# Patient Record
Sex: MALE | Race: WHITE | NOT HISPANIC OR LATINO | Employment: OTHER | URBAN - METROPOLITAN AREA
[De-identification: names, ages, dates, MRNs, and addresses within clinical notes are randomized per-mention and may not be internally consistent; named-entity substitution may affect disease eponyms.]

---

## 2020-05-07 ENCOUNTER — OFFICE VISIT (OUTPATIENT)
Dept: ALLERGY | Facility: CLINIC | Age: 75
End: 2020-05-07
Payer: MEDICARE

## 2020-05-07 VITALS
HEART RATE: 65 BPM | OXYGEN SATURATION: 96 % | HEIGHT: 73 IN | WEIGHT: 232 LBS | TEMPERATURE: 98 F | BODY MASS INDEX: 30.75 KG/M2

## 2020-05-07 DIAGNOSIS — J18.9 RECURRENT PNEUMONIA: ICD-10-CM

## 2020-05-07 DIAGNOSIS — J44.1 CHRONIC OBSTRUCTIVE PULMONARY DISEASE WITH ACUTE EXACERBATION: ICD-10-CM

## 2020-05-07 DIAGNOSIS — D83.9 CVID (COMMON VARIABLE IMMUNODEFICIENCY): Primary | ICD-10-CM

## 2020-05-07 DIAGNOSIS — Z90.81 H/O SPLENECTOMY: ICD-10-CM

## 2020-05-07 PROBLEM — D80.1 HYPOGAMMAGLOBULINEMIA: Status: ACTIVE | Noted: 2020-05-07

## 2020-05-07 PROCEDURE — 99204 OFFICE O/P NEW MOD 45 MIN: CPT | Mod: S$GLB,,, | Performed by: ALLERGY & IMMUNOLOGY

## 2020-05-07 PROCEDURE — 99204 PR OFFICE/OUTPT VISIT, NEW, LEVL IV, 45-59 MIN: ICD-10-PCS | Mod: S$GLB,,, | Performed by: ALLERGY & IMMUNOLOGY

## 2020-05-07 RX ORDER — BUDESONIDE AND FORMOTEROL FUMARATE DIHYDRATE 160; 4.5 UG/1; UG/1
2 AEROSOL RESPIRATORY (INHALATION)
Status: ON HOLD | COMMUNITY
End: 2023-08-31

## 2020-05-07 RX ORDER — ISOSORBIDE MONONITRATE 120 MG/1
120 TABLET, EXTENDED RELEASE ORAL
Status: ON HOLD | COMMUNITY
Start: 2018-04-16 | End: 2023-08-31

## 2020-05-07 RX ORDER — WARFARIN SODIUM 5 MG/1
TABLET ORAL
COMMUNITY
Start: 2020-04-15 | End: 2020-05-07

## 2020-05-07 RX ORDER — DOXYCYCLINE 100 MG/1
CAPSULE ORAL
Qty: 30 CAPSULE | Refills: 2 | Status: SHIPPED | OUTPATIENT
Start: 2020-05-07 | End: 2024-02-21

## 2020-05-07 RX ORDER — QUININE SULFATE 324 MG/1
648 CAPSULE ORAL
Status: ON HOLD | COMMUNITY
End: 2023-08-31

## 2020-05-07 RX ORDER — TIOTROPIUM BROMIDE 18 UG/1
18 CAPSULE ORAL; RESPIRATORY (INHALATION) DAILY
Status: ON HOLD | COMMUNITY
End: 2023-08-31

## 2020-05-07 RX ORDER — LEVOTHYROXINE SODIUM 25 UG/1
TABLET ORAL
COMMUNITY
Start: 2016-12-12 | End: 2023-09-20

## 2020-05-07 RX ORDER — FUROSEMIDE 40 MG/1
40 TABLET ORAL DAILY
COMMUNITY
End: 2023-11-28 | Stop reason: ALTCHOICE

## 2020-05-07 RX ORDER — SPIRONOLACTONE 50 MG/1
50 TABLET, FILM COATED ORAL DAILY
Status: ON HOLD | COMMUNITY
End: 2024-03-07 | Stop reason: HOSPADM

## 2020-05-07 RX ORDER — ACETAMINOPHEN 325 MG/1
650 TABLET ORAL EVERY 6 HOURS PRN
COMMUNITY

## 2020-05-07 RX ORDER — GABAPENTIN 100 MG/1
100 CAPSULE ORAL
COMMUNITY
Start: 2019-11-25 | End: 2023-08-23

## 2020-05-07 RX ORDER — HYDROCODONE BITARTRATE AND HOMATROPINE METHYLBROMIDE ORAL SOLUTION 5; 1.5 MG/5ML; MG/5ML
LIQUID ORAL EVERY 6 HOURS PRN
Status: ON HOLD | COMMUNITY
End: 2024-03-07 | Stop reason: HOSPADM

## 2020-05-07 RX ORDER — TEMAZEPAM 30 MG/1
30 CAPSULE ORAL NIGHTLY PRN
COMMUNITY
End: 2024-01-10

## 2020-05-07 RX ORDER — WARFARIN 2.5 MG/1
2.5 TABLET ORAL
COMMUNITY
End: 2020-05-07

## 2020-05-07 RX ORDER — ALBUTEROL SULFATE 0.83 MG/ML
2.5 SOLUTION RESPIRATORY (INHALATION)
COMMUNITY
End: 2023-08-23

## 2020-05-07 NOTE — PROGRESS NOTES
"Subjective:       Patient ID: Jose F Hanley is a 75 y.o. male.    Chief Complaint: Immunodeficiency ([facility] referral for low IgG level  688 2/11/2020)    HPI     Pt [facility] Dy[facility] NP and Dr. Yoandy Mccoy.     Pt has labs from Feb that indicate CVID.   IgG 688  IgM 24  IgA 270    Strep pneumo 6/14 2/2020 2018 11/14    Doesn't recall vaccination prior to 2018 and memory has dropped.   This qualifies him for CVID.     Tetanus is not immune, needs vaccination. 2/2020    Pt states he gets pneumonia 2-3 times per year. Last episode was late 2019 per pt report, [facility]'t [facility] that those reports currently, just labs and pft.   Pt states had recurrent pna and bronchitis in his 30's. 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     Doesn't recall recurrent sinusitis  Doesn't recall otitis media.       Pt endorses splenectomy in 5 years. He states he has had a pna vaccine last year, but his titers are low.  Epic immuniz indicates last injection was 11/2014 and PCV was 2017. Needs another ppv-23 vaccine.     Of note he has significant respiratory comorbidity.   Reviewed his PFT that shows severe obstruction.   Pt dx with " COPD" and on "shots" I assume dupixent as he states he takes it q 2 weeks.   States some improvement in his breathing and rhinitis with this.     Pre                    Post change   fvc 63%             6%  fev1 52%           9%  Ratio 62             66    Partial response to dilator  Severe obstruction.       Review of Systems    General: neg unexpected weight changes, fevers, chills, night sweats, malaise  HEENT: see hpi, Neg eye pain, vision changes, ear drainage, nose bleeds, throat tightness, sores in the mouth  CV: Neg chest pain, palpitations, swelling  Resp: see hpi,  hemoptysis  GI: see hpi, neg dysphagia, night abdominal pain, reflux, chronic diarrhea, chronic constipation  Derm: " "See Hpi, neg new rash, neg flushing  Mu/sk: Neg joint pain, joint swelling   Psych: Neg anxiety  neuro: neg chronic headaches, muscle weakness  Endo: neg heat/cold intolerance, chronic fatigue    Objective:     Vitals:    05/07/20 0933   Pulse: 65   Temp: 97.7 °F (36.5 °C)   TempSrc: Oral   SpO2: 96%   Weight: 105.2 kg (232 lb)   Height: 6' 1" (1.854 m)   no peak flow due to covid -19.      Physical Exam      General: no acute distress, well developed well nourished   HEENT:   Head:normocephalic atraumatic  Eyes: MIS, EOMI, Neg injection, scleral icterus, or conjunctival papillary hypertrophy.  Ears: tm clear bilaterally, normal canal  Nose: nares   OP: mucus membranes moist, - cobblestoning, - PND, neg erythema or lesions  Neck: supple, Full range of motion, neg lymphadenopathy  Chest: full respiratory excursion no abnormal chest abnormality  Resp: clear to ascultation bilaterally  CV: RRR, neg MRG, brisk capillary refill  Abdomen: BS+, non tender, non distended  Ext:  Neg clubbing, cyanosis, pitting edema  Skin: erythematous eyebrows, with exfoliation.   Lymph: neg supraclavicular, axillary     Assessment:       1. CVID (common variable immunodeficiency)    2. Chronic obstructive pulmonary disease with acute exacerbation    3. Recurrent pneumonia    4. H/O splenectomy        Plan:       CVID (common variable immunodeficiency)  -     doxycycline (VIBRAMYCIN) 100 MG Cap; Take 1 capsule with food to be taken on Monday Wednesday and Friday. May be photsensitive  Dispense: 30 capsule; Refill: 2    Chronic obstructive pulmonary disease with acute exacerbation    Recurrent pneumonia    H/O splenectomy      CVID  DIscussed infusion. Pt declines and would like Doxy MWF  Start infusions if he gets infections.     Needs PPV 13 if last ppv was 2014 due to splenectomy.   Needs Tetanus vaccine not immune     COPD  [facility]      Follow up in 6 weeks        Andria Alvarez " M.D.  Allergy/Immunology  [facility]   [phone] phone  [phone] fax

## 2020-05-07 NOTE — PATIENT INSTRUCTIONS
Start taking doxycycline only to be taken on Monday Wednesday Friday twice per day with food.     Let's follow up in 3 months and see how you are doing.     I recommend you get the Pneumovax -23 vaccine and Tetanus vaccine for protection.

## 2023-08-22 ENCOUNTER — TELEPHONE (OUTPATIENT)
Dept: VASCULAR SURGERY | Facility: CLINIC | Age: 78
End: 2023-08-22
Payer: MEDICARE

## 2023-08-22 NOTE — TELEPHONE ENCOUNTER
----- Message from Rebecca Quiroz LPN sent at 8/21/2023  4:54 PM CDT -----  Contact: Daughter-in-law/Lisy    ----- Message -----  From: Shamar Gaona  Sent: 8/21/2023   1:17 PM CDT  To: William PEREZ Staff    Type:  Sooner Appointment Request    Caller is requesting a sooner appointment.  Caller declined first available appointment listed below.  Caller will not accept being placed on the waitlist and is requesting a message be sent to doctor.    Name of Caller:  Daughter-in-law/Lisy  When is the first available appointment?  N/a  Symptoms:  Vasc issue, legs  Best Call Back Number:  [phone]  Additional Information:   States pt admitted to [facility], needs to see vasc surgeon to prevent amputation. Could not name exact condition

## 2023-08-22 NOTE — TELEPHONE ENCOUNTER
Appt scheduled for 8/23 - 2:00pm at [place] location, asked to be present for 1:30.  Verbalized understanding.

## 2023-08-22 NOTE — TELEPHONE ENCOUNTER
----- Message from Joaquina Lundberg sent at 8/22/2023 11:27 AM CDT -----  Caller is requesting a sooner appointment.  Caller declined first available appointment listed below.  Caller will not accept being placed on the waitlist and is requesting a message be sent to doctor.     Name of Caller:  Daughter-in-law/Lisy  When is the first available appointment?  N/a  Symptoms:  Vasc issue, legs  Best Call Back Number:  [phone]  Additional Information:   States pt discharge from [facility], needs to see vasc surgeon to prevent amputation. Could not name exact condition. Pt is at risk for losing his toes.. need to be call back asap. . Requesting a appt TODAY   please advise thank you

## 2023-08-22 NOTE — TELEPHONE ENCOUNTER
----- Message from Shamar Gaona sent at 8/21/2023  1:15 PM CDT -----  Contact: Daughter-in-law/Lisy  Type:  Sooner Appointment Request    Caller is requesting a sooner appointment.  Caller declined first available appointment listed below.  Caller will not accept being placed on the waitlist and is requesting a message be sent to doctor.    Name of Caller:  Daughter-in-law/Lisy  When is the first available appointment?  N/a  Symptoms:  Vasc issue, legs  Best Call Back Number:  [phone]  Additional Information:   States pt admitted to [place], needs to see vasc surgeon to prevent amputation. Could not name exact condition

## 2023-08-23 ENCOUNTER — OFFICE VISIT (OUTPATIENT)
Dept: VASCULAR SURGERY | Facility: CLINIC | Age: 78
End: 2023-08-23
Payer: MEDICARE

## 2023-08-23 VITALS
BODY MASS INDEX: 30.61 KG/M2 | WEIGHT: 232 LBS | DIASTOLIC BLOOD PRESSURE: 73 MMHG | HEART RATE: 76 BPM | SYSTOLIC BLOOD PRESSURE: 163 MMHG

## 2023-08-23 DIAGNOSIS — I73.9 PAD (PERIPHERAL ARTERY DISEASE): ICD-10-CM

## 2023-08-23 PROCEDURE — 99999 PR PBB SHADOW E&M-EST. PATIENT-LVL II: ICD-10-PCS | Mod: PBBFAC,,, | Performed by: THORACIC SURGERY (CARDIOTHORACIC VASCULAR SURGERY)

## 2023-08-23 PROCEDURE — 99212 OFFICE O/P EST SF 10 MIN: CPT | Mod: PBBFAC,PN | Performed by: THORACIC SURGERY (CARDIOTHORACIC VASCULAR SURGERY)

## 2023-08-23 PROCEDURE — 99204 OFFICE O/P NEW MOD 45 MIN: CPT | Mod: S$PBB,,, | Performed by: THORACIC SURGERY (CARDIOTHORACIC VASCULAR SURGERY)

## 2023-08-23 PROCEDURE — 99204 PR OFFICE/OUTPT VISIT, NEW, LEVL IV, 45-59 MIN: ICD-10-PCS | Mod: S$PBB,,, | Performed by: THORACIC SURGERY (CARDIOTHORACIC VASCULAR SURGERY)

## 2023-08-23 PROCEDURE — 99999 PR PBB SHADOW E&M-EST. PATIENT-LVL II: CPT | Mod: PBBFAC,,, | Performed by: THORACIC SURGERY (CARDIOTHORACIC VASCULAR SURGERY)

## 2023-08-23 RX ORDER — SILVER SULFADIAZINE 10 G/1000G
CREAM TOPICAL 2 TIMES DAILY
COMMUNITY
Start: 2023-08-14 | End: 2023-08-24

## 2023-08-23 RX ORDER — CEFDINIR 300 MG/1
300 CAPSULE ORAL
COMMUNITY
Start: 2023-08-21 | End: 2023-08-25

## 2023-08-23 RX ORDER — NITROGLYCERIN 0.4 MG/1
0.4 TABLET SUBLINGUAL EVERY 5 MIN PRN
COMMUNITY
Start: 2023-02-21

## 2023-08-23 RX ORDER — CYCLOBENZAPRINE HCL 10 MG
10 TABLET ORAL EVERY 8 HOURS PRN
COMMUNITY
Start: 2023-07-10 | End: 2024-07-09

## 2023-08-23 RX ORDER — OMEPRAZOLE 20 MG/1
40 CAPSULE, DELAYED RELEASE ORAL DAILY
COMMUNITY
Start: 2022-09-30

## 2023-08-23 RX ORDER — BETAMETHASONE VALERATE 0.1 %
1 LOTION (ML) TOPICAL 2 TIMES DAILY PRN
COMMUNITY
Start: 2023-02-28

## 2023-08-23 RX ORDER — CLINDAMYCIN HYDROCHLORIDE 300 MG/1
300 CAPSULE ORAL 4 TIMES DAILY
COMMUNITY
Start: 2023-08-14 | End: 2023-08-24

## 2023-08-23 NOTE — PROGRESS NOTES
This patient was referred to the office with peripheral arterial disease.  He was found to have gangrene of the left great toe.  This has gotten somewhat worse over the last couple of weeks.  The patient has chronic COPD.  He quit smoking decades ago.  He is had previous coronary bypass grafting as well as coronary stenting.  He is also had a TAVR procedure.  He has chronic atrial fibrillation.    He has had no other major recent surgeries.  Medicines are part of the epic record.  He does take daily aspirin.  On exam vital signs are stable.  Pupils are equal and round reactive to light.  Neck is supple.  Chest is equal breath sounds.  Heart is in an regular rate and rhythm.  Abdomen is benign.  Peripheral pulses are diminished.  He has dry gangrene of the left great toe.  The recent CTA is noted.  He has superficial femoral artery disease bilaterally.    Recommendation is for angiography and intervention if warranted.  It is possible that his dry gangrene of the right great toe could be secondary to embolic disease given his history of atrial fibrillation.  However he states that he is had a Watchman device.  Nevertheless at this point an angiogram will be done to treat fully the left superficial femoral artery to improve flow to the left foot and great toe.

## 2023-08-24 ENCOUNTER — TELEPHONE (OUTPATIENT)
Dept: VASCULAR SURGERY | Facility: CLINIC | Age: 78
End: 2023-08-24
Payer: MEDICARE

## 2023-08-24 DIAGNOSIS — I73.9 PAD (PERIPHERAL ARTERY DISEASE): Primary | ICD-10-CM

## 2023-08-24 NOTE — TELEPHONE ENCOUNTER
----- Message from Shamar Gaona sent at 8/24/2023  3:51 PM CDT -----  Contact: Self  Type:  Patient Returning Call    Who Called:  Patient  Who Left Message for Patient:  Rebecca  Does the patient know what this is regarding?:  Yes  Best Call Back Number:  [phone]  Additional Information:

## 2023-08-24 NOTE — TELEPHONE ENCOUNTER
Lisy NAVARRETE and patient notified of PROC 8/31 @ 730A @ [facility]         PREOP 8/29 @ 11A @ [facility]  Patient is not on any blood thinners

## 2023-08-28 DIAGNOSIS — Z79.01 LONG-TERM (CURRENT) USE OF ANTICOAGULANTS, INR GOAL 2.5-3.5: ICD-10-CM

## 2023-08-28 DIAGNOSIS — I73.9 PAD (PERIPHERAL ARTERY DISEASE): Primary | ICD-10-CM

## 2023-08-28 RX ORDER — MUPIROCIN 20 MG/G
OINTMENT TOPICAL
Status: CANCELLED | OUTPATIENT
Start: 2023-08-28

## 2023-08-28 RX ORDER — CHLORHEXIDINE GLUCONATE ORAL RINSE 1.2 MG/ML
10 SOLUTION DENTAL
Status: CANCELLED | OUTPATIENT
Start: 2023-08-28

## 2023-08-29 ENCOUNTER — HOSPITAL ENCOUNTER (OUTPATIENT)
Dept: PREADMISSION TESTING | Facility: HOSPITAL | Age: 78
Discharge: HOME | End: 2023-08-29
Attending: THORACIC SURGERY (CARDIOTHORACIC VASCULAR SURGERY)
Payer: MEDICARE

## 2023-08-29 DIAGNOSIS — I73.9 PAD (PERIPHERAL ARTERY DISEASE): ICD-10-CM

## 2023-08-29 DIAGNOSIS — Z79.01 LONG-TERM (CURRENT) USE OF ANTICOAGULANTS, INR GOAL 2.5-3.5: ICD-10-CM

## 2023-08-29 LAB
ANION GAP SERPL CALC-SCNC: 12 MMOL/L (ref 8–16)
BUN SERPL-MCNC: 16 MG/DL (ref 8–23)
CALCIUM SERPL-MCNC: 9.1 MG/DL (ref 8.7–10.5)
CHLORIDE SERPL-SCNC: 90 MMOL/L (ref 95–110)
CO2 SERPL-SCNC: 28 MMOL/L (ref 23–29)
CREAT SERPL-MCNC: 1 MG/DL (ref 0.5–1.4)
EST. GFR  (NO RACE VARIABLE): >60 ML/MIN/1.73 M^2
GLUCOSE SERPL-MCNC: 100 MG/DL (ref 70–110)
POTASSIUM SERPL-SCNC: 4 MMOL/L (ref 3.5–5.1)
SODIUM SERPL-SCNC: 130 MMOL/L (ref 136–145)

## 2023-08-29 PROCEDURE — 80048 BASIC METABOLIC PNL TOTAL CA: CPT | Performed by: THORACIC SURGERY (CARDIOTHORACIC VASCULAR SURGERY)

## 2023-08-29 RX ORDER — ASPIRIN 81 MG/1
81 TABLET ORAL DAILY
COMMUNITY
Start: 2022-08-31 | End: 2023-08-31

## 2023-08-29 RX ORDER — AMOXICILLIN 500 MG
1 CAPSULE ORAL 2 TIMES DAILY
COMMUNITY

## 2023-08-31 ENCOUNTER — HOSPITAL ENCOUNTER (OUTPATIENT)
Facility: HOSPITAL | Age: 78
Discharge: HOME | End: 2023-08-31
Attending: THORACIC SURGERY (CARDIOTHORACIC VASCULAR SURGERY) | Admitting: THORACIC SURGERY (CARDIOTHORACIC VASCULAR SURGERY)
Payer: MEDICARE

## 2023-08-31 VITALS
HEIGHT: 72 IN | BODY MASS INDEX: 36.16 KG/M2 | DIASTOLIC BLOOD PRESSURE: 67 MMHG | SYSTOLIC BLOOD PRESSURE: 146 MMHG | OXYGEN SATURATION: 91 % | HEART RATE: 68 BPM | WEIGHT: 267 LBS | RESPIRATION RATE: 22 BRPM

## 2023-08-31 DIAGNOSIS — I73.9 PAD (PERIPHERAL ARTERY DISEASE): ICD-10-CM

## 2023-08-31 LAB
ERYTHROCYTE [DISTWIDTH] IN BLOOD BY AUTOMATED COUNT: 22.8 % (ref 11.5–14.5)
HCT VFR BLD AUTO: 47.9 % (ref 40–54)
HGB BLD-MCNC: 13.8 G/DL (ref 14–18)
MCH RBC QN AUTO: 19.9 PG (ref 27–31)
MCHC RBC AUTO-ENTMCNC: 28.8 G/DL (ref 32–36)
MCV RBC AUTO: 69 FL (ref 82–98)
PLATELET # BLD AUTO: 569 K/UL (ref 150–450)
PMV BLD AUTO: 9.9 FL (ref 9.2–12.9)
RBC # BLD AUTO: 6.95 M/UL (ref 4.6–6.2)
WBC # BLD AUTO: 9.79 K/UL (ref 3.9–12.7)

## 2023-08-31 PROCEDURE — 99152 MOD SED SAME PHYS/QHP 5/>YRS: CPT | Performed by: THORACIC SURGERY (CARDIOTHORACIC VASCULAR SURGERY)

## 2023-08-31 PROCEDURE — 37224 HC FEM/POPL REVAS W/TLA: CPT | Performed by: THORACIC SURGERY (CARDIOTHORACIC VASCULAR SURGERY)

## 2023-08-31 PROCEDURE — 99153 MOD SED SAME PHYS/QHP EA: CPT | Performed by: THORACIC SURGERY (CARDIOTHORACIC VASCULAR SURGERY)

## 2023-08-31 PROCEDURE — 37230 PR TIB/PER REVASC W/STENT: CPT | Mod: LT,,, | Performed by: THORACIC SURGERY (CARDIOTHORACIC VASCULAR SURGERY)

## 2023-08-31 PROCEDURE — 75716 ARTERY X-RAYS ARMS/LEGS: CPT | Performed by: THORACIC SURGERY (CARDIOTHORACIC VASCULAR SURGERY)

## 2023-08-31 PROCEDURE — 85027 COMPLETE CBC AUTOMATED: CPT | Performed by: THORACIC SURGERY (CARDIOTHORACIC VASCULAR SURGERY)

## 2023-08-31 PROCEDURE — C1725 CATH, TRANSLUMIN NON-LASER: HCPCS | Performed by: THORACIC SURGERY (CARDIOTHORACIC VASCULAR SURGERY)

## 2023-08-31 PROCEDURE — C1887 CATHETER, GUIDING: HCPCS | Performed by: THORACIC SURGERY (CARDIOTHORACIC VASCULAR SURGERY)

## 2023-08-31 PROCEDURE — C1760 CLOSURE DEV, VASC: HCPCS | Performed by: THORACIC SURGERY (CARDIOTHORACIC VASCULAR SURGERY)

## 2023-08-31 PROCEDURE — 25000003 PHARM REV CODE 250: Performed by: THORACIC SURGERY (CARDIOTHORACIC VASCULAR SURGERY)

## 2023-08-31 PROCEDURE — 99152 PR MOD CONSCIOUS SEDATION, SAME PHYS, 5+ YRS, FIRST 15 MIN: ICD-10-PCS | Mod: ,,, | Performed by: THORACIC SURGERY (CARDIOTHORACIC VASCULAR SURGERY)

## 2023-08-31 PROCEDURE — C1894 INTRO/SHEATH, NON-LASER: HCPCS | Performed by: THORACIC SURGERY (CARDIOTHORACIC VASCULAR SURGERY)

## 2023-08-31 PROCEDURE — C2623 CATH, TRANSLUMIN, DRUG-COAT: HCPCS | Performed by: THORACIC SURGERY (CARDIOTHORACIC VASCULAR SURGERY)

## 2023-08-31 PROCEDURE — 37224 PR FEM/POPL REVAS W/TLA: ICD-10-PCS | Mod: 51,LT,, | Performed by: THORACIC SURGERY (CARDIOTHORACIC VASCULAR SURGERY)

## 2023-08-31 PROCEDURE — 63600175 PHARM REV CODE 636 W HCPCS: Performed by: THORACIC SURGERY (CARDIOTHORACIC VASCULAR SURGERY)

## 2023-08-31 PROCEDURE — 37230 PR TIB/PER REVASC W/STENT: ICD-10-PCS | Mod: LT,,, | Performed by: THORACIC SURGERY (CARDIOTHORACIC VASCULAR SURGERY)

## 2023-08-31 PROCEDURE — C1769 GUIDE WIRE: HCPCS | Performed by: THORACIC SURGERY (CARDIOTHORACIC VASCULAR SURGERY)

## 2023-08-31 PROCEDURE — 27201423 OPTIME MED/SURG SUP & DEVICES STERILE SUPPLY: Performed by: THORACIC SURGERY (CARDIOTHORACIC VASCULAR SURGERY)

## 2023-08-31 PROCEDURE — 75716 PR  ANGIO EXTERMITY BILAT: ICD-10-PCS | Mod: 26,59,, | Performed by: THORACIC SURGERY (CARDIOTHORACIC VASCULAR SURGERY)

## 2023-08-31 PROCEDURE — 37230 HC TIB/PER REVASC W/STENT: CPT | Performed by: THORACIC SURGERY (CARDIOTHORACIC VASCULAR SURGERY)

## 2023-08-31 PROCEDURE — 75716 ARTERY X-RAYS ARMS/LEGS: CPT | Mod: 26,59,, | Performed by: THORACIC SURGERY (CARDIOTHORACIC VASCULAR SURGERY)

## 2023-08-31 PROCEDURE — C1874 STENT, COATED/COV W/DEL SYS: HCPCS | Performed by: THORACIC SURGERY (CARDIOTHORACIC VASCULAR SURGERY)

## 2023-08-31 PROCEDURE — 99152 MOD SED SAME PHYS/QHP 5/>YRS: CPT | Mod: ,,, | Performed by: THORACIC SURGERY (CARDIOTHORACIC VASCULAR SURGERY)

## 2023-08-31 PROCEDURE — 37224 PR FEM/POPL REVAS W/TLA: CPT | Mod: 51,LT,, | Performed by: THORACIC SURGERY (CARDIOTHORACIC VASCULAR SURGERY)

## 2023-08-31 DEVICE — STENT ONYXNG30038UX ONYX 3.00X38RX
Type: IMPLANTABLE DEVICE | Site: LEG | Status: FUNCTIONAL
Brand: ONYX FRONTIER™

## 2023-08-31 DEVICE — ANGIO-SEAL VIP VASCULAR CLOSURE DEVICE
Type: IMPLANTABLE DEVICE | Site: LEG | Status: FUNCTIONAL
Brand: ANGIO-SEAL

## 2023-08-31 RX ORDER — CLOPIDOGREL BISULFATE 75 MG/1
75 TABLET ORAL DAILY
Qty: 30 TABLET | Refills: 11 | Status: SHIPPED | OUTPATIENT
Start: 2023-08-31 | End: 2024-01-10

## 2023-08-31 RX ORDER — HEPARIN SODIUM 1000 [USP'U]/ML
INJECTION, SOLUTION INTRAVENOUS; SUBCUTANEOUS
Status: DISCONTINUED | OUTPATIENT
Start: 2023-08-31 | End: 2023-08-31 | Stop reason: HOSPADM

## 2023-08-31 RX ORDER — SODIUM CHLORIDE 9 MG/ML
INJECTION, SOLUTION INTRAVENOUS CONTINUOUS
Status: DISCONTINUED | OUTPATIENT
Start: 2023-08-31 | End: 2023-08-31 | Stop reason: HOSPADM

## 2023-08-31 RX ORDER — HYDROCODONE BITARTRATE AND ACETAMINOPHEN 5; 325 MG/1; MG/1
1 TABLET ORAL EVERY 4 HOURS PRN
Status: DISCONTINUED | OUTPATIENT
Start: 2023-08-31 | End: 2023-08-31 | Stop reason: HOSPADM

## 2023-08-31 RX ORDER — NITROGLYCERIN 5 MG/ML
INJECTION, SOLUTION INTRAVENOUS
Status: DISCONTINUED | OUTPATIENT
Start: 2023-08-31 | End: 2023-08-31 | Stop reason: HOSPADM

## 2023-08-31 RX ORDER — LIDOCAINE HYDROCHLORIDE 10 MG/ML
INJECTION, SOLUTION EPIDURAL; INFILTRATION; INTRACAUDAL; PERINEURAL
Status: DISCONTINUED | OUTPATIENT
Start: 2023-08-31 | End: 2023-08-31 | Stop reason: HOSPADM

## 2023-08-31 RX ORDER — ACETAMINOPHEN 325 MG/1
650 TABLET ORAL EVERY 4 HOURS PRN
Status: DISCONTINUED | OUTPATIENT
Start: 2023-08-31 | End: 2023-08-31 | Stop reason: HOSPADM

## 2023-08-31 RX ORDER — CLOPIDOGREL BISULFATE 75 MG/1
TABLET ORAL
Status: DISCONTINUED
Start: 2023-08-31 | End: 2023-08-31 | Stop reason: HOSPADM

## 2023-08-31 RX ORDER — MUPIROCIN 20 MG/G
OINTMENT TOPICAL
Status: DISCONTINUED | OUTPATIENT
Start: 2023-08-31 | End: 2023-08-31 | Stop reason: HOSPADM

## 2023-08-31 RX ORDER — ONDANSETRON 4 MG/1
8 TABLET, ORALLY DISINTEGRATING ORAL EVERY 8 HOURS PRN
Status: DISCONTINUED | OUTPATIENT
Start: 2023-08-31 | End: 2023-08-31 | Stop reason: HOSPADM

## 2023-08-31 RX ORDER — FENTANYL CITRATE 50 UG/ML
INJECTION, SOLUTION INTRAMUSCULAR; INTRAVENOUS
Status: DISCONTINUED | OUTPATIENT
Start: 2023-08-31 | End: 2023-08-31 | Stop reason: HOSPADM

## 2023-08-31 RX ORDER — CHLORHEXIDINE GLUCONATE ORAL RINSE 1.2 MG/ML
10 SOLUTION DENTAL
Status: DISCONTINUED | OUTPATIENT
Start: 2023-08-31 | End: 2023-08-31 | Stop reason: HOSPADM

## 2023-08-31 RX ORDER — MIDAZOLAM HYDROCHLORIDE 1 MG/ML
INJECTION INTRAMUSCULAR; INTRAVENOUS
Status: DISCONTINUED | OUTPATIENT
Start: 2023-08-31 | End: 2023-08-31 | Stop reason: HOSPADM

## 2023-08-31 RX ORDER — CLOPIDOGREL BISULFATE 75 MG/1
75 TABLET ORAL DAILY
Status: DISCONTINUED | OUTPATIENT
Start: 2023-08-31 | End: 2023-08-31 | Stop reason: HOSPADM

## 2023-08-31 RX ADMIN — SODIUM CHLORIDE: 0.9 INJECTION, SOLUTION INTRAVENOUS at 09:08

## 2023-08-31 RX ADMIN — CLOPIDOGREL BISULFATE 75 MG: 75 TABLET, FILM COATED ORAL at 12:08

## 2023-08-31 NOTE — NURSING
Consulted podiatry per Dr Thomas order, called podiatry , MD's out of town till after Sept 5, referred to outpt wound care, wound care will call pt to set up appointment

## 2023-08-31 NOTE — OP NOTE
This is Dr. Thomas dictating with date of service being 31st August 2023.    Preoperative diagnosis:  Peripheral arterial disease with gangrene of the left great toe.    Postoperative diagnosis:  Same.    Operation: Conscious moderate sedation with aortography and lower extremity runoff as well as selective left lower extremity angiography with angioplasty of the left popliteal artery above the knee and angioplasty and stenting of the proximal anterior tibial artery with 3 mm drug coated balloon expandable stent and angioplasty of the more distal anterior tibial artery with 3 mm angioplasty balloon.  Operation detail:  The patient was prepped and draped in usual sterile fashion.  Conscious moderate sedation was used consisting of fentanyl and Versed in the doses described in nursing notes.  Continuous pulse oximetry, telemetry and blood pressure were monitored during the procedure which took about an hour.    Local anesthesia was infiltrated over the right common femoral artery which was punctured.  A J-wire was advanced.  Sheath was placed.  With catheter and guidewire techniques an Omni flush catheter was placed into the abdominal aorta where aortography was performed revealing patency of the infrarenal abdominal aorta to and through its bifurcation.  The common, external and internal iliac arteries were patent.    The catheter was brought down to the bifurcation and runoff was then obtained revealing patency on the right side of the common, deep and superficial femoral arteries.  The popliteal artery was patent as well.  The tibial vessels were severely diseased with single-vessel runoff primarily through the peroneal artery.    On the left side the common and deep femoral arteries were patent.  The superficial femoral artery was patent as well.  The popliteal artery above the knee had a stenosis in the range of 60-70%.  The popliteal artery below the knee was patent.  The posterior tibial appeared be  chronically occluded.  The peroneal artery had a high-grade lesion at its origin.  The anterior tibial artery which appeared to be the predominant vessel had a subtotal occlusion at its origin as well.    Based on diagnostic findings intervention was then performed.  The patient was heparinized.  After adequate circulation of the heparin a long sheath was advanced from the right to the left external iliac artery.  With catheter and guidewire techniques the lesions within the popliteal artery and anterior tibial artery were crossed.  This was done with an 014 wire.  The lesion within the proximal anterior tibial artery was treated with 2.5 and 3 mm angioplasty balloons.  However persistent high-grade stenosis remained.  This was treated with a 3 mm balloon expandable stent.  An excellent result was achieved with little to no residual stenosis.    The more distal anterior tibial artery also had a significant lesion.  This was treated with a 2.5 followed by a 3 mm angioplasty balloon.  A very good result was achieved.    The procedure was then concluded.  The catheters and wires were removed.  The puncture site in the right common femoral artery was secured with an Angio-Seal closure system.    There were no apparent complications.  Estimated blood loss was minimal.  The patient was brought to the recovery area in satisfactory condition.

## 2023-08-31 NOTE — Clinical Note
An angiography was performed of the ostial, proximal, mid and distal left popliteal artery, anterior tibial artery, peroneal artery and posterior tibial artery

## 2023-08-31 NOTE — Clinical Note
240 ml of contrast were injected throughout the case. 50 mL of contrast was the total wasted during the case. 290 mL was the total amount used during the case.

## 2023-08-31 NOTE — Clinical Note
An angiography was performed of the ostial, proximal, mid and distal left common femoral artery via power injection. Injection was performed with 80 mL contrast at 8 mL/s. The power injection PSI was 500.  Bilateral leg run off performed

## 2023-09-05 ENCOUNTER — OFFICE VISIT (OUTPATIENT)
Dept: WOUND CARE | Facility: HOSPITAL | Age: 78
End: 2023-09-05
Attending: PODIATRIST
Payer: MEDICARE

## 2023-09-05 VITALS
SYSTOLIC BLOOD PRESSURE: 149 MMHG | RESPIRATION RATE: 18 BRPM | TEMPERATURE: 99 F | DIASTOLIC BLOOD PRESSURE: 75 MMHG | HEART RATE: 79 BPM

## 2023-09-05 DIAGNOSIS — I96 GANGRENE: ICD-10-CM

## 2023-09-05 DIAGNOSIS — I96 GANGRENE OF LEFT FOOT: ICD-10-CM

## 2023-09-05 DIAGNOSIS — L08.9 WOUND INFECTION: ICD-10-CM

## 2023-09-05 DIAGNOSIS — L02.619 CELLULITIS AND ABSCESS OF FOOT: ICD-10-CM

## 2023-09-05 DIAGNOSIS — Z91.89 AT HIGH RISK FOR INADEQUATE NUTRITIONAL INTAKE: ICD-10-CM

## 2023-09-05 DIAGNOSIS — M79.672 FOOT PAIN, LEFT: ICD-10-CM

## 2023-09-05 DIAGNOSIS — L03.119 CELLULITIS AND ABSCESS OF FOOT: ICD-10-CM

## 2023-09-05 DIAGNOSIS — Z09 HOSPITAL DISCHARGE FOLLOW-UP: ICD-10-CM

## 2023-09-05 DIAGNOSIS — T14.8XXA WOUND INFECTION: ICD-10-CM

## 2023-09-05 DIAGNOSIS — I73.9 PVD (PERIPHERAL VASCULAR DISEASE): ICD-10-CM

## 2023-09-05 DIAGNOSIS — L97.524 ULCER OF LEFT FOOT WITH NECROSIS OF BONE: Primary | ICD-10-CM

## 2023-09-05 DIAGNOSIS — I73.9 PAD (PERIPHERAL ARTERY DISEASE): ICD-10-CM

## 2023-09-05 DIAGNOSIS — R26.2 DIFFICULTY IN WALKING, NOT ELSEWHERE CLASSIFIED: ICD-10-CM

## 2023-09-05 PROCEDURE — 99204 OFFICE O/P NEW MOD 45 MIN: CPT | Mod: 25,,, | Performed by: PODIATRIST

## 2023-09-05 PROCEDURE — 99204 PR OFFICE/OUTPT VISIT, NEW, LEVL IV, 45-59 MIN: ICD-10-PCS | Mod: 25,,, | Performed by: PODIATRIST

## 2023-09-05 PROCEDURE — 11043 DBRDMT MUSC&/FSCA 1ST 20/<: CPT | Mod: ,,, | Performed by: PODIATRIST

## 2023-09-05 PROCEDURE — 11043 PR DEBRIDEMENT, SKIN, SUB-Q TISSUE,MUSCLE,=<20 SQ CM: ICD-10-PCS | Mod: ,,, | Performed by: PODIATRIST

## 2023-09-05 PROCEDURE — 87075 CULTR BACTERIA EXCEPT BLOOD: CPT | Performed by: PODIATRIST

## 2023-09-05 PROCEDURE — 11043 DBRDMT MUSC&/FSCA 1ST 20/<: CPT | Performed by: PODIATRIST

## 2023-09-05 PROCEDURE — 87070 CULTURE OTHR SPECIMN AEROBIC: CPT | Performed by: PODIATRIST

## 2023-09-05 RX ORDER — DOXYCYCLINE 100 MG/1
100 CAPSULE ORAL EVERY 12 HOURS
Qty: 28 CAPSULE | Refills: 0 | Status: SHIPPED | OUTPATIENT
Start: 2023-09-05 | End: 2023-09-19

## 2023-09-05 NOTE — PROGRESS NOTES
[address] [facility] [address]  [place] [address]  Phone: [phone]   [phone]    Patient's PCP:Sunita Rivera MD  Referring Provider: Aaareferral Self    Subjective:      Chief Complaint:: Wound Care, Wound Infection, Wound Consult, Wound Check, Non-healing Wound, Numbness, Pressure Ulcer, Poor Circulation, Foot Ulcer, Difficulty Walking, Foot Pain, and Hospital Follow Up    HPI  Jose F Hanley is a 78 y.o. male who presents today with a complaint of left great toe wound.  See wound docs documentation for full assessment and evaluation of wound.    First visit with patient.  Reviewed all notes from patients medical chart from last 12 months, including imaging, procedures, studies, progress notes,  cultures, antibiotic regimens, photos,  etc.       Patient had recent vascular intervention.  Reviewed operative note.  Patient under the care of vascular surgery.      EVALUATION, ASSESSMENT, & PLAN:     1. Debridement.See Wound Docs for assessment of wounds and procedure notes  2. Continue taking all medications as prescribed  3. Dressing changes, see Wound docs for dressing change orders  4. RTC one week   5. Counseled patient on increasing protein intake, not getting wound wet, keeping dressing clean dry and intact, following a healthy diet, elevating legs when able, removing pressure from wound    Total time spent for E&M 40  Total time for debridement 35 minutes       Culture taken of wound.  I will adjust antibiotics accordingly once the results of the culture return    Bone scan ordered to rule out deeper infection       Proper ulcer care and the possible need for serial debridements, topical medications, specific dressings and biological engineered skin substitutes if indicated.    Patient should call the office immediately if any signs of infection, such as fever, chills, sweats, increased redness or pain.    Patient was instructed to call the clinic or go to the emergency department if their  symptoms do not improve, worsens, or if new symptoms develop.  Patient was advised that if any increased swelling, pain, or numbness arise to go immediately to the ED. Patient knows to call any time if an emergency arises. Shared decision making occurred and patient verbalized understanding in agreement with this plan.       >50% of this > 60 minute visit was spent face to face educating/counseling the patient    I spent a total of 60 minutes on the day of the visit.This includes face to face time and non-face to face time preparing to see the patient (eg, review of tests), obtaining and/or reviewing separately obtained history, documenting clinical information in the electronic or other health record, independently interpreting results and communicating results to the patient/family/caregiver, or care coordinator.       Much of the documentation for this visit was completed in the Wound Docs system.  Please see the attached documentation for further details about the patient's care. Scanned under the Media tab.        Radha GodoyARIANNE bolden       Vitals:    09/05/23 1634   BP: (!) 149/75   Pulse: 79   Resp: 18   Temp: 98.7 °F (37.1 °C)   PainSc:   2      Shoe Size:     Past Surgical History:   Procedure Laterality Date    AORTOGRAPHY WITH SERIALOGRAPHY N/A 8/31/2023    Procedure: AORTOGRAM, WITH SERIALOGRAPHY;  Surgeon: Gary Thomas MD;  Location: [facility] CATH/EP LAB;  Service: Peripheral Vascular;  Laterality: N/A;    CORONARY ARTERY BYPASS GRAFT      PTA, ANTERIOR TIBIAL Left 8/31/2023    Procedure: PTA, Anterior Tibial;  Surgeon: Gary Thomas MD;  Location: [facility] CATH/EP LAB;  Service: Peripheral Vascular;  Laterality: Left;    PTA, SUPERFICIAL FEMORAL ARTERY Left 8/31/2023    Procedure: PTA, Superficial Femoral Artery;  Surgeon: Gary Thomas MD;  Location: [facility] CATH/EP LAB;  Service: Peripheral Vascular;  Laterality: Left;    SPLENECTOMY      STENT, ANTERIOR TIBIAL Left 8/31/2023     Procedure: Stent, Anterior Tibial;  Surgeon: Gary Thomas MD;  Location: [facility] CATH/EP LAB;  Service: Peripheral Vascular;  Laterality: Left;     Past Medical History:   Diagnosis Date    COPD (chronic obstructive pulmonary disease)     HLD (hyperlipidemia)     HTN (hypertension)     PAD (peripheral artery disease)      History reviewed. No pertinent family history.     Social History:   Marital Status:   Alcohol History:  reports that he does not currently use alcohol after a past usage of about 21.0 standard drinks of alcohol per week.  Tobacco History:  reports that he quit smoking about 35 years ago. His smoking use included cigarettes. He has never used smokeless tobacco.  Drug History:  reports no history of drug use.    Review of patient's allergies indicates:  No Known Allergies    Current Outpatient Medications   Medication Sig Dispense Refill    acetaminophen (TYLENOL) 325 MG tablet Take 650 mg by mouth.      betamethasone valerate 0.1% (VALISONE) 0.1 % Lotn Apply to scalp bid prn rash      clopidogreL (PLAVIX) 75 mg tablet Take 1 tablet (75 mg total) by mouth once daily. 30 tablet 11    cyclobenzaprine (FLEXERIL) 10 MG tablet Take 10 mg by mouth every 8 (eight) hours as needed.      doxycycline (VIBRAMYCIN) 100 MG Cap Take 1 capsule with food to be taken on Monday Wednesday and Friday. May be photsensitive 30 capsule 2    doxycycline (VIBRAMYCIN) 100 MG Cap Take 1 capsule (100 mg total) by mouth every 12 (twelve) hours. for 14 days 28 capsule 0    furosemide (LASIX) 40 MG tablet Take 40 mg by mouth once daily.      hydrocodone-homatropine 5-1.5 mg/5 ml (HYCODAN) 5-1.5 mg/5 mL Syrp Take by mouth.      levothyroxine (SYNTHROID) 25 MCG tablet TAKE 1 TABLET DAILY      NEXIUM 40 mg capsule Take 40 mg by mouth once daily.       nitroGLYCERIN (NITROSTAT) 0.4 MG SL tablet Place 0.4 mg under the tongue every 5 (five) minutes as needed.      omega-3 fatty acids/fish oil (FISH OIL-OMEGA-3 FATTY ACIDS)  300-1,000 mg capsule Take 1 capsule by mouth 2 (two) times daily.      omeprazole (PRILOSEC) 20 MG capsule 40 mg.      pregabalin (LYRICA) 100 MG capsule Take 100 mg by mouth 2 (two) times daily.        spironolactone (ALDACTONE) 50 MG tablet Take 50 mg by mouth once daily.      temazepam (RESTORIL) 30 mg capsule Take 30 mg by mouth nightly as needed for Insomnia.      TESTOSTERONE PROPIONATE, BULK, MISC Inject 1 each as directed every 14 (fourteen) days.      TOPROL XL 25 mg 24 hr tablet Take 25 mg by mouth once daily.        No current facility-administered medications for this visit.       Review of Systems   Constitutional:  Negative for chills, fatigue, fever and unexpected weight change.   HENT:  Negative for hearing loss and trouble swallowing.    Eyes:  Negative for photophobia and visual disturbance.   Respiratory:  Negative for cough, shortness of breath and wheezing.    Cardiovascular:  Negative for chest pain, palpitations and leg swelling.   Gastrointestinal:  Negative for abdominal pain and nausea.   Genitourinary:  Negative for dysuria and frequency.   Musculoskeletal:  Negative for arthralgias, back pain, gait problem, joint swelling and myalgias.   Skin:  Positive for wound. Negative for rash.   Neurological:  Positive for numbness. Negative for tremors, seizures, weakness and headaches.   Hematological:  Does not bruise/bleed easily.         Objective:        Physical Exam:   Foot Exam  Physical Exam  Ortho/SPM Exam     Imaging:            Assessment:       1. 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 PAD (peripheral artery disease)    3. At high risk for inadequate nutritional intake    4. Cellulitis and abscess of foot    5. PVD (peripheral vascular disease)    6. Gangrene    7. Gangrene of left foot    8. Foot pain, left    9. Difficulty in walking, not elsewhere classified    10. Hospital discharge follow-up    11. Wound infection      Plan:   Ulcer of left foot with necrosis of  bone    PAD (peripheral artery disease)    At high risk for inadequate nutritional intake    Cellulitis and abscess of foot  -     NM Bone Scan 3 Phase Foot; Future; Expected date: 09/05/2023  -     Culture, Anaerobic  -     CULTURE, AEROBIC  (SPECIFY SOURCE)  -     doxycycline (VIBRAMYCIN) 100 MG Cap; Take 1 capsule (100 mg total) by mouth every 12 (twelve) hours. for 14 days  Dispense: 28 capsule; Refill: 0    PVD (peripheral vascular disease)    Gangrene    Gangrene of left foot    Foot pain, left    Difficulty in walking, not elsewhere classified    Hospital discharge follow-up    Wound infection      Follow up in about 1 week (around 9/12/2023).    Procedures          Counseling:     I provided patient education verbally regarding:   Patient diagnosis, treatment options, as well as alternatives, risks, and benefits.     This note was created using Dragon voice recognition software that occasionally misinterpreted phrases or words.

## 2023-09-08 LAB — BACTERIA SPEC AEROBE CULT: NORMAL

## 2023-09-11 ENCOUNTER — TELEPHONE (OUTPATIENT)
Dept: VASCULAR SURGERY | Facility: CLINIC | Age: 78
End: 2023-09-11
Payer: MEDICARE

## 2023-09-11 LAB — BACTERIA SPEC ANAEROBE CULT: NORMAL

## 2023-09-12 ENCOUNTER — OFFICE VISIT (OUTPATIENT)
Dept: WOUND CARE | Facility: HOSPITAL | Age: 78
End: 2023-09-12
Attending: PODIATRIST
Payer: MEDICARE

## 2023-09-12 VITALS
TEMPERATURE: 98 F | DIASTOLIC BLOOD PRESSURE: 76 MMHG | HEIGHT: 72 IN | SYSTOLIC BLOOD PRESSURE: 145 MMHG | HEART RATE: 80 BPM | RESPIRATION RATE: 18 BRPM | WEIGHT: 264 LBS | BODY MASS INDEX: 35.76 KG/M2

## 2023-09-12 DIAGNOSIS — Z09 HOSPITAL DISCHARGE FOLLOW-UP: ICD-10-CM

## 2023-09-12 DIAGNOSIS — Z91.89 AT HIGH RISK FOR INADEQUATE NUTRITIONAL INTAKE: ICD-10-CM

## 2023-09-12 DIAGNOSIS — T14.8XXA WOUND INFECTION: ICD-10-CM

## 2023-09-12 DIAGNOSIS — I73.9 PAD (PERIPHERAL ARTERY DISEASE): ICD-10-CM

## 2023-09-12 DIAGNOSIS — L97.524 ULCER OF LEFT FOOT WITH NECROSIS OF BONE: Primary | ICD-10-CM

## 2023-09-12 DIAGNOSIS — I96 GANGRENE OF LEFT FOOT: ICD-10-CM

## 2023-09-12 DIAGNOSIS — L02.619 CELLULITIS AND ABSCESS OF FOOT: ICD-10-CM

## 2023-09-12 DIAGNOSIS — L03.119 CELLULITIS AND ABSCESS OF FOOT: ICD-10-CM

## 2023-09-12 DIAGNOSIS — L08.9 WOUND INFECTION: ICD-10-CM

## 2023-09-12 DIAGNOSIS — I96 GANGRENE: ICD-10-CM

## 2023-09-12 DIAGNOSIS — R26.2 DIFFICULTY IN WALKING, NOT ELSEWHERE CLASSIFIED: ICD-10-CM

## 2023-09-12 DIAGNOSIS — M79.672 FOOT PAIN, LEFT: ICD-10-CM

## 2023-09-12 DIAGNOSIS — I73.9 PVD (PERIPHERAL VASCULAR DISEASE): ICD-10-CM

## 2023-09-12 PROCEDURE — 11043 DBRDMT MUSC&/FSCA 1ST 20/<: CPT | Performed by: PODIATRIST

## 2023-09-12 PROCEDURE — 99214 PR OFFICE/OUTPT VISIT, EST, LEVL IV, 30-39 MIN: ICD-10-PCS | Mod: 25,,, | Performed by: PODIATRIST

## 2023-09-12 PROCEDURE — 11043 PR DEBRIDEMENT, SKIN, SUB-Q TISSUE,MUSCLE,=<20 SQ CM: ICD-10-PCS | Mod: ,,, | Performed by: PODIATRIST

## 2023-09-12 PROCEDURE — 11043 DBRDMT MUSC&/FSCA 1ST 20/<: CPT | Mod: ,,, | Performed by: PODIATRIST

## 2023-09-12 PROCEDURE — 99214 OFFICE O/P EST MOD 30 MIN: CPT | Mod: 25,,, | Performed by: PODIATRIST

## 2023-09-12 NOTE — PROGRESS NOTES
[address] [facility] [address]  [place] [address]  Phone: [phone]   [phone]    Patient's PCP:Sunita Rivera MD  Referring Provider: Aaarefnelly Barrett    Subjective:      Chief Complaint:: Wound Care, Wound Check, Non-healing Wound, Peripheral Neuropathy, Poor Circulation, Numbness, Foot Ulcer, Foot Pain, Difficulty Walking, and Results (Discuss results of recent culture taken)    VIANEY Hanley is a 78 y.o. male who presents today with a complaint of left great toe wound.  See wound docs documentation for full assessment and evaluation of wound.    Additionally, patient presents to discuss results of recent culture taken.     First visit with patient.  Reviewed all notes from patients medical chart from last 12 months, including imaging, procedures, studies, progress notes,  cultures, antibiotic regimens, photos,  etc.         Patient had recent vascular intervention.  Reviewed operative note.  Patient under the care of vascular surgery.        EVALUATION, ASSESSMENT, & PLAN:      1. Debridement.See Wound Docs for assessment of wounds and procedure notes  2. Continue taking all medications as prescribed  3. Dressing changes, see Wound docs for dressing change orders  5. Counseled patient on increasing protein intake, not getting wound wet, keeping dressing clean dry and intact, following a healthy diet, elevating legs when able, removing pressure from wound     Total time spent for E&M 40  Total time for debridement 35 minutes        Bone scan ordered to rule out deeper infection         Proper ulcer care and the possible need for serial debridements, topical medications, specific dressings and biological engineered skin substitutes if indicated.     Patient should call the office immediately if any signs of infection, such as fever, chills, sweats, increased redness or pain.     Patient was instructed to call the clinic or go to the emergency department if their symptoms do not improve,  worsens, or if new symptoms develop.  Patient was advised that if any increased swelling, pain, or numbness arise to go immediately to the ED. Patient knows to call any time if an emergency arises. Shared decision making occurred and patient verbalized understanding in agreement with this plan.         >50% of this > 60 minute visit was spent face to face educating/counseling the patient     I spent a total of 60 minutes on the day of the visit.This includes face to face time and non-face to face time preparing to see the patient (eg, review of tests), obtaining and/or reviewing separately obtained history, documenting clinical information in the electronic or other health record, independently interpreting results and communicating results to the patient/family/caregiver, or care coordinator.        Much of the documentation for this visit was completed in the Wound Docs system.  Please see the attached documentation for further details about the patient's care. Scanned under the Media tab.         Radha Costello DPM     Vitals:    09/12/23 1413   BP: (!) 145/76   Pulse: 80   Resp: 18   Temp: 98.4 °F (36.9 °C)   Weight: 119.7 kg (264 lb)   Height: 6' (1.829 m)   PainSc: 0-No pain      Shoe Size:     Past Surgical History:   Procedure Laterality Date    AORTOGRAPHY WITH SERIALOGRAPHY N/A 8/31/2023    Procedure: AORTOGRAM, WITH SERIALOGRAPHY;  Surgeon: Gary Thomas MD;  Location: [facility] CATH/EP LAB;  Service: Peripheral Vascular;  Laterality: N/A;    CORONARY ARTERY BYPASS GRAFT      PTA, ANTERIOR TIBIAL Left 8/31/2023    Procedure: PTA, Anterior Tibial;  Surgeon: Gary Thomas MD;  Location: [facility] CATH/EP LAB;  Service: Peripheral Vascular;  Laterality: Left;    PTA, SUPERFICIAL FEMORAL ARTERY Left 8/31/2023    Procedure: PTA, Superficial Femoral Artery;  Surgeon: Gary Thomas MD;  Location: [facility] CATH/EP LAB;  Service: Peripheral Vascular;  Laterality: Left;    SPLENECTOMY      STENT, ANTERIOR  TIBIAL Left 8/31/2023    Procedure: Stent, Anterior Tibial;  Surgeon: Gary Thomas MD;  Location: [facility] CATH/EP LAB;  Service: Peripheral Vascular;  Laterality: Left;     Past Medical History:   Diagnosis Date    COPD (chronic obstructive pulmonary disease)     HLD (hyperlipidemia)     HTN (hypertension)     PAD (peripheral artery disease)      History reviewed. No pertinent family history.     Social History:   Marital Status:   Alcohol History:  reports that he does not currently use alcohol after a past usage of about 21.0 standard drinks of alcohol per week.  Tobacco History:  reports that he quit smoking about 35 years ago. His smoking use included cigarettes. He has never used smokeless tobacco.  Drug History:  reports no history of drug use.    Review of patient's allergies indicates:  No Known Allergies    Current Outpatient Medications   Medication Sig Dispense Refill    acetaminophen (TYLENOL) 325 MG tablet Take 650 mg by mouth.      betamethasone valerate 0.1% (VALISONE) 0.1 % Lotn Apply to scalp bid prn rash      clopidogreL (PLAVIX) 75 mg tablet Take 1 tablet (75 mg total) by mouth once daily. 30 tablet 11    cyclobenzaprine (FLEXERIL) 10 MG tablet Take 10 mg by mouth every 8 (eight) hours as needed.      doxycycline (VIBRAMYCIN) 100 MG Cap Take 1 capsule with food to be taken on Monday Wednesday and Friday. May be photsensitive 30 capsule 2    doxycycline (VIBRAMYCIN) 100 MG Cap Take 1 capsule (100 mg total) by mouth every 12 (twelve) hours. for 14 days 28 capsule 0    furosemide (LASIX) 40 MG tablet Take 40 mg by mouth once daily.      hydrocodone-homatropine 5-1.5 mg/5 ml (HYCODAN) 5-1.5 mg/5 mL Syrp Take by mouth.      levothyroxine (SYNTHROID) 25 MCG tablet TAKE 1 TABLET DAILY      NEXIUM 40 mg capsule Take 40 mg by mouth once daily.       nitroGLYCERIN (NITROSTAT) 0.4 MG SL tablet Place 0.4 mg under the tongue every 5 (five) minutes as needed.      omega-3 fatty acids/fish oil (FISH  OIL-OMEGA-3 FATTY ACIDS) 300-1,000 mg capsule Take 1 capsule by mouth 2 (two) times daily.      omeprazole (PRILOSEC) 20 MG capsule 40 mg.      pregabalin (LYRICA) 100 MG capsule Take 100 mg by mouth 2 (two) times daily.        spironolactone (ALDACTONE) 50 MG tablet Take 50 mg by mouth once daily.      temazepam (RESTORIL) 30 mg capsule Take 30 mg by mouth nightly as needed for Insomnia.      TESTOSTERONE PROPIONATE, BULK, MISC Inject 1 each as directed every 14 (fourteen) days.      TOPROL XL 25 mg 24 hr tablet Take 25 mg by mouth once daily.        No current facility-administered medications for this visit.       Review of Systems   Constitutional:  Negative for chills, fatigue, fever and unexpected weight change.   HENT:  Negative for hearing loss and trouble swallowing.    Eyes:  Negative for photophobia and visual disturbance.   Respiratory:  Negative for cough, shortness of breath and wheezing.    Cardiovascular:  Negative for chest pain, palpitations and leg swelling.   Gastrointestinal:  Negative for abdominal pain and nausea.   Genitourinary:  Negative for dysuria and frequency.   Musculoskeletal:  Negative for arthralgias, back pain, gait problem, joint swelling and myalgias.   Skin:  Positive for wound. Negative for rash.   Neurological:  Positive for numbness. Negative for tremors, seizures, weakness and headaches.   Hematological:  Does not bruise/bleed easily.         Objective:        Physical Exam:   Foot Exam  Physical Exam  Ortho/SPM Exam     Imaging:            Assessment:       1. 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 PAD (peripheral artery disease)    3. At high risk for inadequate nutritional intake    4. Gangrene    5. PVD (peripheral vascular disease)    6. Foot pain, left    7. Difficulty in walking, not elsewhere classified    8. Wound infection    9. Hospital discharge follow-up    10. Gangrene of left foot    11. Cellulitis and abscess of foot      Plan:   Ulcer of left foot  with necrosis of bone  -     Ambulatory referral/consult to Cardiovascular Surgery; Future; Expected date: 09/19/2023    PAD (peripheral artery disease)    At high risk for inadequate nutritional intake    Gangrene    PVD (peripheral vascular disease)  -     Ambulatory referral/consult to Cardiovascular Surgery; Future; Expected date: 09/19/2023    Foot pain, left    Difficulty in walking, not elsewhere classified    Wound infection    Hospital discharge follow-up    Gangrene of left foot    Cellulitis and abscess of foot      Follow up in about 1 week (around 9/19/2023).    Procedures          Counseling:     I provided patient education verbally regarding:   Patient diagnosis, treatment options, as well as alternatives, risks, and benefits.     This note was created using Dragon voice recognition software that occasionally misinterpreted phrases or words.

## 2023-09-13 ENCOUNTER — TELEPHONE (OUTPATIENT)
Dept: VASCULAR SURGERY | Facility: CLINIC | Age: 78
End: 2023-09-13
Payer: MEDICARE

## 2023-09-13 DIAGNOSIS — I73.9 PAD (PERIPHERAL ARTERY DISEASE): Primary | ICD-10-CM

## 2023-09-13 NOTE — TELEPHONE ENCOUNTER
Pt scheduled for arterial u/s for Friday 9/15 at 1pm at [place] location. Pt verbalized understanding.     ----- Message from Deann Cohen sent at 9/13/2023  1:26 PM CDT -----  Regarding: appointment  Contact: patient  Type:  Sooner Appointment Request    Caller is requesting a sooner appointment.  Caller declined first available appointment listed below.  Caller will not accept being placed on the waitlist and is requesting a message be sent to doctor.    Name of Caller:  patient  When is the first available appointment?    Symptoms:  blood flow in legs  Best Call Back Number:  [phone] (home)     Additional Information:  Patient states when he went to Dr. Costello she stated he isn't getting enough blood flow in legs.  Please call patient to advise.  thanks

## 2023-09-15 ENCOUNTER — HOSPITAL ENCOUNTER (OUTPATIENT)
Dept: RADIOLOGY | Facility: HOSPITAL | Age: 78
Discharge: HOME | End: 2023-09-15
Attending: PODIATRIST
Payer: MEDICARE

## 2023-09-15 ENCOUNTER — HOSPITAL ENCOUNTER (OUTPATIENT)
Dept: RADIOLOGY | Facility: HOSPITAL | Age: 78
Discharge: HOME | End: 2023-09-15
Attending: THORACIC SURGERY (CARDIOTHORACIC VASCULAR SURGERY)
Payer: MEDICARE

## 2023-09-15 DIAGNOSIS — L02.619 CELLULITIS AND ABSCESS OF FOOT: ICD-10-CM

## 2023-09-15 DIAGNOSIS — L03.119 CELLULITIS AND ABSCESS OF FOOT: ICD-10-CM

## 2023-09-15 DIAGNOSIS — I73.9 PAD (PERIPHERAL ARTERY DISEASE): ICD-10-CM

## 2023-09-15 PROCEDURE — A9503 TC99M MEDRONATE: HCPCS

## 2023-09-15 PROCEDURE — 78315 BONE IMAGING 3 PHASE: CPT | Mod: TC

## 2023-09-15 PROCEDURE — 93925 LOWER EXTREMITY STUDY: CPT | Mod: TC

## 2023-09-19 ENCOUNTER — OFFICE VISIT (OUTPATIENT)
Dept: WOUND CARE | Facility: HOSPITAL | Age: 78
End: 2023-09-19
Attending: PODIATRIST
Payer: MEDICARE

## 2023-09-19 VITALS
HEIGHT: 72 IN | WEIGHT: 264 LBS | SYSTOLIC BLOOD PRESSURE: 142 MMHG | BODY MASS INDEX: 35.76 KG/M2 | DIASTOLIC BLOOD PRESSURE: 75 MMHG | HEART RATE: 95 BPM

## 2023-09-19 DIAGNOSIS — I73.9 PVD (PERIPHERAL VASCULAR DISEASE): ICD-10-CM

## 2023-09-19 DIAGNOSIS — R26.2 DIFFICULTY IN WALKING, NOT ELSEWHERE CLASSIFIED: ICD-10-CM

## 2023-09-19 DIAGNOSIS — I96 GANGRENE OF LEFT FOOT: ICD-10-CM

## 2023-09-19 DIAGNOSIS — I96 GANGRENE: ICD-10-CM

## 2023-09-19 DIAGNOSIS — I73.9 PAD (PERIPHERAL ARTERY DISEASE): ICD-10-CM

## 2023-09-19 DIAGNOSIS — L97.524 ULCER OF LEFT FOOT WITH NECROSIS OF BONE: Primary | ICD-10-CM

## 2023-09-19 DIAGNOSIS — Z91.89 AT HIGH RISK FOR INADEQUATE NUTRITIONAL INTAKE: ICD-10-CM

## 2023-09-19 DIAGNOSIS — L02.619 CELLULITIS AND ABSCESS OF FOOT: ICD-10-CM

## 2023-09-19 DIAGNOSIS — T14.8XXA WOUND INFECTION: ICD-10-CM

## 2023-09-19 DIAGNOSIS — L03.119 CELLULITIS AND ABSCESS OF FOOT: ICD-10-CM

## 2023-09-19 DIAGNOSIS — L08.9 WOUND INFECTION: ICD-10-CM

## 2023-09-19 DIAGNOSIS — M79.672 FOOT PAIN, LEFT: ICD-10-CM

## 2023-09-19 PROCEDURE — 99214 PR OFFICE/OUTPT VISIT, EST, LEVL IV, 30-39 MIN: ICD-10-PCS | Mod: 25,,, | Performed by: PODIATRIST

## 2023-09-19 PROCEDURE — 99214 OFFICE O/P EST MOD 30 MIN: CPT | Mod: 25,,, | Performed by: PODIATRIST

## 2023-09-19 PROCEDURE — 11043 PR DEBRIDEMENT, SKIN, SUB-Q TISSUE,MUSCLE,=<20 SQ CM: ICD-10-PCS | Mod: ,,, | Performed by: PODIATRIST

## 2023-09-19 PROCEDURE — 87075 CULTR BACTERIA EXCEPT BLOOD: CPT | Performed by: PODIATRIST

## 2023-09-19 PROCEDURE — 11043 DBRDMT MUSC&/FSCA 1ST 20/<: CPT | Mod: ,,, | Performed by: PODIATRIST

## 2023-09-19 PROCEDURE — 11043 DBRDMT MUSC&/FSCA 1ST 20/<: CPT | Performed by: PODIATRIST

## 2023-09-19 PROCEDURE — 87070 CULTURE OTHR SPECIMN AEROBIC: CPT | Performed by: PODIATRIST

## 2023-09-19 NOTE — PROGRESS NOTES
[address] [facility] [address]  [place] [address]  Phone: [phone]   [phone]    Patient's PCP:Sunita Rivera MD  Referring Provider: Aaarefnelly Barrett    Subjective:      Chief Complaint:: Wound Care, Foot Ulcer, Poor Circulation, Peripheral Neuropathy, Numbness, Non-healing Wound, Osteomyelitis , Wound Check, Wound Infection, and Results (Discuss results of recent bone scan)    VIANEY Hanley is a 78 y.o. male who presents today with a complaint of left great toe wound.  See wound docs documentation for full assessment and evaluation of wound.     Additionally, patient presents to discuss results of recent bone scan.  Referral sent to ID for osteomyelitis.   IMPRESSION:  Increased MDP activity along the left great toe in keeping with cellulitis with osteomyelitis in the distal phalanx of the great toe of the left foot.        Patient had recent vascular intervention.  Reviewed operative note.  Patient under the care of vascular surgery.        EVALUATION, ASSESSMENT, & PLAN:      1. Debridement.See Wound Docs for assessment of wounds and procedure notes  2. Continue taking all medications as prescribed  3. Dressing changes, see Wound docs for dressing change orders  5. Counseled patient on increasing protein intake, not getting wound wet, keeping dressing clean dry and intact, following a healthy diet, elevating legs when able, removing pressure from wound     Total time spent for E&M 40  Total time for debridement 35 minutes          Proper ulcer care and the possible need for serial debridements, topical medications, specific dressings and biological engineered skin substitutes if indicated.     Patient should call the office immediately if any signs of infection, such as fever, chills, sweats, increased redness or pain.     Patient was instructed to call the clinic or go to the emergency department if their symptoms do not improve, worsens, or if new symptoms develop.  Patient was advised  that if any increased swelling, pain, or numbness arise to go immediately to the ED. Patient knows to call any time if an emergency arises. Shared decision making occurred and patient verbalized understanding in agreement with this plan.         >50% of this > 60 minute visit was spent face to face educating/counseling the patient     I spent a total of 60 minutes on the day of the visit.This includes face to face time and non-face to face time preparing to see the patient (eg, review of tests), obtaining and/or reviewing separately obtained history, documenting clinical information in the electronic or other health record, independently interpreting results and communicating results to the patient/family/caregiver, or care coordinator.        Much of the documentation for this visit was completed in the Wound Docs system.  Please see the attached documentation for further details about the patient's care. Scanned under the Media tab.         Radha ManARIANNE bolden        Vitals:    09/19/23 1242   BP: (!) 142/75   Pulse: 95   Weight: 119.7 kg (264 lb)   Height: 6' (1.829 m)      Shoe Size:     Past Surgical History:   Procedure Laterality Date    AORTOGRAPHY WITH SERIALOGRAPHY N/A 8/31/2023    Procedure: AORTOGRAM, WITH SERIALOGRAPHY;  Surgeon: Gary Thomas MD;  Location: [facility] CATH/EP LAB;  Service: Peripheral Vascular;  Laterality: N/A;    CORONARY ARTERY BYPASS GRAFT      PTA, ANTERIOR TIBIAL Left 8/31/2023    Procedure: PTA, Anterior Tibial;  Surgeon: Gary Thomas MD;  Location: [facility] CATH/EP LAB;  Service: Peripheral Vascular;  Laterality: Left;    PTA, SUPERFICIAL FEMORAL ARTERY Left 8/31/2023    Procedure: PTA, Superficial Femoral Artery;  Surgeon: Gary Thomas MD;  Location: [facility] CATH/EP LAB;  Service: Peripheral Vascular;  Laterality: Left;    SPLENECTOMY      STENT, ANTERIOR TIBIAL Left 8/31/2023    Procedure: Stent, Anterior Tibial;  Surgeon: Gary Thomas MD;  Location: [facility]  CATH/EP LAB;  Service: Peripheral Vascular;  Laterality: Left;     Past Medical History:   Diagnosis Date    COPD (chronic obstructive pulmonary disease)     HLD (hyperlipidemia)     HTN (hypertension)     PAD (peripheral artery disease)      History reviewed. No pertinent family history.     Social History:   Marital Status:   Alcohol History:  reports that he does not currently use alcohol after a past usage of about 21.0 standard drinks of alcohol per week.  Tobacco History:  reports that he quit smoking about 35 years ago. His smoking use included cigarettes. He has never used smokeless tobacco.  Drug History:  reports no history of drug use.    Review of patient's allergies indicates:  No Known Allergies    Current Outpatient Medications   Medication Sig Dispense Refill    acetaminophen (TYLENOL) 325 MG tablet Take 650 mg by mouth.      betamethasone valerate 0.1% (VALISONE) 0.1 % Lotn Apply to scalp bid prn rash      clopidogreL (PLAVIX) 75 mg tablet Take 1 tablet (75 mg total) by mouth once daily. 30 tablet 11    cyclobenzaprine (FLEXERIL) 10 MG tablet Take 10 mg by mouth every 8 (eight) hours as needed.      doxycycline (VIBRAMYCIN) 100 MG Cap Take 1 capsule with food to be taken on Monday Wednesday and Friday. May be photsensitive 30 capsule 2    furosemide (LASIX) 40 MG tablet Take 40 mg by mouth once daily.      hydrocodone-homatropine 5-1.5 mg/5 ml (HYCODAN) 5-1.5 mg/5 mL Syrp Take by mouth.      levothyroxine (SYNTHROID) 25 MCG tablet TAKE 1 TABLET DAILY      NEXIUM 40 mg capsule Take 40 mg by mouth once daily.       nitroGLYCERIN (NITROSTAT) 0.4 MG SL tablet Place 0.4 mg under the tongue every 5 (five) minutes as needed.      omega-3 fatty acids/fish oil (FISH OIL-OMEGA-3 FATTY ACIDS) 300-1,000 mg capsule Take 1 capsule by mouth 2 (two) times daily.      omeprazole (PRILOSEC) 20 MG capsule 40 mg.      pregabalin (LYRICA) 100 MG capsule Take 100 mg by mouth 2 (two) times daily.         spironolactone (ALDACTONE) 50 MG tablet Take 50 mg by mouth once daily.      temazepam (RESTORIL) 30 mg capsule Take 30 mg by mouth nightly as needed for Insomnia.      TESTOSTERONE PROPIONATE, BULK, MISC Inject 1 each as directed every 14 (fourteen) days.      TOPROL XL 25 mg 24 hr tablet Take 25 mg by mouth once daily.        No current facility-administered medications for this visit.       Review of Systems   Constitutional:  Negative for chills, fatigue, fever and unexpected weight change.   HENT:  Negative for hearing loss and trouble swallowing.    Eyes:  Negative for photophobia and visual disturbance.   Respiratory:  Negative for cough, shortness of breath and wheezing.    Cardiovascular:  Negative for chest pain, palpitations and leg swelling.   Gastrointestinal:  Negative for abdominal pain and nausea.   Genitourinary:  Negative for dysuria and frequency.   Musculoskeletal:  Negative for arthralgias, back pain, gait problem, joint swelling and myalgias.   Skin:  Positive for wound. Negative for rash.   Neurological:  Positive for numbness. Negative for tremors, seizures, weakness and headaches.   Hematological:  Does not bruise/bleed easily.         Objective:        Physical Exam:   Foot Exam  Physical Exam  Ortho/SPM Exam     Imaging:            Assessment:       1. 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 PAD (peripheral artery disease)    3. At high risk for inadequate nutritional intake    4. Foot pain, left    5. PVD (peripheral vascular disease)    6. Wound infection    7. Difficulty in walking, not elsewhere classified    8. Gangrene    9. Gangrene of left foot    10. 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  -     Ambulatory referral/consult to Infectious Disease; Future; Expected date: 09/26/2023  -     Culture, Anaerobic  -     CULTURE, AEROBIC  (SPECIFY SOURCE)    PAD (peripheral artery disease)    At high risk for inadequate nutritional  intake    Foot pain, left    PVD (peripheral vascular disease)    Wound infection    Difficulty in walking, not elsewhere classified    Gangrene    Gangrene of left foot    Cellulitis and abscess of foot  -     Ambulatory referral/consult to Infectious Disease; Future; Expected date: 09/26/2023  -     Culture, Anaerobic  -     CULTURE, AEROBIC  (SPECIFY SOURCE)      Follow up in about 1 week (around 9/26/2023).    Procedures          Counseling:     I provided patient education verbally regarding:   Patient diagnosis, treatment options, as well as alternatives, risks, and benefits.     This note was created using Dragon voice recognition software that occasionally misinterpreted phrases or words.

## 2023-09-20 ENCOUNTER — OFFICE VISIT (OUTPATIENT)
Dept: VASCULAR SURGERY | Facility: CLINIC | Age: 78
End: 2023-09-20
Payer: MEDICARE

## 2023-09-20 VITALS
HEIGHT: 72 IN | DIASTOLIC BLOOD PRESSURE: 76 MMHG | SYSTOLIC BLOOD PRESSURE: 127 MMHG | HEART RATE: 82 BPM | BODY MASS INDEX: 35.8 KG/M2

## 2023-09-20 DIAGNOSIS — I73.9 PAD (PERIPHERAL ARTERY DISEASE): Primary | ICD-10-CM

## 2023-09-20 PROCEDURE — 99213 PR OFFICE/OUTPT VISIT, EST, LEVL III, 20-29 MIN: ICD-10-PCS | Mod: S$PBB,,, | Performed by: THORACIC SURGERY (CARDIOTHORACIC VASCULAR SURGERY)

## 2023-09-20 PROCEDURE — 99999 PR PBB SHADOW E&M-EST. PATIENT-LVL IV: CPT | Mod: PBBFAC,,, | Performed by: THORACIC SURGERY (CARDIOTHORACIC VASCULAR SURGERY)

## 2023-09-20 PROCEDURE — 99213 OFFICE O/P EST LOW 20 MIN: CPT | Mod: S$PBB,,, | Performed by: THORACIC SURGERY (CARDIOTHORACIC VASCULAR SURGERY)

## 2023-09-20 PROCEDURE — 99214 OFFICE O/P EST MOD 30 MIN: CPT | Mod: PBBFAC,PN | Performed by: THORACIC SURGERY (CARDIOTHORACIC VASCULAR SURGERY)

## 2023-09-20 PROCEDURE — 99999 PR PBB SHADOW E&M-EST. PATIENT-LVL IV: ICD-10-PCS | Mod: PBBFAC,,, | Performed by: THORACIC SURGERY (CARDIOTHORACIC VASCULAR SURGERY)

## 2023-09-20 RX ORDER — LEVOTHYROXINE SODIUM 50 UG/1
50 TABLET ORAL
COMMUNITY
Start: 2023-02-02

## 2023-09-20 RX ORDER — ALBUTEROL SULFATE 90 UG/1
2 AEROSOL, METERED RESPIRATORY (INHALATION) EVERY 6 HOURS PRN
COMMUNITY

## 2023-09-20 RX ORDER — METOLAZONE 5 MG/1
5 TABLET ORAL DAILY PRN
Status: ON HOLD | COMMUNITY
Start: 2023-02-02 | End: 2024-03-07 | Stop reason: HOSPADM

## 2023-09-20 RX ORDER — UBIDECARENONE 75 MG
500 CAPSULE ORAL
COMMUNITY
Start: 2023-02-02 | End: 2024-03-23

## 2023-09-20 RX ORDER — GABAPENTIN 300 MG/1
300 CAPSULE ORAL 2 TIMES DAILY
COMMUNITY
Start: 2023-05-15

## 2023-09-20 RX ORDER — DOXEPIN HYDROCHLORIDE 50 MG/1
50 CAPSULE ORAL NIGHTLY
COMMUNITY
Start: 2023-09-08 | End: 2024-03-23

## 2023-09-20 RX ORDER — DULOXETIN HYDROCHLORIDE 60 MG/1
60 CAPSULE, DELAYED RELEASE ORAL NIGHTLY
COMMUNITY
Start: 2023-01-17

## 2023-09-20 RX ORDER — MONTELUKAST SODIUM 10 MG/1
10 TABLET ORAL DAILY
COMMUNITY
Start: 2023-01-05

## 2023-09-20 RX ORDER — POTASSIUM CHLORIDE 20 MEQ/1
20 TABLET, EXTENDED RELEASE ORAL NIGHTLY
COMMUNITY
Start: 2023-02-02

## 2023-09-20 RX ORDER — TAMSULOSIN HYDROCHLORIDE 0.4 MG/1
0.4 CAPSULE ORAL DAILY
COMMUNITY
Start: 2023-02-02

## 2023-09-20 RX ORDER — EPINEPHRINE 0.3 MG/.3ML
0.3 INJECTION SUBCUTANEOUS ONCE
COMMUNITY
Start: 2022-12-09

## 2023-09-20 RX ORDER — AMOXICILLIN 250 MG
1 CAPSULE ORAL NIGHTLY
COMMUNITY
Start: 2023-02-02

## 2023-09-20 RX ORDER — DIPHENHYDRAMINE HCL 50 MG
50 CAPSULE ORAL EVERY 6 HOURS PRN
Status: ON HOLD | COMMUNITY
Start: 2023-07-28 | End: 2024-03-07 | Stop reason: HOSPADM

## 2023-09-20 NOTE — PROGRESS NOTES
This patient has peripheral arterial disease and a nonhealing wound on the left great toe.  He had recent angiography and angioplasty and stenting of the left anterior tibial artery.  He comes back to the office today in follow-up.  The podiatrist was concerned that the wound was not improving very quickly.  He is scheduled to see the infectious disease service soon.  He is on antibiotics.  There is a concern that he may have osteomyelitis.    On exam today vital signs are stable.  His wound was seen yesterday and redressed.  He reports that overall there has been some improvement but the dry gangrene persist on the left great toe.    At this point I explained to him and his wife that if there is no improvement consideration can be made for surgical bypass.  However I would reserve this procedure until the podiatrist is sure the wound is not healing or the dry gangrene persists.

## 2023-09-21 ENCOUNTER — OFFICE VISIT (OUTPATIENT)
Dept: INFECTIOUS DISEASES | Facility: CLINIC | Age: 78
End: 2023-09-21
Payer: MEDICARE

## 2023-09-21 VITALS
DIASTOLIC BLOOD PRESSURE: 78 MMHG | BODY MASS INDEX: 36.35 KG/M2 | HEART RATE: 76 BPM | SYSTOLIC BLOOD PRESSURE: 124 MMHG | WEIGHT: 268 LBS | OXYGEN SATURATION: 91 %

## 2023-09-21 DIAGNOSIS — L02.619 CELLULITIS AND ABSCESS OF FOOT: ICD-10-CM

## 2023-09-21 DIAGNOSIS — L97.524 ULCER OF LEFT FOOT WITH NECROSIS OF BONE: Primary | ICD-10-CM

## 2023-09-21 DIAGNOSIS — Z90.81 H/O SPLENECTOMY: ICD-10-CM

## 2023-09-21 DIAGNOSIS — E11.51 DIABETES MELLITUS WITH PERIPHERAL VASCULAR DISEASE: ICD-10-CM

## 2023-09-21 DIAGNOSIS — L03.119 CELLULITIS AND ABSCESS OF FOOT: ICD-10-CM

## 2023-09-21 DIAGNOSIS — I73.9 PAD (PERIPHERAL ARTERY DISEASE): ICD-10-CM

## 2023-09-21 PROCEDURE — 99204 OFFICE O/P NEW MOD 45 MIN: CPT | Mod: S$GLB,,, | Performed by: INTERNAL MEDICINE

## 2023-09-21 PROCEDURE — 99204 PR OFFICE/OUTPT VISIT, NEW, LEVL IV, 45-59 MIN: ICD-10-PCS | Mod: S$GLB,,, | Performed by: INTERNAL MEDICINE

## 2023-09-21 RX ORDER — AMOXICILLIN 500 MG/1
500 CAPSULE ORAL EVERY 8 HOURS
Qty: 90 CAPSULE | Refills: 1 | Status: SHIPPED | OUTPATIENT
Start: 2023-09-21 | End: 2024-02-21

## 2023-09-21 NOTE — PROGRESS NOTES
Subjective:      Reason for consult: osteomyelitis of left great toe    HPI: Jose F Hanley is a 78 y.o. male who sustained a blister on his neuropathic toe in early August. He developed cellulitis after breaking the blister and he was seen by primary on 8/14, 16 , given clindamycin but then admitted on 8/18 to [place]. General surgery debrided gangrenous tissue and sent a culture. He received IV antibiotics and was discharged on cefdinir. The culture grew Enterococcus. He was referred to Dr. Costello who also debrided on 9/12 and gave him doxycycline. This culture and the one from 9/19 were negative. He had a bone scan as well which demonstrated osteomyelitis in the distal phalanx of the great toe.   He has not smoked in 35 years, has an A1c of 6.3% and consumes 3 mixed drinks per night(and has done so for decades) . He does have neuropathy of his feet and checks them manually    [facility]     Review of patient's allergies indicates:   Allergen Reactions    Adhes. band-tape-benzalkonium Rash     Pt stated it caused blisters    Statins-hmg-coa reductase inhibitors Other (See Comments)     Statin Intolerance (joint pain)     Past Medical History:   Diagnosis Date    COPD (chronic obstructive pulmonary disease)     HLD (hyperlipidemia)     HTN (hypertension)     PAD (peripheral artery disease)     Inflammatory polyarthropathy (HCC Code)    Peripheral neuropathy    Carpal tunnel syndrome    Osteoarthrosis, hand    SNHL (sensorineural hearing loss)    S/P CABG (coronary artery bypass graft)    Atherosclerotic heart disease of native coronary artery without angina pectoris    Chronic neck pain    Plantar fascial fibromatosis - Right    Non-ST elevation MI (NSTEMI) (HCC Code)    HTN (hypertension)    Dyslipidemia    COPD, group D, by GOLD 2017 classification (HCC Code)    Hypogonadism male    Splenic rupture    Incisional hernia    AS (aortic stenosis)    S/P TAVR (transcatheter aortic valve replacement)    Basal cell  carcinoma of nose    Presbyopia    Dry eyes    Chronic atrial fibrillation (HCC Code)    Contraindication to statin medication    Atherosclerosis of artery of both lower extremities (HCC Code)    History of malignant neoplasm of skin    Actinic keratoses    JAZMIN (acute kidney injury) (HCC Code)    Asthma-COPD overlap syndrome (HCC Code)    Environmental and seasonal allergies    Pacemaker    Hypothyroidism    Insomnia    H/O splenectomy    Gastrointestinal hemorrhage    Generalized weakness    Syncope    Presence of Watchman left atrial appendage closure device    OA (osteoarthritis of the spine)    Atherosclerosis of autologous artery coronary artery bypass graft(s) with unstable angina pectoris (HCC Code)    Benign prostatic hyperplasia without urinary obstruction    Cobalamin deficiency    Hyperlipidemia    Hypokalemia    Type 2 diabetes mellitus (HCC Code)    Vitamin D deficiency     Coronary artery bypass graft; Ankle surgery; Cardiac catheterization (2013); Coronary stent placement; Rotator cuff repair; Cataract extraction; Hemorroidectomy (2013); Ankle fracture surgery; COLONOSCOPY (N/A, 4/9/2013); Splenectomy, total; Eye surgery; Ventral hernia repair (N/A, 7/29/2015); Aortic valve replacement (N/A, 2/26/2016); Heart Catheterization (7/28/2017); Embolectomy (Right, 4/17/2018); Wound debridement (Right, 6/11/2018); Colonoscopy (N/A, 11/26/2018); Pacemaker (8/1/2019); and Colonoscopy (N/A, 12/3/2021).  Past Surgical History:   Procedure Laterality Date    AORTOGRAPHY WITH SERIALOGRAPHY N/A 8/31/2023    Procedure: AORTOGRAM, WITH SERIALOGRAPHY;  Surgeon: Gary Thomas MD;  Location: [facility] CATH/EP LAB;  Service: Peripheral Vascular;  Laterality: N/A;    CORONARY ARTERY BYPASS GRAFT      PTA, ANTERIOR TIBIAL Left 8/31/2023    Procedure: PTA, Anterior Tibial;  Surgeon: Gary Thomas MD;  Location: [facility] CATH/EP LAB;  Service: Peripheral Vascular;  Laterality: Left;    PTA, SUPERFICIAL FEMORAL ARTERY  Left 2023    Procedure: PTA, Superficial Femoral Artery;  Surgeon: Gary Thomas MD;  Location: [facility] CATH/EP LAB;  Service: Peripheral Vascular;  Laterality: Left;    SPLENECTOMY      STENT, ANTERIOR TIBIAL Left 2023    Procedure: Stent, Anterior Tibial;  Surgeon: Gary Thomas MD;  Location: [facility] CATH/EP LAB;  Service: Peripheral Vascular;  Laterality: Left;      Social History     Tobacco Use    Smoking status: Former     Current packs/day: 0.00     Types: Cigarettes     Quit date: 1987     Years since quittin.8    Smokeless tobacco: Never   Substance Use Topics    Alcohol use: Not Currently     Alcohol/week: 21.0 standard drinks of alcohol     Types: 21 Shots of liquor per week     No family history on file.    Pertinent medications noted:     Review of Systems    Ability to give history is: good. Wife present assists as well  No chills, fever, sweats,    No change in vision,   No pain in mouth or throat.    No chest pain, but he does have a history of congestive heart failure  No cough, sputum production, but does have BLACKBURN and attributes this to needing some lasix  No nausea, vomiting, diarrhea,  or focal abd pain.    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 Body mass index is 36.35 kg/m².  Physical Exam      General: Alert and attentive, cooperative andhas BLACKBURN and a rattle with respiration    Eyes:  anicteric, EOMI    Neck: Supple,      ENT: EAC patent,  nares patent, no oral lesions, , no thrush    Cardiovascular: Regular rate and rhythm      Respiratory: loose upper airway rattle, no consolidation    Gastrointestinal:  Soft, bowel sounds normal,    no tenderness or distention    Genitourinary:  no flank tenderness    Integumentary: Skin without rashes,    Vascular: No pitting peripheral edema or phlebitis, warm and well perfused    Musculoskeletal: Ambulates without difficulty, no acute arthritis, synovitis or myositis.     Lymphatic:      Neurological: Normal LOC, cranial nerves, speech,  gait    Psychiatric: Normal mood, speech,  demeanor     Wound:         VAD:       General Labs reviewed:  Lab Results   Component Value Date    WBC 9.79 08/31/2023    RBC 6.95 (H) 08/31/2023    HGB 13.8 (L) 08/31/2023    HCT 47.9 08/31/2023    MCV 69 (L) 08/31/2023    MCH 19.9 (L) 08/31/2023    MCHC 28.8 (L) 08/31/2023    RDW 22.8 (H) 08/31/2023     (H) 08/31/2023    MPV 9.9 08/31/2023     CMP  Sodium   Date Value Ref Range Status   08/29/2023 130 (L) 136 - 145 mmol/L Final     Potassium   Date Value Ref Range Status   08/29/2023 4.0 3.5 - 5.1 mmol/L Final     Chloride   Date Value Ref Range Status   08/29/2023 90 (L) 95 - 110 mmol/L Final     CO2   Date Value Ref Range Status   08/29/2023 28 23 - 29 mmol/L Final     Glucose   Date Value Ref Range Status   08/29/2023 100 70 - 110 mg/dL Final     BUN   Date Value Ref Range Status   08/29/2023 16 8 - 23 mg/dL Final     Creatinine   Date Value Ref Range Status   08/29/2023 1.0 0.5 - 1.4 mg/dL Final     Calcium   Date Value Ref Range Status   08/29/2023 9.1 8.7 - 10.5 mg/dL Final     Anion Gap   Date Value Ref Range Status   08/29/2023 12 8 - 16 mmol/L Final     eGFR   Date Value Ref Range Status   08/29/2023 >60.0 >60 mL/min/1.73 m^2 Final           Micro:8/18/23 right great toe  Culture  3+ Enterococcus faecalis Abnormal     Culture  1+ Normal skin stiven    Stain  No organisms seen    Stain  No WBC    Stain  No Epithelial cells    Resulting Agency [facility]   Susceptibility    Organism Antibiotic Method  Susceptibility   Enterococcus faecalis Ampicillin SUSCEPTIBILITY PANEL <2 ug/ml: Sensitive   Enterococcus faecalis Gentamicin Synergy Screen SUSCEPTIBILITY PANEL <500 ug/ml: Sensitive   Enterococcus faecalis Linezolid SUSCEPTIBILITY PANEL 2 ug/ml: Sensitive   Enterococcus faecalis Penicillin SUSCEPTIBILITY PANEL 2 ug/ml: Sensitive   Enterococcus faecalis Streptomycin SynergyScreen SUSCEPTIBILITY PANEL <1,000 ug/ml: Sensitive   Enterococcus faecalis Tigecycline   SUSCEPTIBILITY PANEL Sensitive   Enterococcus faecalis Vancomycin SUSCEPTIBILITY PANEL 1 ug/ml: Sensitive     Narrative      Microbiology Results (last 7 days)       ** No results found for the last 168 hours. **          Imaging Reviewed:  CTA BLE  1. Prior cardiac interventions   2. Prior splenectomy   3. Bilateral SFA stenoses      Bone scan   IMPRESSION:  Increased MDP activity along the left great toe in keeping with cellulitis with osteomyelitis in the distal phalanx of the great toe of the left foot.    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  Dispense: 90 capsule; Refill: 1  -     dalbavancin (DALVANCE) 1,500 mg  in dextrose 5 % (D5W) 325 mL infusion  -     dextrose 5 % (D5W) 250 mL flush bag  -     heparin, porcine (PF) 100 unit/mL injection flush 500 Units  -     alteplase injection 2 mg  -     dalbavancin (DALVANCE) 1,500 mg in dextrose 5 % (D5W) 325 mL infusion  -     dextrose 5 % (D5W) 250 mL flush bag  -     heparin, porcine (PF) 100 unit/mL injection flush 500 Units  -     alteplase injection 2 mg    Stop doxycycyline  Start amoxicillin 500 mg three times a day  We will work on getting approval for Dalvance 1500 mg IV on day 1 and on day 10 vs Daptomycin     Follow up 3 weeks    This note was created using Dragon voice recognition software that occasionally misinterpreted phrases or words.

## 2023-09-21 NOTE — PATIENT INSTRUCTIONS
Stop doxycycyline  Start amoxicillin 500 mg three times a day  We will work on getting approval for Dalvance 1500 mg IV on day 1 and on day 10 vs Daptomycin     Follow up 3 weeks

## 2023-09-22 PROBLEM — L97.524 ULCER OF LEFT FOOT WITH NECROSIS OF BONE: Status: ACTIVE | Noted: 2023-09-22

## 2023-09-22 LAB — BACTERIA SPEC AEROBE CULT: NORMAL

## 2023-09-22 RX ORDER — HEPARIN 100 UNIT/ML
500 SYRINGE INTRAVENOUS
Status: CANCELLED | OUTPATIENT
Start: 2023-10-05

## 2023-09-22 RX ORDER — HEPARIN 100 UNIT/ML
500 SYRINGE INTRAVENOUS
Status: CANCELLED | OUTPATIENT
Start: 2023-09-25

## 2023-09-25 ENCOUNTER — DOCUMENT SCAN (OUTPATIENT)
Dept: HOME HEALTH SERVICES | Facility: HOSPITAL | Age: 78
End: 2023-09-25
Payer: MEDICARE

## 2023-09-25 LAB — BACTERIA SPEC ANAEROBE CULT: NORMAL

## 2023-09-26 ENCOUNTER — OFFICE VISIT (OUTPATIENT)
Dept: WOUND CARE | Facility: HOSPITAL | Age: 78
End: 2023-09-26
Attending: PODIATRIST
Payer: MEDICARE

## 2023-09-26 ENCOUNTER — INFUSION (OUTPATIENT)
Dept: INFUSION THERAPY | Facility: HOSPITAL | Age: 78
End: 2023-09-26
Attending: INTERNAL MEDICINE
Payer: MEDICARE

## 2023-09-26 VITALS
TEMPERATURE: 98 F | DIASTOLIC BLOOD PRESSURE: 85 MMHG | OXYGEN SATURATION: 95 % | SYSTOLIC BLOOD PRESSURE: 143 MMHG | RESPIRATION RATE: 16 BRPM | HEART RATE: 75 BPM

## 2023-09-26 VITALS
DIASTOLIC BLOOD PRESSURE: 77 MMHG | RESPIRATION RATE: 20 BRPM | SYSTOLIC BLOOD PRESSURE: 138 MMHG | HEART RATE: 80 BPM | TEMPERATURE: 98 F

## 2023-09-26 DIAGNOSIS — L03.119 CELLULITIS AND ABSCESS OF FOOT: ICD-10-CM

## 2023-09-26 DIAGNOSIS — I73.9 PAD (PERIPHERAL ARTERY DISEASE): ICD-10-CM

## 2023-09-26 DIAGNOSIS — I96 GANGRENE: ICD-10-CM

## 2023-09-26 DIAGNOSIS — I73.9 PVD (PERIPHERAL VASCULAR DISEASE): ICD-10-CM

## 2023-09-26 DIAGNOSIS — M79.672 FOOT PAIN, LEFT: ICD-10-CM

## 2023-09-26 DIAGNOSIS — R26.2 DIFFICULTY IN WALKING, NOT ELSEWHERE CLASSIFIED: ICD-10-CM

## 2023-09-26 DIAGNOSIS — L02.619 CELLULITIS AND ABSCESS OF FOOT: ICD-10-CM

## 2023-09-26 DIAGNOSIS — L97.524 ULCER OF LEFT FOOT WITH NECROSIS OF BONE: Primary | ICD-10-CM

## 2023-09-26 DIAGNOSIS — I96 GANGRENE OF LEFT FOOT: ICD-10-CM

## 2023-09-26 DIAGNOSIS — T14.8XXA WOUND INFECTION: ICD-10-CM

## 2023-09-26 DIAGNOSIS — Z91.89 AT HIGH RISK FOR INADEQUATE NUTRITIONAL INTAKE: ICD-10-CM

## 2023-09-26 DIAGNOSIS — L08.9 WOUND INFECTION: ICD-10-CM

## 2023-09-26 PROCEDURE — 99499 NO LOS: ICD-10-PCS | Mod: ,,, | Performed by: PODIATRIST

## 2023-09-26 PROCEDURE — 25000003 PHARM REV CODE 250: Performed by: INTERNAL MEDICINE

## 2023-09-26 PROCEDURE — 11043 DBRDMT MUSC&/FSCA 1ST 20/<: CPT | Mod: PN | Performed by: PODIATRIST

## 2023-09-26 PROCEDURE — 99499 UNLISTED E&M SERVICE: CPT | Mod: ,,, | Performed by: PODIATRIST

## 2023-09-26 PROCEDURE — 96365 THER/PROPH/DIAG IV INF INIT: CPT

## 2023-09-26 PROCEDURE — 63600175 PHARM REV CODE 636 W HCPCS: Mod: JZ,JG | Performed by: INTERNAL MEDICINE

## 2023-09-26 PROCEDURE — 11043 DBRDMT MUSC&/FSCA 1ST 20/<: CPT | Mod: ,,, | Performed by: PODIATRIST

## 2023-09-26 PROCEDURE — 11043 PR DEBRIDEMENT, SKIN, SUB-Q TISSUE,MUSCLE,=<20 SQ CM: ICD-10-PCS | Mod: ,,, | Performed by: PODIATRIST

## 2023-09-26 RX ORDER — HEPARIN 100 UNIT/ML
500 SYRINGE INTRAVENOUS
Status: CANCELLED | OUTPATIENT
Start: 2023-09-26

## 2023-09-26 RX ADMIN — DALBAVANCIN 1500 MG: 500 INJECTION, POWDER, FOR SOLUTION INTRAVENOUS at 10:09

## 2023-09-27 NOTE — PROGRESS NOTES
[address]  [place] [address]  Phone: [phone]   [phone]    Patient's PCP:Sunita Rivera MD  Referring Provider: Aaarefnelly Self    Subjective:      Chief Complaint:: Wound Care, Foot Ulcer, Wound Check, Wound Infection, Pressure Ulcer, Osteomyelitis , Non-healing Wound, Numbness, Peripheral Neuropathy, Poor Circulation, and Difficulty Walking    HPI  Jose F Hanley is a 78 y.o. male who presents today with a complaint of left great toe wound.  See wound docs documentation for full assessment and evaluation of wound.    Patient saw infectious disease and is undergoing treatment for bone infection present.     Patient had recent vascular intervention.  Reviewed operative note.  Patient under the care of vascular surgery.        EVALUATION, ASSESSMENT, & PLAN:      1. Debridement.See Wound Docs for assessment of wounds and procedure notes  2. Continue taking all medications as prescribed  3. Dressing changes, see Wound docs for dressing change orders  5. Counseled patient on increasing protein intake, not getting wound wet, keeping dressing clean dry and intact, following a healthy diet, elevating legs when able, removing pressure from wound     Total time spent for E&M 40  Total time for debridement 35 minutes          Proper ulcer care and the possible need for serial debridements, topical medications, specific dressings and biological engineered skin substitutes if indicated.     Patient should call the office immediately if any signs of infection, such as fever, chills, sweats, increased redness or pain.     Patient was instructed to call the clinic or go to the emergency department if their symptoms do not improve, worsens, or if new symptoms develop.  Patient was advised that if any increased swelling, pain, or numbness arise to go immediately to the ED. Patient knows to call any time if an emergency arises. Shared decision making occurred and patient verbalized understanding  in agreement with this plan.         >50% of this > 60 minute visit was spent face to face educating/counseling the patient     I spent a total of 60 minutes on the day of the visit.This includes face to face time and non-face to face time preparing to see the patient (eg, review of tests), obtaining and/or reviewing separately obtained history, documenting clinical information in the electronic or other health record, independently interpreting results and communicating results to the patient/family/caregiver, or care coordinator.        Much of the documentation for this visit was completed in the Wound Docs system.  Please see the attached documentation for further details about the patient's care. Scanned under the Media tab.         Radha Costello, ARIANNE     Vitals:    09/26/23 1150   BP: 138/77   Pulse: 80   Resp: 20   Temp: 98.2 °F (36.8 °C)   PainSc: 0-No pain      Shoe Size:     Past Surgical History:   Procedure Laterality Date    AORTOGRAPHY WITH SERIALOGRAPHY N/A 8/31/2023    Procedure: AORTOGRAM, WITH SERIALOGRAPHY;  Surgeon: Gary Thomas MD;  Location: [facility] CATH/EP LAB;  Service: Peripheral Vascular;  Laterality: N/A;    CORONARY ARTERY BYPASS GRAFT      PTA, ANTERIOR TIBIAL Left 8/31/2023    Procedure: PTA, Anterior Tibial;  Surgeon: Gary Thomas MD;  Location: [facility] CATH/EP LAB;  Service: Peripheral Vascular;  Laterality: Left;    PTA, SUPERFICIAL FEMORAL ARTERY Left 8/31/2023    Procedure: PTA, Superficial Femoral Artery;  Surgeon: Gary Thomas MD;  Location: [facility] CATH/EP LAB;  Service: Peripheral Vascular;  Laterality: Left;    SPLENECTOMY      STENT, ANTERIOR TIBIAL Left 8/31/2023    Procedure: Stent, Anterior Tibial;  Surgeon: Gary Thomas MD;  Location: [facility] CATH/EP LAB;  Service: Peripheral Vascular;  Laterality: Left;     Past Medical History:   Diagnosis Date    COPD (chronic obstructive pulmonary disease)     HLD (hyperlipidemia)     HTN (hypertension)     PAD  (peripheral artery disease)      History reviewed. No pertinent family history.     Social History:   Marital Status:   Alcohol History:  reports that he does not currently use alcohol after a past usage of about 21.0 standard drinks of alcohol per week.  Tobacco History:  reports that he quit smoking about 35 years ago. His smoking use included cigarettes. He has never used smokeless tobacco.  Drug History:  reports no history of drug use.    Review of patient's allergies indicates:   Allergen Reactions    Adhes. band-tape-benzalkonium Rash     Pt stated it caused blisters    Statins-hmg-coa reductase inhibitors Other (See Comments)     Statin Intolerance (joint pain)       Current Outpatient Medications   Medication Sig Dispense Refill    acetaminophen (TYLENOL) 325 MG tablet Take 650 mg by mouth.      albuterol (PROVENTIL/VENTOLIN HFA) 90 mcg/actuation inhaler Inhale 2 puffs into the lungs every 6 (six) hours as needed.      amoxicillin (AMOXIL) 500 MG capsule Take 1 capsule (500 mg total) by mouth every 8 (eight) hours. 90 capsule 1    betamethasone valerate 0.1% (VALISONE) 0.1 % Lotn Apply to scalp bid prn rash      clopidogreL (PLAVIX) 75 mg tablet Take 1 tablet (75 mg total) by mouth once daily. 30 tablet 11    cyanocobalamin 500 MCG tablet 500 mcg.      cyclobenzaprine (FLEXERIL) 10 MG tablet Take 10 mg by mouth every 8 (eight) hours as needed.      diphenhydrAMINE (BENADRYL) 50 MG capsule 50 mg.      doxepin (SINEQUAN) 50 MG capsule Take 50 mg by mouth every evening.      doxycycline (VIBRAMYCIN) 100 MG Cap Take 1 capsule with food to be taken on Monday Wednesday and Friday. May be photsensitive 30 capsule 2    DULoxetine (CYMBALTA) 60 MG capsule 60 mg.      EPINEPHrine (EPIPEN) 0.3 mg/0.3 mL AtIn INJECT 0.3MG/0.3ML SUBCUTANEOUSLY (UNDER THE SKIN)  AS NEEDED FOR ALLERGIC REACTIONS SELF-INJECTOR PEN      furosemide (LASIX) 40 MG tablet Take 40 mg by mouth once daily.      gabapentin (NEURONTIN) 400 MG  capsule 800 mg.      hydrocodone-homatropine 5-1.5 mg/5 ml (HYCODAN) 5-1.5 mg/5 mL Syrp Take by mouth.      levothyroxine (SYNTHROID) 50 MCG tablet 50 mcg.      metOLazone (ZAROXOLYN) 5 MG tablet 5 mg.      montelukast (SINGULAIR) 10 mg tablet 10 mg.      NEXIUM 40 mg capsule Take 40 mg by mouth once daily.       nitroGLYCERIN (NITROSTAT) 0.4 MG SL tablet Place 0.4 mg under the tongue every 5 (five) minutes as needed.      omega-3 fatty acids/fish oil (FISH OIL-OMEGA-3 FATTY ACIDS) 300-1,000 mg capsule Take 1 capsule by mouth 2 (two) times daily.      omeprazole (PRILOSEC) 20 MG capsule 40 mg.      potassium chloride SA (K-DUR,KLOR-CON) 20 MEQ tablet 20 mEq.      pregabalin (LYRICA) 100 MG capsule Take 100 mg by mouth 2 (two) times daily.        senna-docusate 8.6-50 mg (PERICOLACE) 8.6-50 mg per tablet TAKE 2 TABLETS BY MOUTH DAILY FOR CONSTIPATION      spironolactone (ALDACTONE) 50 MG tablet Take 50 mg by mouth once daily.      tamsulosin (FLOMAX) 0.4 mg Cap 0.4 mg.      temazepam (RESTORIL) 30 mg capsule Take 30 mg by mouth nightly as needed for Insomnia.      TESTOSTERONE PROPIONATE, BULK, MISC Inject 1 each as directed every 14 (fourteen) days.      TOPROL XL 25 mg 24 hr tablet Take 25 mg by mouth once daily.        No current facility-administered medications for this visit.       Review of Systems   Constitutional:  Negative for chills, fatigue, fever and unexpected weight change.   HENT:  Negative for hearing loss and trouble swallowing.    Eyes:  Negative for photophobia and visual disturbance.   Respiratory:  Negative for cough, shortness of breath and wheezing.    Cardiovascular:  Negative for chest pain, palpitations and leg swelling.   Gastrointestinal:  Negative for abdominal pain and nausea.   Genitourinary:  Negative for dysuria and frequency.   Musculoskeletal:  Negative for arthralgias, back pain, gait problem, joint swelling and myalgias.   Skin:  Positive for wound. Negative for rash.    Neurological:  Positive for numbness. Negative for tremors, seizures, weakness and headaches.   Hematological:  Does not bruise/bleed easily.         Objective:        Physical Exam:   Foot Exam  Physical Exam  Ortho/SPM Exam     Imaging:            Assessment:       1. 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 PVD (peripheral vascular disease)    3. Wound infection    4. PAD (peripheral artery disease)    5. At high risk for inadequate nutritional intake    6. Foot pain, left    7. Gangrene    8. Gangrene of left foot    9. Cellulitis and abscess of foot    10. 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    Procedures          Counseling:     I provided patient education verbally regarding:   Patient diagnosis, treatment options, as well as alternatives, risks, and benefits.     This note was created using Dragon voice recognition software that occasionally misinterpreted phrases or words.

## 2023-10-03 ENCOUNTER — DOCUMENT SCAN (OUTPATIENT)
Dept: HOME HEALTH SERVICES | Facility: HOSPITAL | Age: 78
End: 2023-10-03
Payer: MEDICARE

## 2023-10-03 ENCOUNTER — OFFICE VISIT (OUTPATIENT)
Dept: WOUND CARE | Facility: HOSPITAL | Age: 78
End: 2023-10-03
Attending: PODIATRIST
Payer: MEDICARE

## 2023-10-03 VITALS
SYSTOLIC BLOOD PRESSURE: 140 MMHG | HEART RATE: 83 BPM | DIASTOLIC BLOOD PRESSURE: 70 MMHG | RESPIRATION RATE: 18 BRPM | TEMPERATURE: 98 F

## 2023-10-03 DIAGNOSIS — L03.119 CELLULITIS AND ABSCESS OF FOOT: ICD-10-CM

## 2023-10-03 DIAGNOSIS — R26.2 DIFFICULTY IN WALKING, NOT ELSEWHERE CLASSIFIED: ICD-10-CM

## 2023-10-03 DIAGNOSIS — I73.9 PAD (PERIPHERAL ARTERY DISEASE): ICD-10-CM

## 2023-10-03 DIAGNOSIS — E11.628 DIABETIC FOOT INFECTION: ICD-10-CM

## 2023-10-03 DIAGNOSIS — I73.9 PVD (PERIPHERAL VASCULAR DISEASE): ICD-10-CM

## 2023-10-03 DIAGNOSIS — L08.9 WOUND INFECTION: ICD-10-CM

## 2023-10-03 DIAGNOSIS — L02.619 CELLULITIS AND ABSCESS OF FOOT: ICD-10-CM

## 2023-10-03 DIAGNOSIS — L97.524 ULCER OF LEFT FOOT WITH NECROSIS OF BONE: Primary | ICD-10-CM

## 2023-10-03 DIAGNOSIS — E11.40 TYPE 2 DIABETES MELLITUS WITH DIABETIC NEUROPATHY, UNSPECIFIED WHETHER LONG TERM INSULIN USE: ICD-10-CM

## 2023-10-03 DIAGNOSIS — M79.672 FOOT PAIN, LEFT: ICD-10-CM

## 2023-10-03 DIAGNOSIS — Z91.89 AT HIGH RISK FOR INADEQUATE NUTRITIONAL INTAKE: ICD-10-CM

## 2023-10-03 DIAGNOSIS — T14.8XXA WOUND INFECTION: ICD-10-CM

## 2023-10-03 DIAGNOSIS — L08.9 DIABETIC FOOT INFECTION: ICD-10-CM

## 2023-10-03 PROCEDURE — 11043 DBRDMT MUSC&/FSCA 1ST 20/<: CPT | Mod: PN | Performed by: PODIATRIST

## 2023-10-03 PROCEDURE — 99499 NO LOS: ICD-10-PCS | Mod: ,,, | Performed by: PODIATRIST

## 2023-10-03 PROCEDURE — 11043 DBRDMT MUSC&/FSCA 1ST 20/<: CPT | Mod: ,,, | Performed by: PODIATRIST

## 2023-10-03 PROCEDURE — 11043 PR DEBRIDEMENT, SKIN, SUB-Q TISSUE,MUSCLE,=<20 SQ CM: ICD-10-PCS | Mod: ,,, | Performed by: PODIATRIST

## 2023-10-03 PROCEDURE — 99499 UNLISTED E&M SERVICE: CPT | Mod: ,,, | Performed by: PODIATRIST

## 2023-10-03 NOTE — PROGRESS NOTES
[address] [facility] [address]  [place] [address]  Phone: [phone]   [phone]    Patient's PCP:Sunita Rivera MD  Referring Provider: Aubrey Self    Subjective:      Chief Complaint:: Wound Care, Diabetic Foot Ulcer, Diabetes, Wound Check, Numbness, Peripheral Neuropathy, Non-healing Wound, Pressure Ulcer, Difficulty Walking, and Foot Ulcer    HPI  Jose F Hanley is a 78 y.o. male who presents today with a complaint of left great toe wound.  See wound docs documentation for full assessment and evaluation of wound.     Patient saw infectious disease and is undergoing treatment for bone infection present.     Patient had recent vascular intervention.  Reviewed operative note.  Patient under the care of vascular surgery.        EVALUATION, ASSESSMENT, & PLAN:      1. Debridement.See Wound Docs for assessment of wounds and procedure notes  2. Continue taking all medications as prescribed  3. Dressing changes, see Wound docs for dressing change orders  5. Counseled patient on increasing protein intake, not getting wound wet, keeping dressing clean dry and intact, following a healthy diet, elevating legs when able, removing pressure from wound     Total time spent for E&M 40  Total time for debridement 35 minutes          Proper ulcer care and the possible need for serial debridements, topical medications, specific dressings and biological engineered skin substitutes if indicated.     Patient should call the office immediately if any signs of infection, such as fever, chills, sweats, increased redness or pain.     Patient was instructed to call the clinic or go to the emergency department if their symptoms do not improve, worsens, or if new symptoms develop.  Patient was advised that if any increased swelling, pain, or numbness arise to go immediately to the ED. Patient knows to call any time if an emergency arises. Shared decision making occurred and patient verbalized understanding in agreement with  this plan.         >50% of this > 60 minute visit was spent face to face educating/counseling the patient     I spent a total of 60 minutes on the day of the visit.This includes face to face time and non-face to face time preparing to see the patient (eg, review of tests), obtaining and/or reviewing separately obtained history, documenting clinical information in the electronic or other health record, independently interpreting results and communicating results to the patient/family/caregiver, or care coordinator.        Much of the documentation for this visit was completed in the Wound Docs system.  Please see the attached documentation for further details about the patient's care. Scanned under the Media tab.         Radha Costello, ARIANNE     Vitals:    10/03/23 1137   BP: (!) 140/70   Pulse: 83   Resp: 18   Temp: 98.2 °F (36.8 °C)   PainSc: 0-No pain      Shoe Size:     Past Surgical History:   Procedure Laterality Date    AORTOGRAPHY WITH SERIALOGRAPHY N/A 8/31/2023    Procedure: AORTOGRAM, WITH SERIALOGRAPHY;  Surgeon: Gary Thomas MD;  Location: [facility] CATH/EP LAB;  Service: Peripheral Vascular;  Laterality: N/A;    CORONARY ARTERY BYPASS GRAFT      PTA, ANTERIOR TIBIAL Left 8/31/2023    Procedure: PTA, Anterior Tibial;  Surgeon: Gary Thomas MD;  Location: [facility] CATH/EP LAB;  Service: Peripheral Vascular;  Laterality: Left;    PTA, SUPERFICIAL FEMORAL ARTERY Left 8/31/2023    Procedure: PTA, Superficial Femoral Artery;  Surgeon: Gary Thomas MD;  Location: [facility] CATH/EP LAB;  Service: Peripheral Vascular;  Laterality: Left;    SPLENECTOMY      STENT, ANTERIOR TIBIAL Left 8/31/2023    Procedure: Stent, Anterior Tibial;  Surgeon: Gary Thomas MD;  Location: [facility] CATH/EP LAB;  Service: Peripheral Vascular;  Laterality: Left;     Past Medical History:   Diagnosis Date    COPD (chronic obstructive pulmonary disease)     HLD (hyperlipidemia)     HTN (hypertension)     PAD (peripheral  artery disease)      History reviewed. No pertinent family history.     Social History:   Marital Status:   Alcohol History:  reports that he does not currently use alcohol after a past usage of about 21.0 standard drinks of alcohol per week.  Tobacco History:  reports that he quit smoking about 35 years ago. His smoking use included cigarettes. He has never used smokeless tobacco.  Drug History:  reports no history of drug use.    Review of patient's allergies indicates:   Allergen Reactions    Adhes. band-tape-benzalkonium Rash     Pt stated it caused blisters    Statins-hmg-coa reductase inhibitors Other (See Comments)     Statin Intolerance (joint pain)       Current Outpatient Medications   Medication Sig Dispense Refill    acetaminophen (TYLENOL) 325 MG tablet Take 650 mg by mouth.      albuterol (PROVENTIL/VENTOLIN HFA) 90 mcg/actuation inhaler Inhale 2 puffs into the lungs every 6 (six) hours as needed.      amoxicillin (AMOXIL) 500 MG capsule Take 1 capsule (500 mg total) by mouth every 8 (eight) hours. 90 capsule 1    betamethasone valerate 0.1% (VALISONE) 0.1 % Lotn Apply to scalp bid prn rash      clopidogreL (PLAVIX) 75 mg tablet Take 1 tablet (75 mg total) by mouth once daily. 30 tablet 11    cyanocobalamin 500 MCG tablet 500 mcg.      cyclobenzaprine (FLEXERIL) 10 MG tablet Take 10 mg by mouth every 8 (eight) hours as needed.      diphenhydrAMINE (BENADRYL) 50 MG capsule 50 mg.      doxepin (SINEQUAN) 50 MG capsule Take 50 mg by mouth every evening.      doxycycline (VIBRAMYCIN) 100 MG Cap Take 1 capsule with food to be taken on Monday Wednesday and Friday. May be photsensitive 30 capsule 2    DULoxetine (CYMBALTA) 60 MG capsule 60 mg.      EPINEPHrine (EPIPEN) 0.3 mg/0.3 mL AtIn INJECT 0.3MG/0.3ML SUBCUTANEOUSLY (UNDER THE SKIN)  AS NEEDED FOR ALLERGIC REACTIONS SELF-INJECTOR PEN      furosemide (LASIX) 40 MG tablet Take 40 mg by mouth once daily.      gabapentin (NEURONTIN) 400 MG capsule 800  mg.      hydrocodone-homatropine 5-1.5 mg/5 ml (HYCODAN) 5-1.5 mg/5 mL Syrp Take by mouth.      levothyroxine (SYNTHROID) 50 MCG tablet 50 mcg.      metOLazone (ZAROXOLYN) 5 MG tablet 5 mg.      montelukast (SINGULAIR) 10 mg tablet 10 mg.      NEXIUM 40 mg capsule Take 40 mg by mouth once daily.       nitroGLYCERIN (NITROSTAT) 0.4 MG SL tablet Place 0.4 mg under the tongue every 5 (five) minutes as needed.      omega-3 fatty acids/fish oil (FISH OIL-OMEGA-3 FATTY ACIDS) 300-1,000 mg capsule Take 1 capsule by mouth 2 (two) times daily.      omeprazole (PRILOSEC) 20 MG capsule 40 mg.      potassium chloride SA (K-DUR,KLOR-CON) 20 MEQ tablet 20 mEq.      pregabalin (LYRICA) 100 MG capsule Take 100 mg by mouth 2 (two) times daily.        senna-docusate 8.6-50 mg (PERICOLACE) 8.6-50 mg per tablet TAKE 2 TABLETS BY MOUTH DAILY FOR CONSTIPATION      spironolactone (ALDACTONE) 50 MG tablet Take 50 mg by mouth once daily.      tamsulosin (FLOMAX) 0.4 mg Cap 0.4 mg.      temazepam (RESTORIL) 30 mg capsule Take 30 mg by mouth nightly as needed for Insomnia.      TESTOSTERONE PROPIONATE, BULK, MISC Inject 1 each as directed every 14 (fourteen) days.      TOPROL XL 25 mg 24 hr tablet Take 25 mg by mouth once daily.        No current facility-administered medications for this visit.       Review of Systems   Constitutional:  Negative for chills, fatigue, fever and unexpected weight change.   HENT:  Negative for hearing loss and trouble swallowing.    Eyes:  Negative for photophobia and visual disturbance.   Respiratory:  Negative for cough, shortness of breath and wheezing.    Cardiovascular:  Negative for chest pain, palpitations and leg swelling.   Gastrointestinal:  Negative for abdominal pain and nausea.   Genitourinary:  Negative for dysuria and frequency.   Musculoskeletal:  Negative for arthralgias, back pain, gait problem, joint swelling and myalgias.   Skin:  Positive for wound. Negative for rash.   Neurological:   Positive for numbness. Negative for tremors, seizures, weakness and headaches.   Hematological:  Does not bruise/bleed easily.         Objective:        Physical Exam:   Foot Exam  Physical Exam  Ortho/SPM Exam     Imaging:            Assessment:       1. 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 PVD (peripheral vascular disease)    3. Wound infection    4. PAD (peripheral artery disease)    5. At high risk for inadequate nutritional intake    6. Foot pain, left    7. Difficulty in walking, not elsewhere classified    8. Type 2 diabetes mellitus with diabetic neuropathy, unspecified whether long term insulin use    9. Diabetic foot infection    10. 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    Procedures          Counseling:     I provided patient education verbally regarding:   Patient diagnosis, treatment options, as well as alternatives, risks, and benefits.     This note was created using Dragon voice recognition software that occasionally misinterpreted phrases or words.

## 2023-10-06 ENCOUNTER — INFUSION (OUTPATIENT)
Dept: INFUSION THERAPY | Facility: HOSPITAL | Age: 78
End: 2023-10-06
Attending: INTERNAL MEDICINE
Payer: MEDICARE

## 2023-10-06 ENCOUNTER — DOCUMENT SCAN (OUTPATIENT)
Dept: HOME HEALTH SERVICES | Facility: HOSPITAL | Age: 78
End: 2023-10-06
Payer: MEDICARE

## 2023-10-06 VITALS
HEART RATE: 80 BPM | SYSTOLIC BLOOD PRESSURE: 131 MMHG | TEMPERATURE: 98 F | RESPIRATION RATE: 16 BRPM | OXYGEN SATURATION: 94 % | DIASTOLIC BLOOD PRESSURE: 76 MMHG

## 2023-10-06 DIAGNOSIS — L97.524 ULCER OF LEFT FOOT WITH NECROSIS OF BONE: Primary | ICD-10-CM

## 2023-10-06 PROCEDURE — 63600175 PHARM REV CODE 636 W HCPCS: Mod: JZ,JG | Performed by: INTERNAL MEDICINE

## 2023-10-06 PROCEDURE — 96365 THER/PROPH/DIAG IV INF INIT: CPT

## 2023-10-06 PROCEDURE — 25000003 PHARM REV CODE 250: Performed by: INTERNAL MEDICINE

## 2023-10-06 RX ORDER — HEPARIN 100 UNIT/ML
500 SYRINGE INTRAVENOUS
Status: CANCELLED | OUTPATIENT
Start: 2023-10-06

## 2023-10-06 RX ADMIN — DALBAVANCIN 1500 MG: 500 INJECTION, POWDER, FOR SOLUTION INTRAVENOUS at 10:10

## 2023-10-09 ENCOUNTER — OFFICE VISIT (OUTPATIENT)
Dept: WOUND CARE | Facility: HOSPITAL | Age: 78
End: 2023-10-09
Attending: PODIATRIST
Payer: MEDICARE

## 2023-10-09 VITALS
DIASTOLIC BLOOD PRESSURE: 83 MMHG | HEART RATE: 76 BPM | TEMPERATURE: 98 F | RESPIRATION RATE: 18 BRPM | SYSTOLIC BLOOD PRESSURE: 162 MMHG

## 2023-10-09 DIAGNOSIS — L02.619 CELLULITIS AND ABSCESS OF FOOT: ICD-10-CM

## 2023-10-09 DIAGNOSIS — R26.2 DIFFICULTY IN WALKING, NOT ELSEWHERE CLASSIFIED: ICD-10-CM

## 2023-10-09 DIAGNOSIS — L97.524 ULCER OF LEFT FOOT WITH NECROSIS OF BONE: Primary | ICD-10-CM

## 2023-10-09 DIAGNOSIS — E11.40 TYPE 2 DIABETES MELLITUS WITH DIABETIC NEUROPATHY, UNSPECIFIED WHETHER LONG TERM INSULIN USE: ICD-10-CM

## 2023-10-09 DIAGNOSIS — Z91.89 AT HIGH RISK FOR INADEQUATE NUTRITIONAL INTAKE: ICD-10-CM

## 2023-10-09 DIAGNOSIS — L03.119 CELLULITIS AND ABSCESS OF FOOT: ICD-10-CM

## 2023-10-09 DIAGNOSIS — I73.9 PVD (PERIPHERAL VASCULAR DISEASE): ICD-10-CM

## 2023-10-09 PROCEDURE — 99499 UNLISTED E&M SERVICE: CPT | Mod: ,,, | Performed by: PODIATRIST

## 2023-10-09 PROCEDURE — 99499 NO LOS: ICD-10-PCS | Mod: ,,, | Performed by: PODIATRIST

## 2023-10-09 PROCEDURE — 15275 SKIN SUB GRAFT FACE/NK/HF/G: CPT | Mod: PN | Performed by: PODIATRIST

## 2023-10-09 PROCEDURE — 15275 SKIN SUB GRAFT FACE/NK/HF/G: CPT | Mod: ,,, | Performed by: PODIATRIST

## 2023-10-09 PROCEDURE — 15275 PR SKIN SUB GRAFT FACE/NK/HF/G UP TO 100 SQCM: ICD-10-PCS | Mod: ,,, | Performed by: PODIATRIST

## 2023-10-10 NOTE — PROGRESS NOTES
[address] [facility] [address]  [place] [address]  Phone: [phone]   [phone]    Patient's PCP:Sunita Rivera MD  Referring Provider: Aaareferral Self    Subjective:      Chief Complaint:: Wound Care, Diabetes, Diabetic Foot Ulcer, Wound Check, Numbness, Non-healing Wound, Peripheral Neuropathy, Pressure Ulcer, Foot Ulcer, and Difficulty Walking    HPI  Jose F Hanley is a 78 y.o. male who presents today with a complaint of left great toe wound.   See wound docs documentation for full assessment and evaluation of wound.     Patient saw infectious disease and is undergoing treatment for bone infection present.     Patient had recent vascular intervention.  Reviewed operative note.  Patient under the care of vascular surgery.        EVALUATION, ASSESSMENT, & PLAN:      1. Debridement with application of skin substitute to accelerate healing.See Wound Docs for assessment of wounds and procedure notes  2. Continue taking all medications as prescribed  3. Dressing changes, see Wound docs for dressing change orders  5. Counseled patient on increasing protein intake, not getting wound wet, keeping dressing clean dry and intact, following a healthy diet, elevating legs when able, removing pressure from wound     Total time spent for E&M 40  Total time for debridement 35 minutes          Proper ulcer care and the possible need for serial debridements, topical medications, specific dressings and biological engineered skin substitutes if indicated.     Patient should call the office immediately if any signs of infection, such as fever, chills, sweats, increased redness or pain.     Patient was instructed to call the clinic or go to the emergency department if their symptoms do not improve, worsens, or if new symptoms develop.  Patient was advised that if any increased swelling, pain, or numbness arise to go immediately to the ED. Patient knows to call any time if an emergency arises. Shared decision making  occurred and patient verbalized understanding in agreement with this plan.         >50% of this > 60 minute visit was spent face to face educating/counseling the patient     I spent a total of 60 minutes on the day of the visit.This includes face to face time and non-face to face time preparing to see the patient (eg, review of tests), obtaining and/or reviewing separately obtained history, documenting clinical information in the electronic or other health record, independently interpreting results and communicating results to the patient/family/caregiver, or care coordinator.       Much of the documentation for this visit was completed in the Wound Docs system.  Please see the attached documentation for further details about the patient's care. Scanned under the Media tab.         Radha CostelloJJMARLEN       Vitals:    10/09/23 1025   BP: (!) 162/83   Pulse: 76   Resp: 18   Temp: 97.6 °F (36.4 °C)   TempSrc: Skin   PainSc: 0-No pain      Shoe Size:     Past Surgical History:   Procedure Laterality Date    AORTOGRAPHY WITH SERIALOGRAPHY N/A 8/31/2023    Procedure: AORTOGRAM, WITH SERIALOGRAPHY;  Surgeon: Gary Thomas MD;  Location: [facility] CATH/EP LAB;  Service: Peripheral Vascular;  Laterality: N/A;    CORONARY ARTERY BYPASS GRAFT      PTA, ANTERIOR TIBIAL Left 8/31/2023    Procedure: PTA, Anterior Tibial;  Surgeon: Gary Thomas MD;  Location: [facility] CATH/EP LAB;  Service: Peripheral Vascular;  Laterality: Left;    PTA, SUPERFICIAL FEMORAL ARTERY Left 8/31/2023    Procedure: PTA, Superficial Femoral Artery;  Surgeon: Gary Thomas MD;  Location: [facility] CATH/EP LAB;  Service: Peripheral Vascular;  Laterality: Left;    SPLENECTOMY      STENT, ANTERIOR TIBIAL Left 8/31/2023    Procedure: Stent, Anterior Tibial;  Surgeon: Gary Thomas MD;  Location: [facility] CATH/EP LAB;  Service: Peripheral Vascular;  Laterality: Left;     Past Medical History:   Diagnosis Date    COPD (chronic obstructive  pulmonary disease)     HLD (hyperlipidemia)     HTN (hypertension)     PAD (peripheral artery disease)      History reviewed. No pertinent family history.     Social History:   Marital Status:   Alcohol History:  reports that he does not currently use alcohol after a past usage of about 21.0 standard drinks of alcohol per week.  Tobacco History:  reports that he quit smoking about 35 years ago. His smoking use included cigarettes. He has never used smokeless tobacco.  Drug History:  reports no history of drug use.    Review of patient's allergies indicates:   Allergen Reactions    Adhes. band-tape-benzalkonium Rash     Pt stated it caused blisters    Statins-hmg-coa reductase inhibitors Other (See Comments)     Statin Intolerance (joint pain)       Current Outpatient Medications   Medication Sig Dispense Refill    acetaminophen (TYLENOL) 325 MG tablet Take 650 mg by mouth.      albuterol (PROVENTIL/VENTOLIN HFA) 90 mcg/actuation inhaler Inhale 2 puffs into the lungs every 6 (six) hours as needed.      amoxicillin (AMOXIL) 500 MG capsule Take 1 capsule (500 mg total) by mouth every 8 (eight) hours. 90 capsule 1    betamethasone valerate 0.1% (VALISONE) 0.1 % Lotn Apply to scalp bid prn rash      clopidogreL (PLAVIX) 75 mg tablet Take 1 tablet (75 mg total) by mouth once daily. 30 tablet 11    cyanocobalamin 500 MCG tablet 500 mcg.      cyclobenzaprine (FLEXERIL) 10 MG tablet Take 10 mg by mouth every 8 (eight) hours as needed.      diphenhydrAMINE (BENADRYL) 50 MG capsule 50 mg.      doxepin (SINEQUAN) 50 MG capsule Take 50 mg by mouth every evening.      doxycycline (VIBRAMYCIN) 100 MG Cap Take 1 capsule with food to be taken on Monday Wednesday and Friday. May be photsensitive 30 capsule 2    DULoxetine (CYMBALTA) 60 MG capsule 60 mg.      EPINEPHrine (EPIPEN) 0.3 mg/0.3 mL AtIn INJECT 0.3MG/0.3ML SUBCUTANEOUSLY (UNDER THE SKIN)  AS NEEDED FOR ALLERGIC REACTIONS SELF-INJECTOR PEN      furosemide (LASIX) 40  MG tablet Take 40 mg by mouth once daily.      gabapentin (NEURONTIN) 400 MG capsule 800 mg.      hydrocodone-homatropine 5-1.5 mg/5 ml (HYCODAN) 5-1.5 mg/5 mL Syrp Take by mouth.      levothyroxine (SYNTHROID) 50 MCG tablet 50 mcg.      metOLazone (ZAROXOLYN) 5 MG tablet 5 mg.      montelukast (SINGULAIR) 10 mg tablet 10 mg.      NEXIUM 40 mg capsule Take 40 mg by mouth once daily.       nitroGLYCERIN (NITROSTAT) 0.4 MG SL tablet Place 0.4 mg under the tongue every 5 (five) minutes as needed.      omega-3 fatty acids/fish oil (FISH OIL-OMEGA-3 FATTY ACIDS) 300-1,000 mg capsule Take 1 capsule by mouth 2 (two) times daily.      omeprazole (PRILOSEC) 20 MG capsule 40 mg.      potassium chloride SA (K-DUR,KLOR-CON) 20 MEQ tablet 20 mEq.      pregabalin (LYRICA) 100 MG capsule Take 100 mg by mouth 2 (two) times daily.        senna-docusate 8.6-50 mg (PERICOLACE) 8.6-50 mg per tablet TAKE 2 TABLETS BY MOUTH DAILY FOR CONSTIPATION      spironolactone (ALDACTONE) 50 MG tablet Take 50 mg by mouth once daily.      tamsulosin (FLOMAX) 0.4 mg Cap 0.4 mg.      temazepam (RESTORIL) 30 mg capsule Take 30 mg by mouth nightly as needed for Insomnia.      TESTOSTERONE PROPIONATE, BULK, MISC Inject 1 each as directed every 14 (fourteen) days.      TOPROL XL 25 mg 24 hr tablet Take 25 mg by mouth once daily.        No current facility-administered medications for this visit.       Review of Systems   Constitutional:  Negative for chills, fatigue, fever and unexpected weight change.   HENT:  Negative for hearing loss and trouble swallowing.    Eyes:  Negative for photophobia and visual disturbance.   Respiratory:  Negative for cough, shortness of breath and wheezing.    Cardiovascular:  Negative for chest pain, palpitations and leg swelling.   Gastrointestinal:  Negative for abdominal pain and nausea.   Genitourinary:  Negative for dysuria and frequency.   Musculoskeletal:  Negative for arthralgias, back pain, gait problem, joint  swelling and myalgias.   Skin:  Positive for wound. Negative for rash.   Neurological:  Positive for numbness. Negative for tremors, seizures, weakness and headaches.   Hematological:  Does not bruise/bleed easily.         Objective:        Physical Exam:   Foot Exam  Physical Exam  Ortho/SPM Exam     Imaging:            Assessment:       1. 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 PVD (peripheral vascular disease)    3. Cellulitis and abscess of foot    4. At high risk for inadequate nutritional intake    5. Type 2 diabetes mellitus with diabetic neuropathy, unspecified whether long term insulin use    6. 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    Procedures          Counseling:     I provided patient education verbally regarding:   Patient diagnosis, treatment options, as well as alternatives, risks, and benefits.     This note was created using Dragon voice recognition software that occasionally misinterpreted phrases or words.

## 2023-10-11 ENCOUNTER — OFFICE VISIT (OUTPATIENT)
Dept: INFECTIOUS DISEASES | Facility: CLINIC | Age: 78
End: 2023-10-11
Payer: MEDICARE

## 2023-10-11 ENCOUNTER — LAB VISIT (OUTPATIENT)
Dept: LAB | Facility: HOSPITAL | Age: 78
End: 2023-10-11
Attending: INTERNAL MEDICINE
Payer: MEDICARE

## 2023-10-11 VITALS
SYSTOLIC BLOOD PRESSURE: 146 MMHG | BODY MASS INDEX: 37.15 KG/M2 | HEIGHT: 72 IN | TEMPERATURE: 98 F | OXYGEN SATURATION: 96 % | DIASTOLIC BLOOD PRESSURE: 72 MMHG | HEART RATE: 79 BPM | WEIGHT: 274.31 LBS

## 2023-10-11 DIAGNOSIS — L97.524 ULCER OF LEFT FOOT WITH NECROSIS OF BONE: Primary | ICD-10-CM

## 2023-10-11 DIAGNOSIS — A49.8 ENTEROCOCCUS FAECALIS INFECTION: ICD-10-CM

## 2023-10-11 DIAGNOSIS — Z90.81 H/O SPLENECTOMY: ICD-10-CM

## 2023-10-11 DIAGNOSIS — F10.10 ALCOHOL ABUSE: ICD-10-CM

## 2023-10-11 DIAGNOSIS — L97.524 ULCER OF LEFT FOOT WITH NECROSIS OF BONE: ICD-10-CM

## 2023-10-11 DIAGNOSIS — E11.51 DIABETES MELLITUS WITH PERIPHERAL VASCULAR DISEASE: ICD-10-CM

## 2023-10-11 DIAGNOSIS — Z79.2 ENCOUNTER FOR LONG-TERM (CURRENT) USE OF ANTIBIOTICS: ICD-10-CM

## 2023-10-11 LAB
ALBUMIN SERPL BCP-MCNC: 4.2 G/DL (ref 3.5–5.2)
ALP SERPL-CCNC: 34 U/L (ref 55–135)
ALT SERPL W/O P-5'-P-CCNC: 28 U/L (ref 10–44)
ANION GAP SERPL CALC-SCNC: 7 MMOL/L (ref 8–16)
AST SERPL-CCNC: 33 U/L (ref 10–40)
BASOPHILS # BLD AUTO: 0.19 K/UL (ref 0–0.2)
BASOPHILS NFR BLD: 1.9 % (ref 0–1.9)
BILIRUB SERPL-MCNC: 0.5 MG/DL (ref 0.1–1)
BUN SERPL-MCNC: 30 MG/DL (ref 8–23)
CALCIUM SERPL-MCNC: 9.9 MG/DL (ref 8.7–10.5)
CHLORIDE SERPL-SCNC: 94 MMOL/L (ref 95–110)
CO2 SERPL-SCNC: 34 MMOL/L (ref 23–29)
CREAT SERPL-MCNC: 1.1 MG/DL (ref 0.5–1.4)
DIFFERENTIAL METHOD: ABNORMAL
EOSINOPHIL # BLD AUTO: 0.4 K/UL (ref 0–0.5)
EOSINOPHIL NFR BLD: 4.3 % (ref 0–8)
ERYTHROCYTE [DISTWIDTH] IN BLOOD BY AUTOMATED COUNT: 25.2 % (ref 11.5–14.5)
EST. GFR  (NO RACE VARIABLE): >60 ML/MIN/1.73 M^2
GLUCOSE SERPL-MCNC: 108 MG/DL (ref 70–110)
HCT VFR BLD AUTO: 49.6 % (ref 40–54)
HGB BLD-MCNC: 14.6 G/DL (ref 14–18)
IMM GRANULOCYTES # BLD AUTO: 0.02 K/UL (ref 0–0.04)
IMM GRANULOCYTES NFR BLD AUTO: 0.2 % (ref 0–0.5)
LYMPHOCYTES # BLD AUTO: 3.1 K/UL (ref 1–4.8)
LYMPHOCYTES NFR BLD: 31 % (ref 18–48)
MCH RBC QN AUTO: 21.3 PG (ref 27–31)
MCHC RBC AUTO-ENTMCNC: 29.4 G/DL (ref 32–36)
MCV RBC AUTO: 73 FL (ref 82–98)
MONOCYTES # BLD AUTO: 1.3 K/UL (ref 0.3–1)
MONOCYTES NFR BLD: 13.3 % (ref 4–15)
NEUTROPHILS # BLD AUTO: 5 K/UL (ref 1.8–7.7)
NEUTROPHILS NFR BLD: 49.3 % (ref 38–73)
NRBC BLD-RTO: 0 /100 WBC
PLATELET # BLD AUTO: 408 K/UL (ref 150–450)
PMV BLD AUTO: 9.8 FL (ref 9.2–12.9)
POTASSIUM SERPL-SCNC: 4.3 MMOL/L (ref 3.5–5.1)
PROT SERPL-MCNC: 7.3 G/DL (ref 6–8.4)
RBC # BLD AUTO: 6.84 M/UL (ref 4.6–6.2)
SODIUM SERPL-SCNC: 135 MMOL/L (ref 136–145)
WBC # BLD AUTO: 10.08 K/UL (ref 3.9–12.7)

## 2023-10-11 PROCEDURE — 99214 OFFICE O/P EST MOD 30 MIN: CPT | Mod: S$GLB,,, | Performed by: INTERNAL MEDICINE

## 2023-10-11 PROCEDURE — 80053 COMPREHEN METABOLIC PANEL: CPT | Performed by: INTERNAL MEDICINE

## 2023-10-11 PROCEDURE — 99214 PR OFFICE/OUTPT VISIT, EST, LEVL IV, 30-39 MIN: ICD-10-PCS | Mod: S$GLB,,, | Performed by: INTERNAL MEDICINE

## 2023-10-11 PROCEDURE — 85025 COMPLETE CBC W/AUTO DIFF WBC: CPT | Performed by: INTERNAL MEDICINE

## 2023-10-11 PROCEDURE — 36415 COLL VENOUS BLD VENIPUNCTURE: CPT | Performed by: INTERNAL MEDICINE

## 2023-10-11 PROCEDURE — 86140 C-REACTIVE PROTEIN: CPT | Performed by: INTERNAL MEDICINE

## 2023-10-11 NOTE — PROGRESS NOTES
Subjective:      Reason for consult: osteomyelitis of left great toe  9/21/23  Follow-up    : Jose F Hanley is a 78 y.o. male who sustained a blister on his neuropathic toe in early August. He developed cellulitis after breaking the blister and he was seen by primary on 8/14, 16 , given clindamycin but then admitted on 8/18 to [place]. General surgery debrided gangrenous tissue and sent a culture. He received IV antibiotics and was discharged on cefdinir. The culture grew Enterococcus. He was referred to Dr. Costello who also debrided on 9/12 and gave him doxycycline. This culture and the one from 9/19 were negative. He had a bone scan as well which demonstrated osteomyelitis in the distal phalanx of the great toe.   He has not smoked in 35 years, has an A1c of 6.3% and consumes 3 mixed drinks per night(and has done so for decades) . He does have neuropathy of his feet and checks them manually  [facility]     10/11/23: (had PTA AT in 2 locations 8/31) received dalvance on 9/26 and 10/6.    Reduced alcohol from 4-5 drinks per day down to 2.  Tolerating amoxicillin without rash, diarrhea, thrush. Dr. Costello debrided and placed a graft on 10/9. Taking 2 protein drinks per day. 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   SOB has diminshed.   Review of patient's allergies indicates:   Allergen Reactions    Adhes. band-tape-benzalkonium Rash     Pt stated it caused blisters    Statins-hmg-coa reductase inhibitors Other (See Comments)     Statin Intolerance (joint pain)     Past Medical History:   Diagnosis Date    COPD (chronic obstructive pulmonary disease)     HLD (hyperlipidemia)     HTN (hypertension)     PAD (peripheral artery disease)     Inflammatory polyarthropathy (HCC Code)    Peripheral neuropathy    Carpal tunnel syndrome    Osteoarthrosis, hand    SNHL (sensorineural hearing loss)    S/P CABG (coronary artery bypass graft)    Atherosclerotic heart disease of native  coronary artery without angina pectoris    Chronic neck pain    Plantar fascial fibromatosis - Right    Non-ST elevation MI (NSTEMI) (HCC Code)    HTN (hypertension)    Dyslipidemia    COPD, group D, by GOLD 2017 classification (HCC Code)    Hypogonadism male    Splenic rupture    Incisional hernia    AS (aortic stenosis)    S/P TAVR (transcatheter aortic valve replacement)    Basal cell carcinoma of nose    Presbyopia    Dry eyes    Chronic atrial fibrillation (HCC Code)    Contraindication to statin medication    Atherosclerosis of artery of both lower extremities (HCC Code)    History of malignant neoplasm of skin    Actinic keratoses    JAZMIN (acute kidney injury) (HCC Code)    Asthma-COPD overlap syndrome (HCC Code)    Environmental and seasonal allergies    Pacemaker    Hypothyroidism    Insomnia    H/O splenectomy    Gastrointestinal hemorrhage    Generalized weakness    Syncope    Presence of Watchman left atrial appendage closure device    OA (osteoarthritis of the spine)    Atherosclerosis of autologous artery coronary artery bypass graft(s) with unstable angina pectoris (HCC Code)    Benign prostatic hyperplasia without urinary obstruction    Cobalamin deficiency    Hyperlipidemia    Hypokalemia    Type 2 diabetes mellitus (HCC Code)    Vitamin D deficiency     Coronary artery bypass graft; Ankle surgery; Cardiac catheterization (2013); Coronary stent placement; Rotator cuff repair; Cataract extraction; Hemorroidectomy (2013); Ankle fracture surgery; COLONOSCOPY (N/A, 4/9/2013); Splenectomy, total; Eye surgery; Ventral hernia repair (N/A, 7/29/2015); Aortic valve replacement (N/A, 2/26/2016); Heart Catheterization (7/28/2017); Embolectomy (Right, 4/17/2018); Wound debridement (Right, 6/11/2018); Colonoscopy (N/A, 11/26/2018); Pacemaker (8/1/2019); and Colonoscopy (N/A, 12/3/2021).  Past Surgical History:   Procedure Laterality Date    AORTOGRAPHY WITH SERIALOGRAPHY N/A 8/31/2023    Procedure: AORTOGRAM,  WITH SERIALOGRAPHY;  Surgeon: Gary Thomas MD;  Location: [facility] CATH/EP LAB;  Service: Peripheral Vascular;  Laterality: N/A;    CORONARY ARTERY BYPASS GRAFT      PTA, ANTERIOR TIBIAL Left 2023    Procedure: PTA, Anterior Tibial;  Surgeon: Gary Thomas MD;  Location: [facility] CATH/EP LAB;  Service: Peripheral Vascular;  Laterality: Left;    PTA, SUPERFICIAL FEMORAL ARTERY Left 2023    Procedure: PTA, Superficial Femoral Artery;  Surgeon: Gary Thomas MD;  Location: [facility] CATH/EP LAB;  Service: Peripheral Vascular;  Laterality: Left;    SPLENECTOMY      STENT, ANTERIOR TIBIAL Left 2023    Procedure: Stent, Anterior Tibial;  Surgeon: Gary Thomas MD;  Location: [facility] CATH/EP LAB;  Service: Peripheral Vascular;  Laterality: Left;      Social History     Tobacco Use    Smoking status: Former     Current packs/day: 0.00     Types: Cigarettes     Quit date: 1987     Years since quittin.8    Smokeless tobacco: Never   Substance Use Topics    Alcohol use: Not Currently     Alcohol/week: 21.0 standard drinks of alcohol     Types: 21 Shots of liquor per week     History reviewed. No pertinent family history.    Pertinent medications noted:     Review of Systems    Ability to give history is: good. Wife present assists as well  No chills, fever, sweats,    No change in vision,   No pain in mouth or throat.    No chest pain, but he does have a history of congestive heart failure and is breathing more easily today  No cough, sputum production, but does have less BLACKBURN  and taking lasix daily and takes metolazone prn 2 lb weight gain  No nausea, vomiting, diarrhea,  or focal abd pain.    No swelling of joints, redness of joints, injuries, or new focal pain  He does have peripheral neuropathy  No anxiety, depression but drinks moderate to heavy alcohol. 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 Body mass index is 37.2 kg/m².  Physical Exam      General: Alert and attentive, cooperative      Eyes:  anicteric, EOMI    Neck: Supple,      ENT: EAC patent,  nares patent, no oral lesions, , no thrush    Cardiovascular: Regular rate and rhythm     Respiratory:mild left basilar crackles    Gastrointestinal:  Soft, bowel sounds normal,    no  distention    Genitourinary:  no flank tenderness    Integumentary: Skin without rashes,    Vascular: mild bilateral pitting peripheral edema  warm and well perfused    Musculoskeletal: Ambulates without difficulty, no acute arthritis, synovitis or myositis.     Lymphatic:      Neurological: Normal LOC, cranial nerves, speech,  gait    Psychiatric: Normal mood, speech,  demeanor     Wound:  9/21       I did review photos in wound care notes from 9/26. Bandages and graft not disturbed today    VAD:       General Labs reviewed:  Lab Results   Component Value Date    WBC 10.08 10/11/2023    RBC 6.84 (H) 10/11/2023    HGB 14.6 10/11/2023    HCT 49.6 10/11/2023    MCV 73 (L) 10/11/2023    MCH 21.3 (L) 10/11/2023    MCHC 29.4 (L) 10/11/2023    RDW 25.2 (H) 10/11/2023     10/11/2023    MPV 9.8 10/11/2023    GRAN 5.0 10/11/2023    GRAN 49.3 10/11/2023    LYMPH 3.1 10/11/2023    LYMPH 31.0 10/11/2023    MONO 1.3 (H) 10/11/2023    MONO 13.3 10/11/2023    EOS 0.4 10/11/2023    BASO 0.19 10/11/2023    EOSINOPHIL 4.3 10/11/2023    BASOPHIL 1.9 10/11/2023     CMP  Sodium   Date Value Ref Range Status   10/11/2023 135 (L) 136 - 145 mmol/L Final     Potassium   Date Value Ref Range Status   10/11/2023 4.3 3.5 - 5.1  mmol/L Final     Chloride   Date Value Ref Range Status   10/11/2023 94 (L) 95 - 110 mmol/L Final     CO2   Date Value Ref Range Status   10/11/2023 34 (H) 23 - 29 mmol/L Final     Glucose   Date Value Ref Range Status   10/11/2023 108 70 - 110 mg/dL Final     BUN   Date Value Ref Range Status   10/11/2023 30 (H) 8 - 23 mg/dL Final     Creatinine   Date Value Ref Range Status   10/11/2023 1.1 0.5 - 1.4 mg/dL Final     Calcium   Date Value Ref Range Status   10/11/2023 9.9 8.7 - 10.5 mg/dL Final     Total Protein   Date Value Ref Range Status   10/11/2023 7.3 6.0 - 8.4 g/dL Final     Albumin   Date Value Ref Range Status   10/11/2023 4.2 3.5 - 5.2 g/dL Final     Total Bilirubin   Date Value Ref Range Status   10/11/2023 0.5 0.1 - 1.0 mg/dL Final     Comment:     For infants and newborns, interpretation of results should be based  on gestational age, weight and in agreement with clinical  observations.    Premature Infant recommended reference ranges:  Up to 24 hours.............<8.0 mg/dL  Up to 48 hours............<12.0 mg/dL  3-5 days..................<15.0 mg/dL  6-29 days.................<15.0 mg/dL       Alkaline Phosphatase   Date Value Ref Range Status   10/11/2023 34 (L) 55 - 135 U/L Final     AST   Date Value Ref Range Status   10/11/2023 33 10 - 40 U/L Final     ALT   Date Value Ref Range Status   10/11/2023 28 10 - 44 U/L Final     Anion Gap   Date Value Ref Range Status   10/11/2023 7 (L) 8 - 16 mmol/L Final     eGFR   Date Value Ref Range Status   10/11/2023 >60.0 >60 mL/min/1.73 m^2 Final     CRP not done      Micro:8/18/23 right great toe  Culture  3+ Enterococcus faecalis Abnormal     Culture  1+ Normal skin stiven    Stain  No organisms seen    Stain  No WBC    Stain  No Epithelial cells    Resulting Agency [facility]   Susceptibility    Organism Antibiotic Method Susceptibility   Enterococcus faecalis Ampicillin SUSCEPTIBILITY PANEL <2 ug/ml: Sensitive   Enterococcus faecalis Gentamicin Synergy Screen  SUSCEPTIBILITY PANEL <500 ug/ml: Sensitive   Enterococcus faecalis Linezolid SUSCEPTIBILITY PANEL 2 ug/ml: Sensitive   Enterococcus faecalis Penicillin SUSCEPTIBILITY PANEL 2 ug/ml: Sensitive   Enterococcus faecalis Streptomycin SynergyScreen SUSCEPTIBILITY PANEL <1,000 ug/ml: Sensitive   Enterococcus faecalis Tigecycline   SUSCEPTIBILITY PANEL Sensitive   Enterococcus faecalis Vancomycin SUSCEPTIBILITY PANEL 1 ug/ml: Sensitive     Narrative      Microbiology Results (last 7 days)       ** No results found for the last 168 hours. **          Imaging Reviewed:  CTA BLE  1. Prior cardiac interventions   2. Prior splenectomy   3. Bilateral SFA stenoses      Bone scan   IMPRESSION:  Increased MDP activity along the left great toe in keeping with cellulitis with osteomyelitis in the distal phalanx of the great toe of the left foot.    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 - Primary    Relevant Orders    CBC Auto Differential (Completed)    Comprehensive Metabolic Panel (Completed)    C-Reactive Protein     Other Visit Diagnoses       Encounter for long-term (current) use of antibiotics        Relevant Orders    CBC Auto Differential (Completed)    Comprehensive Metabolic Panel (Completed)    C-Reactive Protein    Enterococcus faecalis infection        Diabetes mellitus with peripheral vascular disease        Alcohol abuse                   Plan:           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  -     CBC Auto Differential; Future; Expected date: 10/11/2023  -     Comprehensive Metabolic Panel; Future; Expected date: 10/11/2023  -     C-Reactive Protein; Future; Expected date: 10/11/2023    Encounter for long-term (current) use of antibiotics  -     CBC Auto Differential; Future; Expected date: 10/11/2023  -     Comprehensive Metabolic Panel; Future; Expected date: 10/11/2023  -     C-Reactive Protein; Future; Expected date:  10/11/2023    Enterococcus faecalis infection    H/O splenectomy    Diabetes mellitus with peripheral vascular disease    Alcohol abuse       Continue the amoxicillin  The intravenous antibiotic will be in your system for another 3-4 weeks  Please call me if you develop any side effects of the amoxicillin  Please continue to reduce alcohol and elevate your foot whenever you are sitting  Please be extra careful not to let it get dirty in the yard/field    Return in 3 weeks  This note was created using Dragon voice recognition software that occasionally misinterpreted phrases or words.

## 2023-10-11 NOTE — PATIENT INSTRUCTIONS
Continue the amoxicillin  The intravenous antibiotic will be in your system for another 3-4 weeks  Please call me if you develop any side effects of the amoxicillin  Please continue to reduce alcohol and elevate your foot whenever you are sitting  Please be extra careful not to let it get dirty in the yard/field    Return in 3 weeks

## 2023-10-12 ENCOUNTER — TELEPHONE (OUTPATIENT)
Dept: PODIATRY | Facility: CLINIC | Age: 78
End: 2023-10-12
Payer: MEDICARE

## 2023-10-12 LAB — CRP SERPL-MCNC: 4.3 MG/L (ref 0–8.2)

## 2023-10-12 NOTE — TELEPHONE ENCOUNTER
----- Message from Nadege Dominguez sent at 10/12/2023  1:40 PM CDT -----  Regarding: Home Health nurse needs return call  Type: Needs Medical Advice  Who Called:  Natali home health nurse    Best Call Back Number: [phone]  Additional Information: They received wound care orders for patient and needing some clarification on them please call as soon as possible

## 2023-10-16 ENCOUNTER — TELEPHONE (OUTPATIENT)
Dept: INFECTIOUS DISEASES | Facility: CLINIC | Age: 78
End: 2023-10-16

## 2023-10-16 NOTE — TELEPHONE ENCOUNTER
----- Message from Francisca Garcia MD sent at 10/12/2023  4:47 PM CDT -----  Please let him know labs look ok

## 2023-10-17 ENCOUNTER — OFFICE VISIT (OUTPATIENT)
Dept: WOUND CARE | Facility: HOSPITAL | Age: 78
End: 2023-10-17
Attending: PODIATRIST
Payer: MEDICARE

## 2023-10-17 VITALS
RESPIRATION RATE: 19 BRPM | TEMPERATURE: 98 F | HEART RATE: 74 BPM | DIASTOLIC BLOOD PRESSURE: 74 MMHG | SYSTOLIC BLOOD PRESSURE: 146 MMHG

## 2023-10-17 DIAGNOSIS — E11.40 TYPE 2 DIABETES MELLITUS WITH DIABETIC NEUROPATHY, UNSPECIFIED WHETHER LONG TERM INSULIN USE: ICD-10-CM

## 2023-10-17 DIAGNOSIS — T14.8XXA WOUND INFECTION: ICD-10-CM

## 2023-10-17 DIAGNOSIS — M79.672 FOOT PAIN, LEFT: ICD-10-CM

## 2023-10-17 DIAGNOSIS — L08.9 WOUND INFECTION: ICD-10-CM

## 2023-10-17 DIAGNOSIS — L03.119 CELLULITIS AND ABSCESS OF FOOT: ICD-10-CM

## 2023-10-17 DIAGNOSIS — I73.9 PVD (PERIPHERAL VASCULAR DISEASE): ICD-10-CM

## 2023-10-17 DIAGNOSIS — Z91.89 AT HIGH RISK FOR INADEQUATE NUTRITIONAL INTAKE: ICD-10-CM

## 2023-10-17 DIAGNOSIS — L97.524 ULCER OF LEFT FOOT WITH NECROSIS OF BONE: Primary | ICD-10-CM

## 2023-10-17 DIAGNOSIS — L02.619 CELLULITIS AND ABSCESS OF FOOT: ICD-10-CM

## 2023-10-17 DIAGNOSIS — L08.9 DIABETIC FOOT INFECTION: ICD-10-CM

## 2023-10-17 DIAGNOSIS — E11.628 DIABETIC FOOT INFECTION: ICD-10-CM

## 2023-10-17 DIAGNOSIS — I73.9 PAD (PERIPHERAL ARTERY DISEASE): ICD-10-CM

## 2023-10-17 DIAGNOSIS — R26.2 DIFFICULTY IN WALKING, NOT ELSEWHERE CLASSIFIED: ICD-10-CM

## 2023-10-17 PROCEDURE — 15275 SKIN SUB GRAFT FACE/NK/HF/G: CPT | Mod: ,,, | Performed by: PODIATRIST

## 2023-10-17 PROCEDURE — 99499 UNLISTED E&M SERVICE: CPT | Mod: ,,, | Performed by: PODIATRIST

## 2023-10-17 PROCEDURE — 99499 NO LOS: ICD-10-PCS | Mod: ,,, | Performed by: PODIATRIST

## 2023-10-17 PROCEDURE — 15275 SKIN SUB GRAFT FACE/NK/HF/G: CPT | Mod: PN | Performed by: PODIATRIST

## 2023-10-17 PROCEDURE — 15275 PR SKIN SUB GRAFT FACE/NK/HF/G UP TO 100 SQCM: ICD-10-PCS | Mod: ,,, | Performed by: PODIATRIST

## 2023-10-18 NOTE — PROGRESS NOTES
[address]  [place] [address]  Phone: [phone]   [phone]    Patient's PCP:Sunita Rivera MD  Referring Provider: Aaareferral Self    Subjective:      Chief Complaint:: Wound Care, Diabetic Foot Ulcer, Non-healing Wound Follow Up, Diabetes, Wound Check, Non-healing Wound, Peripheral Neuropathy, Numbness, and Difficulty Walking    HPI  Jose F Hanley is a 78 y.o. male who presents today with a complaint of left great toe wound.   See wound docs documentation for full assessment and evaluation of wound.     Patient saw infectious disease and is undergoing treatment for bone infection present.     Patient had recent vascular intervention.  Reviewed operative note.  Patient under the care of vascular surgery.        EVALUATION, ASSESSMENT, & PLAN:      1. Debridement with application of skin substitute to accelerate healing.See Wound Docs for assessment of wounds and procedure notes  2. Continue taking all medications as prescribed  3. Dressing changes, see Wound docs for dressing change orders  5. Counseled patient on increasing protein intake, not getting wound wet, keeping dressing clean dry and intact, following a healthy diet, elevating legs when able, removing pressure from wound      Total time spent for E&M 40  Total time for debridement 35 minutes          Proper ulcer care and the possible need for serial debridements, topical medications, specific dressings and biological engineered skin substitutes if indicated.     Patient should call the office immediately if any signs of infection, such as fever, chills, sweats, increased redness or pain.     Patient was instructed to call the clinic or go to the emergency department if their symptoms do not improve, worsens, or if new symptoms develop.  Patient was advised that if any increased swelling, pain, or numbness arise to go immediately to the ED. Patient knows to call any time if an emergency arises. Shared decision making  occurred and patient verbalized understanding in agreement with this plan.         >50% of this > 60 minute visit was spent face to face educating/counseling the patient     I spent a total of 60 minutes on the day of the visit.This includes face to face time and non-face to face time preparing to see the patient (eg, review of tests), obtaining and/or reviewing separately obtained history, documenting clinical information in the electronic or other health record, independently interpreting results and communicating results to the patient/family/caregiver, or care coordinator.        Much of the documentation for this visit was completed in the Wound Docs system.  Please see the attached documentation for further details about the patient's care. Scanned under the Media tab.         Radha Costello, ARIANNE     Vitals:    10/17/23 1507   BP: (!) 146/74   Pulse: 74   Resp: 19   Temp: 98.4 °F (36.9 °C)   PainSc: 0-No pain      Shoe Size:     Past Surgical History:   Procedure Laterality Date    AORTOGRAPHY WITH SERIALOGRAPHY N/A 8/31/2023    Procedure: AORTOGRAM, WITH SERIALOGRAPHY;  Surgeon: Gary Thomas MD;  Location: [facility] CATH/EP LAB;  Service: Peripheral Vascular;  Laterality: N/A;    CORONARY ARTERY BYPASS GRAFT      PTA, ANTERIOR TIBIAL Left 8/31/2023    Procedure: PTA, Anterior Tibial;  Surgeon: Gary Thomas MD;  Location: [facility] CATH/EP LAB;  Service: Peripheral Vascular;  Laterality: Left;    PTA, SUPERFICIAL FEMORAL ARTERY Left 8/31/2023    Procedure: PTA, Superficial Femoral Artery;  Surgeon: Gary Thomas MD;  Location: [facility] CATH/EP LAB;  Service: Peripheral Vascular;  Laterality: Left;    SPLENECTOMY      STENT, ANTERIOR TIBIAL Left 8/31/2023    Procedure: Stent, Anterior Tibial;  Surgeon: Gary Thomas MD;  Location: [facility] CATH/EP LAB;  Service: Peripheral Vascular;  Laterality: Left;     Past Medical History:   Diagnosis Date    COPD (chronic obstructive pulmonary disease)      HLD (hyperlipidemia)     HTN (hypertension)     PAD (peripheral artery disease)      History reviewed. No pertinent family history.     Social History:   Marital Status:   Alcohol History:  reports that he does not currently use alcohol after a past usage of about 21.0 standard drinks of alcohol per week.  Tobacco History:  reports that he quit smoking about 35 years ago. His smoking use included cigarettes. He has never used smokeless tobacco.  Drug History:  reports no history of drug use.    Review of patient's allergies indicates:   Allergen Reactions    Adhes. band-tape-benzalkonium Rash     Pt stated it caused blisters    Statins-hmg-coa reductase inhibitors Other (See Comments)     Statin Intolerance (joint pain)       Current Outpatient Medications   Medication Sig Dispense Refill    acetaminophen (TYLENOL) 325 MG tablet Take 650 mg by mouth.      albuterol (PROVENTIL/VENTOLIN HFA) 90 mcg/actuation inhaler Inhale 2 puffs into the lungs every 6 (six) hours as needed.      amoxicillin (AMOXIL) 500 MG capsule Take 1 capsule (500 mg total) by mouth every 8 (eight) hours. 90 capsule 1    betamethasone valerate 0.1% (VALISONE) 0.1 % Lotn Apply to scalp bid prn rash      clopidogreL (PLAVIX) 75 mg tablet Take 1 tablet (75 mg total) by mouth once daily. 30 tablet 11    cyanocobalamin 500 MCG tablet 500 mcg.      cyclobenzaprine (FLEXERIL) 10 MG tablet Take 10 mg by mouth every 8 (eight) hours as needed.      diphenhydrAMINE (BENADRYL) 50 MG capsule 50 mg.      doxepin (SINEQUAN) 50 MG capsule Take 50 mg by mouth every evening.      doxycycline (VIBRAMYCIN) 100 MG Cap Take 1 capsule with food to be taken on Monday Wednesday and Friday. May be photsensitive (Patient not taking: Reported on 10/11/2023) 30 capsule 2    DULoxetine (CYMBALTA) 60 MG capsule 60 mg.      EPINEPHrine (EPIPEN) 0.3 mg/0.3 mL AtIn INJECT 0.3MG/0.3ML SUBCUTANEOUSLY (UNDER THE SKIN)  AS NEEDED FOR ALLERGIC REACTIONS SELF-INJECTOR PEN       furosemide (LASIX) 40 MG tablet Take 40 mg by mouth once daily.      gabapentin (NEURONTIN) 400 MG capsule 800 mg.      hydrocodone-homatropine 5-1.5 mg/5 ml (HYCODAN) 5-1.5 mg/5 mL Syrp Take by mouth.      levothyroxine (SYNTHROID) 50 MCG tablet 50 mcg.      metOLazone (ZAROXOLYN) 5 MG tablet 5 mg.      montelukast (SINGULAIR) 10 mg tablet 10 mg.      NEXIUM 40 mg capsule Take 40 mg by mouth once daily.       nitroGLYCERIN (NITROSTAT) 0.4 MG SL tablet Place 0.4 mg under the tongue every 5 (five) minutes as needed.      omega-3 fatty acids/fish oil (FISH OIL-OMEGA-3 FATTY ACIDS) 300-1,000 mg capsule Take 1 capsule by mouth 2 (two) times daily.      omeprazole (PRILOSEC) 20 MG capsule 40 mg.      potassium chloride SA (K-DUR,KLOR-CON) 20 MEQ tablet 20 mEq.      pregabalin (LYRICA) 100 MG capsule Take 100 mg by mouth 2 (two) times daily.        senna-docusate 8.6-50 mg (PERICOLACE) 8.6-50 mg per tablet TAKE 2 TABLETS BY MOUTH DAILY FOR CONSTIPATION      spironolactone (ALDACTONE) 50 MG tablet Take 50 mg by mouth once daily.      tamsulosin (FLOMAX) 0.4 mg Cap 0.4 mg.      temazepam (RESTORIL) 30 mg capsule Take 30 mg by mouth nightly as needed for Insomnia.      TESTOSTERONE PROPIONATE, BULK, MISC Inject 1 each as directed every 14 (fourteen) days.      TOPROL XL 25 mg 24 hr tablet Take 25 mg by mouth once daily.        No current facility-administered medications for this visit.       Review of Systems   Constitutional:  Negative for chills, fatigue, fever and unexpected weight change.   HENT:  Negative for hearing loss and trouble swallowing.    Eyes:  Negative for photophobia and visual disturbance.   Respiratory:  Negative for cough, shortness of breath and wheezing.    Cardiovascular:  Negative for chest pain, palpitations and leg swelling.   Gastrointestinal:  Negative for abdominal pain and nausea.   Genitourinary:  Negative for dysuria and frequency.   Musculoskeletal:  Negative for arthralgias, back pain,  gait problem, joint swelling and myalgias.   Skin:  Positive for wound. Negative for rash.   Neurological:  Positive for numbness. Negative for tremors, seizures, weakness and headaches.   Hematological:  Does not bruise/bleed easily.         Objective:        Physical Exam:   Foot Exam  Physical Exam  Ortho/SPM Exam     Imaging:            Assessment:       1. 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 PAD (peripheral artery disease)    3. PVD (peripheral vascular disease)    4. Cellulitis and abscess of foot    5. At high risk for inadequate nutritional intake    6. Diabetic foot infection    7. Type 2 diabetes mellitus with diabetic neuropathy, unspecified whether long term insulin use    8. Foot pain, left    9. Wound infection    10. 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    Procedures          Counseling:     I provided patient education verbally regarding:   Patient diagnosis, treatment options, as well as alternatives, risks, and benefits.     This note was created using Dragon voice recognition software that occasionally misinterpreted phrases or words.

## 2023-10-24 ENCOUNTER — OFFICE VISIT (OUTPATIENT)
Dept: WOUND CARE | Facility: HOSPITAL | Age: 78
End: 2023-10-24
Attending: PODIATRIST
Payer: MEDICARE

## 2023-10-24 VITALS
SYSTOLIC BLOOD PRESSURE: 135 MMHG | TEMPERATURE: 98 F | RESPIRATION RATE: 18 BRPM | HEART RATE: 61 BPM | DIASTOLIC BLOOD PRESSURE: 94 MMHG

## 2023-10-24 DIAGNOSIS — R26.2 DIFFICULTY IN WALKING, NOT ELSEWHERE CLASSIFIED: ICD-10-CM

## 2023-10-24 DIAGNOSIS — L03.119 CELLULITIS AND ABSCESS OF FOOT: ICD-10-CM

## 2023-10-24 DIAGNOSIS — M79.672 FOOT PAIN, LEFT: ICD-10-CM

## 2023-10-24 DIAGNOSIS — L97.524 ULCER OF LEFT FOOT WITH NECROSIS OF BONE: Primary | ICD-10-CM

## 2023-10-24 DIAGNOSIS — Z91.89 AT HIGH RISK FOR INADEQUATE NUTRITIONAL INTAKE: ICD-10-CM

## 2023-10-24 DIAGNOSIS — I73.9 PAD (PERIPHERAL ARTERY DISEASE): ICD-10-CM

## 2023-10-24 DIAGNOSIS — L02.619 CELLULITIS AND ABSCESS OF FOOT: ICD-10-CM

## 2023-10-24 DIAGNOSIS — Z09 HOSPITAL DISCHARGE FOLLOW-UP: ICD-10-CM

## 2023-10-24 DIAGNOSIS — E11.628 DIABETIC FOOT INFECTION: ICD-10-CM

## 2023-10-24 DIAGNOSIS — I73.9 PVD (PERIPHERAL VASCULAR DISEASE): ICD-10-CM

## 2023-10-24 DIAGNOSIS — L08.9 WOUND INFECTION: ICD-10-CM

## 2023-10-24 DIAGNOSIS — L08.9 DIABETIC FOOT INFECTION: ICD-10-CM

## 2023-10-24 DIAGNOSIS — T14.8XXA WOUND INFECTION: ICD-10-CM

## 2023-10-24 DIAGNOSIS — E11.40 TYPE 2 DIABETES MELLITUS WITH DIABETIC NEUROPATHY, UNSPECIFIED WHETHER LONG TERM INSULIN USE: ICD-10-CM

## 2023-10-24 PROCEDURE — 99214 OFFICE O/P EST MOD 30 MIN: CPT | Mod: 25,,, | Performed by: PODIATRIST

## 2023-10-24 PROCEDURE — 15275 SKIN SUB GRAFT FACE/NK/HF/G: CPT | Mod: PN | Performed by: PODIATRIST

## 2023-10-24 PROCEDURE — 99214 PR OFFICE/OUTPT VISIT, EST, LEVL IV, 30-39 MIN: ICD-10-PCS | Mod: 25,,, | Performed by: PODIATRIST

## 2023-10-24 PROCEDURE — 15275 SKIN SUB GRAFT FACE/NK/HF/G: CPT | Mod: ,,, | Performed by: PODIATRIST

## 2023-10-24 PROCEDURE — 15275 PR SKIN SUB GRAFT FACE/NK/HF/G UP TO 100 SQCM: ICD-10-PCS | Mod: ,,, | Performed by: PODIATRIST

## 2023-10-24 NOTE — PROGRESS NOTES
[address] [facility] [address]  [place] [address]  Phone: [phone]   [phone]    Patient's PCP:Sunita Rivera MD  Referring Provider: Aaareferral Self    Subjective:      Chief Complaint:: Wound Care, Hospital Follow Up (Recent hospital admission due to testing positive for COVID.), Wound Check, Non-healing Wound, Numbness, Peripheral Neuropathy, Difficulty Walking, Diabetes, and Diabetic Foot Ulcer    HPI  Jose F Hanley is a 78 y.o. male who presents today with a complaint of left great toe wound.   See wound docs documentation for full assessment and evaluation of wound.    When patient was leaving clinic at the end of his visit he fell.  He did not hit his head.  He did not lose conscious.  He did obtain small skin tears on both arms Karma.  These were bandaged.  Patient did not appear to be confused.  Recommended he go to the emergency room, but he refused.  We took his vital signs following the fall and blood pressure was 132/78, 91 pulse, and 93 oxygen sat.  Patient was able to correctly answer questions such as the president of the [place].  Since patient was not willing to go to the hospital, I recommended he wait in the waiting room for approximately 30 minutes and we would monitor him for any changes and mental status.  Following 30 minutes of monitoring patient appeared to still be mentally acute and aware and no changes in his mental status were noted.  Therefore, we had staff member walk him down to his car.  Once again, he refused to go to the hospital to be further examined.  Patient was competent to make his own medical decisions.  Therefore we were unable to keep him against his will.  Contacted patient at 3pm (patient left clinic around 11:30am) to make sure patient had got home safely. Patient stated he was home safely and felt fine. States no headache, no confusion when contacted over the phone.       Patient saw infectious disease and is undergoing treatment for bone infection  present.     Patient had recent vascular intervention.  Reviewed operative note.  Patient under the care of vascular surgery.        EVALUATION, ASSESSMENT, & PLAN:      1. Debridement with application of skin substitute to accelerate healing.See Wound Docs for assessment of wounds and procedure notes  2. Continue taking all medications as prescribed  3. Dressing changes, see Wound docs for dressing change orders  5. Counseled patient on increasing protein intake, not getting wound wet, keeping dressing clean dry and intact, following a healthy diet, elevating legs when able, removing pressure from wound      Total time spent for E&M 40  Total time for debridement 35 minutes          Proper ulcer care and the possible need for serial debridements, topical medications, specific dressings and biological engineered skin substitutes if indicated.      Counseling/Education:  I provided patient education verbally regarding:   The aspects of diabetes and how it pertains to the feet. I explained the importance of proper diabetic foot care and how it is essential for the health of their feet.    I discussed the importance of knowing their Hemoglobin A1c and that the level needs to be as close to 6 as possible. I discussed the increase complications of high blood sugar including stroke, blindness, heart attack, kidney failure and loss of limb secondary to neuropathy and PVD.     With neuropathy, beware of any breaks in the skin or redness. These areas are not recognized early due to the numbness.    I discussed Diabetes, lower back issues, metabolic disorders, systemic causes, chemotherapy, vitamin deficiency, heavy metal exposure, as some of the causes. I also explained that as much as 40% of the time we can not find a cause. I discussed different treatments available to control the symptoms but which may not cure the problem.       Counseled patient on the aspects of diabetes and how it pertains to the feet.  I explained  the importance of proper diabetic foot care and how it is essential for the health of their feet.      Shoe inspection. Patient instructed on proper foot hygeine. We discussed wearing proper shoe gear, daily foot inspections, never walking without protective shoe gear, never putting sharp instruments to feet, routine podiatric nail visits every 2-3 months.         Patient should call the office immediately if any signs of infection, such as fever, chills, sweats, increased redness or pain.     Patient was instructed to call the clinic or go to the emergency department if their symptoms do not improve, worsens, or if new symptoms develop.  Patient was advised that if any increased swelling, pain, or numbness arise to go immediately to the ED. Patient knows to call any time if an emergency arises. Shared decision making occurred and patient verbalized understanding in agreement with this plan.         >50% of this > 60 minute visit was spent face to face educating/counseling the patient     I spent a total of 60 minutes on the day of the visit.This includes face to face time and non-face to face time preparing to see the patient (eg, review of tests), obtaining and/or reviewing separately obtained history, documenting clinical information in the electronic or other health record, independently interpreting results and communicating results to the patient/family/caregiver, or care coordinator.        Much of the documentation for this visit was completed in the Wound Docs system.  Please see the attached documentation for further details about the patient's care. Scanned under the Media tab.         Radha Costello DPM        Vitals:    10/24/23 1023   BP: (!) 135/94   Pulse: 61   Resp: 18   Temp: 98.1 °F (36.7 °C)   TempSrc: Skin   PainSc: 0-No pain      Shoe Size:     Past Surgical History:   Procedure Laterality Date    AORTOGRAPHY WITH SERIALOGRAPHY N/A 8/31/2023    Procedure: AORTOGRAM, WITH SERIALOGRAPHY;   Surgeon: Gary Thomas MD;  Location: [facility] CATH/EP LAB;  Service: Peripheral Vascular;  Laterality: N/A;    CORONARY ARTERY BYPASS GRAFT      PTA, ANTERIOR TIBIAL Left 8/31/2023    Procedure: PTA, Anterior Tibial;  Surgeon: Gary Thomas MD;  Location: [facility] CATH/EP LAB;  Service: Peripheral Vascular;  Laterality: Left;    PTA, SUPERFICIAL FEMORAL ARTERY Left 8/31/2023    Procedure: PTA, Superficial Femoral Artery;  Surgeon: Gary Thomas MD;  Location: [facility] CATH/EP LAB;  Service: Peripheral Vascular;  Laterality: Left;    SPLENECTOMY      STENT, ANTERIOR TIBIAL Left 8/31/2023    Procedure: Stent, Anterior Tibial;  Surgeon: Gary Thomas MD;  Location: [facility] CATH/EP LAB;  Service: Peripheral Vascular;  Laterality: Left;     Past Medical History:   Diagnosis Date    COPD (chronic obstructive pulmonary disease)     HLD (hyperlipidemia)     HTN (hypertension)     PAD (peripheral artery disease)      History reviewed. No pertinent family history.     Social History:   Marital Status:   Alcohol History:  reports that he does not currently use alcohol after a past usage of about 21.0 standard drinks of alcohol per week.  Tobacco History:  reports that he quit smoking about 35 years ago. His smoking use included cigarettes. He has never used smokeless tobacco.  Drug History:  reports no history of drug use.    Review of patient's allergies indicates:   Allergen Reactions    Adhes. band-tape-benzalkonium Rash     Pt stated it caused blisters    Statins-hmg-coa reductase inhibitors Other (See Comments)     Statin Intolerance (joint pain)       Current Outpatient Medications   Medication Sig Dispense Refill    acetaminophen (TYLENOL) 325 MG tablet Take 650 mg by mouth.      albuterol (PROVENTIL/VENTOLIN HFA) 90 mcg/actuation inhaler Inhale 2 puffs into the lungs every 6 (six) hours as needed.      amoxicillin (AMOXIL) 500 MG capsule Take 1 capsule (500 mg total) by mouth every 8 (eight)  hours. 90 capsule 1    betamethasone valerate 0.1% (VALISONE) 0.1 % Lotn Apply to scalp bid prn rash      clopidogreL (PLAVIX) 75 mg tablet Take 1 tablet (75 mg total) by mouth once daily. 30 tablet 11    cyanocobalamin 500 MCG tablet 500 mcg.      cyclobenzaprine (FLEXERIL) 10 MG tablet Take 10 mg by mouth every 8 (eight) hours as needed.      diphenhydrAMINE (BENADRYL) 50 MG capsule 50 mg.      doxepin (SINEQUAN) 50 MG capsule Take 50 mg by mouth every evening.      doxycycline (VIBRAMYCIN) 100 MG Cap Take 1 capsule with food to be taken on Monday Wednesday and Friday. May be photsensitive (Patient not taking: Reported on 10/11/2023) 30 capsule 2    DULoxetine (CYMBALTA) 60 MG capsule 60 mg.      EPINEPHrine (EPIPEN) 0.3 mg/0.3 mL AtIn INJECT 0.3MG/0.3ML SUBCUTANEOUSLY (UNDER THE SKIN)  AS NEEDED FOR ALLERGIC REACTIONS SELF-INJECTOR PEN      furosemide (LASIX) 40 MG tablet Take 40 mg by mouth once daily.      gabapentin (NEURONTIN) 400 MG capsule 800 mg.      hydrocodone-homatropine 5-1.5 mg/5 ml (HYCODAN) 5-1.5 mg/5 mL Syrp Take by mouth.      levothyroxine (SYNTHROID) 50 MCG tablet 50 mcg.      metOLazone (ZAROXOLYN) 5 MG tablet 5 mg.      montelukast (SINGULAIR) 10 mg tablet 10 mg.      NEXIUM 40 mg capsule Take 40 mg by mouth once daily.       nitroGLYCERIN (NITROSTAT) 0.4 MG SL tablet Place 0.4 mg under the tongue every 5 (five) minutes as needed.      omega-3 fatty acids/fish oil (FISH OIL-OMEGA-3 FATTY ACIDS) 300-1,000 mg capsule Take 1 capsule by mouth 2 (two) times daily.      omeprazole (PRILOSEC) 20 MG capsule 40 mg.      potassium chloride SA (K-DUR,KLOR-CON) 20 MEQ tablet 20 mEq.      pregabalin (LYRICA) 100 MG capsule Take 100 mg by mouth 2 (two) times daily.        senna-docusate 8.6-50 mg (PERICOLACE) 8.6-50 mg per tablet TAKE 2 TABLETS BY MOUTH DAILY FOR CONSTIPATION      spironolactone (ALDACTONE) 50 MG tablet Take 50 mg by mouth once daily.      tamsulosin (FLOMAX) 0.4 mg Cap 0.4 mg.       temazepam (RESTORIL) 30 mg capsule Take 30 mg by mouth nightly as needed for Insomnia.      TESTOSTERONE PROPIONATE, BULK, MISC Inject 1 each as directed every 14 (fourteen) days.      TOPROL XL 25 mg 24 hr tablet Take 25 mg by mouth once daily.        No current facility-administered medications for this visit.       Review of Systems   Constitutional:  Negative for chills, fatigue, fever and unexpected weight change.   HENT:  Negative for hearing loss and trouble swallowing.    Eyes:  Negative for photophobia and visual disturbance.   Respiratory:  Negative for cough, shortness of breath and wheezing.    Cardiovascular:  Negative for chest pain, palpitations and leg swelling.   Gastrointestinal:  Negative for abdominal pain and nausea.   Genitourinary:  Negative for dysuria and frequency.   Musculoskeletal:  Negative for arthralgias, back pain, gait problem, joint swelling and myalgias.   Skin:  Positive for wound. Negative for rash.   Neurological:  Positive for numbness. Negative for tremors, seizures, weakness and headaches.   Hematological:  Does not bruise/bleed easily.         Objective:        Physical Exam:   Foot Exam  Physical Exam  Ortho/SPM Exam     Imaging:            Assessment:       1. 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 PAD (peripheral artery disease)    3. Cellulitis and abscess of foot    4. PVD (peripheral vascular disease)    5. Diabetic foot infection    6. At high risk for inadequate nutritional intake    7. Type 2 diabetes mellitus with diabetic neuropathy, unspecified whether long term insulin use    8. Foot pain, left    9. Wound infection    10. Difficulty in walking, not elsewhere classified    11. 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    Procedures          Counseling:     I provided patient education verbally regarding:   Patient diagnosis, treatment options, as well as alternatives, risks, and benefits.     This note was created using Dragon voice recognition software that occasionally misinterpreted phrases or words.

## 2023-10-26 ENCOUNTER — DOCUMENT SCAN (OUTPATIENT)
Dept: HOME HEALTH SERVICES | Facility: HOSPITAL | Age: 78
End: 2023-10-26
Payer: MEDICARE

## 2023-10-27 ENCOUNTER — EXTERNAL HOME HEALTH (OUTPATIENT)
Dept: HOME HEALTH SERVICES | Facility: HOSPITAL | Age: 78
End: 2023-10-27
Payer: MEDICARE

## 2023-10-31 ENCOUNTER — OFFICE VISIT (OUTPATIENT)
Dept: WOUND CARE | Facility: HOSPITAL | Age: 78
End: 2023-10-31
Attending: PODIATRIST
Payer: MEDICARE

## 2023-10-31 VITALS
SYSTOLIC BLOOD PRESSURE: 137 MMHG | RESPIRATION RATE: 18 BRPM | BODY MASS INDEX: 35.76 KG/M2 | HEIGHT: 72 IN | DIASTOLIC BLOOD PRESSURE: 72 MMHG | HEART RATE: 100 BPM | WEIGHT: 264 LBS | TEMPERATURE: 98 F

## 2023-10-31 DIAGNOSIS — L08.9 WOUND INFECTION: ICD-10-CM

## 2023-10-31 DIAGNOSIS — T14.8XXA WOUND INFECTION: ICD-10-CM

## 2023-10-31 DIAGNOSIS — M79.672 FOOT PAIN, LEFT: ICD-10-CM

## 2023-10-31 DIAGNOSIS — E11.628 DIABETIC FOOT INFECTION: ICD-10-CM

## 2023-10-31 DIAGNOSIS — I73.9 PAD (PERIPHERAL ARTERY DISEASE): ICD-10-CM

## 2023-10-31 DIAGNOSIS — L97.524 ULCER OF LEFT FOOT WITH NECROSIS OF BONE: Primary | ICD-10-CM

## 2023-10-31 DIAGNOSIS — L08.9 DIABETIC FOOT INFECTION: ICD-10-CM

## 2023-10-31 DIAGNOSIS — R26.2 DIFFICULTY IN WALKING, NOT ELSEWHERE CLASSIFIED: ICD-10-CM

## 2023-10-31 DIAGNOSIS — I73.9 PVD (PERIPHERAL VASCULAR DISEASE): ICD-10-CM

## 2023-10-31 DIAGNOSIS — Z91.89 AT HIGH RISK FOR INADEQUATE NUTRITIONAL INTAKE: ICD-10-CM

## 2023-10-31 DIAGNOSIS — E11.40 TYPE 2 DIABETES MELLITUS WITH DIABETIC NEUROPATHY, UNSPECIFIED WHETHER LONG TERM INSULIN USE: ICD-10-CM

## 2023-10-31 PROCEDURE — 15275 SKIN SUB GRAFT FACE/NK/HF/G: CPT | Mod: PN | Performed by: PODIATRIST

## 2023-10-31 PROCEDURE — 15275 SKIN SUB GRAFT FACE/NK/HF/G: CPT | Mod: ,,, | Performed by: PODIATRIST

## 2023-10-31 PROCEDURE — 99499 NO LOS: ICD-10-PCS | Mod: ,,, | Performed by: PODIATRIST

## 2023-10-31 PROCEDURE — 99499 UNLISTED E&M SERVICE: CPT | Mod: ,,, | Performed by: PODIATRIST

## 2023-10-31 PROCEDURE — 15275 PR SKIN SUB GRAFT FACE/NK/HF/G UP TO 100 SQCM: ICD-10-PCS | Mod: ,,, | Performed by: PODIATRIST

## 2023-11-01 NOTE — PROGRESS NOTES
[address] [facility] [address]  [place] [address]  Phone: [phone]   [phone]    Patient's PCP:Sunita Rivera MD  Referring Provider: Aaareferral Self    Subjective:      Chief Complaint:: Wound Care, Diabetes, Wound Check, Pressure Ulcer, Non-healing Wound, Diabetic Foot Ulcer, Numbness, Peripheral Neuropathy, Difficulty Walking, and Foot Ulcer    HPI  Jose F Hanley is a 78 y.o. male who presents today with a complaint of left great toe wound.   See wound docs documentation for full assessment and evaluation of wound.     When patient was leaving clinic at the end of his visit he fell.  He did not hit his head.  He did not lose conscious.  He did obtain small skin tears on both arms Karma.  These were bandaged.  Patient did not appear to be confused.  Recommended he go to the emergency room, but he refused.  We took his vital signs following the fall and blood pressure was 132/78, 91 pulse, and 93 oxygen sat.  Patient was able to correctly answer questions such as the president of the [place].  Since patient was not willing to go to the hospital, I recommended he wait in the waiting room for approximately 30 minutes and we would monitor him for any changes and mental status.  Following 30 minutes of monitoring patient appeared to still be mentally acute and aware and no changes in his mental status were noted.  Therefore, we had staff member walk him down to his car.  Once again, he refused to go to the hospital to be further examined.  Patient was competent to make his own medical decisions.  Therefore we were unable to keep him against his will.  Contacted patient at 3pm (patient left clinic around 11:30am) to make sure patient had got home safely. Patient stated he was home safely and felt fine. States no headache, no confusion when contacted over the phone.         Patient saw infectious disease and is undergoing treatment for bone infection present.     Patient had recent vascular intervention.   Reviewed operative note.  Patient under the care of vascular surgery.        EVALUATION, ASSESSMENT, & PLAN:      1. Debridement with application of skin substitute to accelerate healing.See Wound Docs for assessment of wounds and procedure notes  2. Continue taking all medications as prescribed  3. Dressing changes, see Wound docs for dressing change orders  5. Counseled patient on increasing protein intake, not getting wound wet, keeping dressing clean dry and intact, following a healthy diet, elevating legs when able, removing pressure from wound      Total time spent for E&M 40  Total time for debridement 35 minutes          Proper ulcer care and the possible need for serial debridements, topical medications, specific dressings and biological engineered skin substitutes if indicated.        Counseling/Education:  I provided patient education verbally regarding:   The aspects of diabetes and how it pertains to the feet. I explained the importance of proper diabetic foot care and how it is essential for the health of their feet.     I discussed the importance of knowing their Hemoglobin A1c and that the level needs to be as close to 6 as possible. I discussed the increase complications of high blood sugar including stroke, blindness, heart attack, kidney failure and loss of limb secondary to neuropathy and PVD.      With neuropathy, beware of any breaks in the skin or redness. These areas are not recognized early due to the numbness.     I discussed Diabetes, lower back issues, metabolic disorders, systemic causes, chemotherapy, vitamin deficiency, heavy metal exposure, as some of the causes. I also explained that as much as 40% of the time we can not find a cause. I discussed different treatments available to control the symptoms but which may not cure the problem.         Counseled patient on the aspects of diabetes and how it pertains to the feet.  I explained the importance of proper diabetic foot care and how  it is essential for the health of their feet.        Shoe inspection. Patient instructed on proper foot hygeine. We discussed wearing proper shoe gear, daily foot inspections, never walking without protective shoe gear, never putting sharp instruments to feet, routine podiatric nail visits every 2-3 months.          Patient should call the office immediately if any signs of infection, such as fever, chills, sweats, increased redness or pain.     Patient was instructed to call the clinic or go to the emergency department if their symptoms do not improve, worsens, or if new symptoms develop.  Patient was advised that if any increased swelling, pain, or numbness arise to go immediately to the ED. Patient knows to call any time if an emergency arises. Shared decision making occurred and patient verbalized understanding in agreement with this plan.         >50% of this > 60 minute visit was spent face to face educating/counseling the patient     I spent a total of 60 minutes on the day of the visit.This includes face to face time and non-face to face time preparing to see the patient (eg, review of tests), obtaining and/or reviewing separately obtained history, documenting clinical information in the electronic or other health record, independently interpreting results and communicating results to the patient/family/caregiver, or care coordinator.        Much of the documentation for this visit was completed in the Wound Docs system.  Please see the attached documentation for further details about the patient's care. Scanned under the Media tab.         Radha Costello DPM     Vitals:    10/31/23 1059   BP: 137/72   Pulse: 100   Resp: 18   Temp: 97.8 °F (36.6 °C)   Weight: 119.7 kg (264 lb)   Height: 6' (1.829 m)   PainSc: 0-No pain      Shoe Size:     Past Surgical History:   Procedure Laterality Date    AORTOGRAPHY WITH SERIALOGRAPHY N/A 8/31/2023    Procedure: AORTOGRAM, WITH SERIALOGRAPHY;  Surgeon: Gary Thomas  MD ANA;  Location: [facility] CATH/EP LAB;  Service: Peripheral Vascular;  Laterality: N/A;    CORONARY ARTERY BYPASS GRAFT      PTA, ANTERIOR TIBIAL Left 8/31/2023    Procedure: PTA, Anterior Tibial;  Surgeon: Gary Thomas MD;  Location: [facility] CATH/EP LAB;  Service: Peripheral Vascular;  Laterality: Left;    PTA, SUPERFICIAL FEMORAL ARTERY Left 8/31/2023    Procedure: PTA, Superficial Femoral Artery;  Surgeon: Gary Thomas MD;  Location: [facility] CATH/EP LAB;  Service: Peripheral Vascular;  Laterality: Left;    SPLENECTOMY      STENT, ANTERIOR TIBIAL Left 8/31/2023    Procedure: Stent, Anterior Tibial;  Surgeon: Gary Thomas MD;  Location: [facility] CATH/EP LAB;  Service: Peripheral Vascular;  Laterality: Left;     Past Medical History:   Diagnosis Date    COPD (chronic obstructive pulmonary disease)     HLD (hyperlipidemia)     HTN (hypertension)     PAD (peripheral artery disease)      History reviewed. No pertinent family history.     Social History:   Marital Status:   Alcohol History:  reports that he does not currently use alcohol after a past usage of about 21.0 standard drinks of alcohol per week.  Tobacco History:  reports that he quit smoking about 35 years ago. His smoking use included cigarettes. He has never used smokeless tobacco.  Drug History:  reports no history of drug use.    Review of patient's allergies indicates:   Allergen Reactions    Adhes. band-tape-benzalkonium Rash     Pt stated it caused blisters    Statins-hmg-coa reductase inhibitors Other (See Comments)     Statin Intolerance (joint pain)       Current Outpatient Medications   Medication Sig Dispense Refill    acetaminophen (TYLENOL) 325 MG tablet Take 650 mg by mouth.      albuterol (PROVENTIL/VENTOLIN HFA) 90 mcg/actuation inhaler Inhale 2 puffs into the lungs every 6 (six) hours as needed.      amoxicillin (AMOXIL) 500 MG capsule Take 1 capsule (500 mg total) by mouth every 8 (eight) hours. 90 capsule 1     betamethasone valerate 0.1% (VALISONE) 0.1 % Lotn Apply to scalp bid prn rash      clopidogreL (PLAVIX) 75 mg tablet Take 1 tablet (75 mg total) by mouth once daily. 30 tablet 11    cyanocobalamin 500 MCG tablet 500 mcg.      cyclobenzaprine (FLEXERIL) 10 MG tablet Take 10 mg by mouth every 8 (eight) hours as needed.      diphenhydrAMINE (BENADRYL) 50 MG capsule 50 mg.      doxepin (SINEQUAN) 50 MG capsule Take 50 mg by mouth every evening.      doxycycline (VIBRAMYCIN) 100 MG Cap Take 1 capsule with food to be taken on Monday Wednesday and Friday. May be photsensitive (Patient not taking: Reported on 10/11/2023) 30 capsule 2    DULoxetine (CYMBALTA) 60 MG capsule 60 mg.      EPINEPHrine (EPIPEN) 0.3 mg/0.3 mL AtIn INJECT 0.3MG/0.3ML SUBCUTANEOUSLY (UNDER THE SKIN)  AS NEEDED FOR ALLERGIC REACTIONS SELF-INJECTOR PEN      furosemide (LASIX) 40 MG tablet Take 40 mg by mouth once daily.      gabapentin (NEURONTIN) 400 MG capsule 800 mg.      hydrocodone-homatropine 5-1.5 mg/5 ml (HYCODAN) 5-1.5 mg/5 mL Syrp Take by mouth.      levothyroxine (SYNTHROID) 50 MCG tablet 50 mcg.      metOLazone (ZAROXOLYN) 5 MG tablet 5 mg.      montelukast (SINGULAIR) 10 mg tablet 10 mg.      NEXIUM 40 mg capsule Take 40 mg by mouth once daily.       nitroGLYCERIN (NITROSTAT) 0.4 MG SL tablet Place 0.4 mg under the tongue every 5 (five) minutes as needed.      omega-3 fatty acids/fish oil (FISH OIL-OMEGA-3 FATTY ACIDS) 300-1,000 mg capsule Take 1 capsule by mouth 2 (two) times daily.      omeprazole (PRILOSEC) 20 MG capsule 40 mg.      potassium chloride SA (K-DUR,KLOR-CON) 20 MEQ tablet 20 mEq.      pregabalin (LYRICA) 100 MG capsule Take 100 mg by mouth 2 (two) times daily.        senna-docusate 8.6-50 mg (PERICOLACE) 8.6-50 mg per tablet TAKE 2 TABLETS BY MOUTH DAILY FOR CONSTIPATION      spironolactone (ALDACTONE) 50 MG tablet Take 50 mg by mouth once daily.      tamsulosin (FLOMAX) 0.4 mg Cap 0.4 mg.      temazepam (RESTORIL) 30 mg  capsule Take 30 mg by mouth nightly as needed for Insomnia.      TESTOSTERONE PROPIONATE, BULK, MISC Inject 1 each as directed every 14 (fourteen) days.      TOPROL XL 25 mg 24 hr tablet Take 25 mg by mouth once daily.        No current facility-administered medications for this visit.       Review of Systems   Constitutional:  Negative for chills, fatigue, fever and unexpected weight change.   HENT:  Negative for hearing loss and trouble swallowing.    Eyes:  Negative for photophobia and visual disturbance.   Respiratory:  Negative for cough, shortness of breath and wheezing.    Cardiovascular:  Negative for chest pain, palpitations and leg swelling.   Gastrointestinal:  Negative for abdominal pain and nausea.   Genitourinary:  Negative for dysuria and frequency.   Musculoskeletal:  Negative for arthralgias, back pain, gait problem, joint swelling and myalgias.   Skin:  Positive for wound. Negative for rash.   Neurological:  Positive for numbness. Negative for tremors, seizures, weakness and headaches.   Hematological:  Does not bruise/bleed easily.         Objective:        Physical Exam:   Foot Exam  Physical Exam  Ortho/SPM Exam     Imaging:            Assessment:       1. 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 PAD (peripheral artery disease)    3. PVD (peripheral vascular disease)    4. Diabetic foot infection    5. Difficulty in walking, not elsewhere classified    6. Wound infection    7. Foot pain, left    8. Type 2 diabetes mellitus with diabetic neuropathy, unspecified whether long term insulin use    9. 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    Procedures          Counseling:     I provided patient education verbally regarding:   Patient diagnosis, treatment options, as well as alternatives, risks, and benefits.     This note was created using Dragon voice recognition software that occasionally misinterpreted phrases or words.

## 2023-11-07 ENCOUNTER — OFFICE VISIT (OUTPATIENT)
Dept: WOUND CARE | Facility: HOSPITAL | Age: 78
End: 2023-11-07
Attending: PODIATRIST
Payer: MEDICARE

## 2023-11-07 VITALS
SYSTOLIC BLOOD PRESSURE: 138 MMHG | HEART RATE: 90 BPM | DIASTOLIC BLOOD PRESSURE: 48 MMHG | RESPIRATION RATE: 16 BRPM | TEMPERATURE: 98 F

## 2023-11-07 DIAGNOSIS — Z91.89 AT HIGH RISK FOR INADEQUATE NUTRITIONAL INTAKE: ICD-10-CM

## 2023-11-07 DIAGNOSIS — L97.524 ULCER OF LEFT FOOT WITH NECROSIS OF BONE: Primary | ICD-10-CM

## 2023-11-07 DIAGNOSIS — E11.40 TYPE 2 DIABETES MELLITUS WITH DIABETIC NEUROPATHY, UNSPECIFIED WHETHER LONG TERM INSULIN USE: ICD-10-CM

## 2023-11-07 DIAGNOSIS — E11.628 DIABETIC FOOT INFECTION: ICD-10-CM

## 2023-11-07 DIAGNOSIS — L08.9 DIABETIC FOOT INFECTION: ICD-10-CM

## 2023-11-07 DIAGNOSIS — I73.9 PVD (PERIPHERAL VASCULAR DISEASE): ICD-10-CM

## 2023-11-07 DIAGNOSIS — I73.9 PAD (PERIPHERAL ARTERY DISEASE): ICD-10-CM

## 2023-11-07 DIAGNOSIS — R26.2 DIFFICULTY IN WALKING, NOT ELSEWHERE CLASSIFIED: ICD-10-CM

## 2023-11-07 PROCEDURE — 99499 UNLISTED E&M SERVICE: CPT | Mod: ,,, | Performed by: PODIATRIST

## 2023-11-07 PROCEDURE — 15275 SKIN SUB GRAFT FACE/NK/HF/G: CPT | Mod: PN

## 2023-11-07 PROCEDURE — 15275 PR SKIN SUB GRAFT FACE/NK/HF/G UP TO 100 SQCM: ICD-10-PCS | Mod: ,,, | Performed by: PODIATRIST

## 2023-11-07 PROCEDURE — 15275 SKIN SUB GRAFT FACE/NK/HF/G: CPT | Mod: ,,, | Performed by: PODIATRIST

## 2023-11-07 PROCEDURE — 99499 NO LOS: ICD-10-PCS | Mod: ,,, | Performed by: PODIATRIST

## 2023-11-08 NOTE — PROGRESS NOTES
[address] [facility] [address]  [place] [address]  Phone: [phone]   [phone]    Patient's PCP:Sunita Rivera MD  Referring Provider: Aaareferral Self    Subjective:      Chief Complaint:: Wound Care, Diabetic Foot Ulcer, Wound Check, Pressure Ulcer, Non-healing Wound, Numbness, Peripheral Neuropathy, Diabetes, and Foot Ulcer    HPI  Jose F Hanley is a 78 y.o. male who presents today with a complaint of left great toe wound.   See wound docs documentation for full assessment and evaluation of wound.      Patient had recent vascular intervention.  Reviewed operative note.  Patient under the care of vascular surgery.        EVALUATION, ASSESSMENT, & PLAN:      1. Debridement with application of skin substitute to accelerate healing.See Wound Docs for assessment of wounds and procedure notes  2. Continue taking all medications as prescribed  3. Dressing changes, see Wound docs for dressing change orders  5. Counseled patient on increasing protein intake, not getting wound wet, keeping dressing clean dry and intact, following a healthy diet, elevating legs when able, removing pressure from wound      Total time spent for E&M 40  Total time for debridement 35 minutes          Proper ulcer care and the possible need for serial debridements, topical medications, specific dressings and biological engineered skin substitutes if indicated.        Counseling/Education:  I provided patient education verbally regarding:   The aspects of diabetes and how it pertains to the feet. I explained the importance of proper diabetic foot care and how it is essential for the health of their feet.     I discussed the importance of knowing their Hemoglobin A1c and that the level needs to be as close to 6 as possible. I discussed the increase complications of high blood sugar including stroke, blindness, heart attack, kidney failure and loss of limb secondary to neuropathy and PVD.      With neuropathy, beware of any breaks in  the skin or redness. These areas are not recognized early due to the numbness.     I discussed Diabetes, lower back issues, metabolic disorders, systemic causes, chemotherapy, vitamin deficiency, heavy metal exposure, as some of the causes. I also explained that as much as 40% of the time we can not find a cause. I discussed different treatments available to control the symptoms but which may not cure the problem.         Counseled patient on the aspects of diabetes and how it pertains to the feet.  I explained the importance of proper diabetic foot care and how it is essential for the health of their feet.        Shoe inspection. Patient instructed on proper foot hygeine. We discussed wearing proper shoe gear, daily foot inspections, never walking without protective shoe gear, never putting sharp instruments to feet, routine podiatric nail visits every 2-3 months.          Patient should call the office immediately if any signs of infection, such as fever, chills, sweats, increased redness or pain.     Patient was instructed to call the clinic or go to the emergency department if their symptoms do not improve, worsens, or if new symptoms develop.  Patient was advised that if any increased swelling, pain, or numbness arise to go immediately to the ED. Patient knows to call any time if an emergency arises. Shared decision making occurred and patient verbalized understanding in agreement with this plan.         >50% of this > 60 minute visit was spent face to face educating/counseling the patient     I spent a total of 60 minutes on the day of the visit.This includes face to face time and non-face to face time preparing to see the patient (eg, review of tests), obtaining and/or reviewing separately obtained history, documenting clinical information in the electronic or other health record, independently interpreting results and communicating results to the patient/family/caregiver, or care coordinator.        Much of  the documentation for this visit was completed in the Wound Docs system.  Please see the attached documentation for further details about the patient's care. Scanned under the Media tab.         Radha Costello DPM     Vitals:    11/07/23 1058   BP: (!) 138/48   Pulse: 90   Resp: 16   Temp: 98.3 °F (36.8 °C)   PainSc: 0-No pain      Shoe Size:     Past Surgical History:   Procedure Laterality Date    AORTOGRAPHY WITH SERIALOGRAPHY N/A 8/31/2023    Procedure: AORTOGRAM, WITH SERIALOGRAPHY;  Surgeon: Gary Thomas MD;  Location: [facility] CATH/EP LAB;  Service: Peripheral Vascular;  Laterality: N/A;    CORONARY ARTERY BYPASS GRAFT      PTA, ANTERIOR TIBIAL Left 8/31/2023    Procedure: PTA, Anterior Tibial;  Surgeon: Gary Thomas MD;  Location: [facility] CATH/EP LAB;  Service: Peripheral Vascular;  Laterality: Left;    PTA, SUPERFICIAL FEMORAL ARTERY Left 8/31/2023    Procedure: PTA, Superficial Femoral Artery;  Surgeon: Gary Thomas MD;  Location: [facility] CATH/EP LAB;  Service: Peripheral Vascular;  Laterality: Left;    SPLENECTOMY      STENT, ANTERIOR TIBIAL Left 8/31/2023    Procedure: Stent, Anterior Tibial;  Surgeon: Gary Thomas MD;  Location: [facility] CATH/EP LAB;  Service: Peripheral Vascular;  Laterality: Left;     Past Medical History:   Diagnosis Date    COPD (chronic obstructive pulmonary disease)     HLD (hyperlipidemia)     HTN (hypertension)     PAD (peripheral artery disease)      History reviewed. No pertinent family history.     Social History:   Marital Status:   Alcohol History:  reports that he does not currently use alcohol after a past usage of about 21.0 standard drinks of alcohol per week.  Tobacco History:  reports that he quit smoking about 35 years ago. His smoking use included cigarettes. He has never used smokeless tobacco.  Drug History:  reports no history of drug use.    Review of patient's allergies indicates:   Allergen Reactions    Adhes.  band-tape-benzalkonium Rash     Pt stated it caused blisters    Statins-hmg-coa reductase inhibitors Other (See Comments)     Statin Intolerance (joint pain)       Current Outpatient Medications   Medication Sig Dispense Refill    acetaminophen (TYLENOL) 325 MG tablet Take 650 mg by mouth.      albuterol (PROVENTIL/VENTOLIN HFA) 90 mcg/actuation inhaler Inhale 2 puffs into the lungs every 6 (six) hours as needed.      amoxicillin (AMOXIL) 500 MG capsule Take 1 capsule (500 mg total) by mouth every 8 (eight) hours. 90 capsule 1    betamethasone valerate 0.1% (VALISONE) 0.1 % Lotn Apply to scalp bid prn rash      clopidogreL (PLAVIX) 75 mg tablet Take 1 tablet (75 mg total) by mouth once daily. 30 tablet 11    cyanocobalamin 500 MCG tablet 500 mcg.      cyclobenzaprine (FLEXERIL) 10 MG tablet Take 10 mg by mouth every 8 (eight) hours as needed.      diphenhydrAMINE (BENADRYL) 50 MG capsule 50 mg.      doxepin (SINEQUAN) 50 MG capsule Take 50 mg by mouth every evening.      doxycycline (VIBRAMYCIN) 100 MG Cap Take 1 capsule with food to be taken on Monday Wednesday and Friday. May be photsensitive (Patient not taking: Reported on 10/11/2023) 30 capsule 2    DULoxetine (CYMBALTA) 60 MG capsule 60 mg.      EPINEPHrine (EPIPEN) 0.3 mg/0.3 mL AtIn INJECT 0.3MG/0.3ML SUBCUTANEOUSLY (UNDER THE SKIN)  AS NEEDED FOR ALLERGIC REACTIONS SELF-INJECTOR PEN      furosemide (LASIX) 40 MG tablet Take 40 mg by mouth once daily.      gabapentin (NEURONTIN) 400 MG capsule 800 mg.      hydrocodone-homatropine 5-1.5 mg/5 ml (HYCODAN) 5-1.5 mg/5 mL Syrp Take by mouth.      levothyroxine (SYNTHROID) 50 MCG tablet 50 mcg.      metOLazone (ZAROXOLYN) 5 MG tablet 5 mg.      montelukast (SINGULAIR) 10 mg tablet 10 mg.      NEXIUM 40 mg capsule Take 40 mg by mouth once daily.       nitroGLYCERIN (NITROSTAT) 0.4 MG SL tablet Place 0.4 mg under the tongue every 5 (five) minutes as needed.      omega-3 fatty acids/fish oil (FISH OIL-OMEGA-3 FATTY  ACIDS) 300-1,000 mg capsule Take 1 capsule by mouth 2 (two) times daily.      omeprazole (PRILOSEC) 20 MG capsule 40 mg.      potassium chloride SA (K-DUR,KLOR-CON) 20 MEQ tablet 20 mEq.      pregabalin (LYRICA) 100 MG capsule Take 100 mg by mouth 2 (two) times daily.        senna-docusate 8.6-50 mg (PERICOLACE) 8.6-50 mg per tablet TAKE 2 TABLETS BY MOUTH DAILY FOR CONSTIPATION      spironolactone (ALDACTONE) 50 MG tablet Take 50 mg by mouth once daily.      tamsulosin (FLOMAX) 0.4 mg Cap 0.4 mg.      temazepam (RESTORIL) 30 mg capsule Take 30 mg by mouth nightly as needed for Insomnia.      TESTOSTERONE PROPIONATE, BULK, MISC Inject 1 each as directed every 14 (fourteen) days.      TOPROL XL 25 mg 24 hr tablet Take 25 mg by mouth once daily.        No current facility-administered medications for this visit.       Review of Systems   Constitutional:  Negative for chills, fatigue, fever and unexpected weight change.   HENT:  Negative for hearing loss and trouble swallowing.    Eyes:  Negative for photophobia and visual disturbance.   Respiratory:  Negative for cough, shortness of breath and wheezing.    Cardiovascular:  Negative for chest pain, palpitations and leg swelling.   Gastrointestinal:  Negative for abdominal pain and nausea.   Genitourinary:  Negative for dysuria and frequency.   Musculoskeletal:  Negative for arthralgias, back pain, gait problem, joint swelling and myalgias.   Skin:  Positive for wound. Negative for rash.   Neurological:  Positive for numbness. Negative for tremors, seizures, weakness and headaches.   Hematological:  Does not bruise/bleed easily.         Objective:        Physical Exam:   Foot Exam  Physical Exam  Ortho/SPM Exam     Imaging:            Assessment:       1. 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 PAD (peripheral artery disease)    3. PVD (peripheral vascular disease)    4. Diabetic foot infection    5. Difficulty in walking, not elsewhere classified    6. At high  risk for inadequate nutritional intake    7. 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    Procedures          Counseling:     I provided patient education verbally regarding:   Patient diagnosis, treatment options, as well as alternatives, risks, and benefits.     This note was created using Dragon voice recognition software that occasionally misinterpreted phrases or words.

## 2023-11-14 ENCOUNTER — OFFICE VISIT (OUTPATIENT)
Dept: WOUND CARE | Facility: HOSPITAL | Age: 78
End: 2023-11-14
Attending: PODIATRIST
Payer: MEDICARE

## 2023-11-14 VITALS
DIASTOLIC BLOOD PRESSURE: 76 MMHG | RESPIRATION RATE: 16 BRPM | HEART RATE: 80 BPM | SYSTOLIC BLOOD PRESSURE: 173 MMHG | TEMPERATURE: 98 F

## 2023-11-14 DIAGNOSIS — M79.672 FOOT PAIN, LEFT: ICD-10-CM

## 2023-11-14 DIAGNOSIS — L97.524 ULCER OF LEFT FOOT WITH NECROSIS OF BONE: Primary | ICD-10-CM

## 2023-11-14 DIAGNOSIS — Z91.89 AT HIGH RISK FOR INADEQUATE NUTRITIONAL INTAKE: ICD-10-CM

## 2023-11-14 DIAGNOSIS — R26.2 DIFFICULTY IN WALKING, NOT ELSEWHERE CLASSIFIED: ICD-10-CM

## 2023-11-14 DIAGNOSIS — E11.40 TYPE 2 DIABETES MELLITUS WITH DIABETIC NEUROPATHY, UNSPECIFIED WHETHER LONG TERM INSULIN USE: ICD-10-CM

## 2023-11-14 PROCEDURE — 99499 UNLISTED E&M SERVICE: CPT | Mod: ,,, | Performed by: PODIATRIST

## 2023-11-14 PROCEDURE — 15275 SKIN SUB GRAFT FACE/NK/HF/G: CPT | Mod: PN | Performed by: PODIATRIST

## 2023-11-14 PROCEDURE — 15275 PR SKIN SUB GRAFT FACE/NK/HF/G UP TO 100 SQCM: ICD-10-PCS | Mod: ,,, | Performed by: PODIATRIST

## 2023-11-14 PROCEDURE — 99499 NO LOS: ICD-10-PCS | Mod: ,,, | Performed by: PODIATRIST

## 2023-11-14 PROCEDURE — 15275 SKIN SUB GRAFT FACE/NK/HF/G: CPT | Mod: ,,, | Performed by: PODIATRIST

## 2023-11-15 ENCOUNTER — DOCUMENT SCAN (OUTPATIENT)
Dept: HOME HEALTH SERVICES | Facility: HOSPITAL | Age: 78
End: 2023-11-15
Payer: MEDICARE

## 2023-11-15 NOTE — PROGRESS NOTES
[address] [facility] [address]  [place] [address]  Phone: [phone]   [phone]    Patient's PCP:Sunita Rivera MD  Referring Provider: Aaareferral Self    Subjective:      Chief Complaint:: Wound Care, Wound Consult, Diabetes, Wound Check, Diabetic Foot Ulcer, Peripheral Neuropathy, Numbness, Difficulty Walking, Non-healing Wound, and Pressure Ulcer    HPI  Jose F Hanley is a 78 y.o. male who presents today with a complaint of left great toe wound.   See wound docs documentation for full assessment and evaluation of wound.      Patient had recent vascular intervention.  Reviewed operative note.  Patient under the care of vascular surgery.        EVALUATION, ASSESSMENT, & PLAN:      1. Debridement with application of skin substitute to accelerate healing.See Wound Docs for assessment of wounds and procedure notes  2. Continue taking all medications as prescribed  3. Dressing changes, see Wound docs for dressing change orders  5. Counseled patient on increasing protein intake, not getting wound wet, keeping dressing clean dry and intact, following a healthy diet, elevating legs when able, removing pressure from wound      Total time spent for E&M 40  Total time for debridement 35 minutes          Proper ulcer care and the possible need for serial debridements, topical medications, specific dressings and biological engineered skin substitutes if indicated.        Counseling/Education:  I provided patient education verbally regarding:   The aspects of diabetes and how it pertains to the feet. I explained the importance of proper diabetic foot care and how it is essential for the health of their feet.     I discussed the importance of knowing their Hemoglobin A1c and that the level needs to be as close to 6 as possible. I discussed the increase complications of high blood sugar including stroke, blindness, heart attack, kidney failure and loss of limb secondary to neuropathy and PVD.      With neuropathy,  beware of any breaks in the skin or redness. These areas are not recognized early due to the numbness.     I discussed Diabetes, lower back issues, metabolic disorders, systemic causes, chemotherapy, vitamin deficiency, heavy metal exposure, as some of the causes. I also explained that as much as 40% of the time we can not find a cause. I discussed different treatments available to control the symptoms but which may not cure the problem.         Counseled patient on the aspects of diabetes and how it pertains to the feet.  I explained the importance of proper diabetic foot care and how it is essential for the health of their feet.        Shoe inspection. Patient instructed on proper foot hygeine. We discussed wearing proper shoe gear, daily foot inspections, never walking without protective shoe gear, never putting sharp instruments to feet, routine podiatric nail visits every 2-3 months.          Patient should call the office immediately if any signs of infection, such as fever, chills, sweats, increased redness or pain.     Patient was instructed to call the clinic or go to the emergency department if their symptoms do not improve, worsens, or if new symptoms develop.  Patient was advised that if any increased swelling, pain, or numbness arise to go immediately to the ED. Patient knows to call any time if an emergency arises. Shared decision making occurred and patient verbalized understanding in agreement with this plan.         >50% of this > 60 minute visit was spent face to face educating/counseling the patient     I spent a total of 60 minutes on the day of the visit.This includes face to face time and non-face to face time preparing to see the patient (eg, review of tests), obtaining and/or reviewing separately obtained history, documenting clinical information in the electronic or other health record, independently interpreting results and communicating results to the patient/family/caregiver, or care  coordinator.        Much of the documentation for this visit was completed in the Wound Docs system.  Please see the attached documentation for further details about the patient's care. Scanned under the Media tab.         Radha Costello DPM     Vitals:    11/14/23 1002   BP: (!) 173/76   Pulse: 80   Resp: 16   Temp: 98 °F (36.7 °C)   PainSc: 0-No pain      Shoe Size:     Past Surgical History:   Procedure Laterality Date    AORTOGRAPHY WITH SERIALOGRAPHY N/A 8/31/2023    Procedure: AORTOGRAM, WITH SERIALOGRAPHY;  Surgeon: Gary Thomas MD;  Location: [facility] CATH/EP LAB;  Service: Peripheral Vascular;  Laterality: N/A;    CORONARY ARTERY BYPASS GRAFT      PTA, ANTERIOR TIBIAL Left 8/31/2023    Procedure: PTA, Anterior Tibial;  Surgeon: Gary Thomas MD;  Location: [facility] CATH/EP LAB;  Service: Peripheral Vascular;  Laterality: Left;    PTA, SUPERFICIAL FEMORAL ARTERY Left 8/31/2023    Procedure: PTA, Superficial Femoral Artery;  Surgeon: Gary Thomas MD;  Location: [facility] CATH/EP LAB;  Service: Peripheral Vascular;  Laterality: Left;    SPLENECTOMY      STENT, ANTERIOR TIBIAL Left 8/31/2023    Procedure: Stent, Anterior Tibial;  Surgeon: Gary Thomas MD;  Location: [facility] CATH/EP LAB;  Service: Peripheral Vascular;  Laterality: Left;     Past Medical History:   Diagnosis Date    COPD (chronic obstructive pulmonary disease)     HLD (hyperlipidemia)     HTN (hypertension)     PAD (peripheral artery disease)      History reviewed. No pertinent family history.     Social History:   Marital Status:   Alcohol History:  reports that he does not currently use alcohol after a past usage of about 21.0 standard drinks of alcohol per week.  Tobacco History:  reports that he quit smoking about 35 years ago. His smoking use included cigarettes. He has never used smokeless tobacco.  Drug History:  reports no history of drug use.    Review of patient's allergies indicates:   Allergen Reactions     Adhes. band-tape-benzalkonium Rash     Pt stated it caused blisters    Statins-hmg-coa reductase inhibitors Other (See Comments)     Statin Intolerance (joint pain)       Current Outpatient Medications   Medication Sig Dispense Refill    acetaminophen (TYLENOL) 325 MG tablet Take 650 mg by mouth.      albuterol (PROVENTIL/VENTOLIN HFA) 90 mcg/actuation inhaler Inhale 2 puffs into the lungs every 6 (six) hours as needed.      amoxicillin (AMOXIL) 500 MG capsule Take 1 capsule (500 mg total) by mouth every 8 (eight) hours. 90 capsule 1    betamethasone valerate 0.1% (VALISONE) 0.1 % Lotn Apply to scalp bid prn rash      clopidogreL (PLAVIX) 75 mg tablet Take 1 tablet (75 mg total) by mouth once daily. 30 tablet 11    cyanocobalamin 500 MCG tablet 500 mcg.      cyclobenzaprine (FLEXERIL) 10 MG tablet Take 10 mg by mouth every 8 (eight) hours as needed.      diphenhydrAMINE (BENADRYL) 50 MG capsule 50 mg.      doxepin (SINEQUAN) 50 MG capsule Take 50 mg by mouth every evening.      doxycycline (VIBRAMYCIN) 100 MG Cap Take 1 capsule with food to be taken on Monday Wednesday and Friday. May be photsensitive (Patient not taking: Reported on 10/11/2023) 30 capsule 2    DULoxetine (CYMBALTA) 60 MG capsule 60 mg.      EPINEPHrine (EPIPEN) 0.3 mg/0.3 mL AtIn INJECT 0.3MG/0.3ML SUBCUTANEOUSLY (UNDER THE SKIN)  AS NEEDED FOR ALLERGIC REACTIONS SELF-INJECTOR PEN      furosemide (LASIX) 40 MG tablet Take 40 mg by mouth once daily.      gabapentin (NEURONTIN) 400 MG capsule 800 mg.      hydrocodone-homatropine 5-1.5 mg/5 ml (HYCODAN) 5-1.5 mg/5 mL Syrp Take by mouth.      levothyroxine (SYNTHROID) 50 MCG tablet 50 mcg.      metOLazone (ZAROXOLYN) 5 MG tablet 5 mg.      montelukast (SINGULAIR) 10 mg tablet 10 mg.      NEXIUM 40 mg capsule Take 40 mg by mouth once daily.       nitroGLYCERIN (NITROSTAT) 0.4 MG SL tablet Place 0.4 mg under the tongue every 5 (five) minutes as needed.      omega-3 fatty acids/fish oil (FISH OIL-OMEGA-3  FATTY ACIDS) 300-1,000 mg capsule Take 1 capsule by mouth 2 (two) times daily.      omeprazole (PRILOSEC) 20 MG capsule 40 mg.      potassium chloride SA (K-DUR,KLOR-CON) 20 MEQ tablet 20 mEq.      pregabalin (LYRICA) 100 MG capsule Take 100 mg by mouth 2 (two) times daily.        senna-docusate 8.6-50 mg (PERICOLACE) 8.6-50 mg per tablet TAKE 2 TABLETS BY MOUTH DAILY FOR CONSTIPATION      spironolactone (ALDACTONE) 50 MG tablet Take 50 mg by mouth once daily.      tamsulosin (FLOMAX) 0.4 mg Cap 0.4 mg.      temazepam (RESTORIL) 30 mg capsule Take 30 mg by mouth nightly as needed for Insomnia.      TESTOSTERONE PROPIONATE, BULK, MISC Inject 1 each as directed every 14 (fourteen) days.      TOPROL XL 25 mg 24 hr tablet Take 25 mg by mouth once daily.        No current facility-administered medications for this visit.       Review of Systems   Constitutional:  Negative for chills, fatigue, fever and unexpected weight change.   HENT:  Negative for hearing loss and trouble swallowing.    Eyes:  Negative for photophobia and visual disturbance.   Respiratory:  Negative for cough, shortness of breath and wheezing.    Cardiovascular:  Negative for chest pain, palpitations and leg swelling.   Gastrointestinal:  Negative for abdominal pain and nausea.   Genitourinary:  Negative for dysuria and frequency.   Musculoskeletal:  Negative for arthralgias, back pain, gait problem, joint swelling and myalgias.   Skin:  Positive for wound. Negative for rash.   Neurological:  Positive for numbness. Negative for tremors, seizures, weakness and headaches.   Hematological:  Does not bruise/bleed easily.         Objective:        Physical Exam:   Foot Exam  Physical Exam  Ortho/SPM Exam     Imaging:            Assessment:       1. 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 At high risk for inadequate nutritional intake    3. Foot pain, left    4. Difficulty in walking, not elsewhere classified    5. 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    Procedures          Counseling:     I provided patient education verbally regarding:   Patient diagnosis, treatment options, as well as alternatives, risks, and benefits.     This note was created using Dragon voice recognition software that occasionally misinterpreted phrases or words.

## 2023-11-21 ENCOUNTER — OFFICE VISIT (OUTPATIENT)
Dept: WOUND CARE | Facility: HOSPITAL | Age: 78
End: 2023-11-21
Attending: PODIATRIST
Payer: MEDICARE

## 2023-11-21 VITALS
SYSTOLIC BLOOD PRESSURE: 146 MMHG | HEART RATE: 77 BPM | TEMPERATURE: 98 F | RESPIRATION RATE: 18 BRPM | DIASTOLIC BLOOD PRESSURE: 69 MMHG

## 2023-11-21 DIAGNOSIS — Z91.89 AT HIGH RISK FOR INADEQUATE NUTRITIONAL INTAKE: ICD-10-CM

## 2023-11-21 DIAGNOSIS — L97.524 ULCER OF LEFT FOOT WITH NECROSIS OF BONE: Primary | ICD-10-CM

## 2023-11-21 DIAGNOSIS — I73.9 PVD (PERIPHERAL VASCULAR DISEASE): ICD-10-CM

## 2023-11-21 DIAGNOSIS — R26.2 DIFFICULTY IN WALKING, NOT ELSEWHERE CLASSIFIED: ICD-10-CM

## 2023-11-21 DIAGNOSIS — I73.9 PAD (PERIPHERAL ARTERY DISEASE): ICD-10-CM

## 2023-11-21 DIAGNOSIS — Z09 HOSPITAL DISCHARGE FOLLOW-UP: ICD-10-CM

## 2023-11-21 DIAGNOSIS — E11.40 TYPE 2 DIABETES MELLITUS WITH DIABETIC NEUROPATHY, UNSPECIFIED WHETHER LONG TERM INSULIN USE: ICD-10-CM

## 2023-11-21 PROCEDURE — 15275 SKIN SUB GRAFT FACE/NK/HF/G: CPT | Mod: ,,, | Performed by: PODIATRIST

## 2023-11-21 PROCEDURE — 15275 SKIN SUB GRAFT FACE/NK/HF/G: CPT | Mod: PN | Performed by: PODIATRIST

## 2023-11-21 PROCEDURE — 15275 PR SKIN SUB GRAFT FACE/NK/HF/G UP TO 100 SQCM: ICD-10-PCS | Mod: ,,, | Performed by: PODIATRIST

## 2023-11-21 PROCEDURE — 99214 PR OFFICE/OUTPT VISIT, EST, LEVL IV, 30-39 MIN: ICD-10-PCS | Mod: 25,,, | Performed by: PODIATRIST

## 2023-11-21 PROCEDURE — 99214 OFFICE O/P EST MOD 30 MIN: CPT | Mod: 25,,, | Performed by: PODIATRIST

## 2023-11-21 NOTE — PROGRESS NOTES
[address] [facility] [address]  [place] [address]  Phone: [phone]   [phone]    Patient's PCP:Sunita Rivera MD  Referring Provider: Aaareferral Self    Subjective:      Chief Complaint:: Wound Care, Diabetic Foot Ulcer, Pressure Ulcer, Wound Consult, Difficulty Walking, Peripheral Neuropathy, Numbness, Non-healing Wound, Wound Check, Diabetes, Foot Ulcer, and Hospital Follow Up    HPI  Jose F Hanley is a 78 y.o. male who presents today with a complaint of left great toe wound.   See wound docs documentation for full assessment and evaluation of wound.    Additionally, patient presents following recent hospital admission.   Reviewed all notes from patients medical chart from recent hospital admission, including imaging, procedures, studies, progress notes,  cultures, antibiotic regimens, photos,  etc.       EVALUATION, ASSESSMENT, & PLAN:      1. Debridement with application of skin substitute to accelerate healing.See Wound Docs for assessment of wounds and procedure notes  2. Continue taking all medications as prescribed  3. Dressing changes, see Wound docs for dressing change orders  5. Counseled patient on increasing protein intake, not getting wound wet, keeping dressing clean dry and intact, following a healthy diet, elevating legs when able, removing pressure from wound      Total time spent for E&M 40  Total time for debridement 35 minutes          Proper ulcer care and the possible need for serial debridements, topical medications, specific dressings and biological engineered skin substitutes if indicated.        Counseling/Education:  I provided patient education verbally regarding:   The aspects of diabetes and how it pertains to the feet. I explained the importance of proper diabetic foot care and how it is essential for the health of their feet.     I discussed the importance of knowing their Hemoglobin A1c and that the level needs to be as close to 6 as possible. I discussed the  increase complications of high blood sugar including stroke, blindness, heart attack, kidney failure and loss of limb secondary to neuropathy and PVD.      With neuropathy, beware of any breaks in the skin or redness. These areas are not recognized early due to the numbness.     I discussed Diabetes, lower back issues, metabolic disorders, systemic causes, chemotherapy, vitamin deficiency, heavy metal exposure, as some of the causes. I also explained that as much as 40% of the time we can not find a cause. I discussed different treatments available to control the symptoms but which may not cure the problem.         Counseled patient on the aspects of diabetes and how it pertains to the feet.  I explained the importance of proper diabetic foot care and how it is essential for the health of their feet.        Shoe inspection. Patient instructed on proper foot hygeine. We discussed wearing proper shoe gear, daily foot inspections, never walking without protective shoe gear, never putting sharp instruments to feet, routine podiatric nail visits every 2-3 months.          Patient should call the office immediately if any signs of infection, such as fever, chills, sweats, increased redness or pain.     Patient was instructed to call the clinic or go to the emergency department if their symptoms do not improve, worsens, or if new symptoms develop.  Patient was advised that if any increased swelling, pain, or numbness arise to go immediately to the ED. Patient knows to call any time if an emergency arises. Shared decision making occurred and patient verbalized understanding in agreement with this plan.         >50% of this > 60 minute visit was spent face to face educating/counseling the patient     I spent a total of 60 minutes on the day of the visit.This includes face to face time and non-face to face time preparing to see the patient (eg, review of tests), obtaining and/or reviewing separately obtained history,  documenting clinical information in the electronic or other health record, independently interpreting results and communicating results to the patient/family/caregiver, or care coordinator.        Much of the documentation for this visit was completed in the Wound Docs system.  Please see the attached documentation for further details about the patient's care. Scanned under the Media tab.         Radha Costello DPM     Vitals:    11/21/23 0906   BP: (!) 146/69   Pulse: 77   Resp: 18   Temp: 98 °F (36.7 °C)   TempSrc: Temporal   PainSc: 0-No pain   PainLoc: Toe      Shoe Size:     Past Surgical History:   Procedure Laterality Date    AORTOGRAPHY WITH SERIALOGRAPHY N/A 8/31/2023    Procedure: AORTOGRAM, WITH SERIALOGRAPHY;  Surgeon: Gary Thomas MD;  Location: [facility] CATH/EP LAB;  Service: Peripheral Vascular;  Laterality: N/A;    CORONARY ARTERY BYPASS GRAFT      PTA, ANTERIOR TIBIAL Left 8/31/2023    Procedure: PTA, Anterior Tibial;  Surgeon: Gary Thomas MD;  Location: [facility] CATH/EP LAB;  Service: Peripheral Vascular;  Laterality: Left;    PTA, SUPERFICIAL FEMORAL ARTERY Left 8/31/2023    Procedure: PTA, Superficial Femoral Artery;  Surgeon: Gary Thomas MD;  Location: [facility] CATH/EP LAB;  Service: Peripheral Vascular;  Laterality: Left;    SPLENECTOMY      STENT, ANTERIOR TIBIAL Left 8/31/2023    Procedure: Stent, Anterior Tibial;  Surgeon: Gary Thomas MD;  Location: [facility] CATH/EP LAB;  Service: Peripheral Vascular;  Laterality: Left;     Past Medical History:   Diagnosis Date    COPD (chronic obstructive pulmonary disease)     HLD (hyperlipidemia)     HTN (hypertension)     PAD (peripheral artery disease)      History reviewed. No pertinent family history.     Social History:   Marital Status:   Alcohol History:  reports that he does not currently use alcohol after a past usage of about 21.0 standard drinks of alcohol per week.  Tobacco History:  reports that he quit smoking  about 36 years ago. His smoking use included cigarettes. He has never used smokeless tobacco.  Drug History:  reports no history of drug use.    Review of patient's allergies indicates:   Allergen Reactions    Adhes. band-tape-benzalkonium Rash     Pt stated it caused blisters    Statins-hmg-coa reductase inhibitors Other (See Comments)     Statin Intolerance (joint pain)       Current Outpatient Medications   Medication Sig Dispense Refill    acetaminophen (TYLENOL) 325 MG tablet Take 650 mg by mouth.      albuterol (PROVENTIL/VENTOLIN HFA) 90 mcg/actuation inhaler Inhale 2 puffs into the lungs every 6 (six) hours as needed.      amoxicillin (AMOXIL) 500 MG capsule Take 1 capsule (500 mg total) by mouth every 8 (eight) hours. 90 capsule 1    betamethasone valerate 0.1% (VALISONE) 0.1 % Lotn Apply to scalp bid prn rash      clopidogreL (PLAVIX) 75 mg tablet Take 1 tablet (75 mg total) by mouth once daily. 30 tablet 11    cyanocobalamin 500 MCG tablet 500 mcg.      cyclobenzaprine (FLEXERIL) 10 MG tablet Take 10 mg by mouth every 8 (eight) hours as needed.      diphenhydrAMINE (BENADRYL) 50 MG capsule 50 mg.      doxepin (SINEQUAN) 50 MG capsule Take 50 mg by mouth every evening.      doxycycline (VIBRAMYCIN) 100 MG Cap Take 1 capsule with food to be taken on Monday Wednesday and Friday. May be photsensitive (Patient not taking: Reported on 10/11/2023) 30 capsule 2    DULoxetine (CYMBALTA) 60 MG capsule 60 mg.      EPINEPHrine (EPIPEN) 0.3 mg/0.3 mL AtIn INJECT 0.3MG/0.3ML SUBCUTANEOUSLY (UNDER THE SKIN)  AS NEEDED FOR ALLERGIC REACTIONS SELF-INJECTOR PEN      furosemide (LASIX) 40 MG tablet Take 40 mg by mouth once daily.      gabapentin (NEURONTIN) 400 MG capsule 800 mg.      hydrocodone-homatropine 5-1.5 mg/5 ml (HYCODAN) 5-1.5 mg/5 mL Syrp Take by mouth.      levothyroxine (SYNTHROID) 50 MCG tablet 50 mcg.      metOLazone (ZAROXOLYN) 5 MG tablet 5 mg.      montelukast (SINGULAIR) 10 mg tablet 10 mg.      NEXIUM  40 mg capsule Take 40 mg by mouth once daily.       nitroGLYCERIN (NITROSTAT) 0.4 MG SL tablet Place 0.4 mg under the tongue every 5 (five) minutes as needed.      omega-3 fatty acids/fish oil (FISH OIL-OMEGA-3 FATTY ACIDS) 300-1,000 mg capsule Take 1 capsule by mouth 2 (two) times daily.      omeprazole (PRILOSEC) 20 MG capsule 40 mg.      potassium chloride SA (K-DUR,KLOR-CON) 20 MEQ tablet 20 mEq.      pregabalin (LYRICA) 100 MG capsule Take 100 mg by mouth 2 (two) times daily.        senna-docusate 8.6-50 mg (PERICOLACE) 8.6-50 mg per tablet TAKE 2 TABLETS BY MOUTH DAILY FOR CONSTIPATION      spironolactone (ALDACTONE) 50 MG tablet Take 50 mg by mouth once daily.      tamsulosin (FLOMAX) 0.4 mg Cap 0.4 mg.      temazepam (RESTORIL) 30 mg capsule Take 30 mg by mouth nightly as needed for Insomnia.      TESTOSTERONE PROPIONATE, BULK, MISC Inject 1 each as directed every 14 (fourteen) days.      TOPROL XL 25 mg 24 hr tablet Take 25 mg by mouth once daily.        No current facility-administered medications for this visit.       Review of Systems   Constitutional:  Negative for chills, fatigue, fever and unexpected weight change.   HENT:  Negative for hearing loss and trouble swallowing.    Eyes:  Negative for photophobia and visual disturbance.   Respiratory:  Negative for cough, shortness of breath and wheezing.    Cardiovascular:  Negative for chest pain, palpitations and leg swelling.   Gastrointestinal:  Negative for abdominal pain and nausea.   Genitourinary:  Negative for dysuria and frequency.   Musculoskeletal:  Negative for arthralgias, back pain, gait problem, joint swelling and myalgias.   Skin:  Positive for wound. Negative for rash.   Neurological:  Positive for numbness. Negative for tremors, seizures, weakness and headaches.   Hematological:  Does not bruise/bleed easily.         Objective:        Physical Exam:   Foot Exam  Physical Exam  Ortho/SPM Exam     Imaging:            Assessment:       1.  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 At high risk for inadequate nutritional intake    3. PAD (peripheral artery disease)    4. Difficulty in walking, not elsewhere classified    5. Type 2 diabetes mellitus with diabetic neuropathy, unspecified whether long term insulin use    6. PVD (peripheral vascular disease)    7. 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    Procedures          Counseling:     I provided patient education verbally regarding:   Patient diagnosis, treatment options, as well as alternatives, risks, and benefits.     This note was created using Dragon voice recognition software that occasionally misinterpreted phrases or words.

## 2023-11-24 ENCOUNTER — DOCUMENT SCAN (OUTPATIENT)
Dept: HOME HEALTH SERVICES | Facility: HOSPITAL | Age: 78
End: 2023-11-24
Payer: MEDICARE

## 2023-11-27 ENCOUNTER — DOCUMENT SCAN (OUTPATIENT)
Dept: HOME HEALTH SERVICES | Facility: HOSPITAL | Age: 78
End: 2023-11-27
Payer: MEDICARE

## 2023-11-28 ENCOUNTER — OFFICE VISIT (OUTPATIENT)
Dept: WOUND CARE | Facility: HOSPITAL | Age: 78
End: 2023-11-28
Attending: PODIATRIST
Payer: MEDICARE

## 2023-11-28 ENCOUNTER — OFFICE VISIT (OUTPATIENT)
Dept: CARDIOLOGY | Facility: CLINIC | Age: 78
End: 2023-11-28
Payer: MEDICARE

## 2023-11-28 VITALS
WEIGHT: 278 LBS | SYSTOLIC BLOOD PRESSURE: 120 MMHG | RESPIRATION RATE: 16 BRPM | HEIGHT: 72 IN | BODY MASS INDEX: 37.65 KG/M2 | HEART RATE: 85 BPM | DIASTOLIC BLOOD PRESSURE: 60 MMHG | OXYGEN SATURATION: 93 %

## 2023-11-28 VITALS
SYSTOLIC BLOOD PRESSURE: 149 MMHG | DIASTOLIC BLOOD PRESSURE: 71 MMHG | HEART RATE: 78 BPM | TEMPERATURE: 98 F | RESPIRATION RATE: 20 BRPM

## 2023-11-28 DIAGNOSIS — Z91.89 AT HIGH RISK FOR INADEQUATE NUTRITIONAL INTAKE: ICD-10-CM

## 2023-11-28 DIAGNOSIS — E66.01 SEVERE OBESITY (BMI 35.0-39.9) WITH COMORBIDITY: ICD-10-CM

## 2023-11-28 DIAGNOSIS — R26.2 DIFFICULTY IN WALKING, NOT ELSEWHERE CLASSIFIED: ICD-10-CM

## 2023-11-28 DIAGNOSIS — J44.1 CHRONIC OBSTRUCTIVE PULMONARY DISEASE WITH ACUTE EXACERBATION: ICD-10-CM

## 2023-11-28 DIAGNOSIS — Z95.2 HISTORY OF TRANSCATHETER AORTIC VALVE REPLACEMENT (TAVR): ICD-10-CM

## 2023-11-28 DIAGNOSIS — Z95.1 S/P CABG (CORONARY ARTERY BYPASS GRAFT): Primary | ICD-10-CM

## 2023-11-28 DIAGNOSIS — Z95.818 PRESENCE OF WATCHMAN LEFT ATRIAL APPENDAGE CLOSURE DEVICE: ICD-10-CM

## 2023-11-28 DIAGNOSIS — L97.524 ULCER OF LEFT FOOT WITH NECROSIS OF BONE: Primary | ICD-10-CM

## 2023-11-28 DIAGNOSIS — I48.21 PERMANENT ATRIAL FIBRILLATION: ICD-10-CM

## 2023-11-28 DIAGNOSIS — I50.32 CHRONIC DIASTOLIC CONGESTIVE HEART FAILURE: ICD-10-CM

## 2023-11-28 DIAGNOSIS — Z95.5 S/P CORONARY ARTERY STENT PLACEMENT: ICD-10-CM

## 2023-11-28 DIAGNOSIS — R06.02 SHORTNESS OF BREATH: ICD-10-CM

## 2023-11-28 DIAGNOSIS — E11.40 TYPE 2 DIABETES MELLITUS WITH DIABETIC NEUROPATHY, UNSPECIFIED WHETHER LONG TERM INSULIN USE: ICD-10-CM

## 2023-11-28 DIAGNOSIS — L97.524 ULCER OF LEFT FOOT WITH NECROSIS OF BONE: ICD-10-CM

## 2023-11-28 DIAGNOSIS — I73.9 PAD (PERIPHERAL ARTERY DISEASE): ICD-10-CM

## 2023-11-28 PROCEDURE — 99499 UNLISTED E&M SERVICE: CPT | Mod: ,,, | Performed by: PODIATRIST

## 2023-11-28 PROCEDURE — 15275 PR SKIN SUB GRAFT FACE/NK/HF/G UP TO 100 SQCM: ICD-10-PCS | Mod: ,,, | Performed by: PODIATRIST

## 2023-11-28 PROCEDURE — 15275 SKIN SUB GRAFT FACE/NK/HF/G: CPT | Mod: PN | Performed by: PODIATRIST

## 2023-11-28 PROCEDURE — 99999 PR PBB SHADOW E&M-EST. PATIENT-LVL III: ICD-10-PCS | Mod: PBBFAC,,, | Performed by: INTERNAL MEDICINE

## 2023-11-28 PROCEDURE — 99999 PR PBB SHADOW E&M-EST. PATIENT-LVL III: CPT | Mod: PBBFAC,,, | Performed by: INTERNAL MEDICINE

## 2023-11-28 PROCEDURE — 93010 ELECTROCARDIOGRAM REPORT: CPT | Mod: S$PBB,,, | Performed by: GENERAL PRACTICE

## 2023-11-28 PROCEDURE — 99499 NO LOS: ICD-10-PCS | Mod: ,,, | Performed by: PODIATRIST

## 2023-11-28 PROCEDURE — 15275 SKIN SUB GRAFT FACE/NK/HF/G: CPT | Mod: ,,, | Performed by: PODIATRIST

## 2023-11-28 PROCEDURE — 99213 OFFICE O/P EST LOW 20 MIN: CPT | Mod: PBBFAC,25,PN | Performed by: INTERNAL MEDICINE

## 2023-11-28 PROCEDURE — 93010 EKG 12-LEAD: ICD-10-PCS | Mod: S$PBB,,, | Performed by: GENERAL PRACTICE

## 2023-11-28 PROCEDURE — 93005 ELECTROCARDIOGRAM TRACING: CPT | Mod: PBBFAC,PN | Performed by: GENERAL PRACTICE

## 2023-11-28 PROCEDURE — 99204 PR OFFICE/OUTPT VISIT, NEW, LEVL IV, 45-59 MIN: ICD-10-PCS | Mod: S$PBB,,, | Performed by: INTERNAL MEDICINE

## 2023-11-28 PROCEDURE — 99204 OFFICE O/P NEW MOD 45 MIN: CPT | Mod: S$PBB,,, | Performed by: INTERNAL MEDICINE

## 2023-11-28 RX ORDER — TORSEMIDE 20 MG/1
20 TABLET ORAL DAILY
Qty: 30 TABLET | Refills: 11 | Status: ON HOLD | OUTPATIENT
Start: 2023-11-28 | End: 2024-03-07

## 2023-11-28 RX ORDER — TORSEMIDE 20 MG/1
20 TABLET ORAL DAILY
Qty: 90 TABLET | Refills: 3 | Status: SHIPPED | OUTPATIENT
Start: 2023-11-28 | End: 2023-12-21 | Stop reason: SDUPTHER

## 2023-11-28 NOTE — PROGRESS NOTES
Subjective:    Patient ID:  Jose F Hanley is a 78 y.o. male patient here for evaluation Establish Care (Previous cardio in [place]. He wants to est in [place]. He just had a procedure with Dr. Thomas )      History of Present Illness:  Patient is a 78-year-old gentleman with history of arterial hypertension dyslipidemia peripheral arterial disease and previous coronary artery bypass surgery.  He had revascularization of the lower extremity and has been doing fairly well.  He now wishes to reestablish his cardiac care here locally and is referred to us by CT surgery.    Patient denies having chest discomfort arm neck or jaw pain and no significant change in his shortness of breath noted with normal physical activity.  He does have some COPD and claims that limits his efforts.  Patient has some chronic shortness of breath associated with this.  Has been difficult to distinguish she was symptoms from cardiac etiology.  Denies having any sustained palpitations.  His claudication symptoms have improved in the lower extremity.  His blood pressure also remained stable.    He denies having any GI symptoms of nausea dyspepsia melena hematochezia does have history of microcytic indices on his blood count panel.    Patient has history of permanent atrial fibrillation had successful Watchman device placed at [place] and noted to have symptomatic bradycardia had pacemaker as well.  He has history of severe aortic stenosis and successful TAVR completed.  He also has history of CVA, peripheral embolism while on NOAC therapy and was switched over to Coumadin.  In December of 2021 patient presented with severe lower GI bleed colonoscopy showed diverticulosis with no acute bleeding seen at the time.  Following that Watchman implant was delayed due to COVID pandemic.  A preop DELANO on 02/04/2022 showed small left atrial thrombus in the distal portion of the appendage and Coumadin was noted to be subtherapeutic at this time.   Coumadin was discontinued and started on Xarelto 20 mg a day  On 02/21/2022 he underwent repeat transesophageal echocardiography which showed complete resolution of the previously noted thrombus and thereafter  he had successful 27 mm  Watchman flex device implanted.  implanted  Follow-up DELANO showed appropriate seal with no leak or thrombus noted.  On aspirin Plavix with no postop bleeding issues  Patient is noted to have chronic dyspnea on exertion and intermittent episodes of dizziness without any syncope or presyncope.        Review of patient's allergies indicates:   Allergen Reactions    Adhes. band-tape-benzalkonium Rash     Pt stated it caused blisters    Statins-hmg-coa reductase inhibitors Other (See Comments)     Statin Intolerance (joint pain)       Past Medical History:   Diagnosis Date    COPD (chronic obstructive pulmonary disease)     HLD (hyperlipidemia)     HTN (hypertension)     PAD (peripheral artery disease)      Past Surgical History:   Procedure Laterality Date    AORTOGRAPHY WITH SERIALOGRAPHY N/A 8/31/2023    Procedure: AORTOGRAM, WITH SERIALOGRAPHY;  Surgeon: Gary Thomas MD;  Location: [facility] CATH/EP LAB;  Service: Peripheral Vascular;  Laterality: N/A;    CORONARY ARTERY BYPASS GRAFT      PTA, ANTERIOR TIBIAL Left 8/31/2023    Procedure: PTA, Anterior Tibial;  Surgeon: Gary Thomas MD;  Location: [facility] CATH/EP LAB;  Service: Peripheral Vascular;  Laterality: Left;    PTA, SUPERFICIAL FEMORAL ARTERY Left 8/31/2023    Procedure: PTA, Superficial Femoral Artery;  Surgeon: Gary Thomas MD;  Location: [facility] CATH/EP LAB;  Service: Peripheral Vascular;  Laterality: Left;    SPLENECTOMY      STENT, ANTERIOR TIBIAL Left 8/31/2023    Procedure: Stent, Anterior Tibial;  Surgeon: Gary Thomas MD;  Location: [facility] CATH/EP LAB;  Service: Peripheral Vascular;  Laterality: Left;   Patient has deep less cardiac pacemaker  Watchman flex device 27 with appropriate left atrial  "appendage closure   Social History     Tobacco Use    Smoking status: Former     Current packs/day: 0.00     Types: Cigarettes     Quit date: 1987     Years since quittin.0    Smokeless tobacco: Never   Substance Use Topics    Alcohol use: Not Currently     Alcohol/week: 21.0 standard drinks of alcohol     Types: 21 Shots of liquor per week    Drug use: No        Review of Systems   As noted in HPI in addition     Constitutional: Negative for chills, fatigue and fever.   Eyes: No double vision, No blurred vision  Neuro: No headaches, No dizziness  Respiratory:  Some cough and shortness of breath is noted.  He attributes this to COPD    Cardiovascular: Negative for chest pain. Negative for palpitations and leg swelling.   Gastrointestinal: Negative for abdominal pain, No melena, diarrhea, nausea and vomiting.   Genitourinary: Negative for dysuria and frequency, Negative for hematuria  Skin:  History of toe gangrene on the left positive for edema and chronic changes lower extremities left leg wound is not explored   Endocrine: Negative for polyphagia, Negative for heat intolerance, Negative for cold intolerance  Psychiatric: Negative for depression, Negative for anxiety, Negative for memory loss  Musculoskeletal: Negative for neck pain, Negative for muscle weakness, Negative for back pain          Objective        Vitals:    23 1128   BP: 120/60   Pulse: 85   Resp: 16       LIPIDS - LAST 2   No results found for: "CHOL", "HDL", "LDLCALC", "TRIG", "CHOLHDL"    CBC - LAST 2  Lab Results   Component Value Date    WBC 10.08 10/11/2023    WBC 9.79 2023    RBC 6.84 (H) 10/11/2023    RBC 6.95 (H) 2023    HGB 14.6 10/11/2023    HGB 13.8 (L) 2023    HCT 49.6 10/11/2023    HCT 47.9 2023    MCV 73 (L) 10/11/2023    MCV 69 (L) 2023    MCH 21.3 (L) 10/11/2023    MCH 19.9 (L) 2023    MCHC 29.4 (L) 10/11/2023    MCHC 28.8 (L) 2023    RDW 25.2 (H) 10/11/2023    RDW 22.8 (H) " "08/31/2023     10/11/2023     (H) 08/31/2023    MPV 9.8 10/11/2023    MPV 9.9 08/31/2023    GRAN 5.0 10/11/2023    GRAN 49.3 10/11/2023    LYMPH 3.1 10/11/2023    LYMPH 31.0 10/11/2023    MONO 1.3 (H) 10/11/2023    MONO 13.3 10/11/2023    BASO 0.19 10/11/2023    NRBC 0 10/11/2023       CHEMISTRY & LIVER FUNCTION - LAST 2  Lab Results   Component Value Date     (L) 10/11/2023     (L) 08/29/2023    K 4.3 10/11/2023    K 4.0 08/29/2023    CL 94 (L) 10/11/2023    CL 90 (L) 08/29/2023    CO2 34 (H) 10/11/2023    CO2 28 08/29/2023    ANIONGAP 7 (L) 10/11/2023    ANIONGAP 12 08/29/2023    BUN 30 (H) 10/11/2023    BUN 16 08/29/2023    CREATININE 1.1 10/11/2023    CREATININE 1.0 08/29/2023     10/11/2023     08/29/2023    CALCIUM 9.9 10/11/2023    CALCIUM 9.1 08/29/2023    ALBUMIN 4.2 10/11/2023    PROT 7.3 10/11/2023    ALKPHOS 34 (L) 10/11/2023    ALT 28 10/11/2023    AST 33 10/11/2023    BILITOT 0.5 10/11/2023        CARDIAC PROFILE - LAST 2  No results found for: "BNP", "CPK", "CPKMB", "LDH", "TROPONINI", "TROPONINIHS"     COAGULATION - LAST 2  No results found for: "LABPT", "INR", "APTT"    ENDOCRINE & PSA - LAST 2  Lab Results   Component Value Date    HGBA1C 6.5 11/03/2023    HGBA1C 5.3 07/10/2023        ECHOCARDIOGRAM RESULTS  No results found for this or any previous visit.      CURRENT/PREVIOUS VISIT EKG  Results for orders placed or performed in visit on 11/28/12   SCHEDULED EKG 12-LEAD (to Muse)    Collection Time: 11/28/12  2:13 PM    Narrative    Test Reason : 786.50  B  Vent. Rate : 064 BPM     Atrial Rate : 064 BPM     P-R Int : 180 ms          QRS Dur : 146 ms      QT Int : 440 ms       P-R-T Axes : 062 030 051 degrees     QTc Int : 453 ms    Normal sinus rhythm  Right bundle branch block  Abnormal ECG  No previous ECGs available  Confirmed by LESLIE PARKER MD ([id number]) on 11/30/2012 12:32:16 PM    Referred By: LESLIE PARKER           Overread By: LESLIE PARKER MD     No " valid procedures specified.   No results found for this or any previous visit.    No valid procedures specified.          PREVIOUS STRESS TEST              PREVIOUS ANGIOGRAM        PHYSICAL EXAM    GENERAL: well built, well nourished, well-developed in no apparent distress alert and oriented.   HEENT: Normocephalic. Pupils normal and conjunctivae normal.  Mucous membranes normal, no cyanosis or icterus, trachea central,no pallor or icterus is noted..   NECK: No JVD. No bruit..   THYROID: Thyroid not enlarged. No nodules present..   CARDIAC: Regular rate and rhythm. S1 is normal.S2 is normal.No gallops, clicks or murmurs noted at this time.  CHEST ANATOMY: normal.  Midsternal scar is present  LUNGS:  Diminished breath soundsAnd coarse rhonchi   ABDOMEN: Soft no masses or organomegaly.  No abdomen pulsations or bruits.  Normal bowel sounds. No pulsations and no masses felt, No guarding or rebound.   EXTREMITIES: No cyanosis, no calf tenderness bilaterally.     CENTRAL NERVOUS SYSTEM: No focal motor or sensory deficits noted.   SKIN: Skin without lesions, moist, well perfused.   MUSCLE STRENGTH & TONE: No noteable weakness, atrophy or abnormal movement.     I HAVE REVIEWED :    The vital signs, nurses notes, and all the pertinent radiology and labs.  11/28/2023 EKG shows atrial fibrillation rate of 87 beats per minute, right bundle branch block morphology left anterior fascicular block nonspecific ST T wave changes.      Current Outpatient Medications   Medication Instructions    acetaminophen (TYLENOL) 650 mg, Oral    albuterol (PROVENTIL/VENTOLIN HFA) 90 mcg/actuation inhaler 2 puffs, Inhalation, Every 6 hours PRN    amoxicillin (AMOXIL) 500 mg, Oral, Every 8 hours    betamethasone valerate 0.1% (VALISONE) 0.1 % Lotn Apply to scalp bid prn rash    clopidogreL (PLAVIX) 75 mg, Oral, Daily    cyanocobalamin 500 mcg    cyclobenzaprine (FLEXERIL) 10 mg, Oral, Every 8 hours PRN    diphenhydrAMINE (BENADRYL) 50 mg     doxepin (SINEQUAN) 50 mg, Oral, Nightly    doxycycline (VIBRAMYCIN) 100 MG Cap Take 1 capsule with food to be taken on Monday Wednesday and Friday. May be photsensitive    DULoxetine (CYMBALTA) 60 mg    EPINEPHrine (EPIPEN) 0.3 mg/0.3 mL AtIn INJECT 0.3MG/0.3ML SUBCUTANEOUSLY (UNDER THE SKIN)  AS NEEDED FOR ALLERGIC REACTIONS SELF-INJECTOR PEN    gabapentin (NEURONTIN) 800 mg    hydrocodone-homatropine 5-1.5 mg/5 ml (HYCODAN) 5-1.5 mg/5 mL Syrp Oral    levothyroxine (SYNTHROID) 50 mcg    metOLazone (ZAROXOLYN) 5 mg    montelukast (SINGULAIR) 10 mg    NexIUM 40 mg, Daily    nitroGLYCERIN (NITROSTAT) 0.4 mg, Sublingual, Every 5 min PRN    omega-3 fatty acids/fish oil (FISH OIL-OMEGA-3 FATTY ACIDS) 300-1,000 mg capsule 1 capsule, Oral, 2 times daily    omeprazole (PRILOSEC) 40 mg    potassium chloride SA (K-DUR,KLOR-CON) 20 MEQ tablet 20 mEq    pregabalin (LYRICA) 100 mg, 2 times daily    ranolazine (RANEXA) 500 mg, Oral, 2 times daily    senna-docusate 8.6-50 mg (PERICOLACE) 8.6-50 mg per tablet TAKE 2 TABLETS BY MOUTH DAILY FOR CONSTIPATION    spironolactone (ALDACTONE) 50 mg, Oral, Daily    tamsulosin (FLOMAX) 0.4 mg    temazepam (RESTORIL) 30 mg, Oral, Nightly PRN    TESTOSTERONE PROPIONATE, BULK, MISC 1 each, Injection, Every 14 days    TOPROL XL 25 mg, Daily    torsemide (DEMADEX) 20 mg, Oral, Daily    torsemide (DEMADEX) 20 mg, Oral, Daily          Assessment & Plan     S/P CABG (coronary artery bypass graft) - x2 09/2010 - LIMA to LAD; SVG to OMB; grafts occluded 08/2012  He needs further investigation to evaluate for any progression of coronary artery disease.  However in the meantime will maintain on risk factor modification continue on antiplatelet therapy with Plavix, maintain on Toprol-XL 25 mg daily, Nitrostat sublingual p.r.n..  Recommend to obtain a Lexiscan Myoview for further evaluation of his coronary artery disease and ischemic status as has been over 12 years since revascularization.    Mitzy  of breath  Patient has progressive symptoms of shortness of breath.  Recommend the following  1. Stop Lasix   2. Start him on torsemide at 20 mg twice a day for 2 days and then reduce it to 20 mg daily.    3. Continue on spironolactone 25 mg daily.  4. Obtain echocardiogram for LV function assessment.  And optimize his therapy further  5. Continue on albuterol Ventolin nebulizer    PAD (peripheral artery disease)  Symptoms of claudication has been relatively stable.  Continue on aggressive risk factor modification maintain on antiplatelet therapy with Plavix  Patient has severe statin intolerance.  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  He is on care with wound management    Chronic obstructive pulmonary disease with acute exacerbation  He also has history of COPD continue on nebulizer treatments as previously prescribed.    History of transcatheter aortic valve replacement (TAVR)  Patient has history of TAVR procedure completed a [place] the details of this not known    Permanent atrial fibrillation  Patient has permanent atrial fibrillation and status post Watchman device implanted.  Currently rate controlled continue on aspirin and Plavix for the time being and eventually Plavix can be discontinued maintain on aspirin alone.    Presence of Watchman left atrial appendage closure device  Patient has a functioning Watchman device in Situ without leakage continue on aspirin and Plavix for the time being discontinue Plavix and maintain on aspirin  alone.    Severe obesity (BMI 35.0-39.9) with comorbidity  Encouraged him to restrict his calorie intake and stay physically active as much as possible and maintain on fluid-restricted diet as well.    Chronic diastolic congestive heart failure  Patient is identified as having Diastolic (HFpEF) heart failure that is Chronic. CHF is currently controlled. Latest ECHO performed and demonstrates- No results found for this or any previous visit.  . Continue Beta Blocker, Furosemide, and Aldactone and monitor clinical status closely. Monitor on telemetry. Patient is on CHF pathway.  Monitor strict Is&Os and daily weights.  Place on fluid restriction of 2 L. Cardiology has been consulted. Continue to stress to patient importance of self efficacy and  on diet for CHF.             No follow-ups on file.

## 2023-11-28 NOTE — ASSESSMENT & PLAN NOTE
He needs further investigation to evaluate for any progression of coronary artery disease.  However in the meantime will maintain on risk factor modification continue on antiplatelet therapy with Plavix, maintain on Toprol-XL 25 mg daily, Nitrostat sublingual p.r.n..  Recommend to obtain a Lexiscan Myoview for further evaluation of his coronary artery disease and ischemic status as has been over 12 years since revascularization.

## 2023-11-28 NOTE — ASSESSMENT & PLAN NOTE
Patient has progressive symptoms of shortness of breath.  Recommend the following  1. Stop Lasix   2. Start him on torsemide at 20 mg twice a day for 2 days and then reduce it to 20 mg daily.    3. Continue on spironolactone 25 mg daily.  4. Obtain echocardiogram for LV function assessment.  And optimize his therapy further  5. Continue on albuterol Ventolin nebulizer

## 2023-11-29 RX ORDER — RANOLAZINE 500 MG/1
500 TABLET, EXTENDED RELEASE ORAL 2 TIMES DAILY
Qty: 60 TABLET | Refills: 11 | Status: SHIPPED | OUTPATIENT
Start: 2023-11-29 | End: 2023-12-21 | Stop reason: SDUPTHER

## 2023-11-29 NOTE — PROGRESS NOTES
[address] [facility] [address]  [place] [address]  Phone: [phone]   [phone]    Patient's PCP:Sunita Rivera MD  Referring Provider: Aaareferral Self    Subjective:      Chief Complaint:: Wound Care, Diabetes, Diabetic Foot Ulcer, Wound Check, Pressure Ulcer, Non-healing Wound, Numbness, and Peripheral Neuropathy    HPI  Jose F Hanley is a 78 y.o. male who presents today with a complaint of left great toe wound.   See wound docs documentation for full assessment and evaluation of wound.     Additionally, patient presents following recent hospital admission.   Reviewed all notes from patients medical chart from recent hospital admission, including imaging, procedures, studies, progress notes,  cultures, antibiotic regimens, photos,  etc.       EVALUATION, ASSESSMENT, & PLAN:      1. Debridement with application of skin substitute to accelerate healing.See Wound Docs for assessment of wounds and procedure notes  2. Continue taking all medications as prescribed  3. Dressing changes, see Wound docs for dressing change orders  5. Counseled patient on increasing protein intake, not getting wound wet, keeping dressing clean dry and intact, following a healthy diet, elevating legs when able, removing pressure from wound      Total time spent for E&M 40  Total time for debridement 35 minutes          Proper ulcer care and the possible need for serial debridements, topical medications, specific dressings and biological engineered skin substitutes if indicated.        Counseling/Education:  I provided patient education verbally regarding:   The aspects of diabetes and how it pertains to the feet. I explained the importance of proper diabetic foot care and how it is essential for the health of their feet.     I discussed the importance of knowing their Hemoglobin A1c and that the level needs to be as close to 6 as possible. I discussed the increase complications of high blood sugar including stroke, blindness,  heart attack, kidney failure and loss of limb secondary to neuropathy and PVD.      With neuropathy, beware of any breaks in the skin or redness. These areas are not recognized early due to the numbness.     I discussed Diabetes, lower back issues, metabolic disorders, systemic causes, chemotherapy, vitamin deficiency, heavy metal exposure, as some of the causes. I also explained that as much as 40% of the time we can not find a cause. I discussed different treatments available to control the symptoms but which may not cure the problem.         Counseled patient on the aspects of diabetes and how it pertains to the feet.  I explained the importance of proper diabetic foot care and how it is essential for the health of their feet.        Shoe inspection. Patient instructed on proper foot hygeine. We discussed wearing proper shoe gear, daily foot inspections, never walking without protective shoe gear, never putting sharp instruments to feet, routine podiatric nail visits every 2-3 months.          Patient should call the office immediately if any signs of infection, such as fever, chills, sweats, increased redness or pain.     Patient was instructed to call the clinic or go to the emergency department if their symptoms do not improve, worsens, or if new symptoms develop.  Patient was advised that if any increased swelling, pain, or numbness arise to go immediately to the ED. Patient knows to call any time if an emergency arises. Shared decision making occurred and patient verbalized understanding in agreement with this plan.         >50% of this > 60 minute visit was spent face to face educating/counseling the patient     I spent a total of 60 minutes on the day of the visit.This includes face to face time and non-face to face time preparing to see the patient (eg, review of tests), obtaining and/or reviewing separately obtained history, documenting clinical information in the electronic or other health record,  independently interpreting results and communicating results to the patient/family/caregiver, or care coordinator.        Much of the documentation for this visit was completed in the Wound Docs system.  Please see the attached documentation for further details about the patient's care. Scanned under the Media tab.         Radha Costello DPM     Vitals:    11/28/23 1029   BP: (!) 149/71   Pulse: 78   Resp: 20   Temp: 98.4 °F (36.9 °C)   PainSc: 0-No pain      Shoe Size:     Past Surgical History:   Procedure Laterality Date    AORTOGRAPHY WITH SERIALOGRAPHY N/A 8/31/2023    Procedure: AORTOGRAM, WITH SERIALOGRAPHY;  Surgeon: Gary Thomas MD;  Location: [facility] CATH/EP LAB;  Service: Peripheral Vascular;  Laterality: N/A;    CORONARY ARTERY BYPASS GRAFT      PTA, ANTERIOR TIBIAL Left 8/31/2023    Procedure: PTA, Anterior Tibial;  Surgeon: Gary Thomas MD;  Location: [facility] CATH/EP LAB;  Service: Peripheral Vascular;  Laterality: Left;    PTA, SUPERFICIAL FEMORAL ARTERY Left 8/31/2023    Procedure: PTA, Superficial Femoral Artery;  Surgeon: Gary Thomas MD;  Location: [facility] CATH/EP LAB;  Service: Peripheral Vascular;  Laterality: Left;    SPLENECTOMY      STENT, ANTERIOR TIBIAL Left 8/31/2023    Procedure: Stent, Anterior Tibial;  Surgeon: Gary Thoams MD;  Location: [facility] CATH/EP LAB;  Service: Peripheral Vascular;  Laterality: Left;     Past Medical History:   Diagnosis Date    COPD (chronic obstructive pulmonary disease)     HLD (hyperlipidemia)     HTN (hypertension)     PAD (peripheral artery disease)      History reviewed. No pertinent family history.     Social History:   Marital Status:   Alcohol History:  reports that he does not currently use alcohol after a past usage of about 21.0 standard drinks of alcohol per week.  Tobacco History:  reports that he quit smoking about 36 years ago. His smoking use included cigarettes. He has never used smokeless tobacco.  Drug History:   reports no history of drug use.    Review of patient's allergies indicates:   Allergen Reactions    Adhes. band-tape-benzalkonium Rash     Pt stated it caused blisters    Statins-hmg-coa reductase inhibitors Other (See Comments)     Statin Intolerance (joint pain)       Current Outpatient Medications   Medication Sig Dispense Refill    acetaminophen (TYLENOL) 325 MG tablet Take 650 mg by mouth.      albuterol (PROVENTIL/VENTOLIN HFA) 90 mcg/actuation inhaler Inhale 2 puffs into the lungs every 6 (six) hours as needed.      amoxicillin (AMOXIL) 500 MG capsule Take 1 capsule (500 mg total) by mouth every 8 (eight) hours. 90 capsule 1    betamethasone valerate 0.1% (VALISONE) 0.1 % Lotn Apply to scalp bid prn rash      clopidogreL (PLAVIX) 75 mg tablet Take 1 tablet (75 mg total) by mouth once daily. 30 tablet 11    cyanocobalamin 500 MCG tablet 500 mcg.      cyclobenzaprine (FLEXERIL) 10 MG tablet Take 10 mg by mouth every 8 (eight) hours as needed.      diphenhydrAMINE (BENADRYL) 50 MG capsule 50 mg.      doxepin (SINEQUAN) 50 MG capsule Take 50 mg by mouth every evening.      doxycycline (VIBRAMYCIN) 100 MG Cap Take 1 capsule with food to be taken on Monday Wednesday and Friday. May be photsensitive (Patient not taking: Reported on 10/11/2023) 30 capsule 2    DULoxetine (CYMBALTA) 60 MG capsule 60 mg.      EPINEPHrine (EPIPEN) 0.3 mg/0.3 mL AtIn INJECT 0.3MG/0.3ML SUBCUTANEOUSLY (UNDER THE SKIN)  AS NEEDED FOR ALLERGIC REACTIONS SELF-INJECTOR PEN      gabapentin (NEURONTIN) 400 MG capsule 800 mg.      hydrocodone-homatropine 5-1.5 mg/5 ml (HYCODAN) 5-1.5 mg/5 mL Syrp Take by mouth.      levothyroxine (SYNTHROID) 50 MCG tablet 50 mcg.      metOLazone (ZAROXOLYN) 5 MG tablet 5 mg.      montelukast (SINGULAIR) 10 mg tablet 10 mg.      NEXIUM 40 mg capsule Take 40 mg by mouth once daily.       nitroGLYCERIN (NITROSTAT) 0.4 MG SL tablet Place 0.4 mg under the tongue every 5 (five) minutes as needed.      omega-3 fatty  acids/fish oil (FISH OIL-OMEGA-3 FATTY ACIDS) 300-1,000 mg capsule Take 1 capsule by mouth 2 (two) times daily.      omeprazole (PRILOSEC) 20 MG capsule 40 mg.      potassium chloride SA (K-DUR,KLOR-CON) 20 MEQ tablet 20 mEq.      pregabalin (LYRICA) 100 MG capsule Take 100 mg by mouth 2 (two) times daily.        senna-docusate 8.6-50 mg (PERICOLACE) 8.6-50 mg per tablet TAKE 2 TABLETS BY MOUTH DAILY FOR CONSTIPATION      spironolactone (ALDACTONE) 50 MG tablet Take 50 mg by mouth once daily.      tamsulosin (FLOMAX) 0.4 mg Cap 0.4 mg.      temazepam (RESTORIL) 30 mg capsule Take 30 mg by mouth nightly as needed for Insomnia.      TESTOSTERONE PROPIONATE, BULK, MISC Inject 1 each as directed every 14 (fourteen) days.      TOPROL XL 25 mg 24 hr tablet Take 25 mg by mouth once daily.       torsemide (DEMADEX) 20 MG Tab Take 1 tablet (20 mg total) by mouth once daily. 30 tablet 11    torsemide (DEMADEX) 20 MG Tab Take 1 tablet (20 mg total) by mouth once daily. 90 tablet 3     No current facility-administered medications for this visit.       Review of Systems   Constitutional:  Negative for chills, fatigue, fever and unexpected weight change.   HENT:  Negative for hearing loss and trouble swallowing.    Eyes:  Negative for photophobia and visual disturbance.   Respiratory:  Negative for cough, shortness of breath and wheezing.    Cardiovascular:  Negative for chest pain, palpitations and leg swelling.   Gastrointestinal:  Negative for abdominal pain and nausea.   Genitourinary:  Negative for dysuria and frequency.   Musculoskeletal:  Negative for arthralgias, back pain, gait problem, joint swelling and myalgias.   Skin:  Positive for wound. Negative for rash.   Neurological:  Positive for numbness. Negative for tremors, seizures, weakness and headaches.   Hematological:  Does not bruise/bleed easily.         Objective:        Physical Exam:   Foot Exam  Physical Exam  Ortho/SPM Exam     Imaging:            Assessment:        1. 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 Type 2 diabetes mellitus with diabetic neuropathy, unspecified whether long term insulin use    3. At high risk for inadequate nutritional intake    4. 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    Procedures          Counseling:     I provided patient education verbally regarding:   Patient diagnosis, treatment options, as well as alternatives, risks, and benefits.     This note was created using Dragon voice recognition software that occasionally misinterpreted phrases or words.

## 2023-11-29 NOTE — ASSESSMENT & PLAN NOTE
Symptoms of claudication has been relatively stable.  Continue on aggressive risk factor modification maintain on antiplatelet therapy with Plavix  Patient has severe statin intolerance.  Encouraged to maintain a strict diet and the stress test is negative will increase physical activity as tolerated as well

## 2023-11-29 NOTE — ASSESSMENT & PLAN NOTE
Patient had required a stent placement in 2011 and recommend to have further cardiac evaluation by Lexiscan as discussed above in the meantime may consider starting him on Ranexa

## 2023-11-29 NOTE — ASSESSMENT & PLAN NOTE
Chronic ulcer in the left foot and currently gradual healing and progresses noted per patient.  He is on care with wound management

## 2023-12-01 PROBLEM — I48.21 PERMANENT ATRIAL FIBRILLATION: Status: ACTIVE | Noted: 2023-12-01

## 2023-12-01 PROBLEM — Z95.2 HISTORY OF TRANSCATHETER AORTIC VALVE REPLACEMENT (TAVR): Status: ACTIVE | Noted: 2023-12-01

## 2023-12-01 PROBLEM — I50.32 CHRONIC DIASTOLIC CONGESTIVE HEART FAILURE: Status: ACTIVE | Noted: 2023-12-01

## 2023-12-01 PROBLEM — Z95.818 PRESENCE OF WATCHMAN LEFT ATRIAL APPENDAGE CLOSURE DEVICE: Status: ACTIVE | Noted: 2023-12-01

## 2023-12-01 PROBLEM — E66.01 SEVERE OBESITY (BMI 35.0-39.9) WITH COMORBIDITY: Status: ACTIVE | Noted: 2023-12-01

## 2023-12-01 NOTE — ASSESSMENT & PLAN NOTE
Patient has a functioning Watchman device in Situ without leakage continue on aspirin and Plavix for the time being discontinue Plavix and maintain on aspirin alone.

## 2023-12-01 NOTE — ASSESSMENT & PLAN NOTE
Patient has permanent atrial fibrillation and status post Watchman device implanted.  Currently rate controlled continue on aspirin and Plavix for the time being and eventually Plavix can be discontinued maintain on aspirin alone.

## 2023-12-01 NOTE — ASSESSMENT & PLAN NOTE
Encouraged him to restrict his calorie intake and stay physically active as much as possible and maintain on fluid-restricted diet as well.

## 2023-12-01 NOTE — ASSESSMENT & PLAN NOTE
Patient is identified as having Diastolic (HFpEF) heart failure that is Chronic. CHF is currently controlled. Latest ECHO performed and demonstrates- No results found for this or any previous visit.  . Continue Beta Blocker, Furosemide, and Aldactone and monitor clinical status closely. Monitor on telemetry. Patient is on CHF pathway.  Monitor strict Is&Os and daily weights.  Place on fluid restriction of 2 L. Cardiology has been consulted. Continue to stress to patient importance of self efficacy and  on diet for CHF.

## 2023-12-05 ENCOUNTER — OFFICE VISIT (OUTPATIENT)
Dept: WOUND CARE | Facility: HOSPITAL | Age: 78
End: 2023-12-05
Attending: PODIATRIST
Payer: MEDICARE

## 2023-12-05 VITALS
TEMPERATURE: 99 F | RESPIRATION RATE: 16 BRPM | DIASTOLIC BLOOD PRESSURE: 73 MMHG | SYSTOLIC BLOOD PRESSURE: 150 MMHG | HEART RATE: 88 BPM

## 2023-12-05 DIAGNOSIS — L97.524 ULCER OF LEFT FOOT WITH NECROSIS OF BONE: Primary | ICD-10-CM

## 2023-12-05 DIAGNOSIS — E11.40 TYPE 2 DIABETES MELLITUS WITH DIABETIC NEUROPATHY, UNSPECIFIED WHETHER LONG TERM INSULIN USE: ICD-10-CM

## 2023-12-05 DIAGNOSIS — Z91.89 AT HIGH RISK FOR INADEQUATE NUTRITIONAL INTAKE: ICD-10-CM

## 2023-12-05 DIAGNOSIS — I73.9 PVD (PERIPHERAL VASCULAR DISEASE): ICD-10-CM

## 2023-12-05 DIAGNOSIS — R26.2 DIFFICULTY IN WALKING, NOT ELSEWHERE CLASSIFIED: ICD-10-CM

## 2023-12-05 DIAGNOSIS — I73.9 PAD (PERIPHERAL ARTERY DISEASE): ICD-10-CM

## 2023-12-05 PROCEDURE — 99213 OFFICE O/P EST LOW 20 MIN: CPT | Mod: PN | Performed by: PODIATRIST

## 2023-12-05 PROCEDURE — 99214 PR OFFICE/OUTPT VISIT, EST, LEVL IV, 30-39 MIN: ICD-10-PCS | Mod: ,,, | Performed by: PODIATRIST

## 2023-12-05 PROCEDURE — 99214 OFFICE O/P EST MOD 30 MIN: CPT | Mod: ,,, | Performed by: PODIATRIST

## 2023-12-05 NOTE — PROGRESS NOTES
[address] [facility] [address]  [place] [address]  Phone: [phone]   [phone]    Patient's PCP:Sunita Rivera MD  Referring Provider: Aaareferral Self    Subjective:      Chief Complaint:: Wound Care, Wound Consult, Diabetes, Diabetic Foot Ulcer, Difficulty Walking, Wound Check, Non-healing Wound, Numbness, Peripheral Neuropathy, and Pressure Ulcer    HPI  Jose F Hanley is a 78 y.o. male who presents today with a complaint of left great toe wound.   See wound docs documentation for full assessment and evaluation of wound.     Additionally, patient presents following recent hospital admission.   Reviewed all notes from patients medical chart from recent hospital admission, including imaging, procedures, studies, progress notes,  cultures, antibiotic regimens, photos,  etc.       EVALUATION, ASSESSMENT, & PLAN:      1. HEALED See Wound Docs for assessment of wounds and procedure notes  2. Continue taking all medications as prescribed  3. Dressing changes, see Wound docs for dressing change orders  5. Counseled patient on increasing protein intake, not getting wound wet, keeping dressing clean dry and intact, following a healthy diet, elevating legs when able, removing pressure from wound         Proper ulcer care and the possible need for serial debridements, topical medications, specific dressings and biological engineered skin substitutes if indicated.        Counseling/Education:  I provided patient education verbally regarding:   The aspects of diabetes and how it pertains to the feet. I explained the importance of proper diabetic foot care and how it is essential for the health of their feet.     I discussed the importance of knowing their Hemoglobin A1c and that the level needs to be as close to 6 as possible. I discussed the increase complications of high blood sugar including stroke, blindness, heart attack, kidney failure and loss of limb secondary to neuropathy and PVD.      With neuropathy,  beware of any breaks in the skin or redness. These areas are not recognized early due to the numbness.     I discussed Diabetes, lower back issues, metabolic disorders, systemic causes, chemotherapy, vitamin deficiency, heavy metal exposure, as some of the causes. I also explained that as much as 40% of the time we can not find a cause. I discussed different treatments available to control the symptoms but which may not cure the problem.         Counseled patient on the aspects of diabetes and how it pertains to the feet.  I explained the importance of proper diabetic foot care and how it is essential for the health of their feet.        Shoe inspection. Patient instructed on proper foot hygeine. We discussed wearing proper shoe gear, daily foot inspections, never walking without protective shoe gear, never putting sharp instruments to feet, routine podiatric nail visits every 2-3 months.          Patient should call the office immediately if any signs of infection, such as fever, chills, sweats, increased redness or pain.     Patient was instructed to call the clinic or go to the emergency department if their symptoms do not improve, worsens, or if new symptoms develop.  Patient was advised that if any increased swelling, pain, or numbness arise to go immediately to the ED. Patient knows to call any time if an emergency arises. Shared decision making occurred and patient verbalized understanding in agreement with this plan.         >50% of this > 60 minute visit was spent face to face educating/counseling the patient     I spent a total of 60 minutes on the day of the visit.This includes face to face time and non-face to face time preparing to see the patient (eg, review of tests), obtaining and/or reviewing separately obtained history, documenting clinical information in the electronic or other health record, independently interpreting results and communicating results to the patient/family/caregiver, or care  coordinator.        Much of the documentation for this visit was completed in the Wound Docs system.  Please see the attached documentation for further details about the patient's care. Scanned under the Media tab.         Radha Costello DPM     Vitals:    12/05/23 1007   BP: (!) 150/73   Pulse: 88   Resp: 16   Temp: 98.8 °F (37.1 °C)   PainSc: 0-No pain      Shoe Size:     Past Surgical History:   Procedure Laterality Date    AORTOGRAPHY WITH SERIALOGRAPHY N/A 8/31/2023    Procedure: AORTOGRAM, WITH SERIALOGRAPHY;  Surgeon: Gary Thomas MD;  Location: [facility] CATH/EP LAB;  Service: Peripheral Vascular;  Laterality: N/A;    CORONARY ARTERY BYPASS GRAFT      PTA, ANTERIOR TIBIAL Left 8/31/2023    Procedure: PTA, Anterior Tibial;  Surgeon: Gary Thomas MD;  Location: [facility] CATH/EP LAB;  Service: Peripheral Vascular;  Laterality: Left;    PTA, SUPERFICIAL FEMORAL ARTERY Left 8/31/2023    Procedure: PTA, Superficial Femoral Artery;  Surgeon: Gary Thomas MD;  Location: [facility] CATH/EP LAB;  Service: Peripheral Vascular;  Laterality: Left;    SPLENECTOMY      STENT, ANTERIOR TIBIAL Left 8/31/2023    Procedure: Stent, Anterior Tibial;  Surgeon: Gary Thomas MD;  Location: [facility] CATH/EP LAB;  Service: Peripheral Vascular;  Laterality: Left;     Past Medical History:   Diagnosis Date    COPD (chronic obstructive pulmonary disease)     HLD (hyperlipidemia)     HTN (hypertension)     PAD (peripheral artery disease)      History reviewed. No pertinent family history.     Social History:   Marital Status:   Alcohol History:  reports that he does not currently use alcohol after a past usage of about 21.0 standard drinks of alcohol per week.  Tobacco History:  reports that he quit smoking about 36 years ago. His smoking use included cigarettes. He has never used smokeless tobacco.  Drug History:  reports no history of drug use.    Review of patient's allergies indicates:   Allergen Reactions     Adhes. band-tape-benzalkonium Rash     Pt stated it caused blisters    Statins-hmg-coa reductase inhibitors Other (See Comments)     Statin Intolerance (joint pain)       Current Outpatient Medications   Medication Sig Dispense Refill    acetaminophen (TYLENOL) 325 MG tablet Take 650 mg by mouth.      albuterol (PROVENTIL/VENTOLIN HFA) 90 mcg/actuation inhaler Inhale 2 puffs into the lungs every 6 (six) hours as needed.      amoxicillin (AMOXIL) 500 MG capsule Take 1 capsule (500 mg total) by mouth every 8 (eight) hours. 90 capsule 1    betamethasone valerate 0.1% (VALISONE) 0.1 % Lotn Apply to scalp bid prn rash      clopidogreL (PLAVIX) 75 mg tablet Take 1 tablet (75 mg total) by mouth once daily. 30 tablet 11    cyanocobalamin 500 MCG tablet 500 mcg.      cyclobenzaprine (FLEXERIL) 10 MG tablet Take 10 mg by mouth every 8 (eight) hours as needed.      diphenhydrAMINE (BENADRYL) 50 MG capsule 50 mg.      doxepin (SINEQUAN) 50 MG capsule Take 50 mg by mouth every evening.      doxycycline (VIBRAMYCIN) 100 MG Cap Take 1 capsule with food to be taken on Monday Wednesday and Friday. May be photsensitive (Patient not taking: Reported on 10/11/2023) 30 capsule 2    DULoxetine (CYMBALTA) 60 MG capsule 60 mg.      EPINEPHrine (EPIPEN) 0.3 mg/0.3 mL AtIn INJECT 0.3MG/0.3ML SUBCUTANEOUSLY (UNDER THE SKIN)  AS NEEDED FOR ALLERGIC REACTIONS SELF-INJECTOR PEN      gabapentin (NEURONTIN) 400 MG capsule 800 mg.      hydrocodone-homatropine 5-1.5 mg/5 ml (HYCODAN) 5-1.5 mg/5 mL Syrp Take by mouth.      levothyroxine (SYNTHROID) 50 MCG tablet 50 mcg.      metOLazone (ZAROXOLYN) 5 MG tablet 5 mg.      montelukast (SINGULAIR) 10 mg tablet 10 mg.      NEXIUM 40 mg capsule Take 40 mg by mouth once daily.       nitroGLYCERIN (NITROSTAT) 0.4 MG SL tablet Place 0.4 mg under the tongue every 5 (five) minutes as needed.      omega-3 fatty acids/fish oil (FISH OIL-OMEGA-3 FATTY ACIDS) 300-1,000 mg capsule Take 1 capsule by mouth 2 (two)  times daily.      omeprazole (PRILOSEC) 20 MG capsule 40 mg.      potassium chloride SA (K-DUR,KLOR-CON) 20 MEQ tablet 20 mEq.      pregabalin (LYRICA) 100 MG capsule Take 100 mg by mouth 2 (two) times daily.        ranolazine (RANEXA) 500 MG Tb12 Take 1 tablet (500 mg total) by mouth 2 (two) times daily. 60 tablet 11    senna-docusate 8.6-50 mg (PERICOLACE) 8.6-50 mg per tablet TAKE 2 TABLETS BY MOUTH DAILY FOR CONSTIPATION      spironolactone (ALDACTONE) 50 MG tablet Take 50 mg by mouth once daily.      tamsulosin (FLOMAX) 0.4 mg Cap 0.4 mg.      temazepam (RESTORIL) 30 mg capsule Take 30 mg by mouth nightly as needed for Insomnia.      TESTOSTERONE PROPIONATE, BULK, MISC Inject 1 each as directed every 14 (fourteen) days.      TOPROL XL 25 mg 24 hr tablet Take 25 mg by mouth once daily.       torsemide (DEMADEX) 20 MG Tab Take 1 tablet (20 mg total) by mouth once daily. 30 tablet 11    torsemide (DEMADEX) 20 MG Tab Take 1 tablet (20 mg total) by mouth once daily. 90 tablet 3     No current facility-administered medications for this visit.       Review of Systems   Constitutional:  Negative for chills, fatigue, fever and unexpected weight change.   HENT:  Negative for hearing loss and trouble swallowing.    Eyes:  Negative for photophobia and visual disturbance.   Respiratory:  Negative for cough, shortness of breath and wheezing.    Cardiovascular:  Negative for chest pain, palpitations and leg swelling.   Gastrointestinal:  Negative for abdominal pain and nausea.   Genitourinary:  Negative for dysuria and frequency.   Musculoskeletal:  Negative for arthralgias, back pain, gait problem, joint swelling and myalgias.   Skin:  Positive for wound. Negative for rash.   Neurological:  Positive for numbness. Negative for tremors, seizures, weakness and headaches.   Hematological:  Does not bruise/bleed easily.         Objective:        Physical Exam:   Foot Exam  Physical Exam  Ortho/SPM Exam     Imaging:             Assessment:       1. 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 Difficulty in walking, not elsewhere classified    3. Type 2 diabetes mellitus with diabetic neuropathy, unspecified whether long term insulin use    4. At high risk for inadequate nutritional intake    5. PVD (peripheral vascular disease)    6. 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    Procedures          Counseling:     I provided patient education verbally regarding:   Patient diagnosis, treatment options, as well as alternatives, risks, and benefits.     This note was created using Dragon voice recognition software that occasionally misinterpreted phrases or words.

## 2023-12-12 ENCOUNTER — TELEPHONE (OUTPATIENT)
Dept: CARDIOLOGY | Facility: HOSPITAL | Age: 78
End: 2023-12-12

## 2023-12-12 NOTE — TELEPHONE ENCOUNTER
Patient advised, [facility]   Will need to register on the first floor at the main entrance.   Patient advised that arrival time is 7:30am.  Patient advised that she may be here about 2.5-3 hours, and may want to bring something to occupy their time, as there will be periods of waiting.    Patient advised, may take her medications prior to testing if you need to.  Patient should HOLD Nitroglycerin. Advised if she needs to eat to take her medications, please keep it light, like toast and juice.    Patient advised to avoid all caffeine 12 hours prior to testing.  This includes decaf tea and coffee.    Will provide peanut butter crackers for a snack after stress test.  If patient would prefer something else, please bring a snack from home.    Wear comfortable clothing.   No lotions, oils, or powders to the upper chest area. May wear deodorant.    No metal jewelry, buttons, or zippers to the upper body.  Patient verbalizes understanding of instructions.

## 2023-12-13 ENCOUNTER — HOSPITAL ENCOUNTER (OUTPATIENT)
Dept: CARDIOLOGY | Facility: HOSPITAL | Age: 78
Discharge: HOME | End: 2023-12-13
Attending: INTERNAL MEDICINE
Payer: MEDICARE

## 2023-12-13 ENCOUNTER — HOSPITAL ENCOUNTER (OUTPATIENT)
Dept: RADIOLOGY | Facility: HOSPITAL | Age: 78
Discharge: HOME | End: 2023-12-13
Attending: INTERNAL MEDICINE
Payer: MEDICARE

## 2023-12-13 VITALS — HEIGHT: 72 IN | BODY MASS INDEX: 37.65 KG/M2 | WEIGHT: 278 LBS

## 2023-12-13 DIAGNOSIS — R06.02 SHORTNESS OF BREATH: ICD-10-CM

## 2023-12-13 DIAGNOSIS — Z95.1 S/P CABG (CORONARY ARTERY BYPASS GRAFT): ICD-10-CM

## 2023-12-13 PROCEDURE — A9502 TC99M TETROFOSMIN: HCPCS

## 2023-12-13 PROCEDURE — 93306 ECHO (CUPID ONLY): ICD-10-PCS | Mod: 26,,, | Performed by: INTERNAL MEDICINE

## 2023-12-13 PROCEDURE — 78452 HT MUSCLE IMAGE SPECT MULT: CPT | Mod: 26,,, | Performed by: INTERNAL MEDICINE

## 2023-12-13 PROCEDURE — 93018 CV STRESS TEST I&R ONLY: CPT | Mod: ,,, | Performed by: INTERNAL MEDICINE

## 2023-12-13 PROCEDURE — 93306 TTE W/DOPPLER COMPLETE: CPT

## 2023-12-13 PROCEDURE — 93018 NUCLEAR STRESS - CARDIOLOGY INTERPRETED (CUPID ONLY): ICD-10-PCS | Mod: ,,, | Performed by: INTERNAL MEDICINE

## 2023-12-13 PROCEDURE — 93306 TTE W/DOPPLER COMPLETE: CPT | Mod: 26,,, | Performed by: INTERNAL MEDICINE

## 2023-12-13 PROCEDURE — 93016 NUCLEAR STRESS - CARDIOLOGY INTERPRETED (CUPID ONLY): ICD-10-PCS | Mod: ,,,

## 2023-12-13 PROCEDURE — 93016 CV STRESS TEST SUPVJ ONLY: CPT | Mod: ,,,

## 2023-12-13 PROCEDURE — 78452 HT MUSCLE IMAGE SPECT MULT: CPT

## 2023-12-13 PROCEDURE — 78452 NUCLEAR STRESS - CARDIOLOGY INTERPRETED (CUPID ONLY): ICD-10-PCS | Mod: 26,,, | Performed by: INTERNAL MEDICINE

## 2023-12-13 RX ORDER — REGADENOSON 0.08 MG/ML
0.4 INJECTION, SOLUTION INTRAVENOUS ONCE
Status: COMPLETED | OUTPATIENT
Start: 2023-12-13 | End: 2023-12-13

## 2023-12-13 RX ADMIN — REGADENOSON 0.4 MG: 0.08 INJECTION, SOLUTION INTRAVENOUS at 08:12

## 2023-12-14 ENCOUNTER — HOSPITAL ENCOUNTER (OUTPATIENT)
Dept: RADIOLOGY | Facility: HOSPITAL | Age: 78
Discharge: HOME | End: 2023-12-14
Attending: INTERNAL MEDICINE
Payer: MEDICARE

## 2023-12-14 LAB
AORTIC ROOT ANNULUS: 3.4 CM
AORTIC VALVE CUSP SEPERATION: 1.7 CM
AV INDEX (PROSTH): 0.58
AV MEAN GRADIENT: 9 MMHG
AV PEAK GRADIENT: 16 MMHG
AV VALVE AREA BY VELOCITY RATIO: 1.97 CM²
AV VALVE AREA: 2.2 CM²
AV VELOCITY RATIO: 0.52
BSA FOR ECHO PROCEDURE: 2.53 M2
CV ECHO LV RWT: 0.51 CM
CV PHARM DOSE: 0.4 MG
CV STRESS BASE HR: 69 BPM
DIASTOLIC BLOOD PRESSURE: 56 MMHG
DOP CALC AO PEAK VEL: 2.01 M/S
DOP CALC AO VTI: 35 CM
DOP CALC LVOT AREA: 3.8 CM2
DOP CALC LVOT DIAMETER: 2.2 CM
DOP CALC LVOT PEAK VEL: 1.04 M/S
DOP CALC LVOT STROKE VOLUME: 77.13 CM3
DOP CALC MV VTI: 27.5 CM
DOP CALCLVOT PEAK VEL VTI: 20.3 CM
E WAVE DECELERATION TIME: 224 MSEC
E/E' RATIO: 11.8 M/S
ECHO LV POSTERIOR WALL: 1.3 CM (ref 0.6–1.1)
EJECTION FRACTION- HIGH: 65 %
END DIASTOLIC INDEX-HIGH: 153 ML/M2
END DIASTOLIC INDEX-LOW: 93 ML/M2
END SYSTOLIC INDEX-HIGH: 71 ML/M2
END SYSTOLIC INDEX-LOW: 31 ML/M2
FRACTIONAL SHORTENING: 37 % (ref 28–44)
INTERVENTRICULAR SEPTUM: 1.31 CM (ref 0.6–1.1)
IVC DIAMETER: 2.3 CM
IVRT: 46 MSEC
LEFT ATRIUM SIZE: 4.7 CM
LEFT ATRIUM VOLUME INDEX MOD: 31.3 ML/M2
LEFT ATRIUM VOLUME MOD: 76.8 CM3
LEFT INTERNAL DIMENSION IN SYSTOLE: 3.19 CM (ref 2.1–4)
LEFT VENTRICLE DIASTOLIC VOLUME INDEX: 49.39 ML/M2
LEFT VENTRICLE DIASTOLIC VOLUME: 121 ML
LEFT VENTRICLE MASS INDEX: 109 G/M2
LEFT VENTRICLE SYSTOLIC VOLUME INDEX: 16.6 ML/M2
LEFT VENTRICLE SYSTOLIC VOLUME: 40.6 ML
LEFT VENTRICULAR INTERNAL DIMENSION IN DIASTOLE: 5.05 CM (ref 3.5–6)
LEFT VENTRICULAR MASS: 267.4 G
LV LATERAL E/E' RATIO: 7.87 M/S
LV SEPTAL E/E' RATIO: 23.6 M/S
LVOT MG: 3 MMHG
LVOT MV: 0.71 CM/S
MV MEAN GRADIENT: 3 MMHG
MV PEAK E VEL: 1.18 M/S
MV PEAK GRADIENT: 6 MMHG
MV VALVE AREA BY CONTINUITY EQUATION: 2.8 CM2
NUC REST DIASTOLIC VOLUME INDEX: 102
NUC REST EJECTION FRACTION: 63
NUC REST SYSTOLIC VOLUME INDEX: 38
NUC STRESS DIASTOLIC VOLUME INDEX: 124
NUC STRESS EJECTION FRACTION: 60 %
NUC STRESS SYSTOLIC VOLUME INDEX: 49
OHS CV CPX 1 MINUTE RECOVERY HEART RATE: 87 BPM
OHS CV CPX 85 PERCENT MAX PREDICTED HEART RATE MALE: 120
OHS CV CPX MAX PREDICTED HEART RATE: 141
OHS CV CPX PATIENT IS FEMALE: 0
OHS CV CPX PATIENT IS MALE: 1
OHS CV CPX PEAK DIASTOLIC BLOOD PRESSURE: 53 MMHG
OHS CV CPX PEAK HEAR RATE: 87 BPM
OHS CV CPX PEAK RATE PRESSURE PRODUCT: NORMAL
OHS CV CPX PEAK SYSTOLIC BLOOD PRESSURE: 117 MMHG
OHS CV CPX PERCENT MAX PREDICTED HEART RATE ACHIEVED: 62
OHS CV CPX RATE PRESSURE PRODUCT PRESENTING: 8211
PISA TR MAX VEL: 2.82 M/S
PV MV: 0.88 M/S
PV PEAK GRADIENT: 8 MMHG
PV PEAK VELOCITY: 1.4 M/S
RA PRESSURE ESTIMATED: 8 MMHG
RETIRED EF AND QEF - SEE NOTES: 53 %
RIGHT VENTRICULAR END-DIASTOLIC DIMENSION: 3.33 CM
RV TB RVSP: 11 MMHG
RV TISSUE DOPPLER FREE WALL SYSTOLIC VELOCITY 1 (APICAL 4 CHAMBER VIEW): 7.4 CM/S
SYSTOLIC BLOOD PRESSURE: 119 MMHG
TDI LATERAL: 0.15 M/S
TDI SEPTAL: 0.05 M/S
TDI: 0.1 M/S
TR MAX PG: 32 MMHG
TRICUSPID ANNULAR PLANE SYSTOLIC EXCURSION: 0.87 CM
TV REST PULMONARY ARTERY PRESSURE: 40 MMHG
Z-SCORE OF LEFT VENTRICULAR DIMENSION IN END DIASTOLE: -8.89
Z-SCORE OF LEFT VENTRICULAR DIMENSION IN END SYSTOLE: -6.57

## 2023-12-21 ENCOUNTER — OFFICE VISIT (OUTPATIENT)
Dept: CARDIOLOGY | Facility: CLINIC | Age: 78
End: 2023-12-21
Payer: MEDICARE

## 2023-12-21 VITALS
SYSTOLIC BLOOD PRESSURE: 122 MMHG | RESPIRATION RATE: 16 BRPM | OXYGEN SATURATION: 96 % | WEIGHT: 277 LBS | HEART RATE: 60 BPM | DIASTOLIC BLOOD PRESSURE: 72 MMHG | HEIGHT: 72 IN | BODY MASS INDEX: 37.52 KG/M2

## 2023-12-21 DIAGNOSIS — I48.21 PERMANENT ATRIAL FIBRILLATION: ICD-10-CM

## 2023-12-21 DIAGNOSIS — I20.89 ANGINA OF EFFORT: Primary | ICD-10-CM

## 2023-12-21 DIAGNOSIS — I25.10 CORONARY ARTERY DISEASE INVOLVING NATIVE CORONARY ARTERY OF NATIVE HEART WITHOUT ANGINA PECTORIS: ICD-10-CM

## 2023-12-21 DIAGNOSIS — Z95.1 S/P CABG (CORONARY ARTERY BYPASS GRAFT): ICD-10-CM

## 2023-12-21 DIAGNOSIS — I50.32 CHRONIC DIASTOLIC CONGESTIVE HEART FAILURE: ICD-10-CM

## 2023-12-21 DIAGNOSIS — E66.01 SEVERE OBESITY (BMI 35.0-39.9) WITH COMORBIDITY: ICD-10-CM

## 2023-12-21 DIAGNOSIS — I96 GANGRENE: ICD-10-CM

## 2023-12-21 DIAGNOSIS — Z95.2 HISTORY OF TRANSCATHETER AORTIC VALVE REPLACEMENT (TAVR): ICD-10-CM

## 2023-12-21 PROCEDURE — 99999 PR PBB SHADOW E&M-EST. PATIENT-LVL V: CPT | Mod: PBBFAC,,, | Performed by: INTERNAL MEDICINE

## 2023-12-21 PROCEDURE — 99215 OFFICE O/P EST HI 40 MIN: CPT | Mod: S$PBB,,, | Performed by: INTERNAL MEDICINE

## 2023-12-21 PROCEDURE — 99215 OFFICE O/P EST HI 40 MIN: CPT | Mod: PBBFAC,PN | Performed by: INTERNAL MEDICINE

## 2023-12-21 PROCEDURE — 99215 PR OFFICE/OUTPT VISIT, EST, LEVL V, 40-54 MIN: ICD-10-PCS | Mod: S$PBB,,, | Performed by: INTERNAL MEDICINE

## 2023-12-21 PROCEDURE — 99999 PR PBB SHADOW E&M-EST. PATIENT-LVL V: ICD-10-PCS | Mod: PBBFAC,,, | Performed by: INTERNAL MEDICINE

## 2023-12-21 RX ORDER — SODIUM CHLORIDE 9 MG/ML
INJECTION, SOLUTION INTRAVENOUS ONCE
Status: CANCELLED | OUTPATIENT
Start: 2023-12-21 | End: 2023-12-21

## 2023-12-21 RX ORDER — RANOLAZINE 1000 MG/1
1000 TABLET, EXTENDED RELEASE ORAL 2 TIMES DAILY
Qty: 180 TABLET | Refills: 3 | Status: ON HOLD | OUTPATIENT
Start: 2023-12-21 | End: 2024-03-07 | Stop reason: HOSPADM

## 2023-12-21 RX ORDER — DIPHENHYDRAMINE HCL 50 MG
50 CAPSULE ORAL
Status: CANCELLED | OUTPATIENT
Start: 2023-12-21

## 2023-12-21 RX ORDER — FUROSEMIDE 20 MG/1
20 TABLET ORAL DAILY
Status: ON HOLD | COMMUNITY
End: 2024-03-07 | Stop reason: HOSPADM

## 2023-12-21 NOTE — ASSESSMENT & PLAN NOTE
Patient is identified as having Combined Systolic and Diastolic heart failure that is Chronic. CHF is currently controlled. Latest ECHO performed and demonstrates- Results for orders placed during the hospital encounter of 12/13/23    Echo    Interpretation Summary    Left Ventricle: The left ventricle is normal in size. Mildly increased wall thickness. There is mild concentric hypertrophy. Normal wall motion. There is normal systolic function with a visually estimated ejection fraction of 65 - 70%. There is normal diastolic function.    Right Ventricle: Mild right ventricular enlargement. Wall thickness is normal. Right ventricle wall motion  is normal. Systolic function is normal.    Left Atrium: Left atrium is moderately dilated.    Right Atrium: Right atrium is mildly dilated.    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    Tricuspid Valve: There is mild regurgitation with a centrally directed jet.    Pulmonic Valve: There is mild regurgitation with a centrally directed jet.    Pulmonary Artery: There is mild pulmonary hypertension. The estimated pulmonary artery systolic pressure is 40 mmHg.    IVC/SVC: Intermediate venous pressure at 8 mmHg.  . Continue Beta Blocker, Furosemide, and Nitrate/Vasodilator and monitor clinical status closely. Monitor on telemetry. Patient is on CHF pathway.  Monitor strict Is&Os and daily weights.  Place on fluid restriction of 1.5 L. Cardiology has been consulted. Continue to stress to patient importance of self efficacy and  on diet for CHF.

## 2023-12-21 NOTE — ASSESSMENT & PLAN NOTE
Patient has effort induced dyspnea probably anginal equivalent.  Will increase the ranolazine to 1000 mg p.o. b.i.d..  And continue on other medications same doses.  His abnormal stress test will need angiographic assessment more definite diagnosis and intervention as needed

## 2023-12-21 NOTE — ASSESSMENT & PLAN NOTE
History of known coronary artery disease and previous bypass surgery need angiogram for definite diagnosis.

## 2023-12-21 NOTE — H&P (VIEW-ONLY)
Subjective:    Patient ID:  Jose F Hanley is a 78 y.o. male patient here for evaluation Results (Stress and echo results) and Medication Management (He switched back to lasix, he felt the torsemide was not helping get the fluid off. He is still having edema)      History of Present Illness:   Patient is a 78-year-old with history of known coronary artery disease previous bypass surgery has been experiencing progressive shortness of breath fatigue and tiredness.  He underwent noninvasive testing noted to have abnormality in multivessel distribution now here to discuss results.  Patient tried torsemide for couple of days and apparently did not have adequate diuresis since back on Lasix at this time.  He is denies having chest pains but shortness of breath both at rest and effort is predominant symptom.  And swelling in the legs bilaterally.  Although left is worse than the right.      He does have cough and congestion no fever no persistent nausea and vomiting          Review of patient's allergies indicates:   Allergen Reactions    Adhes. band-tape-benzalkonium Rash     Pt stated it caused blisters    Statins-hmg-coa reductase inhibitors Other (See Comments)     Statin Intolerance (joint pain)       Past Medical History:   Diagnosis Date    COPD (chronic obstructive pulmonary disease)     HLD (hyperlipidemia)     HTN (hypertension)     PAD (peripheral artery disease)      Past Surgical History:   Procedure Laterality Date    AORTOGRAPHY WITH SERIALOGRAPHY N/A 8/31/2023    Procedure: AORTOGRAM, WITH SERIALOGRAPHY;  Surgeon: Gary Thomas MD;  Location: [facility] CATH/EP LAB;  Service: Peripheral Vascular;  Laterality: N/A;    CORONARY ARTERY BYPASS GRAFT      PTA, ANTERIOR TIBIAL Left 8/31/2023    Procedure: PTA, Anterior Tibial;  Surgeon: Gary Thomas MD;  Location: [facility] CATH/EP LAB;  Service: Peripheral Vascular;  Laterality: Left;    PTA, SUPERFICIAL FEMORAL ARTERY Left 8/31/2023    Procedure: PTA,  "Superficial Femoral Artery;  Surgeon: Gary Thomas MD;  Location: [facility] CATH/EP LAB;  Service: Peripheral Vascular;  Laterality: Left;    SPLENECTOMY      STENT, ANTERIOR TIBIAL Left 2023    Procedure: Stent, Anterior Tibial;  Surgeon: Gary Thomas MD;  Location: [facility] CATH/EP LAB;  Service: Peripheral Vascular;  Laterality: Left;     Social History     Tobacco Use    Smoking status: Former     Current packs/day: 0.00     Types: Cigarettes     Quit date: 1987     Years since quittin.0    Smokeless tobacco: Never   Substance Use Topics    Alcohol use: Not Currently     Alcohol/week: 21.0 standard drinks of alcohol     Types: 21 Shots of liquor per week    Drug use: No        Review of Systems:    As noted in HPI in addition      REVIEW OF SYSTEMS  CARDIOVASCULAR: No recent chest pain, palpitations, arm, neck, or jaw pain  RESPIRATORY: No recent fever, cough chills, SOB or congestion  : No blood in the urine  GI: No Nausea, vomiting, constipation, diarrhea, blood, or reflux.  MUSCULOSKELETAL: No myalgias  NEURO: No lightheadedness or dizziness  EYES: No Double vision, blurry, vision or headache              Objective        Vitals:    23 1131   BP: 122/72   Pulse: 60   Resp: 16       LIPIDS - LAST 2   No results found for: "CHOL", "HDL", "LDLCALC", "TRIG", "CHOLHDL"    CBC - LAST 2  Lab Results   Component Value Date    WBC 10.08 10/11/2023    WBC 9.79 2023    RBC 6.84 (H) 10/11/2023    RBC 6.95 (H) 2023    HGB 14.6 10/11/2023    HGB 13.8 (L) 2023    HCT 49.6 10/11/2023    HCT 47.9 2023    MCV 73 (L) 10/11/2023    MCV 69 (L) 2023    MCH 21.3 (L) 10/11/2023    MCH 19.9 (L) 2023    MCHC 29.4 (L) 10/11/2023    MCHC 28.8 (L) 2023    RDW 25.2 (H) 10/11/2023    RDW 22.8 (H) 2023     10/11/2023     (H) 2023    MPV 9.8 10/11/2023    MPV 9.9 2023    GRAN 5.0 10/11/2023    GRAN 49.3 10/11/2023    LYMPH 3.1 " "10/11/2023    LYMPH 31.0 10/11/2023    MONO 1.3 (H) 10/11/2023    MONO 13.3 10/11/2023    BASO 0.19 10/11/2023    NRBC 0 10/11/2023       CHEMISTRY & LIVER FUNCTION - LAST 2  Lab Results   Component Value Date     (L) 10/11/2023     (L) 08/29/2023    K 4.3 10/11/2023    K 4.0 08/29/2023    CL 94 (L) 10/11/2023    CL 90 (L) 08/29/2023    CO2 34 (H) 10/11/2023    CO2 28 08/29/2023    ANIONGAP 7 (L) 10/11/2023    ANIONGAP 12 08/29/2023    BUN 30 (H) 10/11/2023    BUN 16 08/29/2023    CREATININE 1.1 10/11/2023    CREATININE 1.0 08/29/2023     10/11/2023     08/29/2023    CALCIUM 9.9 10/11/2023    CALCIUM 9.1 08/29/2023    ALBUMIN 4.2 10/11/2023    PROT 7.3 10/11/2023    ALKPHOS 34 (L) 10/11/2023    ALT 28 10/11/2023    AST 33 10/11/2023    BILITOT 0.5 10/11/2023        CARDIAC PROFILE - LAST 2  No results found for: "BNP", "CPK", "CPKMB", "LDH", "TROPONINI", "TROPONINIHS"     COAGULATION - LAST 2  No results found for: "LABPT", "INR", "APTT"    ENDOCRINE & PSA - LAST 2  Lab Results   Component Value Date    HGBA1C 6.5 11/03/2023    HGBA1C 5.3 07/10/2023        ECHOCARDIOGRAM RESULTS  Results for orders placed during the hospital encounter of 12/13/23    Echo    Interpretation Summary    Left Ventricle: The left ventricle is normal in size. Mildly increased wall thickness. There is mild concentric hypertrophy. Normal wall motion. There is normal systolic function with a visually estimated ejection fraction of 65 - 70%. There is normal diastolic function.    Right Ventricle: Mild right ventricular enlargement. Wall thickness is normal. Right ventricle wall motion  is normal. Systolic function is normal.    Left Atrium: Left atrium is moderately dilated.    Right Atrium: Right atrium is mildly dilated.    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    Tricuspid Valve: There is mild regurgitation with a centrally directed jet.    Pulmonic Valve: There is mild " regurgitation with a centrally directed jet.    Pulmonary Artery: There is mild pulmonary hypertension. The estimated pulmonary artery systolic pressure is 40 mmHg.    IVC/SVC: Intermediate venous pressure at 8 mmHg.      CURRENT/PREVIOUS VISIT EKG  Results for orders placed or performed in visit on 11/28/23   IN OFFICE EKG 12-LEAD (to [place])    Collection Time: 11/28/23 11:40 AM    Narrative    Test Reason : Z95.1,R06.02,    Vent. Rate : 087 BPM     Atrial Rate : 000 BPM     P-R Int : 000 ms          QRS Dur : 172 ms      QT Int : 434 ms       P-R-T Axes : 000 -71 020 degrees     QTc Int : 522 ms    Atrial fibrillation  Right bundle branch block  Left anterior fascicular block   Bifascicular block   Cannot rule out Inferior infarct (masked by fascicular block?) ,age  undetermined  Abnormal ECG  When compared with ECG of 28-NOV-2012 14:13,  Atrial fibrillation has replaced Sinus rhythm  Left anterior fascicular block is now Present  QT has lengthened  Confirmed by Eleazar WOO, Gregg NEWTON ([id number]) on 12/17/2023 1:10:59 PM    Referred By: [email or web address]   SELF           Confirmed By:Gregg Bush MD     No valid procedures specified.   Results for orders placed during the hospital encounter of 12/13/23    Nuclear Stress - Cardiology Interpreted    Interpretation Summary    Abnormal myocardial perfusion scan.    There are two significant perfusion abnormalities.    Perfusion Abnormality #1 - There is a moderate to severe intensity, moderate sized, reversible perfusion abnormality that is consistent with ischemia in the basal to apical lateral wall(s).    Perfusion Abnormality #2 - There is a moderate to large sized, moderate to severe intensity, equivocal perfusion abnormality that is fixed in the basal to apical inferior wall(s). This finding is equivocal due to diaphragm shadow.    There are no other significant perfusion abnormalities.    The gated perfusion images showed an ejection fraction of 63% at rest. The gated  perfusion images showed an ejection fraction of 60% post stress. Normal ejection fraction is greater than 53%.    There is normal wall motion at rest and post stress.    LV cavity size is normal at rest and normal at stress.    The ECG portion of the study is negative for ischemia.    The patient reported no chest pain during the stress test.    There were no arrhythmias during stress.    TID : 1.3    RECOMMEND ANGIOGRAPHIC EVALUATION FOR MORE DEFINITIVE DIAGNOSIS AND INTERVENTION    No valid procedures specified.    PHYSICAL EXAM  CONSTITUTIONAL: Well built, well nourished in no apparent distress  NECK: no carotid bruit, no JVD  LUNGS: CTA  CHEST WALL: no tenderness  HEART: regular rate and rhythm, S1, S2 normal, no murmur, click, rub or gallop   ABDOMEN: soft, non-tender; bowel sounds normal; no masses,  no organomegaly  EXTREMITIES: Extremities normal, no edema, no calf tenderness noted  NEURO: AAO X 3    I HAVE REVIEWED :    The vital signs, nurses notes, and all the pertinent radiology and labs.        Current Outpatient Medications   Medication Instructions    acetaminophen (TYLENOL) 650 mg, Oral    albuterol (PROVENTIL/VENTOLIN HFA) 90 mcg/actuation inhaler 2 puffs, Inhalation, Every 6 hours PRN    amoxicillin (AMOXIL) 500 mg, Oral, Every 8 hours    betamethasone valerate 0.1% (VALISONE) 0.1 % Lotn Apply to scalp bid prn rash    clopidogreL (PLAVIX) 75 mg, Oral, Daily    cyanocobalamin 500 mcg    cyclobenzaprine (FLEXERIL) 10 mg, Oral, Every 8 hours PRN    diphenhydrAMINE (BENADRYL) 50 mg    doxepin (SINEQUAN) 50 mg, Oral, Nightly    doxycycline (VIBRAMYCIN) 100 MG Cap Take 1 capsule with food to be taken on Monday Wednesday and Friday. May be photsensitive    DULoxetine (CYMBALTA) 60 mg    EPINEPHrine (EPIPEN) 0.3 mg/0.3 mL AtIn INJECT 0.3MG/0.3ML SUBCUTANEOUSLY (UNDER THE SKIN)  AS NEEDED FOR ALLERGIC REACTIONS SELF-INJECTOR PEN    furosemide (LASIX) 20 mg, Oral, Daily    gabapentin (NEURONTIN) 800 mg     hydrocodone-homatropine 5-1.5 mg/5 ml (HYCODAN) 5-1.5 mg/5 mL Syrp Oral    levothyroxine (SYNTHROID) 50 mcg    metOLazone (ZAROXOLYN) 5 mg    montelukast (SINGULAIR) 10 mg    NexIUM 40 mg, Daily    nitroGLYCERIN (NITROSTAT) 0.4 mg, Sublingual, Every 5 min PRN    omega-3 fatty acids/fish oil (FISH OIL-OMEGA-3 FATTY ACIDS) 300-1,000 mg capsule 1 capsule, Oral, 2 times daily    omeprazole (PRILOSEC) 40 mg    potassium chloride SA (K-DUR,KLOR-CON) 20 MEQ tablet 20 mEq    pregabalin (LYRICA) 100 mg, 2 times daily    ranolazine (RANEXA) 500 mg, Oral, 2 times daily    senna-docusate 8.6-50 mg (PERICOLACE) 8.6-50 mg per tablet TAKE 2 TABLETS BY MOUTH DAILY FOR CONSTIPATION    spironolactone (ALDACTONE) 50 mg, Oral, Daily    tamsulosin (FLOMAX) 0.4 mg    temazepam (RESTORIL) 30 mg, Oral, Nightly PRN    TESTOSTERONE PROPIONATE, BULK, MISC 1 each, Injection, Every 14 days    TOPROL XL 25 mg, Daily    torsemide (DEMADEX) 20 mg, Oral, Daily          Assessment & Plan     Angina of effort  Patient has effort induced dyspnea probably anginal equivalent.  Will increase the ranolazine to 1000 mg p.o. b.i.d..  And continue on other medications same doses.  His abnormal stress test will need angiographic assessment more definite diagnosis and intervention as needed    CAD (coronary artery disease), native coronary artery  History of known coronary artery disease and previous bypass surgery need angiogram for definite diagnosis.    S/P CABG (coronary artery bypass graft) - x2 09/2010 - [facility] to LAD; SVG to [facility]; grafts occluded 08/2012  Continue on Ranexa 1000 mg p.o. b.i.d., aspirin and Plavix and continue on Toprol-XL 25 mg daily.  He will benefit from angiographic assessment more definite diagnosis    Discussed with patient the risks and benefits of the procedure including, but not limited to, the following 1:1000 risk of Heart attack, stroke and death   And 3-5% Risk of bleeding, vessel damage, even needing surgical repair and the  need for emergent CABG Surgery.  If intervention is needed atrial risk of emergent bypass surgery, MI is 1-3%  All questions have been answered to patient's full satisfaction.  Informed consent obtained for both cardiac catheterization and possible PCI  Will advise the patient to have nothing by mouth post midnight and proceed with the coronary angiography possible PCI in the morning of the procedure.      History of transcatheter aortic valve replacement (TAVR)  Patient is noted to have history of TAVR.    Chronic diastolic congestive heart failure  Patient is identified as having Combined Systolic and Diastolic heart failure that is Chronic. CHF is currently controlled. Latest ECHO performed and demonstrates- Results for orders placed during the hospital encounter of 12/13/23    Echo    Interpretation Summary    Left Ventricle: The left ventricle is normal in size. Mildly increased wall thickness. There is mild concentric hypertrophy. Normal wall motion. There is normal systolic function with a visually estimated ejection fraction of 65 - 70%. There is normal diastolic function.    Right Ventricle: Mild right ventricular enlargement. Wall thickness is normal. Right ventricle wall motion  is normal. Systolic function is normal.    Left Atrium: Left atrium is moderately dilated.    Right Atrium: Right atrium is mildly dilated.    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    Tricuspid Valve: There is mild regurgitation with a centrally directed jet.    Pulmonic Valve: There is mild regurgitation with a centrally directed jet.    Pulmonary Artery: There is mild pulmonary hypertension. The estimated pulmonary artery systolic pressure is 40 mmHg.    IVC/SVC: Intermediate venous pressure at 8 mmHg.  . Continue Beta Blocker, Furosemide, and Nitrate/Vasodilator and monitor clinical status closely. Monitor on telemetry. Patient is on CHF pathway.  Monitor strict Is&Os and daily  weights.  Place on fluid restriction of 1.5 L. Cardiology has been consulted. Continue to stress to patient importance of self efficacy and  on diet for CHF.           No follow-ups on file.     S/P CABG (coronary artery bypass graft) - x2 09/2010 - [facility] to LAD; SVG to [facility]; grafts occluded 08/2012   Hx TAVR  Watchman  [facility]

## 2023-12-21 NOTE — PROGRESS NOTES
Subjective:    Patient ID:  Jose F Hanley is a 78 y.o. male patient here for evaluation Results (Stress and echo results) and Medication Management (He switched back to lasix, he felt the torsemide was not helping get the fluid off. He is still having edema)      History of Present Illness:   Patient is a 78-year-old with history of known coronary artery disease previous bypass surgery has been experiencing progressive shortness of breath fatigue and tiredness.  He underwent noninvasive testing noted to have abnormality in multivessel distribution now here to discuss results.  Patient tried torsemide for couple of days and apparently did not have adequate diuresis since back on Lasix at this time.  He is denies having chest pains but shortness of breath both at rest and effort is predominant symptom.  And swelling in the legs bilaterally.  Although left is worse than the right.      He does have cough and congestion no fever no persistent nausea and vomiting          Review of patient's allergies indicates:   Allergen Reactions    Adhes. band-tape-benzalkonium Rash     Pt stated it caused blisters    Statins-hmg-coa reductase inhibitors Other (See Comments)     Statin Intolerance (joint pain)       Past Medical History:   Diagnosis Date    COPD (chronic obstructive pulmonary disease)     HLD (hyperlipidemia)     HTN (hypertension)     PAD (peripheral artery disease)      Past Surgical History:   Procedure Laterality Date    AORTOGRAPHY WITH SERIALOGRAPHY N/A 8/31/2023    Procedure: AORTOGRAM, WITH SERIALOGRAPHY;  Surgeon: Gary Thomas MD;  Location: [facility] CATH/EP LAB;  Service: Peripheral Vascular;  Laterality: N/A;    CORONARY ARTERY BYPASS GRAFT      PTA, ANTERIOR TIBIAL Left 8/31/2023    Procedure: PTA, Anterior Tibial;  Surgeon: Gary Thomas MD;  Location: [facility] CATH/EP LAB;  Service: Peripheral Vascular;  Laterality: Left;    PTA, SUPERFICIAL FEMORAL ARTERY Left 8/31/2023    Procedure: PTA,  "Superficial Femoral Artery;  Surgeon: Gary Thomas MD;  Location: [facility] CATH/EP LAB;  Service: Peripheral Vascular;  Laterality: Left;    SPLENECTOMY      STENT, ANTERIOR TIBIAL Left 2023    Procedure: Stent, Anterior Tibial;  Surgeon: Gary Thomas MD;  Location: [facility] CATH/EP LAB;  Service: Peripheral Vascular;  Laterality: Left;     Social History     Tobacco Use    Smoking status: Former     Current packs/day: 0.00     Types: Cigarettes     Quit date: 1987     Years since quittin.0    Smokeless tobacco: Never   Substance Use Topics    Alcohol use: Not Currently     Alcohol/week: 21.0 standard drinks of alcohol     Types: 21 Shots of liquor per week    Drug use: No        Review of Systems:    As noted in HPI in addition      REVIEW OF SYSTEMS  CARDIOVASCULAR: No recent chest pain, palpitations, arm, neck, or jaw pain  RESPIRATORY: No recent fever, cough chills, SOB or congestion  : No blood in the urine  GI: No Nausea, vomiting, constipation, diarrhea, blood, or reflux.  MUSCULOSKELETAL: No myalgias  NEURO: No lightheadedness or dizziness  EYES: No Double vision, blurry, vision or headache              Objective        Vitals:    23 1131   BP: 122/72   Pulse: 60   Resp: 16       LIPIDS - LAST 2   No results found for: "CHOL", "HDL", "LDLCALC", "TRIG", "CHOLHDL"    CBC - LAST 2  Lab Results   Component Value Date    WBC 10.08 10/11/2023    WBC 9.79 2023    RBC 6.84 (H) 10/11/2023    RBC 6.95 (H) 2023    HGB 14.6 10/11/2023    HGB 13.8 (L) 2023    HCT 49.6 10/11/2023    HCT 47.9 2023    MCV 73 (L) 10/11/2023    MCV 69 (L) 2023    MCH 21.3 (L) 10/11/2023    MCH 19.9 (L) 2023    MCHC 29.4 (L) 10/11/2023    MCHC 28.8 (L) 2023    RDW 25.2 (H) 10/11/2023    RDW 22.8 (H) 2023     10/11/2023     (H) 2023    MPV 9.8 10/11/2023    MPV 9.9 2023    GRAN 5.0 10/11/2023    GRAN 49.3 10/11/2023    LYMPH 3.1 " "10/11/2023    LYMPH 31.0 10/11/2023    MONO 1.3 (H) 10/11/2023    MONO 13.3 10/11/2023    BASO 0.19 10/11/2023    NRBC 0 10/11/2023       CHEMISTRY & LIVER FUNCTION - LAST 2  Lab Results   Component Value Date     (L) 10/11/2023     (L) 08/29/2023    K 4.3 10/11/2023    K 4.0 08/29/2023    CL 94 (L) 10/11/2023    CL 90 (L) 08/29/2023    CO2 34 (H) 10/11/2023    CO2 28 08/29/2023    ANIONGAP 7 (L) 10/11/2023    ANIONGAP 12 08/29/2023    BUN 30 (H) 10/11/2023    BUN 16 08/29/2023    CREATININE 1.1 10/11/2023    CREATININE 1.0 08/29/2023     10/11/2023     08/29/2023    CALCIUM 9.9 10/11/2023    CALCIUM 9.1 08/29/2023    ALBUMIN 4.2 10/11/2023    PROT 7.3 10/11/2023    ALKPHOS 34 (L) 10/11/2023    ALT 28 10/11/2023    AST 33 10/11/2023    BILITOT 0.5 10/11/2023        CARDIAC PROFILE - LAST 2  No results found for: "BNP", "CPK", "CPKMB", "LDH", "TROPONINI", "TROPONINIHS"     COAGULATION - LAST 2  No results found for: "LABPT", "INR", "APTT"    ENDOCRINE & PSA - LAST 2  Lab Results   Component Value Date    HGBA1C 6.5 11/03/2023    HGBA1C 5.3 07/10/2023        ECHOCARDIOGRAM RESULTS  Results for orders placed during the hospital encounter of 12/13/23    Echo    Interpretation Summary    Left Ventricle: The left ventricle is normal in size. Mildly increased wall thickness. There is mild concentric hypertrophy. Normal wall motion. There is normal systolic function with a visually estimated ejection fraction of 65 - 70%. There is normal diastolic function.    Right Ventricle: Mild right ventricular enlargement. Wall thickness is normal. Right ventricle wall motion  is normal. Systolic function is normal.    Left Atrium: Left atrium is moderately dilated.    Right Atrium: Right atrium is mildly dilated.    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    Tricuspid Valve: There is mild regurgitation with a centrally directed jet.    Pulmonic Valve: There is mild " regurgitation with a centrally directed jet.    Pulmonary Artery: There is mild pulmonary hypertension. The estimated pulmonary artery systolic pressure is 40 mmHg.    IVC/SVC: Intermediate venous pressure at 8 mmHg.      CURRENT/PREVIOUS VISIT EKG  Results for orders placed or performed in visit on 11/28/23   IN OFFICE EKG 12-LEAD (to [place])    Collection Time: 11/28/23 11:40 AM    Narrative    Test Reason : Z95.1,R06.02,    Vent. Rate : 087 BPM     Atrial Rate : 000 BPM     P-R Int : 000 ms          QRS Dur : 172 ms      QT Int : 434 ms       P-R-T Axes : 000 -71 020 degrees     QTc Int : 522 ms    Atrial fibrillation  Right bundle branch block  Left anterior fascicular block   Bifascicular block   Cannot rule out Inferior infarct (masked by fascicular block?) ,age  undetermined  Abnormal ECG  When compared with ECG of 28-NOV-2012 14:13,  Atrial fibrillation has replaced Sinus rhythm  Left anterior fascicular block is now Present  QT has lengthened  Confirmed by Eleazar WOO, Gregg NEWTON ([id number]) on 12/17/2023 1:10:59 PM    Referred By: [email or web address]   SELF           Confirmed By:Gregg uBsh MD     No valid procedures specified.   Results for orders placed during the hospital encounter of 12/13/23    Nuclear Stress - Cardiology Interpreted    Interpretation Summary    Abnormal myocardial perfusion scan.    There are two significant perfusion abnormalities.    Perfusion Abnormality #1 - There is a moderate to severe intensity, moderate sized, reversible perfusion abnormality that is consistent with ischemia in the basal to apical lateral wall(s).    Perfusion Abnormality #2 - There is a moderate to large sized, moderate to severe intensity, equivocal perfusion abnormality that is fixed in the basal to apical inferior wall(s). This finding is equivocal due to diaphragm shadow.    There are no other significant perfusion abnormalities.    The gated perfusion images showed an ejection fraction of 63% at rest. The gated  perfusion images showed an ejection fraction of 60% post stress. Normal ejection fraction is greater than 53%.    There is normal wall motion at rest and post stress.    LV cavity size is normal at rest and normal at stress.    The ECG portion of the study is negative for ischemia.    The patient reported no chest pain during the stress test.    There were no arrhythmias during stress.    TID : 1.3    RECOMMEND ANGIOGRAPHIC EVALUATION FOR MORE DEFINITIVE DIAGNOSIS AND INTERVENTION    No valid procedures specified.    PHYSICAL EXAM  CONSTITUTIONAL: Well built, well nourished in no apparent distress  NECK: no carotid bruit, no JVD  LUNGS: CTA  CHEST WALL: no tenderness  HEART: regular rate and rhythm, S1, S2 normal, no murmur, click, rub or gallop   ABDOMEN: soft, non-tender; bowel sounds normal; no masses,  no organomegaly  EXTREMITIES: Extremities normal, no edema, no calf tenderness noted  NEURO: AAO X 3    I HAVE REVIEWED :    The vital signs, nurses notes, and all the pertinent radiology and labs.        Current Outpatient Medications   Medication Instructions    acetaminophen (TYLENOL) 650 mg, Oral    albuterol (PROVENTIL/VENTOLIN HFA) 90 mcg/actuation inhaler 2 puffs, Inhalation, Every 6 hours PRN    amoxicillin (AMOXIL) 500 mg, Oral, Every 8 hours    betamethasone valerate 0.1% (VALISONE) 0.1 % Lotn Apply to scalp bid prn rash    clopidogreL (PLAVIX) 75 mg, Oral, Daily    cyanocobalamin 500 mcg    cyclobenzaprine (FLEXERIL) 10 mg, Oral, Every 8 hours PRN    diphenhydrAMINE (BENADRYL) 50 mg    doxepin (SINEQUAN) 50 mg, Oral, Nightly    doxycycline (VIBRAMYCIN) 100 MG Cap Take 1 capsule with food to be taken on Monday Wednesday and Friday. May be photsensitive    DULoxetine (CYMBALTA) 60 mg    EPINEPHrine (EPIPEN) 0.3 mg/0.3 mL AtIn INJECT 0.3MG/0.3ML SUBCUTANEOUSLY (UNDER THE SKIN)  AS NEEDED FOR ALLERGIC REACTIONS SELF-INJECTOR PEN    furosemide (LASIX) 20 mg, Oral, Daily    gabapentin (NEURONTIN) 800 mg     hydrocodone-homatropine 5-1.5 mg/5 ml (HYCODAN) 5-1.5 mg/5 mL Syrp Oral    levothyroxine (SYNTHROID) 50 mcg    metOLazone (ZAROXOLYN) 5 mg    montelukast (SINGULAIR) 10 mg    NexIUM 40 mg, Daily    nitroGLYCERIN (NITROSTAT) 0.4 mg, Sublingual, Every 5 min PRN    omega-3 fatty acids/fish oil (FISH OIL-OMEGA-3 FATTY ACIDS) 300-1,000 mg capsule 1 capsule, Oral, 2 times daily    omeprazole (PRILOSEC) 40 mg    potassium chloride SA (K-DUR,KLOR-CON) 20 MEQ tablet 20 mEq    pregabalin (LYRICA) 100 mg, 2 times daily    ranolazine (RANEXA) 500 mg, Oral, 2 times daily    senna-docusate 8.6-50 mg (PERICOLACE) 8.6-50 mg per tablet TAKE 2 TABLETS BY MOUTH DAILY FOR CONSTIPATION    spironolactone (ALDACTONE) 50 mg, Oral, Daily    tamsulosin (FLOMAX) 0.4 mg    temazepam (RESTORIL) 30 mg, Oral, Nightly PRN    TESTOSTERONE PROPIONATE, BULK, MISC 1 each, Injection, Every 14 days    TOPROL XL 25 mg, Daily    torsemide (DEMADEX) 20 mg, Oral, Daily          Assessment & Plan     Angina of effort  Patient has effort induced dyspnea probably anginal equivalent.  Will increase the ranolazine to 1000 mg p.o. b.i.d..  And continue on other medications same doses.  His abnormal stress test will need angiographic assessment more definite diagnosis and intervention as needed    CAD (coronary artery disease), native coronary artery  History of known coronary artery disease and previous bypass surgery need angiogram for definite diagnosis.    S/P CABG (coronary artery bypass graft) - x2 09/2010 - [facility] to LAD; SVG to [facility]; grafts occluded 08/2012  Continue on Ranexa 1000 mg p.o. b.i.d., aspirin and Plavix and continue on Toprol-XL 25 mg daily.  He will benefit from angiographic assessment more definite diagnosis    Discussed with patient the risks and benefits of the procedure including, but not limited to, the following 1:1000 risk of Heart attack, stroke and death   And 3-5% Risk of bleeding, vessel damage, even needing surgical repair and the  need for emergent CABG Surgery.  If intervention is needed atrial risk of emergent bypass surgery, MI is 1-3%  All questions have been answered to patient's full satisfaction.  Informed consent obtained for both cardiac catheterization and possible PCI  Will advise the patient to have nothing by mouth post midnight and proceed with the coronary angiography possible PCI in the morning of the procedure.      History of transcatheter aortic valve replacement (TAVR)  Patient is noted to have history of TAVR.    Chronic diastolic congestive heart failure  Patient is identified as having Combined Systolic and Diastolic heart failure that is Chronic. CHF is currently controlled. Latest ECHO performed and demonstrates- Results for orders placed during the hospital encounter of 12/13/23    Echo    Interpretation Summary    Left Ventricle: The left ventricle is normal in size. Mildly increased wall thickness. There is mild concentric hypertrophy. Normal wall motion. There is normal systolic function with a visually estimated ejection fraction of 65 - 70%. There is normal diastolic function.    Right Ventricle: Mild right ventricular enlargement. Wall thickness is normal. Right ventricle wall motion  is normal. Systolic function is normal.    Left Atrium: Left atrium is moderately dilated.    Right Atrium: Right atrium is mildly dilated.    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    Tricuspid Valve: There is mild regurgitation with a centrally directed jet.    Pulmonic Valve: There is mild regurgitation with a centrally directed jet.    Pulmonary Artery: There is mild pulmonary hypertension. The estimated pulmonary artery systolic pressure is 40 mmHg.    IVC/SVC: Intermediate venous pressure at 8 mmHg.  . Continue Beta Blocker, Furosemide, and Nitrate/Vasodilator and monitor clinical status closely. Monitor on telemetry. Patient is on CHF pathway.  Monitor strict Is&Os and daily  weights.  Place on fluid restriction of 1.5 L. Cardiology has been consulted. Continue to stress to patient importance of self efficacy and  on diet for CHF.           No follow-ups on file.     S/P CABG (coronary artery bypass graft) - x2 09/2010 - [facility] to LAD; SVG to [facility]; grafts occluded 08/2012   Hx TAVR  Watchman  [facility]

## 2023-12-21 NOTE — ASSESSMENT & PLAN NOTE
Continue on Ranexa 1000 mg p.o. b.i.d., aspirin and Plavix and continue on Toprol-XL 25 mg daily.  He will benefit from angiographic assessment more definite diagnosis    Discussed with patient the risks and benefits of the procedure including, but not limited to, the following 1:1000 risk of Heart attack, stroke and death   And 3-5% Risk of bleeding, vessel damage, even needing surgical repair and the need for emergent CABG Surgery.  If intervention is needed atrial risk of emergent bypass surgery, MI is 1-3%  All questions have been answered to patient's full satisfaction.  Informed consent obtained for both cardiac catheterization and possible PCI  Will advise the patient to have nothing by mouth post midnight and proceed with the coronary angiography possible PCI in the morning of the procedure.

## 2023-12-27 ENCOUNTER — TELEPHONE (OUTPATIENT)
Dept: CARDIOLOGY | Facility: CLINIC | Age: 78
End: 2023-12-27
Payer: MEDICARE

## 2024-01-10 ENCOUNTER — HOSPITAL ENCOUNTER (OUTPATIENT)
Dept: PREADMISSION TESTING | Facility: HOSPITAL | Age: 79
Discharge: HOME | End: 2024-01-10
Attending: GENERAL PRACTICE
Payer: MEDICARE

## 2024-01-10 VITALS
OXYGEN SATURATION: 90 % | BODY MASS INDEX: 36.03 KG/M2 | SYSTOLIC BLOOD PRESSURE: 128 MMHG | WEIGHT: 266 LBS | HEART RATE: 81 BPM | RESPIRATION RATE: 16 BRPM | DIASTOLIC BLOOD PRESSURE: 79 MMHG | TEMPERATURE: 98 F | HEIGHT: 72 IN

## 2024-01-10 DIAGNOSIS — I25.10 CORONARY ARTERY DISEASE INVOLVING NATIVE CORONARY ARTERY OF NATIVE HEART WITHOUT ANGINA PECTORIS: ICD-10-CM

## 2024-01-10 DIAGNOSIS — I20.89 ANGINA OF EFFORT: ICD-10-CM

## 2024-01-10 DIAGNOSIS — R07.89 OTHER CHEST PAIN: ICD-10-CM

## 2024-01-10 DIAGNOSIS — Z95.1 S/P CABG (CORONARY ARTERY BYPASS GRAFT): ICD-10-CM

## 2024-01-10 DIAGNOSIS — I50.32 CHRONIC DIASTOLIC CONGESTIVE HEART FAILURE: ICD-10-CM

## 2024-01-10 DIAGNOSIS — Z95.2 HISTORY OF TRANSCATHETER AORTIC VALVE REPLACEMENT (TAVR): ICD-10-CM

## 2024-01-10 LAB
ANION GAP SERPL CALC-SCNC: 8 MMOL/L (ref 8–16)
APTT PPP: 29.6 SEC (ref 21–32)
BASOPHILS # BLD AUTO: 0.18 K/UL (ref 0–0.2)
BASOPHILS NFR BLD: 1.6 % (ref 0–1.9)
BUN SERPL-MCNC: 15 MG/DL (ref 8–23)
CALCIUM SERPL-MCNC: 10.2 MG/DL (ref 8.7–10.5)
CHLORIDE SERPL-SCNC: 89 MMOL/L (ref 95–110)
CO2 SERPL-SCNC: 34 MMOL/L (ref 23–29)
CREAT SERPL-MCNC: 1.1 MG/DL (ref 0.5–1.4)
DIFFERENTIAL METHOD BLD: ABNORMAL
EOSINOPHIL # BLD AUTO: 0.6 K/UL (ref 0–0.5)
EOSINOPHIL NFR BLD: 5.1 % (ref 0–8)
ERYTHROCYTE [DISTWIDTH] IN BLOOD BY AUTOMATED COUNT: 20.5 % (ref 11.5–14.5)
EST. GFR  (NO RACE VARIABLE): >60 ML/MIN/1.73 M^2
GLUCOSE SERPL-MCNC: 114 MG/DL (ref 70–110)
HCT VFR BLD AUTO: 52.2 % (ref 40–54)
HGB BLD-MCNC: 16 G/DL (ref 14–18)
IMM GRANULOCYTES # BLD AUTO: 0.08 K/UL (ref 0–0.04)
IMM GRANULOCYTES NFR BLD AUTO: 0.7 % (ref 0–0.5)
LYMPHOCYTES # BLD AUTO: 3.2 K/UL (ref 1–4.8)
LYMPHOCYTES NFR BLD: 27.7 % (ref 18–48)
MCH RBC QN AUTO: 23.1 PG (ref 27–31)
MCHC RBC AUTO-ENTMCNC: 30.7 G/DL (ref 32–36)
MCV RBC AUTO: 75 FL (ref 82–98)
MONOCYTES # BLD AUTO: 1.7 K/UL (ref 0.3–1)
MONOCYTES NFR BLD: 14.6 % (ref 4–15)
NEUTROPHILS # BLD AUTO: 5.7 K/UL (ref 1.8–7.7)
NEUTROPHILS NFR BLD: 50.3 % (ref 38–73)
NRBC BLD-RTO: 0 /100 WBC
PLATELET # BLD AUTO: 485 K/UL (ref 150–450)
PMV BLD AUTO: 10.3 FL (ref 9.2–12.9)
POTASSIUM SERPL-SCNC: 4 MMOL/L (ref 3.5–5.1)
RBC # BLD AUTO: 6.93 M/UL (ref 4.6–6.2)
SODIUM SERPL-SCNC: 131 MMOL/L (ref 136–145)
WBC # BLD AUTO: 11.4 K/UL (ref 3.9–12.7)

## 2024-01-10 PROCEDURE — 93010 ELECTROCARDIOGRAM REPORT: CPT | Mod: ,,, | Performed by: GENERAL PRACTICE

## 2024-01-10 PROCEDURE — 36415 COLL VENOUS BLD VENIPUNCTURE: CPT | Performed by: INTERNAL MEDICINE

## 2024-01-10 PROCEDURE — 80048 BASIC METABOLIC PNL TOTAL CA: CPT | Performed by: INTERNAL MEDICINE

## 2024-01-10 PROCEDURE — 85025 COMPLETE CBC W/AUTO DIFF WBC: CPT | Performed by: INTERNAL MEDICINE

## 2024-01-10 PROCEDURE — 85730 THROMBOPLASTIN TIME PARTIAL: CPT | Performed by: INTERNAL MEDICINE

## 2024-01-10 PROCEDURE — 93005 ELECTROCARDIOGRAM TRACING: CPT | Performed by: GENERAL PRACTICE

## 2024-01-10 NOTE — DISCHARGE INSTRUCTIONS
Your procedure is scheduled for:          [facility] at:  0700  1/12/24    Nothing to eat or drink after midnight the night before your procedure.  Do not take any medications the morning of your procedure  Bring all your medications with you in the original pill bottles from pharmacy.  If you take blood thinners, ask your doctor if you should stop taking them.  Do not take metformin 24 hours prior to your procedure.  Adjust your insulin or other diabetes medications if needed.   Do your chlorhexidine wash the night before and morning of your procedure.  If you use a CPAP or BiPAP at home, please bring it with you the day of your procedure.  Make arrangements for someone you know to drive you home after your procedure. Taxi and Uber are not acceptable.         Any questions call the [facility] at [phone]

## 2024-01-11 PROBLEM — I96 GANGRENE: Status: ACTIVE | Noted: 2024-01-11

## 2024-01-12 ENCOUNTER — HOSPITAL ENCOUNTER (OUTPATIENT)
Facility: HOSPITAL | Age: 79
Discharge: HOME | End: 2024-01-12
Attending: GENERAL PRACTICE | Admitting: GENERAL PRACTICE
Payer: MEDICARE

## 2024-01-12 VITALS
BODY MASS INDEX: 36.03 KG/M2 | HEIGHT: 72 IN | OXYGEN SATURATION: 90 % | DIASTOLIC BLOOD PRESSURE: 74 MMHG | HEART RATE: 72 BPM | SYSTOLIC BLOOD PRESSURE: 157 MMHG | RESPIRATION RATE: 20 BRPM | WEIGHT: 266 LBS

## 2024-01-12 DIAGNOSIS — I50.32 CHRONIC DIASTOLIC CONGESTIVE HEART FAILURE: ICD-10-CM

## 2024-01-12 DIAGNOSIS — Z95.818 PRESENCE OF WATCHMAN LEFT ATRIAL APPENDAGE CLOSURE DEVICE: ICD-10-CM

## 2024-01-12 DIAGNOSIS — Z95.1 S/P CABG (CORONARY ARTERY BYPASS GRAFT): ICD-10-CM

## 2024-01-12 DIAGNOSIS — I20.89 ANGINA OF EFFORT: ICD-10-CM

## 2024-01-12 DIAGNOSIS — I25.10 CORONARY ARTERY DISEASE, UNSPECIFIED VESSEL OR LESION TYPE, UNSPECIFIED WHETHER ANGINA PRESENT, UNSPECIFIED WHETHER NATIVE OR TRANSPLANTED HEART: Primary | ICD-10-CM

## 2024-01-12 DIAGNOSIS — Z95.5 S/P CORONARY ARTERY STENT PLACEMENT: Primary | ICD-10-CM

## 2024-01-12 DIAGNOSIS — I25.10 CORONARY ARTERY DISEASE INVOLVING NATIVE CORONARY ARTERY OF NATIVE HEART WITHOUT ANGINA PECTORIS: ICD-10-CM

## 2024-01-12 DIAGNOSIS — Z95.2 HISTORY OF TRANSCATHETER AORTIC VALVE REPLACEMENT (TAVR): ICD-10-CM

## 2024-01-12 DIAGNOSIS — I25.10 CAD (CORONARY ARTERY DISEASE): ICD-10-CM

## 2024-01-12 PROCEDURE — C1760 CLOSURE DEV, VASC: HCPCS | Performed by: GENERAL PRACTICE

## 2024-01-12 PROCEDURE — 93455 CORONARY ART/GRFT ANGIO S&I: CPT | Mod: 26,,, | Performed by: GENERAL PRACTICE

## 2024-01-12 PROCEDURE — 93571 IV DOP VEL&/PRESS C FLO 1ST: CPT | Mod: 26,52,, | Performed by: GENERAL PRACTICE

## 2024-01-12 PROCEDURE — 99152 MOD SED SAME PHYS/QHP 5/>YRS: CPT | Performed by: GENERAL PRACTICE

## 2024-01-12 PROCEDURE — 99152 MOD SED SAME PHYS/QHP 5/>YRS: CPT | Mod: ,,, | Performed by: GENERAL PRACTICE

## 2024-01-12 PROCEDURE — 93455 CORONARY ART/GRFT ANGIO S&I: CPT | Performed by: GENERAL PRACTICE

## 2024-01-12 PROCEDURE — 25000003 PHARM REV CODE 250

## 2024-01-12 PROCEDURE — C1769 GUIDE WIRE: HCPCS | Performed by: GENERAL PRACTICE

## 2024-01-12 PROCEDURE — 99153 MOD SED SAME PHYS/QHP EA: CPT | Performed by: GENERAL PRACTICE

## 2024-01-12 PROCEDURE — C1894 INTRO/SHEATH, NON-LASER: HCPCS | Performed by: GENERAL PRACTICE

## 2024-01-12 PROCEDURE — 25500020 PHARM REV CODE 255: Performed by: GENERAL PRACTICE

## 2024-01-12 PROCEDURE — 63600175 PHARM REV CODE 636 W HCPCS: Performed by: GENERAL PRACTICE

## 2024-01-12 PROCEDURE — 25000003 PHARM REV CODE 250: Performed by: INTERNAL MEDICINE

## 2024-01-12 PROCEDURE — 93572 IV DOP VEL&/PRESS C FLO EA: CPT | Mod: 26,52,, | Performed by: GENERAL PRACTICE

## 2024-01-12 PROCEDURE — 25000003 PHARM REV CODE 250: Performed by: GENERAL PRACTICE

## 2024-01-12 PROCEDURE — 93799 UNLISTED CV SVC/PROCEDURE: CPT | Performed by: GENERAL PRACTICE

## 2024-01-12 DEVICE — ANGIO-SEAL VIP VASCULAR CLOSURE DEVICE
Type: IMPLANTABLE DEVICE | Site: GROIN | Status: FUNCTIONAL
Brand: ANGIO-SEAL

## 2024-01-12 RX ORDER — MIDAZOLAM HYDROCHLORIDE 1 MG/ML
INJECTION INTRAMUSCULAR; INTRAVENOUS
Status: DISCONTINUED | OUTPATIENT
Start: 2024-01-12 | End: 2024-01-12 | Stop reason: HOSPADM

## 2024-01-12 RX ORDER — SODIUM CHLORIDE 9 MG/ML
INJECTION, SOLUTION INTRAVENOUS ONCE
Status: COMPLETED | OUTPATIENT
Start: 2024-01-12 | End: 2024-01-12

## 2024-01-12 RX ORDER — HEPARIN SODIUM 10000 [USP'U]/ML
INJECTION, SOLUTION INTRAVENOUS; SUBCUTANEOUS
Status: DISCONTINUED | OUTPATIENT
Start: 2024-01-12 | End: 2024-01-12 | Stop reason: HOSPADM

## 2024-01-12 RX ORDER — DIPHENHYDRAMINE HCL 25 MG
50 CAPSULE ORAL
Status: DISCONTINUED | OUTPATIENT
Start: 2024-01-12 | End: 2024-01-12 | Stop reason: HOSPADM

## 2024-01-12 RX ORDER — ONDANSETRON 4 MG/1
8 TABLET, ORALLY DISINTEGRATING ORAL EVERY 8 HOURS PRN
Status: DISCONTINUED | OUTPATIENT
Start: 2024-01-12 | End: 2024-01-12 | Stop reason: HOSPADM

## 2024-01-12 RX ORDER — FENTANYL CITRATE 50 UG/ML
INJECTION, SOLUTION INTRAMUSCULAR; INTRAVENOUS
Status: DISCONTINUED | OUTPATIENT
Start: 2024-01-12 | End: 2024-01-12 | Stop reason: HOSPADM

## 2024-01-12 RX ORDER — LIDOCAINE HYDROCHLORIDE 10 MG/ML
INJECTION, SOLUTION EPIDURAL; INFILTRATION; INTRACAUDAL; PERINEURAL
Status: DISCONTINUED | OUTPATIENT
Start: 2024-01-12 | End: 2024-01-12 | Stop reason: HOSPADM

## 2024-01-12 RX ORDER — DIPHENHYDRAMINE HCL 25 MG
CAPSULE ORAL
Status: COMPLETED
Start: 2024-01-12 | End: 2024-01-12

## 2024-01-12 RX ORDER — ACETAMINOPHEN 325 MG/1
650 TABLET ORAL EVERY 4 HOURS PRN
Status: DISCONTINUED | OUTPATIENT
Start: 2024-01-12 | End: 2024-01-12 | Stop reason: HOSPADM

## 2024-01-12 RX ORDER — HYDRALAZINE HYDROCHLORIDE 20 MG/ML
10 INJECTION INTRAMUSCULAR; INTRAVENOUS EVERY 6 HOURS PRN
Status: DISCONTINUED | OUTPATIENT
Start: 2024-01-12 | End: 2024-01-12 | Stop reason: HOSPADM

## 2024-01-12 RX ORDER — SODIUM CHLORIDE 9 MG/ML
INJECTION, SOLUTION INTRAVENOUS CONTINUOUS
Status: DISCONTINUED | OUTPATIENT
Start: 2024-01-12 | End: 2024-01-12 | Stop reason: HOSPADM

## 2024-01-12 RX ADMIN — Medication 50 MG: at 07:01

## 2024-01-12 RX ADMIN — SODIUM CHLORIDE: 0.9 INJECTION, SOLUTION INTRAVENOUS at 07:01

## 2024-01-12 RX ADMIN — DIPHENHYDRAMINE HYDROCHLORIDE 50 MG: 25 CAPSULE ORAL at 07:01

## 2024-01-12 NOTE — Clinical Note
Subjective   Mariluz Arango is a 80 y.o. female.   Chief Complaint   Patient presents with   • Medicare Wellness-subsequent   • Hyperlipidemia   • Hypertension   • Diabetes     For review and evaluation of management of chronic medical problems.  Had PTCA and stent placed to the mid LAD 6 weeks ago. They did have some trouble stopping the bleeding from the groin.  She also had some elevated blood pressure during the procedure.  Her daughter does not feel that mentally she has been doing as well since.  She is now living with her daughter in [place], [place] and will be switching PCPs and January. Is having some dysuria, has recurrent uti's and is on chronic cipro from her urologist.   Hypertension  This is a chronic problem. The current episode started more than 1 year ago. The problem has been rapidly improving since onset. The problem is controlled. Associated symptoms include malaise/fatigue. Pertinent negatives include no blurred vision, chest pain, headaches, neck pain, orthopnea, palpitations, peripheral edema, PND or shortness of breath. Risk factors for coronary artery disease include dyslipidemia, diabetes mellitus, post-menopausal state, sedentary lifestyle and stress. Current antihypertension treatment includes beta blockers and direct vasodilators. The current treatment provides moderate improvement. There are no compliance problems.  There is no history of angina or kidney disease.   Hyperlipidemia  This is a chronic problem. The current episode started more than 1 year ago. The problem is controlled. Recent lipid tests were reviewed and are normal. There are no known factors aggravating her hyperlipidemia. Associated symptoms include myalgias. Pertinent negatives include no chest pain or shortness of breath. Current antihyperlipidemic treatment includes statins. The current treatment provides significant improvement of lipids. There are no compliance problems.    Diabetes  She presents for her  The closure device was deployed in the right femoral artery. follow-up diabetic visit. She has type 2 diabetes mellitus. Her disease course has been stable. Pertinent negatives for hypoglycemia include no dizziness, headaches or nervousness/anxiousness. Pertinent negatives for diabetes include no blurred vision, no chest pain, no fatigue and no weakness. There are no hypoglycemic complications. Risk factors for coronary artery disease include diabetes mellitus, dyslipidemia, stress, sedentary lifestyle and post-menopausal. Current diabetic treatment includes diet. She is compliant with treatment most of the time. Her weight is fluctuating minimally. She is following a generally healthy and diabetic diet. There is no change in her home blood glucose trend. An ACE inhibitor/angiotensin II receptor blocker is being taken. She does not see a podiatrist.Eye exam is current.   Back Pain  This is a chronic problem. The current episode started more than 1 year ago. The problem occurs constantly. The problem is unchanged. Radiates to: Down both legs. The pain is at a severity of 7/10. The pain is moderate. The pain is worse during the day. The symptoms are aggravated by position. Stiffness is present in the morning. Pertinent negatives include no abdominal pain, chest pain, dysuria, fever, headaches, numbness or weakness. She has tried analgesics for the symptoms. The treatment provided moderate relief.   Coronary Artery Disease  Presents for follow-up visit. Pertinent negatives include no chest pain, dizziness, leg swelling, palpitations or shortness of breath. Risk factors include hyperlipidemia. The symptoms have been resolved. Compliance with diet is variable. Compliance with exercise is variable. Compliance with medications is good.      The following portions of the patient's history were reviewed and updated as appropriate: allergies, current medications, past social history and problem list.    Outpatient Medications Prior to Visit   Medication Sig Dispense Refill   •  "carvedilol (COREG) 12.5 MG tablet Take 1 tablet by mouth 2 (Two) Times a Day With Meals. 60 tablet 3   • ciprofloxacin (CIPRO) 250 MG tablet Take 250 mg by mouth Daily.     • clobetasol (TEMOVATE) 0.05 % external solution Apply 1 application topically Daily.     • clopidogrel (PLAVIX) 75 MG tablet TAKE 1 TABLET EVERY DAY 30 tablet 5   • Cranberry 500 MG capsule Take 1 tablet by mouth Daily.     • dicyclomine (BENTYL) 10 MG capsule TAKE 1 CAPSULE TWICE DAILY AS NEEDED FOR DIARRHEA     • donepezil (ARICEPT) 10 MG tablet TAKE 1 TABLET EVERY NIGHT 30 tablet 5   • DULoxetine (CYMBALTA) 30 MG capsule TAKE 1 CAPSULE EVERY DAY 30 capsule 5   • FLUOCINOLONE ACETONIDE SCALP 0.01 % oil Apply 1 application topically Daily.     • fluticasone (FLONASE) 50 MCG/ACT nasal spray INSTILL 2 SPRAYS INTO EACH NOSTRIL DAILY     • gabapentin (NEURONTIN) 300 MG capsule Take 300 mg by mouth 3 (Three) Times a Day. 1 qam and 2 qhs     • HYDROcodone-acetaminophen (NORCO) 5-325 MG per tablet      • hydrOXYzine (ATARAX) 25 MG tablet Take 1 tablet by mouth 2 (Two) Times a Day As Needed for Itching. 60 tablet 5   • ketoconazole (NIZORAL) 2 % shampoo      • losartan (COZAAR) 100 MG tablet Take 1 tablet by mouth Daily. 30 tablet 1   • nystatin (MYCOSTATIN) 614221 UNIT/GM powder APPLY TOPICALLY TO THE AFFECTED AREA THREE TIMES A DAY 60 g 2   • omeprazole (priLOSEC) 40 MG capsule Take 40 mg by mouth 2 (Two) Times a Day.     • ONETOUCH VERIO test strip   11   • Probiotic Product (PROBIOTIC PO) Take  by mouth.     • prochlorperazine (COMPAZINE) 5 MG tablet Take 1 tablet by mouth Every 12 (Twelve) Hours.     • rOPINIRole (REQUIP) 0.5 MG tablet      • rosuvastatin (CRESTOR) 5 MG tablet Take 1 tablet by mouth Daily. 30 tablet 11   • Syringe/Needle, Disp, (B-D INTEGRA SYRINGE) 25G X 5/8\" 3 ML misc USE 1 SYRINGE FOR MONTHLY VITAMIN B-12 SHOTS 6 each 2   • traZODone (DESYREL) 100 MG tablet TAKE 1 TABLET EVERY NIGHT WITH TRAZODONE 50MG 30 tablet 5   • traZODone " (DESYREL) 50 MG tablet TAKE 1 TABLET EVERY NIGHT WITH TRAZODONE 100MG 30 tablet 5   • citalopram (CeleXA) 10 MG tablet Take 10 mg by mouth Daily.     • gabapentin (NEURONTIN) 300 MG capsule TAKE ONE CAPSULE BY MOUTH EVERY MORNING AND TAKE 2 CAPSULES BY MOUTH EVERY NIGHT AT BEDTIME 90 capsule 0   • gabapentin (NEURONTIN) 300 MG capsule Take 300 mg by mouth 3 (Three) Times a Day.     • ondansetron (ZOFRAN) 8 MG tablet Take 8 mg by mouth Every 8 (Eight) Hours As Needed for Nausea or Vomiting.     • promethazine (PHENERGAN) 25 MG tablet Take 25 mg by mouth Every 6 (Six) Hours As Needed for Nausea or Vomiting.     • cyanocobalamin 1000 MCG/ML injection INJECT 1 ML INTO THE APPROPRIATE MUSCLE AS DIRECTED BY PRESCRIBER EVERY 28 DAYS. 1 mL 11   • aspirin 81 MG EC tablet Take 1 tablet by mouth Daily. 30 tablet 1   • hydrocortisone 1 % lotion Apply  topically 3 (Three) Times a Day As Needed for Irritation or Rash. 1 bottle 1   • phenazopyridine (PYRIDIUM) 100 MG tablet Take 1 tablet by mouth 3 (Three) Times a Day As Needed for bladder spasms. 6 tablet 0   • PREDNISONE PO Take 4 mg by mouth Daily.     • traMADol (ULTRAM) 50 MG tablet Take 1 tablet by mouth 3 (Three) Times a Day As Needed for Moderate Pain . ti 90 tablet 2     No facility-administered medications prior to visit.        Review of Systems   Constitutional: Positive for malaise/fatigue. Negative for chills, fatigue, fever and unexpected weight change.   HENT: Negative.  Negative for congestion, ear pain, hearing loss, nosebleeds, rhinorrhea, sneezing, sore throat and tinnitus.    Eyes: Negative.  Negative for blurred vision and discharge.   Respiratory: Negative.  Negative for cough, shortness of breath and wheezing.    Cardiovascular: Negative.  Negative for chest pain, palpitations, orthopnea, leg swelling and PND.   Gastrointestinal: Negative.  Negative for abdominal pain, constipation, diarrhea, nausea and vomiting.   Endocrine: Negative.    Genitourinary:  "Negative.  Negative for dysuria, frequency and urgency.   Musculoskeletal: Positive for back pain and myalgias. Negative for arthralgias, joint swelling and neck pain.   Skin: Negative.  Negative for rash.   Allergic/Immunologic: Negative.    Neurological: Negative.  Negative for dizziness, weakness, numbness and headaches.   Hematological: Negative.  Negative for adenopathy.   Psychiatric/Behavioral: Negative.  Negative for dysphoric mood and sleep disturbance. The patient is not nervous/anxious.        Objective   Visit Vitals  /68 (BP Location: Left arm)   Pulse 63   Ht 160 cm (63\")   Wt 68 kg (150 lb)   LMP  (LMP Unknown)   SpO2 98%   BMI 26.57 kg/m²     Physical Exam  Vitals signs and nursing note reviewed.   Constitutional:       General: She is not in acute distress.     Appearance: She is well-developed.   HENT:      Head: Normocephalic and atraumatic.      Nose: Nose normal.   Eyes:      General:         Right eye: No discharge.         Left eye: No discharge.      Conjunctiva/sclera: Conjunctivae normal.      Pupils: Pupils are equal, round, and reactive to light.   Neck:      Thyroid: No thyromegaly.   Cardiovascular:      Rate and Rhythm: Normal rate and regular rhythm.      Heart sounds: Normal heart sounds.   Pulmonary:      Effort: Pulmonary effort is normal.      Breath sounds: Normal breath sounds.   Lymphadenopathy:      Cervical: No cervical adenopathy.   Skin:     General: Skin is warm and dry.   Neurological:      Mental Status: She is alert and oriented to person, place, and time.     Notes brought forward are reviewed and updated if indicated.     Assessment/Plan   Problem List Items Addressed This Visit        Cardiovascular and Mediastinum    Essential hypertension (Chronic)    Relevant Orders    CBC & Differential    Comprehensive Metabolic Panel    Urinalysis With Culture If Indicated -      Other Visit Diagnoses     Medicare annual wellness visit, subsequent    -  Primary    Dysuria "        Relevant Orders    Urinalysis With Culture If Indicated -    Type 2 diabetes mellitus with diabetic mononeuropathy, with long-term current use of insulin (CMS/[facility])        Relevant Orders    MicroAlbumin, Urine, Random - Urine, Clean Catch    Need for influenza vaccination        Relevant Orders    Fluad Quad 65+ yrs (2081-8319) (Completed)    Coronary arteriosclerosis             Will notify regarding results. Should not be taking citalopram and duloxetine, stop citalopram. Try decreasing trazodone to 100mg qhs. May want to discuss starting Namenda with new PCP.    Patient Instructions   Check on tetanus/pertussis vaccine at pharmacy or with insurance.    Check at pharmacy on Shingrix shingles vaccine  Stop citalopram     No orders of the defined types were placed in this encounter.    No follow-ups on file.

## 2024-01-12 NOTE — Clinical Note
125 ml of contrast were injected throughout the case. 40 mL of contrast was the total wasted during the case. 165 mL was the total amount used during the case.

## 2024-01-12 NOTE — Clinical Note
The catheter was repositioned into the ostial SVG graft. An angiography was performed of the graft. The angiography was performed via hand injection with 5 mL of contrast.  Svg-om stump

## 2024-01-12 NOTE — Clinical Note
The catheter was repositioned into the ostial LIMA graft. An angiography was performed of the graft. Multiple views were taken. The angiography was performed via hand injection with 5 mL of contrast.  Lima-lad

## 2024-01-12 NOTE — Clinical Note
The catheter was inserted into the and was inserted over the wire into the ostium   left main. An angiography was performed of the left coronary arteries. Multiple views were taken. The angiography was performed via hand injection with 5 mL of contrast.

## 2024-01-12 NOTE — Clinical Note
The catheter was inserted into the and was inserted over the wire into the ostial LIMA graft. An angiography was performed of the graft. Multiple views were taken. The angiography was performed via hand injection with 5 mL of contrast.  Lima to LAD

## 2024-01-12 NOTE — Clinical Note
The catheter was inserted into the and was inserted over the wire into the ostium   right coronary artery. An angiography was performed of the right coronary arteries. Multiple views were taken. The angiography was performed via power injection. The injected amount was 6 mL contrast at 3 mL/s.

## 2024-01-31 ENCOUNTER — OFFICE VISIT (OUTPATIENT)
Dept: VASCULAR SURGERY | Facility: CLINIC | Age: 79
End: 2024-01-31
Payer: MEDICARE

## 2024-01-31 VITALS
DIASTOLIC BLOOD PRESSURE: 67 MMHG | HEIGHT: 72 IN | BODY MASS INDEX: 36.08 KG/M2 | SYSTOLIC BLOOD PRESSURE: 113 MMHG | HEART RATE: 91 BPM

## 2024-01-31 DIAGNOSIS — I25.810 CORONARY ARTERY DISEASE INVOLVING CORONARY BYPASS GRAFT OF NATIVE HEART WITHOUT ANGINA PECTORIS: Primary | ICD-10-CM

## 2024-01-31 PROCEDURE — 99214 OFFICE O/P EST MOD 30 MIN: CPT | Mod: S$PBB,,, | Performed by: THORACIC SURGERY (CARDIOTHORACIC VASCULAR SURGERY)

## 2024-01-31 PROCEDURE — 99212 OFFICE O/P EST SF 10 MIN: CPT | Mod: PBBFAC,PN | Performed by: THORACIC SURGERY (CARDIOTHORACIC VASCULAR SURGERY)

## 2024-01-31 PROCEDURE — 99999 PR PBB SHADOW E&M-EST. PATIENT-LVL II: CPT | Mod: PBBFAC,,, | Performed by: THORACIC SURGERY (CARDIOTHORACIC VASCULAR SURGERY)

## 2024-01-31 RX ORDER — NAPROXEN SODIUM 220 MG/1
81 TABLET, FILM COATED ORAL DAILY
COMMUNITY

## 2024-01-31 NOTE — H&P (VIEW-ONLY)
This patient has coronary artery disease.  He has had progressive shortness of breath.  He underwent heart catheterization and coronary angiography showing multivessel coronary artery disease.  He was referred for consideration of redo coronary artery bypass grafting.    He has borderline diabetes  and hypertension.  He has a history of remote coronary artery bypass grafting a number of years ago.    He has had a TAVR and Watchman.  He is not a smoker.  He does have COPD.  He quit smoking 35 years ago.    Medicines are part of the epic record.  He does take daily aspirin.    On exam vital signs are stable.  Pupils are equal and round reactive to light.  Neck is supple.  Chest is equal breath sounds.  Heart is in a irregular rate and rhythm.  Abdomen is benign.  Perfusion to the legs and feet seems to be satisfactory.    Recent studies were reviewed.  The patient has significant recurrent coronary artery disease.  Consideration can be made for redo high-risk coronary artery bypass grafting.  The risks and potential complications were discussed.  The patient will take this under advisement.

## 2024-02-01 ENCOUNTER — TELEPHONE (OUTPATIENT)
Dept: VASCULAR SURGERY | Facility: CLINIC | Age: 79
End: 2024-02-01
Payer: MEDICARE

## 2024-02-01 NOTE — TELEPHONE ENCOUNTER
----- Message from Alfa Raman sent at 2/1/2024 11:58 AM CST -----  Type:  Sooner Appointment Request    Caller is requesting a sooner appointment.  Caller declined first available appointment listed below.  Caller will not accept being placed on the waitlist and is requesting a message be sent to doctor.    Name of Caller:  Patient  When is the first available appointment?    Symptoms:  CV Surgery  Would the patient rather a call back or a response via [facility]?  Call  Best Call Back Number:  [phone]  Additional Information:

## 2024-02-02 DIAGNOSIS — Z79.01 LONG-TERM (CURRENT) USE OF ANTICOAGULANTS, INR GOAL 2.5-3.5: ICD-10-CM

## 2024-02-02 DIAGNOSIS — I25.810 CORONARY ARTERY DISEASE INVOLVING CORONARY BYPASS GRAFT OF NATIVE HEART WITHOUT ANGINA PECTORIS: Primary | ICD-10-CM

## 2024-02-02 DIAGNOSIS — I73.9 PAD (PERIPHERAL ARTERY DISEASE): ICD-10-CM

## 2024-02-05 ENCOUNTER — TELEPHONE (OUTPATIENT)
Dept: VASCULAR SURGERY | Facility: CLINIC | Age: 79
End: 2024-02-05
Payer: MEDICARE

## 2024-02-05 DIAGNOSIS — I25.810 CORONARY ARTERY DISEASE INVOLVING CORONARY BYPASS GRAFT OF NATIVE HEART WITHOUT ANGINA PECTORIS: Primary | ICD-10-CM

## 2024-02-05 DIAGNOSIS — I25.10 CAD (CORONARY ARTERY DISEASE): ICD-10-CM

## 2024-02-05 RX ORDER — CEFAZOLIN SODIUM 2 G/50ML
2 SOLUTION INTRAVENOUS
Status: CANCELLED | OUTPATIENT
Start: 2024-02-05

## 2024-02-05 RX ORDER — MUPIROCIN 20 MG/G
OINTMENT TOPICAL
Status: CANCELLED | OUTPATIENT
Start: 2024-02-05

## 2024-02-05 RX ORDER — HYDROCODONE BITARTRATE AND ACETAMINOPHEN 500; 5 MG/1; MG/1
TABLET ORAL
Status: CANCELLED | OUTPATIENT
Start: 2024-02-05

## 2024-02-05 RX ORDER — CHLORHEXIDINE GLUCONATE ORAL RINSE 1.2 MG/ML
10 SOLUTION DENTAL
Status: CANCELLED | OUTPATIENT
Start: 2024-02-05

## 2024-02-05 NOTE — TELEPHONE ENCOUNTER
Patient notified of   Pre-op 2/21 9:00AM  Surgery 2/23 7:00AM @ [facility]  He states he is not on any blood thinners

## 2024-02-07 NOTE — DISCHARGE SUMMARY
[facility]  Discharge Note  Short Stay    Procedure(s) (LRB):  ANGIOGRAM, CORONARY, INCLUDING BYPASS GRAFT, WITH LEFT HEART CATHETERIZATION (Left)  Instantaneous Wave-Free Ratio (IFR) (N/A)      OUTCOME: Patient tolerated treatment/procedure well without complication and is now ready for discharge.    DISPOSITION: Home or Self Care    FINAL DIAGNOSIS:  <principal problem not specified>    FOLLOWUP: In clinic    DISCHARGE INSTRUCTIONS:    Discharge Procedure Orders   Cardiac rehab phase II   Standing Status: Future Standing Exp. Date: 01/12/25     Order Specific Question Answer Comments   Department [facility] Cardiopul[facility] [place]    Select qualifying diagnosis: I20.8 - Stable angina       20 minutes      Ost LAD to Prox LAD lesion was 70% stenosed. iFR was performed with the Sauer Omniwire. The iFR measured 0.8. The additional iFR measured 0.73. 0.74 was third    Ost Cx to Prox Cx lesion was 80% stenosed. The lesion was previously treated using a stent of unknown type. iFR was performed with the Sauer Omniwire. The iFR measured 0.75. The additional iFR measured 0.79    The Mid LIMA Graft to Insertion into LAD lesion was 99% stenosed.INSTENT STENOSIS    The Origin GRAFT to Lcx 100% stenosed.    There was two vessel coronary artery disease.    The estimated blood loss was none.     The procedure log was documented by Documenter: Maribell Bernal RT and verified by Gregg Bush MD.

## 2024-02-17 ENCOUNTER — ANESTHESIA EVENT (OUTPATIENT)
Dept: SURGERY | Facility: HOSPITAL | Age: 79
DRG: 235 | End: 2024-02-17
Payer: MEDICARE

## 2024-02-21 ENCOUNTER — HOSPITAL ENCOUNTER (OUTPATIENT)
Dept: PREADMISSION TESTING | Facility: HOSPITAL | Age: 79
Discharge: HOME | DRG: 235 | End: 2024-02-21
Attending: THORACIC SURGERY (CARDIOTHORACIC VASCULAR SURGERY)
Payer: MEDICARE

## 2024-02-21 ENCOUNTER — HOSPITAL ENCOUNTER (OUTPATIENT)
Dept: RADIOLOGY | Facility: HOSPITAL | Age: 79
Discharge: HOME | DRG: 235 | End: 2024-02-21
Attending: THORACIC SURGERY (CARDIOTHORACIC VASCULAR SURGERY)
Payer: MEDICARE

## 2024-02-21 VITALS
SYSTOLIC BLOOD PRESSURE: 130 MMHG | BODY MASS INDEX: 37.16 KG/M2 | TEMPERATURE: 98 F | RESPIRATION RATE: 22 BRPM | HEIGHT: 72 IN | OXYGEN SATURATION: 92 % | HEART RATE: 74 BPM | WEIGHT: 274.38 LBS | DIASTOLIC BLOOD PRESSURE: 71 MMHG

## 2024-02-21 DIAGNOSIS — I25.10 CAD (CORONARY ARTERY DISEASE): ICD-10-CM

## 2024-02-21 DIAGNOSIS — I25.810 CORONARY ARTERY DISEASE INVOLVING CORONARY BYPASS GRAFT OF NATIVE HEART WITHOUT ANGINA PECTORIS: ICD-10-CM

## 2024-02-21 PROCEDURE — 93005 ELECTROCARDIOGRAM TRACING: CPT | Performed by: GENERAL PRACTICE

## 2024-02-21 PROCEDURE — 93010 ELECTROCARDIOGRAM REPORT: CPT | Mod: ,,, | Performed by: GENERAL PRACTICE

## 2024-02-21 PROCEDURE — 71046 X-RAY EXAM CHEST 2 VIEWS: CPT | Mod: TC

## 2024-02-21 NOTE — DISCHARGE INSTRUCTIONS
To confirm, Your doctor has instructed you that surgery is scheduled for: Friday 2/23/24    Pre-Op will call the afternoon prior to surgery between 4:00 and 6:00 PM with the final arrival time.      Please report to ER Registration for 430am     Do not eat or drink anything after midnight the night before your surgery - THIS INCLUDES  WATER, GUM, MINTS AND CANDY.    YOU MAY BRUSH YOUR TEETH     TAKE APPROVED  MEDICATIONS WITH A SMALL SIP OF WATER THE MORNING OF YOUR PROCEDURE: See Medication list    PLEASE NOTE:  The surgery schedule has many variables which may affect the time of your surgery case.  Family members should be available if your surgery time changes.      DO NOT TAKE THESE MEDICATIONS 5-7 DAYS PRIOR to your procedure or per your surgeon's request: ASPIRIN, ALEVE, ADVIL, IBUPROFEN,  NEREIDA SELTZER, BC , FISH OIL , VITAMIN E, HERBALS  (May take Tylenol)    ONLY if you are prescribed any types of blood thinners such as:  Aspirin, Coumadin, Plavix, Pradaxa, Xarelto, Aggrenox, Effient, Eliquis, Savasya, Brilinta, or any other, ask your surgeon whether you should stop taking them and how long before surgery you should stop.  You may also need to verify with the prescribing physician if it is ok to stop your medication.                                                     IMPORTANT INSTRUCTIONS    Do not smoke, vape or drink alcoholic beverages 24 hours prior to your procedure.  Shower the night before AND the morning of your procedure with a Chlorhexidine wash such as Hibiclens or Dial antibacterial soap from the neck down.    Do not get it on your face or in your eyes.  You may use your own shampoo and face wash. This helps your skin to be as bacteria free as possible.   Do not apply any deodorant, lotion or powder after you shower.   DO NOT remove hair from the surgery site.  Do not shave the incision site unless you are given specific instructions to do so.    Sleep in a bed with clean sheets.  Do not sleep  with a pet in the bed.   If you wear contact lenses, dentures, hearing aids or glasses, bring a container to put them in during surgery and give to a family member for safe keeping.    Please leave all jewelry, piercing's and valuables at home.   ONLY if you have been diagnosed with sleep apnea , please bring your C-PAP machine.  ONLY if you wear home oxygen please bring your portable oxygen tank the day of your procedure.   ONLY if you have a neuro stimulator, please bring the controller with you the morning of surgery  If your doctor has scheduled you for an overnight stay, bring a small overnight bag with any personal items you need.  Wear comfortable clothing that is easy to remove and place back on after surgery.       If you have any questions about these instructions, call Pre-Op Admit  Nursing at [phone] , Pre-Op Day Surgery Unit at [phone]

## 2024-02-21 NOTE — ANESTHESIA PREPROCEDURE EVALUATION
02/21/2024  Jose F Hanley is a 78 y.o., male.      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    Shortness of breath    History of transcatheter aortic valve replacement (TAVR)    Permanent atrial fibrillation    Presence of Watchman left atrial appendage closure device    Severe obesity (BMI 35.0-39.9) with comorbidity    Chronic diastolic congestive heart failure    Gangrene    Coronary artery disease involving coronary bypass graft of native heart without angina pectoris       Past Surgical History:   Procedure Laterality Date    AORTOGRAPHY WITH SERIALOGRAPHY N/A 08/31/2023    Procedure: AORTOGRAM, WITH SERIALOGRAPHY;  Surgeon: Gary Thomas MD;  Location: [facility] CATH/EP LAB;  Service: Peripheral Vascular;  Laterality: N/A;    CATARACT EXTRACTION Bilateral 2014    CORONARY ANGIOGRAPHY INCLUDING BYPASS GRAFTS WITH CATHETERIZATION OF LEFT HEART Left 01/12/2024    Procedure: ANGIOGRAM, CORONARY, INCLUDING BYPASS GRAFT, WITH LEFT HEART CATHETERIZATION;  Surgeon: Gregg Bush MD;  Location: [facility] CATH/EP LAB;  Service: Cardiology;  Laterality: Left;    CORONARY ARTERY BYPASS GRAFT  2010    INSERTION OF IMPLANTABLE LOOP RECORDER  2011    INSERTION OF PACEMAKER  2021    does not have card with him for preadmit    INSTANTANEOUS WAVE-FREE RATIO (IFR) N/A 01/12/2024    Procedure: Instantaneous Wave-Free Ratio (IFR);  Surgeon: Baker,  Gregg NEWTON MD;  Location: [facility] CATH/EP LAB;  Service: Cardiology;  Laterality: N/A;    PTA, ANTERIOR TIBIAL Left 08/31/2023    Procedure: PTA, Anterior Tibial;  Surgeon: Gary Thomas MD;  Location: [facility] CATH/EP LAB;  Service: Peripheral Vascular;  Laterality: Left;    PTA, SUPERFICIAL FEMORAL ARTERY Left 08/31/2023    Procedure: PTA, Superficial Femoral Artery;  Surgeon: Gary Thomas MD;  Location: [facility] CATH/EP LAB;  Service: Peripheral Vascular;  Laterality: Left;    SPLENECTOMY  2016    STENT, ANTERIOR TIBIAL Left 08/31/2023    Procedure: Stent, Anterior Tibial;  Surgeon: Gary Thomas MD;  Location: [facility] CATH/EP LAB;  Service: Peripheral Vascular;  Laterality: Left;    watchman heart device  2019        Tobacco Use:  The patient  reports that he quit smoking about 36 years ago. His smoking use included cigarettes. He has never used smokeless tobacco.     Results for orders placed or performed during the hospital encounter of 01/10/24   EKG 12-lead    Collection Time: 01/10/24  3:38 PM    Narrative    Test Reason : R07.89,    Vent. Rate : 082 BPM     Atrial Rate : 000 BPM     P-R Int : 000 ms          QRS Dur : 172 ms      QT Int : 436 ms       P-R-T Axes : 000 -42 009 degrees     QTc Int : 509 ms    Atrial fibrillation  Left axis deviation  Right bundle branch block  Abnormal ECG  When compared with ECG of 28-NOV-2023 11:40,  Nonspecific T wave abnormality, worse in Inferior leads  Confirmed by Eleazar WOO, Gregg NEWTON ([id number]) on 1/13/2024 1:22:57 PM    Referred By:             Confirmed By:Gregg Bush MD                 Results for orders placed during the hospital encounter of 12/13/23    Echo    Interpretation Summary    Left Ventricle: The left ventricle is normal in size. Mildly increased wall thickness. There is mild concentric hypertrophy. Normal wall motion. There is normal systolic function with a visually estimated ejection fraction of 65 - 70%. There is normal diastolic  function.    Right Ventricle: Mild right ventricular enlargement. Wall thickness is normal. Right ventricle wall motion  is normal. Systolic function is normal.    Left Atrium: Left atrium is moderately dilated.    Right Atrium: Right atrium is mildly dilated.    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    Tricuspid Valve: There is mild regurgitation with a centrally directed jet.    Pulmonic Valve: There is mild regurgitation with a centrally directed jet.    Pulmonary Artery: There is mild pulmonary hypertension. The estimated pulmonary artery systolic pressure is 40 mmHg.    IVC/SVC: Intermediate venous pressure at 8 mmHg.              Pre-op Assessment    I have reviewed the Patient Summary Reports.     I have reviewed the Nursing Notes.    I have reviewed the Medications.     Review of Systems  Anesthesia Hx:  No problems with previous Anesthesia             Denies Family Hx of Anesthesia complications.    Denies Personal Hx of Anesthesia complications.                    Social:  Former Smoker       Hematology/Oncology:  Hematology Normal                                     Cardiovascular:     Hypertension, well controlled Valvular problems/Murmurs (s/p TAVR) Past MI (hx of MI) CAD (occ chest tightness)   CABG/stent (CABG 2010, subsequent stents in 2010 and 2011)   Angina (on chest tightness, recently occuring at rest) CHF (EF > 60% on most recent ECHO)   PVD                              Pulmonary:  Pneumonia (hx of recurrent pneumonia, no recent) COPD (daily inhaler, home O2 at night, cannot tolerate CPAP), moderate Asthma mild Shortness of breath (with exertion)  Sleep Apnea (Cannot tolerate CPAP)                Hepatic/GI:    Hiatal Hernia, (s/p repair) GERD, well controlled             Musculoskeletal:  Musculoskeletal Normal                Neurological:  TIA, (remote hx of TIA, no subsequent problems, no residual deficits)               Peripheral Neuropathy (bilat  foot numbness, occ bilat hand numbness,)                          Endocrine:  Diabetes (borderline DM, no current meds), well controlled Hypothyroidism (on meds)        Obesity / BMI > 30      Physical Exam  General: Well nourished, Cooperative, Alert and Oriented    Airway:  Mallampati: III / III  Mouth Opening: Normal  TM Distance: > 6 cm  Tongue: Normal  Neck ROM: Normal ROM    Dental:  Caps / Implants    Chest/Lungs:  Clear to auscultation    Heart:  Rate: Normal  Rhythm: Regular Rhythm  Sounds: Normal    Abdomen:  Normal, Soft, Nontender        Anesthesia Plan  Type of Anesthesia, risks & benefits discussed:    Anesthesia Type: Gen ETT  Intra-op Monitoring Plan: Standard ASA Monitors, Central Line, Art Line and CO  Post Op Pain Control Plan: multimodal analgesia and IV/PO Opioids PRN  Induction:  IV  Airway Plan: Video, Post-Induction  Informed Consent: Informed consent signed with the Patient and all parties understand the risks and agree with anesthesia plan.  All questions answered.   ASA Score: 4  Anesthesia Plan Notes:   GETA  PIV x 2, LALI Nguyen, PA cath CO  IV tylenol  Zofran Decadron Pepcid    Ready For Surgery From Anesthesia Perspective.     .

## 2024-02-21 NOTE — PRE ADMISSION SCREENING
"Preadmit assessment complete. Review of patient health history and home medications. Questions answered. Patient voiced understanding. Chlorhexidine scrub given to patient for preop shower Preop education per AVS pt seen by Dr Saldivar with anesthesia. Pt with dressing to left lower forearm, dressing clean, dry and intact, states had skin "torn" from working on a motor. Pre and postop CABG education reviewed, all questions answered.   "

## 2024-02-22 RX ORDER — CALCIUM CHLORIDE, MAGNESIUM CHLORIDE, POTASSIUM CHLORIDE AND SODIUM CHLORIDE 17.6; 325.3; 119.3; 643 MG/100ML; MG/100ML; MG/100ML; MG/100ML
SOLUTION INTRA-ARTERIAL ONCE
Status: DISCONTINUED | OUTPATIENT
Start: 2024-02-23 | End: 2024-02-23 | Stop reason: HOSPADM

## 2024-02-23 ENCOUNTER — ANESTHESIA (OUTPATIENT)
Dept: INTENSIVE CARE | Facility: HOSPITAL | Age: 79
DRG: 235 | End: 2024-02-23
Payer: MEDICARE

## 2024-02-23 ENCOUNTER — ANESTHESIA EVENT (OUTPATIENT)
Dept: INTENSIVE CARE | Facility: HOSPITAL | Age: 79
DRG: 235 | End: 2024-02-23
Payer: MEDICARE

## 2024-02-23 ENCOUNTER — HOSPITAL ENCOUNTER (INPATIENT)
Facility: HOSPITAL | Age: 79
LOS: 13 days | Discharge: NURSING, REHAB OR LONG-TERM CARE | DRG: 235 | End: 2024-03-07
Attending: THORACIC SURGERY (CARDIOTHORACIC VASCULAR SURGERY) | Admitting: THORACIC SURGERY (CARDIOTHORACIC VASCULAR SURGERY)
Payer: MEDICARE

## 2024-02-23 ENCOUNTER — ANESTHESIA (OUTPATIENT)
Dept: SURGERY | Facility: HOSPITAL | Age: 79
DRG: 235 | End: 2024-02-23
Payer: MEDICARE

## 2024-02-23 DIAGNOSIS — I25.810 CORONARY ARTERY DISEASE INVOLVING CORONARY BYPASS GRAFT OF NATIVE HEART WITHOUT ANGINA PECTORIS: ICD-10-CM

## 2024-02-23 DIAGNOSIS — J96.01 ACUTE RESPIRATORY FAILURE WITH HYPOXIA AND HYPERCAPNIA: ICD-10-CM

## 2024-02-23 DIAGNOSIS — K56.7 ILEUS: ICD-10-CM

## 2024-02-23 DIAGNOSIS — R14.0 ABDOMINAL DISTENTION: ICD-10-CM

## 2024-02-23 DIAGNOSIS — I25.10 CORONARY ARTERY DISEASE INVOLVING NATIVE CORONARY ARTERY OF NATIVE HEART WITHOUT ANGINA PECTORIS: ICD-10-CM

## 2024-02-23 DIAGNOSIS — J96.02 ACUTE RESPIRATORY FAILURE WITH HYPOXIA AND HYPERCAPNIA: ICD-10-CM

## 2024-02-23 DIAGNOSIS — Z95.1 S/P CABG (CORONARY ARTERY BYPASS GRAFT): ICD-10-CM

## 2024-02-23 DIAGNOSIS — Z95.1 S/P CABG (CORONARY ARTERY BYPASS GRAFT): Primary | ICD-10-CM

## 2024-02-23 DIAGNOSIS — I25.10 CAD (CORONARY ARTERY DISEASE): ICD-10-CM

## 2024-02-23 LAB
ALBUMIN SERPL BCP-MCNC: 3.1 G/DL (ref 3.5–5.2)
ALBUMIN SERPL BCP-MCNC: 3.5 G/DL (ref 3.5–5.2)
ALLENS TEST: ABNORMAL
ALP SERPL-CCNC: 26 U/L (ref 55–135)
ALP SERPL-CCNC: 30 U/L (ref 55–135)
ALT SERPL W/O P-5'-P-CCNC: 38 U/L (ref 10–44)
ALT SERPL W/O P-5'-P-CCNC: 42 U/L (ref 10–44)
ANION GAP SERPL CALC-SCNC: 4 MMOL/L (ref 8–16)
ANION GAP SERPL CALC-SCNC: 5 MMOL/L (ref 8–16)
ANION GAP SERPL CALC-SCNC: 5 MMOL/L (ref 8–16)
AST SERPL-CCNC: 100 U/L (ref 10–40)
AST SERPL-CCNC: 93 U/L (ref 10–40)
BASOPHILS # BLD AUTO: 0.02 K/UL (ref 0–0.2)
BASOPHILS NFR BLD: 0.1 % (ref 0–1.9)
BILIRUB SERPL-MCNC: 1.5 MG/DL (ref 0.1–1)
BILIRUB SERPL-MCNC: 1.6 MG/DL (ref 0.1–1)
BUN SERPL-MCNC: 10 MG/DL (ref 8–23)
BUN SERPL-MCNC: 11 MG/DL (ref 8–23)
BUN SERPL-MCNC: 11 MG/DL (ref 8–23)
CALCIUM SERPL-MCNC: 7.8 MG/DL (ref 8.7–10.5)
CALCIUM SERPL-MCNC: 7.9 MG/DL (ref 8.7–10.5)
CALCIUM SERPL-MCNC: 8.1 MG/DL (ref 8.7–10.5)
CHLORIDE SERPL-SCNC: 106 MMOL/L (ref 95–110)
CHLORIDE SERPL-SCNC: 106 MMOL/L (ref 95–110)
CHLORIDE SERPL-SCNC: 107 MMOL/L (ref 95–110)
CO2 SERPL-SCNC: 24 MMOL/L (ref 23–29)
CO2 SERPL-SCNC: 25 MMOL/L (ref 23–29)
CO2 SERPL-SCNC: 25 MMOL/L (ref 23–29)
CREAT SERPL-MCNC: 0.9 MG/DL (ref 0.5–1.4)
CREAT SERPL-MCNC: 1 MG/DL (ref 0.5–1.4)
CREAT SERPL-MCNC: 1 MG/DL (ref 0.5–1.4)
DELSYS: ABNORMAL
DIFFERENTIAL METHOD BLD: ABNORMAL
EOSINOPHIL # BLD AUTO: 0 K/UL (ref 0–0.5)
EOSINOPHIL NFR BLD: 0 % (ref 0–8)
ERYTHROCYTE [DISTWIDTH] IN BLOOD BY AUTOMATED COUNT: 20.2 % (ref 11.5–14.5)
ERYTHROCYTE [DISTWIDTH] IN BLOOD BY AUTOMATED COUNT: 20.4 % (ref 11.5–14.5)
ERYTHROCYTE [DISTWIDTH] IN BLOOD BY AUTOMATED COUNT: 20.7 % (ref 11.5–14.5)
ERYTHROCYTE [SEDIMENTATION RATE] IN BLOOD BY WESTERGREN METHOD: 16 MM/H
EST. GFR  (NO RACE VARIABLE): >60 ML/MIN/1.73 M^2
FIO2: 100
FIO2: 35
FIO2: 40
FIO2: 40
FIO2: 60
GLUCOSE SERPL-MCNC: 131 MG/DL (ref 70–110)
GLUCOSE SERPL-MCNC: 131 MG/DL (ref 70–110)
GLUCOSE SERPL-MCNC: 133 MG/DL (ref 70–110)
GLUCOSE SERPL-MCNC: 139 MG/DL (ref 70–110)
GLUCOSE SERPL-MCNC: 147 MG/DL (ref 70–110)
GLUCOSE SERPL-MCNC: 148 MG/DL (ref 70–110)
GLUCOSE SERPL-MCNC: 153 MG/DL (ref 70–110)
GLUCOSE SERPL-MCNC: 163 MG/DL (ref 70–110)
GLUCOSE SERPL-MCNC: 174 MG/DL (ref 70–110)
GLUCOSE SERPL-MCNC: 178 MG/DL (ref 70–110)
GLUCOSE SERPL-MCNC: 181 MG/DL (ref 70–110)
GLUCOSE SERPL-MCNC: 183 MG/DL (ref 70–110)
GLUCOSE SERPL-MCNC: 183 MG/DL (ref 70–110)
GLUCOSE SERPL-MCNC: 195 MG/DL (ref 70–110)
GLUCOSE SERPL-MCNC: 196 MG/DL (ref 70–110)
GLUCOSE SERPL-MCNC: 196 MG/DL (ref 70–110)
GLUCOSE SERPL-MCNC: 199 MG/DL (ref 70–110)
GLUCOSE SERPL-MCNC: 205 MG/DL (ref 70–110)
HCO3 UR-SCNC: 22.6 MMOL/L (ref 24–28)
HCO3 UR-SCNC: 23.2 MMOL/L (ref 24–28)
HCO3 UR-SCNC: 23.4 MMOL/L (ref 24–28)
HCO3 UR-SCNC: 25 MMOL/L (ref 24–28)
HCO3 UR-SCNC: 25.5 MMOL/L (ref 24–28)
HCO3 UR-SCNC: 27.3 MMOL/L (ref 24–28)
HCO3 UR-SCNC: 27.8 MMOL/L (ref 24–28)
HCO3 UR-SCNC: 29.1 MMOL/L (ref 24–28)
HCO3 UR-SCNC: 29.2 MMOL/L (ref 24–28)
HCO3 UR-SCNC: 29.5 MMOL/L (ref 24–28)
HCO3 UR-SCNC: 30.5 MMOL/L (ref 24–28)
HCT VFR BLD AUTO: 32.8 % (ref 40–54)
HCT VFR BLD AUTO: 35.8 % (ref 40–54)
HCT VFR BLD AUTO: 37.5 % (ref 40–54)
HCT VFR BLD CALC: 34 %PCV (ref 36–54)
HCT VFR BLD CALC: 36 %PCV (ref 36–54)
HCT VFR BLD CALC: 37 %PCV (ref 36–54)
HCT VFR BLD CALC: 37 %PCV (ref 36–54)
HCT VFR BLD CALC: 38 %PCV (ref 36–54)
HCT VFR BLD CALC: 39 %PCV (ref 36–54)
HCT VFR BLD CALC: 41 %PCV (ref 36–54)
HCT VFR BLD CALC: 42 %PCV (ref 36–54)
HCT VFR BLD CALC: 42 %PCV (ref 36–54)
HCT VFR BLD CALC: 46 %PCV (ref 36–54)
HCT VFR BLD CALC: 47 %PCV (ref 36–54)
HGB BLD-MCNC: 10.1 G/DL (ref 14–18)
HGB BLD-MCNC: 11 G/DL (ref 14–18)
HGB BLD-MCNC: 11.4 G/DL (ref 14–18)
IMM GRANULOCYTES # BLD AUTO: 0.07 K/UL (ref 0–0.04)
IMM GRANULOCYTES NFR BLD AUTO: 0.5 % (ref 0–0.5)
LYMPHOCYTES # BLD AUTO: 0.5 K/UL (ref 1–4.8)
LYMPHOCYTES NFR BLD: 3.6 % (ref 18–48)
MAGNESIUM SERPL-MCNC: 1.8 MG/DL (ref 1.6–2.6)
MAGNESIUM SERPL-MCNC: 2.1 MG/DL (ref 1.6–2.6)
MAGNESIUM SERPL-MCNC: 2.3 MG/DL (ref 1.6–2.6)
MCH RBC QN AUTO: 22.5 PG (ref 27–31)
MCH RBC QN AUTO: 23.1 PG (ref 27–31)
MCH RBC QN AUTO: 23.2 PG (ref 27–31)
MCHC RBC AUTO-ENTMCNC: 30.4 G/DL (ref 32–36)
MCHC RBC AUTO-ENTMCNC: 30.7 G/DL (ref 32–36)
MCHC RBC AUTO-ENTMCNC: 30.8 G/DL (ref 32–36)
MCV RBC AUTO: 74 FL (ref 82–98)
MCV RBC AUTO: 75 FL (ref 82–98)
MCV RBC AUTO: 76 FL (ref 82–98)
MODE: ABNORMAL
MONOCYTES # BLD AUTO: 0.6 K/UL (ref 0.3–1)
MONOCYTES NFR BLD: 4.7 % (ref 4–15)
NEUTROPHILS # BLD AUTO: 12.4 K/UL (ref 1.8–7.7)
NEUTROPHILS NFR BLD: 91.1 % (ref 38–73)
NRBC BLD-RTO: 0 /100 WBC
PCO2 BLDA: 40.1 MMHG (ref 35–45)
PCO2 BLDA: 41.8 MMHG (ref 35–45)
PCO2 BLDA: 42.1 MMHG (ref 35–45)
PCO2 BLDA: 42.6 MMHG (ref 35–45)
PCO2 BLDA: 44 MMHG (ref 35–45)
PCO2 BLDA: 45.1 MMHG (ref 35–45)
PCO2 BLDA: 45.9 MMHG (ref 35–45)
PCO2 BLDA: 47.4 MMHG (ref 35–45)
PCO2 BLDA: 47.5 MMHG (ref 35–45)
PCO2 BLDA: 50.4 MMHG (ref 35–45)
PCO2 BLDA: 55 MMHG (ref 35–45)
PEEP: 5
PH SMN: 7.33 [PH] (ref 7.35–7.45)
PH SMN: 7.33 [PH] (ref 7.35–7.45)
PH SMN: 7.34 [PH] (ref 7.35–7.45)
PH SMN: 7.35 [PH] (ref 7.35–7.45)
PH SMN: 7.38 [PH] (ref 7.35–7.45)
PH SMN: 7.42 [PH] (ref 7.35–7.45)
PH SMN: 7.43 [PH] (ref 7.35–7.45)
PH SMN: 7.47 [PH] (ref 7.35–7.45)
PHOSPHATE SERPL-MCNC: 1.1 MG/DL (ref 2.7–4.5)
PIP: 27
PIP: 28
PIP: 28
PIP: 31
PLATELET # BLD AUTO: 159 K/UL (ref 150–450)
PLATELET # BLD AUTO: 162 K/UL (ref 150–450)
PLATELET # BLD AUTO: 185 K/UL (ref 150–450)
PMV BLD AUTO: 9.4 FL (ref 9.2–12.9)
PMV BLD AUTO: 9.6 FL (ref 9.2–12.9)
PMV BLD AUTO: 9.7 FL (ref 9.2–12.9)
PO2 BLDA: 104 MMHG (ref 80–100)
PO2 BLDA: 221 MMHG (ref 80–100)
PO2 BLDA: 297 MMHG (ref 80–100)
PO2 BLDA: 322 MMHG (ref 80–100)
PO2 BLDA: 343 MMHG (ref 80–100)
PO2 BLDA: 382 MMHG (ref 80–100)
PO2 BLDA: 412 MMHG (ref 80–100)
PO2 BLDA: 452 MMHG (ref 80–100)
PO2 BLDA: 70 MMHG (ref 80–100)
PO2 BLDA: 78 MMHG (ref 80–100)
PO2 BLDA: 86 MMHG (ref 80–100)
POC ACTIVATED CLOTTING TIME K: 141 SEC (ref 74–137)
POC ACTIVATED CLOTTING TIME K: 141 SEC (ref 74–137)
POC ACTIVATED CLOTTING TIME K: 542 SEC (ref 74–137)
POC ACTIVATED CLOTTING TIME K: 552 SEC (ref 74–137)
POC ACTIVATED CLOTTING TIME K: 558 SEC (ref 74–137)
POC ACTIVATED CLOTTING TIME K: 579 SEC (ref 74–137)
POC BE: -1 MMOL/L
POC BE: -2 MMOL/L
POC BE: -2 MMOL/L
POC BE: -3 MMOL/L
POC BE: 0 MMOL/L
POC BE: 2 MMOL/L
POC BE: 3 MMOL/L
POC BE: 3 MMOL/L
POC BE: 5 MMOL/L
POC BE: 6 MMOL/L
POC BE: 6 MMOL/L
POC IONIZED CALCIUM: 1.07 MMOL/L (ref 1.06–1.42)
POC IONIZED CALCIUM: 1.09 MMOL/L (ref 1.06–1.42)
POC IONIZED CALCIUM: 1.11 MMOL/L (ref 1.06–1.42)
POC IONIZED CALCIUM: 1.18 MMOL/L (ref 1.06–1.42)
POC IONIZED CALCIUM: 1.19 MMOL/L (ref 1.06–1.42)
POC IONIZED CALCIUM: 1.2 MMOL/L (ref 1.06–1.42)
POC IONIZED CALCIUM: 1.23 MMOL/L (ref 1.06–1.42)
POC SATURATED O2: 100 % (ref 95–100)
POC SATURATED O2: 93 % (ref 95–100)
POC SATURATED O2: 95 % (ref 95–100)
POC SATURATED O2: 96 % (ref 95–100)
POC SATURATED O2: 98 % (ref 95–100)
POC TCO2: 24 MMOL/L (ref 23–27)
POC TCO2: 24 MMOL/L (ref 23–27)
POC TCO2: 25 MMOL/L (ref 23–27)
POC TCO2: 26 MMOL/L (ref 23–27)
POC TCO2: 27 MMOL/L (ref 23–27)
POC TCO2: 29 MMOL/L (ref 23–27)
POC TCO2: 29 MMOL/L (ref 23–27)
POC TCO2: 30 MMOL/L (ref 23–27)
POC TCO2: 31 MMOL/L (ref 23–27)
POC TCO2: 31 MMOL/L (ref 23–27)
POC TCO2: 32 MMOL/L (ref 23–27)
POTASSIUM BLD-SCNC: 3.3 MMOL/L (ref 3.5–5.1)
POTASSIUM BLD-SCNC: 3.5 MMOL/L (ref 3.5–5.1)
POTASSIUM BLD-SCNC: 3.5 MMOL/L (ref 3.5–5.1)
POTASSIUM BLD-SCNC: 3.8 MMOL/L (ref 3.5–5.1)
POTASSIUM BLD-SCNC: 4 MMOL/L (ref 3.5–5.1)
POTASSIUM BLD-SCNC: 4.1 MMOL/L (ref 3.5–5.1)
POTASSIUM BLD-SCNC: 4.2 MMOL/L (ref 3.5–5.1)
POTASSIUM BLD-SCNC: 4.2 MMOL/L (ref 3.5–5.1)
POTASSIUM BLD-SCNC: 4.7 MMOL/L (ref 3.5–5.1)
POTASSIUM BLD-SCNC: 4.8 MMOL/L (ref 3.5–5.1)
POTASSIUM BLD-SCNC: 5.1 MMOL/L (ref 3.5–5.1)
POTASSIUM SERPL-SCNC: 3.6 MMOL/L (ref 3.5–5.1)
POTASSIUM SERPL-SCNC: 4.9 MMOL/L (ref 3.5–5.1)
POTASSIUM SERPL-SCNC: 5.4 MMOL/L (ref 3.5–5.1)
PROT SERPL-MCNC: 4.7 G/DL (ref 6–8.4)
PROT SERPL-MCNC: 5 G/DL (ref 6–8.4)
PS: 10
RBC # BLD AUTO: 4.38 M/UL (ref 4.6–6.2)
RBC # BLD AUTO: 4.74 M/UL (ref 4.6–6.2)
RBC # BLD AUTO: 5.06 M/UL (ref 4.6–6.2)
SAMPLE: ABNORMAL
SITE: ABNORMAL
SODIUM BLD-SCNC: 135 MMOL/L (ref 136–145)
SODIUM BLD-SCNC: 137 MMOL/L (ref 136–145)
SODIUM BLD-SCNC: 139 MMOL/L (ref 136–145)
SODIUM SERPL-SCNC: 135 MMOL/L (ref 136–145)
SODIUM SERPL-SCNC: 135 MMOL/L (ref 136–145)
SODIUM SERPL-SCNC: 137 MMOL/L (ref 136–145)
SP02: 90
SP02: 92
SP02: 93
SP02: 96
SP02: 99
VT: 650
WBC # BLD AUTO: 13.63 K/UL (ref 3.9–12.7)
WBC # BLD AUTO: 16.71 K/UL (ref 3.9–12.7)
WBC # BLD AUTO: 23.59 K/UL (ref 3.9–12.7)

## 2024-02-23 PROCEDURE — 06BP4ZZ EXCISION OF RIGHT SAPHENOUS VEIN, PERCUTANEOUS ENDOSCOPIC APPROACH: ICD-10-PCS | Performed by: THORACIC SURGERY (CARDIOTHORACIC VASCULAR SURGERY)

## 2024-02-23 PROCEDURE — 5A02210 ASSISTANCE WITH CARDIAC OUTPUT USING BALLOON PUMP, CONTINUOUS: ICD-10-PCS | Performed by: THORACIC SURGERY (CARDIOTHORACIC VASCULAR SURGERY)

## 2024-02-23 PROCEDURE — C1729 CATH, DRAINAGE: HCPCS | Performed by: THORACIC SURGERY (CARDIOTHORACIC VASCULAR SURGERY)

## 2024-02-23 PROCEDURE — 36620 INSERTION CATHETER ARTERY: CPT | Mod: ,,, | Performed by: ANESTHESIOLOGY

## 2024-02-23 PROCEDURE — 85027 COMPLETE CBC AUTOMATED: CPT | Mod: 91 | Performed by: THORACIC SURGERY (CARDIOTHORACIC VASCULAR SURGERY)

## 2024-02-23 PROCEDURE — 93010 ELECTROCARDIOGRAM REPORT: CPT | Mod: ,,, | Performed by: GENERAL PRACTICE

## 2024-02-23 PROCEDURE — 37000009 HC ANESTHESIA EA ADD 15 MINS: Performed by: THORACIC SURGERY (CARDIOTHORACIC VASCULAR SURGERY)

## 2024-02-23 PROCEDURE — 93005 ELECTROCARDIOGRAM TRACING: CPT | Performed by: GENERAL PRACTICE

## 2024-02-23 PROCEDURE — 99900026 HC AIRWAY MAINTENANCE (STAT)

## 2024-02-23 PROCEDURE — 99900031 HC PATIENT EDUCATION (STAT)

## 2024-02-23 PROCEDURE — 25000003 PHARM REV CODE 250

## 2024-02-23 PROCEDURE — D9220A PRA ANESTHESIA: Mod: CRNA,,, | Performed by: NURSE ANESTHETIST, CERTIFIED REGISTERED

## 2024-02-23 PROCEDURE — 20000000 HC ICU ROOM

## 2024-02-23 PROCEDURE — 94761 N-INVAS EAR/PLS OXIMETRY MLT: CPT | Mod: XB

## 2024-02-23 PROCEDURE — 93503 INSERT/PLACE HEART CATHETER: CPT | Mod: ,,, | Performed by: ANESTHESIOLOGY

## 2024-02-23 PROCEDURE — 36000712 HC OR TIME LEV V 1ST 15 MIN: Performed by: THORACIC SURGERY (CARDIOTHORACIC VASCULAR SURGERY)

## 2024-02-23 PROCEDURE — P9047 ALBUMIN (HUMAN), 25%, 50ML: HCPCS | Mod: JG

## 2024-02-23 PROCEDURE — 80053 COMPREHEN METABOLIC PANEL: CPT | Performed by: THORACIC SURGERY (CARDIOTHORACIC VASCULAR SURGERY)

## 2024-02-23 PROCEDURE — S0017 INJECTION, AMINOCAPROIC ACID: HCPCS | Performed by: NURSE ANESTHETIST, CERTIFIED REGISTERED

## 2024-02-23 PROCEDURE — 27100171 HC OXYGEN HIGH FLOW UP TO 24 HOURS

## 2024-02-23 PROCEDURE — 36000713 HC OR TIME LEV V EA ADD 15 MIN: Performed by: THORACIC SURGERY (CARDIOTHORACIC VASCULAR SURGERY)

## 2024-02-23 PROCEDURE — 33508 ENDOSCOPIC VEIN HARVEST: CPT | Mod: 59,,, | Performed by: THORACIC SURGERY (CARDIOTHORACIC VASCULAR SURGERY)

## 2024-02-23 PROCEDURE — 99900035 HC TECH TIME PER 15 MIN (STAT)

## 2024-02-23 PROCEDURE — S0017 INJECTION, AMINOCAPROIC ACID: HCPCS

## 2024-02-23 PROCEDURE — 25000003 PHARM REV CODE 250: Performed by: THORACIC SURGERY (CARDIOTHORACIC VASCULAR SURGERY)

## 2024-02-23 PROCEDURE — 63600175 PHARM REV CODE 636 W HCPCS: Performed by: THORACIC SURGERY (CARDIOTHORACIC VASCULAR SURGERY)

## 2024-02-23 PROCEDURE — 33518 CABG ARTERY-VEIN TWO: CPT | Mod: ,,, | Performed by: THORACIC SURGERY (CARDIOTHORACIC VASCULAR SURGERY)

## 2024-02-23 PROCEDURE — 021109W BYPASS CORONARY ARTERY, TWO ARTERIES FROM AORTA WITH AUTOLOGOUS VENOUS TISSUE, OPEN APPROACH: ICD-10-PCS | Performed by: THORACIC SURGERY (CARDIOTHORACIC VASCULAR SURGERY)

## 2024-02-23 PROCEDURE — 25000003 PHARM REV CODE 250: Performed by: NURSE ANESTHETIST, CERTIFIED REGISTERED

## 2024-02-23 PROCEDURE — 85025 COMPLETE CBC W/AUTO DIFF WBC: CPT | Performed by: THORACIC SURGERY (CARDIOTHORACIC VASCULAR SURGERY)

## 2024-02-23 PROCEDURE — P9045 ALBUMIN (HUMAN), 5%, 250 ML: HCPCS | Mod: JZ,JG | Performed by: NURSE ANESTHETIST, CERTIFIED REGISTERED

## 2024-02-23 PROCEDURE — C1760 CLOSURE DEV, VASC: HCPCS | Performed by: THORACIC SURGERY (CARDIOTHORACIC VASCULAR SURGERY)

## 2024-02-23 PROCEDURE — 83735 ASSAY OF MAGNESIUM: CPT | Performed by: THORACIC SURGERY (CARDIOTHORACIC VASCULAR SURGERY)

## 2024-02-23 PROCEDURE — 84295 ASSAY OF SERUM SODIUM: CPT

## 2024-02-23 PROCEDURE — 63600175 PHARM REV CODE 636 W HCPCS: Performed by: NURSE ANESTHETIST, CERTIFIED REGISTERED

## 2024-02-23 PROCEDURE — 33031 PARTIAL REMOVAL OF HEART SAC: CPT | Mod: 22,,, | Performed by: THORACIC SURGERY (CARDIOTHORACIC VASCULAR SURGERY)

## 2024-02-23 PROCEDURE — 03BB0ZZ EXCISION OF RIGHT RADIAL ARTERY, OPEN APPROACH: ICD-10-PCS | Performed by: THORACIC SURGERY (CARDIOTHORACIC VASCULAR SURGERY)

## 2024-02-23 PROCEDURE — P9045 ALBUMIN (HUMAN), 5%, 250 ML: HCPCS | Mod: JZ,JG | Performed by: THORACIC SURGERY (CARDIOTHORACIC VASCULAR SURGERY)

## 2024-02-23 PROCEDURE — 37799 UNLISTED PX VASCULAR SURGERY: CPT

## 2024-02-23 PROCEDURE — 02TN0ZZ RESECTION OF PERICARDIUM, OPEN APPROACH: ICD-10-PCS | Performed by: THORACIC SURGERY (CARDIOTHORACIC VASCULAR SURGERY)

## 2024-02-23 PROCEDURE — C1768 GRAFT, VASCULAR: HCPCS | Performed by: THORACIC SURGERY (CARDIOTHORACIC VASCULAR SURGERY)

## 2024-02-23 PROCEDURE — 02100A9 BYPASS CORONARY ARTERY, ONE ARTERY FROM LEFT INTERNAL MAMMARY WITH AUTOLOGOUS ARTERIAL TISSUE, OPEN APPROACH: ICD-10-PCS | Performed by: THORACIC SURGERY (CARDIOTHORACIC VASCULAR SURGERY)

## 2024-02-23 PROCEDURE — 85014 HEMATOCRIT: CPT

## 2024-02-23 PROCEDURE — 99223 1ST HOSP IP/OBS HIGH 75: CPT | Mod: ,,, | Performed by: GENERAL PRACTICE

## 2024-02-23 PROCEDURE — 37000008 HC ANESTHESIA 1ST 15 MINUTES: Performed by: THORACIC SURGERY (CARDIOTHORACIC VASCULAR SURGERY)

## 2024-02-23 PROCEDURE — 82803 BLOOD GASES ANY COMBINATION: CPT

## 2024-02-23 PROCEDURE — D9220A PRA ANESTHESIA: Mod: ANES,,, | Performed by: ANESTHESIOLOGY

## 2024-02-23 PROCEDURE — 35600 OPEN HRV UXTR ART 1 SGM CAB: CPT | Mod: ,,, | Performed by: THORACIC SURGERY (CARDIOTHORACIC VASCULAR SURGERY)

## 2024-02-23 PROCEDURE — 33967 INSERT I-AORT PERCUT DEVICE: CPT | Mod: 51,,, | Performed by: THORACIC SURGERY (CARDIOTHORACIC VASCULAR SURGERY)

## 2024-02-23 PROCEDURE — 84100 ASSAY OF PHOSPHORUS: CPT | Performed by: THORACIC SURGERY (CARDIOTHORACIC VASCULAR SURGERY)

## 2024-02-23 PROCEDURE — 33533 CABG ARTERIAL SINGLE: CPT | Mod: 51,,, | Performed by: THORACIC SURGERY (CARDIOTHORACIC VASCULAR SURGERY)

## 2024-02-23 PROCEDURE — 80048 BASIC METABOLIC PNL TOTAL CA: CPT | Performed by: THORACIC SURGERY (CARDIOTHORACIC VASCULAR SURGERY)

## 2024-02-23 PROCEDURE — 94002 VENT MGMT INPAT INIT DAY: CPT

## 2024-02-23 PROCEDURE — 82330 ASSAY OF CALCIUM: CPT

## 2024-02-23 PROCEDURE — 63600175 PHARM REV CODE 636 W HCPCS

## 2024-02-23 PROCEDURE — 27201423 OPTIME MED/SURG SUP & DEVICES STERILE SUPPLY: Performed by: THORACIC SURGERY (CARDIOTHORACIC VASCULAR SURGERY)

## 2024-02-23 PROCEDURE — 84132 ASSAY OF SERUM POTASSIUM: CPT

## 2024-02-23 PROCEDURE — 94799 UNLISTED PULMONARY SVC/PX: CPT

## 2024-02-23 RX ORDER — PHENYLEPHRINE HCL IN 0.9% NACL 20MG/250ML
0-5 PLASTIC BAG, INJECTION (ML) INTRAVENOUS CONTINUOUS
Status: DISCONTINUED | OUTPATIENT
Start: 2024-02-23 | End: 2024-02-29

## 2024-02-23 RX ORDER — CALCIUM CHLORIDE, MAGNESIUM CHLORIDE, POTASSIUM CHLORIDE AND SODIUM CHLORIDE 17.6; 325.3; 119.3; 643 MG/100ML; MG/100ML; MG/100ML; MG/100ML
SOLUTION INTRA-ARTERIAL ONCE
Status: DISCONTINUED | OUTPATIENT
Start: 2024-02-23 | End: 2024-02-23

## 2024-02-23 RX ORDER — LEVOTHYROXINE SODIUM 25 UG/1
50 TABLET ORAL
Status: DISCONTINUED | OUTPATIENT
Start: 2024-02-24 | End: 2024-02-25

## 2024-02-23 RX ORDER — HYDROCODONE BITARTRATE AND ACETAMINOPHEN 5; 325 MG/1; MG/1
1 TABLET ORAL EVERY 4 HOURS PRN
Status: DISCONTINUED | OUTPATIENT
Start: 2024-02-23 | End: 2024-03-07 | Stop reason: HOSPADM

## 2024-02-23 RX ORDER — POTASSIUM CHLORIDE 14.9 MG/ML
60 INJECTION INTRAVENOUS
Status: DISCONTINUED | OUTPATIENT
Start: 2024-02-23 | End: 2024-03-02

## 2024-02-23 RX ORDER — ALBUMIN HUMAN 50 G/1000ML
SOLUTION INTRAVENOUS
Status: DISCONTINUED | OUTPATIENT
Start: 2024-02-23 | End: 2024-02-23

## 2024-02-23 RX ORDER — SODIUM CHLORIDE 9 MG/ML
INJECTION, SOLUTION INTRAVENOUS CONTINUOUS PRN
Status: DISCONTINUED | OUTPATIENT
Start: 2024-02-23 | End: 2024-02-23

## 2024-02-23 RX ORDER — DEXMEDETOMIDINE HYDROCHLORIDE 4 UG/ML
INJECTION, SOLUTION INTRAVENOUS
Status: COMPLETED
Start: 2024-02-23 | End: 2024-02-23

## 2024-02-23 RX ORDER — FAMOTIDINE 10 MG/ML
INJECTION INTRAVENOUS
Status: DISCONTINUED | OUTPATIENT
Start: 2024-02-23 | End: 2024-02-23

## 2024-02-23 RX ORDER — MAGNESIUM SULFATE HEPTAHYDRATE 40 MG/ML
2 INJECTION, SOLUTION INTRAVENOUS
Status: DISCONTINUED | OUTPATIENT
Start: 2024-02-23 | End: 2024-02-26 | Stop reason: SDUPTHER

## 2024-02-23 RX ORDER — METOPROLOL SUCCINATE 25 MG/1
25 TABLET, EXTENDED RELEASE ORAL 2 TIMES DAILY
Status: DISCONTINUED | OUTPATIENT
Start: 2024-02-23 | End: 2024-02-27

## 2024-02-23 RX ORDER — MIDAZOLAM HYDROCHLORIDE 1 MG/ML
1 INJECTION INTRAMUSCULAR; INTRAVENOUS
Status: DISPENSED | OUTPATIENT
Start: 2024-02-23 | End: 2024-02-24

## 2024-02-23 RX ORDER — MORPHINE SULFATE 4 MG/ML
4 INJECTION, SOLUTION INTRAMUSCULAR; INTRAVENOUS
Status: DISCONTINUED | OUTPATIENT
Start: 2024-02-23 | End: 2024-02-29

## 2024-02-23 RX ORDER — DOCUSATE SODIUM 100 MG/1
100 CAPSULE, LIQUID FILLED ORAL 2 TIMES DAILY
Status: DISCONTINUED | OUTPATIENT
Start: 2024-02-24 | End: 2024-02-28

## 2024-02-23 RX ORDER — HEPARIN SODIUM 10000 [USP'U]/ML
INJECTION, SOLUTION INTRAVENOUS; SUBCUTANEOUS
Status: DISCONTINUED | OUTPATIENT
Start: 2024-02-23 | End: 2024-02-23 | Stop reason: HOSPADM

## 2024-02-23 RX ORDER — SODIUM CHLORIDE, SODIUM LACTATE, POTASSIUM CHLORIDE, CALCIUM CHLORIDE 600; 310; 30; 20 MG/100ML; MG/100ML; MG/100ML; MG/100ML
INJECTION, SOLUTION INTRAVENOUS CONTINUOUS PRN
Status: DISCONTINUED | OUTPATIENT
Start: 2024-02-23 | End: 2024-02-23

## 2024-02-23 RX ORDER — ONDANSETRON HYDROCHLORIDE 2 MG/ML
4 INJECTION, SOLUTION INTRAVENOUS EVERY 6 HOURS PRN
Status: DISCONTINUED | OUTPATIENT
Start: 2024-02-23 | End: 2024-03-07 | Stop reason: HOSPADM

## 2024-02-23 RX ORDER — ETOMIDATE 2 MG/ML
INJECTION INTRAVENOUS
Status: DISCONTINUED | OUTPATIENT
Start: 2024-02-23 | End: 2024-02-23

## 2024-02-23 RX ORDER — DEXMEDETOMIDINE HYDROCHLORIDE 4 UG/ML
0-1.4 INJECTION, SOLUTION INTRAVENOUS CONTINUOUS
Status: DISCONTINUED | OUTPATIENT
Start: 2024-02-23 | End: 2024-02-29

## 2024-02-23 RX ORDER — VECURONIUM BROMIDE FOR INJECTION 1 MG/ML
INJECTION, POWDER, LYOPHILIZED, FOR SOLUTION INTRAVENOUS
Status: DISCONTINUED | OUTPATIENT
Start: 2024-02-23 | End: 2024-02-23

## 2024-02-23 RX ORDER — MORPHINE SULFATE 2 MG/ML
2 INJECTION, SOLUTION INTRAMUSCULAR; INTRAVENOUS
Status: DISCONTINUED | OUTPATIENT
Start: 2024-02-23 | End: 2024-02-29

## 2024-02-23 RX ORDER — PROTAMINE SULFATE 10 MG/ML
INJECTION, SOLUTION INTRAVENOUS
Status: DISCONTINUED | OUTPATIENT
Start: 2024-02-23 | End: 2024-02-23

## 2024-02-23 RX ORDER — CALCIUM GLUCONATE 20 MG/ML
2 INJECTION, SOLUTION INTRAVENOUS
Status: DISCONTINUED | OUTPATIENT
Start: 2024-02-23 | End: 2024-02-26 | Stop reason: SDUPTHER

## 2024-02-23 RX ORDER — ONDANSETRON HYDROCHLORIDE 2 MG/ML
INJECTION, SOLUTION INTRAVENOUS
Status: DISCONTINUED | OUTPATIENT
Start: 2024-02-23 | End: 2024-02-23

## 2024-02-23 RX ORDER — POTASSIUM CHLORIDE 14.9 MG/ML
20 INJECTION INTRAVENOUS
Status: DISCONTINUED | OUTPATIENT
Start: 2024-02-23 | End: 2024-03-02

## 2024-02-23 RX ORDER — AMINOCAPROIC ACID 250 MG/ML
INJECTION, SOLUTION INTRAVENOUS
Status: DISCONTINUED | OUTPATIENT
Start: 2024-02-23 | End: 2024-02-23

## 2024-02-23 RX ORDER — ALBUMIN HUMAN 50 G/1000ML
25 SOLUTION INTRAVENOUS ONCE
Status: COMPLETED | OUTPATIENT
Start: 2024-02-23 | End: 2024-02-23

## 2024-02-23 RX ORDER — RANOLAZINE 500 MG/1
1000 TABLET, EXTENDED RELEASE ORAL 2 TIMES DAILY
Status: DISCONTINUED | OUTPATIENT
Start: 2024-02-23 | End: 2024-02-24

## 2024-02-23 RX ORDER — POTASSIUM CHLORIDE 29.8 MG/ML
40 INJECTION INTRAVENOUS
Status: DISCONTINUED | OUTPATIENT
Start: 2024-02-23 | End: 2024-03-02

## 2024-02-23 RX ORDER — MIDAZOLAM HYDROCHLORIDE 1 MG/ML
INJECTION INTRAMUSCULAR; INTRAVENOUS
Status: DISCONTINUED | OUTPATIENT
Start: 2024-02-23 | End: 2024-02-23

## 2024-02-23 RX ORDER — CALCIUM GLUCONATE 20 MG/ML
3 INJECTION, SOLUTION INTRAVENOUS
Status: DISCONTINUED | OUTPATIENT
Start: 2024-02-23 | End: 2024-02-26 | Stop reason: SDUPTHER

## 2024-02-23 RX ORDER — ALBUTEROL SULFATE 0.83 MG/ML
2.5 SOLUTION RESPIRATORY (INHALATION) EVERY 6 HOURS PRN
Status: DISCONTINUED | OUTPATIENT
Start: 2024-02-23 | End: 2024-03-04

## 2024-02-23 RX ORDER — CHLORHEXIDINE GLUCONATE ORAL RINSE 1.2 MG/ML
10 SOLUTION DENTAL
Status: DISCONTINUED | OUTPATIENT
Start: 2024-02-23 | End: 2024-02-23

## 2024-02-23 RX ORDER — MAGNESIUM SULFATE HEPTAHYDRATE 40 MG/ML
2 INJECTION, SOLUTION INTRAVENOUS
Status: DISCONTINUED | OUTPATIENT
Start: 2024-02-23 | End: 2024-03-02

## 2024-02-23 RX ORDER — SUCCINYLCHOLINE CHLORIDE 20 MG/ML
INJECTION INTRAMUSCULAR; INTRAVENOUS
Status: DISCONTINUED | OUTPATIENT
Start: 2024-02-23 | End: 2024-02-23

## 2024-02-23 RX ORDER — ALBUMIN HUMAN 50 G/1000ML
25 SOLUTION INTRAVENOUS
Status: DISCONTINUED | OUTPATIENT
Start: 2024-02-23 | End: 2024-02-29

## 2024-02-23 RX ORDER — HEPARIN SODIUM 10000 [USP'U]/ML
INJECTION, SOLUTION INTRAVENOUS; SUBCUTANEOUS
Status: DISCONTINUED | OUTPATIENT
Start: 2024-02-23 | End: 2024-02-23

## 2024-02-23 RX ORDER — MAGNESIUM SULFATE HEPTAHYDRATE 40 MG/ML
4 INJECTION, SOLUTION INTRAVENOUS
Status: DISCONTINUED | OUTPATIENT
Start: 2024-02-23 | End: 2024-03-02

## 2024-02-23 RX ORDER — ALBUTEROL SULFATE 90 UG/1
2 AEROSOL, METERED RESPIRATORY (INHALATION) EVERY 6 HOURS PRN
Status: DISCONTINUED | OUTPATIENT
Start: 2024-02-23 | End: 2024-02-23

## 2024-02-23 RX ORDER — MONTELUKAST SODIUM 10 MG/1
10 TABLET ORAL DAILY
Status: DISCONTINUED | OUTPATIENT
Start: 2024-02-23 | End: 2024-03-07 | Stop reason: HOSPADM

## 2024-02-23 RX ORDER — MUPIROCIN 20 MG/G
OINTMENT TOPICAL 2 TIMES DAILY
Status: DISPENSED | OUTPATIENT
Start: 2024-02-23 | End: 2024-02-26

## 2024-02-23 RX ORDER — CALCIUM GLUCONATE 20 MG/ML
1 INJECTION, SOLUTION INTRAVENOUS
Status: DISCONTINUED | OUTPATIENT
Start: 2024-02-23 | End: 2024-03-02

## 2024-02-23 RX ORDER — CALCIUM GLUCONATE 20 MG/ML
2 INJECTION, SOLUTION INTRAVENOUS
Status: DISCONTINUED | OUTPATIENT
Start: 2024-02-23 | End: 2024-03-02

## 2024-02-23 RX ORDER — FENTANYL CITRATE 50 UG/ML
INJECTION, SOLUTION INTRAMUSCULAR; INTRAVENOUS
Status: DISPENSED
Start: 2024-02-23 | End: 2024-02-23

## 2024-02-23 RX ORDER — MAGNESIUM SULFATE HEPTAHYDRATE 40 MG/ML
4 INJECTION, SOLUTION INTRAVENOUS
Status: DISCONTINUED | OUTPATIENT
Start: 2024-02-23 | End: 2024-02-26 | Stop reason: SDUPTHER

## 2024-02-23 RX ORDER — POTASSIUM CHLORIDE 29.8 MG/ML
80 INJECTION INTRAVENOUS
Status: DISCONTINUED | OUTPATIENT
Start: 2024-02-23 | End: 2024-03-02

## 2024-02-23 RX ORDER — NAPROXEN SODIUM 220 MG/1
81 TABLET, FILM COATED ORAL DAILY
Status: DISCONTINUED | OUTPATIENT
Start: 2024-02-23 | End: 2024-03-07 | Stop reason: HOSPADM

## 2024-02-23 RX ORDER — CEFAZOLIN SODIUM 2 G/50ML
2 SOLUTION INTRAVENOUS
Status: COMPLETED | OUTPATIENT
Start: 2024-02-23 | End: 2024-02-23

## 2024-02-23 RX ORDER — CALCIUM GLUCONATE 20 MG/ML
1 INJECTION, SOLUTION INTRAVENOUS
Status: DISCONTINUED | OUTPATIENT
Start: 2024-02-23 | End: 2024-02-26 | Stop reason: SDUPTHER

## 2024-02-23 RX ORDER — CEFAZOLIN SODIUM 2 G/50ML
2 SOLUTION INTRAVENOUS
Status: COMPLETED | OUTPATIENT
Start: 2024-02-23 | End: 2024-02-24

## 2024-02-23 RX ORDER — MUPIROCIN 20 MG/G
OINTMENT TOPICAL
Status: DISCONTINUED | OUTPATIENT
Start: 2024-02-23 | End: 2024-02-23

## 2024-02-23 RX ORDER — CALCIUM GLUCONATE 20 MG/ML
3 INJECTION, SOLUTION INTRAVENOUS
Status: DISCONTINUED | OUTPATIENT
Start: 2024-02-23 | End: 2024-03-02

## 2024-02-23 RX ORDER — FENTANYL CITRATE 50 UG/ML
INJECTION, SOLUTION INTRAMUSCULAR; INTRAVENOUS
Status: DISCONTINUED | OUTPATIENT
Start: 2024-02-23 | End: 2024-02-23

## 2024-02-23 RX ORDER — PANTOPRAZOLE SODIUM 40 MG/1
40 TABLET, DELAYED RELEASE ORAL
Status: DISCONTINUED | OUTPATIENT
Start: 2024-02-23 | End: 2024-03-07 | Stop reason: HOSPADM

## 2024-02-23 RX ORDER — TAMSULOSIN HYDROCHLORIDE 0.4 MG/1
0.4 CAPSULE ORAL DAILY
Status: DISCONTINUED | OUTPATIENT
Start: 2024-02-23 | End: 2024-03-07 | Stop reason: HOSPADM

## 2024-02-23 RX ORDER — HYDROCODONE BITARTRATE AND ACETAMINOPHEN 500; 5 MG/1; MG/1
TABLET ORAL
Status: DISCONTINUED | OUTPATIENT
Start: 2024-02-23 | End: 2024-02-23

## 2024-02-23 RX ORDER — DULOXETIN HYDROCHLORIDE 30 MG/1
60 CAPSULE, DELAYED RELEASE ORAL NIGHTLY
Status: DISCONTINUED | OUTPATIENT
Start: 2024-02-23 | End: 2024-03-07 | Stop reason: HOSPADM

## 2024-02-23 RX ORDER — SODIUM CHLORIDE 450 MG/100ML
INJECTION, SOLUTION INTRAVENOUS CONTINUOUS
Status: DISCONTINUED | OUTPATIENT
Start: 2024-02-23 | End: 2024-03-01

## 2024-02-23 RX ORDER — MIDAZOLAM HYDROCHLORIDE 1 MG/ML
INJECTION INTRAMUSCULAR; INTRAVENOUS
Status: DISPENSED
Start: 2024-02-23 | End: 2024-02-23

## 2024-02-23 RX ADMIN — MAGNESIUM SULFATE HEPTAHYDRATE 2 G: 40 INJECTION, SOLUTION INTRAVENOUS at 08:02

## 2024-02-23 RX ADMIN — GENTAMICIN SULFATE 80 MG: 40 INJECTION, SOLUTION INTRAMUSCULAR; INTRAVENOUS at 07:02

## 2024-02-23 RX ADMIN — CEFAZOLIN SODIUM 2 G: 2 SOLUTION INTRAVENOUS at 11:02

## 2024-02-23 RX ADMIN — SODIUM CHLORIDE: 0.9 INJECTION, SOLUTION INTRAVENOUS at 06:02

## 2024-02-23 RX ADMIN — SODIUM CHLORIDE, SODIUM LACTATE, POTASSIUM CHLORIDE, AND CALCIUM CHLORIDE: .6; .31; .03; .02 INJECTION, SOLUTION INTRAVENOUS at 06:02

## 2024-02-23 RX ADMIN — MIDAZOLAM HYDROCHLORIDE 1 MG: 1 INJECTION, SOLUTION INTRAMUSCULAR; INTRAVENOUS at 07:02

## 2024-02-23 RX ADMIN — ONDANSETRON 4 MG: 2 INJECTION INTRAMUSCULAR; INTRAVENOUS at 07:02

## 2024-02-23 RX ADMIN — MIDAZOLAM HYDROCHLORIDE 3 MG: 1 INJECTION, SOLUTION INTRAMUSCULAR; INTRAVENOUS at 07:02

## 2024-02-23 RX ADMIN — MUPIROCIN 1 G: 20 OINTMENT TOPICAL at 11:02

## 2024-02-23 RX ADMIN — CEFAZOLIN SODIUM 2 G: 2 SOLUTION INTRAVENOUS at 04:02

## 2024-02-23 RX ADMIN — Medication 120 MG: at 07:02

## 2024-02-23 RX ADMIN — POTASSIUM CHLORIDE 20 MEQ: 14.9 INJECTION, SOLUTION INTRAVENOUS at 02:02

## 2024-02-23 RX ADMIN — VECURONIUM BROMIDE 5 MG: 1 INJECTION, POWDER, LYOPHILIZED, FOR SOLUTION INTRAVENOUS at 07:02

## 2024-02-23 RX ADMIN — DEXMEDETOMIDINE HYDROCHLORIDE 0.2 MCG/KG/HR: 4 INJECTION, SOLUTION INTRAVENOUS at 04:02

## 2024-02-23 RX ADMIN — ETOMIDATE 8 MG: 2 INJECTION, SOLUTION INTRAVENOUS at 07:02

## 2024-02-23 RX ADMIN — ALBUMIN (HUMAN) 25 G: 12.5 INJECTION, SOLUTION INTRAVENOUS at 10:02

## 2024-02-23 RX ADMIN — FENTANYL CITRATE 250 MCG: 50 INJECTION, SOLUTION INTRAMUSCULAR; INTRAVENOUS at 09:02

## 2024-02-23 RX ADMIN — HEPARIN SODIUM 40000 UNITS: 10000 INJECTION, SOLUTION INTRAVENOUS; SUBCUTANEOUS at 10:02

## 2024-02-23 RX ADMIN — ONDANSETRON 4 MG: 2 INJECTION INTRAMUSCULAR; INTRAVENOUS at 12:02

## 2024-02-23 RX ADMIN — PROTAMINE SULFATE 350 MG: 10 INJECTION, SOLUTION INTRAVENOUS at 12:02

## 2024-02-23 RX ADMIN — EPINEPHRINE 0.03 MCG/KG/MIN: 1 INJECTION INTRAMUSCULAR; INTRAVENOUS; SUBCUTANEOUS at 12:02

## 2024-02-23 RX ADMIN — CEFAZOLIN SODIUM 2 G: 2 SOLUTION INTRAVENOUS at 07:02

## 2024-02-23 RX ADMIN — FAMOTIDINE 20 MG: 10 INJECTION, SOLUTION INTRAVENOUS at 07:02

## 2024-02-23 RX ADMIN — INSULIN HUMAN 2 UNITS/HR: 1 INJECTION, SOLUTION INTRAVENOUS at 05:02

## 2024-02-23 RX ADMIN — SODIUM CHLORIDE: 0.45 INJECTION, SOLUTION INTRAVENOUS at 02:02

## 2024-02-23 RX ADMIN — MIDAZOLAM HYDROCHLORIDE 2 MG: 1 INJECTION, SOLUTION INTRAMUSCULAR; INTRAVENOUS at 12:02

## 2024-02-23 RX ADMIN — VECURONIUM BROMIDE 3 MG: 1 INJECTION, POWDER, LYOPHILIZED, FOR SOLUTION INTRAVENOUS at 11:02

## 2024-02-23 RX ADMIN — VECURONIUM BROMIDE 5 MG: 1 INJECTION, POWDER, LYOPHILIZED, FOR SOLUTION INTRAVENOUS at 08:02

## 2024-02-23 RX ADMIN — FENTANYL CITRATE 250 MCG: 50 INJECTION, SOLUTION INTRAMUSCULAR; INTRAVENOUS at 07:02

## 2024-02-23 RX ADMIN — ALBUMIN (HUMAN) 500 ML: 12.5 INJECTION, SOLUTION INTRAVENOUS at 12:02

## 2024-02-23 RX ADMIN — MIDAZOLAM HYDROCHLORIDE 2 MG: 1 INJECTION, SOLUTION INTRAMUSCULAR; INTRAVENOUS at 07:02

## 2024-02-23 RX ADMIN — AMINOCAPROIC ACID 5 G: 250 INJECTION, SOLUTION INTRAVENOUS at 12:02

## 2024-02-23 RX ADMIN — AMINOCAPROIC ACID 5 G: 250 INJECTION, SOLUTION INTRAVENOUS at 07:02

## 2024-02-23 RX ADMIN — ALBUMIN (HUMAN) 25 G: 12.5 INJECTION, SOLUTION INTRAVENOUS at 04:02

## 2024-02-23 RX ADMIN — VECURONIUM BROMIDE 5 MG: 1 INJECTION, POWDER, LYOPHILIZED, FOR SOLUTION INTRAVENOUS at 09:02

## 2024-02-23 RX ADMIN — MIDAZOLAM HYDROCHLORIDE 3 MG: 1 INJECTION, SOLUTION INTRAMUSCULAR; INTRAVENOUS at 11:02

## 2024-02-23 RX ADMIN — PHENYLEPHRINE HYDROCHLORIDE 0.5 MCG/KG/MIN: 10 INJECTION INTRAVENOUS at 07:02

## 2024-02-23 RX ADMIN — FAMOTIDINE 20 MG: 10 INJECTION, SOLUTION INTRAVENOUS at 12:02

## 2024-02-23 RX ADMIN — FIBRINOGEN HUMAN, HUMAN THROMBIN: 90; 500 SOLUTION TOPICAL at 07:02

## 2024-02-23 RX ADMIN — ALBUMIN (HUMAN) 25 G: 12.5 INJECTION, SOLUTION INTRAVENOUS at 06:02

## 2024-02-23 RX ADMIN — FENTANYL CITRATE 250 MCG: 50 INJECTION, SOLUTION INTRAMUSCULAR; INTRAVENOUS at 08:02

## 2024-02-23 RX ADMIN — DEXMEDETOMIDINE HYDROCHLORIDE 0.2 MCG/KG/HR: 400 INJECTION INTRAVENOUS at 04:02

## 2024-02-23 RX ADMIN — FENTANYL CITRATE 250 MCG: 50 INJECTION, SOLUTION INTRAMUSCULAR; INTRAVENOUS at 10:02

## 2024-02-23 RX ADMIN — SODIUM CHLORIDE 1 UNITS/HR: 9 INJECTION, SOLUTION INTRAVENOUS at 08:02

## 2024-02-23 NOTE — NURSING
78 yr old male   Left forearm  skin tears     Recommendation:  Left forearm  Clean with chlorhexidine/ns.  Pat dry. Apply Medihoney . Cover with adaptic, abd pad, and kerlex.

## 2024-02-23 NOTE — PLAN OF CARE
[facility]  Initial Discharge Assessment       Primary Care Provider: Sunita Rivera MD    Admission Diagnosis: Coronary artery disease involving coronary bypass graft of native heart without angina pectoris [I25.810]  Long-term (current) use of anticoagulants, INR goal 2.5-3.5 [Z79.01]  PAD (peripheral artery disease) [I73.9]  CAD (coronary artery disease) [I25.10]    Admission Date: 2/23/2024  Expected Discharge Date:     Transition of Care Barriers: None    Assessment completed with wife Berenice.  Pt will discharge home with  family where he is able to complete adl's without assistance. Denies HH, DME, dialysis, services or needs prior to admit, may need home health at discharge cm to follow for needs.    Payor: MEDICARE / Plan: MEDICARE PART A & B / Product Type: Government /     Extended Emergency Contact Information  Primary Emergency Contact: Berenice Hanley  Mobile Phone: [phone]  Relation: Spouse  Secondary Emergency Contact: Lisy Garcia  Address: [address] [place] [address]  Home Phone: [phone]  Work Phone: [phone]  Mobile Phone: [phone]  Relation: Relative    Discharge Plan A: Home, Home with family  Discharge Plan B: Home with family, Home Health      [facility] [address] [address]  Phone: [phone] Fax: [phone]    [facility] [place] [id number]  Phone: [phone] Fax: [phone]      Initial Assessment (most recent)       Adult Discharge Assessment - 02/23/24 1004          Discharge Assessment    Assessment Type Discharge Planning Assessment     Confirmed/corrected address, phone number and insurance Yes     Confirmed Demographics Correct on Facesheet     Source of Information patient     Communicated LEIA with patient/caregiver Date not available/Unable to  determine     Reason For Admission CABG     People in Home spouse     Facility Arrived From: Home     Do you expect to return to your current living situation? Yes     Do you have help at home or someone to help you manage your care at home? Yes     Who are your caregiver(s) and their phone number(s)? Berenice Hanley (Spouse) [phone]     Prior to hospitilization cognitive status: Alert/Oriented     Current cognitive status: Alert/Oriented     Walking or Climbing Stairs Difficulty no     Equipment Currently Used at Home none     Readmission within 30 days? No     Patient currently being followed by outpatient case management? No     Do you currently have service(s) that help you manage your care at home? No     Do you take prescription medications? Yes     Do you have prescription coverage? Yes     Coverage [facility]     Do you have any problems affording any of your prescribed medications? No     Is the patient taking medications as prescribed? yes     Who is going to help you get home at discharge? Berenice Hanley (Spouse) [phone]     How do you get to doctors appointments? car, drives self     Are you on dialysis? No     Do you take coumadin? No     Discharge Plan A Home;Home with family     Discharge Plan B Home with family;Home Health     DME Needed Upon Discharge  none     Discharge Plan discussed with: Spouse/sig other     Name(s) and Number(s) Berenice Hanley (Spouse) [phone]     Transition of Care Barriers None

## 2024-02-23 NOTE — ANESTHESIA PROCEDURE NOTES
[place] [facility]    Diagnosis: CAD;CABG  Patient location during procedure: ICU  Timeout: 2/23/2024 6:25 AM  Procedure end time: 2/23/2024 6:33 AM    Staffing  Authorizing Provider: Samm Carson Jr., MD  Performing Provider: Samm Carson Jr., MD    Staffing  Performed by: Samm Carson Jr., MD  Authorized by: Samm Carson Jr., MD    Anesthesiologist was present at the time of the procedure.  Preanesthetic Checklist  Completed: patient identified, IV checked, site marked, risks and benefits discussed, surgical consent, monitors and equipment checked, pre-op evaluation, timeout performed and anesthesia consent given  [place] [facility]  Skin Prep: chlorhexidine gluconate  Local Infiltration: none  Location: right,  internal jugular vein  Vessel Caliber: medium, patent, compressibility normal  Introducer: 9 Fr single lumen, manometry not used.  Device: CCO/Oximetric Catheter   Catheter Size: 7 Fr  Catheter placement by yes. Heme positive aspiration all ports. PAC floated with balloon up until wedgedSterile sheath usedInsertion Attempts: 1  Indication: intravenous therapy, hemodynamic monitoring, transvenous pacing  Ultrasound Guidance  Needle advanced into vessel with real time Ultrasound guidance.  Guidewire confirmed in vessel.  Sterile sheath used.  Assessment  Central Line Bundle Protocol followed. Hand hygiene before procedure, surgical cap worn, surgical mask worn, sterile surgical gloves worn, large sterile drape used.  Verification: chest x-ray, ultrasound and blood return  Dressing: secured with tape and tegaderm and sutured in place and taped  Patient: Tolerated Well

## 2024-02-23 NOTE — ANESTHESIA PROCEDURE NOTES
[facility]    Diagnosis: CAD;CABG  Patient location during procedure: ICU  Procedure start time: 2/23/2024 6:15 AM  Timeout: 2/23/2024 6:15 AM  Procedure end time: 2/23/2024 6:25 AM    Staffing  Authorizing Provider: Samm Carson Jr., MD  Performing Provider: Samm Carson Jr., MD    Staffing  Performed: anesthesiologist   Anesthesiologist: Samm Carson Jr., MD  Performed by: Samm Carson Jr., MD  Authorized by: Samm Carson Jr., MD    Anesthesiologist was present at the time of the procedure.  Preanesthetic Checklist  Completed: patient identified, IV checked, site marked, risks and benefits discussed, surgical consent, monitors and equipment checked, pre-op evaluation, timeout performed and anesthesia consent given  Indication   Indication: hemodynamic monitoring, vascular access, med administration     Anesthesia   local infiltration    Central Line   Skin Prep: skin prepped with ChloraPrep, skin prep agent completely dried prior to procedure  Sterile Barriers Followed: Yes    All five maximal barriers used- gloves, gown, cap, mask, and large sterile sheet    hand hygiene performed prior to central venous catheter insertion  Location: right internal jugular.   Catheter type: double lumen (MAC 9 Fr)  Catheter Size: 9 Fr  Inserted Catheter Length: 15 cm  Ultrasound: vascular probe with ultrasound   Vessel Caliber: medium, patent, compressibility normal  Needle advanced into vessel with real time Ultrasound guidance.  Guidewire confirmed in vessel.  sterile gel and probe cover used in ultrasound-guided central venous catheter insertion  Manometry: none  Insertion Attempts: 1   Securement:line sutured, chlorhexidine patch, sterile dressing applied and blood return through all ports    Post-Procedure   X-Ray: no pneumothorax on x-ray, placement verified by x-ray, tip termination and successful placement  Tip termination comments: SVC   Adverse Events:none      Guidewire Guidewire removed  intact. Guidewire removed intact, verified with nurse.

## 2024-02-23 NOTE — CONSULTS
[facility]  Department of Cardiology  Consult Note      PATIENT NAME: Jose F Hanley  MRN: [id number]  TODAY'S DATE: 02/23/2024  ADMIT DATE: 2/23/2024                          CONSULT REQUESTED BY: Gary Thomas MD    SUBJECTIVE     PRINCIPAL PROBLEM: S/P CABG (coronary artery bypass graft)      REASON FOR CONSULT:  Post CABG       HPI:  Pt is a 78 year old with history of known CAD with previous bypass surgery back in 08/31/23 and then underwent a repeat CABG today, 2/23/23. He Just recently had a [facility] as well with Dr. Bush back on 2/7/24.       Per surgery team:  Operation: Redo coronary artery bypass grafting x3 using left radial artery to left anterior descending coronary artery and separate segments of saphenous vein from the aorta to the diagonal coronary artery and from the aorta to the obtuse marginal coronary artery using a combination of antegrade and retrograde cardioplegia, mild systemic hypothermia, endoscopic vein harvest from the right leg, right radial artery harvest, and insertion of right femoral arterial intra-aortic balloon pump with fluoroscopic guidance and complete pericardiectomy due to severe adhesive pericarditis         Review of patient's allergies indicates:   Allergen Reactions    Adhes. band-tape-benzalkonium Rash     Pt stated it caused blisters    Statins-hmg-coa reductase inhibitors Other (See Comments)     Statin Intolerance (joint pain)       Past Medical History:   Diagnosis Date    A-fib     Allergies 2020    gets allergy shots    Arthritis 1973    right knee    Back pain     COPD (chronic obstructive pulmonary disease)     Diastolic heart failure     GERD (gastroesophageal reflux disease) 2015    HLD (hyperlipidemia)     HTN (hypertension)     Hx of LASIK     Hypothyroidism, unspecified 2012    Inflammatory disease of prostate, unspecified     out of medicine    Mild intermittent asthma, uncomplicated     Myocardial infarction     Neuropathy     On home  oxygen therapy     3L NC  at night    PAD (peripheral artery disease)     Staph skin infection     right thigh    TIA (transient ischemic attack)      Past Surgical History:   Procedure Laterality Date    AORTOGRAPHY WITH SERIALOGRAPHY N/A 2023    Procedure: AORTOGRAM, WITH SERIALOGRAPHY;  Surgeon: Gary Thomas MD;  Location: [facility] CATH/EP LAB;  Service: Peripheral Vascular;  Laterality: N/A;    CATARACT EXTRACTION Bilateral 2014    CORONARY ANGIOGRAPHY INCLUDING BYPASS GRAFTS WITH CATHETERIZATION OF LEFT HEART Left 2024    Procedure: ANGIOGRAM, CORONARY, INCLUDING BYPASS GRAFT, WITH LEFT HEART CATHETERIZATION;  Surgeon: Gregg Bush MD;  Location: [facility] CATH/EP LAB;  Service: Cardiology;  Laterality: Left;    CORONARY ARTERY BYPASS GRAFT      INSERTION OF IMPLANTABLE LOOP RECORDER      INSERTION OF PACEMAKER      does not have card with him for preadmit    INSTANTANEOUS WAVE-FREE RATIO (IFR) N/A 2024    Procedure: Instantaneous Wave-Free Ratio (IFR);  Surgeon: Gregg Bush MD;  Location: [facility] CATH/EP LAB;  Service: Cardiology;  Laterality: N/A;    PTA, ANTERIOR TIBIAL Left 2023    Procedure: PTA, Anterior Tibial;  Surgeon: Gary Thomas MD;  Location: [facility] CATH/EP LAB;  Service: Peripheral Vascular;  Laterality: Left;    PTA, SUPERFICIAL FEMORAL ARTERY Left 2023    Procedure: PTA, Superficial Femoral Artery;  Surgeon: Gary Thomas MD;  Location: [facility] CATH/EP LAB;  Service: Peripheral Vascular;  Laterality: Left;    SPLENECTOMY  2016    STENT, ANTERIOR TIBIAL Left 2023    Procedure: Stent, Anterior Tibial;  Surgeon: Gary Thomas MD;  Location: [facility] CATH/EP LAB;  Service: Peripheral Vascular;  Laterality: Left;    watchman heart device       Social History     Tobacco Use    Smoking status: Former     Current packs/day: 0.00     Types: Cigarettes     Quit date: 1987     Years since quittin.2     Smokeless tobacco: Never   Substance Use Topics    Alcohol use: Not Currently     Alcohol/week: 21.0 standard drinks of alcohol     Types: 21 Drinks containing 0.5 oz of alcohol per week     Comment: ed roberto mixed drinks    Drug use: No        REVIEW OF SYSTEMS    As mentioned in HPI    OBJECTIVE     VITAL SIGNS (Most Recent)  Temp: 98.2 °F (36.8 °C) (02/23/24 1400)  Pulse: 80 (02/23/24 1515)  Resp: 16 (02/23/24 1515)  BP: (!) 129/91 (02/23/24 1501)  SpO2: (!) 92 % (02/23/24 1515)    VENTILATION STATUS  Resp: 16 (02/23/24 1515)  SpO2: (!) 92 % (02/23/24 1515)  Vent Mode: SIMV  Oxygen Concentration (%):  [] 40  Resp Rate Total:  [16 br/min] 16 br/min  Vt Set:  [650 mL] 650 mL  PEEP/CPAP:  [5 cmH20] 5 cmH20  Pressure Support:  [10 cmH20] 10 cmH20  Mean Airway Pressure:  [12 cmH20] 12 cmH20        I & O (Last 24H):  Intake/Output Summary (Last 24 hours) at 2/23/2024 1621  Last data filed at 2/23/2024 1601  Gross per 24 hour   Intake 4108 ml   Output 1145 ml   Net 2963 ml       WEIGHTS  Wt Readings from Last 3 Encounters:   02/23/24 1540 125 kg (275 lb 9.2 oz)   02/21/24 0948 124.5 kg (274 lb 6.4 oz)   01/12/24 0718 120.7 kg (266 lb)       PHYSICAL EXAM    CONSTITUTIONAL: NAD, intubated, sedated on ventilator   HEENT: Normocephalic. No pallor  NECK: no JVD  LUNGS: coarse   HEART: regular rate and rhythm, S1, S2 normal, + murmur   ABDOMEN: soft, non-tender, bowel sounds normal  EXTREMITIES: No edema  SKIN: No rash  NEURO: AAO X 3  PSYCH: intubated, sedated     HOME MEDICATIONS:  No current facility-administered medications on file prior to encounter.     Current Outpatient Medications on File Prior to Encounter   Medication Sig Dispense Refill    acetaminophen (TYLENOL) 325 MG tablet Take 650 mg by mouth every 6 (six) hours as needed.      albuterol (PROVENTIL/VENTOLIN HFA) 90 mcg/actuation inhaler Inhale 2 puffs into the lungs every 6 (six) hours as needed.      aspirin 81 MG Chew Take 81 mg by mouth once daily.       betamethasone valerate 0.1% (VALISONE) 0.1 % Lotn Apply to scalp bid prn rash      cyanocobalamin 500 MCG tablet 500 mcg.      cyclobenzaprine (FLEXERIL) 10 MG tablet Take 10 mg by mouth every 8 (eight) hours as needed.      diphenhydrAMINE (BENADRYL) 50 MG capsule 50 mg every 6 (six) hours as needed.      doxepin (SINEQUAN) 50 MG capsule Take 50 mg by mouth every evening.      DULoxetine (CYMBALTA) 60 MG capsule 60 mg nightly.      EPINEPHrine (EPIPEN) 0.3 mg/0.3 mL AtIn INJECT 0.3MG/0.3ML SUBCUTANEOUSLY (UNDER THE SKIN)  AS NEEDED FOR ALLERGIC REACTIONS SELF-INJECTOR PEN      furosemide (LASIX) 20 MG tablet Take 20 mg by mouth once daily.      gabapentin (NEURONTIN) 400 MG capsule 800 mg 2 (two) times daily.      hydrocodone-homatropine 5-1.5 mg/5 ml (HYCODAN) 5-1.5 mg/5 mL Syrp Take by mouth every 6 (six) hours as needed. cough      levothyroxine (SYNTHROID) 50 MCG tablet 50 mcg.      metOLazone (ZAROXOLYN) 5 MG tablet 5 mg daily as needed. For weight gain >2lbs      montelukast (SINGULAIR) 10 mg tablet 10 mg once daily.      nitroGLYCERIN (NITROSTAT) 0.4 MG SL tablet Place 0.4 mg under the tongue every 5 (five) minutes as needed.      omega-3 fatty acids/fish oil (FISH OIL-OMEGA-3 FATTY ACIDS) 300-1,000 mg capsule Take 1 capsule by mouth 2 (two) times daily.      omeprazole (PRILOSEC) 20 MG capsule 40 mg once daily.      potassium chloride SA (K-DUR,KLOR-CON) 20 MEQ tablet 20 mEq nightly.      ranolazine (RANEXA) 1,000 mg Tb12 Take 1 tablet (1,000 mg total) by mouth 2 (two) times daily. 180 tablet 3    senna-docusate 8.6-50 mg (PERICOLACE) 8.6-50 mg per tablet nightly.      spironolactone (ALDACTONE) 50 MG tablet Take 50 mg by mouth once daily.      tamsulosin (FLOMAX) 0.4 mg Cap 0.4 mg.      TESTOSTERONE PROPIONATE, BULK, MISC Inject 1 each as directed every 14 (fourteen) days.      TOPROL XL 25 mg 24 hr tablet Take 25 mg by mouth 2 (two) times daily.      torsemide (DEMADEX) 20 MG Tab Take 1 tablet (20 mg  total) by mouth once daily. (Patient not taking: Reported on 12/21/2023) 30 tablet 11       SCHEDULED MEDS:   albumin human 5%  25 g Intravenous Once    aspirin  81 mg Oral Daily    ceFAZolin (Ancef) IV (PEDS and ADULTS)  2 g Intravenous Q6H    [START ON 2/24/2024] docusate sodium  100 mg Oral BID    DULoxetine  60 mg Oral Nightly    fentaNYL        [START ON 2/24/2024] levothyroxine  50 mcg Oral Before breakfast    metoprolol succinate  25 mg Oral BID    midazolam        montelukast  10 mg Oral Daily    mupirocin   Nasal BID    pantoprazole  40 mg Oral Before breakfast    ranolazine  1,000 mg Oral BID    tamsulosin  0.4 mg Oral Daily       CONTINUOUS INFUSIONS:   sodium chloride 0.45% 50 mL/hr at 02/23/24 1437    dexmedeTOMIDine (Precedex) infusion (titrating) 0.2 mcg/kg/hr (02/23/24 1606)    insulin regular 1 units/mL infusion orderable (CTS POST-OP)         PRN MEDS:albumin human 5%, albuterol sulfate, calcium gluconate IVPB, calcium gluconate IVPB, calcium gluconate IVPB, dextrose 50% in water (D50W), dextrose 50% in water (D50W), fentaNYL, HYDROcodone-acetaminophen, magnesium sulfate IVPB, magnesium sulfate IVPB, midazolam, midazolam, morphine, morphine, ondansetron, potassium chloride in water, potassium chloride in water, potassium chloride in water    LABS AND DIAGNOSTICS     CBC LAST 3 DAYS  Recent Labs   Lab 02/21/24  1111 02/23/24  0740 02/23/24  1352 02/23/24  1452 02/23/24  1616   WBC 11.28  --  23.59*  --   --    RBC 6.50*  --  5.06  --   --    HGB 14.7  --  11.4*  --   --    HCT 47.9   < > 37.5* 42 42   MCV 74*  --  74*  --   --    MCH 22.6*  --  22.5*  --   --    MCHC 30.7*  --  30.4*  --   --    RDW 21.8*  --  20.7*  --   --    *  --  185  --   --    MPV 9.4  --  9.7  --   --    GRAN 43.6  4.9  --   --   --   --    LYMPH 31.7  3.6  --   --   --   --    MONO 14.5  1.6*  --   --   --   --    BASO 0.20  --   --   --   --    NRBC 0  --   --   --   --     < > = values in this interval not  "displayed.       COAGULATION LAST 3 DAYS  No results for input(s): "LABPT", "INR", "APTT" in the last 168 hours.    CHEMISTRY LAST 3 DAYS  Recent Labs   Lab 02/21/24  1111 02/23/24  0740 02/23/24  1347 02/23/24  1352 02/23/24  1452 02/23/24  1616   *  --   --  137  --   --    K 4.2  --   --  3.6  --   --    CL 95  --   --  107  --   --    CO2 30*  --   --  25  --   --    ANIONGAP 7*  --   --  5*  --   --    BUN 11  --   --  10  --   --    CREATININE 0.9  --   --  0.9  --   --    *  --   --  153*  --   --    CALCIUM 9.4  --   --  7.8*  --   --    PH  --    < > 7.352  --  7.351 7.352   MG  --   --   --  2.1  --   --    ALBUMIN  --   --   --  3.1*  --   --    PROT  --   --   --  4.7*  --   --    ALKPHOS  --   --   --  30*  --   --    ALT  --   --   --  42  --   --    AST  --   --   --  100*  --   --    BILITOT  --   --   --  1.5*  --   --     < > = values in this interval not displayed.       CARDIAC PROFILE LAST 3 DAYS  No results for input(s): "BNP", "CPK", "CPKMB", "LDH", "TROPONINI", "TROPONINIHS" in the last 168 hours.    ENDOCRINE LAST 3 DAYS  No results for input(s): "TSH", "PROCAL" in the last 168 hours.    LAST ARTERIAL BLOOD GAS  ABG  Recent Labs   Lab 02/23/24  1616   PH 7.352   PO2 86   PCO2 41.8   HCO3 23.2*   BE -2       LAST 7 DAYS MICROBIOLOGY   Microbiology Results (last 7 days)       ** No results found for the last 168 hours. **            MOST RECENT IMAGING  X-Ray Chest AP Portable  Reason: post cabg    FINDINGS:    Portable chest with comparison chest x-ray February 21, 2024 show normal cardiomediastinal silhouette. Median sternotomy wires again noted. Swans Bienvenido catheter tip is in the region of the pulmonary artery. Mediastinal chest drains in place. Nasogastric tube tip and side-port are below the gastroesophageal junction. Endotracheal tube tip is approximately 6 cm superior to the rashel.  Patchy hazy opacities of the left lung base. Bandlike opacity of the right midlung. No " pneumothorax. Pulmonary vasculature is normal. No acute osseous abnormality.    IMPRESSION:    1.  Surgical changes status post CABG.  2.  Patchy hazy opacities of the left lung base and right midlung bandlike opacity likely reflect subsegmental atelectasis. Continued follow-up recommended.    Electronically signed by:  West Badillo DO  02/23/2024 02:11 PM [facility] Workstation: [id number]  [place] Flouro Usage  See Notes    This procedure was auto-finalized.      ECHOCARDIOGRAM RESULTS (last 5)  Results for orders placed during the hospital encounter of 12/13/23    Echo    Interpretation Summary    Left Ventricle: The left ventricle is normal in size. Mildly increased wall thickness. There is mild concentric hypertrophy. Normal wall motion. There is normal systolic function with a visually estimated ejection fraction of 65 - 70%. There is normal diastolic function.    Right Ventricle: Mild right ventricular enlargement. Wall thickness is normal. Right ventricle wall motion  is normal. Systolic function is normal.    Left Atrium: Left atrium is moderately dilated.    Right Atrium: Right atrium is mildly dilated.    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    Tricuspid Valve: There is mild regurgitation with a centrally directed jet.    Pulmonic Valve: There is mild regurgitation with a centrally directed jet.    Pulmonary Artery: There is mild pulmonary hypertension. The estimated pulmonary artery systolic pressure is 40 mmHg.    IVC/SVC: Intermediate venous pressure at 8 mmHg.      CURRENT/PREVIOUS VISIT EKG  Results for orders placed or performed during the hospital encounter of 02/23/24   EKG 12-LEAD    Collection Time: 02/23/24  1:53 PM   Result Value Ref Range    QRS Duration 232 ms    OHS QTC Calculation 613 ms    Narrative    Test Reason : Z95.1,Z95.1,    Vent. Rate : 080 BPM     Atrial Rate : 087 BPM     P-R Int : 000 ms          QRS Dur : 232 ms      QT Int : 532 ms        P-R-T Axes : 000 168 250 degrees     QTc Int : 613 ms    Ventricular-paced rhythm  Abnormal ECG  When compared with ECG of 21-FEB-2024 09:33,  Electronic ventricular pacemaker has replaced Atrial fibrillation    Referred By: OLLIE RIBEIRO           Confirmed By:            ASSESSMENT/PLAN:     Active Hospital Problems    Diagnosis    *S/P CABG (coronary artery bypass graft) - x2 09/2010 - [place] to LAD; SVG to OMB; grafts occluded 08/2012       ASSESSMENT & PLAN:     S/P repeat CABG day 1  CAD  HTN       RECOMMENDATIONS:    Monitor chest tube output overnight.  Continue metoprolol succinate 25 mg BID.  Continue Asprin therapy.  PT/OT when extubated and awake.  Up and out of bed as tolerated when extubated and awake.  IS.  Strict I/Os.   Monitor rate and rhythm.  Continue to check and replace potassium and magnesium. Goal for potassium is 4.0, and goal for magnesium is 2.0.   Will continue to follow, thank you for the consult.       Mar Camacho NP  Department of Cardiology  Date of Service: 02/23/2024        I have personally interviewed and examined the patient, I have reviewed the Nurse Practitioner's history and physical, assessment, and plan. I agree with the findings and plan.  PATIENT KNOWN TO ME.  REPEAT CABG RECOMMENDED  HE IS CURRENTLY POST UP CORONARY BYPASS  HE % VENTRICULAR PACED  HE IS ON THE RESPIRATOR  HEMODYNAMIC STABLE CURRENTLY    Gregg Bush M.D.  Department of Cardiology  Date of Service: 02/23/2024  4:21 PM

## 2024-02-23 NOTE — OP NOTE
This is Dr. Thomas dictating with date of service being 23rd February 2024.    Preoperative diagnosis:  Severe recurrent atherosclerotic coronary artery disease with persistent angina and shortness of breath.    Postoperative diagnosis:  Same.    Operation:  Redo coronary artery bypass grafting x3 using left radial artery to left anterior descending coronary artery and separate segments of saphenous vein from the aorta to the diagonal coronary artery and from the aorta to the obtuse marginal coronary artery using a combination of antegrade and retrograde cardioplegia, mild systemic hypothermia, endoscopic vein harvest from the right leg, right radial artery harvest, and insertion of right femoral arterial intra-aortic balloon pump with fluoroscopic guidance and complete pericardiectomy due to severe adhesive pericarditis.    Of note the operation was long lasting and difficult secondary to the patient's body habitus and the severe adhesions noted within the mediastinum adding extra time and effort to the procedure.    Operation in detail:  The patient was prepped and draped in usual sterile fashion.  The saphenous vein was harvested from the right leg with the Terumo endoscopic harvest system.  The side branches were initially controlled with electrocautery.  The vein was excised and retrogradely flushed and found to be satisfactory.  The vein branches were reinforced with hemoclips and silk ties.  The leg was closed in a single layer of the subcutaneous tissue with running 2 0 Monocryl stitch and the skin was closed with running 3-0 subcuticular Monocryl stitch.    The right radial artery was harvested through a longitudinal incision.  It side branches were controlled with hemoclips and low power electrocautery.  The pedicle was divided distally and flushed proximally with a nitroglycerin and Cardene solution.  A soft bulldog clamp was placed on distal aspect of the pedicle.  The artery at the wrist was oversewn  with 5 0 Prolene suture.  The arm was partially closed from distal to proximal with running 2 0 Monocryl stitch in the subcutaneous tissue.    The pedicle was then clamped at the antecubital fossa and excised.  The artery was flushed with the nitroglycerin and Cardene solution.  The artery at the antecubital fossa was oversewn with 5 0 Prolene suture.  The arm was closed with a running 2 0 Monocryl stitch in the subcutaneous tissue and the skin was closed with running 3-0 subcuticular Monocryl stitch.    The intra-aortic balloon pump was placed via the right femoral artery.  The vessel was punctured.  A J-wire was advanced under fluoroscopic control into the aorta up to the level of the aortic arch.  The puncture site into the vessel was sequentially dilated with the last dilator attached through the sheath.  The dilator was removed.  The balloon pump was then passed over the wire into the arterial circulation and up to the level of the aortic arch just distal to the aortic knob with fluoroscopic control.  The wire was then removed.  The balloon was connected to its console and satisfactory intra-aortic balloon pump counter pulsation was achieved.  The sheath and balloon was secured to skin with interrupted silk suture.    The chest was opened through redo median sternotomy.  The previously placed wires were removed.  The sternum was incised longitudinally with the oscillating saw.  The periosteal edges were cauterized.  There were severe adhesions noted immediately behind the sternum and careful retraction on either side of the sternum allowed dissection sharply and with the cautery to release these adhesions from primarily the epicardium on either side.  The dissection proceeded to free up the right ventricle and then the right atrium and ascending thoracic aorta so cannulation could be achieved.  This was done with sharp and cautery dissection.  The patient was cannulated using the ascending thoracic aorta and  the remnant of the right atrial appendage in two-stage cannula for venous drainage.  Antegrade and retrograde cardioplegia lines were placed in the aorta and right atrial free wall and into the coronary sinus respectively.    The patient was placed on cardiopulmonary bypass and cooled to systemic moderate hypothermia.  The rest of the dissection and pericardiectomy was achieved with the patient on bypass because of the adhesions and the cardiomegaly.    Once these adhesions were freed the cross-clamp was applied and the heart was arrested initially with antegrade cardioplegia and then intermittently with retrograde and antegrade cardioplegia.    Once the heart was satisfactorily arrested the bypasses were then done.    Saphenous vein was sewn in an end-to-side fashion to the obtuse marginal coronary artery with running 7 0 Prolene suture.  The graft was measured to length and cut and sewn proximally to the aorta in an end-to-side fashion with running 6 0 Prolene suture.    The vein was then next sewn in end-to-side fashion to the diagonal coronary artery with running 7 0 Prolene suture.  The graft was measured to length and cut and sewn proximally to the morel of the vein graft that proceeded to circumflex system.  This was done in end-to-side fashion with running 6 0 Prolene suture.  The left radial artery was then sewn in end-to-side fashion to the left anterior descending coronary artery past the previous left internal mammary artery anastomosis and previously placed stents.  This was done in end-to-side fashion with running 7 0 Prolene suture.  The graft was measured to length and cut and similarly sewn to the morel of the vein graft that proceeded to circumflex system.  This was done in end-to-side fashion with running 7 0 Prolene suture.    The patient was rewarming during this time and the cross-clamp released.  The patient was weaned from cardiopulmonary bypass once satisfactory hemodynamics were maintained  and rewarming complete.  Chest tubes were inserted into the mediastinum through separate stab wounds and secured to skin with 0 silk suture.  A single Ian drain was inserted through separate stab wound and secured to skin with 2-0 silk suture drain the mediastinum and right pleural space.    Two pacing wires were placed on the right atrium and 2 on the right ventricle and brought out through separate stab wounds and secured to skin with 2-0 silk suture.    Once the patient was doing well decannulation was performed and the sites secured with their respective pursestrings and oversewn with Prolene suture.    Closure of the sternum was done with interrupted stainless steel wire.  The presternal fascia and subcutaneous tissues were closed with running 0 and 2-0 Monocryl stitch respectively.  The skin was closed with running 3-0 subcuticular Monocryl stitch.    Overall patient tolerated the procedure well and there appeared to be no major obvious intraoperative complications.  Estimated blood loss was 700 cc.  The patient was brought to the ICU in satisfactory condition.

## 2024-02-23 NOTE — TRANSFER OF CARE
Anesthesia Transfer of Care Note    Patient: Jose F Hanley    Procedure(s) Performed: Procedure(s) (LRB):  CABG, REPEAT (N/A)  HARVEST-VEIN-ENDOVASCULAR (Right)    Patient location: ICU    Anesthesia Type: general    Transport from OR: Transported from OR intubated on 100% O2 by AMBU with adequate controlled ventilation. Continuous ECG monitoring in transport. Continuous SpO2 monitoring in transport. Continuos invasive BP monitoring in transport. Continuous CVP monitoring in transport. Continuous PA pressure monitoring in transport    Post pain: adequate analgesia    Post assessment: no apparent anesthetic complications    Post vital signs: stable    Level of consciousness: sedated    Nausea/Vomiting: no nausea/vomiting    Complications: none    Transfer of care protocol was followed    Last vitals: Visit Vitals  BP (!) 150/72   Pulse 71   Temp 36.7 °C (98.1 °F) (Oral)   Resp (!) 21   SpO2 (!) 92%

## 2024-02-23 NOTE — NURSING
Nurses Note -- 4 Eyes      2/23/2024   5:23 AM      Skin assessed during: Admit      [] No Altered Skin Integrity Present    []Prevention Measures Documented      [x] Yes- Altered Skin Integrity Present or Discovered   [x] LDA Added if Not in Epic (Describe Wound)   [x] New Altered Skin Integrity was Present on Admit and Documented in LDA   [x] Wound Image Taken    Wound Care Consulted? Yes    Attending Nurse: SINCERE Landry      Second RN/Staff Member:  SINCERE Plummer

## 2024-02-23 NOTE — PROGRESS NOTES
Automatic Inhaler to Nebulizer Interchange    albuterol (Ventolin, ProAir, or Proventil)  mcg given multiple times per day changed to albuterol 2.5 mg Q6H PRN shortness of breath  per [facility] Automatic Therapeutic Substitutions Protocol.    Please contact pharmacy at extension [phone] with any questions.     Thank you,   Benito Hill

## 2024-02-23 NOTE — ANESTHESIA PROCEDURE NOTES
Arterial    Diagnosis: CAD;CABG    Patient location during procedure: ICU  Timeout: 2/23/2024 6:00 AM  Procedure end time: 2/23/2024 6:07 AM    Staffing  Authorizing Provider: Samm Carson Jr., MD  Performing Provider: Samm Carson Jr., MD    Staffing  Performed by: Smam Carson Jr., MD  Authorized by: Samm Carson Jr., MD    Anesthesiologist was present at the time of the procedure.    Preanesthetic Checklist  Completed: patient identified, IV checked, site marked, risks and benefits discussed, surgical consent, monitors and equipment checked, pre-op evaluation, timeout performed and anesthesia consent givenArterial  Skin Prep: chlorhexidine gluconate  Local Infiltration: lidocaine  Orientation: left  Location: radial (high radial)    Catheter Size: 20 G  Catheter placement by Ultrasound guidance. Heme positive aspiration all ports.   Vessel Caliber: medium, patent  Needle advanced into vessel with real time Ultrasound guidance.  Guidewire confirmed in vessel.  Sterile sheath used.Insertion Attempts: 1  Assessment  Dressing: secured with tape and tegaderm and sutured in place and taped  Patient: Tolerated well

## 2024-02-23 NOTE — ANESTHESIA PROCEDURE NOTES
[place] [facility]    Diagnosis: CAD;CABG  Patient location during procedure: ICU  Timeout: 2/23/2024 6:15 AM  Procedure end time: 2/23/2024 6:35 AM    Staffing  Authorizing Provider: Samm Carson Jr., MD  Performing Provider: Samm Carson Jr., MD    Staffing  Performed by: Samm Carson Jr., MD  Authorized by: Samm Carson Jr., MD    Anesthesiologist was present at the time of the procedure.  Preanesthetic Checklist  Completed: patient identified, IV checked, site marked, risks and benefits discussed, surgical consent, monitors and equipment checked, pre-op evaluation, timeout performed and anesthesia consent given  [place]  Skin Prep: chlorhexidine gluconate  Local Infiltration: none  Location: right,  internal jugular vein  Vessel Caliber: medium, patent, compressibility normal  Introducer: 9 Fr single lumen (MAC 9 Fr double lumen), manometry not used.  Device: CCO/Oximetric Catheter   Catheter Size: 7 Fr  Catheter placement by yes. Heme positive aspiration all ports. PAC floated with balloon up until wedgedSterile sheath usedInsertion Attempts: 1  Ultrasound Guidance  Needle advanced into vessel with real time Ultrasound guidance.  Guidewire confirmed in vessel.  Sterile sheath used.  Assessment  Central Line Bundle Protocol followed. Hand hygiene before procedure, surgical cap worn, surgical mask worn, sterile surgical gloves worn, large sterile drape used.  Verification: chest x-ray, ultrasound and blood return  Dressing: secured with tape and tegaderm and sutured in place and taped  Patient: Tolerated Well

## 2024-02-24 LAB
ABO + RH BLD: NORMAL
ALBUMIN SERPL BCP-MCNC: 3.6 G/DL (ref 3.5–5.2)
ALBUMIN SERPL BCP-MCNC: 3.7 G/DL (ref 3.5–5.2)
ALLENS TEST: ABNORMAL
ALP SERPL-CCNC: 17 U/L (ref 55–135)
ALP SERPL-CCNC: 22 U/L (ref 55–135)
ALT SERPL W/O P-5'-P-CCNC: 18 U/L (ref 10–44)
ALT SERPL W/O P-5'-P-CCNC: 22 U/L (ref 10–44)
ANION GAP SERPL CALC-SCNC: 3 MMOL/L (ref 8–16)
ANION GAP SERPL CALC-SCNC: 4 MMOL/L (ref 8–16)
ANION GAP SERPL CALC-SCNC: 5 MMOL/L (ref 8–16)
ANISOCYTOSIS BLD QL SMEAR: ABNORMAL
AST SERPL-CCNC: 39 U/L (ref 10–40)
AST SERPL-CCNC: 48 U/L (ref 10–40)
BASOPHILS # BLD AUTO: 0.03 K/UL (ref 0–0.2)
BASOPHILS # BLD AUTO: 0.03 K/UL (ref 0–0.2)
BASOPHILS NFR BLD: 0.1 % (ref 0–1.9)
BASOPHILS NFR BLD: 0.2 % (ref 0–1.9)
BILIRUB SERPL-MCNC: 0.7 MG/DL (ref 0.1–1)
BILIRUB SERPL-MCNC: 1 MG/DL (ref 0.1–1)
BLD GP AB SCN CELLS X3 SERPL QL: NORMAL
BUN SERPL-MCNC: 11 MG/DL (ref 8–23)
BUN SERPL-MCNC: 13 MG/DL (ref 8–23)
BUN SERPL-MCNC: 17 MG/DL (ref 8–23)
CALCIUM SERPL-MCNC: 7.7 MG/DL (ref 8.7–10.5)
CALCIUM SERPL-MCNC: 7.8 MG/DL (ref 8.7–10.5)
CALCIUM SERPL-MCNC: 7.8 MG/DL (ref 8.7–10.5)
CHLORIDE SERPL-SCNC: 106 MMOL/L (ref 95–110)
CHLORIDE SERPL-SCNC: 107 MMOL/L (ref 95–110)
CHLORIDE SERPL-SCNC: 107 MMOL/L (ref 95–110)
CO2 SERPL-SCNC: 24 MMOL/L (ref 23–29)
CO2 SERPL-SCNC: 25 MMOL/L (ref 23–29)
CO2 SERPL-SCNC: 27 MMOL/L (ref 23–29)
CREAT SERPL-MCNC: 0.9 MG/DL (ref 0.5–1.4)
CREAT SERPL-MCNC: 1 MG/DL (ref 0.5–1.4)
CREAT SERPL-MCNC: 1 MG/DL (ref 0.5–1.4)
DELSYS: ABNORMAL
DIFFERENTIAL METHOD BLD: ABNORMAL
DIFFERENTIAL METHOD BLD: ABNORMAL
EOSINOPHIL # BLD AUTO: 0 K/UL (ref 0–0.5)
EOSINOPHIL # BLD AUTO: 0 K/UL (ref 0–0.5)
EOSINOPHIL NFR BLD: 0 % (ref 0–8)
EOSINOPHIL NFR BLD: 0 % (ref 0–8)
EP: 5
ERYTHROCYTE [DISTWIDTH] IN BLOOD BY AUTOMATED COUNT: 20.3 % (ref 11.5–14.5)
ERYTHROCYTE [DISTWIDTH] IN BLOOD BY AUTOMATED COUNT: 20.6 % (ref 11.5–14.5)
ERYTHROCYTE [DISTWIDTH] IN BLOOD BY AUTOMATED COUNT: 20.8 % (ref 11.5–14.5)
ERYTHROCYTE [SEDIMENTATION RATE] IN BLOOD BY WESTERGREN METHOD: 16 MM/H
ERYTHROCYTE [SEDIMENTATION RATE] IN BLOOD BY WESTERGREN METHOD: 16 MM/H
ERYTHROCYTE [SEDIMENTATION RATE] IN BLOOD BY WESTERGREN METHOD: 650 MM/H
EST. GFR  (NO RACE VARIABLE): >60 ML/MIN/1.73 M^2
FIO2: 28
FIO2: 40
FIO2: 40
FLOW: 7
GLUCOSE SERPL-MCNC: 120 MG/DL (ref 70–110)
GLUCOSE SERPL-MCNC: 123 MG/DL (ref 70–110)
GLUCOSE SERPL-MCNC: 126 MG/DL (ref 70–110)
GLUCOSE SERPL-MCNC: 129 MG/DL (ref 70–110)
GLUCOSE SERPL-MCNC: 130 MG/DL (ref 70–110)
GLUCOSE SERPL-MCNC: 144 MG/DL (ref 70–110)
GLUCOSE SERPL-MCNC: 144 MG/DL (ref 70–110)
GLUCOSE SERPL-MCNC: 146 MG/DL (ref 70–110)
GLUCOSE SERPL-MCNC: 156 MG/DL (ref 70–110)
GLUCOSE SERPL-MCNC: 162 MG/DL (ref 70–110)
GLUCOSE SERPL-MCNC: 162 MG/DL (ref 70–110)
GLUCOSE SERPL-MCNC: 163 MG/DL (ref 70–110)
GLUCOSE SERPL-MCNC: 170 MG/DL (ref 70–110)
GLUCOSE SERPL-MCNC: 171 MG/DL (ref 70–110)
GLUCOSE SERPL-MCNC: 175 MG/DL (ref 70–110)
GLUCOSE SERPL-MCNC: 98 MG/DL (ref 70–110)
HCO3 UR-SCNC: 23.3 MMOL/L (ref 24–28)
HCO3 UR-SCNC: 23.7 MMOL/L (ref 24–28)
HCO3 UR-SCNC: 24.6 MMOL/L (ref 24–28)
HCO3 UR-SCNC: 24.8 MMOL/L (ref 24–28)
HCO3 UR-SCNC: 24.9 MMOL/L (ref 24–28)
HCO3 UR-SCNC: 25.6 MMOL/L (ref 24–28)
HCT VFR BLD AUTO: 29.5 % (ref 40–54)
HCT VFR BLD AUTO: 29.6 % (ref 40–54)
HCT VFR BLD AUTO: 31.2 % (ref 40–54)
HCT VFR BLD CALC: 31 %PCV (ref 36–54)
HCT VFR BLD CALC: 32 %PCV (ref 36–54)
HCT VFR BLD CALC: 33 %PCV (ref 36–54)
HGB BLD-MCNC: 8.9 G/DL (ref 14–18)
HGB BLD-MCNC: 9.1 G/DL (ref 14–18)
HGB BLD-MCNC: 9.5 G/DL (ref 14–18)
HYPOCHROMIA BLD QL SMEAR: ABNORMAL
IMM GRANULOCYTES # BLD AUTO: 0.06 K/UL (ref 0–0.04)
IMM GRANULOCYTES # BLD AUTO: 0.09 K/UL (ref 0–0.04)
IMM GRANULOCYTES NFR BLD AUTO: 0.4 % (ref 0–0.5)
IMM GRANULOCYTES NFR BLD AUTO: 0.4 % (ref 0–0.5)
IP: 16
LYMPHOCYTES # BLD AUTO: 0.8 K/UL (ref 1–4.8)
LYMPHOCYTES # BLD AUTO: 1.8 K/UL (ref 1–4.8)
LYMPHOCYTES NFR BLD: 5.6 % (ref 18–48)
LYMPHOCYTES NFR BLD: 8.7 % (ref 18–48)
MAGNESIUM SERPL-MCNC: 2.2 MG/DL (ref 1.6–2.6)
MAGNESIUM SERPL-MCNC: 2.3 MG/DL (ref 1.6–2.6)
MCH RBC QN AUTO: 22.8 PG (ref 27–31)
MCH RBC QN AUTO: 23.3 PG (ref 27–31)
MCH RBC QN AUTO: 23.3 PG (ref 27–31)
MCHC RBC AUTO-ENTMCNC: 30.2 G/DL (ref 32–36)
MCHC RBC AUTO-ENTMCNC: 30.4 G/DL (ref 32–36)
MCHC RBC AUTO-ENTMCNC: 30.7 G/DL (ref 32–36)
MCV RBC AUTO: 75 FL (ref 82–98)
MCV RBC AUTO: 76 FL (ref 82–98)
MCV RBC AUTO: 77 FL (ref 82–98)
MIN VOL: 13.4
MODE: ABNORMAL
MONOCYTES # BLD AUTO: 0.9 K/UL (ref 0.3–1)
MONOCYTES # BLD AUTO: 2.8 K/UL (ref 0.3–1)
MONOCYTES NFR BLD: 13.5 % (ref 4–15)
MONOCYTES NFR BLD: 6 % (ref 4–15)
NEUTROPHILS # BLD AUTO: 12.6 K/UL (ref 1.8–7.7)
NEUTROPHILS # BLD AUTO: 15.7 K/UL (ref 1.8–7.7)
NEUTROPHILS NFR BLD: 77.3 % (ref 38–73)
NEUTROPHILS NFR BLD: 87.8 % (ref 38–73)
NRBC BLD-RTO: 0 /100 WBC
NRBC BLD-RTO: 0 /100 WBC
OHS QRS DURATION: 232 MS
OHS QTC CALCULATION: 613 MS
OVALOCYTES BLD QL SMEAR: ABNORMAL
PCO2 BLDA: 41.4 MMHG (ref 35–45)
PCO2 BLDA: 44.6 MMHG (ref 35–45)
PCO2 BLDA: 45.5 MMHG (ref 35–45)
PCO2 BLDA: 51.9 MMHG (ref 35–45)
PCO2 BLDA: 54.6 MMHG (ref 35–45)
PCO2 BLDA: 55.4 MMHG (ref 35–45)
PEEP: 5
PEEP: 5
PH SMN: 7.26 [PH] (ref 7.35–7.45)
PH SMN: 7.28 [PH] (ref 7.35–7.45)
PH SMN: 7.28 [PH] (ref 7.35–7.45)
PH SMN: 7.33 [PH] (ref 7.35–7.45)
PH SMN: 7.34 [PH] (ref 7.35–7.45)
PH SMN: 7.36 [PH] (ref 7.35–7.45)
PHOSPHATE SERPL-MCNC: 2.9 MG/DL (ref 2.7–4.5)
PIP: 34
PIP: 35
PLATELET # BLD AUTO: 123 K/UL (ref 150–450)
PLATELET # BLD AUTO: 144 K/UL (ref 150–450)
PLATELET # BLD AUTO: 156 K/UL (ref 150–450)
PMV BLD AUTO: 10.3 FL (ref 9.2–12.9)
PMV BLD AUTO: 10.4 FL (ref 9.2–12.9)
PMV BLD AUTO: 10.8 FL (ref 9.2–12.9)
PO2 BLDA: 60 MMHG (ref 80–100)
PO2 BLDA: 70 MMHG (ref 80–100)
PO2 BLDA: 74 MMHG (ref 80–100)
PO2 BLDA: 75 MMHG (ref 80–100)
PO2 BLDA: 79 MMHG (ref 80–100)
PO2 BLDA: 92 MMHG (ref 80–100)
POC BE: -1 MMOL/L
POC BE: -1 MMOL/L
POC BE: -2 MMOL/L
POC IONIZED CALCIUM: 1.16 MMOL/L (ref 1.06–1.42)
POC IONIZED CALCIUM: 1.16 MMOL/L (ref 1.06–1.42)
POC IONIZED CALCIUM: 1.19 MMOL/L (ref 1.06–1.42)
POC IONIZED CALCIUM: 1.2 MMOL/L (ref 1.06–1.42)
POC SATURATED O2: 89 % (ref 95–100)
POC SATURATED O2: 92 % (ref 95–100)
POC SATURATED O2: 92 % (ref 95–100)
POC SATURATED O2: 93 % (ref 95–100)
POC SATURATED O2: 95 % (ref 95–100)
POC SATURATED O2: 96 % (ref 95–100)
POC TCO2: 24 MMOL/L (ref 23–27)
POC TCO2: 25 MMOL/L (ref 23–27)
POC TCO2: 26 MMOL/L (ref 23–27)
POC TCO2: 27 MMOL/L (ref 23–27)
POIKILOCYTOSIS BLD QL SMEAR: SLIGHT
POLYCHROMASIA BLD QL SMEAR: ABNORMAL
POTASSIUM BLD-SCNC: 4.6 MMOL/L (ref 3.5–5.1)
POTASSIUM BLD-SCNC: 4.8 MMOL/L (ref 3.5–5.1)
POTASSIUM BLD-SCNC: 4.9 MMOL/L (ref 3.5–5.1)
POTASSIUM BLD-SCNC: 5.1 MMOL/L (ref 3.5–5.1)
POTASSIUM BLD-SCNC: 5.1 MMOL/L (ref 3.5–5.1)
POTASSIUM BLD-SCNC: 5.2 MMOL/L (ref 3.5–5.1)
POTASSIUM SERPL-SCNC: 4.7 MMOL/L (ref 3.5–5.1)
POTASSIUM SERPL-SCNC: 5 MMOL/L (ref 3.5–5.1)
POTASSIUM SERPL-SCNC: 5 MMOL/L (ref 3.5–5.1)
PROT SERPL-MCNC: 5.2 G/DL (ref 6–8.4)
PROT SERPL-MCNC: 5.3 G/DL (ref 6–8.4)
PS: 10
PS: 10
RBC # BLD AUTO: 3.91 M/UL (ref 4.6–6.2)
RBC # BLD AUTO: 3.91 M/UL (ref 4.6–6.2)
RBC # BLD AUTO: 4.08 M/UL (ref 4.6–6.2)
SAMPLE: ABNORMAL
SITE: ABNORMAL
SODIUM BLD-SCNC: 137 MMOL/L (ref 136–145)
SODIUM BLD-SCNC: 137 MMOL/L (ref 136–145)
SODIUM BLD-SCNC: 138 MMOL/L (ref 136–145)
SODIUM SERPL-SCNC: 136 MMOL/L (ref 136–145)
SP02: 92
SP02: 93
SPECIMEN OUTDATE: NORMAL
SPONT RATE: 21
TARGETS BLD QL SMEAR: ABNORMAL
VT: 16
VT: 650
WBC # BLD AUTO: 14.35 K/UL (ref 3.9–12.7)
WBC # BLD AUTO: 18.68 K/UL (ref 3.9–12.7)
WBC # BLD AUTO: 20.34 K/UL (ref 3.9–12.7)

## 2024-02-24 PROCEDURE — 94761 N-INVAS EAR/PLS OXIMETRY MLT: CPT | Mod: XB

## 2024-02-24 PROCEDURE — 25000003 PHARM REV CODE 250: Performed by: THORACIC SURGERY (CARDIOTHORACIC VASCULAR SURGERY)

## 2024-02-24 PROCEDURE — 85025 COMPLETE CBC W/AUTO DIFF WBC: CPT | Mod: 91 | Performed by: THORACIC SURGERY (CARDIOTHORACIC VASCULAR SURGERY)

## 2024-02-24 PROCEDURE — 99900035 HC TECH TIME PER 15 MIN (STAT)

## 2024-02-24 PROCEDURE — 94003 VENT MGMT INPAT SUBQ DAY: CPT

## 2024-02-24 PROCEDURE — 99900026 HC AIRWAY MAINTENANCE (STAT)

## 2024-02-24 PROCEDURE — 80053 COMPREHEN METABOLIC PANEL: CPT | Mod: 91 | Performed by: THORACIC SURGERY (CARDIOTHORACIC VASCULAR SURGERY)

## 2024-02-24 PROCEDURE — 63600175 PHARM REV CODE 636 W HCPCS: Performed by: THORACIC SURGERY (CARDIOTHORACIC VASCULAR SURGERY)

## 2024-02-24 PROCEDURE — 99233 SBSQ HOSP IP/OBS HIGH 50: CPT | Mod: ,,, | Performed by: INTERNAL MEDICINE

## 2024-02-24 PROCEDURE — 63600175 PHARM REV CODE 636 W HCPCS: Performed by: INTERNAL MEDICINE

## 2024-02-24 PROCEDURE — 99900031 HC PATIENT EDUCATION (STAT)

## 2024-02-24 PROCEDURE — 83735 ASSAY OF MAGNESIUM: CPT | Performed by: THORACIC SURGERY (CARDIOTHORACIC VASCULAR SURGERY)

## 2024-02-24 PROCEDURE — 82330 ASSAY OF CALCIUM: CPT

## 2024-02-24 PROCEDURE — 84295 ASSAY OF SERUM SODIUM: CPT

## 2024-02-24 PROCEDURE — 82803 BLOOD GASES ANY COMBINATION: CPT

## 2024-02-24 PROCEDURE — 36430 TRANSFUSION BLD/BLD COMPNT: CPT

## 2024-02-24 PROCEDURE — 80048 BASIC METABOLIC PNL TOTAL CA: CPT | Performed by: THORACIC SURGERY (CARDIOTHORACIC VASCULAR SURGERY)

## 2024-02-24 PROCEDURE — 86901 BLOOD TYPING SEROLOGIC RH(D): CPT | Performed by: THORACIC SURGERY (CARDIOTHORACIC VASCULAR SURGERY)

## 2024-02-24 PROCEDURE — 85027 COMPLETE CBC AUTOMATED: CPT | Performed by: THORACIC SURGERY (CARDIOTHORACIC VASCULAR SURGERY)

## 2024-02-24 PROCEDURE — 25000003 PHARM REV CODE 250: Performed by: PHYSICIAN ASSISTANT

## 2024-02-24 PROCEDURE — 5A09457 ASSISTANCE WITH RESPIRATORY VENTILATION, 24-96 CONSECUTIVE HOURS, CONTINUOUS POSITIVE AIRWAY PRESSURE: ICD-10-PCS | Performed by: THORACIC SURGERY (CARDIOTHORACIC VASCULAR SURGERY)

## 2024-02-24 PROCEDURE — 94660 CPAP INITIATION&MGMT: CPT | Mod: XB

## 2024-02-24 PROCEDURE — 20000000 HC ICU ROOM

## 2024-02-24 PROCEDURE — 84100 ASSAY OF PHOSPHORUS: CPT | Performed by: THORACIC SURGERY (CARDIOTHORACIC VASCULAR SURGERY)

## 2024-02-24 PROCEDURE — 37799 UNLISTED PX VASCULAR SURGERY: CPT

## 2024-02-24 PROCEDURE — 85014 HEMATOCRIT: CPT

## 2024-02-24 PROCEDURE — 27100171 HC OXYGEN HIGH FLOW UP TO 24 HOURS

## 2024-02-24 PROCEDURE — 99900017 HC EXTUBATION W/PARAMETERS (STAT)

## 2024-02-24 PROCEDURE — P9016 RBC LEUKOCYTES REDUCED: HCPCS | Performed by: THORACIC SURGERY (CARDIOTHORACIC VASCULAR SURGERY)

## 2024-02-24 PROCEDURE — 86920 COMPATIBILITY TEST SPIN: CPT | Performed by: THORACIC SURGERY (CARDIOTHORACIC VASCULAR SURGERY)

## 2024-02-24 PROCEDURE — 84132 ASSAY OF SERUM POTASSIUM: CPT

## 2024-02-24 RX ORDER — FUROSEMIDE 10 MG/ML
20 INJECTION INTRAMUSCULAR; INTRAVENOUS ONCE
Status: COMPLETED | OUTPATIENT
Start: 2024-02-24 | End: 2024-02-24

## 2024-02-24 RX ORDER — HYDROMORPHONE HYDROCHLORIDE 1 MG/ML
2 INJECTION, SOLUTION INTRAMUSCULAR; INTRAVENOUS; SUBCUTANEOUS
Status: DISCONTINUED | OUTPATIENT
Start: 2024-02-24 | End: 2024-02-29

## 2024-02-24 RX ORDER — LORAZEPAM 2 MG/ML
2 INJECTION INTRAMUSCULAR
Status: DISCONTINUED | OUTPATIENT
Start: 2024-02-24 | End: 2024-03-03

## 2024-02-24 RX ORDER — HYDROCODONE BITARTRATE AND ACETAMINOPHEN 500; 5 MG/1; MG/1
TABLET ORAL
Status: DISCONTINUED | OUTPATIENT
Start: 2024-02-24 | End: 2024-03-04

## 2024-02-24 RX ADMIN — HYDROMORPHONE HYDROCHLORIDE 2 MG: 1 INJECTION, SOLUTION INTRAMUSCULAR; INTRAVENOUS; SUBCUTANEOUS at 09:02

## 2024-02-24 RX ADMIN — MORPHINE SULFATE 4 MG: 4 INJECTION, SOLUTION INTRAMUSCULAR; INTRAVENOUS at 06:02

## 2024-02-24 RX ADMIN — DEXMEDETOMIDINE HYDROCHLORIDE 1 MCG/KG/HR: 4 INJECTION, SOLUTION INTRAVENOUS at 02:02

## 2024-02-24 RX ADMIN — DEXMEDETOMIDINE HYDROCHLORIDE 1.4 MCG/KG/HR: 4 INJECTION, SOLUTION INTRAVENOUS at 10:02

## 2024-02-24 RX ADMIN — MORPHINE SULFATE 4 MG: 4 INJECTION, SOLUTION INTRAMUSCULAR; INTRAVENOUS at 01:02

## 2024-02-24 RX ADMIN — LORAZEPAM 2 MG: 2 INJECTION INTRAMUSCULAR; INTRAVENOUS at 09:02

## 2024-02-24 RX ADMIN — CEFAZOLIN SODIUM 2 G: 2 SOLUTION INTRAVENOUS at 06:02

## 2024-02-24 RX ADMIN — CEFAZOLIN SODIUM 2 G: 2 SOLUTION INTRAVENOUS at 10:02

## 2024-02-24 RX ADMIN — DEXMEDETOMIDINE HYDROCHLORIDE 1.4 MCG/KG/HR: 4 INJECTION, SOLUTION INTRAVENOUS at 08:02

## 2024-02-24 RX ADMIN — FUROSEMIDE 20 MG: 10 INJECTION, SOLUTION INTRAMUSCULAR; INTRAVENOUS at 11:02

## 2024-02-24 RX ADMIN — MORPHINE SULFATE 2 MG: 2 INJECTION, SOLUTION INTRAMUSCULAR; INTRAVENOUS at 12:02

## 2024-02-24 RX ADMIN — LORAZEPAM 2 MG: 2 INJECTION INTRAMUSCULAR; INTRAVENOUS at 08:02

## 2024-02-24 RX ADMIN — ASCORBIC ACID, VITAMIN A PALMITATE, CHOLECALCIFEROL, THIAMINE HYDROCHLORIDE, RIBOFLAVIN-5 PHOSPHATE SODIUM, PYRIDOXINE HYDROCHLORIDE, NIACINAMIDE, DEXPANTHENOL, ALPHA-TOCOPHEROL ACETATE, VITAMIN K1, FOLIC ACID, BIOTIN, CYANOCOBALAMIN: 200; 3300; 200; 6; 3.6; 6; 40; 15; 10; 150; 600; 60; 5 INJECTION, SOLUTION INTRAVENOUS at 11:02

## 2024-02-24 RX ADMIN — MORPHINE SULFATE 4 MG: 4 INJECTION, SOLUTION INTRAMUSCULAR; INTRAVENOUS at 11:02

## 2024-02-24 RX ADMIN — LORAZEPAM 2 MG: 2 INJECTION INTRAMUSCULAR; INTRAVENOUS at 05:02

## 2024-02-24 RX ADMIN — DEXMEDETOMIDINE HYDROCHLORIDE 1.4 MCG/KG/HR: 4 INJECTION, SOLUTION INTRAVENOUS at 09:02

## 2024-02-24 RX ADMIN — LORAZEPAM 2 MG: 2 INJECTION INTRAMUSCULAR; INTRAVENOUS at 10:02

## 2024-02-24 NOTE — ANESTHESIA POSTPROCEDURE EVALUATION
Anesthesia Post Evaluation    Patient: Jose F Hanley    Procedure(s) Performed: Procedure(s) (LRB):  CABG, REPEAT (N/A)  HARVEST-VEIN-ENDOVASCULAR (Right)  SURGICAL PROCUREMENT,ARTERY,RADIAL,FOR CABG  INSERTION, INTRA-AORTIC BALLOON PUMP (Right)    Final Anesthesia Type: general      Patient location during evaluation: ICU  Patient participation: No - Unable to Participate, Sedation  Level of consciousness: sedated  Post-procedure vital signs: reviewed and stable  Pain management: adequate  Airway patency: patent    PONV status at discharge: No PONV  Anesthetic complications: no      Cardiovascular status: blood pressure returned to baseline and hemodynamically stable  Respiratory status: BIPAP  Hydration status: euvolemic  Follow-up not needed.          Extubated and now on BiPAP, sedated to tolerate BiPAP. Nurse reports when awakened pt responds appropriately.      [facility]    We will remain available if needed for re-consultation    Vitals Value Taken Time   /81 02/24/24 1600   Temp 36.9 °C (98.5 °F) 02/24/24 1235   Pulse 84 02/24/24 1614   Resp 16 02/24/24 1614   SpO2 94 % 02/24/24 1614   Vitals shown include unvalidated device data.      No case tracking events are documented in the log.      Pain/Iraida Score: Pain Rating Prior to Med Admin: 5 (2/24/2024  9:38 AM)  Pain Rating Post Med Admin: 0 (2/24/2024  1:30 AM)

## 2024-02-24 NOTE — RESPIRATORY THERAPY
02/23/24 1940   Patient Assessment/Suction   Level of Consciousness (AVPU) responds to pain   Respiratory Effort Normal;Unlabored   Expansion/Accessory Muscles/Retractions no retractions;no use of accessory muscles   All Lung Fields Breath Sounds clear   Rhythm/Pattern, Respiratory assisted mechanically   Cough Frequency with stimulation   Cough Type assisted   Suction Method oral;tracheal   Suction Pressure (mmHg) -120 mmHg   $ Suction Charges Inline Suction Procedure Stat Charge   Secretions Amount moderate   Secretions Color white   Secretions Characteristics thick   Skin Integrity   $ Wound Care Tech Time 15 min   Area Observed Upper lip;Lower lip;Corner lip   Skin Appearance without discoloration   PRE-TX-O2   Device (Oxygen Therapy) ventilator   $ Is the patient on High Flow Oxygen? Yes   Oxygen Concentration (%) 40   SpO2 (!) 90 %   Pulse Oximetry Type Continuous   $ Pulse Oximetry - Multiple Charge Pulse Oximetry - Multiple   Pulse (!) 229   Resp 18   Aerosol Therapy   $ Aerosol Therapy Charges PRN treatment not required   Respiratory Treatment Status (SVN) PRN treatment not required        Airway - Non-Surgical 02/23/24 0608 Endotracheal Tube   Placement Date/Time: 02/23/24 0608   Present Prior to Hospital Arrival?: No  Method of Intubation: Direct laryngoscopy  Inserted by: CRNA  Airway Device: Endotracheal Tube  Mask Ventilation: Easy  Intubated: Postinduction  Blade: Saldivar #2  Airway Dev...   Secured at 23 cm   Measured At Lips   Secured Location Center   Secured by Commercial tube boswell   Bite Block none   Status Intact;Secured;Patent   Airway Safety   Is Ambu Bag and Mask with Patient? Yes, Adult Ambu Bag and Mask   Suction set is at the bedside? Yes   Respiratory Interventions   Airway/Ventilation Management airway patency maintained   Vent Select   Conventional Vent Y   Charged w/in last 24h YES   Preset Conventional Ventilator Settings   Vent ID [id number]   Vent Type    Ventilation Type VC   Vent  Mode SIMV   Humidity HME   Set Rate 16 BPM   Vt Set 650 mL   PEEP/CPAP 5 cmH20   Pressure Support 10 cmH20   Waveform RAMP   Peak Flow 60 L/min   Peak End Inspiratory Pressure 34 cmH20   Insp Rise Time  60 %   I-Trigger Type  V-TRIG   Trigger Sensitivity Flow/I-Trigger 2.5 L/min   Patient Ventilator Parameters   Resp Rate Total 15 br/min   Peak Airway Pressure 48 cmH20   Mean Airway Pressure 9.8 cmH20   Plateau Pressure 0 cmH20   Exhaled Vt 637 mL   Total Ve 3.42 L/m   Spont Ve 0.77 L   I:E Ratio Measured 1:4.00   Auto PEEP 0 cmH20   Tubing ID (mm) 8 mm   Tube Type ET   Inspired Tidal Volume (VTI) 0 mL   Conventional Ventilator Alarms   Alarms On Y   Resp Rate High Alarm 40 br/min   Press High Alarm 60 cmH2O   Apnea Rate 22   Apnea Volume (mL) 0 mL   Apnea Oxygen Concentration  100   Apnea Flow Rate (L/min) 60   T Apnea 20 sec(s)   Ready to Wean/Extubation Screen   FIO2<=50 (chart decimal) 0.4   MV<16L (chart vol.) 3.42   PEEP <=8 (chart #) 5   Ready to Wean Parameters   F/VT Ratio<105 (RSBI) (!) 28.26   Vital Capacity   Vital Capacity (mL) 0   Education   $ Education Bronchodilator;Suction;Ventilator Oxygen;15 min   Tobacco Cessation Intervention   Do you use any type of tobacco product? No   Respiratory Evaluation   $ Care Plan Tech Time 15 min   $ Eval/Re-eval Charges Evaluation   Evaluation For New Orders   Admitting Diagnosis CABG   Cardiac Diagnosis CAD, CHF   Pulmonary Diagnosis COPD   Current Surgeries CABG   Bronchodilator Care Plan   Bronchodilator Care Plan Aerosol   Aerosol Meds w/ frequency Albuterol - Ipratropium (DUO-NEB) 0.5mg-3mg(2.5ml) 3ml Nebulizer Solution0.1-0.15mg/kg PRN   Airway Clearance Care Plan   Airway Clearance Care Plan Suction PRN   Frequency PRN   Rationale Other  (On vent)

## 2024-02-24 NOTE — CARE UPDATE
PATIENT RIPPING OFF BIPAP AND AGITATED. PATIENT STATES WILL NOT WEAR ANYMORE, PLACED ON 6L NC TO INCREASE SATS.

## 2024-02-24 NOTE — NURSING
Pt mad as a hornet mildly confused.  He has decided to rip his medical equipment out and walk home.  Pt fighting with nurses trying to hurt claw and squeeze nurses.  R and l soft wrist restraints reapplied.  Notified Dr Marino will give him 2 mg dilaudid for pt comfort.  Abg done and results to Dr marino.  Pt wife Berenice at bed side Mrs aldridge said pt can be a little difficult at home too.

## 2024-02-24 NOTE — PROGRESS NOTES
[facility]  Department of Cardiology  Consult Note      PATIENT NAME: Jose F Hanley  MRN: [id number]  TODAY'S DATE: 02/24/2024  ADMIT DATE: 2/23/2024                          CONSULT REQUESTED BY: Gary Thomas MD    SUBJECTIVE     PRINCIPAL PROBLEM: S/P CABG (coronary artery bypass graft)      REASON FOR CONSULT:  Post CABG     Interval history:  2/24/24  Pt was seen extubated on BiPAP this morning. Balloon pump. Pressures soft this morning. Per RN, pt was increasingly agitated this morning.       HPI:  Pt is a 78 year old with history of known CAD with previous bypass surgery back in 08/31/23 and then underwent a repeat CABG today, 2/23/23. He Just recently had a [facility] as well with Dr. Bush back on 2/7/24.       Per surgery team:  Operation: Redo coronary artery bypass grafting x3 using left radial artery to left anterior descending coronary artery and separate segments of saphenous vein from the aorta to the diagonal coronary artery and from the aorta to the obtuse marginal coronary artery using a combination of antegrade and retrograde cardioplegia, mild systemic hypothermia, endoscopic vein harvest from the right leg, right radial artery harvest, and insertion of right femoral arterial intra-aortic balloon pump with fluoroscopic guidance and complete pericardiectomy due to severe adhesive pericarditis         Review of patient's allergies indicates:   Allergen Reactions    Adhes. band-tape-benzalkonium Rash     Pt stated it caused blisters    Statins-hmg-coa reductase inhibitors Other (See Comments)     Statin Intolerance (joint pain)       Past Medical History:   Diagnosis Date    A-fib     Allergies 2020    gets allergy shots    Arthritis 1973    right knee    Back pain     COPD (chronic obstructive pulmonary disease)     Diastolic heart failure     GERD (gastroesophageal reflux disease) 2015    HLD (hyperlipidemia)     HTN (hypertension)     Hx of LASIK     Hypothyroidism, unspecified 2012     Inflammatory disease of prostate, unspecified     out of medicine    Mild intermittent asthma, uncomplicated     Myocardial infarction     Neuropathy     On home oxygen therapy     3L NC  at night    PAD (peripheral artery disease)     Staph skin infection 2016    right thigh    TIA (transient ischemic attack) 2013     Past Surgical History:   Procedure Laterality Date    AORTOGRAPHY WITH SERIALOGRAPHY N/A 08/31/2023    Procedure: AORTOGRAM, WITH SERIALOGRAPHY;  Surgeon: Gary Thomas MD;  Location: [facility] CATH/EP LAB;  Service: Peripheral Vascular;  Laterality: N/A;    CATARACT EXTRACTION Bilateral 2014    CORONARY ANGIOGRAPHY INCLUDING BYPASS GRAFTS WITH CATHETERIZATION OF LEFT HEART Left 01/12/2024    Procedure: ANGIOGRAM, CORONARY, INCLUDING BYPASS GRAFT, WITH LEFT HEART CATHETERIZATION;  Surgeon: Gregg Bush MD;  Location: [facility] CATH/EP LAB;  Service: Cardiology;  Laterality: Left;    CORONARY ARTERY BYPASS GRAFT  2010    INSERTION OF IMPLANTABLE LOOP RECORDER  2011    INSERTION OF PACEMAKER  2021    does not have card with him for preadmit    INSTANTANEOUS WAVE-FREE RATIO (IFR) N/A 01/12/2024    Procedure: Instantaneous Wave-Free Ratio (IFR);  Surgeon: Gregg Bush MD;  Location: [facility] CATH/EP LAB;  Service: Cardiology;  Laterality: N/A;    PTA, ANTERIOR TIBIAL Left 08/31/2023    Procedure: PTA, Anterior Tibial;  Surgeon: Gary Thomas MD;  Location: [facility] CATH/EP LAB;  Service: Peripheral Vascular;  Laterality: Left;    PTA, SUPERFICIAL FEMORAL ARTERY Left 08/31/2023    Procedure: PTA, Superficial Femoral Artery;  Surgeon: Gary Thomas MD;  Location: [facility] CATH/EP LAB;  Service: Peripheral Vascular;  Laterality: Left;    SPLENECTOMY  2016    STENT, ANTERIOR TIBIAL Left 08/31/2023    Procedure: Stent, Anterior Tibial;  Surgeon: Gary Thomas MD;  Location: [facility] CATH/EP LAB;  Service: Peripheral Vascular;  Laterality: Left;    watchman heart device  2019     Social  History     Tobacco Use    Smoking status: Former     Current packs/day: 0.00     Types: Cigarettes     Quit date: 1987     Years since quittin.2    Smokeless tobacco: Never   Substance Use Topics    Alcohol use: Not Currently     Alcohol/week: 21.0 standard drinks of alcohol     Types: 21 Drinks containing 0.5 oz of alcohol per week     Comment: ed roberto mixed drinks    Drug use: No        REVIEW OF SYSTEMS    As mentioned in HPI    OBJECTIVE     VITAL SIGNS (Most Recent)  Temp: 99.3 °F (37.4 °C) (24 1007)  Pulse: 80 (24 1045)  Resp: 15 (24 104)  BP: (!) 102/56 (24 1030)  SpO2: (!) 88 % (24)    VENTILATION STATUS  Resp: 15 (24)  SpO2: (!) 88 % (24)  Vent Mode: SIMV  Oxygen Concentration (%):  [] 35  Resp Rate Total:  [10 br/min-23 br/min] 18 br/min  Vt Set:  [650 mL] 650 mL  PEEP/CPAP:  [5 cmH20] 5 cmH20  Pressure Support:  [10 cmH20] 10 cmH20  Mean Airway Pressure:  [8.2 pzT94-38 cmH20] 14 cmH20        I & O (Last 24H):  Intake/Output Summary (Last 24 hours) at 2024 1229  Last data filed at 2024 1106  Gross per 24 hour   Intake 7296.57 ml   Output 2685 ml   Net 4611.57 ml         WEIGHTS  Wt Readings from Last 3 Encounters:   24 1540 125 kg (275 lb 9.2 oz)   24 0948 124.5 kg (274 lb 6.4 oz)   24 0718 120.7 kg (266 lb)       PHYSICAL EXAM    CONSTITUTIONAL: NAD, sleeping, light sedation   HEENT: Normocephalic. No pallor  NECK: no JVD  LUNGS: coarse   HEART: regular rate and rhythm, S1, S2 normal, + murmur   ABDOMEN: soft, non-tender, bowel sounds normal  EXTREMITIES: No edema  SKIN: No rash  NEURO: AAO X 3  PSYCH: light sedation     HOME MEDICATIONS:  No current facility-administered medications on file prior to encounter.     Current Outpatient Medications on File Prior to Encounter   Medication Sig Dispense Refill    acetaminophen (TYLENOL) 325 MG tablet Take 650 mg by mouth every 6 (six) hours as needed.       albuterol (PROVENTIL/VENTOLIN HFA) 90 mcg/actuation inhaler Inhale 2 puffs into the lungs every 6 (six) hours as needed.      aspirin 81 MG Chew Take 81 mg by mouth once daily.      betamethasone valerate 0.1% (VALISONE) 0.1 % Lotn Apply to scalp bid prn rash      cyanocobalamin 500 MCG tablet 500 mcg.      cyclobenzaprine (FLEXERIL) 10 MG tablet Take 10 mg by mouth every 8 (eight) hours as needed.      diphenhydrAMINE (BENADRYL) 50 MG capsule 50 mg every 6 (six) hours as needed.      doxepin (SINEQUAN) 50 MG capsule Take 50 mg by mouth every evening.      DULoxetine (CYMBALTA) 60 MG capsule 60 mg nightly.      EPINEPHrine (EPIPEN) 0.3 mg/0.3 mL AtIn INJECT 0.3MG/0.3ML SUBCUTANEOUSLY (UNDER THE SKIN)  AS NEEDED FOR ALLERGIC REACTIONS SELF-INJECTOR PEN      furosemide (LASIX) 20 MG tablet Take 20 mg by mouth once daily.      gabapentin (NEURONTIN) 400 MG capsule 800 mg 2 (two) times daily.      hydrocodone-homatropine 5-1.5 mg/5 ml (HYCODAN) 5-1.5 mg/5 mL Syrp Take by mouth every 6 (six) hours as needed. cough      levothyroxine (SYNTHROID) 50 MCG tablet 50 mcg.      metOLazone (ZAROXOLYN) 5 MG tablet 5 mg daily as needed. For weight gain >2lbs      montelukast (SINGULAIR) 10 mg tablet 10 mg once daily.      nitroGLYCERIN (NITROSTAT) 0.4 MG SL tablet Place 0.4 mg under the tongue every 5 (five) minutes as needed.      omega-3 fatty acids/fish oil (FISH OIL-OMEGA-3 FATTY ACIDS) 300-1,000 mg capsule Take 1 capsule by mouth 2 (two) times daily.      omeprazole (PRILOSEC) 20 MG capsule 40 mg once daily.      potassium chloride SA (K-DUR,KLOR-CON) 20 MEQ tablet 20 mEq nightly.      ranolazine (RANEXA) 1,000 mg Tb12 Take 1 tablet (1,000 mg total) by mouth 2 (two) times daily. 180 tablet 3    senna-docusate 8.6-50 mg (PERICOLACE) 8.6-50 mg per tablet nightly.      spironolactone (ALDACTONE) 50 MG tablet Take 50 mg by mouth once daily.      tamsulosin (FLOMAX) 0.4 mg Cap 0.4 mg.      TESTOSTERONE PROPIONATE, BULK, MISC  Inject 1 each as directed every 14 (fourteen) days.      TOPROL XL 25 mg 24 hr tablet Take 25 mg by mouth 2 (two) times daily.      torsemide (DEMADEX) 20 MG Tab Take 1 tablet (20 mg total) by mouth once daily. (Patient not taking: Reported on 12/21/2023) 30 tablet 11       SCHEDULED MEDS:   aspirin  81 mg Oral Daily    docusate sodium  100 mg Oral BID    DULoxetine  60 mg Oral Nightly    levothyroxine  50 mcg Oral Before breakfast    metoprolol succinate  25 mg Oral BID    montelukast  10 mg Oral Daily    mupirocin   Nasal BID    pantoprazole  40 mg Oral Before breakfast    ranolazine  1,000 mg Oral BID    sodium chloride 0.9% 1,000 mL with mvi, (ADULT) no.4 with vit K 3,300 unit- 150 mcg/10 mL 10 mL, thiamine 100 mg, folic acid 1 mg infusion   Intravenous Daily    tamsulosin  0.4 mg Oral Daily       CONTINUOUS INFUSIONS:   sodium chloride 0.45% Stopped (02/24/24 1103)    dexmedeTOMIDine (Precedex) infusion (titrating) 1.4 mcg/kg/hr (02/24/24 1034)    insulin regular 1 units/mL infusion orderable (CTS POST-OP) Stopped (02/24/24 0900)    phenylephrine Stopped (02/24/24 0401)       PRN MEDS:0.9%  NaCl infusion (for blood administration), albumin human 5%, albuterol sulfate, calcium gluconate IVPB, calcium gluconate IVPB, calcium gluconate IVPB, calcium gluconate IVPB, calcium gluconate IVPB, calcium gluconate IVPB, dextrose 50% in water (D50W), dextrose 50% in water (D50W), HYDROcodone-acetaminophen, HYDROmorphone, lorazepam, magnesium sulfate IVPB, magnesium sulfate IVPB, magnesium sulfate IVPB, magnesium sulfate IVPB, midazolam, morphine, morphine, ondansetron, potassium chloride in water, potassium chloride in water, potassium chloride in water **AND** potassium chloride in water **AND** potassium chloride in water, potassium chloride in water, sodium phosphate 15 mmol in dextrose 5 % (D5W) 250 mL IVPB, sodium phosphate 20.01 mmol in dextrose 5 % (D5W) 250 mL IVPB, sodium phosphate 30 mmol in dextrose 5 % (D5W)  "250 mL IVPB    LABS AND DIAGNOSTICS     CBC LAST 3 DAYS  Recent Labs   Lab 02/21/24  1111 02/23/24  0740 02/23/24  2127 02/24/24  0113 02/24/24  0230 02/24/24  0645 02/24/24  0726 02/24/24  0917   WBC 11.28   < > 13.63* 14.35*  --   --  18.68*  --    RBC 6.50*   < > 4.38* 3.91*  --   --  3.91*  --    HGB 14.7   < > 10.1* 8.9*  --   --  9.1*  --    HCT 47.9   < > 32.8* 29.5*   < > 33* 29.6* 33*   MCV 74*   < > 75* 75*  --   --  76*  --    MCH 22.6*   < > 23.1* 22.8*  --   --  23.3*  --    MCHC 30.7*   < > 30.8* 30.2*  --   --  30.7*  --    RDW 21.8*   < > 20.2* 20.3*  --   --  20.8*  --    *   < > 159 156  --   --  144*  --    MPV 9.4   < > 9.4 10.3  --   --  10.8  --    GRAN 43.6  4.9  --  91.1*  12.4* 87.8*  12.6*  --   --   --   --    LYMPH 31.7  3.6  --  3.6*  0.5* 5.6*  0.8*  --   --   --   --    MONO 14.5  1.6*  --  4.7  0.6 6.0  0.9  --   --   --   --    BASO 0.20  --  0.02 0.03  --   --   --   --    NRBC 0  --  0 0  --   --   --   --     < > = values in this interval not displayed.         COAGULATION LAST 3 DAYS  No results for input(s): "LABPT", "INR", "APTT" in the last 168 hours.    CHEMISTRY LAST 3 DAYS  Recent Labs   Lab 02/23/24  1352 02/23/24  1452 02/23/24  1812 02/23/24 2007 02/23/24 2127 02/24/24  0113 02/24/24  0230 02/24/24  0434 02/24/24  0645 02/24/24  0726 02/24/24  0917     --  135*  --  135* 136  --   --   --  136  --    K 3.6  --  4.9  --  5.4* 4.7  --   --   --  5.0  --      --  106  --  106 107  --   --   --  106  --    CO2 25  --  24  --  25 25  --   --   --  27  --    ANIONGAP 5*  --  5*  --  4* 4*  --   --   --  3*  --    BUN 10  --  11  --  11 11  --   --   --  13  --    CREATININE 0.9  --  1.0  --  1.0 1.0  --   --   --  1.0  --    *  --  199*  --  183* 162*  --   --   --  120*  --    CALCIUM 7.8*  --  7.9*  --  8.1* 7.8*  --   --   --  7.8*  --    PH  --    < >  --    < >  --   --    < > 7.258* 7.345*  --  7.283*   MG 2.1  --  1.8  --  2.3  --   " "--   --   --  2.3  --    ALBUMIN 3.1*  --  3.5  --   --   --   --   --   --  3.7  --    PROT 4.7*  --  5.0*  --   --   --   --   --   --  5.2*  --    ALKPHOS 30*  --  26*  --   --   --   --   --   --  17*  --    ALT 42  --  38  --   --   --   --   --   --  22  --    *  --  93*  --   --   --   --   --   --  48*  --    BILITOT 1.5*  --  1.6*  --   --   --   --   --   --  1.0  --     < > = values in this interval not displayed.         CARDIAC PROFILE LAST 3 DAYS  No results for input(s): "BNP", "CPK", "CPKMB", "LDH", "TROPONINI", "TROPONINIHS" in the last 168 hours.    ENDOCRINE LAST 3 DAYS  No results for input(s): "TSH", "PROCAL" in the last 168 hours.    LAST ARTERIAL BLOOD GAS  ABG  Recent Labs   Lab 02/24/24  0917   PH 7.283*   PO2 92   PCO2 51.9*   HCO3 24.6   BE -2         LAST 7 DAYS MICROBIOLOGY   Microbiology Results (last 7 days)       ** No results found for the last 168 hours. **            MOST RECENT IMAGING  X-Ray Chest AP Portable  Reason: Post-op    FINDINGS:    Portable chest with comparison chest x-ray February 23, 2024 show enlarged cardiomediastinal silhouette. Median sternotomy wires again noted. Right swans Bienvenido catheter is unchanged. Mediastinal chest drains again noted. Nasogastric tube and endotracheal tube are unchanged.  Hazy opacities of the left lung base and bandlike opacity of the right midlung are unchanged. Pulmonary vasculature is normal. No acute osseous abnormality.    IMPRESSION:    No significant change from prior exam.    Electronically signed by:  West Badillo DO  02/24/2024 09:31 AM CST Workstation: [id number]      ECHOCARDIOGRAM RESULTS (last 5)  Results for orders placed during the hospital encounter of 12/13/23    Echo    Interpretation Summary    Left Ventricle: The left ventricle is normal in size. Mildly increased wall thickness. There is mild concentric hypertrophy. Normal wall motion. There is normal systolic function with a visually estimated ejection " fraction of 65 - 70%. There is normal diastolic function.    Right Ventricle: Mild right ventricular enlargement. Wall thickness is normal. Right ventricle wall motion  is normal. Systolic function is normal.    Left Atrium: Left atrium is moderately dilated.    Right Atrium: Right atrium is mildly dilated.    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    Tricuspid Valve: There is mild regurgitation with a centrally directed jet.    Pulmonic Valve: There is mild regurgitation with a centrally directed jet.    Pulmonary Artery: There is mild pulmonary hypertension. The estimated pulmonary artery systolic pressure is 40 mmHg.    IVC/SVC: Intermediate venous pressure at 8 mmHg.      CURRENT/PREVIOUS VISIT EKG  Results for orders placed or performed during the hospital encounter of 02/23/24   EKG 12-LEAD    Collection Time: 02/23/24  1:53 PM   Result Value Ref Range    QRS Duration 232 ms    OHS QTC Calculation 613 ms    Narrative    Test Reason : Z95.1,Z95.1,    Vent. Rate : 080 BPM     Atrial Rate : 087 BPM     P-R Int : 000 ms          QRS Dur : 232 ms      QT Int : 532 ms       P-R-T Axes : 000 168 250 degrees     QTc Int : 613 ms    Ventricular-paced rhythm  occasional atrila pacing  Abnormal ECG  When compared with ECG of 21-FEB-2024 09:33,  Electronic ventricular pacemaker has replaced Atrial fibrillation  Confirmed by Eleazar WOO, Gregg NEWTON ([id number]) on 2/24/2024 7:12:58 AM    Referred By: OLLIE RIBEIRO           Confirmed By:Gregg Bush MD           ASSESSMENT/PLAN:     Active Hospital Problems    Diagnosis    *S/P CABG (coronary artery bypass graft) - x2 09/2010 - LIMA to LAD; SVG to OMB; grafts occluded 08/2012       ASSESSMENT & PLAN:     MVCAD S/P redo CABG yesterday 2/23/24,  left radial artery to LAD, SVG to Dx, SVG to OM  HFpEF  HTN/ dyslipidemia   Statin intolerance      RECOMMENDATIONS:    Continue to monitor chest tube output.  Continue metoprolol succinate 25  mg BID.  Continue Asprin therapy.  PT/OT.  Up and out of bed as tolerated.   IS.  Strict I/Os.   Monitor rate and rhythm.  Continue to check and replace potassium and magnesium. Goal for potassium is 4.0, and goal for magnesium is 2.0.   Will continue to follow.       Mar Camacho NP  Department of Cardiology  Date of Service: 02/24/2024

## 2024-02-24 NOTE — NURSING
Attempting to wean vent to rate 10  precidex off pt wakes easily lifts head off pillow then becomes very anxious banging on siderails then bp drops to 60/40.  Vent back to rate 16 able to calm pt bp back to 110/50.  Will continue to hold precidex and see if pt becomes more appropriate.

## 2024-02-24 NOTE — CARE UPDATE
02/24/24 0803   Patient Assessment/Suction   Level of Consciousness (AVPU) alert   Respiratory Effort Normal;Unlabored   Expansion/Accessory Muscles/Retractions no use of accessory muscles   All Lung Fields Breath Sounds clear   Rhythm/Pattern, Respiratory assisted mechanically   PRE-TX-O2   Device (Oxygen Therapy) BIPAP   Oxygen Concentration (%) 40   SpO2 (!) 92 %  (Simultaneous filing. User may not have seen previous data.)   Pulse 80  (Simultaneous filing. User may not have seen previous data.)   Resp (!) 21  (Simultaneous filing. User may not have seen previous data.)   BP 98/69   Ready to Wean/Extubation Screen   FIO2<=50 (chart decimal) 0.4   Ready to Wean Parameters   $ Extubation Tech Time Tech Time 15 min;Extubation w/ Parameters   Sedation Vacation Completed? Yes   Cough Reflex? Yes   Doctor Notified and Provider Name DR BUSBY   Extubated? Yes   Reason for Extubation Extubated   Ventilator Discontinued Yes   Preset CPAP/BiPAP Settings   Mode Of Delivery BiPAP S/T   $ CPAP/BiPAP Daily Charge BiPAP/CPAP Daily   $ Initial CPAP/BiPAP Setup? Yes   $ Is patient using? Yes   Size of Mask Large   Sized Appropriately? Yes   Ipap 16   EPAP (cm H2O) 5   Pressure Support (cm H2O) 11   Set Rate (Breaths/Min) 16   ITime (sec) 1   Rise Time (sec) 3   Patient CPAP/BiPAP Settings   RR Total (Breaths/Min) 18   Tidal Volume (mL) 678   VE Minute Ventilation (L/min) 12.5 L/min   Peak Inspiratory Pressure (cm H2O) 16   TiTOT (%) 24   Total Leak (L/Min) 11   Patient Trigger - ST Mode Only (%) 85   CPAP/BiPAP Backup Settings   IPAP Backup 16 cmH2O   EPAP Backup 5 cmH2O   Backup Rate 16 breaths per minute (bpm)   FIO2 Backup 40 %   ITIME Backup 1 seconds   Rise Time Backup 3 seconds   CPAP/BiPAP Alarms   High Pressure (cm H2O) 40   Low Pressure (cm H2O) 8   Minute Ventilation (L/Min) 3   High RR (breaths/min) 45   Low RR (breaths/min) 8   Apnea (Sec) 20   Education   $ Education BiPAP;15 min

## 2024-02-24 NOTE — PROGRESS NOTES
This patient is status post difficult redo coronary artery bypass grafting.  He is intubated and on an intra-aortic balloon pump.  He is awake and alert and quite expressive.    On exam vital signs are stable.  He has in a paced rhythm.    Surgical wounds are dressed.  Laboratory work is noted.    The patient will be weaned to extubation.  He seems to be awake and alert and strong enough to tolerate extubation.  Supportive care is ongoing.  The intra-aortic balloon pump will be weaned.

## 2024-02-25 LAB
ALBUMIN SERPL BCP-MCNC: 3.4 G/DL (ref 3.5–5.2)
ALP SERPL-CCNC: 22 U/L (ref 55–135)
ALT SERPL W/O P-5'-P-CCNC: 15 U/L (ref 10–44)
ANION GAP SERPL CALC-SCNC: 7 MMOL/L (ref 8–16)
AST SERPL-CCNC: 33 U/L (ref 10–40)
BILIRUB SERPL-MCNC: 0.7 MG/DL (ref 0.1–1)
BUN SERPL-MCNC: 19 MG/DL (ref 8–23)
CALCIUM SERPL-MCNC: 7.7 MG/DL (ref 8.7–10.5)
CHLORIDE SERPL-SCNC: 107 MMOL/L (ref 95–110)
CO2 SERPL-SCNC: 23 MMOL/L (ref 23–29)
CREAT SERPL-MCNC: 0.9 MG/DL (ref 0.5–1.4)
ERYTHROCYTE [DISTWIDTH] IN BLOOD BY AUTOMATED COUNT: 20.6 % (ref 11.5–14.5)
EST. GFR  (NO RACE VARIABLE): >60 ML/MIN/1.73 M^2
GLUCOSE SERPL-MCNC: 171 MG/DL (ref 70–110)
HCT VFR BLD AUTO: 30.1 % (ref 40–54)
HGB BLD-MCNC: 9.3 G/DL (ref 14–18)
MAGNESIUM SERPL-MCNC: 2.2 MG/DL (ref 1.6–2.6)
MCH RBC QN AUTO: 23.7 PG (ref 27–31)
MCHC RBC AUTO-ENTMCNC: 30.9 G/DL (ref 32–36)
MCV RBC AUTO: 77 FL (ref 82–98)
PLATELET # BLD AUTO: 114 K/UL (ref 150–450)
PMV BLD AUTO: 10.3 FL (ref 9.2–12.9)
POTASSIUM SERPL-SCNC: 4.7 MMOL/L (ref 3.5–5.1)
PROT SERPL-MCNC: 5.3 G/DL (ref 6–8.4)
RBC # BLD AUTO: 3.93 M/UL (ref 4.6–6.2)
SODIUM SERPL-SCNC: 137 MMOL/L (ref 136–145)
WBC # BLD AUTO: 18.7 K/UL (ref 3.9–12.7)

## 2024-02-25 PROCEDURE — 99900031 HC PATIENT EDUCATION (STAT)

## 2024-02-25 PROCEDURE — 25000003 PHARM REV CODE 250: Performed by: PHYSICIAN ASSISTANT

## 2024-02-25 PROCEDURE — 82330 ASSAY OF CALCIUM: CPT

## 2024-02-25 PROCEDURE — 80053 COMPREHEN METABOLIC PANEL: CPT | Performed by: THORACIC SURGERY (CARDIOTHORACIC VASCULAR SURGERY)

## 2024-02-25 PROCEDURE — 84295 ASSAY OF SERUM SODIUM: CPT

## 2024-02-25 PROCEDURE — 83735 ASSAY OF MAGNESIUM: CPT | Performed by: THORACIC SURGERY (CARDIOTHORACIC VASCULAR SURGERY)

## 2024-02-25 PROCEDURE — 37799 UNLISTED PX VASCULAR SURGERY: CPT

## 2024-02-25 PROCEDURE — 99233 SBSQ HOSP IP/OBS HIGH 50: CPT | Mod: ,,, | Performed by: INTERNAL MEDICINE

## 2024-02-25 PROCEDURE — 85027 COMPLETE CBC AUTOMATED: CPT | Performed by: THORACIC SURGERY (CARDIOTHORACIC VASCULAR SURGERY)

## 2024-02-25 PROCEDURE — 33968 REMOVE AORTIC ASSIST DEVICE: CPT

## 2024-02-25 PROCEDURE — 27100171 HC OXYGEN HIGH FLOW UP TO 24 HOURS

## 2024-02-25 PROCEDURE — 84132 ASSAY OF SERUM POTASSIUM: CPT

## 2024-02-25 PROCEDURE — 94660 CPAP INITIATION&MGMT: CPT

## 2024-02-25 PROCEDURE — 94761 N-INVAS EAR/PLS OXIMETRY MLT: CPT | Mod: XB

## 2024-02-25 PROCEDURE — 63600175 PHARM REV CODE 636 W HCPCS: Performed by: THORACIC SURGERY (CARDIOTHORACIC VASCULAR SURGERY)

## 2024-02-25 PROCEDURE — 25000003 PHARM REV CODE 250: Performed by: THORACIC SURGERY (CARDIOTHORACIC VASCULAR SURGERY)

## 2024-02-25 PROCEDURE — 20000000 HC ICU ROOM

## 2024-02-25 PROCEDURE — 82803 BLOOD GASES ANY COMBINATION: CPT

## 2024-02-25 PROCEDURE — 99900035 HC TECH TIME PER 15 MIN (STAT)

## 2024-02-25 PROCEDURE — 85014 HEMATOCRIT: CPT

## 2024-02-25 RX ORDER — FUROSEMIDE 10 MG/ML
40 INJECTION INTRAMUSCULAR; INTRAVENOUS ONCE
Status: COMPLETED | OUTPATIENT
Start: 2024-02-25 | End: 2024-02-25

## 2024-02-25 RX ORDER — LEVOTHYROXINE SODIUM ANHYDROUS 100 UG/5ML
25 INJECTION, POWDER, LYOPHILIZED, FOR SOLUTION INTRAVENOUS DAILY
Status: DISCONTINUED | OUTPATIENT
Start: 2024-02-25 | End: 2024-03-06

## 2024-02-25 RX ADMIN — LORAZEPAM 2 MG: 2 INJECTION INTRAMUSCULAR; INTRAVENOUS at 02:02

## 2024-02-25 RX ADMIN — LEVOTHYROXINE SODIUM ANHYDROUS 25 MCG: 100 INJECTION, POWDER, LYOPHILIZED, FOR SOLUTION INTRAVENOUS at 10:02

## 2024-02-25 RX ADMIN — LORAZEPAM 2 MG: 2 INJECTION INTRAMUSCULAR; INTRAVENOUS at 07:02

## 2024-02-25 RX ADMIN — LORAZEPAM 2 MG: 2 INJECTION INTRAMUSCULAR; INTRAVENOUS at 10:02

## 2024-02-25 RX ADMIN — LORAZEPAM 2 MG: 2 INJECTION INTRAMUSCULAR; INTRAVENOUS at 01:02

## 2024-02-25 RX ADMIN — MORPHINE SULFATE 4 MG: 4 INJECTION, SOLUTION INTRAMUSCULAR; INTRAVENOUS at 09:02

## 2024-02-25 RX ADMIN — ASCORBIC ACID, VITAMIN A PALMITATE, CHOLECALCIFEROL, THIAMINE HYDROCHLORIDE, RIBOFLAVIN-5 PHOSPHATE SODIUM, PYRIDOXINE HYDROCHLORIDE, NIACINAMIDE, DEXPANTHENOL, ALPHA-TOCOPHEROL ACETATE, VITAMIN K1, FOLIC ACID, BIOTIN, CYANOCOBALAMIN: 200; 3300; 200; 6; 3.6; 6; 40; 15; 10; 150; 600; 60; 5 INJECTION, SOLUTION INTRAVENOUS at 09:02

## 2024-02-25 RX ADMIN — DEXMEDETOMIDINE HYDROCHLORIDE 1.4 MCG/KG/HR: 4 INJECTION, SOLUTION INTRAVENOUS at 01:02

## 2024-02-25 RX ADMIN — DEXMEDETOMIDINE HYDROCHLORIDE 1.4 MCG/KG/HR: 4 INJECTION, SOLUTION INTRAVENOUS at 11:02

## 2024-02-25 RX ADMIN — FUROSEMIDE 40 MG: 10 INJECTION, SOLUTION INTRAMUSCULAR; INTRAVENOUS at 08:02

## 2024-02-25 RX ADMIN — LORAZEPAM 2 MG: 2 INJECTION INTRAMUSCULAR; INTRAVENOUS at 04:02

## 2024-02-25 RX ADMIN — DEXMEDETOMIDINE HYDROCHLORIDE 1.4 MCG/KG/HR: 4 INJECTION, SOLUTION INTRAVENOUS at 08:02

## 2024-02-25 RX ADMIN — MORPHINE SULFATE 4 MG: 4 INJECTION, SOLUTION INTRAMUSCULAR; INTRAVENOUS at 01:02

## 2024-02-25 RX ADMIN — DEXMEDETOMIDINE HYDROCHLORIDE 1.4 MCG/KG/HR: 4 INJECTION, SOLUTION INTRAVENOUS at 04:02

## 2024-02-25 RX ADMIN — SODIUM CHLORIDE: 0.45 INJECTION, SOLUTION INTRAVENOUS at 06:02

## 2024-02-25 RX ADMIN — MUPIROCIN: 20 OINTMENT TOPICAL at 09:02

## 2024-02-25 RX ADMIN — LORAZEPAM 2 MG: 2 INJECTION INTRAMUSCULAR; INTRAVENOUS at 09:02

## 2024-02-25 RX ADMIN — MUPIROCIN 1 G: 20 OINTMENT TOPICAL at 09:02

## 2024-02-25 RX ADMIN — DEXMEDETOMIDINE HYDROCHLORIDE 1.4 MCG/KG/HR: 4 INJECTION, SOLUTION INTRAVENOUS at 12:02

## 2024-02-25 RX ADMIN — SODIUM CHLORIDE: 0.45 INJECTION, SOLUTION INTRAVENOUS at 02:02

## 2024-02-25 RX ADMIN — DEXMEDETOMIDINE HYDROCHLORIDE 1.4 MCG/KG/HR: 4 INJECTION, SOLUTION INTRAVENOUS at 10:02

## 2024-02-25 RX ADMIN — MORPHINE SULFATE 4 MG: 4 INJECTION, SOLUTION INTRAMUSCULAR; INTRAVENOUS at 04:02

## 2024-02-25 RX ADMIN — DEXMEDETOMIDINE HYDROCHLORIDE 1.4 MCG/KG/HR: 4 INJECTION, SOLUTION INTRAVENOUS at 03:02

## 2024-02-25 RX ADMIN — DEXMEDETOMIDINE HYDROCHLORIDE 1.4 MCG/KG/HR: 4 INJECTION, SOLUTION INTRAVENOUS at 02:02

## 2024-02-25 NOTE — CARE UPDATE
02/25/24 0700   Patient Assessment/Suction   Level of Consciousness (AVPU) responds to voice   Respiratory Effort Normal;Unlabored   Expansion/Accessory Muscles/Retractions no retractions   All Lung Fields Breath Sounds coarse   Rhythm/Pattern, Respiratory assisted mechanically   Cough Frequency no cough   PRE-TX-O2   Device (Oxygen Therapy) BIPAP   $ Is the patient on High Flow Oxygen? Yes   Oxygen Concentration (%) 35   SpO2 96 %   Pulse Oximetry Type Continuous   $ Pulse Oximetry - Multiple Charge Pulse Oximetry - Multiple   Pulse 80   Resp (!) 28   BP (!) 90/51   Aerosol Therapy   $ Aerosol Therapy Charges PRN treatment not required   Respiratory Treatment Status (SVN) PRN treatment not required   Preset CPAP/BiPAP Settings   Mode Of Delivery BiPAP S/T   $ CPAP/BiPAP Daily Charge BiPAP/CPAP Daily   $ Initial CPAP/BiPAP Setup? No   $ Is patient using? Yes   Size of Mask Large   Sized Appropriately? Yes   Equipment Type V60   Airway Device Type large full face mask   Ipap 16   EPAP (cm H2O) 5   Pressure Support (cm H2O) 11   Set Rate (Breaths/Min) 16   ITime (sec) 1   Rise Time (sec) 3   Patient CPAP/BiPAP Settings   RR Total (Breaths/Min) 21   Tidal Volume (mL) 621   VE Minute Ventilation (L/min) 13.3 L/min   Peak Inspiratory Pressure (cm H2O) 16   TiTOT (%) 29   Total Leak (L/Min) 5   Patient Trigger - ST Mode Only (%) 99   CPAP/BiPAP Backup Settings   IPAP Backup 16 cmH2O   EPAP Backup 5 cmH2O   Backup Rate 16 breaths per minute (bpm)   FIO2 Backup 35 %   ITIME Backup 1 seconds   Rise Time Backup 3 seconds   CPAP/BiPAP Alarms   High Pressure (cm H2O) 40   Low Pressure (cm H2O) 8   Minute Ventilation (L/Min) 3   High RR (breaths/min) 45   Low RR (breaths/min) 8   Apnea (Sec) 20   Education   $ Education BiPAP;15 min

## 2024-02-25 NOTE — PROGRESS NOTES
Pt has been agitated and confused, attempting to pull at lines and tubes and trying to get out of bed. Re-educated pt that he cannot get out of bed with balloon pump in place multiple times. No evidence of understanding. Restraints remain in place. Precedex gtt still infusing. PRN ativan given.

## 2024-02-25 NOTE — PROGRESS NOTES
Nurses Note -- 4 Eyes      2/24/2024   1901    Skin assessed during: Q Shift Change      [] No Altered Skin Integrity Present    []Prevention Measures Documented      [x] Yes- Altered Skin Integrity Present or Discovered   [] LDA Added if Not in Epic (Describe Wound)   [] New Altered Skin Integrity was Present on Admit and Documented in LDA   [] Wound Image Taken    Wound Care Consulted? Yes    Attending Nurse:  SINCERE Cordero    Second RN/Staff Member:  SINCERE Landry

## 2024-02-25 NOTE — PROGRESS NOTES
This patient is status post redo coronary artery bypass grafting.  He remains on Precedex and is not following commands this morning but seems agitated and moves all 4 extremities.    On exam vital signs are stable.  Hemodynamics are satisfactory.    Surgical wounds are dressed.  Laboratory work is noted.    His chest x-ray shows mild pulmonary congestion.    The intra-aortic balloon pump was removed at bedside.  Manual pressure was held for about 10 minutes.  The nurse took over holding more pressure for least another 10 minutes or so.    Lasix will be ordered.  The Precedex will be weaned as tolerated.  Hopefully his mentation was clear and he will start following commands and can be mobilized.  He very well may be having withdrawal symptoms from alcohol.

## 2024-02-25 NOTE — PROGRESS NOTES
[facility]  Department of Cardiology  Consult Note      PATIENT NAME: Jose F Hanley  MRN: [id number]  TODAY'S DATE: 02/25/2024  ADMIT DATE: 2/23/2024                          CONSULT REQUESTED BY: Gary Thomas MD    SUBJECTIVE     PRINCIPAL PROBLEM: S/P CABG (coronary artery bypass graft)      REASON FOR CONSULT:  Post CABG     Interval history:  2/25/24  Pt was seen resting in bed with Bipap mask in place. Balloon pump was removed this morning per bedside RN.     2/24/24  Pt was seen extubated on BiPAP this morning. Balloon pump. Pressures soft this morning. Per RN, pt was increasingly agitated this morning.       HPI:  Pt is a 78 year old with history of known CAD with previous bypass surgery back in 08/31/23 and then underwent a repeat CABG today, 2/23/23. He Just recently had a [facility] as well with Dr. Bush back on 2/7/24.       Per surgery team:  Operation: Redo coronary artery bypass grafting x3 using left radial artery to left anterior descending coronary artery and separate segments of saphenous vein from the aorta to the diagonal coronary artery and from the aorta to the obtuse marginal coronary artery using a combination of antegrade and retrograde cardioplegia, mild systemic hypothermia, endoscopic vein harvest from the right leg, right radial artery harvest, and insertion of right femoral arterial intra-aortic balloon pump with fluoroscopic guidance and complete pericardiectomy due to severe adhesive pericarditis         Review of patient's allergies indicates:   Allergen Reactions    Adhes. band-tape-benzalkonium Rash     Pt stated it caused blisters    Statins-hmg-coa reductase inhibitors Other (See Comments)     Statin Intolerance (joint pain)       Past Medical History:   Diagnosis Date    A-fib     Allergies 2020    gets allergy shots    Arthritis 1973    right knee    Back pain     COPD (chronic obstructive pulmonary disease)     Diastolic heart failure     GERD (gastroesophageal  reflux disease) 2015    HLD (hyperlipidemia)     HTN (hypertension)     Hx of LASIK     Hypothyroidism, unspecified 2012    Inflammatory disease of prostate, unspecified     out of medicine    Mild intermittent asthma, uncomplicated     Myocardial infarction     Neuropathy     On home oxygen therapy     3L NC  at night    PAD (peripheral artery disease)     Staph skin infection 2016    right thigh    TIA (transient ischemic attack) 2013     Past Surgical History:   Procedure Laterality Date    AORTOGRAPHY WITH SERIALOGRAPHY N/A 08/31/2023    Procedure: AORTOGRAM, WITH SERIALOGRAPHY;  Surgeon: Gary Thomas MD;  Location: [facility] CATH/EP LAB;  Service: Peripheral Vascular;  Laterality: N/A;    CATARACT EXTRACTION Bilateral 2014    CORONARY ANGIOGRAPHY INCLUDING BYPASS GRAFTS WITH CATHETERIZATION OF LEFT HEART Left 01/12/2024    Procedure: ANGIOGRAM, CORONARY, INCLUDING BYPASS GRAFT, WITH LEFT HEART CATHETERIZATION;  Surgeon: Gregg Bush MD;  Location: [facility] CATH/EP LAB;  Service: Cardiology;  Laterality: Left;    CORONARY ARTERY BYPASS GRAFT  2010    INSERTION OF IMPLANTABLE LOOP RECORDER  2011    INSERTION OF PACEMAKER  2021    does not have card with him for preadmit    INSTANTANEOUS WAVE-FREE RATIO (IFR) N/A 01/12/2024    Procedure: Instantaneous Wave-Free Ratio (IFR);  Surgeon: Gregg Bush MD;  Location: [facility] CATH/EP LAB;  Service: Cardiology;  Laterality: N/A;    PTA, ANTERIOR TIBIAL Left 08/31/2023    Procedure: PTA, Anterior Tibial;  Surgeon: Gary Thomas MD;  Location: [facility] CATH/EP LAB;  Service: Peripheral Vascular;  Laterality: Left;    PTA, SUPERFICIAL FEMORAL ARTERY Left 08/31/2023    Procedure: PTA, Superficial Femoral Artery;  Surgeon: Gary Thomas MD;  Location: [facility] CATH/EP LAB;  Service: Peripheral Vascular;  Laterality: Left;    SPLENECTOMY  2016    STENT, ANTERIOR TIBIAL Left 08/31/2023    Procedure: Stent, Anterior Tibial;  Surgeon: Gary Thomas MD;   Location: [facility] CATH/EP LAB;  Service: Peripheral Vascular;  Laterality: Left;    watchman heart device  2019     Social History     Tobacco Use    Smoking status: Former     Current packs/day: 0.00     Types: Cigarettes     Quit date: 1987     Years since quittin.2    Smokeless tobacco: Never   Substance Use Topics    Alcohol use: Not Currently     Alcohol/week: 21.0 standard drinks of alcohol     Types: 21 Drinks containing 0.5 oz of alcohol per week     Comment: ed roberto mixed drinks    Drug use: No        REVIEW OF SYSTEMS    As mentioned in HPI    OBJECTIVE     VITAL SIGNS (Most Recent)  Temp: 99.1 °F (37.3 °C) (24 07)  Pulse: 80 (24)  Resp: (!) 27 (24)  BP: (!) 111/53 (24)  SpO2: 97 % (24)    VENTILATION STATUS  Resp: (!) 27 (24)  SpO2: 97 % (24)  Oxygen Concentration (%):  [28-35] 35        I & O (Last 24H):  Intake/Output Summary (Last 24 hours) at 2024 0956  Last data filed at 2024 0901  Gross per 24 hour   Intake 3250.1 ml   Output 2285 ml   Net 965.1 ml         WEIGHTS  Wt Readings from Last 3 Encounters:   24 1540 125 kg (275 lb 9.2 oz)   24 0948 124.5 kg (274 lb 6.4 oz)   24 0718 120.7 kg (266 lb)       PHYSICAL EXAM    CONSTITUTIONAL: NAD, sleeping, light sedation   HEENT: Normocephalic. No pallor  NECK: no JVD  LUNGS: coarse   HEART: regular rate and rhythm, S1, S2 normal, + murmur   ABDOMEN: soft, non-tender, bowel sounds normal  EXTREMITIES: No edema  SKIN: No rash  NEURO: AAO X 3  PSYCH: light sedation     HOME MEDICATIONS:  No current facility-administered medications on file prior to encounter.     Current Outpatient Medications on File Prior to Encounter   Medication Sig Dispense Refill    acetaminophen (TYLENOL) 325 MG tablet Take 650 mg by mouth every 6 (six) hours as needed.      albuterol (PROVENTIL/VENTOLIN HFA) 90 mcg/actuation inhaler Inhale 2 puffs into the lungs every 6 (six)  hours as needed.      aspirin 81 MG Chew Take 81 mg by mouth once daily.      betamethasone valerate 0.1% (VALISONE) 0.1 % Lotn Apply to scalp bid prn rash      cyanocobalamin 500 MCG tablet 500 mcg.      cyclobenzaprine (FLEXERIL) 10 MG tablet Take 10 mg by mouth every 8 (eight) hours as needed.      diphenhydrAMINE (BENADRYL) 50 MG capsule 50 mg every 6 (six) hours as needed.      doxepin (SINEQUAN) 50 MG capsule Take 50 mg by mouth every evening.      DULoxetine (CYMBALTA) 60 MG capsule 60 mg nightly.      EPINEPHrine (EPIPEN) 0.3 mg/0.3 mL AtIn INJECT 0.3MG/0.3ML SUBCUTANEOUSLY (UNDER THE SKIN)  AS NEEDED FOR ALLERGIC REACTIONS SELF-INJECTOR PEN      furosemide (LASIX) 20 MG tablet Take 20 mg by mouth once daily.      gabapentin (NEURONTIN) 400 MG capsule 800 mg 2 (two) times daily.      hydrocodone-homatropine 5-1.5 mg/5 ml (HYCODAN) 5-1.5 mg/5 mL Syrp Take by mouth every 6 (six) hours as needed. cough      levothyroxine (SYNTHROID) 50 MCG tablet 50 mcg.      metOLazone (ZAROXOLYN) 5 MG tablet 5 mg daily as needed. For weight gain >2lbs      montelukast (SINGULAIR) 10 mg tablet 10 mg once daily.      nitroGLYCERIN (NITROSTAT) 0.4 MG SL tablet Place 0.4 mg under the tongue every 5 (five) minutes as needed.      omega-3 fatty acids/fish oil (FISH OIL-OMEGA-3 FATTY ACIDS) 300-1,000 mg capsule Take 1 capsule by mouth 2 (two) times daily.      omeprazole (PRILOSEC) 20 MG capsule 40 mg once daily.      potassium chloride SA (K-DUR,KLOR-CON) 20 MEQ tablet 20 mEq nightly.      ranolazine (RANEXA) 1,000 mg Tb12 Take 1 tablet (1,000 mg total) by mouth 2 (two) times daily. 180 tablet 3    senna-docusate 8.6-50 mg (PERICOLACE) 8.6-50 mg per tablet nightly.      spironolactone (ALDACTONE) 50 MG tablet Take 50 mg by mouth once daily.      tamsulosin (FLOMAX) 0.4 mg Cap 0.4 mg.      TESTOSTERONE PROPIONATE, BULK, MISC Inject 1 each as directed every 14 (fourteen) days.      TOPROL XL 25 mg 24 hr tablet Take 25 mg by mouth 2  (two) times daily.      torsemide (DEMADEX) 20 MG Tab Take 1 tablet (20 mg total) by mouth once daily. (Patient not taking: Reported on 12/21/2023) 30 tablet 11       SCHEDULED MEDS:   aspirin  81 mg Oral Daily    docusate sodium  100 mg Oral BID    DULoxetine  60 mg Oral Nightly    levothyroxine  25 mcg Intravenous Daily    metoprolol succinate  25 mg Oral BID    montelukast  10 mg Oral Daily    mupirocin   Nasal BID    pantoprazole  40 mg Oral Before breakfast    sodium chloride 0.9% 1,000 mL with mvi, (ADULT) no.4 with vit K 3,300 unit- 150 mcg/10 mL 10 mL, thiamine 100 mg, folic acid 1 mg infusion   Intravenous Daily    tamsulosin  0.4 mg Oral Daily       CONTINUOUS INFUSIONS:   sodium chloride 0.45% 50 mL/hr at 02/24/24 1728    dexmedeTOMIDine (Precedex) infusion (titrating) 1.4 mcg/kg/hr (02/25/24 0852)    insulin regular 1 units/mL infusion orderable (CTS POST-OP) Stopped (02/24/24 0900)    phenylephrine Stopped (02/24/24 0401)       PRN MEDS:0.9%  NaCl infusion (for blood administration), albumin human 5%, albuterol sulfate, calcium gluconate IVPB, calcium gluconate IVPB, calcium gluconate IVPB, calcium gluconate IVPB, calcium gluconate IVPB, calcium gluconate IVPB, dextrose 50% in water (D50W), dextrose 50% in water (D50W), HYDROcodone-acetaminophen, HYDROmorphone, lorazepam, magnesium sulfate IVPB, magnesium sulfate IVPB, magnesium sulfate IVPB, magnesium sulfate IVPB, morphine, morphine, ondansetron, potassium chloride in water, potassium chloride in water, potassium chloride in water **AND** potassium chloride in water **AND** potassium chloride in water, potassium chloride in water, sodium phosphate 15 mmol in dextrose 5 % (D5W) 250 mL IVPB, sodium phosphate 20.01 mmol in dextrose 5 % (D5W) 250 mL IVPB, sodium phosphate 30 mmol in dextrose 5 % (D5W) 250 mL IVPB    LABS AND DIAGNOSTICS     CBC LAST 3 DAYS  Recent Labs   Lab 02/23/24  2127 02/24/24  0113 02/24/24  0230 02/24/24  0726 02/24/24  0917  "02/24/24 1828 02/24/24 1951 02/25/24 0337   WBC 13.63* 14.35*  --  18.68*  --  20.34*  --  18.70*   RBC 4.38* 3.91*  --  3.91*  --  4.08*  --  3.93*   HGB 10.1* 8.9*  --  9.1*  --  9.5*  --  9.3*   HCT 32.8* 29.5*   < > 29.6*   < > 31.2* 33* 30.1*   MCV 75* 75*  --  76*  --  77*  --  77*   MCH 23.1* 22.8*  --  23.3*  --  23.3*  --  23.7*   MCHC 30.8* 30.2*  --  30.7*  --  30.4*  --  30.9*   RDW 20.2* 20.3*  --  20.8*  --  20.6*  --  20.6*    156  --  144*  --  123*  --  114*   MPV 9.4 10.3  --  10.8  --  10.4  --  10.3   GRAN 91.1*  12.4* 87.8*  12.6*  --   --   --  77.3*  15.7*  --   --    LYMPH 3.6*  0.5* 5.6*  0.8*  --   --   --  8.7*  1.8  --   --    MONO 4.7  0.6 6.0  0.9  --   --   --  13.5  2.8*  --   --    BASO 0.02 0.03  --   --   --  0.03  --   --    NRBC 0 0  --   --   --  0  --   --     < > = values in this interval not displayed.         COAGULATION LAST 3 DAYS  No results for input(s): "LABPT", "INR", "APTT" in the last 168 hours.    CHEMISTRY LAST 3 DAYS  Recent Labs   Lab 02/24/24  0645 02/24/24  0726 02/24/24 0917 02/24/24 1828 02/24/24 1951 02/25/24 0337   NA  --  136  --  136  --  137   K  --  5.0  --  5.0  --  4.7   CL  --  106  --  107  --  107   CO2  --  27  --  24  --  23   ANIONGAP  --  3*  --  5*  --  7*   BUN  --  13  --  17  --  19   CREATININE  --  1.0  --  0.9  --  0.9   GLU  --  120*  --  171*  --  171*   CALCIUM  --  7.8*  --  7.7*  --  7.7*   PH 7.345*  --  7.283*  --  7.334*  --    MG  --  2.3  --  2.2  --  2.2   ALBUMIN  --  3.7  --  3.6  --  3.4*   PROT  --  5.2*  --  5.3*  --  5.3*   ALKPHOS  --  17*  --  22*  --  22*   ALT  --  22  --  18  --  15   AST  --  48*  --  39  --  33   BILITOT  --  1.0  --  0.7  --  0.7         CARDIAC PROFILE LAST 3 DAYS  No results for input(s): "BNP", "CPK", "CPKMB", "LDH", "TROPONINI", "TROPONINIHS" in the last 168 hours.    ENDOCRINE LAST 3 DAYS  No results for input(s): "TSH", "PROCAL" in the last 168 hours.    LAST ARTERIAL " BLOOD GAS  ABG  Recent Labs   Lab 02/24/24 1951   PH 7.334*   PO2 60*   PCO2 44.6   HCO3 23.7*   BE -2         LAST 7 DAYS MICROBIOLOGY   Microbiology Results (last 7 days)       ** No results found for the last 168 hours. **            MOST RECENT IMAGING  X-Ray Chest AP Portable  CLINICAL HISTORY:  78 years (1945) Male Post-op Post-op, S/P CABG (coronary artery bypass graft    TECHNIQUE:  Portable AP radiograph the chest. One view.    COMPARISON:  Radiograph from February 24, 2024.    FINDINGS:  There is vascular congestion with increased interstitial markings findings indicating mild pulmonary edema.  There is blunting of the left costophrenic angle consistent with a trace pleural effusion. No pneumothorax is identified. The cardiac silhouette is moderately enlarged. Median sternotomy wires are in expected configuration status post CABG.  Osseous structures appear unchanged. The visualized upper abdomen is unremarkable.    Lines and tubes: Interval extubation and removal of the enteric tube. Right-sided IJ Brooks-Bienvenido catheter with tip at the right main pulmonary artery. 2 mediastinal drains are seen projecting to the right of midline over the cardiac mediastinal silhouette. One tube is seen projecting over the right heart.    IMPRESSION:  Interval extubation and removal of the enteric tube, slightly improved lung volumes, otherwise unchanged radiograph the chest.    .    Electronically signed by:  Evans Guo MD  02/25/2024 07:23 AM CST Workstation: [id number]      ECHOCARDIOGRAM RESULTS (last 5)  Results for orders placed during the hospital encounter of 12/13/23    Echo    Interpretation Summary    Left Ventricle: The left ventricle is normal in size. Mildly increased wall thickness. There is mild concentric hypertrophy. Normal wall motion. There is normal systolic function with a visually estimated ejection fraction of 65 - 70%. There is normal diastolic function.    Right Ventricle: Mild right  ventricular enlargement. Wall thickness is normal. Right ventricle wall motion  is normal. Systolic function is normal.    Left Atrium: Left atrium is moderately dilated.    Right Atrium: Right atrium is mildly dilated.    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    Tricuspid Valve: There is mild regurgitation with a centrally directed jet.    Pulmonic Valve: There is mild regurgitation with a centrally directed jet.    Pulmonary Artery: There is mild pulmonary hypertension. The estimated pulmonary artery systolic pressure is 40 mmHg.    IVC/SVC: Intermediate venous pressure at 8 mmHg.      CURRENT/PREVIOUS VISIT EKG  Results for orders placed or performed during the hospital encounter of 02/23/24   EKG 12-LEAD    Collection Time: 02/23/24  1:53 PM   Result Value Ref Range    QRS Duration 232 ms    [facility] QTC Calculation 613 ms    Narrative    Test Reason : Z95.1,Z95.1,    Vent. Rate : 080 BPM     Atrial Rate : 087 BPM     P-R Int : 000 ms          QRS Dur : 232 ms      QT Int : 532 ms       P-R-T Axes : 000 168 250 degrees     QTc Int : 613 ms    Ventricular-paced rhythm  occasional atrila pacing  Abnormal ECG  When compared with ECG of 21-FEB-2024 09:33,  Electronic ventricular pacemaker has replaced Atrial fibrillation  Confirmed by Eleazar WOO, Gregg NEWTON ([id number]) on 2/24/2024 7:12:58 AM    Referred By: OLLIE RIBEIRO           Confirmed By:Gregg Bush MD           ASSESSMENT/PLAN:     Active Hospital Problems    Diagnosis    *S/P CABG (coronary artery bypass graft) - x2 09/2010 - LIMA to LAD; SVG to OMB; grafts occluded 08/2012       ASSESSMENT & PLAN:     MVCAD S/P redo CABG yesterday 2/23/24,  left radial artery to LAD, SVG to Dx, SVG to OM  HFpEF  HTN/ dyslipidemia   Statin intolerance      RECOMMENDATIONS:    Continue to monitor chest tube output.  Continue metoprolol succinate 25 mg BID.  Continue Asprin therapy.  PT/OT.  Up and out of bed as tolerated.   IS.  Strict  I/Os. Received 1 dose IV lasix 40 mg this morning.   Monitor rate and rhythm.  Continue to check and replace potassium and magnesium. Goal for potassium is 4.0, and goal for magnesium is 2.0.   Will continue to follow.       Mar Camacho NP  Department of Cardiology  Date of Service: 02/25/2024

## 2024-02-26 PROBLEM — D62 ACUTE BLOOD LOSS ANEMIA: Status: ACTIVE | Noted: 2024-02-26

## 2024-02-26 PROBLEM — G93.40 ACUTE ENCEPHALOPATHY: Status: ACTIVE | Noted: 2024-02-26

## 2024-02-26 PROBLEM — J96.02 ACUTE RESPIRATORY FAILURE WITH HYPOXIA AND HYPERCAPNIA: Status: ACTIVE | Noted: 2024-02-26

## 2024-02-26 PROBLEM — J96.01 ACUTE RESPIRATORY FAILURE WITH HYPOXIA AND HYPERCAPNIA: Status: ACTIVE | Noted: 2024-02-26

## 2024-02-26 LAB
ALBUMIN SERPL BCP-MCNC: 3.3 G/DL (ref 3.5–5.2)
ALP SERPL-CCNC: 24 U/L (ref 55–135)
ALT SERPL W/O P-5'-P-CCNC: 12 U/L (ref 10–44)
ANION GAP SERPL CALC-SCNC: 5 MMOL/L (ref 8–16)
AST SERPL-CCNC: 32 U/L (ref 10–40)
BILIRUB SERPL-MCNC: 1.1 MG/DL (ref 0.1–1)
BUN SERPL-MCNC: 22 MG/DL (ref 8–23)
CALCIUM SERPL-MCNC: 7.8 MG/DL (ref 8.7–10.5)
CHLORIDE SERPL-SCNC: 109 MMOL/L (ref 95–110)
CO2 SERPL-SCNC: 25 MMOL/L (ref 23–29)
CREAT SERPL-MCNC: 0.8 MG/DL (ref 0.5–1.4)
ERYTHROCYTE [DISTWIDTH] IN BLOOD BY AUTOMATED COUNT: 21.3 % (ref 11.5–14.5)
EST. GFR  (NO RACE VARIABLE): >60 ML/MIN/1.73 M^2
GLUCOSE SERPL-MCNC: 151 MG/DL (ref 70–110)
HCT VFR BLD AUTO: 29.1 % (ref 40–54)
HGB BLD-MCNC: 8.8 G/DL (ref 14–18)
MAGNESIUM SERPL-MCNC: 2.1 MG/DL (ref 1.6–2.6)
MCH RBC QN AUTO: 23.3 PG (ref 27–31)
MCHC RBC AUTO-ENTMCNC: 30.2 G/DL (ref 32–36)
MCV RBC AUTO: 77 FL (ref 82–98)
PLATELET # BLD AUTO: 110 K/UL (ref 150–450)
PMV BLD AUTO: ABNORMAL FL (ref 9.2–12.9)
POTASSIUM SERPL-SCNC: 4.7 MMOL/L (ref 3.5–5.1)
PROT SERPL-MCNC: 5.4 G/DL (ref 6–8.4)
RBC # BLD AUTO: 3.78 M/UL (ref 4.6–6.2)
SODIUM SERPL-SCNC: 139 MMOL/L (ref 136–145)
WBC # BLD AUTO: 16.55 K/UL (ref 3.9–12.7)

## 2024-02-26 PROCEDURE — 80053 COMPREHEN METABOLIC PANEL: CPT | Performed by: THORACIC SURGERY (CARDIOTHORACIC VASCULAR SURGERY)

## 2024-02-26 PROCEDURE — 99900031 HC PATIENT EDUCATION (STAT)

## 2024-02-26 PROCEDURE — 63600175 PHARM REV CODE 636 W HCPCS

## 2024-02-26 PROCEDURE — 94761 N-INVAS EAR/PLS OXIMETRY MLT: CPT

## 2024-02-26 PROCEDURE — 25000003 PHARM REV CODE 250: Performed by: THORACIC SURGERY (CARDIOTHORACIC VASCULAR SURGERY)

## 2024-02-26 PROCEDURE — 99900035 HC TECH TIME PER 15 MIN (STAT)

## 2024-02-26 PROCEDURE — 27100171 HC OXYGEN HIGH FLOW UP TO 24 HOURS

## 2024-02-26 PROCEDURE — 63600175 PHARM REV CODE 636 W HCPCS: Performed by: THORACIC SURGERY (CARDIOTHORACIC VASCULAR SURGERY)

## 2024-02-26 PROCEDURE — 83735 ASSAY OF MAGNESIUM: CPT | Performed by: THORACIC SURGERY (CARDIOTHORACIC VASCULAR SURGERY)

## 2024-02-26 PROCEDURE — 25000003 PHARM REV CODE 250: Performed by: PHYSICIAN ASSISTANT

## 2024-02-26 PROCEDURE — 94660 CPAP INITIATION&MGMT: CPT

## 2024-02-26 PROCEDURE — 99024 POSTOP FOLLOW-UP VISIT: CPT | Mod: POP,,, | Performed by: PHYSICIAN ASSISTANT

## 2024-02-26 PROCEDURE — 25000242 PHARM REV CODE 250 ALT 637 W/ HCPCS: Performed by: INTERNAL MEDICINE

## 2024-02-26 PROCEDURE — 94640 AIRWAY INHALATION TREATMENT: CPT

## 2024-02-26 PROCEDURE — 20000000 HC ICU ROOM

## 2024-02-26 PROCEDURE — 85027 COMPLETE CBC AUTOMATED: CPT | Performed by: THORACIC SURGERY (CARDIOTHORACIC VASCULAR SURGERY)

## 2024-02-26 RX ORDER — FUROSEMIDE 10 MG/ML
20 INJECTION INTRAMUSCULAR; INTRAVENOUS DAILY
Status: DISCONTINUED | OUTPATIENT
Start: 2024-02-26 | End: 2024-02-28

## 2024-02-26 RX ORDER — FUROSEMIDE 10 MG/ML
40 INJECTION INTRAMUSCULAR; INTRAVENOUS DAILY
Status: DISCONTINUED | OUTPATIENT
Start: 2024-02-26 | End: 2024-02-26

## 2024-02-26 RX ORDER — ENOXAPARIN SODIUM 100 MG/ML
60 INJECTION SUBCUTANEOUS EVERY 24 HOURS
Status: DISCONTINUED | OUTPATIENT
Start: 2024-02-26 | End: 2024-03-04

## 2024-02-26 RX ORDER — IPRATROPIUM BROMIDE AND ALBUTEROL SULFATE 2.5; .5 MG/3ML; MG/3ML
3 SOLUTION RESPIRATORY (INHALATION) EVERY 4 HOURS
Status: DISCONTINUED | OUTPATIENT
Start: 2024-02-26 | End: 2024-02-28

## 2024-02-26 RX ADMIN — DEXMEDETOMIDINE HYDROCHLORIDE 1.4 MCG/KG/HR: 4 INJECTION, SOLUTION INTRAVENOUS at 05:02

## 2024-02-26 RX ADMIN — LORAZEPAM 2 MG: 2 INJECTION INTRAMUSCULAR; INTRAVENOUS at 03:02

## 2024-02-26 RX ADMIN — TAMSULOSIN HYDROCHLORIDE 0.4 MG: 0.4 CAPSULE ORAL at 08:02

## 2024-02-26 RX ADMIN — ASPIRIN 81 MG 81 MG: 81 TABLET ORAL at 08:02

## 2024-02-26 RX ADMIN — DEXMEDETOMIDINE HYDROCHLORIDE 1.4 MCG/KG/HR: 4 INJECTION, SOLUTION INTRAVENOUS at 03:02

## 2024-02-26 RX ADMIN — SODIUM CHLORIDE: 0.45 INJECTION, SOLUTION INTRAVENOUS at 05:02

## 2024-02-26 RX ADMIN — DEXMEDETOMIDINE HYDROCHLORIDE 1.4 MCG/KG/HR: 4 INJECTION, SOLUTION INTRAVENOUS at 12:02

## 2024-02-26 RX ADMIN — MONTELUKAST 10 MG: 10 TABLET, FILM COATED ORAL at 08:02

## 2024-02-26 RX ADMIN — ENOXAPARIN SODIUM 60 MG: 60 INJECTION SUBCUTANEOUS at 05:02

## 2024-02-26 RX ADMIN — IPRATROPIUM BROMIDE AND ALBUTEROL SULFATE 3 ML: 2.5; .5 SOLUTION RESPIRATORY (INHALATION) at 04:02

## 2024-02-26 RX ADMIN — MORPHINE SULFATE 4 MG: 4 INJECTION, SOLUTION INTRAMUSCULAR; INTRAVENOUS at 12:02

## 2024-02-26 RX ADMIN — IPRATROPIUM BROMIDE AND ALBUTEROL SULFATE 3 ML: 2.5; .5 SOLUTION RESPIRATORY (INHALATION) at 07:02

## 2024-02-26 RX ADMIN — MORPHINE SULFATE 4 MG: 4 INJECTION, SOLUTION INTRAMUSCULAR; INTRAVENOUS at 05:02

## 2024-02-26 RX ADMIN — LEVOTHYROXINE SODIUM ANHYDROUS 25 MCG: 100 INJECTION, POWDER, LYOPHILIZED, FOR SOLUTION INTRAVENOUS at 08:02

## 2024-02-26 RX ADMIN — DOCUSATE SODIUM 100 MG: 100 CAPSULE, LIQUID FILLED ORAL at 08:02

## 2024-02-26 RX ADMIN — LORAZEPAM 2 MG: 2 INJECTION INTRAMUSCULAR; INTRAVENOUS at 05:02

## 2024-02-26 RX ADMIN — LORAZEPAM 2 MG: 2 INJECTION INTRAMUSCULAR; INTRAVENOUS at 12:02

## 2024-02-26 RX ADMIN — MORPHINE SULFATE 4 MG: 4 INJECTION, SOLUTION INTRAMUSCULAR; INTRAVENOUS at 07:02

## 2024-02-26 RX ADMIN — LORAZEPAM 2 MG: 2 INJECTION INTRAMUSCULAR; INTRAVENOUS at 11:02

## 2024-02-26 RX ADMIN — DEXMEDETOMIDINE HYDROCHLORIDE 1.4 MCG/KG/HR: 4 INJECTION, SOLUTION INTRAVENOUS at 11:02

## 2024-02-26 RX ADMIN — FUROSEMIDE 20 MG: 10 INJECTION, SOLUTION INTRAMUSCULAR; INTRAVENOUS at 12:02

## 2024-02-26 RX ADMIN — DEXMEDETOMIDINE HYDROCHLORIDE 1.4 MCG/KG/HR: 4 INJECTION, SOLUTION INTRAVENOUS at 08:02

## 2024-02-26 RX ADMIN — METOPROLOL SUCCINATE 25 MG: 25 TABLET, EXTENDED RELEASE ORAL at 08:02

## 2024-02-26 RX ADMIN — ASCORBIC ACID, VITAMIN A PALMITATE, CHOLECALCIFEROL, THIAMINE HYDROCHLORIDE, RIBOFLAVIN-5 PHOSPHATE SODIUM, PYRIDOXINE HYDROCHLORIDE, NIACINAMIDE, DEXPANTHENOL, ALPHA-TOCOPHEROL ACETATE, VITAMIN K1, FOLIC ACID, BIOTIN, CYANOCOBALAMIN: 200; 3300; 200; 6; 3.6; 6; 40; 15; 10; 150; 600; 60; 5 INJECTION, SOLUTION INTRAVENOUS at 08:02

## 2024-02-26 RX ADMIN — LORAZEPAM 2 MG: 2 INJECTION INTRAMUSCULAR; INTRAVENOUS at 07:02

## 2024-02-26 NOTE — PHYSICIAN QUERY
PT Name: Jose F Hanley  MR #: [id number]     DOCUMENTATION CLARIFICATION     CDS/: Ernestine Thomas Pe               Contact information: [phone]   This form is a permanent document in the medical record.     Query Date: February 26, 2024    By submitting this query, we are merely seeking further clarification of documentation.  Please utilize your independent clinical judgment when addressing the question(s) below.    The Medical Record contains the following   Indicators Supporting Clinical Findings Location in Medical Record    Heart Failure documented History of Diastolic CHF  Diagnosis HFpEF 2/23 Cardiology consult note  2/24 Cardiology Consult note     BNP None drawn     EF/Echo 12/13 Echo Normal ejection fraction is greater than 53%.  12/13/2023 Echo     Radiology findings 2/25 CXR There is vascular congestion with increased interstitial markings findings indicating mild pulmonary edema  2/25 CXR     Subjective/Objective Respiratory Conditions Resp rate as high as 30 - patient on Bipap support  Per Nursing flow sheets     Diuretics/Meds IV Lasix 2/24 20mg>2/25 40mg  Per MAR    Other Patient on home Demadex  2/23 Cardiology consult note       Provider, please further specify the acuity of the HFpEF diagnosis associated with the above clinical findings.    [ x  ]  Acute on Chronic Diastolic Heart Failure (HFpEF) - worsening of CHF signs/symptoms in preexisting CHF   [   ]  Other (please specify): ___________________________________   Please document in your progress notes daily for the duration of treatment until resolved and include in your discharge summary.

## 2024-02-26 NOTE — SUBJECTIVE & OBJECTIVE
Past Medical History:   Diagnosis Date    A-fib     Allergies 2020    gets allergy shots    Arthritis 1973    right knee    Back pain     COPD (chronic obstructive pulmonary disease)     Diastolic heart failure     GERD (gastroesophageal reflux disease) 2015    HLD (hyperlipidemia)     HTN (hypertension)     Hx of LASIK     Hypothyroidism, unspecified 2012    Inflammatory disease of prostate, unspecified     out of medicine    Mild intermittent asthma, uncomplicated     Myocardial infarction     Neuropathy     On home oxygen therapy     3L NC  at night    PAD (peripheral artery disease)     Staph skin infection 2016    right thigh    TIA (transient ischemic attack) 2013       Past Surgical History:   Procedure Laterality Date    AORTOGRAPHY WITH SERIALOGRAPHY N/A 08/31/2023    Procedure: AORTOGRAM, WITH SERIALOGRAPHY;  Surgeon: Gary Thomas MD;  Location: [facility] CATH/EP LAB;  Service: Peripheral Vascular;  Laterality: N/A;    CATARACT EXTRACTION Bilateral 2014    CORONARY ANGIOGRAPHY INCLUDING BYPASS GRAFTS WITH CATHETERIZATION OF LEFT HEART Left 01/12/2024    Procedure: ANGIOGRAM, CORONARY, INCLUDING BYPASS GRAFT, WITH LEFT HEART CATHETERIZATION;  Surgeon: Gregg Bush MD;  Location: [facility] CATH/EP LAB;  Service: Cardiology;  Laterality: Left;    CORONARY ARTERY BYPASS GRAFT  2010    ENDOSCOPIC HARVEST OF VEIN Right 2/23/2024    Procedure: HARVEST-VEIN-ENDOVASCULAR;  Surgeon: Gary Thomas MD;  Location: [facility] OR;  Service: Cardiothoracic;  Laterality: Right;    INSERTION OF IMPLANTABLE LOOP RECORDER  2011    INSERTION OF INTRA-AORTIC BALLOON ASSIST DEVICE Right 2/23/2024    Procedure: INSERTION, INTRA-AORTIC BALLOON PUMP;  Surgeon: Gary Thomas MD;  Location: [facility] OR;  Service: Cardiothoracic;  Laterality: Right;    INSERTION OF PACEMAKER  2021    does not have card with him for preadmit    INSTANTANEOUS WAVE-FREE RATIO (IFR) N/A 01/12/2024    Procedure: Instantaneous Wave-Free Ratio  (IFR);  Surgeon: Gregg Bush MD;  Location: [facility] CATH/EP LAB;  Service: Cardiology;  Laterality: N/A;    PTA, ANTERIOR TIBIAL Left 08/31/2023    Procedure: PTA, Anterior Tibial;  Surgeon: Gary Thomas MD;  Location: [facility] CATH/EP LAB;  Service: Peripheral Vascular;  Laterality: Left;    PTA, SUPERFICIAL FEMORAL ARTERY Left 08/31/2023    Procedure: PTA, Superficial Femoral Artery;  Surgeon: Gary Thomas MD;  Location: [facility] CATH/EP LAB;  Service: Peripheral Vascular;  Laterality: Left;    REPEAT CORONARY ARTERY BYPASS GRAFTING N/A 2/23/2024    Procedure: CABG, REPEAT;  Surgeon: Gary Thomas MD;  Location: [facility] OR;  Service: Cardiothoracic;  Laterality: N/A;    SPLENECTOMY  2016    STENT, ANTERIOR TIBIAL Left 08/31/2023    Procedure: Stent, Anterior Tibial;  Surgeon: Gary Thomas MD;  Location: [facility] CATH/EP LAB;  Service: Peripheral Vascular;  Laterality: Left;    SURGICAL PROCUREMENT, ARTERY, RADIAL, FOR CABG  2/23/2024    Procedure: SURGICAL PROCUREMENT,ARTERY,RADIAL,FOR CABG;  Surgeon: Gary Thomas MD;  Location: [facility] OR;  Service: Cardiothoracic;;    watchman heart device  2019       Review of patient's allergies indicates:   Allergen Reactions    Adhes. band-tape-benzalkonium Rash     Pt stated it caused blisters    Statins-hmg-coa reductase inhibitors Other (See Comments)     Statin Intolerance (joint pain)       No current facility-administered medications on file prior to encounter.     Current Outpatient Medications on File Prior to Encounter   Medication Sig    acetaminophen (TYLENOL) 325 MG tablet Take 650 mg by mouth every 6 (six) hours as needed.    albuterol (PROVENTIL/VENTOLIN HFA) 90 mcg/actuation inhaler Inhale 2 puffs into the lungs every 6 (six) hours as needed.    aspirin 81 MG Chew Take 81 mg by mouth once daily.    betamethasone valerate 0.1% (VALISONE) 0.1 % Lotn Apply to scalp bid prn rash    cyanocobalamin 500 MCG tablet 500 mcg.     cyclobenzaprine (FLEXERIL) 10 MG tablet Take 10 mg by mouth every 8 (eight) hours as needed.    diphenhydrAMINE (BENADRYL) 50 MG capsule 50 mg every 6 (six) hours as needed.    doxepin (SINEQUAN) 50 MG capsule Take 50 mg by mouth every evening.    DULoxetine (CYMBALTA) 60 MG capsule 60 mg nightly.    EPINEPHrine (EPIPEN) 0.3 mg/0.3 mL AtIn INJECT 0.3MG/0.3ML SUBCUTANEOUSLY (UNDER THE SKIN)  AS NEEDED FOR ALLERGIC REACTIONS SELF-INJECTOR PEN    furosemide (LASIX) 20 MG tablet Take 20 mg by mouth once daily.    gabapentin (NEURONTIN) 400 MG capsule 800 mg 2 (two) times daily.    hydrocodone-homatropine 5-1.5 mg/5 ml (HYCODAN) 5-1.5 mg/5 mL Syrp Take by mouth every 6 (six) hours as needed. cough    levothyroxine (SYNTHROID) 50 MCG tablet 50 mcg.    metOLazone (ZAROXOLYN) 5 MG tablet 5 mg daily as needed. For weight gain >2lbs    montelukast (SINGULAIR) 10 mg tablet 10 mg once daily.    nitroGLYCERIN (NITROSTAT) 0.4 MG SL tablet Place 0.4 mg under the tongue every 5 (five) minutes as needed.    omega-3 fatty acids/fish oil (FISH OIL-OMEGA-3 FATTY ACIDS) 300-1,000 mg capsule Take 1 capsule by mouth 2 (two) times daily.    omeprazole (PRILOSEC) 20 MG capsule 40 mg once daily.    potassium chloride SA (K-DUR,KLOR-CON) 20 MEQ tablet 20 mEq nightly.    ranolazine (RANEXA) 1,000 mg Tb12 Take 1 tablet (1,000 mg total) by mouth 2 (two) times daily.    senna-docusate 8.6-50 mg (PERICOLACE) 8.6-50 mg per tablet nightly.    spironolactone (ALDACTONE) 50 MG tablet Take 50 mg by mouth once daily.    tamsulosin (FLOMAX) 0.4 mg Cap 0.4 mg.    TESTOSTERONE PROPIONATE, BULK, MISC Inject 1 each as directed every 14 (fourteen) days.    TOPROL XL 25 mg 24 hr tablet Take 25 mg by mouth 2 (two) times daily.    torsemide (DEMADEX) 20 MG Tab Take 1 tablet (20 mg total) by mouth once daily. (Patient not taking: Reported on 12/21/2023)     Family History    None       Tobacco Use    Smoking status: Former     Current packs/day: 0.00     Types:  Cigarettes     Quit date: 1987     Years since quittin.2    Smokeless tobacco: Never   Substance and Sexual Activity    Alcohol use: Not Currently     Alcohol/week: 21.0 standard drinks of alcohol     Types: 21 Drinks containing 0.5 oz of alcohol per week     Comment: ed roberto mixed drinks    Drug use: No    Sexual activity: Not on file     Review of Systems   Reason unable to perform ROS: Patient remained sedated with precedex and BiPAP on. he has also recieved Ativan before.     Objective:     Vital Signs (Most Recent):  Temp: 98.7 °F (37.1 °C) (24 1501)  Pulse: 80 (24 1501)  Resp: (!) 21 (24 1501)  BP: (!) 103/55 (24 1501)  SpO2: 96 % (24 1501) Vital Signs (24h Range):  Temp:  [98.6 °F (37 °C)-99.4 °F (37.4 °C)] 98.7 °F (37.1 °C)  Pulse:  [80-87] 80  Resp:  [21-30] 21  SpO2:  [93 %-99 %] 96 %  BP: ()/(51-63) 103/55  Arterial Line BP: ()/(43-68) 92/68     Weight: 125 kg (275 lb 9.2 oz)  Body mass index is 37.37 kg/m².     Physical Exam  Constitutional:       Appearance: He is obese. He is ill-appearing.      Comments: There is chest tube and pacer wires intact.    Eyes:      Conjunctiva/sclera: Conjunctivae normal.   Cardiovascular:      Rate and Rhythm: Normal rate and regular rhythm.      Heart sounds: Normal heart sounds. No murmur heard.  Pulmonary:      Effort: No respiratory distress.      Breath sounds: No wheezing.      Comments: Diminished breath sounds bilaterally.   Abdominal:      General: There is no distension.      Palpations: Abdomen is soft.      Tenderness: There is no abdominal tenderness.   Musculoskeletal:         General: No swelling or tenderness.   Skin:     Coloration: Skin is not jaundiced or pale.   Neurological:      General: No focal deficit present.      Comments: Sedated with precedex. Does not answer questions.           Significant Labs: All pertinent labs within the past 24 hours have been reviewed.  CBC:   Recent Labs   Lab  02/24/24 1828 02/24/24 1951 02/25/24 0337 02/26/24  0521   WBC 20.34*  --  18.70* 16.55*   HGB 9.5*  --  9.3* 8.8*   HCT 31.2* 33* 30.1* 29.1*   *  --  114* 110*     CMP:   Recent Labs   Lab 02/24/24 1828 02/25/24 0337 02/26/24  0521    137 139   K 5.0 4.7 4.7    107 109   CO2 24 23 25   * 171* 151*   BUN 17 19 22   CREATININE 0.9 0.9 0.8   CALCIUM 7.7* 7.7* 7.8*   PROT 5.3* 5.3* 5.4*   ALBUMIN 3.6 3.4* 3.3*   BILITOT 0.7 0.7 1.1*   ALKPHOS 22* 22* 24*   AST 39 33 32   ALT 18 15 12   ANIONGAP 5* 7* 5*       Significant Imaging: I have reviewed all pertinent imaging results/findings within the past 24 hours.

## 2024-02-26 NOTE — PHYSICIAN QUERY
PT Name: Jose F Hanley  MR #: [id number]     DOCUMENTATION CLARIFICATION     CDS/: Ernestine Thomas Pe               Contact information:  This form is a permanent document in the medical record.     Query Date: February 26, 2024    By submitting this query, we are merely seeking further clarification of documentation.  Please utilize your independent clinical judgment when addressing the question(s) below.  The Medical Record contains the following   Indicators   Supporting Clinical Findings Location in Medical Record    RR         O2 sat         O2 use 2/24 Post Op pt extubated to bipap then down to 4L Oxymask > spO2 noted 84% Pt placed on Bipap 35L with respiratory rate as high as 30  2/26 Patient remains on Bipap support  Per Nursing flow sheets    Blood Gas (ABG or VBG) 2/24 ABG:  pH 7.334  PCO2 44.6  PO2 60  HCO3 23.7  FiO2 28 on Bipap 2/24 Lab report    BiPAP/Intubation/Mechanical Ventilation Bipap Per nursing flow sheets    Home O2, Oxygen Dependence Patient is noted to be on home O2 3L NC at night only Per Cardiology consult note 2/23    Acute/Chronic Illness Patient is s/p CABG 2/23 Per Chart review        The clinical guidelines noted are only a system guideline. It does not replace the providers clinical judgment.    Provider, please specify the diagnosis or diagnoses associated with above clinical findings.   [  x  ] Acute Post Procedure Respiratory Failure with Hypoxia   [    ] Other Respiratory Diagnosis (please specify): _________________     Please document in your progress notes daily for the duration of treatment until resolved and include in your discharge summary.

## 2024-02-26 NOTE — PROGRESS NOTES
[facility]  Department of Cardiology  Consult Note      PATIENT NAME: Jose F Hanley  MRN: [id number]  TODAY'S DATE: 02/26/2024  ADMIT DATE: 2/23/2024                          CONSULT REQUESTED BY: Gary Thomas MD    SUBJECTIVE     PRINCIPAL PROBLEM: S/P CABG (coronary artery bypass graft)      REASON FOR CONSULT:  Post CABG     Interval history:  2/26/24  Pt asleep in bed, remains on BiPAP. Per RN, he is still confused while awake. Currently under light sedation - precedex gtt. Remains paced with temporary pacer - 80s.     2/25/24  Pt was seen resting in bed with Bipap mask in place. Balloon pump was removed this morning per bedside RN.     2/24/24  Pt was seen extubated on BiPAP this morning. Balloon pump. Pressures soft this morning. Per RN, pt was increasingly agitated this morning.       HPI:  Pt is a 78 year old with history of known CAD with previous bypass surgery back in 08/31/23 and then underwent a repeat CABG today, 2/23/23. He Just recently had a [facility] as well with Dr. Bush back on 2/7/24.       Per surgery team:  Operation: Redo coronary artery bypass grafting x3 using left radial artery to left anterior descending coronary artery and separate segments of saphenous vein from the aorta to the diagonal coronary artery and from the aorta to the obtuse marginal coronary artery using a combination of antegrade and retrograde cardioplegia, mild systemic hypothermia, endoscopic vein harvest from the right leg, right radial artery harvest, and insertion of right femoral arterial intra-aortic balloon pump with fluoroscopic guidance and complete pericardiectomy due to severe adhesive pericarditis         Review of patient's allergies indicates:   Allergen Reactions    Adhes. band-tape-benzalkonium Rash     Pt stated it caused blisters    Statins-hmg-coa reductase inhibitors Other (See Comments)     Statin Intolerance (joint pain)       Past Medical History:   Diagnosis Date    A-fib      Allergies 2020    gets allergy shots    Arthritis 1973    right knee    Back pain     COPD (chronic obstructive pulmonary disease)     Diastolic heart failure     GERD (gastroesophageal reflux disease) 2015    HLD (hyperlipidemia)     HTN (hypertension)     Hx of LASIK     Hypothyroidism, unspecified 2012    Inflammatory disease of prostate, unspecified     out of medicine    Mild intermittent asthma, uncomplicated     Myocardial infarction     Neuropathy     On home oxygen therapy     3L NC  at night    PAD (peripheral artery disease)     Staph skin infection 2016    right thigh    TIA (transient ischemic attack) 2013     Past Surgical History:   Procedure Laterality Date    AORTOGRAPHY WITH SERIALOGRAPHY N/A 08/31/2023    Procedure: AORTOGRAM, WITH SERIALOGRAPHY;  Surgeon: Gary Thomas MD;  Location: [facility] CATH/EP LAB;  Service: Peripheral Vascular;  Laterality: N/A;    CATARACT EXTRACTION Bilateral 2014    CORONARY ANGIOGRAPHY INCLUDING BYPASS GRAFTS WITH CATHETERIZATION OF LEFT HEART Left 01/12/2024    Procedure: ANGIOGRAM, CORONARY, INCLUDING BYPASS GRAFT, WITH LEFT HEART CATHETERIZATION;  Surgeon: Gregg Bush MD;  Location: [facility] CATH/EP LAB;  Service: Cardiology;  Laterality: Left;    CORONARY ARTERY BYPASS GRAFT  2010    INSERTION OF IMPLANTABLE LOOP RECORDER  2011    INSERTION OF PACEMAKER  2021    does not have card with him for preadmit    INSTANTANEOUS WAVE-FREE RATIO (IFR) N/A 01/12/2024    Procedure: Instantaneous Wave-Free Ratio (IFR);  Surgeon: Gregg Bush MD;  Location: [facility] CATH/EP LAB;  Service: Cardiology;  Laterality: N/A;    PTA, ANTERIOR TIBIAL Left 08/31/2023    Procedure: PTA, Anterior Tibial;  Surgeon: Gary Thomas MD;  Location: [facility] CATH/EP LAB;  Service: Peripheral Vascular;  Laterality: Left;    PTA, SUPERFICIAL FEMORAL ARTERY Left 08/31/2023    Procedure: PTA, Superficial Femoral Artery;  Surgeon: Gary Thomas MD;  Location: [facility] CATH/EP LAB;   Service: Peripheral Vascular;  Laterality: Left;    SPLENECTOMY  2016    STENT, ANTERIOR TIBIAL Left 2023    Procedure: Stent, Anterior Tibial;  Surgeon: Gary Thomas MD;  Location: [facility] CATH/EP LAB;  Service: Peripheral Vascular;  Laterality: Left;    watchman heart device  2019     Social History     Tobacco Use    Smoking status: Former     Current packs/day: 0.00     Types: Cigarettes     Quit date: 1987     Years since quittin.2    Smokeless tobacco: Never   Substance Use Topics    Alcohol use: Not Currently     Alcohol/week: 21.0 standard drinks of alcohol     Types: 21 Drinks containing 0.5 oz of alcohol per week     Comment: ed roberto mixed drinks    Drug use: No        REVIEW OF SYSTEMS    As mentioned in HPI    OBJECTIVE     VITAL SIGNS (Most Recent)  Temp: 98.9 °F (37.2 °C) (24 07)  Pulse: 80 (24 075)  Resp: (!) 27 (24)  BP: (!) 115/59 (24)  SpO2: 97 % (24)    VENTILATION STATUS  Resp: (!) 27 (24)  SpO2: 97 % (24)  Oxygen Concentration (%):  [35] 35        I & O (Last 24H):  Intake/Output Summary (Last 24 hours) at 2024 1044  Last data filed at 2024 0801  Gross per 24 hour   Intake 2975.42 ml   Output 3040 ml   Net -64.58 ml         WEIGHTS  Wt Readings from Last 3 Encounters:   24 1540 125 kg (275 lb 9.2 oz)   24 0948 124.5 kg (274 lb 6.4 oz)   24 0718 120.7 kg (266 lb)       PHYSICAL EXAM    CONSTITUTIONAL: NAD, sleeping, light sedation   HEENT: Normocephalic. No pallor  NECK: no JVD  LUNGS: coarse   HEART: paced rate and rhythm - underlying Afib, S1, S2 normal, + murmur   ABDOMEN: soft, non-tender, bowel sounds normal  EXTREMITIES: No edema  SKIN: No rash  NEURO: AAO X 3  PSYCH: light sedation     HOME MEDICATIONS:  No current facility-administered medications on file prior to encounter.     Current Outpatient Medications on File Prior to Encounter   Medication Sig Dispense Refill     acetaminophen (TYLENOL) 325 MG tablet Take 650 mg by mouth every 6 (six) hours as needed.      albuterol (PROVENTIL/VENTOLIN HFA) 90 mcg/actuation inhaler Inhale 2 puffs into the lungs every 6 (six) hours as needed.      aspirin 81 MG Chew Take 81 mg by mouth once daily.      betamethasone valerate 0.1% (VALISONE) 0.1 % Lotn Apply to scalp bid prn rash      cyanocobalamin 500 MCG tablet 500 mcg.      cyclobenzaprine (FLEXERIL) 10 MG tablet Take 10 mg by mouth every 8 (eight) hours as needed.      diphenhydrAMINE (BENADRYL) 50 MG capsule 50 mg every 6 (six) hours as needed.      doxepin (SINEQUAN) 50 MG capsule Take 50 mg by mouth every evening.      DULoxetine (CYMBALTA) 60 MG capsule 60 mg nightly.      EPINEPHrine (EPIPEN) 0.3 mg/0.3 mL AtIn INJECT 0.3MG/0.3ML SUBCUTANEOUSLY (UNDER THE SKIN)  AS NEEDED FOR ALLERGIC REACTIONS SELF-INJECTOR PEN      furosemide (LASIX) 20 MG tablet Take 20 mg by mouth once daily.      gabapentin (NEURONTIN) 400 MG capsule 800 mg 2 (two) times daily.      hydrocodone-homatropine 5-1.5 mg/5 ml (HYCODAN) 5-1.5 mg/5 mL Syrp Take by mouth every 6 (six) hours as needed. cough      levothyroxine (SYNTHROID) 50 MCG tablet 50 mcg.      metOLazone (ZAROXOLYN) 5 MG tablet 5 mg daily as needed. For weight gain >2lbs      montelukast (SINGULAIR) 10 mg tablet 10 mg once daily.      nitroGLYCERIN (NITROSTAT) 0.4 MG SL tablet Place 0.4 mg under the tongue every 5 (five) minutes as needed.      omega-3 fatty acids/fish oil (FISH OIL-OMEGA-3 FATTY ACIDS) 300-1,000 mg capsule Take 1 capsule by mouth 2 (two) times daily.      omeprazole (PRILOSEC) 20 MG capsule 40 mg once daily.      potassium chloride SA (K-DUR,KLOR-CON) 20 MEQ tablet 20 mEq nightly.      ranolazine (RANEXA) 1,000 mg Tb12 Take 1 tablet (1,000 mg total) by mouth 2 (two) times daily. 180 tablet 3    senna-docusate 8.6-50 mg (PERICOLACE) 8.6-50 mg per tablet nightly.      spironolactone (ALDACTONE) 50 MG tablet Take 50 mg by mouth once  daily.      tamsulosin (FLOMAX) 0.4 mg Cap 0.4 mg.      TESTOSTERONE PROPIONATE, BULK, MISC Inject 1 each as directed every 14 (fourteen) days.      TOPROL XL 25 mg 24 hr tablet Take 25 mg by mouth 2 (two) times daily.      torsemide (DEMADEX) 20 MG Tab Take 1 tablet (20 mg total) by mouth once daily. (Patient not taking: Reported on 12/21/2023) 30 tablet 11       SCHEDULED MEDS:   aspirin  81 mg Oral Daily    docusate sodium  100 mg Oral BID    DULoxetine  60 mg Oral Nightly    levothyroxine  25 mcg Intravenous Daily    metoprolol succinate  25 mg Oral BID    montelukast  10 mg Oral Daily    pantoprazole  40 mg Oral Before breakfast    sodium chloride 0.9% 1,000 mL with mvi, (ADULT) no.4 with vit K 3,300 unit- 150 mcg/10 mL 10 mL, thiamine 100 mg, folic acid 1 mg infusion   Intravenous Daily    tamsulosin  0.4 mg Oral Daily       CONTINUOUS INFUSIONS:   sodium chloride 0.45% 50 mL/hr at 02/25/24 1801    dexmedeTOMIDine (Precedex) infusion (titrating) 1.4 mcg/kg/hr (02/26/24 0809)    insulin regular 1 units/mL infusion orderable (CTS POST-OP) Stopped (02/24/24 0900)    phenylephrine Stopped (02/24/24 0401)       PRN MEDS:0.9%  NaCl infusion (for blood administration), albumin human 5%, albuterol sulfate, calcium gluconate IVPB, calcium gluconate IVPB, calcium gluconate IVPB, calcium gluconate IVPB, calcium gluconate IVPB, calcium gluconate IVPB, dextrose 50% in water (D50W), dextrose 50% in water (D50W), HYDROcodone-acetaminophen, HYDROmorphone, lorazepam, magnesium sulfate IVPB, magnesium sulfate IVPB, magnesium sulfate IVPB, magnesium sulfate IVPB, morphine, morphine, ondansetron, potassium chloride in water, potassium chloride in water, potassium chloride in water **AND** potassium chloride in water **AND** potassium chloride in water, potassium chloride in water, sodium phosphate 15 mmol in dextrose 5 % (D5W) 250 mL IVPB, sodium phosphate 20.01 mmol in dextrose 5 % (D5W) 250 mL IVPB, sodium phosphate 30 mmol in  "dextrose 5 % (D5W) 250 mL IVPB    LABS AND DIAGNOSTICS     CBC LAST 3 DAYS  Recent Labs   Lab 02/23/24 2127 02/24/24  0113 02/24/24  0230 02/24/24 1828 02/24/24 1951 02/25/24  0337 02/26/24  0521   WBC 13.63* 14.35*   < > 20.34*  --  18.70* 16.55*   RBC 4.38* 3.91*   < > 4.08*  --  3.93* 3.78*   HGB 10.1* 8.9*   < > 9.5*  --  9.3* 8.8*   HCT 32.8* 29.5*   < > 31.2* 33* 30.1* 29.1*   MCV 75* 75*   < > 77*  --  77* 77*   MCH 23.1* 22.8*   < > 23.3*  --  23.7* 23.3*   MCHC 30.8* 30.2*   < > 30.4*  --  30.9* 30.2*   RDW 20.2* 20.3*   < > 20.6*  --  20.6* 21.3*    156   < > 123*  --  114* 110*   MPV 9.4 10.3   < > 10.4  --  10.3 SEE COMMENT   GRAN 91.1*  12.4* 87.8*  12.6*  --  77.3*  15.7*  --   --   --    LYMPH 3.6*  0.5* 5.6*  0.8*  --  8.7*  1.8  --   --   --    MONO 4.7  0.6 6.0  0.9  --  13.5  2.8*  --   --   --    BASO 0.02 0.03  --  0.03  --   --   --    NRBC 0 0  --  0  --   --   --     < > = values in this interval not displayed.         COAGULATION LAST 3 DAYS  No results for input(s): "LABPT", "INR", "APTT" in the last 168 hours.    CHEMISTRY LAST 3 DAYS  Recent Labs   Lab 02/24/24  0645 02/24/24  0726 02/24/24  0917 02/24/24 1828 02/24/24 1951 02/25/24  0337 02/26/24  0521   NA  --    < >  --  136  --  137 139   K  --    < >  --  5.0  --  4.7 4.7   CL  --    < >  --  107  --  107 109   CO2  --    < >  --  24  --  23 25   ANIONGAP  --    < >  --  5*  --  7* 5*   BUN  --    < >  --  17  --  19 22   CREATININE  --    < >  --  0.9  --  0.9 0.8   GLU  --    < >  --  171*  --  171* 151*   CALCIUM  --    < >  --  7.7*  --  7.7* 7.8*   PH 7.345*  --  7.283*  --  7.334*  --   --    MG  --    < >  --  2.2  --  2.2 2.1   ALBUMIN  --    < >  --  3.6  --  3.4* 3.3*   PROT  --    < >  --  5.3*  --  5.3* 5.4*   ALKPHOS  --    < >  --  22*  --  22* 24*   ALT  --    < >  --  18  --  15 12   AST  --    < >  --  39  --  33 32   BILITOT  --    < >  --  0.7  --  0.7 1.1*    < > = values in this interval not " "displayed.         CARDIAC PROFILE LAST 3 DAYS  No results for input(s): "BNP", "CPK", "CPKMB", "LDH", "TROPONINI", "TROPONINIHS" in the last 168 hours.    ENDOCRINE LAST 3 DAYS  No results for input(s): "TSH", "PROCAL" in the last 168 hours.    LAST ARTERIAL BLOOD GAS  ABG  Recent Labs   Lab 02/24/24 1951   PH 7.334*   PO2 60*   PCO2 44.6   HCO3 23.7*   BE -2         LAST 7 DAYS MICROBIOLOGY   Microbiology Results (last 7 days)       ** No results found for the last 168 hours. **            MOST RECENT IMAGING  X-Ray Chest AP Portable  CLINICAL HISTORY:  78 years (1945) Male Post-op Post-op, S/P CABG (coronary artery bypass graft    TECHNIQUE:  Portable AP radiograph the chest. One view.    COMPARISON:  Radiograph from February 24, 2024.    FINDINGS:  There is vascular congestion with increased interstitial markings findings indicating mild pulmonary edema.  There is blunting of the left costophrenic angle consistent with a trace pleural effusion. No pneumothorax is identified. The cardiac silhouette is moderately enlarged. Median sternotomy wires are in expected configuration status post CABG.  Osseous structures appear unchanged. The visualized upper abdomen is unremarkable.    Lines and tubes: Interval extubation and removal of the enteric tube. Right-sided IJ [place]-Bienvenido catheter with tip at the right main pulmonary artery. 2 mediastinal drains are seen projecting to the right of midline over the cardiac mediastinal silhouette. One tube is seen projecting over the right heart.    IMPRESSION:  Interval extubation and removal of the enteric tube, slightly improved lung volumes, otherwise unchanged radiograph the chest.    .    Electronically signed by:  Evans Guo MD  02/25/2024 07:23 AM CST Workstation: [id number]      ECHOCARDIOGRAM RESULTS (last 5)  Results for orders placed during the hospital encounter of 12/13/23    Echo    Interpretation Summary    Left Ventricle: The left ventricle is normal " in size. Mildly increased wall thickness. There is mild concentric hypertrophy. Normal wall motion. There is normal systolic function with a visually estimated ejection fraction of 65 - 70%. There is normal diastolic function.    Right Ventricle: Mild right ventricular enlargement. Wall thickness is normal. Right ventricle wall motion  is normal. Systolic function is normal.    Left Atrium: Left atrium is moderately dilated.    Right Atrium: Right atrium is mildly dilated.    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    Tricuspid Valve: There is mild regurgitation with a centrally directed jet.    Pulmonic Valve: There is mild regurgitation with a centrally directed jet.    Pulmonary Artery: There is mild pulmonary hypertension. The estimated pulmonary artery systolic pressure is 40 mmHg.    IVC/SVC: Intermediate venous pressure at 8 mmHg.      CURRENT/PREVIOUS VISIT EKG  Results for orders placed or performed during the hospital encounter of 02/23/24   EKG 12-LEAD    Collection Time: 02/23/24  1:53 PM   Result Value Ref Range    QRS Duration 232 ms    [facility]HS QTC Calculation 613 ms    Narrative    Test Reason : Z95.1,Z95.1,    Vent. Rate : 080 BPM     Atrial Rate : 087 BPM     P-R Int : 000 ms          QRS Dur : 232 ms      QT Int : 532 ms       P-R-T Axes : 000 168 250 degrees     QTc Int : 613 ms    Ventricular-paced rhythm  occasional atrila pacing  Abnormal ECG  When compared with ECG of 21-FEB-2024 09:33,  Electronic ventricular pacemaker has replaced Atrial fibrillation  Confirmed by Eleazar WOO, Gregg NEWTON ([id number]) on 2/24/2024 7:12:58 AM    Referred By: OLLIE RIBEIRO           Confirmed By:Gregg Bush MD           ASSESSMENT/PLAN:     Active Hospital Problems    Diagnosis    *S/P CABG (coronary artery bypass graft) - x2 09/2010 - LIMA to LAD; SVG to OMB; grafts occluded 08/2012       ASSESSMENT & PLAN:     MVCAD S/P redo CABG yesterday 2/23/24,  left radial artery to  LAD, SVG to Dx, SVG to OM  HFpEF  HTN/ dyslipidemia   Statin intolerance      RECOMMENDATIONS:    Continue to monitor chest tube output.  Continue metoprolol succinate 25 mg BID.  Continue Asprin therapy.  PT/OT; Up and out of bed as tolerated.   IS while awake if able to follow commands.   Strict I/Os especially with banana bag running. Lasix 20 mg IV daily.   Monitor rate and rhythm. Currently paced at 80 with temp pacemaker still in place.   Continue to check and replace potassium and magnesium. Goal for potassium is 4.0, and goal for magnesium is 2.0.   Will continue to follow.       Mar Camacho NP  Department of Cardiology  Date of Service: 02/26/2024    REMAINS CRITICAL.  EXTUBATED BALLOON PUMP OUT.  100% PACED HEMODYNAMICALLY STABLE

## 2024-02-26 NOTE — NURSING
Cleansed Lt. Forearm with Chlorahexadine and gauze and patted dry with gauze. Applied Medihoney . Covered with adaptic, abd pad, and roll gauze.  Robert. Well. Spouse at bedside.

## 2024-02-26 NOTE — HPI
Patient is 78 year old male with history of Afib, alcohol use, HFpEF, COPD, hypothyroidism, CAD was admitted to ICU after elective CABG. He underwent heart catheterization and coronary angiography showing multivessel coronary artery disease.  He was referred for consideration of redo coronary artery bypass grafting.    He has had a TAVR and Watchman. Recent studies were reviewed.  The patient has significant recurrent coronary artery disease.  Post operatively patient developed alcohol withdrawal and delirium, was started on precedex. Patient was extubated and still on BiPAP.  Hospitalist was consulted for medical management. .

## 2024-02-26 NOTE — CONSULTS
[facility] Medicine  Consult Note    Patient Name: Jose F Hanley  MRN: [id number]  Admission Date: 2/23/2024  Hospital Length of Stay: 3 days  Attending Physician: Gary Thomas MD   Primary Care Provider: Sunita Rivera MD           Patient information was obtained from patient, spouse/SO, and ER records.     Consults  Subjective:     Principal Problem: S/P CABG (coronary artery bypass graft)    Chief Complaint: No chief complaint on file.       HPI: Patient is 78 year old male with history of Afib, alcohol use, HFpEF, COPD, hypothyroidism, CAD was admitted to ICU after elective CABG. He underwent heart catheterization and coronary angiography showing multivessel coronary artery disease.  He was referred for consideration of redo coronary artery bypass grafting.    He has had a TAVR and Watchman. Recent studies were reviewed.  The patient has significant recurrent coronary artery disease.  Post operatively patient developed alcohol withdrawal and delirium, was started on precedex. Patient was extubated and still on BiPAP.  Hospitalist was consulted for medical management.     Past Medical History:   Diagnosis Date    A-fib     Allergies 2020    gets allergy shots    Arthritis 1973    right knee    Back pain     COPD (chronic obstructive pulmonary disease)     Diastolic heart failure     GERD (gastroesophageal reflux disease) 2015    HLD (hyperlipidemia)     HTN (hypertension)     Hx of LASIK     Hypothyroidism, unspecified 2012    Inflammatory disease of prostate, unspecified     out of medicine    Mild intermittent asthma, uncomplicated     Myocardial infarction     Neuropathy     On home oxygen therapy     3L NC  at night    PAD (peripheral artery disease)     Staph skin infection 2016    right thigh    TIA (transient ischemic attack) 2013       Past Surgical History:   Procedure Laterality Date    AORTOGRAPHY WITH SERIALOGRAPHY N/A 08/31/2023    Procedure: AORTOGRAM, WITH  SERIALOGRAPHY;  Surgeon: Gary Thomas MD;  Location: [facility] CATH/EP LAB;  Service: Peripheral Vascular;  Laterality: N/A;    CATARACT EXTRACTION Bilateral 2014    CORONARY ANGIOGRAPHY INCLUDING BYPASS GRAFTS WITH CATHETERIZATION OF LEFT HEART Left 01/12/2024    Procedure: ANGIOGRAM, CORONARY, INCLUDING BYPASS GRAFT, WITH LEFT HEART CATHETERIZATION;  Surgeon: Gregg Bush MD;  Location: [facility] CATH/EP LAB;  Service: Cardiology;  Laterality: Left;    CORONARY ARTERY BYPASS GRAFT  2010    ENDOSCOPIC HARVEST OF VEIN Right 2/23/2024    Procedure: HARVEST-VEIN-ENDOVASCULAR;  Surgeon: Gary Thomas MD;  Location: [facility];  Service: Cardiothoracic;  Laterality: Right;    INSERTION OF IMPLANTABLE LOOP RECORDER  2011    INSERTION OF INTRA-AORTIC BALLOON ASSIST DEVICE Right 2/23/2024    Procedure: INSERTION, INTRA-AORTIC BALLOON PUMP;  Surgeon: Gary Thomas MD;  Location: [facility];  Service: Cardiothoracic;  Laterality: Right;    INSERTION OF PACEMAKER  2021    does not have card with him for preadmit    INSTANTANEOUS WAVE-FREE RATIO (IFR) N/A 01/12/2024    Procedure: Instantaneous Wave-Free Ratio (IFR);  Surgeon: Gregg Bush MD;  Location: [facility] CATH/EP LAB;  Service: Cardiology;  Laterality: N/A;    PTA, ANTERIOR TIBIAL Left 08/31/2023    Procedure: PTA, Anterior Tibial;  Surgeon: Gary Thomas MD;  Location: [facility] CATH/EP LAB;  Service: Peripheral Vascular;  Laterality: Left;    PTA, SUPERFICIAL FEMORAL ARTERY Left 08/31/2023    Procedure: PTA, Superficial Femoral Artery;  Surgeon: Gary Thomas MD;  Location: [facility] CATH/EP LAB;  Service: Peripheral Vascular;  Laterality: Left;    REPEAT CORONARY ARTERY BYPASS GRAFTING N/A 2/23/2024    Procedure: CABG, REPEAT;  Surgeon: Gary Thomas MD;  Location: [facility];  Service: Cardiothoracic;  Laterality: N/A;    SPLENECTOMY  2016    STENT, ANTERIOR TIBIAL Left 08/31/2023    Procedure: Stent, Anterior Tibial;  Surgeon: William  Gary PEREZ MD;  Location: [facility] CATH/EP LAB;  Service: Peripheral Vascular;  Laterality: Left;    SURGICAL PROCUREMENT, ARTERY, RADIAL, FOR CABG  2/23/2024    Procedure: SURGICAL PROCUREMENT,ARTERY,RADIAL,FOR CABG;  Surgeon: Gary Thomas MD;  Location: [facility] OR;  Service: Cardiothoracic;;    watchman heart device  2019       Review of patient's allergies indicates:   Allergen Reactions    Adhes. band-tape-benzalkonium Rash     Pt stated it caused blisters    Statins-hmg-coa reductase inhibitors Other (See Comments)     Statin Intolerance (joint pain)       No current facility-administered medications on file prior to encounter.     Current Outpatient Medications on File Prior to Encounter   Medication Sig    acetaminophen (TYLENOL) 325 MG tablet Take 650 mg by mouth every 6 (six) hours as needed.    albuterol (PROVENTIL/VENTOLIN HFA) 90 mcg/actuation inhaler Inhale 2 puffs into the lungs every 6 (six) hours as needed.    aspirin 81 MG Chew Take 81 mg by mouth once daily.    betamethasone valerate 0.1% (VALISONE) 0.1 % Lotn Apply to scalp bid prn rash    cyanocobalamin 500 MCG tablet 500 mcg.    cyclobenzaprine (FLEXERIL) 10 MG tablet Take 10 mg by mouth every 8 (eight) hours as needed.    diphenhydrAMINE (BENADRYL) 50 MG capsule 50 mg every 6 (six) hours as needed.    doxepin (SINEQUAN) 50 MG capsule Take 50 mg by mouth every evening.    DULoxetine (CYMBALTA) 60 MG capsule 60 mg nightly.    EPINEPHrine (EPIPEN) 0.3 mg/0.3 mL AtIn INJECT 0.3MG/0.3ML SUBCUTANEOUSLY (UNDER THE SKIN)  AS NEEDED FOR ALLERGIC REACTIONS SELF-INJECTOR PEN    furosemide (LASIX) 20 MG tablet Take 20 mg by mouth once daily.    gabapentin (NEURONTIN) 400 MG capsule 800 mg 2 (two) times daily.    hydrocodone-homatropine 5-1.5 mg/5 ml (HYCODAN) 5-1.5 mg/5 mL Syrp Take by mouth every 6 (six) hours as needed. cough    levothyroxine (SYNTHROID) 50 MCG tablet 50 mcg.    metOLazone (ZAROXOLYN) 5 MG tablet 5 mg daily as needed. For weight gain  >2lbs    montelukast (SINGULAIR) 10 mg tablet 10 mg once daily.    nitroGLYCERIN (NITROSTAT) 0.4 MG SL tablet Place 0.4 mg under the tongue every 5 (five) minutes as needed.    omega-3 fatty acids/fish oil (FISH OIL-OMEGA-3 FATTY ACIDS) 300-1,000 mg capsule Take 1 capsule by mouth 2 (two) times daily.    omeprazole (PRILOSEC) 20 MG capsule 40 mg once daily.    potassium chloride SA (K-DUR,KLOR-CON) 20 MEQ tablet 20 mEq nightly.    ranolazine (RANEXA) 1,000 mg Tb12 Take 1 tablet (1,000 mg total) by mouth 2 (two) times daily.    senna-docusate 8.6-50 mg (PERICOLACE) 8.6-50 mg per tablet nightly.    spironolactone (ALDACTONE) 50 MG tablet Take 50 mg by mouth once daily.    tamsulosin (FLOMAX) 0.4 mg Cap 0.4 mg.    TESTOSTERONE PROPIONATE, BULK, MISC Inject 1 each as directed every 14 (fourteen) days.    TOPROL XL 25 mg 24 hr tablet Take 25 mg by mouth 2 (two) times daily.    torsemide (DEMADEX) 20 MG Tab Take 1 tablet (20 mg total) by mouth once daily. (Patient not taking: Reported on 2023)     Family History    None       Tobacco Use    Smoking status: Former     Current packs/day: 0.00     Types: Cigarettes     Quit date: 1987     Years since quittin.2    Smokeless tobacco: Never   Substance and Sexual Activity    Alcohol use: Not Currently     Alcohol/week: 21.0 standard drinks of alcohol     Types: 21 Drinks containing 0.5 oz of alcohol per week     Comment: ed roberto mixed drinks    Drug use: No    Sexual activity: Not on file     Review of Systems   Reason unable to perform ROS: Patient remained sedated with precedex and BiPAP on. he has also recieved Ativan before.     Objective:     Vital Signs (Most Recent):  Temp: 98.7 °F (37.1 °C) (24 1501)  Pulse: 80 (24 1501)  Resp: (!) 21 (24 1501)  BP: (!) 103/55 (24 1501)  SpO2: 96 % (24 1501) Vital Signs (24h Range):  Temp:  [98.6 °F (37 °C)-99.4 °F (37.4 °C)] 98.7 °F (37.1 °C)  Pulse:  [80-87] 80  Resp:  [21-30] 21  SpO2:   [93 %-99 %] 96 %  BP: ()/(51-63) 103/55  Arterial Line BP: ()/(43-68) 92/68     Weight: 125 kg (275 lb 9.2 oz)  Body mass index is 37.37 kg/m².     Physical Exam  Constitutional:       Appearance: He is obese. He is ill-appearing.      Comments: There is chest tube and pacer wires intact.    Eyes:      Conjunctiva/sclera: Conjunctivae normal.   Cardiovascular:      Rate and Rhythm: Normal rate and regular rhythm.      Heart sounds: Normal heart sounds. No murmur heard.  Pulmonary:      Effort: No respiratory distress.      Breath sounds: No wheezing.      Comments: Diminished breath sounds bilaterally.   Abdominal:      General: There is no distension.      Palpations: Abdomen is soft.      Tenderness: There is no abdominal tenderness.   Musculoskeletal:         General: No swelling or tenderness.   Skin:     Coloration: Skin is not jaundiced or pale.   Neurological:      General: No focal deficit present.      Comments: Sedated with precedex. Does not answer questions.           Significant Labs: All pertinent labs within the past 24 hours have been reviewed.  CBC:   Recent Labs   Lab 02/24/24 1828 02/24/24 1951 02/25/24 0337 02/26/24  0521   WBC 20.34*  --  18.70* 16.55*   HGB 9.5*  --  9.3* 8.8*   HCT 31.2* 33* 30.1* 29.1*   *  --  114* 110*     CMP:   Recent Labs   Lab 02/24/24 1828 02/25/24  0337 02/26/24  0521    137 139   K 5.0 4.7 4.7    107 109   CO2 24 23 25   * 171* 151*   BUN 17 19 22   CREATININE 0.9 0.9 0.8   CALCIUM 7.7* 7.7* 7.8*   PROT 5.3* 5.3* 5.4*   ALBUMIN 3.6 3.4* 3.3*   BILITOT 0.7 0.7 1.1*   ALKPHOS 22* 22* 24*   AST 39 33 32   ALT 18 15 12   ANIONGAP 5* 7* 5*       Significant Imaging: I have reviewed all pertinent imaging results/findings within the past 24 hours.  Assessment/Plan:     CAD (coronary artery disease), native coronary artery  S/p CABG X 3 on 2/23  - post op care per CTS, chest tube and pacer wires to be removed per surgery. Balloon  pump was removed 2/25  - Cont aspirin, Lipitor, ,metoprolol     Acute respiratory failure with hypoxia and hypercapnia  Secondary to COPD exacerbation   - start scheduled breathing Rx   - off BiPAP when mental status improve     Acute encephalopathy  Due to alcohol withdrawal   - wean prescedex  - CIWA trigged ativan       Acute blood loss anemia  Expected post op   Hb stable   - Cont to monitor     Severe obesity (BMI 35.0-39.9) with comorbidity  Body mass index is 37.37 kg/m². Morbid obesity complicates all aspects of disease management from diagnostic modalities to treatment. Weight loss encouraged and health benefits explained to patient.         Permanent atrial fibrillation  S/p watchman device   - Cont metoprolol     Dyslipidemia  - cont Statin         VTE Risk Mitigation (From admission, onward)           Ordered     enoxaparin injection 60 mg  Every 24 hours         02/26/24 0143     Place DUTCH hose  Until discontinued         02/23/24 9276                      Thank you for your consult. I will follow-up with patient. Please contact us if you have any additional questions.    Mehdi Zhong MD  Department of Hospital Medicine   [facility]

## 2024-02-26 NOTE — ASSESSMENT & PLAN NOTE
S/p CABG X 3 on 2/23  - post op care per CTS, chest tube and pacer wires to be removed per surgery. Balloon pump was removed 2/25  - Cont aspirin, Lipitor, ,metoprolol

## 2024-02-26 NOTE — CARE UPDATE
02/26/24 0751   Patient Assessment/Suction   Level of Consciousness (AVPU) responds to voice   Expansion/Accessory Muscles/Retractions no use of accessory muscles   Cough Frequency no cough   Skin Integrity   $ Wound Care Tech Time 15 min   Area Observed Bridge of nose   Skin Appearance without discoloration   Barrier used? Liquid Filled Cushion   PRE-TX-O2   Device (Oxygen Therapy) BIPAP   $ Is the patient on High Flow Oxygen? Yes   Oxygen Concentration (%) 35   SpO2 97 %   Pulse Oximetry Type Continuous   $ Pulse Oximetry - Multiple Charge Pulse Oximetry - Multiple   Pulse 80   Resp (!) 27   Ready to Wean/Extubation Screen   FIO2<=50 (chart decimal) 0.35   Preset CPAP/BiPAP Settings   Mode Of Delivery BiPAP S/T   $ CPAP/BiPAP Daily Charge BiPAP/CPAP Daily   $ Initial CPAP/BiPAP Setup? No   $ Is patient using? Yes   Size of Mask Medium/Large   Sized Appropriately? Yes   Equipment Type V60   Airway Device Type large full face mask   Ipap 16   EPAP (cm H2O) 5   Pressure Support (cm H2O) 11   Set Rate (Breaths/Min) 16   ITime (sec) 1   Rise Time (sec) 3   Patient CPAP/BiPAP Settings   Timed Inspiration (Sec) 1   IPAP Rise Time (sec) 3   RR Total (Breaths/Min) 27   Tidal Volume (mL) 878   VE Minute Ventilation (L/min) 21 L/min   Peak Inspiratory Pressure (cm H2O) 17   TiTOT (%) 17   Patient Trigger - ST Mode Only (%) 99   CPAP/BiPAP Alarms   High Pressure (cm H2O) 40   Low Pressure (cm H2O) 8   Minute Ventilation (L/Min) 3   High RR (breaths/min) 45   Low RR (breaths/min) 8   Apnea (Sec) 20   Education   $ Education BiPAP;15 min

## 2024-02-26 NOTE — PLAN OF CARE
02/26/24 1328   Discharge Reassessment   Assessment Type Discharge Planning Reassessment   Did the patient's condition or plan change since previous assessment? Yes   Discharge Plan discussed with: Spouse/sig other   Name(s) and Number(s) Berenice [phone]   Communicated LEIA with patient/caregiver Date not available/Unable to determine   Discharge Plan A Home;Home with family   Discharge Plan B Home with family;Home Health   DME Needed Upon Discharge  none   Transition of Care Barriers None   Why the patient remains in the hospital Requires continued medical care   Post-Acute Status   Discharge Delays None known at this time     Pt presently on precedex drip and has chest tubes, not medically ready for discharge, intends to discharge home.

## 2024-02-26 NOTE — HOSPITAL COURSE
78 M with  coronary artery disease, HTN , DM and non smomker , COPD  with progressive shortness of breath.  He underwent heart catheterization and coronary angiography showing multivessel coronary artery disease.  He was referred for consideration of redo coronary artery bypass grafting.  He has had a TAVR and Watchman.in the past . He .had elective post op CABG was done and  admitted to ICU.   Extubated to BiPAP, developed encephalopathy secondary to DT and needed precedex.    Postop course was complicated with DT and also atrial fibrillation and heart failure and possible abdominal source of infection and treated and completed Zosyn.   Chest and mediastinal tubes and pacer wires and Balloon pump was removed 2/25.  Patient had brief period of possible paralytic ileus with bowel obstruction but patient had bowel movement and which has resolved.   Patient has history of  TIANA, on BiPAP q.h.s.while  sleeping and continue on high-flow nasal cannula 6 L and saturating 96% and later able to wean down to 3 L which is  his baseline   He needed Cardizem drip for AFib with RVR but currently getting controlled with p.o. Cardizem.  Later patient dyspnea improved and cardiology changed Lasix to torsemide and continue Aldactone and patient was placed the nursing home and follow up with Cardiology and Cardiothoracic surgery in 1 week.  Cardiology and Pulmonary did not recommend anticoagulation at this time

## 2024-02-26 NOTE — ASSESSMENT & PLAN NOTE
Body mass index is 37.37 kg/m². Morbid obesity complicates all aspects of disease management from diagnostic modalities to treatment. Weight loss encouraged and health benefits explained to patient.        No

## 2024-02-26 NOTE — PROGRESS NOTES
CVT SURGERY PROGRESS NOTE  Jose F Hanley  78 y.o.  1945    Patient's Chief Complaint:  S/P CABG (coronary artery bypass graft)    HPI:  This patient has coronary artery disease.  He has had progressive shortness of breath.  He underwent heart catheterization and coronary angiography showing multivessel coronary artery disease.  He was referred for consideration of redo coronary artery bypass grafting.    He has borderline diabetes  and hypertension.  He has a history of remote coronary artery bypass grafting a number of years ago.    He has had a TAVR and Watchman.  He is not a smoker.  He does have COPD.  He quit smoking 35 years ago.    Medicines are part of the epic record.  He does take daily aspirin.    On exam vital signs are stable.  Pupils are equal and round reactive to light.  Neck is supple.  Chest is equal breath sounds.  Heart is in a irregular rate and rhythm.  Abdomen is benign.  Perfusion to the legs and feet seems to be satisfactory.    Recent studies were reviewed.  The patient has significant recurrent coronary artery disease.  Consideration can be made for redo high-risk coronary artery bypass grafting.  The risks and potential complications were discussed.  The patient will take this under advisement.    Last 24 hour interval:  -Remains on Precedex for sedation.   -BP well controlled   -Paced at 80 bpm  -Leukocytosis downtrending, remains afebrile  -H/H down slightly (9.3/30/8.8/29)  -MS tube output last 24 hours - 150cc, to suction, no air leak  -Bulb drain output last 24 hours - 20cc, to suction  -Good UOP, renal function stable. Canas in place  -Pt seen this AM, lying in bed on BiPap. Sedated on Precedex.     Subjective:  Symptoms:  (Patient sedated).    Diet:  NPO.  No nausea or vomiting.    Pain:  He complains of pain that is moderate.  Pain is requiring pain medication.                                                                   Objective:  General Appearance:  In no acute  distress.    Vital signs: (most recent): Blood pressure (!) 115/59, pulse 80, temperature 98.9 °F (37.2 °C), temperature source Axillary, resp. rate (!) 27, height 6' (1.829 m), weight 125 kg (275 lb 9.2 oz), SpO2 97 %.    Output: Producing urine.    HEENT: Normal HEENT exam.    Lungs:  There are rhonchi (scattered).  No rales or wheezes.    Heart: Normal rate.  Regular rhythm.  No murmur, gallop or friction rub.   Abdomen: Abdomen is soft and non-distended.  Hypoactive bowel sounds.   There is no abdominal tenderness.     Extremities: There is no local swelling.    Pulses: Distal pulses are intact.    Neurological: (Sedated on Precedex).    Skin:  Dry and cool.      Recent Vitals:  Vitals:    02/26/24 0601 02/26/24 0701 02/26/24 0743 02/26/24 0751   BP: (!) 117/57 (!) 115/59     BP Location: Left arm      Patient Position: Lying      Pulse: 80 80  80   Resp: (!) 22 (!) 24 (!) 28 (!) 27   Temp:  98.9 °F (37.2 °C)     TempSrc:  Axillary     SpO2: 99% 98%  97%   Weight:       Height:           INPATIENT MEDS   sodium chloride 0.45% 50 mL/hr at 02/25/24 1801    dexmedeTOMIDine (Precedex) infusion (titrating) 1.4 mcg/kg/hr (02/26/24 0809)    insulin regular 1 units/mL infusion orderable (CTS POST-OP) Stopped (02/24/24 0900)    phenylephrine Stopped (02/24/24 0401)      aspirin  81 mg Oral Daily    docusate sodium  100 mg Oral BID    DULoxetine  60 mg Oral Nightly    levothyroxine  25 mcg Intravenous Daily    metoprolol succinate  25 mg Oral BID    montelukast  10 mg Oral Daily    pantoprazole  40 mg Oral Before breakfast    sodium chloride 0.9% 1,000 mL with mvi, (ADULT) no.4 with vit K 3,300 unit- 150 mcg/10 mL 10 mL, thiamine 100 mg, folic acid 1 mg infusion   Intravenous Daily    tamsulosin  0.4 mg Oral Daily     0.9%  NaCl infusion (for blood administration), albumin human 5%, albuterol sulfate, calcium gluconate IVPB, calcium gluconate IVPB, calcium gluconate IVPB, calcium gluconate IVPB, calcium gluconate IVPB,  "calcium gluconate IVPB, dextrose 50% in water (D50W), dextrose 50% in water (D50W), HYDROcodone-acetaminophen, HYDROmorphone, lorazepam, magnesium sulfate IVPB, magnesium sulfate IVPB, magnesium sulfate IVPB, magnesium sulfate IVPB, morphine, morphine, ondansetron, potassium chloride in water, potassium chloride in water, potassium chloride in water **AND** potassium chloride in water **AND** potassium chloride in water, potassium chloride in water, sodium phosphate 15 mmol in dextrose 5 % (D5W) 250 mL IVPB, sodium phosphate 20.01 mmol in dextrose 5 % (D5W) 250 mL IVPB, sodium phosphate 30 mmol in dextrose 5 % (D5W) 250 mL IVPB  HEMODYNAMICS  PAP: (36-48)/(16-30) 45/19  PAP (Mean):  [25 mmHg-38 mmHg] 28 mmHg  CO:  [5.4 L/min-7 L/min] 6.7 L/min  CI:  [2.1 L/min/m2-2.8 L/min/m2] 2.7 L/min/m2   Recent O2 Therapy/Vent Settings: Bipap    I/O last 24 hrs:  Intake/Output - Last 3 Shifts         02/24 0700  02/25 0659 02/25 0700 02/26 0659 02/26 0700 02/27 0659    I.V. (mL/kg) 3102.3 (24.8) 3056.7 (24.5) 100 (0.8)    Blood 330      IV Piggyback 100      Total Intake(mL/kg) 3532.3 (28.3) 3056.7 (24.5) 100 (0.8)    Urine (mL/kg/hr) 1705 (0.6) 3535 (1.2) 120 (0.4)    Drains 210 20     Blood       Chest Tube 215 150     Total Output 2130 3705 120    Net +1402.3 -648.3 -20                 Recent Cardiac Rhythm: SR    Recent Pain Assessment: 7    CBC  Recent Labs   Lab 02/24/24  1828 02/25/24  0337 02/26/24  0521   WBC 20.34* 18.70* 16.55*   RBC 4.08* 3.93* 3.78*   HGB 9.5* 9.3* 8.8*   * 114* 110*   MCV 77* 77* 77*   MCH 23.3* 23.7* 23.3*   MCHC 30.4* 30.9* 30.2*     BMP  Recent Labs   Lab 02/24/24  1828 02/25/24  0337 02/26/24  0521   CO2 24 23 25   BUN 17 19 22   CREATININE 0.9 0.9 0.8   CALCIUM 7.7* 7.7* 7.8*     CARDIAC ENZYMES  No results for input(s): "TROPONINI", "CPK", "CKMB" in the last 168 hours.  BNP  No results for input(s): "BNP" in the last 168 hours.  PT/INR  No results found for: "PROTIME", " ""INR"      DIAGNOSTIC RESULTS:  X-Ray Chest AP Portable  CLINICAL HISTORY:  78 years (1945) Male Post-op Post-op, S/P CABG (coronary artery bypass graft    TECHNIQUE:  Portable AP radiograph the chest. One view.    COMPARISON:  Radiograph from February 24, 2024.    FINDINGS:  There is vascular congestion with increased interstitial markings findings indicating mild pulmonary edema.  There is blunting of the left costophrenic angle consistent with a trace pleural effusion. No pneumothorax is identified. The cardiac silhouette is moderately enlarged. Median sternotomy wires are in expected configuration status post CABG.  Osseous structures appear unchanged. The visualized upper abdomen is unremarkable.    Lines and tubes: Interval extubation and removal of the enteric tube. Right-sided IJ Dora-Bienvenido catheter with tip at the right main pulmonary artery. 2 mediastinal drains are seen projecting to the right of midline over the cardiac mediastinal silhouette. One tube is seen projecting over the right heart.    IMPRESSION:  Interval extubation and removal of the enteric tube, slightly improved lung volumes, otherwise unchanged radiograph the chest.    .    Electronically signed by:  Evans Guo MD  02/25/2024 07:23 AM CST Workstation: [id number]        CURRENT CONSULTS:  WOUND CARE CONSULT  IP CONSULT TO CARDIOLOGY    ASSESSMENT/PLAN:    Multivessel CAD   S/p CABG x 2 (LIMA to LAD, SVG to OM) in 2010  S/p Redo CABG X 3 (L radial to LAD, SVG to Diag, SVG to OM) by Dr. Thomas on 2/23/24  -Hemodynamically stable off pressors  -Requiring Precedex for agitation - wean as able  -Tolerating Bipap - wean as able  -Mediastinal tube and WISAM drain to remain until patient ambulates and output decreases  -Pacer wires to remain  -Surgical incisions with surgical dressing  -Continue ASA and BB. Documented statin intolerance - consider PCSK9i  -Currently paced  -Encourage IS  -Increase activity  -Cruzito hose  -Bowel regimen - " on docusate.    Leukocytosis  -Afebrile  -Likely reactive  -Monitor    Acute post operative blood loss anemia  Thrombocytopenia  -Expected post op  -Received 1358 cc Cell Saver post post  -1 unit PRBCs on Feb 23  -Monitor    HFpEF  -Strict I/Os and daily weights  -Salt/fluid restrictions  -Diuresis prn    Hypomagnesemia  -Replaced per protocol  -Keep Mag >2 and K >4    Hx of TAVR  -Stable    PAF  S/p Watchman device    HTN  -Controlled on current regimen  -Goal < 140/90    HLD  -Continue statin     BPH  -Continue flomax  -Canas in place - remove when patient alert    Case and plan of care discussed with MD.      GI Prophylaxis:  PPI:proton pump inhibitor per orders  DVT Prophylaxis:  Anticoagulants   Medication Route Frequency       Angeli Linda PA-C  Cardiovascular Thoracic Surgery  2/26/2024  9:20 AM

## 2024-02-26 NOTE — ASSESSMENT & PLAN NOTE
Secondary to COPD exacerbation   - start scheduled breathing Rx   - off BiPAP when mental status improve

## 2024-02-27 LAB
ANION GAP SERPL CALC-SCNC: 9 MMOL/L (ref 8–16)
ANISOCYTOSIS BLD QL SMEAR: ABNORMAL
BASOPHILS # BLD AUTO: 0.07 K/UL (ref 0–0.2)
BASOPHILS NFR BLD: 0.5 % (ref 0–1.9)
BLD PROD TYP BPU: NORMAL
BLD PROD TYP BPU: NORMAL
BLOOD UNIT EXPIRATION DATE: NORMAL
BLOOD UNIT EXPIRATION DATE: NORMAL
BLOOD UNIT TYPE CODE: 6200
BLOOD UNIT TYPE CODE: 6200
BLOOD UNIT TYPE: NORMAL
BLOOD UNIT TYPE: NORMAL
BUN SERPL-MCNC: 20 MG/DL (ref 8–23)
CALCIUM SERPL-MCNC: 7.8 MG/DL (ref 8.7–10.5)
CHLORIDE SERPL-SCNC: 110 MMOL/L (ref 95–110)
CO2 SERPL-SCNC: 23 MMOL/L (ref 23–29)
CODING SYSTEM: NORMAL
CODING SYSTEM: NORMAL
CREAT SERPL-MCNC: 0.7 MG/DL (ref 0.5–1.4)
CROSSMATCH INTERPRETATION: NORMAL
CROSSMATCH INTERPRETATION: NORMAL
DIFFERENTIAL METHOD BLD: ABNORMAL
DISPENSE STATUS: NORMAL
DISPENSE STATUS: NORMAL
EOSINOPHIL # BLD AUTO: 0.2 K/UL (ref 0–0.5)
EOSINOPHIL NFR BLD: 1 % (ref 0–8)
ERYTHROCYTE [DISTWIDTH] IN BLOOD BY AUTOMATED COUNT: 22.1 % (ref 11.5–14.5)
EST. GFR  (NO RACE VARIABLE): >60 ML/MIN/1.73 M^2
GLUCOSE SERPL-MCNC: 148 MG/DL (ref 70–110)
HCT VFR BLD AUTO: 27.4 % (ref 40–54)
HGB BLD-MCNC: 8.3 G/DL (ref 14–18)
HYPOCHROMIA BLD QL SMEAR: ABNORMAL
IMM GRANULOCYTES # BLD AUTO: 0.11 K/UL (ref 0–0.04)
IMM GRANULOCYTES NFR BLD AUTO: 0.7 % (ref 0–0.5)
LYMPHOCYTES # BLD AUTO: 1 K/UL (ref 1–4.8)
LYMPHOCYTES NFR BLD: 6.6 % (ref 18–48)
MAGNESIUM SERPL-MCNC: 2.2 MG/DL (ref 1.6–2.6)
MCH RBC QN AUTO: 23.3 PG (ref 27–31)
MCHC RBC AUTO-ENTMCNC: 30.3 G/DL (ref 32–36)
MCV RBC AUTO: 77 FL (ref 82–98)
MONOCYTES # BLD AUTO: 2 K/UL (ref 0.3–1)
MONOCYTES NFR BLD: 13.1 % (ref 4–15)
NEUTROPHILS # BLD AUTO: 12 K/UL (ref 1.8–7.7)
NEUTROPHILS NFR BLD: 78.1 % (ref 38–73)
NRBC BLD-RTO: 1 /100 WBC
NUM UNITS TRANS PACKED RBC: NORMAL
NUM UNITS TRANS PACKED RBC: NORMAL
OVALOCYTES BLD QL SMEAR: ABNORMAL
PLATELET # BLD AUTO: 144 K/UL (ref 150–450)
PLATELET BLD QL SMEAR: ABNORMAL
PMV BLD AUTO: 11.8 FL (ref 9.2–12.9)
POIKILOCYTOSIS BLD QL SMEAR: SLIGHT
POLYCHROMASIA BLD QL SMEAR: ABNORMAL
POTASSIUM SERPL-SCNC: 4.2 MMOL/L (ref 3.5–5.1)
RBC # BLD AUTO: 3.56 M/UL (ref 4.6–6.2)
SODIUM SERPL-SCNC: 142 MMOL/L (ref 136–145)
TARGETS BLD QL SMEAR: ABNORMAL
WBC # BLD AUTO: 15.4 K/UL (ref 3.9–12.7)

## 2024-02-27 PROCEDURE — 63600175 PHARM REV CODE 636 W HCPCS: Performed by: THORACIC SURGERY (CARDIOTHORACIC VASCULAR SURGERY)

## 2024-02-27 PROCEDURE — 83735 ASSAY OF MAGNESIUM: CPT | Performed by: THORACIC SURGERY (CARDIOTHORACIC VASCULAR SURGERY)

## 2024-02-27 PROCEDURE — 94640 AIRWAY INHALATION TREATMENT: CPT

## 2024-02-27 PROCEDURE — 99900031 HC PATIENT EDUCATION (STAT)

## 2024-02-27 PROCEDURE — 85025 COMPLETE CBC W/AUTO DIFF WBC: CPT | Performed by: THORACIC SURGERY (CARDIOTHORACIC VASCULAR SURGERY)

## 2024-02-27 PROCEDURE — 99900035 HC TECH TIME PER 15 MIN (STAT)

## 2024-02-27 PROCEDURE — 25000003 PHARM REV CODE 250: Performed by: THORACIC SURGERY (CARDIOTHORACIC VASCULAR SURGERY)

## 2024-02-27 PROCEDURE — 99024 POSTOP FOLLOW-UP VISIT: CPT | Mod: POP,,, | Performed by: PHYSICIAN ASSISTANT

## 2024-02-27 PROCEDURE — 63600175 PHARM REV CODE 636 W HCPCS

## 2024-02-27 PROCEDURE — 25000242 PHARM REV CODE 250 ALT 637 W/ HCPCS: Performed by: INTERNAL MEDICINE

## 2024-02-27 PROCEDURE — 25000003 PHARM REV CODE 250: Performed by: PHYSICIAN ASSISTANT

## 2024-02-27 PROCEDURE — 20000000 HC ICU ROOM

## 2024-02-27 PROCEDURE — 94761 N-INVAS EAR/PLS OXIMETRY MLT: CPT

## 2024-02-27 PROCEDURE — 80048 BASIC METABOLIC PNL TOTAL CA: CPT | Performed by: THORACIC SURGERY (CARDIOTHORACIC VASCULAR SURGERY)

## 2024-02-27 PROCEDURE — 94660 CPAP INITIATION&MGMT: CPT

## 2024-02-27 PROCEDURE — 27100171 HC OXYGEN HIGH FLOW UP TO 24 HOURS

## 2024-02-27 RX ORDER — METOPROLOL TARTRATE 1 MG/ML
10 INJECTION, SOLUTION INTRAVENOUS EVERY 12 HOURS
Status: DISCONTINUED | OUTPATIENT
Start: 2024-02-27 | End: 2024-02-28

## 2024-02-27 RX ADMIN — ENOXAPARIN SODIUM 60 MG: 60 INJECTION SUBCUTANEOUS at 04:02

## 2024-02-27 RX ADMIN — MORPHINE SULFATE 2 MG: 2 INJECTION, SOLUTION INTRAMUSCULAR; INTRAVENOUS at 02:02

## 2024-02-27 RX ADMIN — DEXMEDETOMIDINE HYDROCHLORIDE 1.4 MCG/KG/HR: 4 INJECTION, SOLUTION INTRAVENOUS at 02:02

## 2024-02-27 RX ADMIN — IPRATROPIUM BROMIDE AND ALBUTEROL SULFATE 3 ML: 2.5; .5 SOLUTION RESPIRATORY (INHALATION) at 03:02

## 2024-02-27 RX ADMIN — METOPROLOL TARTRATE 10 MG: 1 INJECTION, SOLUTION INTRAVENOUS at 08:02

## 2024-02-27 RX ADMIN — ASCORBIC ACID, VITAMIN A PALMITATE, CHOLECALCIFEROL, THIAMINE HYDROCHLORIDE, RIBOFLAVIN-5 PHOSPHATE SODIUM, PYRIDOXINE HYDROCHLORIDE, NIACINAMIDE, DEXPANTHENOL, ALPHA-TOCOPHEROL ACETATE, VITAMIN K1, FOLIC ACID, BIOTIN, CYANOCOBALAMIN: 200; 3300; 200; 6; 3.6; 6; 40; 15; 10; 150; 600; 60; 5 INJECTION, SOLUTION INTRAVENOUS at 09:02

## 2024-02-27 RX ADMIN — MORPHINE SULFATE 4 MG: 4 INJECTION, SOLUTION INTRAMUSCULAR; INTRAVENOUS at 11:02

## 2024-02-27 RX ADMIN — DEXMEDETOMIDINE HYDROCHLORIDE 1 MCG/KG/HR: 4 INJECTION, SOLUTION INTRAVENOUS at 10:02

## 2024-02-27 RX ADMIN — SODIUM CHLORIDE: 0.45 INJECTION, SOLUTION INTRAVENOUS at 04:02

## 2024-02-27 RX ADMIN — IPRATROPIUM BROMIDE AND ALBUTEROL SULFATE 3 ML: 2.5; .5 SOLUTION RESPIRATORY (INHALATION) at 07:02

## 2024-02-27 RX ADMIN — FUROSEMIDE 20 MG: 10 INJECTION, SOLUTION INTRAMUSCULAR; INTRAVENOUS at 09:02

## 2024-02-27 RX ADMIN — IPRATROPIUM BROMIDE AND ALBUTEROL SULFATE 3 ML: 2.5; .5 SOLUTION RESPIRATORY (INHALATION) at 12:02

## 2024-02-27 RX ADMIN — IPRATROPIUM BROMIDE AND ALBUTEROL SULFATE 3 ML: 2.5; .5 SOLUTION RESPIRATORY (INHALATION) at 11:02

## 2024-02-27 RX ADMIN — DEXMEDETOMIDINE HYDROCHLORIDE 1 MCG/KG/HR: 4 INJECTION, SOLUTION INTRAVENOUS at 09:02

## 2024-02-27 RX ADMIN — MORPHINE SULFATE 4 MG: 4 INJECTION, SOLUTION INTRAMUSCULAR; INTRAVENOUS at 09:02

## 2024-02-27 RX ADMIN — DEXMEDETOMIDINE HYDROCHLORIDE 1.4 MCG/KG/HR: 4 INJECTION, SOLUTION INTRAVENOUS at 08:02

## 2024-02-27 RX ADMIN — DEXMEDETOMIDINE HYDROCHLORIDE 1.4 MCG/KG/HR: 4 INJECTION, SOLUTION INTRAVENOUS at 04:02

## 2024-02-27 RX ADMIN — LEVOTHYROXINE SODIUM ANHYDROUS 25 MCG: 100 INJECTION, POWDER, LYOPHILIZED, FOR SOLUTION INTRAVENOUS at 09:02

## 2024-02-27 RX ADMIN — METOPROLOL TARTRATE 10 MG: 1 INJECTION, SOLUTION INTRAVENOUS at 09:02

## 2024-02-27 RX ADMIN — LORAZEPAM 2 MG: 2 INJECTION INTRAMUSCULAR; INTRAVENOUS at 04:02

## 2024-02-27 RX ADMIN — LORAZEPAM 2 MG: 2 INJECTION INTRAMUSCULAR; INTRAVENOUS at 11:02

## 2024-02-27 RX ADMIN — MORPHINE SULFATE 4 MG: 4 INJECTION, SOLUTION INTRAMUSCULAR; INTRAVENOUS at 04:02

## 2024-02-27 RX ADMIN — DEXMEDETOMIDINE HYDROCHLORIDE 1.4 MCG/KG/HR: 4 INJECTION, SOLUTION INTRAVENOUS at 05:02

## 2024-02-27 RX ADMIN — LORAZEPAM 2 MG: 2 INJECTION INTRAMUSCULAR; INTRAVENOUS at 05:02

## 2024-02-27 NOTE — PROGRESS NOTES
[facility] Medicine  Progress Note    Patient Name: Jose F Hanley  MRN: [id number]  Patient Class: IP- Surgery Admit   Admission Date: 2/23/2024  Length of Stay: 4 days  Attending Physician: Mehdi Zhong MD  Primary Care Provider: Sunita Rivera MD        Subjective:     Principal Problem:S/P CABG (coronary artery bypass graft)        HPI:  Patient is 78 year old male with history of Afib, alcohol use, HFpEF, COPD, hypothyroidism, CAD was admitted to ICU after elective CABG. He underwent heart catheterization and coronary angiography showing multivessel coronary artery disease.  He was referred for consideration of redo coronary artery bypass grafting.    He has had a TAVR and Watchman. Recent studies were reviewed.  The patient has significant recurrent coronary artery disease.  Post operatively patient developed alcohol withdrawal and delirium, was started on precedex. Patient was extubated and still on BiPAP.  Hospitalist was consulted for medical management.     Overview/Hospital Course:  Patient was post op admitted to ICU. Extubated to BiPAP, developed encephalopathy and now on precedex. Post op care per CTS. Cardiology also following. He is post op D4 today, still has chest and mediastinal tubes and pacer wires. Balloon pump was removed 2/25.     Interval History: Patient is off BiPAP this morning, was talking with the spouse. He came off Precedex but became agitated and had to be restarted on. No other overnight issues.     Review of Systems  Objective:     Vital Signs (Most Recent):  Temp: 99 °F (37.2 °C) (02/27/24 1101)  Pulse: 81 (02/27/24 1138)  Resp: (!) 21 (02/27/24 1138)  BP: (!) 110/56 (02/27/24 1101)  SpO2: (!) 94 % (02/27/24 1138) Vital Signs (24h Range):  Temp:  [98.4 °F (36.9 °C)-99 °F (37.2 °C)] 99 °F (37.2 °C)  Pulse:  [79-99] 81  Resp:  [18-32] 21  SpO2:  [85 %-99 %] 94 %  BP: (101-137)/(55-92) 110/56     Weight: 125 kg (275 lb 9.2 oz)  Body mass index is  37.37 kg/m².    Intake/Output Summary (Last 24 hours) at 2/27/2024 1354  Last data filed at 2/27/2024 0501  Gross per 24 hour   Intake 2800 ml   Output 1745 ml   Net 1055 ml         Physical Exam  Constitutional:       Appearance: He is ill-appearing.      Comments: Confused, follow commands, on precedex, has restrains   Eyes:      Conjunctiva/sclera: Conjunctivae normal.   Cardiovascular:      Rate and Rhythm: Normal rate and regular rhythm.      Comments: There are mediastinal tube and chest tube. Pacer wires in.   Pulmonary:      Effort: No respiratory distress.      Breath sounds: Normal breath sounds. No wheezing.   Abdominal:      General: There is no distension.      Palpations: Abdomen is soft.      Tenderness: There is no abdominal tenderness.   Musculoskeletal:         General: No swelling.   Skin:     Coloration: Skin is not jaundiced or pale.   Neurological:      General: No focal deficit present.      Mental Status: He is disoriented.      Comments: Moves all 4 extremities, follow commands.              Significant Labs: All pertinent labs within the past 24 hours have been reviewed.  CBC:   Recent Labs   Lab 02/26/24  0521 02/27/24  0339   WBC 16.55* 15.40*   HGB 8.8* 8.3*   HCT 29.1* 27.4*   * 144*     CMP:   Recent Labs   Lab 02/26/24  0521 02/27/24  0339    142   K 4.7 4.2    110   CO2 25 23   * 148*   BUN 22 20   CREATININE 0.8 0.7   CALCIUM 7.8* 7.8*   PROT 5.4*  --    ALBUMIN 3.3*  --    BILITOT 1.1*  --    ALKPHOS 24*  --    AST 32  --    ALT 12  --    ANIONGAP 5* 9       Significant Imaging: I have reviewed all pertinent imaging results/findings within the past 24 hours.    Assessment/Plan:      * S/P CABG (coronary artery bypass graft) - x2 09/2010 - LIMA to LAD; SVG to OMB; grafts occluded 08/2012  - post op care per surgery       CAD (coronary artery disease), native coronary artery  S/p CABG X 3 on 2/23  - post op care per CTS, chest tube and pacer wires to be  removed per surgery. Balloon pump was removed 2/25  - Cont aspirin, Lipitor, ,metoprolol     Acute respiratory failure with hypoxia and hypercapnia  Secondary to COPD exacerbation   Off BiPAP today   - scheduled breathing Rx     Acute encephalopathy  Due to alcohol withdrawal   - wean prescedex  - CIWA trigged ativan       Acute blood loss anemia  Expected post op   Hb stable   - Cont to monitor     Severe obesity (BMI 35.0-39.9) with comorbidity  Body mass index is 37.37 kg/m². Morbid obesity complicates all aspects of disease management from diagnostic modalities to treatment. Weight loss encouraged and health benefits explained to patient.         Permanent atrial fibrillation  S/p watchman device   - Cont metoprolol     Dyslipidemia  - cont Statin         VTE Risk Mitigation (From admission, onward)           Ordered     enoxaparin injection 60 mg  Every 24 hours         02/26/24 1519     Place DUTCH hose  Until discontinued         02/23/24 0545                    Discharge Planning   LEIA: 3/5/2024     Code Status: Full Code   Is the patient medically ready for discharge?:     Reason for patient still in hospital (select all that apply): Treatment  Discharge Plan A: Home, Home with family   Discharge Delays: None known at this time          Mehdi Zhong MD  Department of Hospital Medicine   [facility]

## 2024-02-27 NOTE — PROGRESS NOTES
[facility]  Department of Cardiology  Consult Note      PATIENT NAME: Jose F Hnaley  MRN: [id number]  TODAY'S DATE: 02/27/2024  ADMIT DATE: 2/23/2024                          CONSULT REQUESTED BY: Mehdi Zhong MD    SUBJECTIVE     PRINCIPAL PROBLEM: S/P CABG (coronary artery bypass graft)      REASON FOR CONSULT:  Post CABG     Interval history:    2/27/2024    Resting on Face mask. Still with mild sedation family at bedside. VSS.      2/26/24  Pt asleep in bed, remains on BiPAP. Per RN, he is still confused while awake. Currently under light sedation - precedex gtt. Remains paced with temporary pacer - 80s.     2/25/24  Pt was seen resting in bed with Bipap mask in place. Balloon pump was removed this morning per bedside RN.     2/24/24  Pt was seen extubated on BiPAP this morning. Balloon pump. Pressures soft this morning. Per RN, pt was increasingly agitated this morning.       HPI:  Pt is a 78 year old with history of known CAD with previous bypass surgery back in 08/31/23 and then underwent a repeat CABG today, 2/23/23. He Just recently had a [facility] as well with Dr. Bush back on 2/7/24.       Per surgery team:  Operation: Redo coronary artery bypass grafting x3 using left radial artery to left anterior descending coronary artery and separate segments of saphenous vein from the aorta to the diagonal coronary artery and from the aorta to the obtuse marginal coronary artery using a combination of antegrade and retrograde cardioplegia, mild systemic hypothermia, endoscopic vein harvest from the right leg, right radial artery harvest, and insertion of right femoral arterial intra-aortic balloon pump with fluoroscopic guidance and complete pericardiectomy due to severe adhesive pericarditis         Review of patient's allergies indicates:   Allergen Reactions    Adhes. band-tape-benzalkonium Rash     Pt stated it caused blisters    Statins-hmg-coa reductase inhibitors Other (See Comments)      Statin Intolerance (joint pain)       Past Medical History:   Diagnosis Date    A-fib     Allergies 2020    gets allergy shots    Arthritis 1973    right knee    Back pain     COPD (chronic obstructive pulmonary disease)     Diastolic heart failure     GERD (gastroesophageal reflux disease) 2015    HLD (hyperlipidemia)     HTN (hypertension)     Hx of LASIK     Hypothyroidism, unspecified 2012    Inflammatory disease of prostate, unspecified     out of medicine    Mild intermittent asthma, uncomplicated     Myocardial infarction     Neuropathy     On home oxygen therapy     3L [place]  at night    PAD (peripheral artery disease)     Staph skin infection 2016    right thigh    TIA (transient ischemic attack) 2013     Past Surgical History:   Procedure Laterality Date    AORTOGRAPHY WITH SERIALOGRAPHY N/A 08/31/2023    Procedure: AORTOGRAM, WITH SERIALOGRAPHY;  Surgeon: Gary Thomas MD;  Location: [facility] CATH/EP LAB;  Service: Peripheral Vascular;  Laterality: N/A;    CATARACT EXTRACTION Bilateral 2014    CORONARY ANGIOGRAPHY INCLUDING BYPASS GRAFTS WITH CATHETERIZATION OF LEFT HEART Left 01/12/2024    Procedure: ANGIOGRAM, CORONARY, INCLUDING BYPASS GRAFT, WITH LEFT HEART CATHETERIZATION;  Surgeon: Gregg Bush MD;  Location: [facility] CATH/EP LAB;  Service: Cardiology;  Laterality: Left;    CORONARY ARTERY BYPASS GRAFT  2010    ENDOSCOPIC HARVEST OF VEIN Right 2/23/2024    Procedure: HARVEST-VEIN-ENDOVASCULAR;  Surgeon: Gary Thomas MD;  Location: [facility] OR;  Service: Cardiothoracic;  Laterality: Right;    INSERTION OF IMPLANTABLE LOOP RECORDER  2011    INSERTION OF INTRA-AORTIC BALLOON ASSIST DEVICE Right 2/23/2024    Procedure: INSERTION, INTRA-AORTIC BALLOON PUMP;  Surgeon: Gary Thomas MD;  Location: [facility] OR;  Service: Cardiothoracic;  Laterality: Right;    INSERTION OF PACEMAKER  2021    does not have card with him for preadmit    INSTANTANEOUS WAVE-FREE RATIO (IFR) N/A 01/12/2024     Procedure: Instantaneous Wave-Free Ratio (IFR);  Surgeon: Gregg Bush MD;  Location: [facility] CATH/EP LAB;  Service: Cardiology;  Laterality: N/A;    PTA, ANTERIOR TIBIAL Left 2023    Procedure: PTA, Anterior Tibial;  Surgeon: Gary Thomas MD;  Location: [facility] CATH/EP LAB;  Service: Peripheral Vascular;  Laterality: Left;    PTA, SUPERFICIAL FEMORAL ARTERY Left 2023    Procedure: PTA, Superficial Femoral Artery;  Surgeon: Gary Thomas MD;  Location: [facility] CATH/EP LAB;  Service: Peripheral Vascular;  Laterality: Left;    REPEAT CORONARY ARTERY BYPASS GRAFTING N/A 2024    Procedure: CABG, REPEAT;  Surgeon: Gary Thomas MD;  Location: [facility] OR;  Service: Cardiothoracic;  Laterality: N/A;    SPLENECTOMY  2016    STENT, ANTERIOR TIBIAL Left 2023    Procedure: Stent, Anterior Tibial;  Surgeon: Gary Thomas MD;  Location: [facility] CATH/EP LAB;  Service: Peripheral Vascular;  Laterality: Left;    SURGICAL PROCUREMENT, ARTERY, RADIAL, FOR CABG  2024    Procedure: SURGICAL PROCUREMENT,ARTERY,RADIAL,FOR CABG;  Surgeon: Gary Thomas MD;  Location: [facility] OR;  Service: Cardiothoracic;;    watchman heart device       Social History     Tobacco Use    Smoking status: Former     Current packs/day: 0.00     Types: Cigarettes     Quit date: 1987     Years since quittin.2    Smokeless tobacco: Never   Substance Use Topics    Alcohol use: Not Currently     Alcohol/week: 21.0 standard drinks of alcohol     Types: 21 Drinks containing 0.5 oz of alcohol per week     Comment: ed roberto mixed drinks    Drug use: No        REVIEW OF SYSTEMS    As mentioned in HPI    OBJECTIVE     VITAL SIGNS (Most Recent)  Temp: 99 °F (37.2 °C) (24)  Pulse: 92 (24)  Resp: 20 (24)  BP: (!) 116/92 (24)  SpO2: 99 % (24)    VENTILATION STATUS  Resp: 20 (24)  SpO2: 99 % (24 07)  Oxygen Concentration (%):  [35]  35        I & O (Last 24H):  Intake/Output Summary (Last 24 hours) at 2/27/2024 0916  Last data filed at 2/27/2024 0501  Gross per 24 hour   Intake 3919.17 ml   Output 2320 ml   Net 1599.17 ml       WEIGHTS  Wt Readings from Last 3 Encounters:   02/23/24 1540 125 kg (275 lb 9.2 oz)   02/21/24 0948 124.5 kg (274 lb 6.4 oz)   01/12/24 0718 120.7 kg (266 lb)       PHYSICAL EXAM    CONSTITUTIONAL: NAD, sleeping, light sedation   HEENT: Normocephalic. No pallor  NECK: no JVD  LUNGS: coarse   HEART: paced rate and rhythm - underlying Afib, S1, S2 normal, + murmur   ABDOMEN: soft, non-tender, bowel sounds normal  EXTREMITIES: No edema  SKIN: No rash  PSYCH: light sedation     HOME MEDICATIONS:  No current facility-administered medications on file prior to encounter.     Current Outpatient Medications on File Prior to Encounter   Medication Sig Dispense Refill    acetaminophen (TYLENOL) 325 MG tablet Take 650 mg by mouth every 6 (six) hours as needed.      albuterol (PROVENTIL/VENTOLIN HFA) 90 mcg/actuation inhaler Inhale 2 puffs into the lungs every 6 (six) hours as needed.      aspirin 81 MG Chew Take 81 mg by mouth once daily.      betamethasone valerate 0.1% (VALISONE) 0.1 % Lotn Apply to scalp bid prn rash      cyanocobalamin 500 MCG tablet 500 mcg.      cyclobenzaprine (FLEXERIL) 10 MG tablet Take 10 mg by mouth every 8 (eight) hours as needed.      diphenhydrAMINE (BENADRYL) 50 MG capsule 50 mg every 6 (six) hours as needed.      doxepin (SINEQUAN) 50 MG capsule Take 50 mg by mouth every evening.      DULoxetine (CYMBALTA) 60 MG capsule 60 mg nightly.      EPINEPHrine (EPIPEN) 0.3 mg/0.3 mL AtIn INJECT 0.3MG/0.3ML SUBCUTANEOUSLY (UNDER THE SKIN)  AS NEEDED FOR ALLERGIC REACTIONS SELF-INJECTOR PEN      furosemide (LASIX) 20 MG tablet Take 20 mg by mouth once daily.      gabapentin (NEURONTIN) 400 MG capsule 800 mg 2 (two) times daily.      hydrocodone-homatropine 5-1.5 mg/5 ml (HYCODAN) 5-1.5 mg/5 mL Syrp Take by  mouth every 6 (six) hours as needed. cough      levothyroxine (SYNTHROID) 50 MCG tablet 50 mcg.      metOLazone (ZAROXOLYN) 5 MG tablet 5 mg daily as needed. For weight gain >2lbs      montelukast (SINGULAIR) 10 mg tablet 10 mg once daily.      nitroGLYCERIN (NITROSTAT) 0.4 MG SL tablet Place 0.4 mg under the tongue every 5 (five) minutes as needed.      omega-3 fatty acids/fish oil (FISH OIL-OMEGA-3 FATTY ACIDS) 300-1,000 mg capsule Take 1 capsule by mouth 2 (two) times daily.      omeprazole (PRILOSEC) 20 MG capsule 40 mg once daily.      potassium chloride SA (K-DUR,KLOR-CON) 20 MEQ tablet 20 mEq nightly.      ranolazine (RANEXA) 1,000 mg Tb12 Take 1 tablet (1,000 mg total) by mouth 2 (two) times daily. 180 tablet 3    senna-docusate 8.6-50 mg (PERICOLACE) 8.6-50 mg per tablet nightly.      spironolactone (ALDACTONE) 50 MG tablet Take 50 mg by mouth once daily.      tamsulosin (FLOMAX) 0.4 mg Cap 0.4 mg.      TESTOSTERONE PROPIONATE, BULK, MISC Inject 1 each as directed every 14 (fourteen) days.      TOPROL XL 25 mg 24 hr tablet Take 25 mg by mouth 2 (two) times daily.      torsemide (DEMADEX) 20 MG Tab Take 1 tablet (20 mg total) by mouth once daily. (Patient not taking: Reported on 12/21/2023) 30 tablet 11       SCHEDULED MEDS:   albuterol-ipratropium  3 mL Nebulization Q4H    aspirin  81 mg Oral Daily    docusate sodium  100 mg Oral BID    DULoxetine  60 mg Oral Nightly    enoxparin  60 mg Subcutaneous Q24H (prophylaxis, 1700)    furosemide (LASIX) injection  20 mg Intravenous Daily    levothyroxine  25 mcg Intravenous Daily    metoprolol  10 mg Intravenous Q12H    montelukast  10 mg Oral Daily    pantoprazole  40 mg Oral Before breakfast    sodium chloride 0.9% 1,000 mL with mvi, (ADULT) no.4 with vit K 3,300 unit- 150 mcg/10 mL 10 mL, thiamine 100 mg, folic acid 1 mg infusion   Intravenous Daily    tamsulosin  0.4 mg Oral Daily       CONTINUOUS INFUSIONS:   sodium chloride 0.45% 50 mL/hr at 02/26/24 3333     "dexmedeTOMIDine (Precedex) infusion (titrating) 1.4 mcg/kg/hr (02/27/24 0551)    insulin regular 1 units/mL infusion orderable (CTS POST-OP) Stopped (02/24/24 0900)    phenylephrine Stopped (02/24/24 0401)       PRN MEDS:0.9%  NaCl infusion (for blood administration), albumin human 5%, albuterol sulfate, calcium gluconate IVPB, calcium gluconate IVPB, calcium gluconate IVPB, dextrose 50% in water (D50W), dextrose 50% in water (D50W), HYDROcodone-acetaminophen, HYDROmorphone, lorazepam, magnesium sulfate IVPB, magnesium sulfate IVPB, morphine, morphine, ondansetron, potassium chloride in water, potassium chloride in water, potassium chloride in water **AND** potassium chloride in water **AND** potassium chloride in water, potassium chloride in water, sodium phosphate 15 mmol in dextrose 5 % (D5W) 250 mL IVPB, sodium phosphate 20.01 mmol in dextrose 5 % (D5W) 250 mL IVPB, sodium phosphate 30 mmol in dextrose 5 % (D5W) 250 mL IVPB    LABS AND DIAGNOSTICS     CBC LAST 3 DAYS  Recent Labs   Lab 02/24/24  0113 02/24/24  0230 02/24/24  1828 02/24/24  1951 02/25/24  0337 02/26/24  0521 02/27/24  0339   WBC 14.35*   < > 20.34*  --  18.70* 16.55* 15.40*   RBC 3.91*   < > 4.08*  --  3.93* 3.78* 3.56*   HGB 8.9*   < > 9.5*  --  9.3* 8.8* 8.3*   HCT 29.5*   < > 31.2*   < > 30.1* 29.1* 27.4*   MCV 75*   < > 77*  --  77* 77* 77*   MCH 22.8*   < > 23.3*  --  23.7* 23.3* 23.3*   MCHC 30.2*   < > 30.4*  --  30.9* 30.2* 30.3*   RDW 20.3*   < > 20.6*  --  20.6* 21.3* 22.1*      < > 123*  --  114* 110* 144*   MPV 10.3   < > 10.4  --  10.3 SEE COMMENT 11.8   GRAN 87.8*  12.6*  --  77.3*  15.7*  --   --   --  78.1*  12.0*   LYMPH 5.6*  0.8*  --  8.7*  1.8  --   --   --  6.6*  1.0   MONO 6.0  0.9  --  13.5  2.8*  --   --   --  13.1  2.0*   BASO 0.03  --  0.03  --   --   --  0.07   NRBC 0  --  0  --   --   --  1*    < > = values in this interval not displayed.       COAGULATION LAST 3 DAYS  No results for input(s): "LABPT", " ""INR", "APTT" in the last 168 hours.    CHEMISTRY LAST 3 DAYS  Recent Labs   Lab 02/24/24  0645 02/24/24  0726 02/24/24  0917 02/24/24  1828 02/24/24 1951 02/25/24 0337 02/26/24  0521 02/27/24 0339   NA  --    < >  --  136  --  137 139 142   K  --    < >  --  5.0  --  4.7 4.7 4.2   CL  --    < >  --  107  --  107 109 110   CO2  --    < >  --  24  --  23 25 23   ANIONGAP  --    < >  --  5*  --  7* 5* 9   BUN  --    < >  --  17  --  19 22 20   CREATININE  --    < >  --  0.9  --  0.9 0.8 0.7   GLU  --    < >  --  171*  --  171* 151* 148*   CALCIUM  --    < >  --  7.7*  --  7.7* 7.8* 7.8*   PH 7.345*  --  7.283*  --  7.334*  --   --   --    MG  --    < >  --  2.2  --  2.2 2.1 2.2   ALBUMIN  --    < >  --  3.6  --  3.4* 3.3*  --    PROT  --    < >  --  5.3*  --  5.3* 5.4*  --    ALKPHOS  --    < >  --  22*  --  22* 24*  --    ALT  --    < >  --  18  --  15 12  --    AST  --    < >  --  39  --  33 32  --    BILITOT  --    < >  --  0.7  --  0.7 1.1*  --     < > = values in this interval not displayed.       CARDIAC PROFILE LAST 3 DAYS  No results for input(s): "BNP", "CPK", "CPKMB", "LDH", "TROPONINI", "TROPONINIHS" in the last 168 hours.    ENDOCRINE LAST 3 DAYS  No results for input(s): "TSH", "PROCAL" in the last 168 hours.    LAST ARTERIAL BLOOD GAS  ABG  Recent Labs   Lab 02/24/24 1951   PH 7.334*   PO2 60*   PCO2 44.6   HCO3 23.7*   BE -2       LAST 7 DAYS MICROBIOLOGY   Microbiology Results (last 7 days)       ** No results found for the last 168 hours. **            MOST RECENT IMAGING  X-Ray Chest AP Portable  CLINICAL HISTORY:  78 years (1945) Male Post-op Post-op, S/P CABG (coronary artery bypass graft    TECHNIQUE:  Portable AP radiograph the chest. One view.    COMPARISON:  Radiograph from February 24, 2024.    FINDINGS:  There is vascular congestion with increased interstitial markings findings indicating mild pulmonary edema.  There is blunting of the left costophrenic angle consistent with a trace " pleural effusion. No pneumothorax is identified. The cardiac silhouette is moderately enlarged. Median sternotomy wires are in expected configuration status post CABG.  Osseous structures appear unchanged. The visualized upper abdomen is unremarkable.    Lines and tubes: Interval extubation and removal of the enteric tube. Right-sided IJ Rushford-Bienvenido catheter with tip at the right main pulmonary artery. 2 mediastinal drains are seen projecting to the right of midline over the cardiac mediastinal silhouette. One tube is seen projecting over the right heart.    IMPRESSION:  Interval extubation and removal of the enteric tube, slightly improved lung volumes, otherwise unchanged radiograph the chest.    .    Electronically signed by:  Evans Guo MD  02/25/2024 07:23 AM CST Workstation: [id number]      ECHOCARDIOGRAM RESULTS (last 5)  Results for orders placed during the hospital encounter of 12/13/23    Echo    Interpretation Summary    Left Ventricle: The left ventricle is normal in size. Mildly increased wall thickness. There is mild concentric hypertrophy. Normal wall motion. There is normal systolic function with a visually estimated ejection fraction of 65 - 70%. There is normal diastolic function.    Right Ventricle: Mild right ventricular enlargement. Wall thickness is normal. Right ventricle wall motion  is normal. Systolic function is normal.    Left Atrium: Left atrium is moderately dilated.    Right Atrium: Right atrium is mildly dilated.    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    Tricuspid Valve: There is mild regurgitation with a centrally directed jet.    Pulmonic Valve: There is mild regurgitation with a centrally directed jet.    Pulmonary Artery: There is mild pulmonary hypertension. The estimated pulmonary artery systolic pressure is 40 mmHg.    IVC/SVC: Intermediate venous pressure at 8 mmHg.      CURRENT/PREVIOUS VISIT EKG  Results for orders placed or  performed during the hospital encounter of 02/23/24   EKG 12-LEAD    Collection Time: 02/23/24  1:53 PM   Result Value Ref Range    QRS Duration 232 ms    [facility] QTC Calculation 613 ms    Narrative    Test Reason : Z95.1,Z95.1,    Vent. Rate : 080 BPM     Atrial Rate : 087 BPM     P-R Int : 000 ms          QRS Dur : 232 ms      QT Int : 532 ms       P-R-T Axes : 000 168 250 degrees     QTc Int : 613 ms    Ventricular-paced rhythm  occasional atrila pacing  Abnormal ECG  When compared with ECG of 21-FEB-2024 09:33,  Electronic ventricular pacemaker has replaced Atrial fibrillation  Confirmed by Eleazar WOO, Gregg NEWTON ([id number]) on 2/24/2024 7:12:58 AM    Referred By: OLLIE RIBEIRO           Confirmed By:Gregg Bush MD           ASSESSMENT/PLAN:     Active Hospital Problems    Diagnosis    *S/P CABG (coronary artery bypass graft) - x2 09/2010 - LIMA to LAD; SVG to OMB; grafts occluded 08/2012    Acute encephalopathy    Acute blood loss anemia    Acute respiratory failure with hypoxia and hypercapnia    Severe obesity (BMI 35.0-39.9) with comorbidity    Permanent atrial fibrillation    CAD (coronary artery disease), native coronary artery    Dyslipidemia       ASSESSMENT & PLAN:     MVCAD S/P redo CABG yesterday 2/23/24,  left radial artery to LAD, SVG to Dx, SVG to OM  HFpEF  HTN/ dyslipidemia   Statin intolerance      RECOMMENDATIONS:    Continue to monitor chest tube output.  Continue metoprolol succinate 25 mg BID.  Continue Asprin therapy.  PT/OT; Up and out of bed as tolerated.   IS while awake if able to follow commands.   Strict I/Os   Monitor rate and rhythm.   Continue to check and replace potassium and magnesium. Goal for potassium is 4.0, and goal for magnesium is 2.0.   Will continue to follow.         Andria Duffy NP  Department of Cardiology  Date of Service: 02/27/2024      Patient is doing well postoperatively.  He is back into atrial fibrillation.  He does have a leadless pacemaker in place which  was interrogated and appropriately functioning and set to a rate of 50 beats per minute

## 2024-02-27 NOTE — ASSESSMENT & PLAN NOTE
Expected post op   Hb stable   - Cont to monitor    Pt has a medical necessity for ambulance transportation home due to revascularization of leg. Pt stable to go home today.     Gene Nguyen MD  Resident  [facility]

## 2024-02-27 NOTE — SUBJECTIVE & OBJECTIVE
Interval History: Patient is off BiPAP this morning, was talking with the spouse. He came off Precedex but became agitated and had to be restarted on. No other overnight issues.     Review of Systems  Objective:     Vital Signs (Most Recent):  Temp: 99 °F (37.2 °C) (02/27/24 1101)  Pulse: 81 (02/27/24 1138)  Resp: (!) 21 (02/27/24 1138)  BP: (!) 110/56 (02/27/24 1101)  SpO2: (!) 94 % (02/27/24 1138) Vital Signs (24h Range):  Temp:  [98.4 °F (36.9 °C)-99 °F (37.2 °C)] 99 °F (37.2 °C)  Pulse:  [79-99] 81  Resp:  [18-32] 21  SpO2:  [85 %-99 %] 94 %  BP: (101-137)/(55-92) 110/56     Weight: 125 kg (275 lb 9.2 oz)  Body mass index is 37.37 kg/m².    Intake/Output Summary (Last 24 hours) at 2/27/2024 1354  Last data filed at 2/27/2024 0501  Gross per 24 hour   Intake 2800 ml   Output 1745 ml   Net 1055 ml         Physical Exam  Constitutional:       Appearance: He is ill-appearing.      Comments: Confused, follow commands, on precedex, has restrains   Eyes:      Conjunctiva/sclera: Conjunctivae normal.   Cardiovascular:      Rate and Rhythm: Normal rate and regular rhythm.      Comments: There are mediastinal tube and chest tube. Pacer wires in.   Pulmonary:      Effort: No respiratory distress.      Breath sounds: Normal breath sounds. No wheezing.   Abdominal:      General: There is no distension.      Palpations: Abdomen is soft.      Tenderness: There is no abdominal tenderness.   Musculoskeletal:         General: No swelling.   Skin:     Coloration: Skin is not jaundiced or pale.   Neurological:      General: No focal deficit present.      Mental Status: He is disoriented.      Comments: Moves all 4 extremities, follow commands.              Significant Labs: All pertinent labs within the past 24 hours have been reviewed.  CBC:   Recent Labs   Lab 02/26/24  0521 02/27/24  0339   WBC 16.55* 15.40*   HGB 8.8* 8.3*   HCT 29.1* 27.4*   * 144*     CMP:   Recent Labs   Lab 02/26/24  0521 02/27/24  0339    142    K 4.7 4.2    110   CO2 25 23   * 148*   BUN 22 20   CREATININE 0.8 0.7   CALCIUM 7.8* 7.8*   PROT 5.4*  --    ALBUMIN 3.3*  --    BILITOT 1.1*  --    ALKPHOS 24*  --    AST 32  --    ALT 12  --    ANIONGAP 5* 9       Significant Imaging: I have reviewed all pertinent imaging results/findings within the past 24 hours.

## 2024-02-27 NOTE — RESPIRATORY THERAPY
02/26/24 1949   Patient Assessment/Suction   Level of Consciousness (AVPU) responds to voice   Respiratory Effort Normal;Unlabored   Expansion/Accessory Muscles/Retractions no retractions;no use of accessory muscles   All Lung Fields Breath Sounds coarse   Skin Integrity   $ Wound Care Tech Time 15 min   Area Observed Bridge of nose   Skin Appearance without discoloration   Barrier used? Liquid Filled Cushion   PRE-TX-O2   Device (Oxygen Therapy) BIPAP   Oxygen Concentration (%) 35   SpO2 97 %   Pulse Oximetry Type Continuous   $ Pulse Oximetry - Multiple Charge Pulse Oximetry - Multiple   Pulse 79   Resp (!) 21   Aerosol Therapy   $ Aerosol Therapy Charges Aerosol Treatment   Daily Review of Necessity (SVN) completed   Respiratory Treatment Status (SVN) given   Treatment Route (SVN) in-line   Patient Position (SVN) HOB elevated   Post Treatment Assessment (SVN) breath sounds unchanged   Signs of Intolerance (SVN) none   Breath Sounds Post-Respiratory Treatment   Throughout All Fields Post-Treatment All Fields   Throughout All Fields Post-Treatment no change   Post-treatment Heart Rate (beats/min) 80   Post-treatment Resp Rate (breaths/min) 20   Ready to Wean/Extubation Screen   FIO2<=50 (chart decimal) 0.35   Preset CPAP/BiPAP Settings   Mode Of Delivery BiPAP S/T   $ Initial CPAP/BiPAP Setup? No   $ Is patient using? Yes   Size of Mask Large   Sized Appropriately? Yes   Equipment Type V60   Airway Device Type large full face mask   Humidifier not applicable   Ipap 16   EPAP (cm H2O) 5   Pressure Support (cm H2O) 11   Set Rate (Breaths/Min) 16   ITime (sec) 1   Rise Time (sec) 3   Patient CPAP/BiPAP Settings   Timed Inspiration (Sec) 1   IPAP Rise Time (sec) 3   RR Total (Breaths/Min) 20   Tidal Volume (mL) 739   VE Minute Ventilation (L/min) 14.7 L/min   Peak Inspiratory Pressure (cm H2O) 17   TiTOT (%) 25   Total Leak (L/Min) 24   Patient Trigger - ST Mode Only (%) 99   Education   $ Education  BiPAP;Bronchodilator;DME Oxygen;15 min

## 2024-02-27 NOTE — PROGRESS NOTES
CVT SURGERY PROGRESS NOTE  Jose F Hanley  78 y.o.  1945    Patient's Chief Complaint:  S/P CABG (coronary artery bypass graft)    HPI:  This patient has coronary artery disease.  He has had progressive shortness of breath.  He underwent heart catheterization and coronary angiography showing multivessel coronary artery disease.  He was referred for consideration of redo coronary artery bypass grafting.    He has borderline diabetes  and hypertension.  He has a history of remote coronary artery bypass grafting a number of years ago.    He has had a TAVR and Watchman.  He is not a smoker.  He does have COPD.  He quit smoking 35 years ago.    Medicines are part of the epic record.  He does take daily aspirin.    On exam vital signs are stable.  Pupils are equal and round reactive to light.  Neck is supple.  Chest is equal breath sounds.  Heart is in a irregular rate and rhythm.  Abdomen is benign.  Perfusion to the legs and feet seems to be satisfactory.    Recent studies were reviewed.  The patient has significant recurrent coronary artery disease.  Consideration can be made for redo high-risk coronary artery bypass grafting.  The risks and potential complications were discussed.  The patient will take this under advisement.    Last 24 hour interval:  -Precedex weaned off this AM. Responding appropriately.   -BiPap weaned to VM  -BP well controlled   -Pacer turned down to 50, intrinsic rhythm afib, rate 90s.   -Leukocytosis downtrending, remains afebrile  -H/H downtrending. platelets improving.   -MS tube output last 24 hours - 20 cc, to suction, no air leak  -Bulb drain output last 24 hours - 40cc, to suction  -Good UOP, renal function stable. Canas in place  -Pt seen this AM, lying in bed on VM. Lethargic, but responds appropriately to questions. Complains of expected post op pain. + flatus, no BM.     Subjective:  Symptoms:  Improved.  (Patient sedated).    Diet:  NPO.  No nausea or vomiting.    Pain:  He  complains of pain that is moderate.  Pain is requiring pain medication.                                                                   Objective:  General Appearance:  In no acute distress.    Vital signs: (most recent): Blood pressure (!) 116/92, pulse 92, temperature 99 °F (37.2 °C), temperature source Axillary, resp. rate 20, height 6' (1.829 m), weight 125 kg (275 lb 9.2 oz), SpO2 99 %.    Output: Producing urine.    HEENT: Normal HEENT exam.    Lungs:  There are rhonchi (coarse, scattered).  No rales or wheezes.    Heart: Normal rate.  Regular rhythm.  No murmur, gallop or friction rub.   Abdomen: Abdomen is soft and non-distended.  Bowel sounds are normal.   There is no abdominal tenderness.     Extremities: There is no local swelling.    Pulses: Distal pulses are intact.    Neurological: (Lethargic. ).    Skin:  Dry and cool.      Recent Vitals:  Vitals:    02/27/24 0401 02/27/24 0501 02/27/24 0701 02/27/24 0724   BP: (!) 112/57 (!) 106/57 (!) 116/92    BP Location: Left arm Left arm     Patient Position: Lying Lying     Pulse: 90 98 93 92   Resp: (!) 25 (!) 21 (!) 31 20   Temp:   99 °F (37.2 °C)    TempSrc:   Axillary    SpO2: (!) 89% 97% 99% 99%   Weight:       Height:           INPATIENT MEDS   sodium chloride 0.45% 50 mL/hr at 02/26/24 1705    dexmedeTOMIDine (Precedex) infusion (titrating) 1.4 mcg/kg/hr (02/27/24 0551)    insulin regular 1 units/mL infusion orderable (CTS POST-OP) Stopped (02/24/24 0900)    phenylephrine Stopped (02/24/24 0401)      albuterol-ipratropium  3 mL Nebulization Q4H    aspirin  81 mg Oral Daily    docusate sodium  100 mg Oral BID    DULoxetine  60 mg Oral Nightly    enoxparin  60 mg Subcutaneous Q24H (prophylaxis, 1700)    furosemide (LASIX) injection  20 mg Intravenous Daily    levothyroxine  25 mcg Intravenous Daily    metoprolol  10 mg Intravenous Q12H    montelukast  10 mg Oral Daily    pantoprazole  40 mg Oral Before breakfast    sodium chloride 0.9% 1,000 mL with mvi,  "(ADULT) no.4 with vit K 3,300 unit- 150 mcg/10 mL 10 mL, thiamine 100 mg, folic acid 1 mg infusion   Intravenous Daily    tamsulosin  0.4 mg Oral Daily     0.9%  NaCl infusion (for blood administration), albumin human 5%, albuterol sulfate, calcium gluconate IVPB, calcium gluconate IVPB, calcium gluconate IVPB, dextrose 50% in water (D50W), dextrose 50% in water (D50W), HYDROcodone-acetaminophen, HYDROmorphone, lorazepam, magnesium sulfate IVPB, magnesium sulfate IVPB, morphine, morphine, ondansetron, potassium chloride in water, potassium chloride in water, potassium chloride in water **AND** potassium chloride in water **AND** potassium chloride in water, potassium chloride in water, sodium phosphate 15 mmol in dextrose 5 % (D5W) 250 mL IVPB, sodium phosphate 20.01 mmol in dextrose 5 % (D5W) 250 mL IVPB, sodium phosphate 30 mmol in dextrose 5 % (D5W) 250 mL IVPB  HEMODYNAMICS      Recent O2 Therapy/Vent Settings: 4L VM    I/O last 24 hrs:  Intake/Output - Last 3 Shifts         02/25 0700 02/26 0659 02/26 0700 02/27 0659 02/27 0700 02/28 0659    I.V. (mL/kg) 3056.7 (24.5) 4019.2 (32.2)     Blood       IV Piggyback       Total Intake(mL/kg) 3056.7 (24.5) 4019.2 (32.2)     Urine (mL/kg/hr) 3535 (1.2) 2380 (0.8)     Drains 20 40     Chest Tube 150 20     Total Output 3705 2440     Net -648.3 +1579.2                  Recent Cardiac Rhythm: a-fib, rates 90s-110s    Recent Pain Assessment: 6    CBC  Recent Labs   Lab 02/25/24  0337 02/26/24  0521 02/27/24  0339   WBC 18.70* 16.55* 15.40*   RBC 3.93* 3.78* 3.56*   HGB 9.3* 8.8* 8.3*   * 110* 144*   MCV 77* 77* 77*   MCH 23.7* 23.3* 23.3*   MCHC 30.9* 30.2* 30.3*       BMP  Recent Labs   Lab 02/25/24  0337 02/26/24  0521 02/27/24  0339   CO2 23 25 23   BUN 19 22 20   CREATININE 0.9 0.8 0.7   CALCIUM 7.7* 7.8* 7.8*       CARDIAC ENZYMES  No results for input(s): "TROPONINI", "CPK", "CKMB" in the last 168 hours.  BNP  No results for input(s): "BNP" in the last 168 " "hours.  PT/INR  No results found for: "PROTIME", "INR"      DIAGNOSTIC RESULTS:  X-Ray Chest AP Portable  CLINICAL HISTORY:  78 years (1945) Male Post-op Post-op, S/P CABG (coronary artery bypass graft    TECHNIQUE:  Portable AP radiograph the chest. One view.    COMPARISON:  Radiograph from February 24, 2024.    FINDINGS:  There is vascular congestion with increased interstitial markings findings indicating mild pulmonary edema.  There is blunting of the left costophrenic angle consistent with a trace pleural effusion. No pneumothorax is identified. The cardiac silhouette is moderately enlarged. Median sternotomy wires are in expected configuration status post CABG.  Osseous structures appear unchanged. The visualized upper abdomen is unremarkable.    Lines and tubes: Interval extubation and removal of the enteric tube. Right-sided IJ [place] catheter with tip at the right main pulmonary artery. 2 mediastinal drains are seen projecting to the right of midline over the cardiac mediastinal silhouette. One tube is seen projecting over the right heart.    IMPRESSION:  Interval extubation and removal of the enteric tube, slightly improved lung volumes, otherwise unchanged radiograph the chest.    .    Electronically signed by:  Evans Guo MD  02/25/2024 07:23 AM CST Workstation: [id number]        CURRENT CONSULTS:  WOUND CARE CONSULT  IP CONSULT TO CARDIOLOGY  IP CONSULT TO HOSPITALIST    ASSESSMENT/PLAN:    Multivessel CAD   S/p CABG x 2 (LIMA to LAD, SVG to OM) in 2010  S/p Redo CABG X 3 (L radial to LAD, SVG to Diag, SVG to OM) by Dr. Thomas on 2/23/24  -Hemodynamically stable off pressors  -Precedex weaned off 2/27  -Tolerating VM - wean as able  -Mediastinal tube and WISAM drain to remain until patient ambulates and output decreases  -Pacer wires to remain  -Surgical incisions with surgical dressing  -Continue ASA and BB. Will change Toprol XL to lopressor IV as patient is currently NPO until mental " status imrpoves.  Documented statin intolerance - consider PCSK9i  -Encourage IS  -Increase activity  -Cruzito hose  -Bowel regimen - on docusate. + flatus, no BM yet. Can consider lactulose tomorrow if no BM.     Leukocytosis  -Downtrending  -Afebrile  -Likely reactive  -Monitor    Acute post operative blood loss anemia  Thrombocytopenia  -Expected post op  -Received 1358 cc Cell Saver post post  -1 unit PRBCs on Feb 23  -H/H downtrending  -Monitor    HFpEF  -Strict I/Os and daily weights  -Salt/fluid restrictions  -Diuresis prn    Hypomagnesemia  -Replaced per protocol  -Keep Mag >2 and K >4    Hx of TAVR  -Stable    Permanent AF  S/p Watchman device  -Continue b-blocker as above    HTN  -Controlled on current regimen  -Goal < 140/90    HLD  -Continue statin     BPH  -Continue flomax  -Canas in place - remove when patient alert    Case and plan of care discussed with MD.      GI Prophylaxis:  PPI:proton pump inhibitor per orders  DVT Prophylaxis:  Anticoagulants   Medication Route Frequency    enoxaparin injection 60 mg Subcutaneous Q24H (prophylaxis, 1700)       Angeli Linda PA-C  Cardiovascular Thoracic Surgery  2/27/2024  9:20 AM

## 2024-02-27 NOTE — PROGRESS NOTES
[facility]  Adult Nutrition   Progress Note (Initial Assessment)     SUMMARY     Recommendations  Recommendation/Intervention:   1. Once medically feasible advance to Clear liquid diet and transition to Cardiac diet as tolerated, with texture per SLP. 2. Once diet initiated inslude Ensure HP for additional energy protein intake.   3. If unable to initiate diet within 48 hrs, consider Nutrition Support.   4. RD following.  Goals: Initiate diet and or nutrition support by f/u.  Nutrition Goal Status: progressing towards goal    Nutrition Diagnosis PES Statement: Inadequate oral intake related to AMS as evidenced by NPO status, lethargic,  no nutrition support.     Dietitian Rounds Brief  PT NPO x 4 days. He is s/p redo CABG. Pt has been weaned off sedation. He was off bipap this morning for  4 hours per RN and mental status continues to improve over the past few days. Pt did have a few ice chips however not safe enough yet for oral medication and/or  po intake per RN.  + flatus, no BM yet.   Nutrition Related Social Determinants of Health: SDOH: Unable to assess at this time.       Diet order:   Current Diet Order: NPO            Evaluation of Received Nutrient/Fluid Intake  Energy Calories Required: not meeting needs  Protein Required: not meeting needs  Fluid Required: not meeting needs      % Intake of Estimated Energy Needs: 0%  % Meal Intake: NPO      Intake/Output Summary (Last 24 hours) at 2/27/2024 1152  Last data filed at 2/27/2024 0501  Gross per 24 hour   Intake 3243.75 ml   Output 2195 ml   Net 1048.75 ml        Anthropometrics  Temp: 99 °F (37.2 °C)  Height Method: Measured  Height: 6' (182.9 cm)  Height (inches): 72 in  Weight Method: Bed Scale  Weight: 125 kg (275 lb 9.2 oz)  Weight (lb): 275.58 lb  Ideal Body Weight (IBW), Male: 178 lb  % Ideal Body Weight, Male (lb): 154.82 %  BMI (Calculated): 37.4  BMI Grade: 35 - 39.9 - obesity - grade II       Estimated/Assessed Needs  Weight Used  For Calorie Calculations: 125 kg (275 lb 9.2 oz)  Energy Calorie Requirements (kcal): 1645-5596 (20-25 kcal/ kg bw)  Energy Need Method: Kcal/kg  Protein Requirements: 121-162 gm (1.5-2.0 gm/ kg IBW)  Weight Used For Protein Calculations: 81 kg (178 lb 9.2 oz)     Estimated Fluid Requirement Method: RDA Method  RDA Method (mL): 2500       Reason for Assessment  Reason For Assessment: identified at risk by screening criteria  Relevant Medical History: CAD, COPD  Interdisciplinary Rounds: did not attend  General Information Comments: Pt admitted with SOB.  Nutrition Discharge Planning: Cardiac diet    Nutrition/Diet History  Patient Reported Diet/Restrictions/Preferences: general  Spiritual, Cultural Beliefs, Spiritism Practices, Values that Affect Care: no  Food Allergies: NKFA  Factors Affecting Nutritional Intake: NPO    Nutrition Risk Screen  Nutrition Risk Screen: no indicators present       Altered Skin Integrity 02/23/24 0525 Left anterior;lower Arm #1 Skin Tear Partial thickness tissue loss. Shallow open ulcer with a red or pink wound bed, without slough. Intact or Open/Ruptured Serum-filled blister.-Wound Image: Images linked  MST Score: 0  Have you recently lost weight without trying?: No  Weight loss score: 0  Have you been eating poorly because of a decreased appetite?: No  Appetite score: 0       Weight History:  Wt Readings from Last 10 Encounters:   02/23/24 125 kg (275 lb 9.2 oz)   02/21/24 124.5 kg (274 lb 6.4 oz)   01/12/24 120.7 kg (266 lb)   01/10/24 120.7 kg (266 lb)   12/21/23 125.6 kg (277 lb)   12/13/23 126.1 kg (278 lb)   11/28/23 126.1 kg (278 lb)   10/31/23 119.7 kg (264 lb)   10/11/23 124.4 kg (274 lb 4.8 oz)   09/21/23 121.6 kg (268 lb)        Lab/Procedures/Meds: Pertinent Labs/Meds Reviewed    Medications:Pertinent Medications Reviewed  Scheduled Meds:   albuterol-ipratropium  3 mL Nebulization Q4H    aspirin  81 mg Oral Daily    docusate sodium  100 mg Oral BID    DULoxetine  60 mg Oral  Nightly    enoxparin  60 mg Subcutaneous Q24H (prophylaxis, 1700)    furosemide (LASIX) injection  20 mg Intravenous Daily    levothyroxine  25 mcg Intravenous Daily    metoprolol  10 mg Intravenous Q12H    montelukast  10 mg Oral Daily    pantoprazole  40 mg Oral Before breakfast    sodium chloride 0.9% 1,000 mL with mvi, (ADULT) no.4 with vit K 3,300 unit- 150 mcg/10 mL 10 mL, thiamine 100 mg, folic acid 1 mg infusion   Intravenous Daily    tamsulosin  0.4 mg Oral Daily     Continuous Infusions:   sodium chloride 0.45% 50 mL/hr at 02/26/24 1705    dexmedeTOMIDine (Precedex) infusion (titrating) 1 mcg/kg/hr (02/27/24 0934)    insulin regular 1 units/mL infusion orderable (CTS POST-OP) Stopped (02/24/24 0900)    phenylephrine Stopped (02/24/24 0401)     PRN Meds:.0.9%  NaCl infusion (for blood administration), albumin human 5%, albuterol sulfate, calcium gluconate IVPB, calcium gluconate IVPB, calcium gluconate IVPB, dextrose 50% in water (D50W), dextrose 50% in water (D50W), HYDROcodone-acetaminophen, HYDROmorphone, lorazepam, magnesium sulfate IVPB, magnesium sulfate IVPB, morphine, morphine, ondansetron, potassium chloride in water, potassium chloride in water, potassium chloride in water **AND** potassium chloride in water **AND** potassium chloride in water, potassium chloride in water, sodium phosphate 15 mmol in dextrose 5 % (D5W) 250 mL IVPB, sodium phosphate 20.01 mmol in dextrose 5 % (D5W) 250 mL IVPB, sodium phosphate 30 mmol in dextrose 5 % (D5W) 250 mL IVPB    Labs: Pertinent Labs Reviewed  Clinical Chemistry:  Recent Labs   Lab 02/23/24  1812 02/23/24  2127 02/24/24  0726 02/24/24  1828 02/25/24  0337 02/26/24  0521 02/27/24  0339   *   < > 136 136 137 139 142   K 4.9   < > 5.0 5.0 4.7 4.7 4.2      < > 106 107 107 109 110   CO2 24   < > 27 24 23 25 23   *   < > 120* 171* 171* 151* 148*   BUN 11   < > 13 17 19 22 20   CREATININE 1.0   < > 1.0 0.9 0.9 0.8 0.7   CALCIUM 7.9*   < > 7.8*  7.7* 7.7* 7.8* 7.8*   PROT 5.0*  --  5.2* 5.3* 5.3* 5.4*  --    ALBUMIN 3.5  --  3.7 3.6 3.4* 3.3*  --    BILITOT 1.6*  --  1.0 0.7 0.7 1.1*  --    ALKPHOS 26*  --  17* 22* 22* 24*  --    AST 93*  --  48* 39 33 32  --    ALT 38  --  22 18 15 12  --    ANIONGAP 5*   < > 3* 5* 7* 5* 9   MG 1.8   < > 2.3 2.2 2.2 2.1 2.2   PHOS 1.1*  --  2.9  --   --   --   --     < > = values in this interval not displayed.     CBC:   Recent Labs   Lab 02/27/24  0339   WBC 15.40*   RBC 3.56*   HGB 8.3*   HCT 27.4*   *   MCV 77*   MCH 23.3*   MCHC 30.3*         Monitor and Evaluation  Food and Nutrient Intake: energy intake, food and beverage intake  Food and Nutrient Adminstration: diet order  Knowledge/Beliefs/Attitudes: food and nutrition knowledge/skill, beliefs and attitudes  Physical Activity and Function: nutrition-related ADLs and IADLs  Anthropometric Measurements: weight change, weight, body mass index  Biochemical Data, Medical Tests and Procedures: electrolyte and renal panel, inflammatory profile, gastrointestinal profile, lipid profile, glucose/endocrine profile  Nutrition-Focused Physical Findings: overall appearance     Nutrition Risk  Level of Risk/Frequency of Follow-up:  (2 x week)     Nutrition Follow-Up  RD Follow-up?: Yes      Graciela Rhodes, PANTERA 02/27/2024 11:52 AM

## 2024-02-27 NOTE — CARE UPDATE
02/27/24 0724   Patient Assessment/Suction   Level of Consciousness (AVPU) alert   Respiratory Effort Normal;Unlabored   Expansion/Accessory Muscles/Retractions no use of accessory muscles;no retractions   All Lung Fields Breath Sounds Anterior:;Lateral:;clear;diminished   Rhythm/Pattern, Respiratory unlabored;pattern regular   Cough Frequency no cough   Skin Integrity   $ Wound Care Tech Time 15 min   Area Observed Bridge of nose   Skin Appearance without discoloration   PRE-TX-O2   Device (Oxygen Therapy) BIPAP   $ Is the patient on High Flow Oxygen? Yes   SpO2 99 %   Pulse Oximetry Type Continuous   $ Pulse Oximetry - Multiple Charge Pulse Oximetry - Multiple   Pulse 92   Resp 20   Aerosol Therapy   $ Aerosol Therapy Charges Aerosol Treatment   Daily Review of Necessity (SVN) completed   Respiratory Treatment Status (SVN) given   Treatment Route (SVN) mask;in-line  (bipap)   Patient Position (SVN) HOB elevated   Post Treatment Assessment (SVN) breath sounds improved   Signs of Intolerance (SVN) none   Breath Sounds Post-Respiratory Treatment   Throughout All Fields Post-Treatment All Fields   Throughout All Fields Post-Treatment aeration increased   Post-treatment Heart Rate (beats/min) 84   Post-treatment Resp Rate (breaths/min) 20   Preset CPAP/BiPAP Settings   Mode Of Delivery BiPAP S/T   $ CPAP/BiPAP Daily Charge BiPAP/CPAP Daily   $ Initial CPAP/BiPAP Setup? No   $ Is patient using? Yes   Size of Mask Extra Large   Equipment Type V60   Airway Device Type large full face mask   Ipap 16   EPAP (cm H2O) 5   Pressure Support (cm H2O) 11   Set Rate (Breaths/Min) 16   ITime (sec) 1   Rise Time (sec) 3   Patient CPAP/BiPAP Settings   Timed Inspiration (Sec) 1   IPAP Rise Time (sec) 3   RR Total (Breaths/Min) 20   Tidal Volume (mL) 0.91   VE Minute Ventilation (L/min) 18 L/min   Peak Inspiratory Pressure (cm H2O) 16   TiTOT (%) 26   Total Leak (L/Min) 19   Patient Trigger - ST Mode Only (%) 99   CPAP/BiPAP Alarms    High Pressure (cm H2O) 40   Low Pressure (cm H2O) 8   Minute Ventilation (L/Min) 3   High RR (breaths/min) 45   Low RR (breaths/min) 8   Apnea (Sec) 20   Education   $ Education 15 min;Bronchodilator;BiPAP

## 2024-02-28 PROBLEM — R14.0 ABDOMINAL DISTENTION: Status: ACTIVE | Noted: 2024-02-28

## 2024-02-28 LAB
ALLENS TEST: ABNORMAL
ANION GAP SERPL CALC-SCNC: 9 MMOL/L (ref 8–16)
BASOPHILS # BLD AUTO: 0.04 K/UL (ref 0–0.2)
BASOPHILS NFR BLD: 0.3 % (ref 0–1.9)
BUN SERPL-MCNC: 17 MG/DL (ref 8–23)
CALCIUM SERPL-MCNC: 8 MG/DL (ref 8.7–10.5)
CHLORIDE SERPL-SCNC: 112 MMOL/L (ref 95–110)
CO2 SERPL-SCNC: 23 MMOL/L (ref 23–29)
CREAT SERPL-MCNC: 0.7 MG/DL (ref 0.5–1.4)
DELSYS: ABNORMAL
DIFFERENTIAL METHOD BLD: ABNORMAL
EOSINOPHIL # BLD AUTO: 0.6 K/UL (ref 0–0.5)
EOSINOPHIL NFR BLD: 3.9 % (ref 0–8)
ERYTHROCYTE [DISTWIDTH] IN BLOOD BY AUTOMATED COUNT: 22.6 % (ref 11.5–14.5)
EST. GFR  (NO RACE VARIABLE): >60 ML/MIN/1.73 M^2
FLOW: 3
GLUCOSE SERPL-MCNC: 136 MG/DL (ref 70–110)
GLUCOSE SERPL-MCNC: 140 MG/DL (ref 70–110)
HCO3 UR-SCNC: 23.8 MMOL/L (ref 24–28)
HCT VFR BLD AUTO: 28 % (ref 40–54)
HCT VFR BLD CALC: 29 %PCV (ref 36–54)
HGB BLD-MCNC: 8.3 G/DL (ref 14–18)
IMM GRANULOCYTES # BLD AUTO: 0.09 K/UL (ref 0–0.04)
IMM GRANULOCYTES NFR BLD AUTO: 0.6 % (ref 0–0.5)
LYMPHOCYTES # BLD AUTO: 1.6 K/UL (ref 1–4.8)
LYMPHOCYTES NFR BLD: 10.7 % (ref 18–48)
MAGNESIUM SERPL-MCNC: 2.2 MG/DL (ref 1.6–2.6)
MCH RBC QN AUTO: 23.1 PG (ref 27–31)
MCHC RBC AUTO-ENTMCNC: 29.6 G/DL (ref 32–36)
MCV RBC AUTO: 78 FL (ref 82–98)
MODE: ABNORMAL
MONOCYTES # BLD AUTO: 2 K/UL (ref 0.3–1)
MONOCYTES NFR BLD: 14 % (ref 4–15)
NEUTROPHILS # BLD AUTO: 10.2 K/UL (ref 1.8–7.7)
NEUTROPHILS NFR BLD: 70.5 % (ref 38–73)
NRBC BLD-RTO: 2 /100 WBC
PCO2 BLDA: 32 MMHG (ref 35–45)
PH SMN: 7.48 [PH] (ref 7.35–7.45)
PLATELET # BLD AUTO: 216 K/UL (ref 150–450)
PMV BLD AUTO: 11.1 FL (ref 9.2–12.9)
PO2 BLDA: 55 MMHG (ref 80–100)
POC BE: 0 MMOL/L
POC IONIZED CALCIUM: 1.17 MMOL/L (ref 1.06–1.42)
POC SATURATED O2: 91 % (ref 95–100)
POC TCO2: 25 MMOL/L (ref 23–27)
POTASSIUM BLD-SCNC: 3.6 MMOL/L (ref 3.5–5.1)
POTASSIUM SERPL-SCNC: 3.3 MMOL/L (ref 3.5–5.1)
POTASSIUM SERPL-SCNC: 3.7 MMOL/L (ref 3.5–5.1)
RBC # BLD AUTO: 3.59 M/UL (ref 4.6–6.2)
SAMPLE: ABNORMAL
SITE: ABNORMAL
SODIUM BLD-SCNC: 145 MMOL/L (ref 136–145)
SODIUM SERPL-SCNC: 144 MMOL/L (ref 136–145)
WBC # BLD AUTO: 14.48 K/UL (ref 3.9–12.7)

## 2024-02-28 PROCEDURE — 80048 BASIC METABOLIC PNL TOTAL CA: CPT | Performed by: THORACIC SURGERY (CARDIOTHORACIC VASCULAR SURGERY)

## 2024-02-28 PROCEDURE — 94640 AIRWAY INHALATION TREATMENT: CPT

## 2024-02-28 PROCEDURE — 99024 POSTOP FOLLOW-UP VISIT: CPT | Mod: POP,,, | Performed by: PHYSICIAN ASSISTANT

## 2024-02-28 PROCEDURE — 63600175 PHARM REV CODE 636 W HCPCS

## 2024-02-28 PROCEDURE — 85025 COMPLETE CBC W/AUTO DIFF WBC: CPT | Performed by: THORACIC SURGERY (CARDIOTHORACIC VASCULAR SURGERY)

## 2024-02-28 PROCEDURE — 82330 ASSAY OF CALCIUM: CPT

## 2024-02-28 PROCEDURE — 84132 ASSAY OF SERUM POTASSIUM: CPT | Performed by: INTERNAL MEDICINE

## 2024-02-28 PROCEDURE — 25000003 PHARM REV CODE 250: Performed by: INTERNAL MEDICINE

## 2024-02-28 PROCEDURE — 31720 CLEARANCE OF AIRWAYS: CPT

## 2024-02-28 PROCEDURE — 99900035 HC TECH TIME PER 15 MIN (STAT)

## 2024-02-28 PROCEDURE — 85014 HEMATOCRIT: CPT

## 2024-02-28 PROCEDURE — 20000000 HC ICU ROOM

## 2024-02-28 PROCEDURE — 82803 BLOOD GASES ANY COMBINATION: CPT

## 2024-02-28 PROCEDURE — 27000221 HC OXYGEN, UP TO 24 HOURS

## 2024-02-28 PROCEDURE — 25000242 PHARM REV CODE 250 ALT 637 W/ HCPCS: Performed by: INTERNAL MEDICINE

## 2024-02-28 PROCEDURE — 0D9670Z DRAINAGE OF STOMACH WITH DRAINAGE DEVICE, VIA NATURAL OR ARTIFICIAL OPENING: ICD-10-PCS | Performed by: GENERAL PRACTICE

## 2024-02-28 PROCEDURE — 63600175 PHARM REV CODE 636 W HCPCS: Performed by: GENERAL PRACTICE

## 2024-02-28 PROCEDURE — 94761 N-INVAS EAR/PLS OXIMETRY MLT: CPT

## 2024-02-28 PROCEDURE — 84132 ASSAY OF SERUM POTASSIUM: CPT

## 2024-02-28 PROCEDURE — 99291 CRITICAL CARE FIRST HOUR: CPT | Mod: ,,, | Performed by: INTERNAL MEDICINE

## 2024-02-28 PROCEDURE — 99900031 HC PATIENT EDUCATION (STAT)

## 2024-02-28 PROCEDURE — 36600 WITHDRAWAL OF ARTERIAL BLOOD: CPT

## 2024-02-28 PROCEDURE — 25000003 PHARM REV CODE 250: Performed by: PHYSICIAN ASSISTANT

## 2024-02-28 PROCEDURE — 84295 ASSAY OF SERUM SODIUM: CPT

## 2024-02-28 PROCEDURE — 25000003 PHARM REV CODE 250: Performed by: THORACIC SURGERY (CARDIOTHORACIC VASCULAR SURGERY)

## 2024-02-28 PROCEDURE — 63600175 PHARM REV CODE 636 W HCPCS: Performed by: THORACIC SURGERY (CARDIOTHORACIC VASCULAR SURGERY)

## 2024-02-28 PROCEDURE — 83735 ASSAY OF MAGNESIUM: CPT | Performed by: THORACIC SURGERY (CARDIOTHORACIC VASCULAR SURGERY)

## 2024-02-28 PROCEDURE — 94660 CPAP INITIATION&MGMT: CPT

## 2024-02-28 PROCEDURE — 25000003 PHARM REV CODE 250: Performed by: NURSE PRACTITIONER

## 2024-02-28 RX ORDER — IPRATROPIUM BROMIDE 0.5 MG/2.5ML
0.5 SOLUTION RESPIRATORY (INHALATION) EVERY 6 HOURS
Status: DISCONTINUED | OUTPATIENT
Start: 2024-02-28 | End: 2024-03-07 | Stop reason: HOSPADM

## 2024-02-28 RX ORDER — GUAIFENESIN 600 MG/1
1200 TABLET, EXTENDED RELEASE ORAL 2 TIMES DAILY
Status: DISCONTINUED | OUTPATIENT
Start: 2024-02-28 | End: 2024-03-07 | Stop reason: HOSPADM

## 2024-02-28 RX ORDER — METOPROLOL TARTRATE 1 MG/ML
10 INJECTION, SOLUTION INTRAVENOUS EVERY 4 HOURS
Status: DISCONTINUED | OUTPATIENT
Start: 2024-02-28 | End: 2024-02-29

## 2024-02-28 RX ORDER — IPRATROPIUM BROMIDE AND ALBUTEROL SULFATE 2.5; .5 MG/3ML; MG/3ML
3 SOLUTION RESPIRATORY (INHALATION) EVERY 4 HOURS
Status: CANCELLED | OUTPATIENT
Start: 2024-02-28

## 2024-02-28 RX ORDER — LACTULOSE 10 G/15ML
20 SOLUTION ORAL 2 TIMES DAILY
Status: DISCONTINUED | OUTPATIENT
Start: 2024-02-28 | End: 2024-03-01

## 2024-02-28 RX ORDER — FUROSEMIDE 10 MG/ML
40 INJECTION INTRAMUSCULAR; INTRAVENOUS EVERY 12 HOURS
Status: DISCONTINUED | OUTPATIENT
Start: 2024-02-28 | End: 2024-02-29

## 2024-02-28 RX ORDER — FUROSEMIDE 10 MG/ML
60 INJECTION INTRAMUSCULAR; INTRAVENOUS ONCE
Status: COMPLETED | OUTPATIENT
Start: 2024-02-28 | End: 2024-02-28

## 2024-02-28 RX ORDER — FUROSEMIDE 10 MG/ML
60 INJECTION INTRAMUSCULAR; INTRAVENOUS ONCE
Status: DISCONTINUED | OUTPATIENT
Start: 2024-02-28 | End: 2024-02-28

## 2024-02-28 RX ORDER — LEVALBUTEROL INHALATION SOLUTION 1.25 MG/3ML
1.25 SOLUTION RESPIRATORY (INHALATION) EVERY 6 HOURS
Status: DISCONTINUED | OUTPATIENT
Start: 2024-02-28 | End: 2024-03-01

## 2024-02-28 RX ORDER — ACETYLCYSTEINE 200 MG/ML
2 SOLUTION ORAL; RESPIRATORY (INHALATION) 3 TIMES DAILY
Status: DISCONTINUED | OUTPATIENT
Start: 2024-02-28 | End: 2024-02-28

## 2024-02-28 RX ORDER — METOPROLOL TARTRATE 1 MG/ML
10 INJECTION, SOLUTION INTRAVENOUS EVERY 6 HOURS
Status: DISCONTINUED | OUTPATIENT
Start: 2024-02-28 | End: 2024-02-28

## 2024-02-28 RX ORDER — METOPROLOL TARTRATE 1 MG/ML
5 INJECTION, SOLUTION INTRAVENOUS EVERY 6 HOURS
Status: DISCONTINUED | OUTPATIENT
Start: 2024-02-28 | End: 2024-02-28

## 2024-02-28 RX ADMIN — IPRATROPIUM BROMIDE AND ALBUTEROL SULFATE 3 ML: 2.5; .5 SOLUTION RESPIRATORY (INHALATION) at 03:02

## 2024-02-28 RX ADMIN — LORAZEPAM 2 MG: 2 INJECTION INTRAMUSCULAR; INTRAVENOUS at 09:02

## 2024-02-28 RX ADMIN — DEXMEDETOMIDINE HYDROCHLORIDE 1 MCG/KG/HR: 4 INJECTION, SOLUTION INTRAVENOUS at 11:02

## 2024-02-28 RX ADMIN — ENOXAPARIN SODIUM 60 MG: 60 INJECTION SUBCUTANEOUS at 05:02

## 2024-02-28 RX ADMIN — IPRATROPIUM BROMIDE 0.5 MG: 0.5 SOLUTION RESPIRATORY (INHALATION) at 08:02

## 2024-02-28 RX ADMIN — GUAIFENESIN 1200 MG: 600 TABLET, EXTENDED RELEASE ORAL at 09:02

## 2024-02-28 RX ADMIN — LEUCINE, PHENYLALANINE, LYSINE, METHIONINE, ISOLEUCINE, VALINE, HISTIDINE, THREONINE, TRYPTOPHAN, ALANINE, GLYCINE, ARGININE, PROLINE, SERINE, TYROSINE, SODIUM ACETATE, DIBASIC POTASSIUM PHOSPHATE, MAGNESIUM CHLORIDE, SODIUM CHLORIDE, CALCIUM CHLORIDE, DEXTROSE
311; 238; 247; 170; 255; 247; 204; 179; 77; 880; 438; 489; 289; 213; 17; 297; 261; 51; 77; 33; 5 INJECTION INTRAVENOUS at 07:02

## 2024-02-28 RX ADMIN — FUROSEMIDE 20 MG: 10 INJECTION, SOLUTION INTRAMUSCULAR; INTRAVENOUS at 06:02

## 2024-02-28 RX ADMIN — METOPROLOL TARTRATE 10 MG: 1 INJECTION, SOLUTION INTRAVENOUS at 09:02

## 2024-02-28 RX ADMIN — FUROSEMIDE 60 MG: 10 INJECTION, SOLUTION INTRAMUSCULAR; INTRAVENOUS at 07:02

## 2024-02-28 RX ADMIN — MORPHINE SULFATE 4 MG: 4 INJECTION, SOLUTION INTRAMUSCULAR; INTRAVENOUS at 11:02

## 2024-02-28 RX ADMIN — DOCUSATE SODIUM 100 MG: 100 CAPSULE, LIQUID FILLED ORAL at 09:02

## 2024-02-28 RX ADMIN — ASPIRIN 81 MG 81 MG: 81 TABLET ORAL at 09:02

## 2024-02-28 RX ADMIN — METOPROLOL TARTRATE 10 MG: 1 INJECTION, SOLUTION INTRAVENOUS at 10:02

## 2024-02-28 RX ADMIN — POTASSIUM CHLORIDE 60 MEQ: 14.9 INJECTION, SOLUTION INTRAVENOUS at 11:02

## 2024-02-28 RX ADMIN — IPRATROPIUM BROMIDE AND ALBUTEROL SULFATE 3 ML: 2.5; .5 SOLUTION RESPIRATORY (INHALATION) at 11:02

## 2024-02-28 RX ADMIN — POTASSIUM CHLORIDE 20 MEQ: 14.9 INJECTION, SOLUTION INTRAVENOUS at 11:02

## 2024-02-28 RX ADMIN — DEXMEDETOMIDINE HYDROCHLORIDE 1 MCG/KG/HR: 4 INJECTION, SOLUTION INTRAVENOUS at 01:02

## 2024-02-28 RX ADMIN — DULOXETINE HYDROCHLORIDE 60 MG: 30 CAPSULE, DELAYED RELEASE ORAL at 09:02

## 2024-02-28 RX ADMIN — DEXMEDETOMIDINE HYDROCHLORIDE 1.4 MCG/KG/HR: 4 INJECTION, SOLUTION INTRAVENOUS at 06:02

## 2024-02-28 RX ADMIN — IPRATROPIUM BROMIDE AND ALBUTEROL SULFATE 3 ML: 2.5; .5 SOLUTION RESPIRATORY (INHALATION) at 07:02

## 2024-02-28 RX ADMIN — LACTULOSE 20 G: 20 SOLUTION ORAL at 03:02

## 2024-02-28 RX ADMIN — MORPHINE SULFATE 2 MG: 2 INJECTION, SOLUTION INTRAMUSCULAR; INTRAVENOUS at 07:02

## 2024-02-28 RX ADMIN — MONTELUKAST 10 MG: 10 TABLET, FILM COATED ORAL at 09:02

## 2024-02-28 RX ADMIN — MORPHINE SULFATE 2 MG: 2 INJECTION, SOLUTION INTRAMUSCULAR; INTRAVENOUS at 05:02

## 2024-02-28 RX ADMIN — TAMSULOSIN HYDROCHLORIDE 0.4 MG: 0.4 CAPSULE ORAL at 09:02

## 2024-02-28 RX ADMIN — GUAIFENESIN 1200 MG: 600 TABLET, EXTENDED RELEASE ORAL at 03:02

## 2024-02-28 RX ADMIN — IPRATROPIUM BROMIDE 0.5 MG: 0.5 SOLUTION RESPIRATORY (INHALATION) at 02:02

## 2024-02-28 RX ADMIN — LORAZEPAM 2 MG: 2 INJECTION INTRAMUSCULAR; INTRAVENOUS at 06:02

## 2024-02-28 RX ADMIN — METOPROLOL TARTRATE 10 MG: 1 INJECTION, SOLUTION INTRAVENOUS at 05:02

## 2024-02-28 NOTE — PT/OT/SLP PROGRESS
Speech Language Pathology      Jose F Hanley  MRN: [id number]    Patient not seen today secondary to Nursing hold (Comment) (Pt not appropriate for evaluation today). Will follow-up 2/29.

## 2024-02-28 NOTE — ASSESSMENT & PLAN NOTE
Body mass index is 37.37 kg/m². Morbid obesity complicates all aspects of disease management from diagnostic modalities to treatment. Weight loss encouraged and health benefits explained to patient.

## 2024-02-28 NOTE — CONSULTS
[facility]  Adult Nutrition   Consult Note (Nutrition Support Management)    SUMMARY     Recommendations  Recommendation/Intervention:   1. Recommend Clinimix E 4.25/5 @ 100 ml/hr to provide 102 gm protein (84% of EPN), 816 kcal (33% EEN) and GIR: 1.2.     2. Recommend monitoring and correction of BG and electrolytes as needed.   3. Order labs for managing PN: phos, prealbumin, and triglycerides.  4.RD following.  Goals: Initiate Clinimix to meet >75% of EPN.  Nutrition Goal Status: progressing towards goal  Communication of RD Recs: reviewed with RN    Nutrition Diagnosis PES Statement: Inadequate energy protein intake  related to altered GI fxn as evidenced by ileus, NG to suction, NPO and need for PN initiation.    Dietitian Rounds Brief  Pt NPO x 5 days. He is s/p redo CABG.   2/27/24. Pt has been weaned off sedation. He was off bipap this morning for 4 hours per RN and mental status continues to improve over the past few days. Pt did have a few ice chips however not safe enough yet for oral medication and/or po intake per RN. + flatus, no BM yet.    2/28/24. Per MD note: Pt complained of increasing abdominal distention and pain.  NG was placed to suction d/t ileus. Pt to remain NPO, and initiate nutrition support.     Nutrition Related Social Determinants of Health: SDOH: Adequate food in home environment      Diet order:   Current Diet Order: NPO             Evaluation of Received Nutrient/Fluid Intake  Energy Calories Required: not meeting needs  Protein Required: not meeting needs  Fluid Required: not meeting needs      % Intake of Estimated Energy Needs: 0%  % Meal Intake: NPO      Intake/Output Summary (Last 24 hours) at 2/28/2024 1714  Last data filed at 2/28/2024 0650  Gross per 24 hour   Intake 258 ml   Output 840 ml   Net -582 ml        Anthropometrics  Temp: 98.8 °F (37.1 °C)  Height Method: Measured  Height: 6' (182.9 cm)  Height (inches): 72 in  Weight Method: Bed Scale  Weight: 125  kg (275 lb 9.2 oz)  Weight (lb): 275.58 lb  Ideal Body Weight (IBW), Male: 178 lb  % Ideal Body Weight, Male (lb): 154.82 %  BMI (Calculated): 37.4  BMI Grade: 35 - 39.9 - obesity - grade II       Estimated/Assessed Needs  Weight Used For Calorie Calculations: 125 kg (275 lb 9.2 oz)  Energy Calorie Requirements (kcal): 5637-1241 (20-25 kcal/ kg bw)  Energy Need Method: Kcal/kg  Protein Requirements: 121-162 gm (1.5-2.0 gm/ kg IBW)  Weight Used For Protein Calculations: 81 kg (178 lb 9.2 oz)     Estimated Fluid Requirement Method: RDA Method  RDA Method (mL): 2500       Reason for Assessment  Reason For Assessment: consult, NPO/clear liquids x 5 days, new TPN  Relevant Medical History: CAD, COPD  Interdisciplinary Rounds: attended  General Information Comments: Pt admitted with SOB.  Nutrition Discharge Planning: Cardiac diet    Nutrition/Diet History  Patient Reported Diet/Restrictions/Preferences: general  Spiritual, Cultural Beliefs, Amish Practices, Values that Affect Care: no  Food Allergies: NKFA  Factors Affecting Nutritional Intake: NPO    Nutrition Risk Screen  Nutrition Risk Screen: no indicators present       Altered Skin Integrity 02/23/24 0525 Left anterior;lower Arm #1 Skin Tear Partial thickness tissue loss. Shallow open ulcer with a red or pink wound bed, without slough. Intact or Open/Ruptured Serum-filled blister.-Wound Image: Images linked  MST Score: 0  Have you recently lost weight without trying?: No  Weight loss score: 0  Have you been eating poorly because of a decreased appetite?: No  Appetite score: 0       Weight History:  Wt Readings from Last 10 Encounters:   02/23/24 125 kg (275 lb 9.2 oz)   02/21/24 124.5 kg (274 lb 6.4 oz)   01/12/24 120.7 kg (266 lb)   01/10/24 120.7 kg (266 lb)   12/21/23 125.6 kg (277 lb)   12/13/23 126.1 kg (278 lb)   11/28/23 126.1 kg (278 lb)   10/31/23 119.7 kg (264 lb)   10/11/23 124.4 kg (274 lb 4.8 oz)   09/21/23 121.6 kg (268 lb)         Lab/Procedures/Meds: Pertinent Labs/Meds Reviewed    Medications:Pertinent Medications Reviewed  Scheduled Meds:   aspirin  81 mg Oral Daily    DULoxetine  60 mg Oral Nightly    enoxparin  60 mg Subcutaneous Q24H (prophylaxis, 1700)    furosemide (LASIX) injection  40 mg Intravenous Q12H    guaiFENesin  1,200 mg Oral BID    ipratropium  0.5 mg Nebulization Q6H    lactulose  20 g Oral BID    levalbuterol  1.25 mg Nebulization Q6H    levothyroxine  25 mcg Intravenous Daily    metoprolol  10 mg Intravenous Q6H    montelukast  10 mg Oral Daily    pantoprazole  40 mg Oral Before breakfast    sodium chloride 0.9% 1,000 mL with mvi, (ADULT) no.4 with vit K 3,300 unit- 150 mcg/10 mL 10 mL, thiamine 100 mg, folic acid 1 mg infusion   Intravenous Daily    tamsulosin  0.4 mg Oral Daily     Continuous Infusions:   sodium chloride 0.45% 50 mL/hr at 02/27/24 1620    amino acid 4.25% - dextrose 5% (CLINIMIX-E) solution      dexmedeTOMIDine (Precedex) infusion (titrating) 1 mcg/kg/hr (02/28/24 1133)    insulin regular 1 units/mL infusion orderable (CTS POST-OP) Stopped (02/24/24 0900)    phenylephrine Stopped (02/24/24 0401)     PRN Meds:.0.9%  NaCl infusion (for blood administration), albumin human 5%, albuterol sulfate, calcium gluconate IVPB, calcium gluconate IVPB, calcium gluconate IVPB, dextrose 50% in water (D50W), dextrose 50% in water (D50W), HYDROcodone-acetaminophen, HYDROmorphone, lorazepam, magnesium sulfate IVPB, magnesium sulfate IVPB, morphine, morphine, ondansetron, potassium chloride in water, potassium chloride in water, potassium chloride in water **AND** potassium chloride in water **AND** potassium chloride in water, potassium chloride in water, sodium phosphate 15 mmol in dextrose 5 % (D5W) 250 mL IVPB, sodium phosphate 20.01 mmol in dextrose 5 % (D5W) 250 mL IVPB, sodium phosphate 30 mmol in dextrose 5 % (D5W) 250 mL IVPB    Labs: Pertinent Labs Reviewed  Clinical Chemistry:  Recent Labs   Lab  02/23/24 1812 02/23/24 2127 02/24/24  0726 02/24/24  1828 02/25/24 0337 02/26/24  0521 02/27/24  0339 02/28/24  0340   *   < > 136 136 137 139   < > 144   K 4.9   < > 5.0 5.0 4.7 4.7   < > 3.7      < > 106 107 107 109   < > 112*   CO2 24   < > 27 24 23 25   < > 23   *   < > 120* 171* 171* 151*   < > 136*   BUN 11   < > 13 17 19 22   < > 17   CREATININE 1.0   < > 1.0 0.9 0.9 0.8   < > 0.7   CALCIUM 7.9*   < > 7.8* 7.7* 7.7* 7.8*   < > 8.0*   PROT 5.0*  --  5.2* 5.3* 5.3* 5.4*  --   --    ALBUMIN 3.5  --  3.7 3.6 3.4* 3.3*  --   --    BILITOT 1.6*  --  1.0 0.7 0.7 1.1*  --   --    ALKPHOS 26*  --  17* 22* 22* 24*  --   --    AST 93*  --  48* 39 33 32  --   --    ALT 38  --  22 18 15 12  --   --    ANIONGAP 5*   < > 3* 5* 7* 5*   < > 9   MG 1.8   < > 2.3 2.2 2.2 2.1   < > 2.2   PHOS 1.1*  --  2.9  --   --   --   --   --     < > = values in this interval not displayed.     CBC:   Recent Labs   Lab 02/28/24  0340 02/28/24  1253   WBC 14.48*  --    RBC 3.59*  --    HGB 8.3*  --    HCT 28.0* 29*     --    MCV 78*  --    MCH 23.1*  --    MCHC 29.6*  --        Monitor and Evaluation  Food and Nutrient Intake: parenteral nutrition intake  Food and Nutrient Adminstration: enteral and parenteral nutrition administration  Knowledge/Beliefs/Attitudes: food and nutrition knowledge/skill, beliefs and attitudes  Physical Activity and Function: nutrition-related ADLs and IADLs  Anthropometric Measurements: weight, weight change, body mass index  Biochemical Data, Medical Tests and Procedures: electrolyte and renal panel, inflammatory profile, lipid profile, gastrointestinal profile, glucose/endocrine profile  Nutrition-Focused Physical Findings: overall appearance     Nutrition Risk  Level of Risk/Frequency of Follow-up:  (2 x week)     Nutrition Follow-Up  RD Follow-up?: Yes      Graciela Rhodes RD 02/28/2024 5:14 PM

## 2024-02-28 NOTE — NURSING
Pt became anxious, agitated while being bathed. RR 30-35, -140. Precedex increased. PRN Morphine given as ordered. Pt oriented to self but confused and uncooperative.

## 2024-02-28 NOTE — PROGRESS NOTES
[facility] Medicine  Progress Note    Patient Name: Jose F Hanley  MRN: [id number]  Patient Class: IP- Surgery Admit   Admission Date: 2/23/2024  Length of Stay: 5 days  Attending Physician: Aravind Albarran MD  Primary Care Provider: Sunita Rivera MD        Subjective:     Principal Problem:S/P CABG (coronary artery bypass graft)        HPI:  Patient is 78 year old male with history of Afib, alcohol use, HFpEF, COPD, hypothyroidism, CAD was admitted to ICU after elective CABG. He underwent heart catheterization and coronary angiography showing multivessel coronary artery disease.  He was referred for consideration of redo coronary artery bypass grafting.    He has had a TAVR and Watchman. Recent studies were reviewed.  The patient has significant recurrent coronary artery disease.  Post operatively patient developed alcohol withdrawal and delirium, was started on precedex. Patient was extubated and still on BiPAP.  Hospitalist was consulted for medical management. .    Overview/Hospital Course:  Patient was post op admitted to ICU. Extubated to BiPAP, developed encephalopathy and now on precedex. Post op care per CTS. Cardiology also following. He is post op D4 today, still has chest and mediastinal tubes and pacer wires. Balloon pump was removed 2/25.  Patient complained of increasing abdominal distention and pain.  Discussed checking KUB, and inserting NG tube to LIS, patient may possibly have ileus.    Interval History:  Patient is seen for follow-up care.  Patient is status post CABG.  Patient also on Precedex for alcohol withdrawal symptoms.  Patient complained of increased abdominal distention, pain, and nausea.  Discussed inserted NG tube to LIS, checking KUB.  Patient will possibly have a postoperative ileus.  Patient followed by CT surgery and Pulmonary critical Care, input appreciated.    Review of Systems   Constitutional: Negative.    HENT: Negative.     Eyes: Negative.     Respiratory: Negative.     Cardiovascular: Negative.    Gastrointestinal:  Positive for abdominal distention, abdominal pain and nausea.   Endocrine: Negative.    Genitourinary: Negative.    Musculoskeletal: Negative.    Skin: Negative.    Allergic/Immunologic: Negative.    Neurological: Negative.    Hematological: Negative.    Psychiatric/Behavioral: Negative.       Objective:     Vital Signs (Most Recent):  Temp: 98.8 °F (37.1 °C) (02/28/24 0600)  Pulse: 92 (02/28/24 1104)  Resp: (!) 27 (02/28/24 1104)  BP: 130/60 (02/28/24 1100)  SpO2: (!) 94 % (02/28/24 1104) Vital Signs (24h Range):  Temp:  [98.8 °F (37.1 °C)-99 °F (37.2 °C)] 98.8 °F (37.1 °C)  Pulse:  [] 92  Resp:  [18-39] 27  SpO2:  [89 %-100 %] 94 %  BP: (108-135)/(55-69) 130/60     Weight: 125 kg (275 lb 9.2 oz)  Body mass index is 37.37 kg/m².    Intake/Output Summary (Last 24 hours) at 2/28/2024 1158  Last data filed at 2/28/2024 0650  Gross per 24 hour   Intake 2308.1 ml   Output 2280 ml   Net 28.1 ml         Physical Exam  Vitals reviewed.   Constitutional:       General: He is not in acute distress.     Appearance: Normal appearance. He is normal weight.   HENT:      Head: Normocephalic.      Right Ear: External ear normal.      Left Ear: External ear normal.      Nose: Nose normal.      Mouth/Throat:      Mouth: Mucous membranes are moist.      Pharynx: Oropharynx is clear.   Eyes:      Extraocular Movements: Extraocular movements intact.      Pupils: Pupils are equal, round, and reactive to light.   Cardiovascular:      Rate and Rhythm: Normal rate and regular rhythm.      Pulses: Normal pulses.      Heart sounds: Normal heart sounds.      Comments: S/P CABG scar , with drains in place  Pulmonary:      Effort: Pulmonary effort is normal.      Breath sounds: Normal breath sounds.   Abdominal:      General: Bowel sounds are normal. There is distension.   Genitourinary:     Penis: Normal.    Musculoskeletal:         General: Normal range of  "motion.      Cervical back: Normal range of motion and neck supple.   Skin:     General: Skin is warm.      Capillary Refill: Capillary refill takes less than 2 seconds.   Neurological:      General: No focal deficit present.      Mental Status: He is alert.      Cranial Nerves: No cranial nerve deficit.      Motor: Weakness present.   Psychiatric:         Mood and Affect: Mood normal.         Thought Content: Thought content normal.             Significant Labs: All pertinent labs within the past 24 hours have been reviewed.  Blood Culture: No results for input(s): "LABBLOO" in the last 48 hours.  BMP:   Recent Labs   Lab 02/28/24  0340   *      K 3.7   *   CO2 23   BUN 17   CREATININE 0.7   CALCIUM 8.0*   MG 2.2     CBC:   Recent Labs   Lab 02/27/24  0339 02/28/24  0340   WBC 15.40* 14.48*   HGB 8.3* 8.3*   HCT 27.4* 28.0*   * 216     Respiratory Culture: No results for input(s): "GSRESP", "RESPIRATORYC" in the last 48 hours.  TSH: No results for input(s): "TSH" in the last 4320 hours.  Urine Culture: No results for input(s): "LABURIN" in the last 48 hours.    Significant Imaging: I have reviewed all pertinent imaging results/findings within the past 24 hours.    Assessment/Plan:      * S/P CABG (coronary artery bypass graft) - x2 09/2010 - LIMA to LAD; SVG to OMB; grafts occluded 08/2012  - post op care per surgery   -aspirin mg daily metoprolol 10 mg IV q.12 hours, Lasix 20 mg IV daily      Acute respiratory failure with hypoxia and hypercapnia  Secondary to COPD exacerbation   Off BiPAP today   - scheduled breathing Rx     Acute blood loss anemia  Expected post op   Hb stable at 8.3  -transfuse hemoglobin less than 7  - Cont to monitor     Acute encephalopathy  Due to alcohol withdrawal   - wean precedex  - CIWA trigged ativan   -patient counseled to quit drinking alcohol due to adverse health risk.      Severe obesity (BMI 35.0-39.9) with comorbidity  Body mass index is 37.37 kg/m². " Morbid obesity complicates all aspects of disease management from diagnostic modalities to treatment. Weight loss encouraged and health benefits explained to patient.         Permanent atrial fibrillation  S/p watchman device   - Cont metoprolol   -on Lovenox    Dyslipidemia  - cont Statin       CAD (coronary artery disease), native coronary artery  S/p CABG X 3 on 2/23  - post op care per CTS, chest tube and pacer wires to be removed per surgery. Balloon pump was removed 2/25  - Cont aspirin, Lipitor, ,metoprolol       VTE Risk Mitigation (From admission, onward)           Ordered     enoxaparin injection 60 mg  Every 24 hours         02/26/24 1519     Place DUTCH hose  Until discontinued         02/23/24 0545                    Discharge Planning   LEIA: 3/5/2024     Code Status: Full Code   Is the patient medically ready for discharge?:     Reason for patient still in hospital (select all that apply): Patient trending condition, Treatment, Imaging, Consult recommendations, PT / OT recommendations, and Pending disposition  Discharge Plan A: Home, Home with family   Discharge Delays: None known at this time        Critical care time spent on the evaluation and treatment of severe organ dysfunction, review of pertinent labs and imaging studies, discussions with consulting providers and discussions with patient/family: 32 minutes.      Aravind Albarran MD  Department of Hospital Medicine   [facility]

## 2024-02-28 NOTE — ASSESSMENT & PLAN NOTE
Due to alcohol withdrawal   - wean precedex  - CIWA trigged ativan   -patient counseled to quit drinking alcohol due to adverse health risk.

## 2024-02-28 NOTE — CONSULTS
Pulmonary/Critical Care Consult      PATIENT NAME: Jose F Hanley  MRN: [id number]  TODAY'S DATE: 2024  1:01 PM  ADMIT DATE: 2024  AGE: 78 y.o. : 1945    CONSULT REQUESTED BY: Aravind Albarran MD    REASON FOR CONSULT:   Respiratory failure    HPI:  The patient is a 78-year-old gentleman who underwent coronary artery bypass grafting.  He is recovering from DTs, he is extremely morbidly obese, he is in AFib with RVR, and he is not clearing his secretions.  He is off of Precedex but only by a few hours and is lethargic.    REVIEW OF SYSTEMS  Unable to obtain    ALLERGIES  Review of patient's allergies indicates:   Allergen Reactions    Adhes. band-tape-benzalkonium Rash     Pt stated it caused blisters    Statins-hmg-coa reductase inhibitors Other (See Comments)     Statin Intolerance (joint pain)       INPATIENT SCHEDULED MEDICATIONS   acetylcysteine 200 mg/ml (20%)  2 mL Nebulization TID    albuterol-ipratropium  3 mL Nebulization Q4H    aspirin  81 mg Oral Daily    DULoxetine  60 mg Oral Nightly    enoxparin  60 mg Subcutaneous Q24H (prophylaxis, 1700)    furosemide (LASIX) injection  20 mg Intravenous Daily    guaiFENesin  1,200 mg Oral BID    lactulose  20 g Oral BID    levothyroxine  25 mcg Intravenous Daily    metoprolol  10 mg Intravenous Q12H    montelukast  10 mg Oral Daily    pantoprazole  40 mg Oral Before breakfast    sodium chloride 0.9% 1,000 mL with mvi, (ADULT) no.4 with vit K 3,300 unit- 150 mcg/10 mL 10 mL, thiamine 100 mg, folic acid 1 mg infusion   Intravenous Daily    tamsulosin  0.4 mg Oral Daily      sodium chloride 0.45% 50 mL/hr at 24 1620    dexmedeTOMIDine (Precedex) infusion (titrating) 1 mcg/kg/hr (24 1133)    insulin regular 1 units/mL infusion orderable (CTS POST-OP) Stopped (24 0900)    phenylephrine Stopped (24 0401)       MEDICAL AND SURGICAL HISTORY  Past Medical History:   Diagnosis Date    A-fib     Allergies     gets allergy shots     Arthritis 1973    right knee    Back pain     COPD (chronic obstructive pulmonary disease)     Diastolic heart failure     GERD (gastroesophageal reflux disease) 2015    HLD (hyperlipidemia)     HTN (hypertension)     Hx of LASIK     Hypothyroidism, unspecified 2012    Inflammatory disease of prostate, unspecified     out of medicine    Mild intermittent asthma, uncomplicated     Myocardial infarction     Neuropathy     On home oxygen therapy     3L NC  at night    PAD (peripheral artery disease)     Staph skin infection 2016    right thigh    TIA (transient ischemic attack) 2013   Obstructive sleep apnea, not tolerant of BiPAP  Past Surgical History:   Procedure Laterality Date    AORTOGRAPHY WITH SERIALOGRAPHY N/A 08/31/2023    Procedure: AORTOGRAM, WITH SERIALOGRAPHY;  Surgeon: Gary Thomas MD;  Location: [facility] CATH/EP LAB;  Service: Peripheral Vascular;  Laterality: N/A;    CATARACT EXTRACTION Bilateral 2014    CORONARY ANGIOGRAPHY INCLUDING BYPASS GRAFTS WITH CATHETERIZATION OF LEFT HEART Left 01/12/2024    Procedure: ANGIOGRAM, CORONARY, INCLUDING BYPASS GRAFT, WITH LEFT HEART CATHETERIZATION;  Surgeon: Gregg Bush MD;  Location: [facility] CATH/EP LAB;  Service: Cardiology;  Laterality: Left;    CORONARY ARTERY BYPASS GRAFT  2010    ENDOSCOPIC HARVEST OF VEIN Right 2/23/2024    Procedure: HARVEST-VEIN-ENDOVASCULAR;  Surgeon: Gary Thomas MD;  Location: [facility] OR;  Service: Cardiothoracic;  Laterality: Right;    INSERTION OF IMPLANTABLE LOOP RECORDER  2011    INSERTION OF INTRA-AORTIC BALLOON ASSIST DEVICE Right 2/23/2024    Procedure: INSERTION, INTRA-AORTIC BALLOON PUMP;  Surgeon: Gary Thomas MD;  Location: [facility] OR;  Service: Cardiothoracic;  Laterality: Right;    INSERTION OF PACEMAKER  2021    does not have card with him for preadmit    INSTANTANEOUS WAVE-FREE RATIO (IFR) N/A 01/12/2024    Procedure: Instantaneous Wave-Free Ratio (IFR);  Surgeon: Gregg Bush MD;  Location:  [facility] CATH/EP LAB;  Service: Cardiology;  Laterality: N/A;    PTA, ANTERIOR TIBIAL Left 2023    Procedure: PTA, Anterior Tibial;  Surgeon: Gary Thomas MD;  Location: [facility] CATH/EP LAB;  Service: Peripheral Vascular;  Laterality: Left;    PTA, SUPERFICIAL FEMORAL ARTERY Left 2023    Procedure: PTA, Superficial Femoral Artery;  Surgeon: Gary Thomas MD;  Location: [facility] CATH/EP LAB;  Service: Peripheral Vascular;  Laterality: Left;    REPEAT CORONARY ARTERY BYPASS GRAFTING N/A 2024    Procedure: CABG, REPEAT;  Surgeon: Gary Thomas MD;  Location: [facility] OR;  Service: Cardiothoracic;  Laterality: N/A;    SPLENECTOMY  2016    STENT, ANTERIOR TIBIAL Left 2023    Procedure: Stent, Anterior Tibial;  Surgeon: Gary Thomas MD;  Location: [facility] CATH/EP LAB;  Service: Peripheral Vascular;  Laterality: Left;    SURGICAL PROCUREMENT, ARTERY, RADIAL, FOR CABG  2024    Procedure: SURGICAL PROCUREMENT,ARTERY,RADIAL,FOR CABG;  Surgeon: Gary Thomas MD;  Location: [facility] OR;  Service: Cardiothoracic;;    watchman heart device         ALCOHOL, TOBACCO AND DRUG USE  Social History     Tobacco Use   Smoking Status Former    Current packs/day: 0.00    Types: Cigarettes    Quit date: 1987    Years since quittin.2   Smokeless Tobacco Never     Social History     Substance and Sexual Activity   Alcohol Use Not Currently    Alcohol/week: 21.0 standard drinks of alcohol    Types: 21 Drinks containing 0.5 oz of alcohol per week    Comment: ed roberto mixed drinks     Social History     Substance and Sexual Activity   Drug Use No       FAMILY HISTORY  History reviewed. No pertinent family history.    VITAL SIGNS (MOST RECENT)  Temp: 98.8 °F (37.1 °C) (24 0600)  Pulse: 92 (24 1104)  Resp: (!) 27 (24 1104)  BP: 130/60 (24 1100)  SpO2: (!) 94 % (24 1104)    INTAKE AND OUTPUT (LAST 24 HOURS):  Intake/Output Summary (Last 24 hours) at  2/28/2024 1301  Last data filed at 2/28/2024 0650  Gross per 24 hour   Intake 2308.1 ml   Output 2280 ml   Net 28.1 ml       WEIGHT  Wt Readings from Last 1 Encounters:   02/23/24 125 kg (275 lb 9.2 oz)       PHYSICAL EXAM  GENERAL: Older patient in no distress.  HEENT: Pupils equal and reactive. Extraocular movements intact. Nose with NG tube in place; also nasal cannula in place.  Pharynx moist.  NECK: Supple.   HEART: Regular rate and rhythm. No murmur or gallop auscultated.  LUNGS: Clear to auscultation and percussion. Lung excursion symmetrical. No change in fremitus. No adventitial noises.  ABDOMEN:  Extremely obese.  Distended.  Bowel sounds present. Non-tender, no masses palpated.  : Normal anatomy.  Canas with some urine.  Not as much as she would expect post Lasix  EXTREMITIES: Normal muscle tone and joint movement, no cyanosis or clubbing.   LYMPHATICS: No adenopathy palpated, no edema.  SKIN: Dry, intact, no lesions.  Incisions dressed and dry  NEURO: Cranial nerves appear intact.  Motor strength is not formally tested.  Patient is restrained.  The patient can not tell me where he is or the year.  He does know his wife's name is Berenice.  She reports that normally he is oriented.  PSYCH:  Too lethargic to assess      CBC LAST (LAST 24 HOURS)  Recent Labs   Lab 02/28/24  0340   WBC 14.48*   RBC 3.59*   HGB 8.3*   HCT 28.0*   MCV 78*   MCH 23.1*   MCHC 29.6*   RDW 22.6*      MPV 11.1   GRAN 70.5  10.2*   LYMPH 10.7*  1.6   MONO 14.0  2.0*   BASO 0.04   NRBC 2*       CHEMISTRY LAST (LAST 24 HOURS)  Recent Labs   Lab 02/28/24  0340      K 3.7   *   CO2 23   ANIONGAP 9   BUN 17   CREATININE 0.7   *   CALCIUM 8.0*   MG 2.2         LAST 7 DAYS MICROBIOLOGY   Microbiology Results (last 7 days)       ** No results found for the last 168 hours. **            MOST RECENT IMAGING  X-Ray Chest AP Portable   ADDENDUM #1     This is a correction to the prior report. The median sternotomy  drains have not been removed and there are stable position.    Electronically signed by:  Ching Adamson MD  02/28/2024 12:57 PM CST Workstation: [id number]     ORIGINAL REPORT     Portable chest x-ray at 6:07 AM is compared to prior study 2/25/2024    Clinical history is coronary artery disease post-CABG    FINDINGS: The right IJ Jefferson-Bienvenido catheter and mediastinal drains have been removed.  The heart is mildly enlarged. There is an aortic valve stent. There are median sternotomy wires. There is a heart loop monitor in place.    There is improvement of the pulmonary vascular congestion. There is a tiny left pleural effusion. There is left lower lobe atelectasis. The remainder the lungs are clear. There are no acute osseous abnormalities.    IMPRESSION: Status post recent CABG with improvement of the pulmonary vascular congestion with left lower lobe atelectasis and tiny left pleural effusion    Electronically signed by:  Ching Adamson MD  02/28/2024 07:47 AM CST Workstation: [id number]  X-Ray Chest AP Portable  Portable chest x-ray at 12:13 PM is compared to prior study at 6:07 AM    Clinical history is NG tube placement status post recent CABG    FINDINGS: There is an NG tube with the tip descending into the stomach.  There are mediastinal drains.  The heart is enlarged. There is a prosthetic aortic valve. There are monitors in place. There are median sternotomy wires.  There is atelectasis in the lung bases with tiny left pleural effusion. The upper lobes are clear. There are no acute osseous abnormalities.    IMPRESSION: Placement of an NG tube with the tip descending into the stomach    Cardiomegaly with prosthetic aortic valve    Bibasilar atelectasis with tiny left pleural effusion    Electronically signed by:  Ching Adamson MD  02/28/2024 12:53 PM CST Workstation: [id number]      CURRENT VISIT EKG  Results for orders placed or performed during the hospital encounter of 02/23/24   EKG 12-LEAD   Result  Value Ref Range    QRS Duration 232 ms    OHS QTC Calculation 613 ms    Narrative    Test Reason : Z95.1,Z95.1,    Vent. Rate : 080 BPM     Atrial Rate : 087 BPM     P-R Int : 000 ms          QRS Dur : 232 ms      QT Int : 532 ms       P-R-T Axes : 000 168 250 degrees     QTc Int : 613 ms    Ventricular-paced rhythm  occasional atrila pacing  Abnormal ECG  When compared with ECG of 21-FEB-2024 09:33,  Electronic ventricular pacemaker has replaced Atrial fibrillation  Confirmed by Eleazar WOO, Gregg NEWTON ([id number]) on 2/24/2024 7:12:58 AM    Referred By: OLLIE RIBEIRO           Confirmed By:Gregg Bush MD       ECHOCARDIOGRAM RESULTS  Results for orders placed during the hospital encounter of 12/13/23    Echo    Interpretation Summary    Left Ventricle: The left ventricle is normal in size. Mildly increased wall thickness. There is mild concentric hypertrophy. Normal wall motion. There is normal systolic function with a visually estimated ejection fraction of 65 - 70%. There is normal diastolic function.    Right Ventricle: Mild right ventricular enlargement. Wall thickness is normal. Right ventricle wall motion  is normal. Systolic function is normal.    Left Atrium: Left atrium is moderately dilated.    Right Atrium: Right atrium is mildly dilated.    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    Tricuspid Valve: There is mild regurgitation with a centrally directed jet.    Pulmonic Valve: There is mild regurgitation with a centrally directed jet.    Pulmonary Artery: There is mild pulmonary hypertension. The estimated pulmonary artery systolic pressure is 40 mmHg.    IVC/SVC: Intermediate venous pressure at 8 mmHg.        Oxygen INFORMATION  Oxygen Concentration (%):  [35] 35    3 L       LAST ARTERIAL BLOOD GAS  ABG  Recent Labs   Lab 02/24/24 1951   PH 7.334*   PO2 60*   PCO2 44.6   HCO3 23.7*   BE -2       IMPRESSION AND PLAN  Status post coronary artery bypass  grafting  Alcoholic going through DTs  - now off of Precedex  Acute hypoxemic respiratory failure  - hypoxemic on 3 L will increase to 4-5  Bibasilar atelectasis  - will add IPPB  - encourage patient to cough and clear  - Mucinex 1200 mg b.i.d.  Obstructive sleep apnea  - noncompliant with CPAP  - sleeps on 3 L at home  - will sleep on BiPAP whenever he is sleeping  - continue elevation of head of bed  Extreme morbid obesity  - patient does not oxygenate when flat  Atrial fib with RVR  - CV surgery and Cardiology following  Ileus  - NG tube is drained 800 cc so far, placed less than an hour ago  Leukocytosis  - improving  Anemia  - postop, expected  Hyperglycemia  - mild  - trend    Discussed with nursing and Respiratory and CV surgery and hospitalist and the patient wife.    Critical care time spent reviewing the chart, examining the patient, reviewing the labs, reviewing the radiological findings, discussing care with nursing, physicians, and respiratory and creating the note and  has been greater than 35 minutes.    Lesley Ang MD  Date of Service: 02/28/2024  1:01 PM

## 2024-02-28 NOTE — PLAN OF CARE
02/27/24 1958   Patient Assessment/Suction   Level of Consciousness (AVPU) alert   Respiratory Effort Unlabored   Expansion/Accessory Muscles/Retractions expansion symmetric   All Lung Fields Breath Sounds coarse   Rhythm/Pattern, Respiratory assisted mechanically   Cough Frequency infrequent   Cough Type fair;nonproductive   Skin Integrity   $ Wound Care Tech Time 15 min   Area Observed Bridge of nose   Skin Appearance without discoloration   Barrier used? Liquid Filled Cushion   PRE-TX-O2   Device (Oxygen Therapy) BIPAP   $ Is the patient on Low Flow Oxygen? Yes   Oxygen Concentration (%) 35   SpO2 99 %   Pulse Oximetry Type Continuous   $ Pulse Oximetry - Multiple Charge Pulse Oximetry - Multiple   Pulse 88   Resp (!) 22   Aerosol Therapy   $ Aerosol Therapy Charges Aerosol Treatment   Daily Review of Necessity (SVN) completed   Respiratory Treatment Status (SVN) given   Treatment Route (SVN) in-line   Patient Position (SVN) HOB elevated   Post Treatment Assessment (SVN) breath sounds unchanged   Signs of Intolerance (SVN) none   Breath Sounds Post-Respiratory Treatment   Throughout All Fields Post-Treatment All Fields   Throughout All Fields Post-Treatment no change   Post-treatment Heart Rate (beats/min) 86   Post-treatment Resp Rate (breaths/min) 20   Ready to Wean/Extubation Screen   FIO2<=50 (chart decimal) 0.35   Preset CPAP/BiPAP Settings   Mode Of Delivery BiPAP S/T   $ Is patient using? Yes   Size of Mask Extra Large   Sized Appropriately? Yes   Equipment Type V60   Airway Device Type large full face mask   Ipap 16   EPAP (cm H2O) 5   Pressure Support (cm H2O) 11   Set Rate (Breaths/Min) 16   ITime (sec) 1   Rise Time (sec) 3   Patient CPAP/BiPAP Settings   RR Total (Breaths/Min) 19   Tidal Volume (mL) 888   VE Minute Ventilation (L/min) 17 L/min   Peak Inspiratory Pressure (cm H2O) 16   TiTOT (%) 24   Total Leak (L/Min) 18   Patient Trigger - ST Mode Only (%) 96   CPAP/BiPAP Alarms   High Pressure  (cm H2O) 40   Low Pressure (cm H2O) 8   Minute Ventilation (L/Min) 3   High RR (breaths/min) 45   Low RR (breaths/min) 8   Apnea (Sec) 20   Education   $ Education BiPAP;15 min

## 2024-02-28 NOTE — PLAN OF CARE
Problem: Parenteral Nutrition  Goal: Effective Intravenous Nutrition Therapy Delivery  Outcome: Ongoing, Progressing  Intervention: Optimize Intravenous Nutrition Delivery  Flowsheets (Taken 2/28/2024 1721)  Nutrition Support Management: parenteral nutrition initiated

## 2024-02-28 NOTE — PROGRESS NOTES
[facility]  Department of Cardiology  Consult Note      PATIENT NAME: Jose F Hanley  MRN: [id number]  TODAY'S DATE: 02/28/2024  ADMIT DATE: 2/23/2024                          CONSULT REQUESTED BY: Aravind Albarran MD    SUBJECTIVE     PRINCIPAL PROBLEM: S/P CABG (coronary artery bypass graft)      REASON FOR CONSULT:  Post CABG     Interval history:      2/28/2024    Off Bipap  Off Precedex  Pacer wires out  Abdomen is distended    2/27/2024    Resting on Face mask. Still with mild sedation family at bedside. VSS.      2/26/24  Pt asleep in bed, remains on BiPAP. Per RN, he is still confused while awake. Currently under light sedation - precedex gtt. Remains paced with temporary pacer - 80s.     2/25/24  Pt was seen resting in bed with Bipap mask in place. Balloon pump was removed this morning per bedside RN.     2/24/24  Pt was seen extubated on BiPAP this morning. Balloon pump. Pressures soft this morning. Per RN, pt was increasingly agitated this morning.       HPI:  Pt is a 78 year old with history of known CAD with previous bypass surgery back in 08/31/23 and then underwent a repeat CABG today, 2/23/23. He Just recently had a [facility] as well with Dr. Bush back on 2/7/24.       Per surgery team:  Operation: Redo coronary artery bypass grafting x3 using left radial artery to left anterior descending coronary artery and separate segments of saphenous vein from the aorta to the diagonal coronary artery and from the aorta to the obtuse marginal coronary artery using a combination of antegrade and retrograde cardioplegia, mild systemic hypothermia, endoscopic vein harvest from the right leg, right radial artery harvest, and insertion of right femoral arterial intra-aortic balloon pump with fluoroscopic guidance and complete pericardiectomy due to severe adhesive pericarditis         Review of patient's allergies indicates:   Allergen Reactions    Adhes. band-tape-benzalkonium Rash     Pt stated it caused  blisters    Statins-hmg-coa reductase inhibitors Other (See Comments)     Statin Intolerance (joint pain)       Past Medical History:   Diagnosis Date    A-fib     Allergies 2020    gets allergy shots    Arthritis 1973    right knee    Back pain     COPD (chronic obstructive pulmonary disease)     Diastolic heart failure     GERD (gastroesophageal reflux disease) 2015    HLD (hyperlipidemia)     HTN (hypertension)     Hx of LASIK     Hypothyroidism, unspecified 2012    Inflammatory disease of prostate, unspecified     out of medicine    Mild intermittent asthma, uncomplicated     Myocardial infarction     Neuropathy     On home oxygen therapy     3L NC  at night    PAD (peripheral artery disease)     Staph skin infection 2016    right thigh    TIA (transient ischemic attack) 2013     Past Surgical History:   Procedure Laterality Date    AORTOGRAPHY WITH SERIALOGRAPHY N/A 08/31/2023    Procedure: AORTOGRAM, WITH SERIALOGRAPHY;  Surgeon: Gary Thomas MD;  Location: [facility] CATH/EP LAB;  Service: Peripheral Vascular;  Laterality: N/A;    CATARACT EXTRACTION Bilateral 2014    CORONARY ANGIOGRAPHY INCLUDING BYPASS GRAFTS WITH CATHETERIZATION OF LEFT HEART Left 01/12/2024    Procedure: ANGIOGRAM, CORONARY, INCLUDING BYPASS GRAFT, WITH LEFT HEART CATHETERIZATION;  Surgeon: Gregg Bush MD;  Location: [facility] CATH/EP LAB;  Service: Cardiology;  Laterality: Left;    CORONARY ARTERY BYPASS GRAFT  2010    ENDOSCOPIC HARVEST OF VEIN Right 2/23/2024    Procedure: HARVEST-VEIN-ENDOVASCULAR;  Surgeon: Gary Thomas MD;  Location: [facility] OR;  Service: Cardiothoracic;  Laterality: Right;    INSERTION OF IMPLANTABLE LOOP RECORDER  2011    INSERTION OF INTRA-AORTIC BALLOON ASSIST DEVICE Right 2/23/2024    Procedure: INSERTION, INTRA-AORTIC BALLOON PUMP;  Surgeon: Gary Thomas MD;  Location: [facility] OR;  Service: Cardiothoracic;  Laterality: Right;    INSERTION OF PACEMAKER  2021    does not have card with him for  preadmit    INSTANTANEOUS WAVE-FREE RATIO (IFR) N/A 2024    Procedure: Instantaneous Wave-Free Ratio (IFR);  Surgeon: Gregg Bush MD;  Location: [facility] CATH/EP LAB;  Service: Cardiology;  Laterality: N/A;    PTA, ANTERIOR TIBIAL Left 2023    Procedure: PTA, Anterior Tibial;  Surgeon: Gary Thomas MD;  Location: [facility] CATH/EP LAB;  Service: Peripheral Vascular;  Laterality: Left;    PTA, SUPERFICIAL FEMORAL ARTERY Left 2023    Procedure: PTA, Superficial Femoral Artery;  Surgeon: Gary Thomas MD;  Location: [facility] CATH/EP LAB;  Service: Peripheral Vascular;  Laterality: Left;    REPEAT CORONARY ARTERY BYPASS GRAFTING N/A 2024    Procedure: CABG, REPEAT;  Surgeon: Gary Thomas MD;  Location: [facility] OR;  Service: Cardiothoracic;  Laterality: N/A;    SPLENECTOMY  2016    STENT, ANTERIOR TIBIAL Left 2023    Procedure: Stent, Anterior Tibial;  Surgeon: Gary Thomas MD;  Location: [facility] CATH/EP LAB;  Service: Peripheral Vascular;  Laterality: Left;    SURGICAL PROCUREMENT, ARTERY, RADIAL, FOR CABG  2024    Procedure: SURGICAL PROCUREMENT,ARTERY,RADIAL,FOR CABG;  Surgeon: Gary Thomas MD;  Location: [facility] OR;  Service: Cardiothoracic;;    watchman heart device  2019     Social History     Tobacco Use    Smoking status: Former     Current packs/day: 0.00     Types: Cigarettes     Quit date: 1987     Years since quittin.2    Smokeless tobacco: Never   Substance Use Topics    Alcohol use: Not Currently     Alcohol/week: 21.0 standard drinks of alcohol     Types: 21 Drinks containing 0.5 oz of alcohol per week     Comment: ed roberto mixed drinks    Drug use: No        REVIEW OF SYSTEMS    As mentioned in HPI    OBJECTIVE     VITAL SIGNS (Most Recent)  Temp: 98.8 °F (37.1 °C) (24 0600)  Pulse: 90 (24 09)  Resp: (!) 27 (24)  BP: (!) 111/58 (24)  SpO2: 97 % (02/28/24 0900)    VENTILATION STATUS  Resp: (!) 27  (02/28/24 0900)  SpO2: 97 % (02/28/24 0900)  Oxygen Concentration (%):  [35] 35        I & O (Last 24H):  Intake/Output Summary (Last 24 hours) at 2/28/2024 0939  Last data filed at 2/28/2024 0650  Gross per 24 hour   Intake 2308.1 ml   Output 2630 ml   Net -321.9 ml       WEIGHTS  Wt Readings from Last 3 Encounters:   02/23/24 1540 125 kg (275 lb 9.2 oz)   02/21/24 0948 124.5 kg (274 lb 6.4 oz)   01/12/24 0718 120.7 kg (266 lb)       PHYSICAL EXAM    CONSTITUTIONAL: NAD, sleeping, light sedation   HEENT: Normocephalic. No pallor  NECK: no JVD  LUNGS: coarse   HEART: paced rate and rhythm - underlying Afib, S1, S2 normal, + murmur   ABDOMEN: soft, non-tender, bowel sounds normal  EXTREMITIES: No edema  SKIN: No rash  PSYCH: light sedation     HOME MEDICATIONS:  No current facility-administered medications on file prior to encounter.     Current Outpatient Medications on File Prior to Encounter   Medication Sig Dispense Refill    acetaminophen (TYLENOL) 325 MG tablet Take 650 mg by mouth every 6 (six) hours as needed.      albuterol (PROVENTIL/VENTOLIN HFA) 90 mcg/actuation inhaler Inhale 2 puffs into the lungs every 6 (six) hours as needed.      aspirin 81 MG Chew Take 81 mg by mouth once daily.      betamethasone valerate 0.1% (VALISONE) 0.1 % Lotn Apply to scalp bid prn rash      cyanocobalamin 500 MCG tablet 500 mcg.      cyclobenzaprine (FLEXERIL) 10 MG tablet Take 10 mg by mouth every 8 (eight) hours as needed.      diphenhydrAMINE (BENADRYL) 50 MG capsule 50 mg every 6 (six) hours as needed.      doxepin (SINEQUAN) 50 MG capsule Take 50 mg by mouth every evening.      DULoxetine (CYMBALTA) 60 MG capsule 60 mg nightly.      EPINEPHrine (EPIPEN) 0.3 mg/0.3 mL AtIn INJECT 0.3MG/0.3ML SUBCUTANEOUSLY (UNDER THE SKIN)  AS NEEDED FOR ALLERGIC REACTIONS SELF-INJECTOR PEN      furosemide (LASIX) 20 MG tablet Take 20 mg by mouth once daily.      gabapentin (NEURONTIN) 400 MG capsule 800 mg 2 (two) times daily.       hydrocodone-homatropine 5-1.5 mg/5 ml (HYCODAN) 5-1.5 mg/5 mL Syrp Take by mouth every 6 (six) hours as needed. cough      levothyroxine (SYNTHROID) 50 MCG tablet 50 mcg.      metOLazone (ZAROXOLYN) 5 MG tablet 5 mg daily as needed. For weight gain >2lbs      montelukast (SINGULAIR) 10 mg tablet 10 mg once daily.      nitroGLYCERIN (NITROSTAT) 0.4 MG SL tablet Place 0.4 mg under the tongue every 5 (five) minutes as needed.      omega-3 fatty acids/fish oil (FISH OIL-OMEGA-3 FATTY ACIDS) 300-1,000 mg capsule Take 1 capsule by mouth 2 (two) times daily.      omeprazole (PRILOSEC) 20 MG capsule 40 mg once daily.      potassium chloride SA (K-DUR,KLOR-CON) 20 MEQ tablet 20 mEq nightly.      ranolazine (RANEXA) 1,000 mg Tb12 Take 1 tablet (1,000 mg total) by mouth 2 (two) times daily. 180 tablet 3    senna-docusate 8.6-50 mg (PERICOLACE) 8.6-50 mg per tablet nightly.      spironolactone (ALDACTONE) 50 MG tablet Take 50 mg by mouth once daily.      tamsulosin (FLOMAX) 0.4 mg Cap 0.4 mg.      TESTOSTERONE PROPIONATE, BULK, MISC Inject 1 each as directed every 14 (fourteen) days.      TOPROL XL 25 mg 24 hr tablet Take 25 mg by mouth 2 (two) times daily.      torsemide (DEMADEX) 20 MG Tab Take 1 tablet (20 mg total) by mouth once daily. (Patient not taking: Reported on 12/21/2023) 30 tablet 11       SCHEDULED MEDS:   albuterol-ipratropium  3 mL Nebulization Q4H    aspirin  81 mg Oral Daily    docusate sodium  100 mg Oral BID    DULoxetine  60 mg Oral Nightly    enoxparin  60 mg Subcutaneous Q24H (prophylaxis, 1700)    furosemide (LASIX) injection  20 mg Intravenous Daily    levothyroxine  25 mcg Intravenous Daily    metoprolol  10 mg Intravenous Q12H    montelukast  10 mg Oral Daily    pantoprazole  40 mg Oral Before breakfast    sodium chloride 0.9% 1,000 mL with mvi, (ADULT) no.4 with vit K 3,300 unit- 150 mcg/10 mL 10 mL, thiamine 100 mg, folic acid 1 mg infusion   Intravenous Daily    tamsulosin  0.4 mg Oral Daily  "      CONTINUOUS INFUSIONS:   sodium chloride 0.45% 50 mL/hr at 02/27/24 1620    dexmedeTOMIDine (Precedex) infusion (titrating) 1.4 mcg/kg/hr (02/28/24 0613)    insulin regular 1 units/mL infusion orderable (CTS POST-OP) Stopped (02/24/24 0900)    phenylephrine Stopped (02/24/24 0401)       PRN MEDS:0.9%  NaCl infusion (for blood administration), albumin human 5%, albuterol sulfate, calcium gluconate IVPB, calcium gluconate IVPB, calcium gluconate IVPB, dextrose 50% in water (D50W), dextrose 50% in water (D50W), HYDROcodone-acetaminophen, HYDROmorphone, lorazepam, magnesium sulfate IVPB, magnesium sulfate IVPB, morphine, morphine, ondansetron, potassium chloride in water, potassium chloride in water, potassium chloride in water **AND** potassium chloride in water **AND** potassium chloride in water, potassium chloride in water, sodium phosphate 15 mmol in dextrose 5 % (D5W) 250 mL IVPB, sodium phosphate 20.01 mmol in dextrose 5 % (D5W) 250 mL IVPB, sodium phosphate 30 mmol in dextrose 5 % (D5W) 250 mL IVPB    LABS AND DIAGNOSTICS     CBC LAST 3 DAYS  Recent Labs   Lab 02/24/24  1828 02/24/24  1951 02/26/24  0521 02/27/24  0339 02/28/24  0340   WBC 20.34*   < > 16.55* 15.40* 14.48*   RBC 4.08*   < > 3.78* 3.56* 3.59*   HGB 9.5*   < > 8.8* 8.3* 8.3*   HCT 31.2*   < > 29.1* 27.4* 28.0*   MCV 77*   < > 77* 77* 78*   MCH 23.3*   < > 23.3* 23.3* 23.1*   MCHC 30.4*   < > 30.2* 30.3* 29.6*   RDW 20.6*   < > 21.3* 22.1* 22.6*   *   < > 110* 144* 216   MPV 10.4   < > SEE COMMENT 11.8 11.1   GRAN 77.3*  15.7*  --   --  78.1*  12.0* 70.5  10.2*   LYMPH 8.7*  1.8  --   --  6.6*  1.0 10.7*  1.6   MONO 13.5  2.8*  --   --  13.1  2.0* 14.0  2.0*   BASO 0.03  --   --  0.07 0.04   NRBC 0  --   --  1* 2*    < > = values in this interval not displayed.       COAGULATION LAST 3 DAYS  No results for input(s): "LABPT", "INR", "APTT" in the last 168 hours.    CHEMISTRY LAST 3 DAYS  Recent Labs   Lab 02/24/24  0645 " "02/24/24  0726 02/24/24  0917 02/24/24  1828 02/24/24 1951 02/25/24 0337 02/26/24  0521 02/27/24 0339 02/28/24  0340   NA  --    < >  --  136  --  137 139 142 144   K  --    < >  --  5.0  --  4.7 4.7 4.2 3.7   CL  --    < >  --  107  --  107 109 110 112*   CO2  --    < >  --  24  --  23 25 23 23   ANIONGAP  --    < >  --  5*  --  7* 5* 9 9   BUN  --    < >  --  17  --  19 22 20 17   CREATININE  --    < >  --  0.9  --  0.9 0.8 0.7 0.7   GLU  --    < >  --  171*  --  171* 151* 148* 136*   CALCIUM  --    < >  --  7.7*  --  7.7* 7.8* 7.8* 8.0*   PH 7.345*  --  7.283*  --  7.334*  --   --   --   --    MG  --    < >  --  2.2  --  2.2 2.1 2.2 2.2   ALBUMIN  --    < >  --  3.6  --  3.4* 3.3*  --   --    PROT  --    < >  --  5.3*  --  5.3* 5.4*  --   --    ALKPHOS  --    < >  --  22*  --  22* 24*  --   --    ALT  --    < >  --  18  --  15 12  --   --    AST  --    < >  --  39  --  33 32  --   --    BILITOT  --    < >  --  0.7  --  0.7 1.1*  --   --     < > = values in this interval not displayed.       CARDIAC PROFILE LAST 3 DAYS  No results for input(s): "BNP", "CPK", "CPKMB", "LDH", "TROPONINI", "TROPONINIHS" in the last 168 hours.    ENDOCRINE LAST 3 DAYS  No results for input(s): "TSH", "PROCAL" in the last 168 hours.    LAST ARTERIAL BLOOD GAS  ABG  Recent Labs   Lab 02/24/24 1951   PH 7.334*   PO2 60*   PCO2 44.6   HCO3 23.7*   BE -2       LAST 7 DAYS MICROBIOLOGY   Microbiology Results (last 7 days)       ** No results found for the last 168 hours. **            MOST RECENT IMAGING  X-Ray Chest AP Portable  Portable chest x-ray at 6:07 AM is compared to prior study 2/25/2024    Clinical history is coronary artery disease post-CABG    FINDINGS: The right IJ Goldston-Bienvenido catheter and mediastinal drains have been removed.  The heart is mildly enlarged. There is an aortic valve stent. There are median sternotomy wires. There is a heart loop monitor in place.    There is improvement of the pulmonary vascular congestion. " There is a tiny left pleural effusion. There is left lower lobe atelectasis. The remainder the lungs are clear. There are no acute osseous abnormalities.    IMPRESSION: Status post recent CABG with improvement of the pulmonary vascular congestion with left lower lobe atelectasis and tiny left pleural effusion    Electronically signed by:  Ching Adamson MD  02/28/2024 07:47 AM CST Workstation: [id number]      ECHOCARDIOGRAM RESULTS (last 5)  Results for orders placed during the hospital encounter of 12/13/23    Echo    Interpretation Summary    Left Ventricle: The left ventricle is normal in size. Mildly increased wall thickness. There is mild concentric hypertrophy. Normal wall motion. There is normal systolic function with a visually estimated ejection fraction of 65 - 70%. There is normal diastolic function.    Right Ventricle: Mild right ventricular enlargement. Wall thickness is normal. Right ventricle wall motion  is normal. Systolic function is normal.    Left Atrium: Left atrium is moderately dilated.    Right Atrium: Right atrium is mildly dilated.    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    Tricuspid Valve: There is mild regurgitation with a centrally directed jet.    Pulmonic Valve: There is mild regurgitation with a centrally directed jet.    Pulmonary Artery: There is mild pulmonary hypertension. The estimated pulmonary artery systolic pressure is 40 mmHg.    IVC/SVC: Intermediate venous pressure at 8 mmHg.      CURRENT/PREVIOUS VISIT EKG  Results for orders placed or performed during the hospital encounter of 02/23/24   EKG 12-LEAD    Collection Time: 02/23/24  1:53 PM   Result Value Ref Range    QRS Duration 232 ms    OHS QTC Calculation 613 ms    Narrative    Test Reason : Z95.1,Z95.1,    Vent. Rate : 080 BPM     Atrial Rate : 087 BPM     P-R Int : 000 ms          QRS Dur : 232 ms      QT Int : 532 ms       P-R-T Axes : 000 168 250 degrees     QTc Int : 613  ms    Ventricular-paced rhythm  occasional atrila pacing  Abnormal ECG  When compared with ECG of 21-FEB-2024 09:33,  Electronic ventricular pacemaker has replaced Atrial fibrillation  Confirmed by Eleazar WOO, Gregg NEWTON ([id number]) on 2/24/2024 7:12:58 AM    Referred By: OLLIE RIBEIRO           Confirmed By:Gregg Bush MD           ASSESSMENT/PLAN:     Active Hospital Problems    Diagnosis    *S/P CABG (coronary artery bypass graft) - x2 09/2010 - LIMA to LAD; SVG to OMB; grafts occluded 08/2012    Acute encephalopathy    Acute blood loss anemia    Acute respiratory failure with hypoxia and hypercapnia    Severe obesity (BMI 35.0-39.9) with comorbidity    Permanent atrial fibrillation    CAD (coronary artery disease), native coronary artery    Dyslipidemia       ASSESSMENT & PLAN:     MVCAD S/P redo CABG yesterday 2/23/24,  left radial artery to LAD, SVG to Dx, SVG to OM  HFpEF  HTN/ dyslipidemia   Statin intolerance  AFIB H/O Watchman with RVR  H/O TAVR  Leadless PPM in situ      RECOMMENDATIONS:    Continue Lasix increase to BID  CXR  Increase Metoprolol IV Q 4 hours          Andria Duffy NP  Department of Cardiology  Date of Service: 02/28/2024      PATIENT IS COMPLAINED OF SHORTNESS OF BREATH.  HE IS TACHYCARDIC ATRIAL FIBRILLATION 110  HIS LUNGS ARE CONGESTED UPPER AIRWAY NOISES, HE IS TACHYPNEIC  NG TUBE IS IN PLACE  ABDOMEN IS DISTENDED, QUESTIONABLE ILEUS ON ABDOMINAL X-RAY  CHEST X-RAY LOOKS LIKE PULMONARY EDEMA ACUTE    INCREASE LASIX  NG PLACED FOR ILEUS

## 2024-02-28 NOTE — CARE UPDATE
02/28/24 0728   Patient Assessment/Suction   Level of Consciousness (AVPU) responds to voice   Respiratory Effort Unlabored;Normal   Expansion/Accessory Muscles/Retractions no use of accessory muscles;no retractions;expansion symmetric   All Lung Fields Breath Sounds diminished   Rhythm/Pattern, Respiratory unlabored;pattern regular;depth regular;chest wiggle adequate;no shortness of breath reported   Cough Frequency no cough   Skin Integrity   $ Wound Care Tech Time 15 min   Area Observed Bridge of nose   Skin Appearance without discoloration   Barrier used? Gel Cushion   Aerosol Therapy   $ Aerosol Therapy Charges Aerosol Treatment   Daily Review of Necessity (SVN) completed   Respiratory Treatment Status (SVN) given   Treatment Route (SVN) in-line   Patient Position (SVN) HOB elevated   Post Treatment Assessment (SVN) increased aeration   Signs of Intolerance (SVN) none   Breath Sounds Post-Respiratory Treatment   Throughout All Fields Post-Treatment All Fields   Throughout All Fields Post-Treatment aeration increased   Post-treatment Heart Rate (beats/min) 94   Post-treatment Resp Rate (breaths/min) 31   Preset CPAP/BiPAP Settings   Mode Of Delivery BiPAP S/T   $ CPAP/BiPAP Daily Charge BiPAP/CPAP Daily   $ Initial CPAP/BiPAP Setup? Yes   $ Is patient using? Yes   Size of Mask Large   Sized Appropriately? Yes   Equipment Type V60   Airway Device Type large full face mask   Humidifier not applicable   Ipap 16   EPAP (cm H2O) 5   Pressure Support (cm H2O) 11   Flow Rate (L/Min) 16   Education   $ Education BiPAP;15 min

## 2024-02-28 NOTE — ASSESSMENT & PLAN NOTE
- post op care per surgery   -aspirin mg daily metoprolol 10 mg IV q.12 hours, Lasix 20 mg IV daily

## 2024-02-29 PROBLEM — K56.7 ILEUS: Status: ACTIVE | Noted: 2024-02-29

## 2024-02-29 LAB
ALLENS TEST: ABNORMAL
ANION GAP SERPL CALC-SCNC: 9 MMOL/L (ref 8–16)
BASOPHILS # BLD AUTO: 0.04 K/UL (ref 0–0.2)
BASOPHILS NFR BLD: 0.2 % (ref 0–1.9)
BUN SERPL-MCNC: 22 MG/DL (ref 8–23)
CALCIUM SERPL-MCNC: 8.9 MG/DL (ref 8.7–10.5)
CHLORIDE SERPL-SCNC: 109 MMOL/L (ref 95–110)
CO2 SERPL-SCNC: 33 MMOL/L (ref 23–29)
CREAT SERPL-MCNC: 0.9 MG/DL (ref 0.5–1.4)
DELSYS: ABNORMAL
DIFFERENTIAL METHOD BLD: ABNORMAL
EOSINOPHIL # BLD AUTO: 0 K/UL (ref 0–0.5)
EOSINOPHIL NFR BLD: 0.1 % (ref 0–8)
ERYTHROCYTE [DISTWIDTH] IN BLOOD BY AUTOMATED COUNT: 22.8 % (ref 11.5–14.5)
EST. GFR  (NO RACE VARIABLE): >60 ML/MIN/1.73 M^2
FLOW: 2
GLUCOSE SERPL-MCNC: 164 MG/DL (ref 70–110)
HCO3 UR-SCNC: 35.3 MMOL/L (ref 24–28)
HCT VFR BLD AUTO: 30 % (ref 40–54)
HGB BLD-MCNC: 8.8 G/DL (ref 14–18)
IMM GRANULOCYTES # BLD AUTO: 0.18 K/UL (ref 0–0.04)
IMM GRANULOCYTES NFR BLD AUTO: 1 % (ref 0–0.5)
LYMPHOCYTES # BLD AUTO: 1 K/UL (ref 1–4.8)
LYMPHOCYTES NFR BLD: 5.3 % (ref 18–48)
MAGNESIUM SERPL-MCNC: 2.4 MG/DL (ref 1.6–2.6)
MCH RBC QN AUTO: 23.1 PG (ref 27–31)
MCHC RBC AUTO-ENTMCNC: 29.3 G/DL (ref 32–36)
MCV RBC AUTO: 79 FL (ref 82–98)
MODE: ABNORMAL
MONOCYTES # BLD AUTO: 1.9 K/UL (ref 0.3–1)
MONOCYTES NFR BLD: 10.4 % (ref 4–15)
NEUTROPHILS # BLD AUTO: 15 K/UL (ref 1.8–7.7)
NEUTROPHILS NFR BLD: 83 % (ref 38–73)
NRBC BLD-RTO: 3 /100 WBC
PCO2 BLDA: 53.8 MMHG (ref 35–45)
PH SMN: 7.42 [PH] (ref 7.35–7.45)
PHOSPHATE SERPL-MCNC: 2.4 MG/DL (ref 2.7–4.5)
PLATELET # BLD AUTO: 342 K/UL (ref 150–450)
PMV BLD AUTO: 10.8 FL (ref 9.2–12.9)
PO2 BLDA: 65 MMHG (ref 80–100)
POC BE: 11 MMOL/L
POC SATURATED O2: 92 % (ref 95–100)
POC TCO2: 37 MMOL/L (ref 23–27)
POTASSIUM SERPL-SCNC: 3.7 MMOL/L (ref 3.5–5.1)
PREALB SERPL-MCNC: 6.7 MG/DL (ref 20–43)
RBC # BLD AUTO: 3.81 M/UL (ref 4.6–6.2)
SAMPLE: ABNORMAL
SITE: ABNORMAL
SODIUM SERPL-SCNC: 151 MMOL/L (ref 136–145)
TRIGL SERPL-MCNC: 94 MG/DL (ref 30–150)
WBC # BLD AUTO: 18.06 K/UL (ref 3.9–12.7)

## 2024-02-29 PROCEDURE — 25000003 PHARM REV CODE 250: Performed by: THORACIC SURGERY (CARDIOTHORACIC VASCULAR SURGERY)

## 2024-02-29 PROCEDURE — 92526 ORAL FUNCTION THERAPY: CPT

## 2024-02-29 PROCEDURE — 99900035 HC TECH TIME PER 15 MIN (STAT)

## 2024-02-29 PROCEDURE — 80048 BASIC METABOLIC PNL TOTAL CA: CPT | Performed by: THORACIC SURGERY (CARDIOTHORACIC VASCULAR SURGERY)

## 2024-02-29 PROCEDURE — 97530 THERAPEUTIC ACTIVITIES: CPT

## 2024-02-29 PROCEDURE — 94761 N-INVAS EAR/PLS OXIMETRY MLT: CPT

## 2024-02-29 PROCEDURE — 20000000 HC ICU ROOM

## 2024-02-29 PROCEDURE — 99291 CRITICAL CARE FIRST HOUR: CPT | Mod: ,,, | Performed by: INTERNAL MEDICINE

## 2024-02-29 PROCEDURE — 63600175 PHARM REV CODE 636 W HCPCS: Performed by: INTERNAL MEDICINE

## 2024-02-29 PROCEDURE — 27100171 HC OXYGEN HIGH FLOW UP TO 24 HOURS

## 2024-02-29 PROCEDURE — 99222 1ST HOSP IP/OBS MODERATE 55: CPT | Mod: ,,, | Performed by: SURGERY

## 2024-02-29 PROCEDURE — 94640 AIRWAY INHALATION TREATMENT: CPT

## 2024-02-29 PROCEDURE — 63600175 PHARM REV CODE 636 W HCPCS: Performed by: NURSE PRACTITIONER

## 2024-02-29 PROCEDURE — 63600175 PHARM REV CODE 636 W HCPCS: Performed by: THORACIC SURGERY (CARDIOTHORACIC VASCULAR SURGERY)

## 2024-02-29 PROCEDURE — 82803 BLOOD GASES ANY COMBINATION: CPT

## 2024-02-29 PROCEDURE — 85025 COMPLETE CBC W/AUTO DIFF WBC: CPT | Performed by: THORACIC SURGERY (CARDIOTHORACIC VASCULAR SURGERY)

## 2024-02-29 PROCEDURE — 97163 PT EVAL HIGH COMPLEX 45 MIN: CPT

## 2024-02-29 PROCEDURE — 99900031 HC PATIENT EDUCATION (STAT)

## 2024-02-29 PROCEDURE — 25000003 PHARM REV CODE 250: Performed by: PHYSICIAN ASSISTANT

## 2024-02-29 PROCEDURE — 83735 ASSAY OF MAGNESIUM: CPT | Performed by: THORACIC SURGERY (CARDIOTHORACIC VASCULAR SURGERY)

## 2024-02-29 PROCEDURE — 84478 ASSAY OF TRIGLYCERIDES: CPT | Performed by: INTERNAL MEDICINE

## 2024-02-29 PROCEDURE — 36600 WITHDRAWAL OF ARTERIAL BLOOD: CPT

## 2024-02-29 PROCEDURE — 25000003 PHARM REV CODE 250: Performed by: NURSE PRACTITIONER

## 2024-02-29 PROCEDURE — 84134 ASSAY OF PREALBUMIN: CPT | Performed by: INTERNAL MEDICINE

## 2024-02-29 PROCEDURE — 97167 OT EVAL HIGH COMPLEX 60 MIN: CPT

## 2024-02-29 PROCEDURE — 92610 EVALUATE SWALLOWING FUNCTION: CPT

## 2024-02-29 PROCEDURE — 25000003 PHARM REV CODE 250: Performed by: INTERNAL MEDICINE

## 2024-02-29 PROCEDURE — 99024 POSTOP FOLLOW-UP VISIT: CPT | Mod: POP,,, | Performed by: PHYSICIAN ASSISTANT

## 2024-02-29 PROCEDURE — 25000242 PHARM REV CODE 250 ALT 637 W/ HCPCS: Performed by: INTERNAL MEDICINE

## 2024-02-29 PROCEDURE — 3E0336Z INTRODUCTION OF NUTRITIONAL SUBSTANCE INTO PERIPHERAL VEIN, PERCUTANEOUS APPROACH: ICD-10-PCS | Performed by: INTERNAL MEDICINE

## 2024-02-29 PROCEDURE — 84100 ASSAY OF PHOSPHORUS: CPT | Performed by: INTERNAL MEDICINE

## 2024-02-29 RX ORDER — METOPROLOL TARTRATE 50 MG/1
50 TABLET ORAL 3 TIMES DAILY
Status: DISCONTINUED | OUTPATIENT
Start: 2024-02-29 | End: 2024-02-29

## 2024-02-29 RX ORDER — HYDRALAZINE HYDROCHLORIDE 20 MG/ML
10 INJECTION INTRAMUSCULAR; INTRAVENOUS EVERY 6 HOURS PRN
Status: DISCONTINUED | OUTPATIENT
Start: 2024-02-29 | End: 2024-03-07 | Stop reason: HOSPADM

## 2024-02-29 RX ORDER — METOPROLOL TARTRATE 1 MG/ML
10 INJECTION, SOLUTION INTRAVENOUS EVERY 6 HOURS PRN
Status: DISCONTINUED | OUTPATIENT
Start: 2024-02-29 | End: 2024-03-07 | Stop reason: HOSPADM

## 2024-02-29 RX ORDER — METOPROLOL TARTRATE 1 MG/ML
5 INJECTION, SOLUTION INTRAVENOUS EVERY 6 HOURS
Status: DISCONTINUED | OUTPATIENT
Start: 2024-02-29 | End: 2024-02-29

## 2024-02-29 RX ORDER — LOSARTAN POTASSIUM 25 MG/1
25 TABLET ORAL DAILY
Status: DISCONTINUED | OUTPATIENT
Start: 2024-02-29 | End: 2024-03-04

## 2024-02-29 RX ORDER — METOPROLOL TARTRATE 50 MG/1
50 TABLET ORAL 3 TIMES DAILY
Status: DISCONTINUED | OUTPATIENT
Start: 2024-02-29 | End: 2024-03-04

## 2024-02-29 RX ORDER — SPIRONOLACTONE 25 MG/1
25 TABLET ORAL DAILY
Status: DISCONTINUED | OUTPATIENT
Start: 2024-03-01 | End: 2024-03-04

## 2024-02-29 RX ORDER — METOPROLOL TARTRATE 1 MG/ML
5 INJECTION, SOLUTION INTRAVENOUS EVERY 6 HOURS PRN
Status: DISCONTINUED | OUTPATIENT
Start: 2024-02-29 | End: 2024-02-29

## 2024-02-29 RX ORDER — METOPROLOL TARTRATE 25 MG/1
25 TABLET, FILM COATED ORAL 3 TIMES DAILY
Status: DISCONTINUED | OUTPATIENT
Start: 2024-02-29 | End: 2024-02-29

## 2024-02-29 RX ORDER — FUROSEMIDE 10 MG/ML
40 INJECTION INTRAMUSCULAR; INTRAVENOUS EVERY 8 HOURS
Status: DISCONTINUED | OUTPATIENT
Start: 2024-02-29 | End: 2024-03-04

## 2024-02-29 RX ORDER — HYDROMORPHONE HYDROCHLORIDE 1 MG/ML
2 INJECTION, SOLUTION INTRAMUSCULAR; INTRAVENOUS; SUBCUTANEOUS EVERY 6 HOURS PRN
Status: DISCONTINUED | OUTPATIENT
Start: 2024-02-29 | End: 2024-03-05

## 2024-02-29 RX ADMIN — METOPROLOL TARTRATE 50 MG: 50 TABLET, FILM COATED ORAL at 03:02

## 2024-02-29 RX ADMIN — IPRATROPIUM BROMIDE 0.5 MG: 0.5 SOLUTION RESPIRATORY (INHALATION) at 08:02

## 2024-02-29 RX ADMIN — LEVALBUTEROL HYDROCHLORIDE 1.25 MG: 1.25 SOLUTION RESPIRATORY (INHALATION) at 08:02

## 2024-02-29 RX ADMIN — ENOXAPARIN SODIUM 60 MG: 60 INJECTION SUBCUTANEOUS at 05:02

## 2024-02-29 RX ADMIN — FUROSEMIDE 40 MG: 10 INJECTION, SOLUTION INTRAVENOUS at 09:02

## 2024-02-29 RX ADMIN — PIPERACILLIN AND TAZOBACTAM 3.38 G: 3; .375 INJECTION, POWDER, FOR SOLUTION INTRAVENOUS; PARENTERAL at 08:02

## 2024-02-29 RX ADMIN — METOPROLOL TARTRATE 10 MG: 1 INJECTION, SOLUTION INTRAVENOUS at 02:02

## 2024-02-29 RX ADMIN — ASPIRIN 81 MG 81 MG: 81 TABLET ORAL at 08:02

## 2024-02-29 RX ADMIN — HYDROMORPHONE HYDROCHLORIDE 2 MG: 1 INJECTION, SOLUTION INTRAMUSCULAR; INTRAVENOUS; SUBCUTANEOUS at 08:02

## 2024-02-29 RX ADMIN — LOSARTAN POTASSIUM 25 MG: 25 TABLET, FILM COATED ORAL at 10:02

## 2024-02-29 RX ADMIN — PIPERACILLIN AND TAZOBACTAM 3.38 G: 3; .375 INJECTION, POWDER, FOR SOLUTION INTRAVENOUS; PARENTERAL at 02:02

## 2024-02-29 RX ADMIN — GUAIFENESIN 1200 MG: 600 TABLET, EXTENDED RELEASE ORAL at 08:02

## 2024-02-29 RX ADMIN — HYDROMORPHONE HYDROCHLORIDE 2 MG: 1 INJECTION, SOLUTION INTRAMUSCULAR; INTRAVENOUS; SUBCUTANEOUS at 11:02

## 2024-02-29 RX ADMIN — METOPROLOL TARTRATE 5 MG: 1 INJECTION, SOLUTION INTRAVENOUS at 12:02

## 2024-02-29 RX ADMIN — IPRATROPIUM BROMIDE 0.5 MG: 0.5 SOLUTION RESPIRATORY (INHALATION) at 12:02

## 2024-02-29 RX ADMIN — FUROSEMIDE 40 MG: 10 INJECTION, SOLUTION INTRAVENOUS at 08:02

## 2024-02-29 RX ADMIN — LEVALBUTEROL HYDROCHLORIDE 1.25 MG: 1.25 SOLUTION RESPIRATORY (INHALATION) at 01:02

## 2024-02-29 RX ADMIN — MORPHINE SULFATE 4 MG: 4 INJECTION, SOLUTION INTRAMUSCULAR; INTRAVENOUS at 05:02

## 2024-02-29 RX ADMIN — PANTOPRAZOLE SODIUM 40 MG: 40 TABLET, DELAYED RELEASE ORAL at 05:02

## 2024-02-29 RX ADMIN — LEVOTHYROXINE SODIUM ANHYDROUS 25 MCG: 100 INJECTION, POWDER, LYOPHILIZED, FOR SOLUTION INTRAVENOUS at 08:02

## 2024-02-29 RX ADMIN — ASCORBIC ACID, VITAMIN A PALMITATE, CHOLECALCIFEROL, THIAMINE HYDROCHLORIDE, RIBOFLAVIN-5 PHOSPHATE SODIUM, PYRIDOXINE HYDROCHLORIDE, NIACINAMIDE, DEXPANTHENOL, ALPHA-TOCOPHEROL ACETATE, VITAMIN K1, FOLIC ACID, BIOTIN, CYANOCOBALAMIN: 200; 3300; 200; 6; 3.6; 6; 40; 15; 10; 150; 600; 60; 5 INJECTION, SOLUTION INTRAVENOUS at 09:02

## 2024-02-29 RX ADMIN — TAMSULOSIN HYDROCHLORIDE 0.4 MG: 0.4 CAPSULE ORAL at 08:02

## 2024-02-29 RX ADMIN — LEVALBUTEROL HYDROCHLORIDE 1.25 MG: 1.25 SOLUTION RESPIRATORY (INHALATION) at 07:02

## 2024-02-29 RX ADMIN — LEUCINE, PHENYLALANINE, LYSINE, METHIONINE, ISOLEUCINE, VALINE, HISTIDINE, THREONINE, TRYPTOPHAN, ALANINE, GLYCINE, ARGININE, PROLINE, SERINE, TYROSINE, SODIUM ACETATE, DIBASIC POTASSIUM PHOSPHATE, MAGNESIUM CHLORIDE, SODIUM CHLORIDE, CALCIUM CHLORIDE, DEXTROSE
311; 238; 247; 170; 255; 247; 204; 179; 77; 880; 438; 489; 289; 213; 17; 297; 261; 51; 77; 33; 5 INJECTION INTRAVENOUS at 03:02

## 2024-02-29 RX ADMIN — FUROSEMIDE 40 MG: 10 INJECTION, SOLUTION INTRAVENOUS at 03:02

## 2024-02-29 RX ADMIN — IPRATROPIUM BROMIDE 0.5 MG: 0.5 SOLUTION RESPIRATORY (INHALATION) at 07:02

## 2024-02-29 RX ADMIN — IPRATROPIUM BROMIDE 0.5 MG: 0.5 SOLUTION RESPIRATORY (INHALATION) at 01:02

## 2024-02-29 RX ADMIN — METOPROLOL TARTRATE 50 MG: 50 TABLET, FILM COATED ORAL at 08:02

## 2024-02-29 RX ADMIN — DULOXETINE HYDROCHLORIDE 60 MG: 30 CAPSULE, DELAYED RELEASE ORAL at 08:02

## 2024-02-29 RX ADMIN — SODIUM PHOSPHATE, MONOBASIC, MONOHYDRATE AND SODIUM PHOSPHATE, DIBASIC, ANHYDROUS 15 MMOL: 142; 276 INJECTION, SOLUTION INTRAVENOUS at 10:02

## 2024-02-29 RX ADMIN — METOPROLOL TARTRATE 10 MG: 1 INJECTION, SOLUTION INTRAVENOUS at 05:02

## 2024-02-29 RX ADMIN — MORPHINE SULFATE 4 MG: 4 INJECTION, SOLUTION INTRAMUSCULAR; INTRAVENOUS at 02:02

## 2024-02-29 RX ADMIN — LEVALBUTEROL HYDROCHLORIDE 1.25 MG: 1.25 SOLUTION RESPIRATORY (INHALATION) at 12:02

## 2024-02-29 RX ADMIN — MONTELUKAST 10 MG: 10 TABLET, FILM COATED ORAL at 08:02

## 2024-02-29 NOTE — ASSESSMENT & PLAN NOTE
Addended by: Cong Dolan on: 12/15/2021 10:10 AM     Modules accepted: Orders Due to alcohol withdrawal   - wean precedex  - CIWA trigged ativan   -patient counseled to quit drinking alcohol due to adverse health risk.

## 2024-02-29 NOTE — ASSESSMENT & PLAN NOTE
-patient has increasing abdominal distention and discomfort  -general surgery consultation, input appreciated.  Suggests ileus, less likely SBO.  -check KUB  -NG tube to LIS  -possible postoperative ileus  -increase mobility as tolerated  -patient was started on IV Zosyn  -trend WBC 18

## 2024-02-29 NOTE — ASSESSMENT & PLAN NOTE
- post op care per surgery   -aspirin mg daily metoprolol 10 mg IV q.12 hours, Lasix 20 mg IV daily, losartan 25 mg daily, metoprolol 25 mg p.o. t.i.d.

## 2024-02-29 NOTE — CARE UPDATE
02/28/24 2010   Patient Assessment/Suction   Level of Consciousness (AVPU) alert   Respiratory Effort Normal;Unlabored   Expansion/Accessory Muscles/Retractions no use of accessory muscles   All Lung Fields Breath Sounds coarse   Rhythm/Pattern, Respiratory unlabored;pattern regular   Cough Frequency infrequent   Cough Type fair;congested   Skin Integrity   $ Wound Care Tech Time 15 min   Area Observed Left;Right;Behind ear;Nares   Skin Appearance without discoloration   PRE-TX-O2   Device (Oxygen Therapy) nasal cannula   Flow (L/min) 10   SpO2 100 %   Pulse Oximetry Type Continuous   $ Pulse Oximetry - Multiple Charge Pulse Oximetry - Multiple   Pulse (!) 120   Resp (!) 25   Aerosol Therapy   $ Aerosol Therapy Charges Aerosol Treatment   Daily Review of Necessity (SVN) completed   Respiratory Treatment Status (SVN) given   Treatment Route (SVN) mouthpiece  (ipv)   Patient Position (SVN) HOB elevated   Post Treatment Assessment (SVN) increased aeration   Signs of Intolerance (SVN) none   Breath Sounds Post-Respiratory Treatment   Throughout All Fields Post-Treatment All Fields   Throughout All Fields Post-Treatment aeration increased   Post-treatment Heart Rate (beats/min) 120   Post-treatment Resp Rate (breaths/min) 25   Intrapulmonary Percussive Ventilation/Metaneb   $ IPV Administration Therapy   Daily Review of Necessity (IPV) completed   Route (IPV) mouthpiece   IPV Operational Pressure (PSI) 35   IPV Duration Total (min) 15   Patient Position (IPV) HOB elevated   Post Treatment Assessment (IPV) increased aeration   Signs of Intolerance (IPV) none   Preset CPAP/BiPAP Settings   Mode Of Delivery BiPAP;Standby   Education   $ Education Bronchodilator;15 min

## 2024-02-29 NOTE — PROGRESS NOTES
[facility] Medicine  Progress Note    Patient Name: Jose F Hanley  MRN: [id number]  Patient Class: IP- Surgery Admit   Admission Date: 2/23/2024  Length of Stay: 6 days  Attending Physician: Aravind Albarran MD  Primary Care Provider: Sunita Rivera MD        Subjective:     Principal Problem:S/P CABG (coronary artery bypass graft)        HPI:  Patient is 78 year old male with history of Afib, alcohol use, HFpEF, COPD, hypothyroidism, CAD was admitted to ICU after elective CABG. He underwent heart catheterization and coronary angiography showing multivessel coronary artery disease.  He was referred for consideration of redo coronary artery bypass grafting.    He has had a TAVR and Watchman. Recent studies were reviewed.  The patient has significant recurrent coronary artery disease.  Post operatively patient developed alcohol withdrawal and delirium, was started on precedex. Patient was extubated and still on BiPAP.  Hospitalist was consulted for medical management. .    Overview/Hospital Course:  Patient was post op admitted to ICU. Extubated to BiPAP, developed encephalopathy and now on precedex. Post op care per CTS. Cardiology also following. He is post op D4 today, still has chest and mediastinal tubes and pacer wires. Balloon pump was removed 2/25.  Patient complained of increasing abdominal distention and pain.  Discussed checking KUB, and inserting NG tube to LIS, patient may possibly have ileus.  Patient is seen by General surgery, recommend conservative care for now, possible ileus.  Patient with cough and congestion, WBC up to 18.  Patient was started on IV Zosyn    Interval History:  Patient seen today for follow-up care.  He seems little more alert today.  Patient continues to have cough and congestion.  He would several liquid stools with the lactulose.  Patient was seen by General surgery for concerns of abdominal distention, KUB suggests possible ileus.  Patient was started  on IV Zosyn due to concerns of leukocytosis, possible risk of aspiration, and ileus.  Patient follow up with Pulmonary, had ABG done showed pCO2 of 53.  Patient encouraged to wears BiPAP at night.  Patient can have ice chips and sips of water as tolerated per General surgery, otherwise NPO for now.  Patient on TPN until bowel function returns.      Review of Systems   Constitutional: Negative.    HENT:  Positive for congestion.    Eyes: Negative.    Respiratory:  Positive for cough.    Cardiovascular: Negative.    Gastrointestinal:  Positive for abdominal distention.   Endocrine: Negative.    Genitourinary: Negative.    Musculoskeletal: Negative.    Allergic/Immunologic: Negative.    Neurological: Negative.    Hematological: Negative.    Psychiatric/Behavioral: Negative.       Objective:     Vital Signs (Most Recent):  Temp: 99.1 °F (37.3 °C) (02/29/24 0800)  Pulse: (!) 127 (02/29/24 1000)  Resp: 20 (02/29/24 0721)  BP: (!) 166/76 (02/29/24 1000)  SpO2: 97 % (02/29/24 1000) Vital Signs (24h Range):  Temp:  [97.8 °F (36.6 °C)-100.2 °F (37.9 °C)] 99.1 °F (37.3 °C)  Pulse:  [] 127  Resp:  [17-36] 20  SpO2:  [88 %-100 %] 97 %  BP: (121-173)/() 166/76     Weight: 125 kg (275 lb 9.2 oz)  Body mass index is 37.37 kg/m².    Intake/Output Summary (Last 24 hours) at 2/29/2024 1117  Last data filed at 2/29/2024 0800  Gross per 24 hour   Intake 1620.83 ml   Output 5010 ml   Net -3389.17 ml         Physical Exam  Vitals reviewed.   Constitutional:       Appearance: Normal appearance. He is normal weight.   HENT:      Head: Normocephalic and atraumatic.      Right Ear: External ear normal.      Left Ear: External ear normal.      Nose: Nose normal.      Mouth/Throat:      Mouth: Mucous membranes are moist.      Pharynx: Oropharynx is clear.   Cardiovascular:      Rate and Rhythm: Tachycardia present. Rhythm irregular.      Pulses: Normal pulses.      Heart sounds: Normal heart sounds.   Pulmonary:      Effort:  "Pulmonary effort is normal.      Breath sounds: Normal breath sounds.   Abdominal:      General: Abdomen is flat. Bowel sounds are normal. There is distension.      Palpations: Abdomen is soft.      Comments: Abdominal distention gradually improving   Musculoskeletal:         General: Normal range of motion.      Cervical back: Normal range of motion and neck supple.   Skin:     General: Skin is warm.      Capillary Refill: Capillary refill takes less than 2 seconds.   Neurological:      General: No focal deficit present.      Mental Status: He is alert and oriented to person, place, and time. Mental status is at baseline.      Cranial Nerves: No cranial nerve deficit.   Psychiatric:         Mood and Affect: Mood normal.         Thought Content: Thought content normal.         Judgment: Judgment normal.             Significant Labs: All pertinent labs within the past 24 hours have been reviewed.  Blood Culture: No results for input(s): "LABBLOO" in the last 48 hours.  BMP:   Recent Labs   Lab 02/29/24  0346   *   *   K 3.7      CO2 33*   BUN 22   CREATININE 0.9   CALCIUM 8.9   MG 2.4     CBC:   Recent Labs   Lab 02/28/24  0340 02/28/24  1253 02/29/24  0346   WBC 14.48*  --  18.06*   HGB 8.3*  --  8.8*   HCT 28.0* 29* 30.0*     --  342     Urine Culture: No results for input(s): "LABURIN" in the last 48 hours.    Significant Imaging: I have reviewed all pertinent imaging results/findings within the past 24 hours.    Assessment/Plan:      * S/P CABG (coronary artery bypass graft) - x2 09/2010 - LIMA to LAD; SVG to OMB; grafts occluded 08/2012  - post op care per surgery   -aspirin mg daily metoprolol 10 mg IV q.12 hours, Lasix 20 mg IV daily, losartan 25 mg daily, metoprolol 25 mg p.o. t.i.d.      Ileus  Patient has Post-operative ileus which is adynamic in etiology which is stable. This is inherent to his procedure. Will treat conservatively with bowel rest, IV fluids, serial abdominal exams " and avoidance of GI paralytics such as narcotics or anti-spasmodics. Monitor patient closely.       Abdominal distention  -patient has increasing abdominal distention and discomfort  -general surgery consultation, input appreciated.  Suggests ileus, less likely SBO.  -check KUB  -NG tube to LIS  -possible postoperative ileus  -increase mobility as tolerated  -patient was started on IV Zosyn  -trend WBC 18      Acute respiratory failure with hypoxia and hypercapnia  Secondary to COPD exacerbation   BiPAP q.h.s.  - scheduled breathing Rx     Acute blood loss anemia  Expected post o  Hb stable at 8.3<8.8  -transfuse hemoglobin less than 7  - Cont to monitor     Acute encephalopathy  Due to alcohol withdrawal   - wean precedex  - CIWA trigged ativan   -patient counseled to quit drinking alcohol due to adverse health risk.      Severe obesity (BMI 35.0-39.9) with comorbidity  Body mass index is 37.37 kg/m². Morbid obesity complicates all aspects of disease management from diagnostic modalities to treatment. Weight loss encouraged and health benefits explained to patient.         Permanent atrial fibrillation  S/p watchman device   -S/P TAVR  - Cont metoprolol   -on Lovenox    Dyslipidemia  - cont Statin       CAD (coronary artery disease), native coronary artery  S/p CABG X 3 on 2/23  - post op care per CTS, chest tube and pacer wires to be removed per surgery. Balloon pump was removed 2/25  - Cont aspirin, Lipitor, ,metoprolol       VTE Risk Mitigation (From admission, onward)           Ordered     enoxaparin injection 60 mg  Every 24 hours         02/26/24 1519     Place DUTCH hose  Until discontinued         02/23/24 7024                    Discharge Planning   LEIA: 3/5/2024     Code Status: Full Code   Is the patient medically ready for discharge?:     Reason for patient still in hospital (select all that apply): Patient unstable, Patient trending condition, Treatment, Imaging, Consult recommendations, and PT / OT  recommendations  Discharge Plan A: Home, Home with family   Discharge Delays: None known at this time        Critical care time spent on the evaluation and treatment of severe organ dysfunction, review of pertinent labs and imaging studies, discussions with consulting providers and discussions with patient/family: 34 minutes.      Aravind Albarran MD  Department of Hospital Medicine   [facility]

## 2024-02-29 NOTE — SUBJECTIVE & OBJECTIVE
Interval History:  Patient seen today for follow-up care.  He seems little more alert today.  Patient continues to have cough and congestion.  He would several liquid stools with the lactulose.  Patient was seen by General surgery for concerns of abdominal distention, KUB suggests possible ileus.  Patient was started on IV Zosyn due to concerns of leukocytosis, possible risk of aspiration, and ileus.  Patient follow up with Pulmonary, had ABG done showed pCO2 of 53.  Patient encouraged to wears BiPAP at night.  Patient can have ice chips and sips of water as tolerated per General surgery, otherwise NPO for now.  Patient on TPN until bowel function returns.      Review of Systems   Constitutional: Negative.    HENT:  Positive for congestion.    Eyes: Negative.    Respiratory:  Positive for cough.    Cardiovascular: Negative.    Gastrointestinal:  Positive for abdominal distention.   Endocrine: Negative.    Genitourinary: Negative.    Musculoskeletal: Negative.    Allergic/Immunologic: Negative.    Neurological: Negative.    Hematological: Negative.    Psychiatric/Behavioral: Negative.       Objective:     Vital Signs (Most Recent):  Temp: 99.1 °F (37.3 °C) (02/29/24 0800)  Pulse: (!) 127 (02/29/24 1000)  Resp: 20 (02/29/24 0721)  BP: (!) 166/76 (02/29/24 1000)  SpO2: 97 % (02/29/24 1000) Vital Signs (24h Range):  Temp:  [97.8 °F (36.6 °C)-100.2 °F (37.9 °C)] 99.1 °F (37.3 °C)  Pulse:  [] 127  Resp:  [17-36] 20  SpO2:  [88 %-100 %] 97 %  BP: (121-173)/() 166/76     Weight: 125 kg (275 lb 9.2 oz)  Body mass index is 37.37 kg/m².    Intake/Output Summary (Last 24 hours) at 2/29/2024 1117  Last data filed at 2/29/2024 0800  Gross per 24 hour   Intake 1620.83 ml   Output 5010 ml   Net -3389.17 ml         Physical Exam  Vitals reviewed.   Constitutional:       Appearance: Normal appearance. He is normal weight.   HENT:      Head: Normocephalic and atraumatic.      Right Ear: External ear normal.      Left Ear:  "External ear normal.      Nose: Nose normal.      Mouth/Throat:      Mouth: Mucous membranes are moist.      Pharynx: Oropharynx is clear.   Cardiovascular:      Rate and Rhythm: Tachycardia present. Rhythm irregular.      Pulses: Normal pulses.      Heart sounds: Normal heart sounds.   Pulmonary:      Effort: Pulmonary effort is normal.      Breath sounds: Normal breath sounds.   Abdominal:      General: Abdomen is flat. Bowel sounds are normal. There is distension.      Palpations: Abdomen is soft.      Comments: Abdominal distention gradually improving   Musculoskeletal:         General: Normal range of motion.      Cervical back: Normal range of motion and neck supple.   Skin:     General: Skin is warm.      Capillary Refill: Capillary refill takes less than 2 seconds.   Neurological:      General: No focal deficit present.      Mental Status: He is alert and oriented to person, place, and time. Mental status is at baseline.      Cranial Nerves: No cranial nerve deficit.   Psychiatric:         Mood and Affect: Mood normal.         Thought Content: Thought content normal.         Judgment: Judgment normal.             Significant Labs: All pertinent labs within the past 24 hours have been reviewed.  Blood Culture: No results for input(s): "LABBLOO" in the last 48 hours.  BMP:   Recent Labs   Lab 02/29/24  0346   *   *   K 3.7      CO2 33*   BUN 22   CREATININE 0.9   CALCIUM 8.9   MG 2.4     CBC:   Recent Labs   Lab 02/28/24  0340 02/28/24  1253 02/29/24  0346   WBC 14.48*  --  18.06*   HGB 8.3*  --  8.8*   HCT 28.0* 29* 30.0*     --  342     Urine Culture: No results for input(s): "LABURIN" in the last 48 hours.    Significant Imaging: I have reviewed all pertinent imaging results/findings within the past 24 hours.  "

## 2024-02-29 NOTE — PLAN OF CARE
02/29/24 1150   Discharge Reassessment   Assessment Type Discharge Planning Reassessment   Did the patient's condition or plan change since previous assessment? Yes   Discharge Plan discussed with: Spouse/sig other   Name(s) and Number(s) Berenice   Communicated LEIA with patient/caregiver Date not available/Unable to determine   Discharge Plan A Home;Home with family   Discharge Plan B Home with family;Home Health   DME Needed Upon Discharge  none   Why the patient remains in the hospital Requires continued medical care   Post-Acute Status   Discharge Delays None known at this time     Pt remains with chest tubes, poss ileus, ng tube placed, wants to discharge home once medically ready

## 2024-02-29 NOTE — PT/OT/SLP EVAL
Occupational Therapy   Evaluation    Name: Jose F Hanley  MRN: [id number]  Admitting Diagnosis: S/P CABG (coronary artery bypass graft)  Recent Surgery: Procedure(s) (LRB):  CABG, REPEAT (N/A)  HARVEST-VEIN-ENDOVASCULAR (Right)  SURGICAL PROCUREMENT,ARTERY,RADIAL,FOR CABG  INSERTION, INTRA-AORTIC BALLOON PUMP (Right) 6 Days Post-Op    Recommendations:     Discharge Recommendations: High Intensity Therapy  Discharge Equipment Recommendations:  to be determined by next level of care  Barriers to discharge:   (increased assist with ADLs and mobility)    Assessment:     Jose F Hanley is a 78 y.o. male with a medical diagnosis of S/P CABG (coronary artery bypass graft).  Pt agreeable to OT evaluation this AM. Performance deficits affecting function: weakness, impaired endurance, impaired self care skills, impaired functional mobility, gait instability, impaired balance, decreased coordination, decreased upper extremity function, decreased lower extremity function, decreased safety awareness, pain, decreased ROM, impaired skin, edema, impaired cardiopulmonary response to activity, other (comment) (sternal precautions).      Rehab Prognosis: Fair; patient would benefit from acute skilled OT services to address these deficits and reach maximum level of function.       Plan:     Patient to be seen 6 x/week to address the above listed problems via self-care/home management, therapeutic activities, therapeutic exercises  Plan of Care Expires: 03/29/24  Plan of Care Reviewed with: spouse, patient    Subjective     Chief Complaint: pain and fatigue  Patient/Family Comments/goals: none stated    Occupational Profile:  Living Environment: Pt lives with wife in a 1 story home with ~ 6 PRECIOUS. Pt has a walk-in shower with a shower chair  Previous level of function: Independent with ADLs, IADLs, and mobility; Pt uses a rollator only when out in the community if needing to walk long distances.  Roles and Routines:   Equipment Used at  Home: shower chair, rollator, oxygen  Assistance upon Discharge: yes, from facility    Pain/Comfort:  Pain Rating 1:  (not rated)  Location 1:  (sternal site)  Pain Addressed 1: Reposition, Distraction, Cessation of Activity    Patients cultural, spiritual, Amish conflicts given the current situation:      Objective:     Communicated with: nursing prior to session.  Patient found HOB elevated with chest tube, NG tube, oxygen, fitzgerald catheter, peripheral IV, telemetry, pulse ox (continuous), blood pressure cuff, WISAM drain upon OT entry to room.    General Precautions: Standard, fall, sternal  Orthopedic Precautions:  none  Braces: N/A  Respiratory Status: Nasal cannula, flow 4 L/min    Occupational Performance:    Bed Mobility:    Patient completed Scooting/Bridging with total assistance and 3 persons  Patient completed Supine to Sit with total assistance and 3 persons  Patient completed Sit to Supine with total assistance and 3 persons  Pt required minimum assistance x 2 for EOB balance and required encouragement to stay sitting on EOB    Activities of Daily Living:  Feeding:  maximal assistance to hold cup of water while pt took a sip    Cognitive/Visual Perceptual:  Cognitive/Psychosocial Skills:     -       Follows Commands/attention:Follows one-step commands  -       Communication: clear/fluent  -       Safety awareness/insight to disability: impaired   -       Mood/Affect/Coping skills/emotional control: Appropriate to situation, Cooperative, and Flat affect    Physical Exam:  Balance:    -       Min A x 2 EOB balance  Upper Extremity Range of Motion:     -       Right Upper Extremity: ~45 degrees shldr flex AROM; WFL up to 90 degrees shldr flex AAROM; WFL AAROM distally   -       Left Upper Extremity: ~45 degrees shldr flex AROM; WFL up to 90 degrees shldr flex AAROM; WFL AAROM distally    Strength:    -       Right Upper Extremity:  fair   -       Left Upper Extremity:  fair   Fine/Gross Motor  Coordination: impaired due to weakness    AMPA 6 Click ADL:  AMPA Total Score: 12    Treatment & Education:  Pt educated on role of OT/POC, importance of OOB/EOB activity, use of call bell, and safety during ADLs, transfers, and functional mobility.    Patient left HOB elevated with all lines intact, call button in reach, and wife and nurse present    GOALS:   Multidisciplinary Problems       Occupational Therapy Goals          Problem: Occupational Therapy    Goal Priority Disciplines Outcome Interventions   Occupational Therapy Goal     OT, PT/OT     Description: Goals to be met by: 3/29/24     Patient will increase functional independence with ADLs by performing:    UE Dressing with Moderate Assistance.  LE Dressing with Moderate Assistance.  Grooming while seated with Supervision.  Toileting from bedside commode with Moderate Assistance for hygiene and clothing management.   Toilet transfer to bedside commode with Moderate Assistance.    All goals above complete while maintaining sternal precautions.                          History:     Past Medical History:   Diagnosis Date    A-fib     Allergies 2020    gets allergy shots    Arthritis 1973    right knee    Back pain     COPD (chronic obstructive pulmonary disease)     Diastolic heart failure     GERD (gastroesophageal reflux disease) 2015    HLD (hyperlipidemia)     HTN (hypertension)     Hx of LASIK     Hypothyroidism, unspecified 2012    Inflammatory disease of prostate, unspecified     out of medicine    Mild intermittent asthma, uncomplicated     Myocardial infarction     Neuropathy     On home oxygen therapy     3L NC  at night    PAD (peripheral artery disease)     Staph skin infection 2016    right thigh    TIA (transient ischemic attack) 2013         Past Surgical History:   Procedure Laterality Date    AORTOGRAPHY WITH SERIALOGRAPHY N/A 08/31/2023    Procedure: AORTOGRAM, WITH SERIALOGRAPHY;  Surgeon: Gary Thomas MD;  Location: [facility]  CATH/EP LAB;  Service: Peripheral Vascular;  Laterality: N/A;    CATARACT EXTRACTION Bilateral 2014    CORONARY ANGIOGRAPHY INCLUDING BYPASS GRAFTS WITH CATHETERIZATION OF LEFT HEART Left 01/12/2024    Procedure: ANGIOGRAM, CORONARY, INCLUDING BYPASS GRAFT, WITH LEFT HEART CATHETERIZATION;  Surgeon: Gregg Bush MD;  Location: [facility] CATH/EP LAB;  Service: Cardiology;  Laterality: Left;    CORONARY ARTERY BYPASS GRAFT  2010    ENDOSCOPIC HARVEST OF VEIN Right 2/23/2024    Procedure: HARVEST-VEIN-ENDOVASCULAR;  Surgeon: Gary Thomas MD;  Location: [facility];  Service: Cardiothoracic;  Laterality: Right;    INSERTION OF IMPLANTABLE LOOP RECORDER  2011    INSERTION OF INTRA-AORTIC BALLOON ASSIST DEVICE Right 2/23/2024    Procedure: INSERTION, INTRA-AORTIC BALLOON PUMP;  Surgeon: Gary Thomas MD;  Location: [facility];  Service: Cardiothoracic;  Laterality: Right;    INSERTION OF PACEMAKER  2021    does not have card with him for preadmit    INSTANTANEOUS WAVE-FREE RATIO (IFR) N/A 01/12/2024    Procedure: Instantaneous Wave-Free Ratio (IFR);  Surgeon: Gregg Bush MD;  Location: [facility] CATH/EP LAB;  Service: Cardiology;  Laterality: N/A;    PTA, ANTERIOR TIBIAL Left 08/31/2023    Procedure: PTA, Anterior Tibial;  Surgeon: Gary Thomas MD;  Location: [facility] CATH/EP LAB;  Service: Peripheral Vascular;  Laterality: Left;    PTA, SUPERFICIAL FEMORAL ARTERY Left 08/31/2023    Procedure: PTA, Superficial Femoral Artery;  Surgeon: Gary Thomas MD;  Location: [facility] CATH/EP LAB;  Service: Peripheral Vascular;  Laterality: Left;    REPEAT CORONARY ARTERY BYPASS GRAFTING N/A 2/23/2024    Procedure: CABG, REPEAT;  Surgeon: Gary Thomas MD;  Location: [facility] OR;  Service: Cardiothoracic;  Laterality: N/A;    SPLENECTOMY  2016    STENT, ANTERIOR TIBIAL Left 08/31/2023    Procedure: Stent, Anterior Tibial;  Surgeon: Gary Thomas MD;  Location: [facility] CATH/EP LAB;  Service: Peripheral  Vascular;  Laterality: Left;    SURGICAL PROCUREMENT, ARTERY, RADIAL, FOR CABG  2/23/2024    Procedure: SURGICAL PROCUREMENT,ARTERY,RADIAL,FOR CABG;  Surgeon: Gary Thomas MD;  Location: [facility];  Service: Cardiothoracic;;    watchman heart device  2019       Time Tracking:     OT Date of Treatment: 02/29/24  OT Start Time: 1051  OT Stop Time: 1116  OT Total Time (min): 25 min    Billable Minutes:Evaluation 10  Therapeutic Activity 15    2/29/2024

## 2024-02-29 NOTE — PLAN OF CARE
Problem: SLP  Goal: SLP Goal  Description: 1) Pt will consume Regular (IDDSI 7) textures and thin liquids (IDDSI 0) with functional oral phase and no overt s/s penetration/aspiration.    Outcome: Ongoing, Progressing       Clinical swallowing evaluation completed. Pt awake but lethargic. Weak. He presented with s/s possible pharyngeal dysphagia. REC NPO, for now, except ice chips. Will follow for ongoing swallow evaluation with goal for oral diet.

## 2024-02-29 NOTE — PT/OT/SLP EVAL
Speech Language Pathology Evaluation  Bedside Swallow    Patient Name:  Jose F Hanley   MRN:  [id number]  Admitting Diagnosis: S/P CABG (coronary artery bypass graft)    Recommendations:                 General Recommendations:   Ongoing swallow evaluation  Diet recommendations:  NPO, NPO (except ice chips)   Aspiration Precautions: Frequent oral care and Ice chips sparingly   General Precautions: Standard, aspiration, fall, NPO, sternal  Communication strategies:  provide increased time to answer and frequent reorientation    Assessment:     Jose F Hanley is a 78 y.o. male with an SLP diagnosis of Dysphagia. Clinical swallowing evaluation completed. Pt awake but lethargic. Weak. He presents with s/s possible pharyngeal dysphagia. Aspiration risk 2° respiratory status and lethargy. REC NPO, for now, except ice chips. Will follow for ongoing swallow evaluation with goal for oral diet.      History:   PER HPI:  This patient has coronary artery disease.  He has had progressive shortness of breath.  He underwent heart catheterization and coronary angiography showing multivessel coronary artery disease.  He was referred for consideration of redo coronary artery bypass grafting.    He has borderline diabetes  and hypertension.  He has a history of remote coronary artery bypass grafting a number of years ago.    He has had a TAVR and Watchman.  He is not a smoker.  He does have COPD.  He quit smoking 35 years ago.    Medicines are part of the epic record.  He does take daily aspirin.    On exam vital signs are stable.  Pupils are equal and round reactive to light.  Neck is supple.  Chest is equal breath sounds.  Heart is in a irregular rate and rhythm.  Abdomen is benign.  Perfusion to the legs and feet seems to be satisfactory.    Recent studies were reviewed.  The patient has significant recurrent coronary artery disease.  Consideration can be made for redo high-risk coronary artery bypass grafting.  The risks and potential  complications were discussed.  The patient will take this under advisement.    Past Medical History:   Diagnosis Date    A-fib     Allergies 2020    gets allergy shots    Arthritis 1973    right knee    Back pain     COPD (chronic obstructive pulmonary disease)     Diastolic heart failure     GERD (gastroesophageal reflux disease) 2015    HLD (hyperlipidemia)     HTN (hypertension)     Hx of LASIK     Hypothyroidism, unspecified 2012    Inflammatory disease of prostate, unspecified     out of medicine    Mild intermittent asthma, uncomplicated     Myocardial infarction     Neuropathy     On home oxygen therapy     3L NC  at night    PAD (peripheral artery disease)     Staph skin infection 2016    right thigh    TIA (transient ischemic attack) 2013       Past Surgical History:   Procedure Laterality Date    AORTOGRAPHY WITH SERIALOGRAPHY N/A 08/31/2023    Procedure: AORTOGRAM, WITH SERIALOGRAPHY;  Surgeon: Gary Thomas MD;  Location: [facility] CATH/EP LAB;  Service: Peripheral Vascular;  Laterality: N/A;    CATARACT EXTRACTION Bilateral 2014    CORONARY ANGIOGRAPHY INCLUDING BYPASS GRAFTS WITH CATHETERIZATION OF LEFT HEART Left 01/12/2024    Procedure: ANGIOGRAM, CORONARY, INCLUDING BYPASS GRAFT, WITH LEFT HEART CATHETERIZATION;  Surgeon: Gregg Bush MD;  Location: [facility] CATH/EP LAB;  Service: Cardiology;  Laterality: Left;    CORONARY ARTERY BYPASS GRAFT  2010    ENDOSCOPIC HARVEST OF VEIN Right 2/23/2024    Procedure: HARVEST-VEIN-ENDOVASCULAR;  Surgeon: Gary Thomas MD;  Location: [facility] OR;  Service: Cardiothoracic;  Laterality: Right;    INSERTION OF IMPLANTABLE LOOP RECORDER  2011    INSERTION OF INTRA-AORTIC BALLOON ASSIST DEVICE Right 2/23/2024    Procedure: INSERTION, INTRA-AORTIC BALLOON PUMP;  Surgeon: Gary Thomas MD;  Location: [facility] OR;  Service: Cardiothoracic;  Laterality: Right;    INSERTION OF PACEMAKER  2021    does not have card with him for preadmit    INSTANTANEOUS  WAVE-FREE RATIO (IFR) N/A 01/12/2024    Procedure: Instantaneous Wave-Free Ratio (IFR);  Surgeon: Gregg Bush MD;  Location: [facility] CATH/EP LAB;  Service: Cardiology;  Laterality: N/A;    PTA, ANTERIOR TIBIAL Left 08/31/2023    Procedure: PTA, Anterior Tibial;  Surgeon: Gary Thomas MD;  Location: [facility] CATH/EP LAB;  Service: Peripheral Vascular;  Laterality: Left;    PTA, SUPERFICIAL FEMORAL ARTERY Left 08/31/2023    Procedure: PTA, Superficial Femoral Artery;  Surgeon: Gary Thomas MD;  Location: [facility] CATH/EP LAB;  Service: Peripheral Vascular;  Laterality: Left;    REPEAT CORONARY ARTERY BYPASS GRAFTING N/A 2/23/2024    Procedure: CABG, REPEAT;  Surgeon: Gary Thomas MD;  Location: [facility] OR;  Service: Cardiothoracic;  Laterality: N/A;    SPLENECTOMY  2016    STENT, ANTERIOR TIBIAL Left 08/31/2023    Procedure: Stent, Anterior Tibial;  Surgeon: Gary Thomas MD;  Location: [facility] CATH/EP LAB;  Service: Peripheral Vascular;  Laterality: Left;    SURGICAL PROCUREMENT, ARTERY, RADIAL, FOR CABG  2/23/2024    Procedure: SURGICAL PROCUREMENT,ARTERY,RADIAL,FOR CABG;  Surgeon: Gary Thomas MD;  Location: [facility] OR;  Service: Cardiothoracic;;    watchman heart device  2019       Social History: Patient lives with wife.    Prior Intubation HX:  2/23/24-2/24/23 (CABG)    Modified Barium Swallow: NA    Imaging:     X-Ray Chest AP Portable  Order: [id number]  Status: Final result       Visible to patient: No (inaccessible in Patient Portal)       Next appt: None    0 Result Notes  Details    Reading Physician Reading Date Result Priority   West Badillo MD  [phone] 2/29/2024      Narrative & Impression  Reason: Atelectasis     FINDINGS:     Portable chest with comparison chest x-ray February 28, 2024 show unchanged enlarged cardiomediastinal silhouette. Median sternotomy wires and left loop recorder device noted. Right chest tube is unchanged. These chest drain noted.  "Nasogastric tube is unchanged. Cardiac prosthetic valve again noted.  Hazy ill-defined opacification of the bilateral mid to lower lungs and interstitial prominence is unchanged. Pulmonary vasculature is normal. No acute osseous abnormality.     IMPRESSION:     No significant change from prior exam.     Electronically signed by:  West Badillo DO  02/29/2024 09:32 AM CST Workstation: [id number]           Specimen Collected: 02/29/24 09:14 CST Last Resulted: 02/29/24 09:32 CST              Prior diet: Regular/thin    Subjective     "I have to have some water."       Pain/Comfort:  Pain Rating 1: 0/10    Respiratory Status: Nasal cannula, flow 4 L/min    Objective:   Pt seen in ICU for clinical swallow evaluation. He is awake and cooperative. Lethargic. Oriented x3. Appears weak. +wet breath sounds prior to PO trials. +NGT to R nare.     Oral Musculature Evaluation  Oral Musculature: general weakness  Dentition: present and adequate  Secretion Management: adequate  Mucosal Quality: adequate  Mandibular Strength and Mobility: WFL  Oral Labial Strength and Mobility: WFL  Lingual Strength and Mobility: WFL  Volitional Cough: strong; productive  Volitional Swallow: able to palpate laryngeal movment  Voice Prior to PO Intake: clear. +upper airway noise    Bedside Swallow Eval:   Consistencies Assessed:  Thin liquids --ice chips. Water via tsp and straw  Puree --applesauce      Oral Phase:   WFL    Pharyngeal Phase:   Ice chips--delayed cough across 1 of 3 trials  Inconsistent cough noted with thin liquids via tsp and straw  +wet breath sounds which were also present prior to PO trials. Change in breath sounds observed with thin liquids    Compensatory Strategies  None    Treatment: Pt/family educated re: results/recs of evaluation, SLP role and POC. Receptive to information provided.     Goals:   Multidisciplinary Problems       SLP Goals          Problem: SLP    Goal Priority Disciplines Outcome   SLP Goal     SLP " Ongoing, Progressing   Description: 1) Pt will consume Regular (IDDSI 7) textures and thin liquids (IDDSI 0) with functional oral phase and no overt s/s penetration/aspiration.                         Plan:     Patient to be seen:  5 x/week   Plan of Care expires:  03/10/24  Plan of Care reviewed with:      SLP Follow-Up:  Yes       Discharge recommendations:  High Intensity Therapy   Barriers to Discharge:  Level of Skilled Assistance Needed .    Time Tracking:     SLP Treatment Date:   02/29/24  Speech Start Time:  1117  Speech Stop Time:  1133     Speech Total Time (min):  16 min    Billable Minutes: Treatment Swallowing Dysfunction 8, Eval Swallow and Oral Function 8, and Total Time 16    02/29/2024

## 2024-02-29 NOTE — CONSULTS
GENERAL SURGERY  INPATIENT CONSULT    REASON FOR CONSULT: Abdominal distention    HPI: Jose F Hanley is a 78 y.o. male status post redo CABG on 02/23/2024 complicated by postoperative delirium likely related to alcohol withdrawal who developed abdominal distention. Abdominal x-ray was obtained which showed dilated loops of small bowel.  NG tube was placed.  General surgery was consulted for evaluation.  Patient seen at bedside this morning.  Cooperative but not oriented. Nurse reports bowel movements with lactulose but no spontaneous bowel movements.  NG tube had large amount of bilious output. Abdomen has evidence of previous surgery but patient was unable to tell me what surgery she is undergone.    I have reviewed the patient's chart including prior progress notes, procedures and testing.     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    Shortness of breath    History of transcatheter aortic valve replacement (TAVR)    Permanent atrial fibrillation    Presence of Watchman left atrial appendage closure device    Severe obesity (BMI 35.0-39.9) with comorbidity    Chronic diastolic congestive heart failure    Gangrene    Coronary artery disease involving coronary bypass graft of native heart without angina pectoris    Acute encephalopathy    Acute blood loss anemia    Acute respiratory failure with hypoxia and hypercapnia    Abdominal distention         HISTORY  Past Medical History:   Diagnosis Date     A-fib     Allergies 2020    gets allergy shots    Arthritis 1973    right knee    Back pain     COPD (chronic obstructive pulmonary disease)     Diastolic heart failure     GERD (gastroesophageal reflux disease) 2015    HLD (hyperlipidemia)     HTN (hypertension)     Hx of LASIK     Hypothyroidism, unspecified 2012    Inflammatory disease of prostate, unspecified     out of medicine    Mild intermittent asthma, uncomplicated     Myocardial infarction     Neuropathy     On home oxygen therapy     3L NC  at night    PAD (peripheral artery disease)     Staph skin infection 2016    right thigh    TIA (transient ischemic attack) 2013       Past Surgical History:   Procedure Laterality Date    AORTOGRAPHY WITH SERIALOGRAPHY N/A 08/31/2023    Procedure: AORTOGRAM, WITH SERIALOGRAPHY;  Surgeon: Gary Thomas MD;  Location: [facility] CATH/EP LAB;  Service: Peripheral Vascular;  Laterality: N/A;    CATARACT EXTRACTION Bilateral 2014    CORONARY ANGIOGRAPHY INCLUDING BYPASS GRAFTS WITH CATHETERIZATION OF LEFT HEART Left 01/12/2024    Procedure: ANGIOGRAM, CORONARY, INCLUDING BYPASS GRAFT, WITH LEFT HEART CATHETERIZATION;  Surgeon: Gregg Bush MD;  Location: [facility] CATH/EP LAB;  Service: Cardiology;  Laterality: Left;    CORONARY ARTERY BYPASS GRAFT  2010    ENDOSCOPIC HARVEST OF VEIN Right 2/23/2024    Procedure: HARVEST-VEIN-ENDOVASCULAR;  Surgeon: Gary Thomas MD;  Location: [facility] OR;  Service: Cardiothoracic;  Laterality: Right;    INSERTION OF IMPLANTABLE LOOP RECORDER  2011    INSERTION OF INTRA-AORTIC BALLOON ASSIST DEVICE Right 2/23/2024    Procedure: INSERTION, INTRA-AORTIC BALLOON PUMP;  Surgeon: Gary Thomas MD;  Location: [facility] OR;  Service: Cardiothoracic;  Laterality: Right;    INSERTION OF PACEMAKER  2021    does not have card with him for preadmit    INSTANTANEOUS WAVE-FREE RATIO (IFR) N/A 01/12/2024    Procedure: Instantaneous Wave-Free Ratio (IFR);  Surgeon: Gregg Bush MD;   Location: [facility] CATH/EP LAB;  Service: Cardiology;  Laterality: N/A;    PTA, ANTERIOR TIBIAL Left 2023    Procedure: PTA, Anterior Tibial;  Surgeon: Gary Thomas MD;  Location: [facility] CATH/EP LAB;  Service: Peripheral Vascular;  Laterality: Left;    PTA, SUPERFICIAL FEMORAL ARTERY Left 2023    Procedure: PTA, Superficial Femoral Artery;  Surgeon: Gary Thomas MD;  Location: [facility] CATH/EP LAB;  Service: Peripheral Vascular;  Laterality: Left;    REPEAT CORONARY ARTERY BYPASS GRAFTING N/A 2024    Procedure: CABG, REPEAT;  Surgeon: Gary Thomas MD;  Location: [facility] OR;  Service: Cardiothoracic;  Laterality: N/A;    SPLENECTOMY  2016    STENT, ANTERIOR TIBIAL Left 2023    Procedure: Stent, Anterior Tibial;  Surgeon: Gary Thomas MD;  Location: [facility] CATH/EP LAB;  Service: Peripheral Vascular;  Laterality: Left;    SURGICAL PROCUREMENT, ARTERY, RADIAL, FOR CABG  2024    Procedure: SURGICAL PROCUREMENT,ARTERY,RADIAL,FOR CABG;  Surgeon: Gary Thomas MD;  Location: [facility] OR;  Service: Cardiothoracic;;    watchman heart device  2019       Social History     Tobacco Use    Smoking status: Former     Current packs/day: 0.00     Types: Cigarettes     Quit date: 1987     Years since quittin.2    Smokeless tobacco: Never   Substance Use Topics    Alcohol use: Not Currently     Alcohol/week: 21.0 standard drinks of alcohol     Types: 21 Drinks containing 0.5 oz of alcohol per week     Comment: ed roberto mixed drinks    Drug use: No       History reviewed. No pertinent family history.      MEDS:  No current facility-administered medications on file prior to encounter.     Current Outpatient Medications on File Prior to Encounter   Medication Sig Dispense Refill    acetaminophen (TYLENOL) 325 MG tablet Take 650 mg by mouth every 6 (six) hours as needed.      albuterol (PROVENTIL/VENTOLIN HFA) 90 mcg/actuation inhaler Inhale 2 puffs into the lungs every 6  (six) hours as needed.      aspirin 81 MG Chew Take 81 mg by mouth once daily.      betamethasone valerate 0.1% (VALISONE) 0.1 % Lotn Apply to scalp bid prn rash      cyanocobalamin 500 MCG tablet 500 mcg.      cyclobenzaprine (FLEXERIL) 10 MG tablet Take 10 mg by mouth every 8 (eight) hours as needed.      diphenhydrAMINE (BENADRYL) 50 MG capsule 50 mg every 6 (six) hours as needed.      doxepin (SINEQUAN) 50 MG capsule Take 50 mg by mouth every evening.      DULoxetine (CYMBALTA) 60 MG capsule 60 mg nightly.      EPINEPHrine (EPIPEN) 0.3 mg/0.3 mL AtIn INJECT 0.3MG/0.3ML SUBCUTANEOUSLY (UNDER THE SKIN)  AS NEEDED FOR ALLERGIC REACTIONS SELF-INJECTOR PEN      furosemide (LASIX) 20 MG tablet Take 20 mg by mouth once daily.      gabapentin (NEURONTIN) 400 MG capsule 800 mg 2 (two) times daily.      hydrocodone-homatropine 5-1.5 mg/5 ml (HYCODAN) 5-1.5 mg/5 mL Syrp Take by mouth every 6 (six) hours as needed. cough      levothyroxine (SYNTHROID) 50 MCG tablet 50 mcg.      metOLazone (ZAROXOLYN) 5 MG tablet 5 mg daily as needed. For weight gain >2lbs      montelukast (SINGULAIR) 10 mg tablet 10 mg once daily.      nitroGLYCERIN (NITROSTAT) 0.4 MG SL tablet Place 0.4 mg under the tongue every 5 (five) minutes as needed.      omega-3 fatty acids/fish oil (FISH OIL-OMEGA-3 FATTY ACIDS) 300-1,000 mg capsule Take 1 capsule by mouth 2 (two) times daily.      omeprazole (PRILOSEC) 20 MG capsule 40 mg once daily.      potassium chloride SA (K-DUR,KLOR-CON) 20 MEQ tablet 20 mEq nightly.      ranolazine (RANEXA) 1,000 mg Tb12 Take 1 tablet (1,000 mg total) by mouth 2 (two) times daily. 180 tablet 3    senna-docusate 8.6-50 mg (PERICOLACE) 8.6-50 mg per tablet nightly.      spironolactone (ALDACTONE) 50 MG tablet Take 50 mg by mouth once daily.      tamsulosin (FLOMAX) 0.4 mg Cap 0.4 mg.      TESTOSTERONE PROPIONATE, BULK, MISC Inject 1 each as directed every 14 (fourteen) days.      TOPROL XL 25 mg 24 hr tablet Take 25 mg by  mouth 2 (two) times daily.      torsemide (DEMADEX) 20 MG Tab Take 1 tablet (20 mg total) by mouth once daily. (Patient not taking: Reported on 12/21/2023) 30 tablet 11       ALLERGIES:  Review of patient's allergies indicates:   Allergen Reactions    Adhes. band-tape-benzalkonium Rash     Pt stated it caused blisters    Statins-hmg-coa reductase inhibitors Other (See Comments)     Statin Intolerance (joint pain)         VITALS:  Temp:  [97.8 °F (36.6 °C)-100.2 °F (37.9 °C)] 99.1 °F (37.3 °C)  Pulse:  [] 124  Resp:  [17-36] 20  SpO2:  [88 %-100 %] 98 %  BP: (121-173)/() 173/79    I/O last 3 completed shifts:  In: 1878.8 [I.V.:1578.8; IV Piggyback:300]  Out: 5725 [Urine:3750; Drains:1895; Chest Tube:80]      PHYSICAL EXAM:  Physical Exam  Vitals reviewed.   Constitutional:       Appearance: He is obese.   HENT:      Nose:      Comments: NG tube in place with some bilious output and canister  Eyes:      General: No scleral icterus.  Cardiovascular:      Rate and Rhythm: Tachycardia present. Rhythm irregular.      Comments: Chest tubes in place  Abdominal:      General: There is distension (Distended but also unsure if patient was baseline as he is obese).      Palpations: There is no mass.      Tenderness: There is no abdominal tenderness. There is no guarding or rebound.   Musculoskeletal:      Cervical back: Normal range of motion and neck supple. No rigidity.   Skin:     General: Skin is warm.      Findings: No erythema.   Neurological:      Mental Status: He is alert. He is disoriented.      Motor: No weakness.           LABS:  Lab Results   Component Value Date    WBC 18.06 (H) 02/29/2024    RBC 3.81 (L) 02/29/2024    HGB 8.8 (L) 02/29/2024    HCT 30.0 (L) 02/29/2024    HCT 29 (L) 02/28/2024     02/29/2024     Lab Results   Component Value Date     (H) 02/29/2024     (H) 02/29/2024    K 3.7 02/29/2024     02/29/2024    CO2 33 (H) 02/29/2024    BUN 22 02/29/2024    CREATININE  0.9 02/29/2024    CALCIUM 8.9 02/29/2024     Lab Results   Component Value Date    ALT 12 02/26/2024    AST 32 02/26/2024    ALKPHOS 24 (L) 02/26/2024    BILITOT 1.1 (H) 02/26/2024     Lab Results   Component Value Date    MG 2.4 02/29/2024    PHOS 2.4 (L) 02/29/2024       STUDIES:  Images and reports were personally reviewed.    Abdominal x-ray  FINDINGS:     3 views of the abdomen demonstrate gas dilated loops of small and large bowel. Gas dilated loops of small bowel measure up to 4.6 cm in diameter. Nasogastric tube is in place with tip below the gastroesophageal junction. Surgical drains noted. There is blunting of the left costophrenic angle with left basilar hazy opacification consistent with small left pleural effusion and overlying passive atelectasis or consolidation.     IMPRESSION:     1.  Gas dilated loops of large and small bowel with small bowel measuring up to 4.6 cm in diameter. Findings could reflect postoperative ileus versus distal obstruction. Ileus favored. Continued follow-up recommended.  2.  Small left pleural effusion with overlying passive atelectasis or consolidation    ASSESSMENT & PLAN:  78 y.o. male with abdominal distention status post redo CABG, alcohol withdrawal delirium   -abdominal distention present and x-ray concerning for ileus versus obstruction   -likely ileus given recent surgery and current medical condition however also have to consider obstruction, patient was unable to provide history the chart reports he does have a history of splenectomy  -recommend continue NG tube to suction for now, keep NPO but okay for sips of water in order to keep patient happy  -await return of spontaneous bowel function, bowels are still hypo active and would hold off on lactulose for now  -if patient begins passing flatus or bowel movement spontaneously can clamp NG tube and allow for clear liquids as tolerated but would go slow due to concern for aspiration due to his disorientation and  occasional delirium   -if he fails to improve will obtain CT abdomen pelvis to evaluate for obstruction   -optimize electrolytes  -leukocytosis may be secondary to splenectomy however he does have fevers, I am okay with prophylactic antibiotics for possible previous aspiration pneumonia if suspected

## 2024-02-29 NOTE — NURSING
78 yr old male  s/p CABG  Chest tubes x 2  yoandy drain x1  ng tube   Ag dressing to the right arm harvest site   Dressing tot he left arm  nursing changing   Sacral clear   Heels clear   Bridge of nose 2x2x.1   Clean with chlorhexidine/ns.  Pat dry. Apply Medihoney Cover with large band aid.

## 2024-02-29 NOTE — PROGRESS NOTES
Pulmonary/Critical Care Progress Note      PATIENT NAME: Jose F Hanley  MRN: [id number]  TODAY'S DATE: 2024  1:01 PM  ADMIT DATE: 2024  AGE: 78 y.o. : 1945    CONSULT REQUESTED BY: Aravind Albarran MD    REASON FOR CONSULT:   Respiratory failure    HPI:  The patient is a 78-year-old gentleman who underwent coronary artery bypass grafting.  He is recovering from DTs, he is extremely morbidly obese, he is in AFib with RVR, and he is not clearing his secretions.  He is off of Precedex but only by a few hours and is lethargic.     the patient remains quite lethargic.    REVIEW OF SYSTEMS  Unable to obtain    ALLERGIES  Review of patient's allergies indicates:   Allergen Reactions    Adhes. band-tape-benzalkonium Rash     Pt stated it caused blisters    Statins-hmg-coa reductase inhibitors Other (See Comments)     Statin Intolerance (joint pain)       INPATIENT SCHEDULED MEDICATIONS   aspirin  81 mg Oral Daily    DULoxetine  60 mg Oral Nightly    enoxparin  60 mg Subcutaneous Q24H (prophylaxis, 0)    furosemide (LASIX) injection  40 mg Intravenous Q12H    guaiFENesin  1,200 mg Oral BID    ipratropium  0.5 mg Nebulization Q6H    lactulose  20 g Oral BID    levalbuterol  1.25 mg Nebulization Q6H    levothyroxine  25 mcg Intravenous Daily    metoprolol  10 mg Intravenous Q4H    montelukast  10 mg Oral Daily    pantoprazole  40 mg Oral Before breakfast    piperacillin-tazobactam (Zosyn) IV (PEDS and ADULTS) (extended infusion is not appropriate)  3.375 g Intravenous Q8H    sodium chloride 0.9% 1,000 mL with mvi, (ADULT) no.4 with vit K 3,300 unit- 150 mcg/10 mL 10 mL, thiamine 100 mg, folic acid 1 mg infusion   Intravenous Daily    tamsulosin  0.4 mg Oral Daily      sodium chloride 0.45% 50 mL/hr at 24 1620    amino acid 4.25% - dextrose 5% (CLINIMIX-E) solution 100 mL/hr at 24 1940    dexmedeTOMIDine (Precedex) infusion (titrating) Stopped (24 1145)    insulin regular 1 units/mL  infusion orderable (CTS POST-OP) Stopped (02/24/24 0900)    phenylephrine Stopped (02/24/24 0401)       MEDICAL AND SURGICAL HISTORY  Past Medical History:   Diagnosis Date    A-fib     Allergies 2020    gets allergy shots    Arthritis 1973    right knee    Back pain     COPD (chronic obstructive pulmonary disease)     Diastolic heart failure     GERD (gastroesophageal reflux disease) 2015    HLD (hyperlipidemia)     HTN (hypertension)     Hx of LASIK     Hypothyroidism, unspecified 2012    Inflammatory disease of prostate, unspecified     out of medicine    Mild intermittent asthma, uncomplicated     Myocardial infarction     Neuropathy     On home oxygen therapy     3L NC  at night    PAD (peripheral artery disease)     Staph skin infection 2016    right thigh    TIA (transient ischemic attack) 2013   Obstructive sleep apnea, not tolerant of BiPAP  Past Surgical History:   Procedure Laterality Date    AORTOGRAPHY WITH SERIALOGRAPHY N/A 08/31/2023    Procedure: AORTOGRAM, WITH SERIALOGRAPHY;  Surgeon: Gary Thomas MD;  Location: [facility] CATH/EP LAB;  Service: Peripheral Vascular;  Laterality: N/A;    CATARACT EXTRACTION Bilateral 2014    CORONARY ANGIOGRAPHY INCLUDING BYPASS GRAFTS WITH CATHETERIZATION OF LEFT HEART Left 01/12/2024    Procedure: ANGIOGRAM, CORONARY, INCLUDING BYPASS GRAFT, WITH LEFT HEART CATHETERIZATION;  Surgeon: Gregg Bush MD;  Location: [facility] CATH/EP LAB;  Service: Cardiology;  Laterality: Left;    CORONARY ARTERY BYPASS GRAFT  2010    ENDOSCOPIC HARVEST OF VEIN Right 2/23/2024    Procedure: HARVEST-VEIN-ENDOVASCULAR;  Surgeon: Gary Thomas MD;  Location: [facility] OR;  Service: Cardiothoracic;  Laterality: Right;    INSERTION OF IMPLANTABLE LOOP RECORDER  2011    INSERTION OF INTRA-AORTIC BALLOON ASSIST DEVICE Right 2/23/2024    Procedure: INSERTION, INTRA-AORTIC BALLOON PUMP;  Surgeon: Gary Thomas MD;  Location: [facility] OR;  Service: Cardiothoracic;  Laterality: Right;     INSERTION OF PACEMAKER      does not have card with him for preadmit    INSTANTANEOUS WAVE-FREE RATIO (IFR) N/A 2024    Procedure: Instantaneous Wave-Free Ratio (IFR);  Surgeon: Gregg Bush MD;  Location: [facility] CATH/EP LAB;  Service: Cardiology;  Laterality: N/A;    PTA, ANTERIOR TIBIAL Left 2023    Procedure: PTA, Anterior Tibial;  Surgeon: Gary Thomas MD;  Location: [facility] CATH/EP LAB;  Service: Peripheral Vascular;  Laterality: Left;    PTA, SUPERFICIAL FEMORAL ARTERY Left 2023    Procedure: PTA, Superficial Femoral Artery;  Surgeon: Gary Thomas MD;  Location: [facility] CATH/EP LAB;  Service: Peripheral Vascular;  Laterality: Left;    REPEAT CORONARY ARTERY BYPASS GRAFTING N/A 2024    Procedure: CABG, REPEAT;  Surgeon: Gary Thomas MD;  Location: [facility] OR;  Service: Cardiothoracic;  Laterality: N/A;    SPLENECTOMY  2016    STENT, ANTERIOR TIBIAL Left 2023    Procedure: Stent, Anterior Tibial;  Surgeon: Gary Thomas MD;  Location: [facility] CATH/EP LAB;  Service: Peripheral Vascular;  Laterality: Left;    SURGICAL PROCUREMENT, ARTERY, RADIAL, FOR CABG  2024    Procedure: SURGICAL PROCUREMENT,ARTERY,RADIAL,FOR CABG;  Surgeon: Gary Thomas MD;  Location: [facility] OR;  Service: Cardiothoracic;;    watchman heart device         ALCOHOL, TOBACCO AND DRUG USE  Social History     Tobacco Use   Smoking Status Former    Current packs/day: 0.00    Types: Cigarettes    Quit date: 1987    Years since quittin.2   Smokeless Tobacco Never     Social History     Substance and Sexual Activity   Alcohol Use Not Currently    Alcohol/week: 21.0 standard drinks of alcohol    Types: 21 Drinks containing 0.5 oz of alcohol per week    Comment: ed roberto mixed drinks     Social History     Substance and Sexual Activity   Drug Use No       FAMILY HISTORY  History reviewed. No pertinent family history.    VITAL SIGNS (MOST RECENT)  Temp: 99.1 °F (37.3  °C) (02/29/24 0800)  Pulse: (!) 117 (02/29/24 0815)  Resp: 20 (02/29/24 0721)  BP: (!) 165/80 (02/29/24 0815)  SpO2: 99 % (02/29/24 0815)    INTAKE AND OUTPUT (LAST 24 HOURS):  Intake/Output Summary (Last 24 hours) at 2/29/2024 0847  Last data filed at 2/29/2024 0800  Gross per 24 hour   Intake 1620.83 ml   Output 5010 ml   Net -3389.17 ml       WEIGHT  Wt Readings from Last 1 Encounters:   02/23/24 125 kg (275 lb 9.2 oz)       PHYSICAL EXAM  GENERAL: Older patient in no distress.  HEENT: Pupils equal and reactive. Extraocular movements intact. Nose with NG tube in place; also nasal cannula in place.  Pharynx moist.  NECK: Supple.   HEART: Regular rate and rhythm. No murmur or gallop auscultated.  LUNGS:  There are audible rhonchi everywhere.  Patient will not coughing clear.  Respiratory ports that he does do well with the IPV.  Lung excursion symmetrical. No change in fremitus. No adventitial noises.  ABDOMEN:  Extremely obese.  Distended.  Bowel sounds present. Non-tender, no masses palpated.  : Normal anatomy.  Canas with some urine.  Not as much as she would expect post Lasix  EXTREMITIES: Normal muscle tone and joint movement, no cyanosis or clubbing.   LYMPHATICS: No adenopathy palpated, no edema.  SKIN: Dry, intact, no lesions.  Incisions dressed and dry  NEURO: Cranial nerves appear intact.  Motor strength is not formally tested.  Patient knows he is in the hospital and the year is 2024.  He thinks it is January.  The patient will not cough.  PSYCH:  Too lethargic to assess      CBC LAST (LAST 24 HOURS)  Recent Labs   Lab 02/29/24  0346   WBC 18.06*   RBC 3.81*   HGB 8.8*   HCT 30.0*   MCV 79*   MCH 23.1*   MCHC 29.3*   RDW 22.8*      MPV 10.8   GRAN 83.0*  15.0*   LYMPH 5.3*  1.0   MONO 10.4  1.9*   BASO 0.04   NRBC 3*       CHEMISTRY LAST (LAST 24 HOURS)  Recent Labs   Lab 02/28/24  1253 02/28/24  2137 02/29/24  0346   NA  --   --  151*   K  --    < > 3.7   CL  --   --  109   CO2  --   --  33*    ANIONGAP  --   --  9   BUN  --   --  22   CREATININE  --   --  0.9   GLU  --   --  164*   CALCIUM  --   --  8.9   PH 7.479*  --   --    MG  --   --  2.4    < > = values in this interval not displayed.         LAST 7 DAYS MICROBIOLOGY   Microbiology Results (last 7 days)       ** No results found for the last 168 hours. **            MOST RECENT IMAGING  X-Ray Abdomen AP 1 View  Reason: nausea and abdominal distention    FINDINGS:    3 views of the abdomen demonstrate gas dilated loops of small and large bowel. Gas dilated loops of small bowel measure up to 4.6 cm in diameter. Nasogastric tube is in place with tip below the gastroesophageal junction. Surgical drains noted. There is blunting of the left costophrenic angle with left basilar hazy opacification consistent with small left pleural effusion and overlying passive atelectasis or consolidation.    IMPRESSION:    1.  Gas dilated loops of large and small bowel with small bowel measuring up to 4.6 cm in diameter. Findings could reflect postoperative ileus versus distal obstruction. Ileus favored. Continued follow-up recommended.  2.  Small left pleural effusion with overlying passive atelectasis or consolidation.    Electronically signed by:  West Badillo DO  02/29/2024 08:05 AM CST Workstation: [id number]      CURRENT VISIT EKG  Results for orders placed or performed during the hospital encounter of 02/23/24   EKG 12-LEAD   Result Value Ref Range    QRS Duration 232 ms    OHS QTC Calculation 613 ms    Narrative    Test Reason : Z95.1,Z95.1,    Vent. Rate : 080 BPM     Atrial Rate : 087 BPM     P-R Int : 000 ms          QRS Dur : 232 ms      QT Int : 532 ms       P-R-T Axes : 000 168 250 degrees     QTc Int : 613 ms    Ventricular-paced rhythm  occasional atrila pacing  Abnormal ECG  When compared with ECG of 21-FEB-2024 09:33,  Electronic ventricular pacemaker has replaced Atrial fibrillation  Confirmed by Eleazar WOO, Gregg NEWTON ([id number]) on 2/24/2024 7:12:58  AM    Referred By: OLLIE RIBEIRO           Confirmed By:Gregg Bush MD       ECHOCARDIOGRAM RESULTS  Results for orders placed during the hospital encounter of 12/13/23    Echo    Interpretation Summary    Left Ventricle: The left ventricle is normal in size. Mildly increased wall thickness. There is mild concentric hypertrophy. Normal wall motion. There is normal systolic function with a visually estimated ejection fraction of 65 - 70%. There is normal diastolic function.    Right Ventricle: Mild right ventricular enlargement. Wall thickness is normal. Right ventricle wall motion  is normal. Systolic function is normal.    Left Atrium: Left atrium is moderately dilated.    Right Atrium: Right atrium is mildly dilated.    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    Tricuspid Valve: There is mild regurgitation with a centrally directed jet.    Pulmonic Valve: There is mild regurgitation with a centrally directed jet.    Pulmonary Artery: There is mild pulmonary hypertension. The estimated pulmonary artery systolic pressure is 40 mmHg.    IVC/SVC: Intermediate venous pressure at 8 mmHg.        Oxygen INFORMATION  Oxygen Concentration (%):  [3-35] 3    2 L       LAST ARTERIAL BLOOD GAS  ABG  Recent Labs   Lab 02/28/24  1253   PH 7.479*   PO2 55*   PCO2 32.0*   HCO3 23.8*   BE 0       IMPRESSION AND PLAN  Status post coronary artery bypass grafting  Alcoholic going through DTs  Encephalopathy  - now off of Precedex  - still very lethargic  - hypercapnic but pH is balanced  Acute hypercapnic hypoxemic respiratory failure  - 100% saturation on 3 L  - hypercapnic on today's ABG, pH is balanced  Bibasilar atelectasis  - continue IPV  - encourage patient to cough and clear  - Mucinex 1200 mg b.i.d.  Obstructive sleep apnea  - noncompliant with CPAP  - sleeps on 3 L at home  - will sleep on BiPAP whenever he is sleeping.  Nursing reports that the patient continued to pull off  his BiPAP last night.  - restrain patient if necessary to keep him on BiPAP tonight  - continue elevation of head of bed  Extreme morbid obesity  - patient does not oxygenate when flat  Atrial fib with RVR  - CV surgery and Cardiology following  Ileus  - NG tube has drained greater than 2 L  - KUB suggest ileus  Leukocytosis  - worse with left shift  - Zosyn started by hospitalist this morning  Anemia  - postop, expected  Hyperglycemia  - mild  - trend  Hypernatremia  - fluids held last night by nursing  - will bolus 250 cc and resume  Hypophosphatemia  - replace and trend    Discussed with nursing and Respiratory and hospitalist and the patient's wife.    Critical care time spent reviewing the chart, examining the patient, reviewing the labs, reviewing the radiological findings, discussing care with nursing, physicians, and respiratory and creating the note and  has been greater than 35 minutes.    Lesley Ang MD  Date of Service: 02/29/2024  1:01 PM

## 2024-02-29 NOTE — PROGRESS NOTES
CVT SURGERY PROGRESS NOTE  Jose F Hanley  78 y.o.  1945    Patient's Chief Complaint:  S/P CABG (coronary artery bypass graft)    HPI:  This patient has coronary artery disease.  He has had progressive shortness of breath.  He underwent heart catheterization and coronary angiography showing multivessel coronary artery disease.  He was referred for consideration of redo coronary artery bypass grafting.    He has borderline diabetes  and hypertension.  He has a history of remote coronary artery bypass grafting a number of years ago.    He has had a TAVR and Watchman.  He is not a smoker.  He does have COPD.  He quit smoking 35 years ago.    Medicines are part of the epic record.  He does take daily aspirin.    On exam vital signs are stable.  Pupils are equal and round reactive to light.  Neck is supple.  Chest is equal breath sounds.  Heart is in a irregular rate and rhythm.  Abdomen is benign.  Perfusion to the legs and feet seems to be satisfactory.    Recent studies were reviewed.  The patient has significant recurrent coronary artery disease.  Consideration can be made for redo high-risk coronary artery bypass grafting.  The risks and potential complications were discussed.  The patient will take this under advisement.    Last 24 hour interval:  -NGT placed yesterday due to distended abdomen with large amt of output. Started on abx for coarse rhonchi and leukocytosis with left shift.   -No events overnight.   -More alert today. Tolerating PO meds.   -BP elevated - started losartan 25mg   -HR improved with increased dose of Lopressor - will change to PO as patient now tolerating oral meds.   -Leukocytosis uptrending, remains afebrile. Abx as above.   -H/H stable.   -MS tube output last 24 hours - 60 cc, to suction, no air leak  -Bulb drain output last 24 hours - 70cc, to suction  -Good UOP, renal function stable. Canas in place  -Pt seen this AM, lying in bed on NC. More alert today. Complains of expected  post op pain. Had some liquid BMs after being given lactulose yesterday. Bowel sounds very quiet today. Denies flatus. NGT to suction with bilious output.     Subjective:  Symptoms:  Improved.    Diet:  NPO.  No nausea or vomiting.    Pain:  He complains of pain that is moderate.  Pain is requiring pain medication.                                                                   Objective:  General Appearance:  In no acute distress.    Vital signs: (most recent): Blood pressure (!) 166/76, pulse (!) 125, temperature 99.1 °F (37.3 °C), resp. rate 19, height 6' (1.829 m), weight 125 kg (275 lb 9.2 oz), SpO2 (!) 93 %.    Output: Producing urine.    HEENT: Normal HEENT exam.  (NGT in place, to suction, bilious output. )    Lungs:  There are rhonchi (coarse throughout).  No rales or wheezes.    Heart: Normal rate.  Regular rhythm.  No murmur, gallop or friction rub.   Abdomen: Abdomen is distended.  Hypoactive bowel sounds.   There is no abdominal tenderness.     Extremities: There is local swelling (BLE).    Pulses: Distal pulses are intact.    Neurological: Patient is alert and oriented to person, place and time.    Skin:  Warm and dry.      Recent Vitals:  Vitals:    02/29/24 0900 02/29/24 1000 02/29/24 1100 02/29/24 1156   BP: (!) 173/79 (!) 166/76     Pulse: (!) 124 (!) 127 (!) 125    Resp:    19   Temp:       TempSrc:       SpO2: 98% 97% (!) 93%    Weight:       Height:           INPATIENT MEDS   sodium chloride 0.45% 50 mL/hr at 02/27/24 1620    amino acid 4.25% - dextrose 5% (CLINIMIX-E) solution 100 mL/hr at 02/28/24 1940      aspirin  81 mg Oral Daily    DULoxetine  60 mg Oral Nightly    enoxparin  60 mg Subcutaneous Q24H (prophylaxis, 1700)    furosemide (LASIX) injection  40 mg Intravenous Q12H    guaiFENesin  1,200 mg Oral BID    ipratropium  0.5 mg Nebulization Q6H    lactulose  20 g Oral BID    levalbuterol  1.25 mg Nebulization Q6H    levothyroxine  25 mcg Intravenous Daily    losartan  25 mg Oral Daily     metoprolol  5 mg Intravenous Q6H    montelukast  10 mg Oral Daily    pantoprazole  40 mg Oral Before breakfast    piperacillin-tazobactam (Zosyn) IV (PEDS and ADULTS) (extended infusion is not appropriate)  3.375 g Intravenous Q8H    sodium chloride 0.9% 1,000 mL with mvi, (ADULT) no.4 with vit K 3,300 unit- 150 mcg/10 mL 10 mL, thiamine 100 mg, folic acid 1 mg infusion   Intravenous Daily    tamsulosin  0.4 mg Oral Daily     0.9%  NaCl infusion (for blood administration), albuterol sulfate, calcium gluconate IVPB, calcium gluconate IVPB, calcium gluconate IVPB, dextrose 50% in water (D50W), dextrose 50% in water (D50W), HYDROcodone-acetaminophen, HYDROmorphone, lorazepam, magnesium sulfate IVPB, magnesium sulfate IVPB, ondansetron, potassium chloride in water, potassium chloride in water, potassium chloride in water **AND** potassium chloride in water **AND** potassium chloride in water, potassium chloride in water, sodium phosphate 15 mmol in dextrose 5 % (D5W) 250 mL IVPB, sodium phosphate 20.01 mmol in dextrose 5 % (D5W) 250 mL IVPB, sodium phosphate 30 mmol in dextrose 5 % (D5W) 250 mL IVPB  HEMODYNAMICS      Recent O2 Therapy/Vent Settings: 4L NC    I/O last 24 hrs:  Intake/Output - Last 3 Shifts         02/27 0700  02/28 0659 02/28 0700  02/29 0659 02/29 0700  03/01 0659    I.V. (mL/kg) 2308.1 (18.5) 1320.8 (10.6)     IV Piggyback  300     Total Intake(mL/kg) 2308.1 (18.5) 1620.8 (13)     Urine (mL/kg/hr) 2700 (0.9) 3000 (1) 125 (0.2)    Drains 70 1845     Stool  0     Chest Tube 60 40     Total Output 2830 4885 125    Net -521.9 -3264.2 -125           Stool Occurrence  1 x           Recent Cardiac Rhythm: a-fib, rates 120s    Recent Pain Assessment: 6    CBC  Recent Labs   Lab 02/27/24  0339 02/28/24  0340 02/29/24  0346   WBC 15.40* 14.48* 18.06*   RBC 3.56* 3.59* 3.81*   HGB 8.3* 8.3* 8.8*   * 216 342   MCV 77* 78* 79*   MCH 23.3* 23.1* 23.1*   MCHC 30.3* 29.6* 29.3*       BMP  Recent Labs   Lab  "02/27/24  0339 02/28/24  0340 02/29/24  0346   CO2 23 23 33*   BUN 20 17 22   CREATININE 0.7 0.7 0.9   CALCIUM 7.8* 8.0* 8.9       CARDIAC ENZYMES  No results for input(s): "TROPONINI", "CPK", "CKMB" in the last 168 hours.  BNP  No results for input(s): "BNP" in the last 168 hours.  PT/INR  No results found for: "PROTIME", "INR"      DIAGNOSTIC RESULTS:  X-Ray Chest AP Portable  Reason: Atelectasis    FINDINGS:    Portable chest with comparison chest x-ray February 28, 2024 show unchanged enlarged cardiomediastinal silhouette. Median sternotomy wires and left loop recorder device noted. Right chest tube is unchanged. These chest drain noted. Nasogastric tube is unchanged. Cardiac prosthetic valve again noted.  Hazy ill-defined opacification of the bilateral mid to lower lungs and interstitial prominence is unchanged. Pulmonary vasculature is normal. No acute osseous abnormality.    IMPRESSION:    No significant change from prior exam.    Electronically signed by:  West Badillo DO  02/29/2024 09:32 AM CST Workstation: [id number]  X-Ray Abdomen AP 1 View  Reason: nausea and abdominal distention    FINDINGS:    3 views of the abdomen demonstrate gas dilated loops of small and large bowel. Gas dilated loops of small bowel measure up to 4.6 cm in diameter. Nasogastric tube is in place with tip below the gastroesophageal junction. Surgical drains noted. There is blunting of the left costophrenic angle with left basilar hazy opacification consistent with small left pleural effusion and overlying passive atelectasis or consolidation.    IMPRESSION:    1.  Gas dilated loops of large and small bowel with small bowel measuring up to 4.6 cm in diameter. Findings could reflect postoperative ileus versus distal obstruction. Ileus favored. Continued follow-up recommended.  2.  Small left pleural effusion with overlying passive atelectasis or consolidation.    Electronically signed by:  West Badillo DO  02/29/2024 08:05 AM CST " Workstation: [id number]        CURRENT CONSULTS:  WOUND CARE CONSULT  IP CONSULT TO CARDIOLOGY  IP CONSULT TO HOSPITALIST  IP CONSULT TO REGISTERED DIETITIAN/NUTRITIONIST  IP CONSULT TO PULMONOLOGY  IP CONSULT TO GENERAL SURGERY    ASSESSMENT/PLAN:    Multivessel CAD   S/p CABG x 2 (LIMA to LAD, SVG to OM) in 2010  S/p Redo CABG X 3 (L radial to LAD, SVG to Diag, SVG to OM) + placement of IABP by Dr. Thomas on 2/23/24  -IABP removed 2/25  -Hemodynamically stable off pressors  -Precedex weaned off 2/27  -Tolerating NC- wean as able  -Mediastinal tube and WISAM drain to remain until patient ambulates and output decreases  -Pacer wires removed 2/28. OK to start Lovenox.  -Surgical incisions with surgical dressing  -Continue ASA and BB.   -Documented statin intolerance - consider PCSK9i  -Encourage IS  -Increase activity  -Cruzito hose  -Bowel regimen - NGT in place currently for suspected Ileus.     Leukocytosis  -Afebrile  -Starting abx for possible aspiration  -Monitor    Acute post operative blood loss anemia  Thrombocytopenia  -Expected post op  -Received 1358 cc Cell Saver post post  -1 unit PRBCs on Feb 23  -H/H stable  -Monitor    Post operative ileus  -NGT in place to suction  -Await return of bowel function.     HFpEF  -Strict I/Os and daily weights  -Salt/fluid restrictions  -Diuresis prn    Hypomagnesemia  -Replaced per protocol  -Keep Mag >2 and K >4    Hx of TAVR  -Stable    Permanent AF  S/p Watchman device  -Rates uncontrolled currently  -Increase metoprolol    HTN  -Controlled on current regimen  -Goal < 140/90    HLD  -Continue statin     BPH  -Continue flomax  -Canas in place - remove when patient alert    Leadless PPM in situ  -Stable  -Base rate @ 50    Case and plan of care discussed with MD.      GI Prophylaxis:  PPI:proton pump inhibitor per orders  DVT Prophylaxis:  Anticoagulants   Medication Route Frequency    enoxaparin injection 60 mg Subcutaneous Q24H (prophylaxis, 1700)       Angeli LEPE  YOLANDA Linda  Cardiovascular Thoracic Surgery  2/29/2024  9:20 AM

## 2024-02-29 NOTE — PROGRESS NOTES
[facility]  Department of Cardiology  Consult Note      PATIENT NAME: Jose F Hanley  MRN: [id number]  TODAY'S DATE: 02/29/2024  ADMIT DATE: 2/23/2024                          CONSULT REQUESTED BY: Aravind Albarran MD    SUBJECTIVE     PRINCIPAL PROBLEM: S/P CABG (coronary artery bypass graft)      REASON FOR CONSULT:  Post CABG     Interval history:      2/29/24    Post op Ileus with NGT  RVR  Still positive on I and O  CXR Not any better      2/28/2024    Off Bipap  Off Precedex  Pacer wires out  Abdomen is distended    2/27/2024    Resting on Face mask. Still with mild sedation family at bedside. VSS.      2/26/24  Pt asleep in bed, remains on BiPAP. Per RN, he is still confused while awake. Currently under light sedation - precedex gtt. Remains paced with temporary pacer - 80s.     2/25/24  Pt was seen resting in bed with Bipap mask in place. Balloon pump was removed this morning per bedside RN.     2/24/24  Pt was seen extubated on BiPAP this morning. Balloon pump. Pressures soft this morning. Per RN, pt was increasingly agitated this morning.       HPI:  Pt is a 78 year old with history of known CAD with previous bypass surgery back in 08/31/23 and then underwent a repeat CABG today, 2/23/23. He Just recently had a [facility] as well with Dr. Bush back on 2/7/24.       Per surgery team:  Operation: Redo coronary artery bypass grafting x3 using left radial artery to left anterior descending coronary artery and separate segments of saphenous vein from the aorta to the diagonal coronary artery and from the aorta to the obtuse marginal coronary artery using a combination of antegrade and retrograde cardioplegia, mild systemic hypothermia, endoscopic vein harvest from the right leg, right radial artery harvest, and insertion of right femoral arterial intra-aortic balloon pump with fluoroscopic guidance and complete pericardiectomy due to severe adhesive pericarditis         Review of patient's allergies  indicates:   Allergen Reactions    Adhes. band-tape-benzalkonium Rash     Pt stated it caused blisters    Statins-hmg-coa reductase inhibitors Other (See Comments)     Statin Intolerance (joint pain)       Past Medical History:   Diagnosis Date    A-fib     Allergies 2020    gets allergy shots    Arthritis 1973    right knee    Back pain     COPD (chronic obstructive pulmonary disease)     Diastolic heart failure     GERD (gastroesophageal reflux disease) 2015    HLD (hyperlipidemia)     HTN (hypertension)     Hx of LASIK     Hypothyroidism, unspecified 2012    Inflammatory disease of prostate, unspecified     out of medicine    Mild intermittent asthma, uncomplicated     Myocardial infarction     Neuropathy     On home oxygen therapy     3L NC  at night    PAD (peripheral artery disease)     Staph skin infection 2016    right thigh    TIA (transient ischemic attack) 2013     Past Surgical History:   Procedure Laterality Date    AORTOGRAPHY WITH SERIALOGRAPHY N/A 08/31/2023    Procedure: AORTOGRAM, WITH SERIALOGRAPHY;  Surgeon: Gary Thomas MD;  Location: [facility] CATH/EP LAB;  Service: Peripheral Vascular;  Laterality: N/A;    CATARACT EXTRACTION Bilateral 2014    CORONARY ANGIOGRAPHY INCLUDING BYPASS GRAFTS WITH CATHETERIZATION OF LEFT HEART Left 01/12/2024    Procedure: ANGIOGRAM, CORONARY, INCLUDING BYPASS GRAFT, WITH LEFT HEART CATHETERIZATION;  Surgeon: Gregg Bush MD;  Location: [facility] CATH/EP LAB;  Service: Cardiology;  Laterality: Left;    CORONARY ARTERY BYPASS GRAFT  2010    ENDOSCOPIC HARVEST OF VEIN Right 2/23/2024    Procedure: HARVEST-VEIN-ENDOVASCULAR;  Surgeon: Gary Thomas MD;  Location: [facility] OR;  Service: Cardiothoracic;  Laterality: Right;    INSERTION OF IMPLANTABLE LOOP RECORDER  2011    INSERTION OF INTRA-AORTIC BALLOON ASSIST DEVICE Right 2/23/2024    Procedure: INSERTION, INTRA-AORTIC BALLOON PUMP;  Surgeon: Gary Thomas MD;  Location: [facility] OR;  Service:  Cardiothoracic;  Laterality: Right;    INSERTION OF PACEMAKER      does not have card with him for preadmit    INSTANTANEOUS WAVE-FREE RATIO (IFR) N/A 2024    Procedure: Instantaneous Wave-Free Ratio (IFR);  Surgeon: Gregg Bush MD;  Location: [facility] CATH/EP LAB;  Service: Cardiology;  Laterality: N/A;    PTA, ANTERIOR TIBIAL Left 2023    Procedure: PTA, Anterior Tibial;  Surgeon: Gary Thomas MD;  Location: [facility] CATH/EP LAB;  Service: Peripheral Vascular;  Laterality: Left;    PTA, SUPERFICIAL FEMORAL ARTERY Left 2023    Procedure: PTA, Superficial Femoral Artery;  Surgeon: Gary Thomas MD;  Location: [facility] CATH/EP LAB;  Service: Peripheral Vascular;  Laterality: Left;    REPEAT CORONARY ARTERY BYPASS GRAFTING N/A 2024    Procedure: CABG, REPEAT;  Surgeon: Gary Thomas MD;  Location: [facility] OR;  Service: Cardiothoracic;  Laterality: N/A;    SPLENECTOMY  2016    STENT, ANTERIOR TIBIAL Left 2023    Procedure: Stent, Anterior Tibial;  Surgeon: Gary Thomas MD;  Location: [facility] CATH/EP LAB;  Service: Peripheral Vascular;  Laterality: Left;    SURGICAL PROCUREMENT, ARTERY, RADIAL, FOR CABG  2024    Procedure: SURGICAL PROCUREMENT,ARTERY,RADIAL,FOR CABG;  Surgeon: Gary Thomas MD;  Location: [facility] OR;  Service: Cardiothoracic;;    watchman heart device       Social History     Tobacco Use    Smoking status: Former     Current packs/day: 0.00     Types: Cigarettes     Quit date: 1987     Years since quittin.2    Smokeless tobacco: Never   Substance Use Topics    Alcohol use: Not Currently     Alcohol/week: 21.0 standard drinks of alcohol     Types: 21 Drinks containing 0.5 oz of alcohol per week     Comment: ed roberto mixed drinks    Drug use: No        REVIEW OF SYSTEMS    As mentioned in HPI    OBJECTIVE     VITAL SIGNS (Most Recent)  Temp: 99.1 °F (37.3 °C) (24 0800)  Pulse: (!) 125 (24 1100)  Resp: 20 (24  0721)  BP: (!) 166/76 (02/29/24 1000)  SpO2: (!) 93 % (02/29/24 1100)    VENTILATION STATUS  Resp: 20 (02/29/24 0721)  SpO2: (!) 93 % (02/29/24 1100)  Oxygen Concentration (%):  [3-35] 3        I & O (Last 24H):  Intake/Output Summary (Last 24 hours) at 2/29/2024 1148  Last data filed at 2/29/2024 0800  Gross per 24 hour   Intake 1620.83 ml   Output 5010 ml   Net -3389.17 ml       WEIGHTS  Wt Readings from Last 3 Encounters:   02/23/24 1540 125 kg (275 lb 9.2 oz)   02/21/24 0948 124.5 kg (274 lb 6.4 oz)   01/12/24 0718 120.7 kg (266 lb)       PHYSICAL EXAM    CONSTITUTIONAL: NAD,  HEENT: NGT present  NECK: no JVD  LUNGS: coarse crackles diminished bases  HEART AFIB RVR  ABDOMEN: distended better  EXTREMITIES: No edema  SKIN: No rash      HOME MEDICATIONS:  No current facility-administered medications on file prior to encounter.     Current Outpatient Medications on File Prior to Encounter   Medication Sig Dispense Refill    acetaminophen (TYLENOL) 325 MG tablet Take 650 mg by mouth every 6 (six) hours as needed.      albuterol (PROVENTIL/VENTOLIN HFA) 90 mcg/actuation inhaler Inhale 2 puffs into the lungs every 6 (six) hours as needed.      aspirin 81 MG Chew Take 81 mg by mouth once daily.      betamethasone valerate 0.1% (VALISONE) 0.1 % Lotn Apply to scalp bid prn rash      cyanocobalamin 500 MCG tablet 500 mcg.      cyclobenzaprine (FLEXERIL) 10 MG tablet Take 10 mg by mouth every 8 (eight) hours as needed.      diphenhydrAMINE (BENADRYL) 50 MG capsule 50 mg every 6 (six) hours as needed.      doxepin (SINEQUAN) 50 MG capsule Take 50 mg by mouth every evening.      DULoxetine (CYMBALTA) 60 MG capsule 60 mg nightly.      EPINEPHrine (EPIPEN) 0.3 mg/0.3 mL AtIn INJECT 0.3MG/0.3ML SUBCUTANEOUSLY (UNDER THE SKIN)  AS NEEDED FOR ALLERGIC REACTIONS SELF-INJECTOR PEN      furosemide (LASIX) 20 MG tablet Take 20 mg by mouth once daily.      gabapentin (NEURONTIN) 400 MG capsule 800 mg 2 (two) times daily.       hydrocodone-homatropine 5-1.5 mg/5 ml (HYCODAN) 5-1.5 mg/5 mL Syrp Take by mouth every 6 (six) hours as needed. cough      levothyroxine (SYNTHROID) 50 MCG tablet 50 mcg.      metOLazone (ZAROXOLYN) 5 MG tablet 5 mg daily as needed. For weight gain >2lbs      montelukast (SINGULAIR) 10 mg tablet 10 mg once daily.      nitroGLYCERIN (NITROSTAT) 0.4 MG SL tablet Place 0.4 mg under the tongue every 5 (five) minutes as needed.      omega-3 fatty acids/fish oil (FISH OIL-OMEGA-3 FATTY ACIDS) 300-1,000 mg capsule Take 1 capsule by mouth 2 (two) times daily.      omeprazole (PRILOSEC) 20 MG capsule 40 mg once daily.      potassium chloride SA (K-DUR,KLOR-CON) 20 MEQ tablet 20 mEq nightly.      ranolazine (RANEXA) 1,000 mg Tb12 Take 1 tablet (1,000 mg total) by mouth 2 (two) times daily. 180 tablet 3    senna-docusate 8.6-50 mg (PERICOLACE) 8.6-50 mg per tablet nightly.      spironolactone (ALDACTONE) 50 MG tablet Take 50 mg by mouth once daily.      tamsulosin (FLOMAX) 0.4 mg Cap 0.4 mg.      TESTOSTERONE PROPIONATE, BULK, MISC Inject 1 each as directed every 14 (fourteen) days.      TOPROL XL 25 mg 24 hr tablet Take 25 mg by mouth 2 (two) times daily.      torsemide (DEMADEX) 20 MG Tab Take 1 tablet (20 mg total) by mouth once daily. (Patient not taking: Reported on 12/21/2023) 30 tablet 11       SCHEDULED MEDS:   aspirin  81 mg Oral Daily    DULoxetine  60 mg Oral Nightly    enoxparin  60 mg Subcutaneous Q24H (prophylaxis, 1700)    furosemide (LASIX) injection  40 mg Intravenous Q12H    guaiFENesin  1,200 mg Oral BID    ipratropium  0.5 mg Nebulization Q6H    lactulose  20 g Oral BID    levalbuterol  1.25 mg Nebulization Q6H    levothyroxine  25 mcg Intravenous Daily    losartan  25 mg Oral Daily    metoprolol tartrate  50 mg Oral TID    montelukast  10 mg Oral Daily    pantoprazole  40 mg Oral Before breakfast    piperacillin-tazobactam (Zosyn) IV (PEDS and ADULTS) (extended infusion is not appropriate)  3.375 g  "Intravenous Q8H    sodium chloride 0.9% 1,000 mL with mvi, (ADULT) no.4 with vit K 3,300 unit- 150 mcg/10 mL 10 mL, thiamine 100 mg, folic acid 1 mg infusion   Intravenous Daily    tamsulosin  0.4 mg Oral Daily       CONTINUOUS INFUSIONS:   sodium chloride 0.45% 50 mL/hr at 02/27/24 1620    amino acid 4.25% - dextrose 5% (CLINIMIX-E) solution 100 mL/hr at 02/28/24 1940       PRN MEDS:0.9%  NaCl infusion (for blood administration), albuterol sulfate, calcium gluconate IVPB, calcium gluconate IVPB, calcium gluconate IVPB, dextrose 50% in water (D50W), dextrose 50% in water (D50W), HYDROcodone-acetaminophen, HYDROmorphone, lorazepam, magnesium sulfate IVPB, magnesium sulfate IVPB, ondansetron, potassium chloride in water, potassium chloride in water, potassium chloride in water **AND** potassium chloride in water **AND** potassium chloride in water, potassium chloride in water, sodium phosphate 15 mmol in dextrose 5 % (D5W) 250 mL IVPB, sodium phosphate 20.01 mmol in dextrose 5 % (D5W) 250 mL IVPB, sodium phosphate 30 mmol in dextrose 5 % (D5W) 250 mL IVPB    LABS AND DIAGNOSTICS     CBC LAST 3 DAYS  Recent Labs   Lab 02/27/24  0339 02/28/24  0340 02/28/24  1253 02/29/24  0346   WBC 15.40* 14.48*  --  18.06*   RBC 3.56* 3.59*  --  3.81*   HGB 8.3* 8.3*  --  8.8*   HCT 27.4* 28.0* 29* 30.0*   MCV 77* 78*  --  79*   MCH 23.3* 23.1*  --  23.1*   MCHC 30.3* 29.6*  --  29.3*   RDW 22.1* 22.6*  --  22.8*   * 216  --  342   MPV 11.8 11.1  --  10.8   GRAN 78.1*  12.0* 70.5  10.2*  --  83.0*  15.0*   LYMPH 6.6*  1.0 10.7*  1.6  --  5.3*  1.0   MONO 13.1  2.0* 14.0  2.0*  --  10.4  1.9*   BASO 0.07 0.04  --  0.04   NRBC 1* 2*  --  3*       COAGULATION LAST 3 DAYS  No results for input(s): "LABPT", "INR", "APTT" in the last 168 hours.    CHEMISTRY LAST 3 DAYS  Recent Labs   Lab 02/24/24  1828 02/24/24  1951 02/25/24  0337 02/26/24  0521 02/27/24  0339 02/28/24  0340 02/28/24  1253 02/28/24  2137 02/29/24  0346 " "02/29/24  0908     --  137 139 142 144  --   --  151*  --    K 5.0  --  4.7 4.7 4.2 3.7  --  3.3* 3.7  --      --  107 109 110 112*  --   --  109  --    CO2 24  --  23 25 23 23  --   --  33*  --    ANIONGAP 5*  --  7* 5* 9 9  --   --  9  --    BUN 17  --  19 22 20 17  --   --  22  --    CREATININE 0.9  --  0.9 0.8 0.7 0.7  --   --  0.9  --    *  --  171* 151* 148* 136*  --   --  164*  --    CALCIUM 7.7*  --  7.7* 7.8* 7.8* 8.0*  --   --  8.9  --    PH  --  7.334*  --   --   --   --  7.479*  --   --  7.425   MG 2.2  --  2.2 2.1 2.2 2.2  --   --  2.4  --    ALBUMIN 3.6  --  3.4* 3.3*  --   --   --   --   --   --    PROT 5.3*  --  5.3* 5.4*  --   --   --   --   --   --    ALKPHOS 22*  --  22* 24*  --   --   --   --   --   --    ALT 18  --  15 12  --   --   --   --   --   --    AST 39  --  33 32  --   --   --   --   --   --    BILITOT 0.7  --  0.7 1.1*  --   --   --   --   --   --        CARDIAC PROFILE LAST 3 DAYS  No results for input(s): "BNP", "CPK", "CPKMB", "LDH", "TROPONINI", "TROPONINIHS" in the last 168 hours.    ENDOCRINE LAST 3 DAYS  No results for input(s): "TSH", "PROCAL" in the last 168 hours.    LAST ARTERIAL BLOOD GAS  ABG  Recent Labs   Lab 02/29/24 0908   PH 7.425   PO2 65*   PCO2 53.8*   HCO3 35.3*   BE 11*       LAST 7 DAYS MICROBIOLOGY   Microbiology Results (last 7 days)       ** No results found for the last 168 hours. **            MOST RECENT IMAGING  X-Ray Chest AP Portable  Reason: Atelectasis    FINDINGS:    Portable chest with comparison chest x-ray February 28, 2024 show unchanged enlarged cardiomediastinal silhouette. Median sternotomy wires and left loop recorder device noted. Right chest tube is unchanged. These chest drain noted. Nasogastric tube is unchanged. Cardiac prosthetic valve again noted.  Hazy ill-defined opacification of the bilateral mid to lower lungs and interstitial prominence is unchanged. Pulmonary vasculature is normal. No acute osseous " abnormality.    IMPRESSION:    No significant change from prior exam.    Electronically signed by:  West Badillo DO  02/29/2024 09:32 AM CST Workstation: [id number]  X-Ray Abdomen AP 1 View  Reason: nausea and abdominal distention    FINDINGS:    3 views of the abdomen demonstrate gas dilated loops of small and large bowel. Gas dilated loops of small bowel measure up to 4.6 cm in diameter. Nasogastric tube is in place with tip below the gastroesophageal junction. Surgical drains noted. There is blunting of the left costophrenic angle with left basilar hazy opacification consistent with small left pleural effusion and overlying passive atelectasis or consolidation.    IMPRESSION:    1.  Gas dilated loops of large and small bowel with small bowel measuring up to 4.6 cm in diameter. Findings could reflect postoperative ileus versus distal obstruction. Ileus favored. Continued follow-up recommended.  2.  Small left pleural effusion with overlying passive atelectasis or consolidation.    Electronically signed by:  West Badillo DO  02/29/2024 08:05 AM CST Workstation: [id number]      ECHOCARDIOGRAM RESULTS (last 5)  Results for orders placed during the hospital encounter of 12/13/23    Echo    Interpretation Summary    Left Ventricle: The left ventricle is normal in size. Mildly increased wall thickness. There is mild concentric hypertrophy. Normal wall motion. There is normal systolic function with a visually estimated ejection fraction of 65 - 70%. There is normal diastolic function.    Right Ventricle: Mild right ventricular enlargement. Wall thickness is normal. Right ventricle wall motion  is normal. Systolic function is normal.    Left Atrium: Left atrium is moderately dilated.    Right Atrium: Right atrium is mildly dilated.    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    Tricuspid Valve: There is mild regurgitation with a centrally directed jet.    Pulmonic Valve: There  is mild regurgitation with a centrally directed jet.    Pulmonary Artery: There is mild pulmonary hypertension. The estimated pulmonary artery systolic pressure is 40 mmHg.    IVC/SVC: Intermediate venous pressure at 8 mmHg.      CURRENT/PREVIOUS VISIT EKG  Results for orders placed or performed during the hospital encounter of 02/23/24   EKG 12-LEAD    Collection Time: 02/23/24  1:53 PM   Result Value Ref Range    QRS Duration 232 ms    OHS QTC Calculation 613 ms    Narrative    Test Reason : Z95.1,Z95.1,    Vent. Rate : 080 BPM     Atrial Rate : 087 BPM     P-R Int : 000 ms          QRS Dur : 232 ms      QT Int : 532 ms       P-R-T Axes : 000 168 250 degrees     QTc Int : 613 ms    Ventricular-paced rhythm  occasional atrila pacing  Abnormal ECG  When compared with ECG of 21-FEB-2024 09:33,  Electronic ventricular pacemaker has replaced Atrial fibrillation  Confirmed by Eleazar WOO, Gregg NEWTON ([id number]) on 2/24/2024 7:12:58 AM    Referred By: OLLIE RIBEIRO           Confirmed By:Gregg Bush MD           ASSESSMENT/PLAN:     Active Hospital Problems    Diagnosis    *S/P CABG (coronary artery bypass graft) - x2 09/2010 - LIMA to LAD; SVG to OMB; grafts occluded 08/2012    Ileus    Abdominal distention     -patient has increasing abdominal distention and discomfort  -check KUB  -NG tube to LIS  -possible postoperative ileus  -increase mobility as tolerated      Acute encephalopathy    Acute blood loss anemia    Acute respiratory failure with hypoxia and hypercapnia    Severe obesity (BMI 35.0-39.9) with comorbidity    Permanent atrial fibrillation    CAD (coronary artery disease), native coronary artery    Dyslipidemia       ASSESSMENT & PLAN:     MVCAD S/P redo CABG yesterday 2/23/24,  left radial artery to LAD, SVG to Dx, SVG to OM  HFpEF  HTN/ dyslipidemia   Statin intolerance  AFIB H/O Watchman with RVR  H/O TAVR  Leadless PPM in situ  Distended Abdomen Post op Ileus now with  NGT      RECOMMENDATIONS:      Agree with Metoprolol 50 q 8  Increase lasix 40 mg TID  Add aldactone  Limited ECHO  BNP in am   CXR in am   Will follow    The patient is in atrial fibrillation with a increased response responding to metoprolol.  He clinically has heart failure with upper airway noises.  He is got some ileus which is being relieved with NG tube.  Continue heart rate control mild diuresis      Andria Duffy NP  Department of Cardiology  Date of Service: 02/29/2024

## 2024-02-29 NOTE — CARE UPDATE
02/29/24 0720   Patient Assessment/Suction   Level of Consciousness (AVPU) alert   Respiratory Effort Normal;Unlabored   Expansion/Accessory Muscles/Retractions no use of accessory muscles;no retractions;expansion symmetric   All Lung Fields Breath Sounds coarse;diminished   Rhythm/Pattern, Respiratory unlabored;pattern regular;depth regular;chest wiggle adequate;no shortness of breath reported   Cough Frequency infrequent   Cough Type good   Skin Integrity   $ Wound Care Tech Time 15 min   Area Observed Bridge of nose   Skin Appearance without discoloration   Barrier used? Gel Cushion   Breath Sounds Post-Respiratory Treatment   Throughout All Fields Post-Treatment All Fields   Throughout All Fields Post-Treatment aeration increased   Post-treatment Heart Rate (beats/min) 120   Post-treatment Resp Rate (breaths/min) 23   Intrapulmonary Percussive Ventilation/Metaneb   $ IPV Administration Therapy   Route (IPV) mouthpiece   Education   $ Education 15 min;Bronchodilator

## 2024-02-29 NOTE — PT/OT/SLP EVAL
Physical Therapy Evaluation    Patient Name:  Jose F Hanley   MRN:  [id number]    Recommendations:     Discharge Recommendations: High Intensity Therapy   Discharge Equipment Recommendations: to be determined by next level of care   Barriers to discharge:  increased level of asssit needed    Assessment:     Jose F Hanley is a 78 y.o. male admitted with a medical diagnosis of S/P CABG (coronary artery bypass graft).  He presents with the following impairments/functional limitations: weakness, impaired endurance, impaired self care skills, impaired functional mobility, gait instability, impaired balance, decreased coordination, decreased upper extremity function, decreased lower extremity function, decreased safety awareness, pain, abnormal tone, decreased ROM, impaired skin, impaired cardiopulmonary response to activity.  Pt is lethargic and confused.  Does not state his name and unaware of where he is or situation. He is agreeable to PT with encouragement.  He requires maxA x3 for all mobility, once situated able to maintain sitting balance at EOB x15' with CGA.    Rehab Prognosis: Fair; patient would benefit from acute skilled PT services to address these deficits and reach maximum level of function.    Recent Surgery: Procedure(s) (LRB):  CABG, REPEAT (N/A)  HARVEST-VEIN-ENDOVASCULAR (Right)  SURGICAL PROCUREMENT,ARTERY,RADIAL,FOR CABG  INSERTION, INTRA-AORTIC BALLOON PUMP (Right) 6 Days Post-Op    Plan:     During this hospitalization, patient to be seen daily to address the identified rehab impairments via gait training, therapeutic activities, therapeutic exercises and progress toward the following goals:    Plan of Care Expires:  03/29/24    Subjective     Chief Complaint: not feeling well  Patient/Family Comments/goals: rest  Pain/Comfort:  Pain Rating 1: other (see comments) (did not rate)  Pain Addressed 1: Reposition, Distraction, Nurse notified    Patients cultural, spiritual, Yarsani conflicts given the  current situation:      Living Environment:  Pt lives [facility] with wife  Prior to admission, patients level of function was modified independent.  Equipment used at home: shower chair, rollator, oxygen.  DME owned (not currently used): none.  Upon discharge, patient will have assistance from wife.    Objective:     Communicated with RN, Paul prior to session.  Patient found HOB elevated with chest tube, NG tube, oxygen, pulse ox (continuous), telemetry, peripheral IV, blood pressure cuff, WISAM drain, fitzgerald catheter  upon PT entry to room.    General Precautions: Standard, aspiration, fall, NPO  Orthopedic Precautions:    Braces:    Respiratory Status: Nasal cannula, flow 2 L/min    Exams:  Cognitive Exam:  Patient is oriented to Person  RLE ROM: WFL  RLE Strength: 2/5  LLE ROM: WFL  LLE Strength: 2/5    Functional Mobility:  Bed Mobility:     Supine to Sit: maximal assistance and of 3 persons  Sit to Supine: maximal assistance and of 3 persons      AM-PAC 6 CLICK MOBILITY  Total Score:8       Treatment & Education:  Pt was educated on the following: call light use, importance of OOB activity and functional mobility to negate the negative effects of prolonged bed rest during this hospitalization, safe transfers/ambulation and discharge planning recommendations/options.     Patient left HOB elevated with all lines intact, call button in reach, RN notified, and RN present.    GOALS:   Multidisciplinary Problems       Physical Therapy Goals          Problem: Physical Therapy    Goal Priority Disciplines Outcome Goal Variances Interventions   Physical Therapy Goal     PT, PT/OT Ongoing, Progressing     Description: 1. Supine to sit with Stand-by Assistance  2. Sit to stand transfer with Stand-by Assistance  3.. Bed to chair transfer with Supervision using Rolling Walker  4. Gait  x 100 feet with Minimal Assistance using Rolling Walker.                          History:     Past Medical History:   Diagnosis Date    A-fib      Allergies 2020    gets allergy shots    Arthritis 1973    right knee    Back pain     COPD (chronic obstructive pulmonary disease)     Diastolic heart failure     GERD (gastroesophageal reflux disease) 2015    HLD (hyperlipidemia)     HTN (hypertension)     Hx of LASIK     Hypothyroidism, unspecified 2012    Inflammatory disease of prostate, unspecified     out of medicine    Mild intermittent asthma, uncomplicated     Myocardial infarction     Neuropathy     On home oxygen therapy     3L NC  at night    PAD (peripheral artery disease)     Staph skin infection 2016    right thigh    TIA (transient ischemic attack) 2013       Past Surgical History:   Procedure Laterality Date    AORTOGRAPHY WITH SERIALOGRAPHY N/A 08/31/2023    Procedure: AORTOGRAM, WITH SERIALOGRAPHY;  Surgeon: Gary Thomas MD;  Location: [facility] CATH/EP LAB;  Service: Peripheral Vascular;  Laterality: N/A;    CATARACT EXTRACTION Bilateral 2014    CORONARY ANGIOGRAPHY INCLUDING BYPASS GRAFTS WITH CATHETERIZATION OF LEFT HEART Left 01/12/2024    Procedure: ANGIOGRAM, CORONARY, INCLUDING BYPASS GRAFT, WITH LEFT HEART CATHETERIZATION;  Surgeon: Gregg Bush MD;  Location: [facility] CATH/EP LAB;  Service: Cardiology;  Laterality: Left;    CORONARY ARTERY BYPASS GRAFT  2010    ENDOSCOPIC HARVEST OF VEIN Right 2/23/2024    Procedure: HARVEST-VEIN-ENDOVASCULAR;  Surgeon: Gary Thomas MD;  Location: [facility] OR;  Service: Cardiothoracic;  Laterality: Right;    INSERTION OF IMPLANTABLE LOOP RECORDER  2011    INSERTION OF INTRA-AORTIC BALLOON ASSIST DEVICE Right 2/23/2024    Procedure: INSERTION, INTRA-AORTIC BALLOON PUMP;  Surgeon: Gary Thomas MD;  Location: [facility] OR;  Service: Cardiothoracic;  Laterality: Right;    INSERTION OF PACEMAKER  2021    does not have card with him for preadmit    INSTANTANEOUS WAVE-FREE RATIO (IFR) N/A 01/12/2024    Procedure: Instantaneous Wave-Free Ratio (IFR);  Surgeon: Gregg Bush MD;  Location:  [facility] CATH/EP LAB;  Service: Cardiology;  Laterality: N/A;    PTA, ANTERIOR TIBIAL Left 08/31/2023    Procedure: PTA, Anterior Tibial;  Surgeon: Gary Thomas MD;  Location: [facility] CATH/EP LAB;  Service: Peripheral Vascular;  Laterality: Left;    PTA, SUPERFICIAL FEMORAL ARTERY Left 08/31/2023    Procedure: PTA, Superficial Femoral Artery;  Surgeon: Gary Thomas MD;  Location: [facility] CATH/EP LAB;  Service: Peripheral Vascular;  Laterality: Left;    REPEAT CORONARY ARTERY BYPASS GRAFTING N/A 2/23/2024    Procedure: CABG, REPEAT;  Surgeon: Gary Thomas MD;  Location: [facility] OR;  Service: Cardiothoracic;  Laterality: N/A;    SPLENECTOMY  2016    STENT, ANTERIOR TIBIAL Left 08/31/2023    Procedure: Stent, Anterior Tibial;  Surgeon: Gary Thomas MD;  Location: [facility] CATH/EP LAB;  Service: Peripheral Vascular;  Laterality: Left;    SURGICAL PROCUREMENT, ARTERY, RADIAL, FOR CABG  2/23/2024    Procedure: SURGICAL PROCUREMENT,ARTERY,RADIAL,FOR CABG;  Surgeon: Gary Thomas MD;  Location: [facility] OR;  Service: Cardiothoracic;;    watchman heart device  2019       Time Tracking:     PT Received On: 02/29/24  PT Start Time: 1431     PT Stop Time: 1502  PT Total Time (min): 31 min     Billable Minutes: Evaluation 8 and Therapeutic Activity 23 02/29/2024

## 2024-02-29 NOTE — PROGRESS NOTES
CVT SURGERY PROGRESS NOTE  Jose F Hanley  78 y.o.  1945    Patient's Chief Complaint:  S/P CABG (coronary artery bypass graft)    HPI:  This patient has coronary artery disease.  He has had progressive shortness of breath.  He underwent heart catheterization and coronary angiography showing multivessel coronary artery disease.  He was referred for consideration of redo coronary artery bypass grafting.    He has borderline diabetes  and hypertension.  He has a history of remote coronary artery bypass grafting a number of years ago.    He has had a TAVR and Watchman.  He is not a smoker.  He does have COPD.  He quit smoking 35 years ago.    Medicines are part of the epic record.  He does take daily aspirin.    On exam vital signs are stable.  Pupils are equal and round reactive to light.  Neck is supple.  Chest is equal breath sounds.  Heart is in a irregular rate and rhythm.  Abdomen is benign.  Perfusion to the legs and feet seems to be satisfactory.    Recent studies were reviewed.  The patient has significant recurrent coronary artery disease.  Consideration can be made for redo high-risk coronary artery bypass grafting.  The risks and potential complications were discussed.  The patient will take this under advisement.    Last 24 hour interval:  -Precedex off.   -BP well controlled   -HR elevated this AM - rates 120s  -Leukocytosis downtrending, remains afebrile  -H/H stable  -MS tube output last 24 hours -  60 cc, to suction, no air leak  -Bulb drain output last 24 hours - 70cc, to suction  -Good UOP, renal function stable. Canas in place  -Pt seen this AM, lying in bed on VM. Lethargic, but responds appropriately to questions. Complains of expected post op pain. + flatus, no BM.     Subjective:  Symptoms:  Improved.    Diet:  No nausea or vomiting.    Pain:  He complains of pain that is moderate.  Pain is requiring pain medication.                                                                    Objective:  General Appearance:  In no acute distress.    Vital signs: (most recent): Blood pressure (!) 166/76, pulse (!) 125, temperature 99.1 °F (37.3 °C), resp. rate 19, height 6' (1.829 m), weight 125 kg (275 lb 9.2 oz), SpO2 (!) 93 %.    Output: Producing urine.    HEENT: Normal HEENT exam.    Lungs:  There are rhonchi (coarse, scattered).  No rales or wheezes.    Heart: Normal rate.  Regular rhythm.  No murmur, gallop or friction rub.   Abdomen: Abdomen is soft and non-distended.  Bowel sounds are normal.   There is no abdominal tenderness.     Extremities: There is no local swelling.    Pulses: Distal pulses are intact.    Neurological: (Lethargic. ).    Skin:  Dry and cool.      Recent Vitals:  Temp: 98.8 °F (37.1 °C) (02/28/24 0600)  Pulse: 92 (02/28/24 1104)  Resp: (!) 27 (02/28/24 1104)  BP: 130/60 (02/28/24 1100)  SpO2: (!) 94 % (02/28/24 1104)    INPATIENT MEDS   sodium chloride 0.45% 50 mL/hr at 02/27/24 1620    amino acid 4.25% - dextrose 5% (CLINIMIX-E) solution 100 mL/hr at 02/28/24 1940       0.9%  NaCl infusion (for blood administration), albuterol sulfate, calcium gluconate IVPB, calcium gluconate IVPB, calcium gluconate IVPB, dextrose 50% in water (D50W), dextrose 50% in water (D50W), HYDROcodone-acetaminophen, HYDROmorphone, lorazepam, magnesium sulfate IVPB, magnesium sulfate IVPB, ondansetron, potassium chloride in water, potassium chloride in water, potassium chloride in water **AND** potassium chloride in water **AND** potassium chloride in water, potassium chloride in water, sodium phosphate 15 mmol in dextrose 5 % (D5W) 250 mL IVPB, sodium phosphate 20.01 mmol in dextrose 5 % (D5W) 250 mL IVPB, sodium phosphate 30 mmol in dextrose 5 % (D5W) 250 mL IVPB  HEMODYNAMICS       acetylcysteine 200 mg/ml (20%)  2 mL Nebulization TID    albuterol-ipratropium  3 mL Nebulization Q4H    aspirin  81 mg Oral Daily    DULoxetine  60 mg Oral Nightly    enoxparin  60 mg Subcutaneous Q24H (prophylaxis,  "1700)    furosemide (LASIX) injection  20 mg Intravenous Daily    guaiFENesin  1,200 mg Oral BID    lactulose  20 g Oral BID    levothyroxine  25 mcg Intravenous Daily    metoprolol  10 mg Intravenous Q12H    montelukast  10 mg Oral Daily    pantoprazole  40 mg Oral Before breakfast    sodium chloride 0.9% 1,000 mL with mvi, (ADULT) no.4 with vit K 3,300 unit- 150 mcg/10 mL 10 mL, thiamine 100 mg, folic acid 1 mg infusion   Intravenous Daily    tamsulosin  0.4 mg Oral Daily       sodium chloride 0.45% 50 mL/hr at 02/27/24 1620    dexmedeTOMIDine (Precedex) infusion (titrating) 1 mcg/kg/hr (02/28/24 1133)    insulin regular 1 units/mL infusion orderable (CTS POST-OP) Stopped (02/24/24 0900)    phenylephrine Stopped (02/24/24 0401)      Recent O2 Therapy/Vent Settings: 4L VM    I/O last 24 hrs:  Intake/Output Summary (Last 24 hours) at 2/28/2024 1301  Last data filed at 2/28/2024 0650      Gross per 24 hour   Intake 2308.1 ml   Output 2280 ml   Net 28.1 ml       Recent Cardiac Rhythm: a-fib, rates 120s    Recent Pain Assessment: 6    CBC  Recent Labs   Lab 02/27/24  0339 02/28/24  0340   WBC 15.40* 14.48*   RBC 3.56* 3.59*   HGB 8.3* 8.3*   * 216   MCV 77* 78*   MCH 23.3* 23.1*   MCHC 30.3* 29.6*       BMP  Recent Labs   Lab 02/27/24  0339 02/28/24  0340   CO2 23 23   BUN 20 17   CREATININE 0.7 0.7   CALCIUM 7.8* 8.0*       CARDIAC ENZYMES  No results for input(s): "TROPONINI", "CPK", "CKMB" in the last 168 hours.  BNP  No results for input(s): "BNP" in the last 168 hours.  PT/INR  No results found for: "PROTIME", "INR"    DIAGNOSTIC RESULTS:  CXR  Details    Reading Physician Reading Date Result Priority   Ching Adamson MD  [phone] 2/28/2024      Narrative & Impression         ADDENDUM #1               This is a correction to the prior report. The median sternotomy drains have not been removed and there are stable position.     Electronically signed by:  Ching Adamson MD  02/28/2024 12:57 PM CST " Workstation: [id number]         ORIGINAL REPORT         Portable chest x-ray at 6:07 AM is compared to prior study 2/25/2024     Clinical history is coronary artery disease post-CABG     FINDINGS: The right IJ [place]ven[place] catheter and mediastinal drains have been removed.  The heart is mildly enlarged. There is an aortic valve stent. There are median sternotomy wires. There is a heart loop monitor in place.     There is improvement of the pulmonary vascular congestion. There is a tiny left pleural effusion. There is left lower lobe atelectasis. The remainder the lungs are clear. There are no acute osseous abnormalities.     IMPRESSION: Status post recent CABG with improvement of the pulmonary vascular congestion with left lower lobe atelectasis and tiny left pleural effusion     Electronically signed by:  Ching Adamson MD  02/28/2024 07:47 AM CST Workstation: [id number]           Specimen Collected: 02/28/24 06:00 CST Last Resulted: 02/28/24 12:57 CST             CURRENT CONSULTS:  WOUND CARE CONSULT  IP CONSULT TO CARDIOLOGY  IP CONSULT TO HOSPITALIST  IP CONSULT TO REGISTERED DIETITIAN/NUTRITIONIST  IP CONSULT TO PULMONOLOGY  IP CONSULT TO GENERAL SURGERY    ASSESSMENT/PLAN:    Multivessel CAD   S/p CABG x 2 (LIMA to LAD, SVG to OM) in 2010  S/p Redo CABG X 3 (L radial to LAD, SVG to Diag, SVG to OM) by Dr. Thomas on 2/23/24  -Hemodynamically stable off pressors  -Precedex weaned off 2/27  -Tolerating VM - wean as able  -Mediastinal tube and WISAM drain to remain until patient ambulates and output decreases  -Pacer wires removed 2/28/24. OK to start Lovenox 40mg daily.   -Surgical incisions with surgical dressing  -Continue ASA and BB. Increase b-blocker for improved HR control.   -Documented statin intolerance - consider PCSK9i  -Encourage IS  -Increase activity  -Cruzito hose  -Bowel regimen - on docusate. + flatus, no BM yet. Can consider lactulose tomorrow if no BM.      Leukocytosis  -Downtrending  -Afebrile  -Likely reactive  -Monitor    Acute post operative blood loss anemia  Thrombocytopenia  -Expected post op  -Received 1358 cc Cell Saver post post  -1 unit PRBCs on Feb 23  -H/H stable  -Monitor    HFpEF  -Strict I/Os and daily weights  -Salt/fluid restrictions  -Diuresis prn    Hypomagnesemia  -Replaced per protocol  -Keep Mag >2 and K >4    Hx of TAVR  -Stable    Permanent AF  S/p Watchman device  -Continue b-blocker as above    HTN  -Controlled on current regimen  -Goal < 140/90    HLD  -Continue statin     BPH  -Continue flomax  -Canas in place - remove when patient alert    Case and plan of care discussed with MD.      GI Prophylaxis:  PPI:proton pump inhibitor per orders  DVT Prophylaxis:  Anticoagulants   Medication Route Frequency    enoxaparin injection 60 mg Subcutaneous Q24H (prophylaxis, 1700)       Angeli Linda PA-C  Cardiovascular Thoracic Surgery  2/29/2024  9:20 AM

## 2024-02-29 NOTE — ASSESSMENT & PLAN NOTE
Patient has Post-operative ileus which is adynamic in etiology which is stable. This is inherent to his procedure. Will treat conservatively with bowel rest, IV fluids, serial abdominal exams and avoidance of GI paralytics such as narcotics or anti-spasmodics. Monitor patient closely.

## 2024-03-01 ENCOUNTER — CLINICAL SUPPORT (OUTPATIENT)
Dept: CARDIOLOGY | Facility: HOSPITAL | Age: 79
DRG: 235 | End: 2024-03-01
Attending: THORACIC SURGERY (CARDIOTHORACIC VASCULAR SURGERY)
Payer: MEDICARE

## 2024-03-01 PROBLEM — E87.0 HYPERNATREMIA: Status: ACTIVE | Noted: 2024-03-01

## 2024-03-01 LAB
ALLENS TEST: ABNORMAL
AMMONIA PLAS-SCNC: 29 UMOL/L (ref 10–50)
ANION GAP SERPL CALC-SCNC: 9 MMOL/L (ref 8–16)
ANISOCYTOSIS BLD QL SMEAR: ABNORMAL
BASOPHILS # BLD AUTO: 0.14 K/UL (ref 0–0.2)
BASOPHILS NFR BLD: 0.6 % (ref 0–1.9)
BSA FOR ECHO PROCEDURE: 2.52 M2
BUN SERPL-MCNC: 28 MG/DL (ref 8–23)
CALCIUM SERPL-MCNC: 8.8 MG/DL (ref 8.7–10.5)
CHLORIDE SERPL-SCNC: 106 MMOL/L (ref 95–110)
CO2 SERPL-SCNC: 36 MMOL/L (ref 23–29)
CREAT SERPL-MCNC: 1.1 MG/DL (ref 0.5–1.4)
CV ECHO LV RWT: 0.42 CM
DELSYS: ABNORMAL
DIFFERENTIAL METHOD BLD: ABNORMAL
ECHO LV POSTERIOR WALL: 0.97 CM (ref 0.6–1.1)
EOSINOPHIL # BLD AUTO: 0.4 K/UL (ref 0–0.5)
EOSINOPHIL NFR BLD: 1.4 % (ref 0–8)
ERYTHROCYTE [DISTWIDTH] IN BLOOD BY AUTOMATED COUNT: 22.8 % (ref 11.5–14.5)
EST. GFR  (NO RACE VARIABLE): >60 ML/MIN/1.73 M^2
FLOW: 3
FRACTIONAL SHORTENING: 36 % (ref 28–44)
GLUCOSE SERPL-MCNC: 128 MG/DL (ref 70–110)
GLUCOSE SERPL-MCNC: 149 MG/DL (ref 70–110)
HCO3 UR-SCNC: 36 MMOL/L (ref 24–28)
HCT VFR BLD AUTO: 32.5 % (ref 40–54)
HCT VFR BLD CALC: 31 %PCV (ref 36–54)
HGB BLD-MCNC: 9.3 G/DL (ref 14–18)
IMM GRANULOCYTES # BLD AUTO: 0.39 K/UL (ref 0–0.04)
IMM GRANULOCYTES NFR BLD AUTO: 1.5 % (ref 0–0.5)
INTERVENTRICULAR SEPTUM: 1.44 CM (ref 0.6–1.1)
LACTATE SERPL-SCNC: 1.2 MMOL/L (ref 0.5–1.9)
LEFT INTERNAL DIMENSION IN SYSTOLE: 2.95 CM (ref 2.1–4)
LEFT VENTRICLE DIASTOLIC VOLUME INDEX: 40.49 ML/M2
LEFT VENTRICLE DIASTOLIC VOLUME: 98.8 ML
LEFT VENTRICLE MASS INDEX: 85 G/M2
LEFT VENTRICLE SYSTOLIC VOLUME INDEX: 13.8 ML/M2
LEFT VENTRICLE SYSTOLIC VOLUME: 33.6 ML
LEFT VENTRICULAR INTERNAL DIMENSION IN DIASTOLE: 4.63 CM (ref 3.5–6)
LEFT VENTRICULAR MASS: 208.32 G
LYMPHOCYTES # BLD AUTO: 1.8 K/UL (ref 1–4.8)
LYMPHOCYTES NFR BLD: 6.9 % (ref 18–48)
MAGNESIUM SERPL-MCNC: 2.2 MG/DL (ref 1.6–2.6)
MCH RBC QN AUTO: 23.3 PG (ref 27–31)
MCHC RBC AUTO-ENTMCNC: 28.6 G/DL (ref 32–36)
MCV RBC AUTO: 81 FL (ref 82–98)
MODE: ABNORMAL
MONOCYTES # BLD AUTO: 2.6 K/UL (ref 0.3–1)
MONOCYTES NFR BLD: 10.3 % (ref 4–15)
MRSA SCREEN BY PCR: NEGATIVE
NEUTROPHILS # BLD AUTO: 20 K/UL (ref 1.8–7.7)
NEUTROPHILS NFR BLD: 79.3 % (ref 38–73)
NRBC BLD-RTO: 7 /100 WBC
OVALOCYTES BLD QL SMEAR: ABNORMAL
PCO2 BLDA: 54.4 MMHG (ref 35–45)
PH SMN: 7.43 [PH] (ref 7.35–7.45)
PHOSPHATE SERPL-MCNC: 3.6 MG/DL (ref 2.7–4.5)
PLATELET # BLD AUTO: 418 K/UL (ref 150–450)
PLATELET BLD QL SMEAR: ABNORMAL
PMV BLD AUTO: 10.8 FL (ref 9.2–12.9)
PO2 BLDA: 54 MMHG (ref 80–100)
POC BE: 12 MMOL/L
POC IONIZED CALCIUM: 1.15 MMOL/L (ref 1.06–1.42)
POC SATURATED O2: 88 % (ref 95–100)
POC TCO2: 38 MMOL/L (ref 23–27)
POLYCHROMASIA BLD QL SMEAR: ABNORMAL
POTASSIUM BLD-SCNC: 3.4 MMOL/L (ref 3.5–5.1)
POTASSIUM SERPL-SCNC: 3.7 MMOL/L (ref 3.5–5.1)
RBC # BLD AUTO: 4 M/UL (ref 4.6–6.2)
SAMPLE: ABNORMAL
SITE: ABNORMAL
SODIUM BLD-SCNC: 148 MMOL/L (ref 136–145)
SODIUM SERPL-SCNC: 151 MMOL/L (ref 136–145)
TSH SERPL DL<=0.005 MIU/L-ACNC: 2.17 UIU/ML (ref 0.34–5.6)
WBC # BLD AUTO: 25.23 K/UL (ref 3.9–12.7)
Z-SCORE OF LEFT VENTRICULAR DIMENSION IN END DIASTOLE: -9.52
Z-SCORE OF LEFT VENTRICULAR DIMENSION IN END SYSTOLE: -7

## 2024-03-01 PROCEDURE — 93308 TTE F-UP OR LMTD: CPT

## 2024-03-01 PROCEDURE — 25000003 PHARM REV CODE 250: Performed by: PHYSICIAN ASSISTANT

## 2024-03-01 PROCEDURE — 99291 CRITICAL CARE FIRST HOUR: CPT | Mod: ,,, | Performed by: INTERNAL MEDICINE

## 2024-03-01 PROCEDURE — 84100 ASSAY OF PHOSPHORUS: CPT | Performed by: INTERNAL MEDICINE

## 2024-03-01 PROCEDURE — 97530 THERAPEUTIC ACTIVITIES: CPT | Mod: CQ

## 2024-03-01 PROCEDURE — 87040 BLOOD CULTURE FOR BACTERIA: CPT | Performed by: INTERNAL MEDICINE

## 2024-03-01 PROCEDURE — 99900026 HC AIRWAY MAINTENANCE (STAT)

## 2024-03-01 PROCEDURE — 25000003 PHARM REV CODE 250: Performed by: INTERNAL MEDICINE

## 2024-03-01 PROCEDURE — 84132 ASSAY OF SERUM POTASSIUM: CPT

## 2024-03-01 PROCEDURE — 99900035 HC TECH TIME PER 15 MIN (STAT)

## 2024-03-01 PROCEDURE — 25000242 PHARM REV CODE 250 ALT 637 W/ HCPCS: Performed by: INTERNAL MEDICINE

## 2024-03-01 PROCEDURE — 84443 ASSAY THYROID STIM HORMONE: CPT | Performed by: INTERNAL MEDICINE

## 2024-03-01 PROCEDURE — 99900031 HC PATIENT EDUCATION (STAT)

## 2024-03-01 PROCEDURE — 87641 MR-STAPH DNA AMP PROBE: CPT | Performed by: INTERNAL MEDICINE

## 2024-03-01 PROCEDURE — 36415 COLL VENOUS BLD VENIPUNCTURE: CPT | Performed by: THORACIC SURGERY (CARDIOTHORACIC VASCULAR SURGERY)

## 2024-03-01 PROCEDURE — 85014 HEMATOCRIT: CPT

## 2024-03-01 PROCEDURE — 27100171 HC OXYGEN HIGH FLOW UP TO 24 HOURS

## 2024-03-01 PROCEDURE — 25000003 PHARM REV CODE 250: Performed by: THORACIC SURGERY (CARDIOTHORACIC VASCULAR SURGERY)

## 2024-03-01 PROCEDURE — 37799 UNLISTED PX VASCULAR SURGERY: CPT

## 2024-03-01 PROCEDURE — 97535 SELF CARE MNGMENT TRAINING: CPT

## 2024-03-01 PROCEDURE — 82803 BLOOD GASES ANY COMBINATION: CPT

## 2024-03-01 PROCEDURE — 94761 N-INVAS EAR/PLS OXIMETRY MLT: CPT

## 2024-03-01 PROCEDURE — 25000003 PHARM REV CODE 250: Performed by: NURSE PRACTITIONER

## 2024-03-01 PROCEDURE — 63600175 PHARM REV CODE 636 W HCPCS: Performed by: NURSE PRACTITIONER

## 2024-03-01 PROCEDURE — 25500020 PHARM REV CODE 255: Performed by: INTERNAL MEDICINE

## 2024-03-01 PROCEDURE — 80048 BASIC METABOLIC PNL TOTAL CA: CPT | Performed by: THORACIC SURGERY (CARDIOTHORACIC VASCULAR SURGERY)

## 2024-03-01 PROCEDURE — 82140 ASSAY OF AMMONIA: CPT | Performed by: INTERNAL MEDICINE

## 2024-03-01 PROCEDURE — 99024 POSTOP FOLLOW-UP VISIT: CPT | Mod: POP,,, | Performed by: PHYSICIAN ASSISTANT

## 2024-03-01 PROCEDURE — 63600175 PHARM REV CODE 636 W HCPCS: Performed by: THORACIC SURGERY (CARDIOTHORACIC VASCULAR SURGERY)

## 2024-03-01 PROCEDURE — 85025 COMPLETE CBC W/AUTO DIFF WBC: CPT | Performed by: THORACIC SURGERY (CARDIOTHORACIC VASCULAR SURGERY)

## 2024-03-01 PROCEDURE — 83735 ASSAY OF MAGNESIUM: CPT | Performed by: THORACIC SURGERY (CARDIOTHORACIC VASCULAR SURGERY)

## 2024-03-01 PROCEDURE — 83605 ASSAY OF LACTIC ACID: CPT | Performed by: INTERNAL MEDICINE

## 2024-03-01 PROCEDURE — 63600175 PHARM REV CODE 636 W HCPCS: Performed by: INTERNAL MEDICINE

## 2024-03-01 PROCEDURE — 92526 ORAL FUNCTION THERAPY: CPT

## 2024-03-01 PROCEDURE — 93308 TTE F-UP OR LMTD: CPT | Mod: 26,,, | Performed by: GENERAL PRACTICE

## 2024-03-01 PROCEDURE — 94640 AIRWAY INHALATION TREATMENT: CPT

## 2024-03-01 PROCEDURE — 99231 SBSQ HOSP IP/OBS SF/LOW 25: CPT | Mod: ,,, | Performed by: SURGERY

## 2024-03-01 PROCEDURE — 84295 ASSAY OF SERUM SODIUM: CPT

## 2024-03-01 PROCEDURE — 20000000 HC ICU ROOM

## 2024-03-01 PROCEDURE — 82330 ASSAY OF CALCIUM: CPT

## 2024-03-01 RX ORDER — LEVALBUTEROL INHALATION SOLUTION 1.25 MG/3ML
1.25 SOLUTION RESPIRATORY (INHALATION) EVERY 6 HOURS
Status: DISCONTINUED | OUTPATIENT
Start: 2024-03-01 | End: 2024-03-07 | Stop reason: HOSPADM

## 2024-03-01 RX ORDER — DEXMEDETOMIDINE HYDROCHLORIDE 4 UG/ML
0-1.4 INJECTION, SOLUTION INTRAVENOUS CONTINUOUS
Status: DISCONTINUED | OUTPATIENT
Start: 2024-03-01 | End: 2024-03-02

## 2024-03-01 RX ORDER — EZETIMIBE 10 MG/1
10 TABLET ORAL NIGHTLY
Status: DISCONTINUED | OUTPATIENT
Start: 2024-03-01 | End: 2024-03-07 | Stop reason: HOSPADM

## 2024-03-01 RX ORDER — DEXTROSE MONOHYDRATE 50 MG/ML
INJECTION, SOLUTION INTRAVENOUS CONTINUOUS
Status: DISCONTINUED | OUTPATIENT
Start: 2024-03-01 | End: 2024-03-03

## 2024-03-01 RX ADMIN — IOHEXOL 100 ML: 350 INJECTION, SOLUTION INTRAVENOUS at 04:03

## 2024-03-01 RX ADMIN — TAMSULOSIN HYDROCHLORIDE 0.4 MG: 0.4 CAPSULE ORAL at 08:03

## 2024-03-01 RX ADMIN — PIPERACILLIN AND TAZOBACTAM 3.38 G: 3; .375 INJECTION, POWDER, FOR SOLUTION INTRAVENOUS; PARENTERAL at 12:03

## 2024-03-01 RX ADMIN — DEXMEDETOMIDINE HYDROCHLORIDE 0.06 MCG/KG/HR: 400 INJECTION INTRAVENOUS at 09:03

## 2024-03-01 RX ADMIN — IPRATROPIUM BROMIDE 0.5 MG: 0.5 SOLUTION RESPIRATORY (INHALATION) at 01:03

## 2024-03-01 RX ADMIN — METOPROLOL TARTRATE 50 MG: 50 TABLET, FILM COATED ORAL at 04:03

## 2024-03-01 RX ADMIN — FUROSEMIDE 40 MG: 10 INJECTION, SOLUTION INTRAVENOUS at 09:03

## 2024-03-01 RX ADMIN — LEVALBUTEROL HYDROCHLORIDE 1.25 MG: 1.25 SOLUTION RESPIRATORY (INHALATION) at 01:03

## 2024-03-01 RX ADMIN — IPRATROPIUM BROMIDE 0.5 MG: 0.5 SOLUTION RESPIRATORY (INHALATION) at 07:03

## 2024-03-01 RX ADMIN — LEVALBUTEROL HYDROCHLORIDE 1.25 MG: 1.25 SOLUTION RESPIRATORY (INHALATION) at 08:03

## 2024-03-01 RX ADMIN — FUROSEMIDE 40 MG: 10 INJECTION, SOLUTION INTRAVENOUS at 01:03

## 2024-03-01 RX ADMIN — ENOXAPARIN SODIUM 60 MG: 60 INJECTION SUBCUTANEOUS at 05:03

## 2024-03-01 RX ADMIN — IPRATROPIUM BROMIDE 0.5 MG: 0.5 SOLUTION RESPIRATORY (INHALATION) at 08:03

## 2024-03-01 RX ADMIN — LEVOTHYROXINE SODIUM ANHYDROUS 25 MCG: 100 INJECTION, POWDER, LYOPHILIZED, FOR SOLUTION INTRAVENOUS at 09:03

## 2024-03-01 RX ADMIN — PIPERACILLIN AND TAZOBACTAM 3.38 G: 3; .375 INJECTION, POWDER, FOR SOLUTION INTRAVENOUS; PARENTERAL at 04:03

## 2024-03-01 RX ADMIN — LEVALBUTEROL HYDROCHLORIDE 1.25 MG: 1.25 SOLUTION RESPIRATORY (INHALATION) at 07:03

## 2024-03-01 RX ADMIN — ASPIRIN 81 MG 81 MG: 81 TABLET ORAL at 08:03

## 2024-03-01 RX ADMIN — PANTOPRAZOLE SODIUM 40 MG: 40 TABLET, DELAYED RELEASE ORAL at 06:03

## 2024-03-01 RX ADMIN — ASCORBIC ACID, VITAMIN A PALMITATE, CHOLECALCIFEROL, THIAMINE HYDROCHLORIDE, RIBOFLAVIN-5 PHOSPHATE SODIUM, PYRIDOXINE HYDROCHLORIDE, NIACINAMIDE, DEXPANTHENOL, ALPHA-TOCOPHEROL ACETATE, VITAMIN K1, FOLIC ACID, BIOTIN, CYANOCOBALAMIN: 200; 3300; 200; 6; 3.6; 6; 40; 15; 10; 150; 600; 60; 5 INJECTION, SOLUTION INTRAVENOUS at 09:03

## 2024-03-01 RX ADMIN — EZETIMIBE 10 MG: 10 TABLET ORAL at 09:03

## 2024-03-01 RX ADMIN — HYDROCODONE BITARTRATE AND ACETAMINOPHEN 1 TABLET: 5; 325 TABLET ORAL at 08:03

## 2024-03-01 RX ADMIN — METOPROLOL TARTRATE 50 MG: 50 TABLET, FILM COATED ORAL at 08:03

## 2024-03-01 RX ADMIN — GUAIFENESIN 1200 MG: 600 TABLET, EXTENDED RELEASE ORAL at 09:03

## 2024-03-01 RX ADMIN — PIPERACILLIN AND TAZOBACTAM 3.38 G: 3; .375 INJECTION, POWDER, FOR SOLUTION INTRAVENOUS; PARENTERAL at 08:03

## 2024-03-01 RX ADMIN — LOSARTAN POTASSIUM 25 MG: 25 TABLET, FILM COATED ORAL at 08:03

## 2024-03-01 RX ADMIN — GUAIFENESIN 1200 MG: 600 TABLET, EXTENDED RELEASE ORAL at 08:03

## 2024-03-01 RX ADMIN — METOPROLOL TARTRATE 50 MG: 50 TABLET, FILM COATED ORAL at 09:03

## 2024-03-01 RX ADMIN — HYDROMORPHONE HYDROCHLORIDE 2 MG: 1 INJECTION, SOLUTION INTRAMUSCULAR; INTRAVENOUS; SUBCUTANEOUS at 03:03

## 2024-03-01 RX ADMIN — DEXTROSE: 5 SOLUTION INTRAVENOUS at 08:03

## 2024-03-01 RX ADMIN — DULOXETINE HYDROCHLORIDE 60 MG: 30 CAPSULE, DELAYED RELEASE ORAL at 09:03

## 2024-03-01 RX ADMIN — FUROSEMIDE 40 MG: 10 INJECTION, SOLUTION INTRAVENOUS at 07:03

## 2024-03-01 RX ADMIN — LEUCINE, PHENYLALANINE, LYSINE, METHIONINE, ISOLEUCINE, VALINE, HISTIDINE, THREONINE, TRYPTOPHAN, ALANINE, GLYCINE, ARGININE, PROLINE, SERINE, TYROSINE, SODIUM ACETATE, DIBASIC POTASSIUM PHOSPHATE, MAGNESIUM CHLORIDE, SODIUM CHLORIDE, CALCIUM CHLORIDE, DEXTROSE
311; 238; 247; 170; 255; 247; 204; 179; 77; 880; 438; 489; 289; 213; 17; 297; 261; 51; 77; 33; 5 INJECTION INTRAVENOUS at 05:03

## 2024-03-01 RX ADMIN — DEXMEDETOMIDINE HYDROCHLORIDE 0.4 MCG/KG/HR: 400 INJECTION INTRAVENOUS at 05:03

## 2024-03-01 RX ADMIN — HYDROCODONE BITARTRATE AND ACETAMINOPHEN 1 TABLET: 5; 325 TABLET ORAL at 04:03

## 2024-03-01 RX ADMIN — DEXMEDETOMIDINE HYDROCHLORIDE 0.6 MCG/KG/HR: 400 INJECTION INTRAVENOUS at 09:03

## 2024-03-01 RX ADMIN — MONTELUKAST 10 MG: 10 TABLET, FILM COATED ORAL at 08:03

## 2024-03-01 RX ADMIN — SPIRONOLACTONE 25 MG: 25 TABLET ORAL at 08:03

## 2024-03-01 NOTE — PT/OT/SLP PROGRESS
"Occupational Therapy   Treatment    Name: Jose F Hanley  MRN: [id number]  Admitting Diagnosis:  S/P CABG (coronary artery bypass graft)  7 Days Post-Op    Recommendations:     Discharge Recommendations: High Intensity Therapy  Discharge Equipment Recommendations:  to be determined by next level of care  Barriers to discharge:   (increased assist with ADLs and mobility)    Assessment:     Jose F Hanley is a 78 y.o. male with a medical diagnosis of S/P CABG (coronary artery bypass graft). Performance deficits affecting function are weakness, impaired endurance, impaired self care skills, impaired functional mobility, gait instability, impaired balance, impaired cognition, decreased coordination, decreased upper extremity function, decreased lower extremity function, decreased safety awareness, pain, decreased ROM, impaired skin, edema, impaired cardiopulmonary response to activity. Pt more confused this date and required increased time during session for self expression.    Rehab Prognosis:  Fair; patient would benefit from acute skilled OT services to address these deficits and reach maximum level of function.       Plan:     Patient to be seen 5 x/week to address the above listed problems via self-care/home management, therapeutic activities, therapeutic exercises, cognitive retraining  Plan of Care Expires: 03/29/24  Plan of Care Reviewed with: patient, spouse    Subjective     Chief Complaint: none stated  Patient/Family Comments/goals: none stated  Pain/Comfort:  Pain Rating 1:  (not rated)  Location 1:  ("all over")  Pain Addressed 1: Cessation of Activity    Objective:     Communicated with: nursing prior to session.  Patient found HOB elevated with chest tube, NG tube, oxygen, pulse ox (continuous), telemetry, peripheral IV, blood pressure cuff, WISAM drain, fitzgerald catheter, restraints upon OT entry to room.    General Precautions: Standard, fall, sternal    Orthopedic Precautions:N/A  Braces: N/A  Respiratory " Status: High flow, flow 3 L/min     Occupational Performance:     Activities of Daily Living:  Grooming: minimum assistance bed level to wash face; pt able to use left hand to grab washcloth and bring washcloth to face; pt minimally washed face, requiring cues from OT for completeness, but pt did not complete; OT assisted with finishing washing pt's face    Treatment & Education:  Pt educated on role of OT/POC, importance of OOB/EOB activity, use of call bell, and safety during ADLs, transfers, and functional mobility.    Patient left HOB elevated with all lines intact, call button in reach, restraints reapplied at end of session, and wife present    GOALS:   Multidisciplinary Problems       Occupational Therapy Goals          Problem: Occupational Therapy    Goal Priority Disciplines Outcome Interventions   Occupational Therapy Goal     OT, PT/OT     Description: Goals to be met by: 3/29/24     Patient will increase functional independence with ADLs by performing:    UE Dressing with Moderate Assistance.  LE Dressing with Moderate Assistance.  Grooming while seated with Supervision.  Toileting from bedside commode with Moderate Assistance for hygiene and clothing management.   Toilet transfer to bedside commode with Moderate Assistance.    All goals above complete while maintaining sternal precautions.                          Time Tracking:     OT Date of Treatment: 03/01/24  OT Start Time: 0955  OT Stop Time: 1005  OT Total Time (min): 10 min    Billable Minutes:Self Care/Home Management 10    OT/KIAN: OT          3/1/2024

## 2024-03-01 NOTE — SUBJECTIVE & OBJECTIVE
Interval History:  Patient seen today for follow-up care.  He seems little confused at times.  He was be oriented to time and place.  Patient's laboratories show WBC up 25.  We will check blood cultures and lactic acid level.  Patient's chest x-ray and abdominal one view showed some improvement.  Patient followed by General surgery , CT surgery, and Pulmonary critical Care.  Discussed discontinue benzodiazepines as may be contributing to increased confusion.  Patient is on IV Zosyn.  Laboratories show sodium 151, we will change IV fluids to D5W 50 mL an hour.  Patient on TPN for nutrition.          Review of Systems   Constitutional: Negative.    HENT:  Positive for congestion.    Eyes: Negative.    Respiratory:  Positive for cough.    Cardiovascular: Negative.    Gastrointestinal:  Positive for abdominal distention.   Endocrine: Negative.    Genitourinary: Negative.    Musculoskeletal: Negative.    Allergic/Immunologic: Negative.    Neurological: Negative.    Hematological: Negative.    Psychiatric/Behavioral: Negative.       Objective:     Vital Signs (Most Recent):  Temp: 98.8 °F (37.1 °C) (03/01/24 0301)  Pulse: (!) 121 (03/01/24 0723)  Resp: (!) 24 (03/01/24 0723)  BP: (!) 140/71 (03/01/24 0700)  SpO2: (!) 92 % (03/01/24 0723) Vital Signs (24h Range):  Temp:  [98.6 °F (37 °C)-99.3 °F (37.4 °C)] 98.8 °F (37.1 °C)  Pulse:  [102-134] 121  Resp:  [16-24] 24  SpO2:  [89 %-100 %] 92 %  BP: (124-173)/(58-80) 140/71     Weight: 125 kg (275 lb 9.2 oz)  Body mass index is 37.37 kg/m².    Intake/Output Summary (Last 24 hours) at 3/1/2024 0804  Last data filed at 3/1/2024 0600  Gross per 24 hour   Intake 4849.9 ml   Output 4480 ml   Net 369.9 ml           Physical Exam  Vitals reviewed.   Constitutional:       Appearance: Normal appearance. He is normal weight.   HENT:      Head: Normocephalic and atraumatic.      Right Ear: External ear normal.      Left Ear: External ear normal.      Nose: Nose normal.      Mouth/Throat:  "     Mouth: Mucous membranes are moist.      Pharynx: Oropharynx is clear.   Cardiovascular:      Rate and Rhythm: Tachycardia present. Rhythm irregular.      Pulses: Normal pulses.      Heart sounds: Normal heart sounds.   Pulmonary:      Effort: Pulmonary effort is normal.      Breath sounds: Normal breath sounds.   Abdominal:      General: Abdomen is flat. Bowel sounds are normal. There is distension.      Palpations: Abdomen is soft.      Comments: Abdominal distention gradually improving   Musculoskeletal:         General: Normal range of motion.      Cervical back: Normal range of motion and neck supple.   Skin:     General: Skin is warm.      Capillary Refill: Capillary refill takes less than 2 seconds.   Neurological:      General: No focal deficit present.      Mental Status: He is alert and oriented to person, place, and time. Mental status is at baseline.      Cranial Nerves: No cranial nerve deficit.   Psychiatric:         Mood and Affect: Mood normal.         Thought Content: Thought content normal.         Judgment: Judgment normal.             Significant Labs: All pertinent labs within the past 24 hours have been reviewed.  Blood Culture: No results for input(s): "LABBLOO" in the last 48 hours.  BMP:   Recent Labs   Lab 03/01/24  0517   *   *   K 3.7      CO2 36*   BUN 28*   CREATININE 1.1   CALCIUM 8.8   MG 2.2       CBC:   Recent Labs   Lab 02/28/24  1253 02/29/24  0346 03/01/24  0517   WBC  --  18.06* 25.23*   HGB  --  8.8* 9.3*   HCT 29* 30.0* 32.5*   PLT  --  342 418       Urine Culture: No results for input(s): "LABURIN" in the last 48 hours.    Significant Imaging: I have reviewed all pertinent imaging results/findings within the past 24 hours.  "

## 2024-03-01 NOTE — PLAN OF CARE
03/01/24 1417   Discharge Reassessment   Assessment Type Discharge Planning Reassessment   Did the patient's condition or plan change since previous assessment? Yes   Discharge Plan discussed with: Spouse/sig other;Patient   Name(s) and Number(s) Berenice   Communicated LEIA with patient/caregiver Date not available/Unable to determine   Discharge Plan A Skilled Nursing Facility   Discharge Plan B Skilled Nursing Facility   DME Needed Upon Discharge  none   Transition of Care Barriers None   Why the patient remains in the hospital Requires continued medical care   Post-Acute Status   Post-Acute Authorization Placement   Post-Acute Placement Status Patient List Provided   Discharge Delays None known at this time     Pt with chest drains, pt/ot recs high level therapy, pt and wife ok with pt going to [place] [facility], referral sent for review in anticipation of need.

## 2024-03-01 NOTE — NURSING
Pt awake most of the most of the shift. Oriented to self, wife, and location but he does not know why he is in the hospital. A-fib with rates 110-145. -160's. T max 99.3. 4l nasal cannula; pt with expiratory wheezes and rhonci. Encouraged incentive spirometer but pt unable to do it. Pt sat on the edge of the bed x2. Central line removed. Canas intact; 2400 uo with 2 doses of lasix given. Dressings c/d/I. NG tube to LIS; active bowel sounds but pt did not have a bowel movement. Wife at bedside most of shift and updated on POC.

## 2024-03-01 NOTE — CARE UPDATE
02/29/24 2003   Patient Assessment/Suction   Level of Consciousness (AVPU) alert   Respiratory Effort Normal;Unlabored   Expansion/Accessory Muscles/Retractions no use of accessory muscles   All Lung Fields Breath Sounds coarse   Rhythm/Pattern, Respiratory unlabored;shallow   Cough Frequency infrequent   Cough Type weak   PRE-TX-O2   Device (Oxygen Therapy) nasal cannula   Flow (L/min) 3   SpO2 98 %   Pulse Oximetry Type Continuous   $ Pulse Oximetry - Multiple Charge Pulse Oximetry - Multiple   Pulse (!) 134   Resp 20   Aerosol Therapy   $ Aerosol Therapy Charges Aerosol Treatment   Daily Review of Necessity (SVN) completed   Respiratory Treatment Status (SVN) given   Treatment Route (SVN) in-line;mouthpiece   Patient Position (SVN) [place]   Post Treatment Assessment (SVN) increased aeration   Signs of Intolerance (SVN) none   Breath Sounds Post-Respiratory Treatment   Throughout All Fields Post-Treatment All Fields   Throughout All Fields Post-Treatment aeration increased   Post-treatment Heart Rate (beats/min) 130   Post-treatment Resp Rate (breaths/min) 21   Intrapulmonary Percussive Ventilation/Metaneb   $ IPV Administration Therapy   Daily Review of Necessity (IPV) completed   Route (IPV) mouthpiece   IPV Operational Pressure (PSI) 35   IPV Duration Total (min) 15   Patient Position (IPV) [place]   Post Treatment Assessment (IPV) increased aeration   Signs of Intolerance (IPV) none   Preset CPAP/BiPAP Settings   Mode Of Delivery BiPAP;Standby   Education   $ Education Bronchodilator;15 min

## 2024-03-01 NOTE — ASSESSMENT & PLAN NOTE
Secondary to COPD exacerbation   BiPAP q.h.s.  - scheduled breathing Rx   -chest x-ray shows some improvement  -trend leukocytosis, WBC 25  -check lactic acid level  -check blood cultures

## 2024-03-01 NOTE — ASSESSMENT & PLAN NOTE
Patient has Post-operative ileus which is adynamic in etiology which is stable. This is inherent to his procedure. Will treat conservatively with bowel rest, IV fluids, serial abdominal exams and avoidance of GI paralytics such as narcotics or anti-spasmodics. Monitor patient closely.     -trend leukocytosis, WBC 25  -check lactic acid level  -check blood cultures  -chest x-ray and abdominal 1- view improving

## 2024-03-01 NOTE — ASSESSMENT & PLAN NOTE
Patient has hypernatremia which is uncontrolled. The hypernatremia is due to  possible IV fluids . We will aim to correct the sodium by 8-10mEq in 24 hours. We will correct their hypernatremia with Select IV fluids: D5W at a rate of 50 ml/hr. The patient's sodium results have been reviewed and are listed below.  Recent Labs   Lab 03/01/24  0517   *     -discontinue 0.45 NS  -started D5W at 50 mL/ hour  -trend sodium level, sodium 151

## 2024-03-01 NOTE — PROGRESS NOTES
"[facility]  Department of Cardiology  Consult Note      PATIENT NAME: Jose F Hanley  MRN: [id number]  TODAY'S DATE: 03/01/2024  ADMIT DATE: 2/23/2024                          CONSULT REQUESTED BY: Aravind Albarran MD    SUBJECTIVE     PRINCIPAL PROBLEM: S/P CABG (coronary artery bypass graft)      REASON FOR CONSULT:  Post CABG     Interval history:        3/1/2024      Patient is not completely oriented. He is lethargic.  He states "yes" when asked if he is breathing better.  According to I and O still is positive.  Creatinine stable.  Remains in AFIB rates are low 100s      2/29/24    Post op Ileus with NGT  RVR  Still positive on I and O  CXR Not any better      2/28/2024    Off Bipap  Off Precedex  Pacer wires out  Abdomen is distended    2/27/2024    Resting on Face mask. Still with mild sedation family at bedside. VSS.      2/26/24  Pt asleep in bed, remains on BiPAP. Per RN, he is still confused while awake. Currently under light sedation - precedex gtt. Remains paced with temporary pacer - 80s.     2/25/24  Pt was seen resting in bed with Bipap mask in place. Balloon pump was removed this morning per bedside RN.     2/24/24  Pt was seen extubated on BiPAP this morning. Balloon pump. Pressures soft this morning. Per RN, pt was increasingly agitated this morning.       HPI:  Pt is a 78 year old with history of known CAD with previous bypass surgery back in 08/31/23 and then underwent a repeat CABG today, 2/23/23. He Just recently had a [facility] as well with Dr. Bush back on 2/7/24.       Per surgery team:  Operation: Redo coronary artery bypass grafting x3 using left radial artery to left anterior descending coronary artery and separate segments of saphenous vein from the aorta to the diagonal coronary artery and from the aorta to the obtuse marginal coronary artery using a combination of antegrade and retrograde cardioplegia, mild systemic hypothermia, endoscopic vein harvest from the right leg, right " radial artery harvest, and insertion of right femoral arterial intra-aortic balloon pump with fluoroscopic guidance and complete pericardiectomy due to severe adhesive pericarditis         Review of patient's allergies indicates:   Allergen Reactions    Adhes. band-tape-benzalkonium Rash     Pt stated it caused blisters    Statins-hmg-coa reductase inhibitors Other (See Comments)     Statin Intolerance (joint pain)       Past Medical History:   Diagnosis Date    A-fib     Allergies 2020    gets allergy shots    Arthritis 1973    right knee    Back pain     COPD (chronic obstructive pulmonary disease)     Diastolic heart failure     GERD (gastroesophageal reflux disease) 2015    HLD (hyperlipidemia)     HTN (hypertension)     Hx of LASIK     Hypothyroidism, unspecified 2012    Inflammatory disease of prostate, unspecified     out of medicine    Mild intermittent asthma, uncomplicated     Myocardial infarction     Neuropathy     On home oxygen therapy     3L NC  at night    PAD (peripheral artery disease)     Staph skin infection 2016    right thigh    TIA (transient ischemic attack) 2013     Past Surgical History:   Procedure Laterality Date    AORTOGRAPHY WITH SERIALOGRAPHY N/A 08/31/2023    Procedure: AORTOGRAM, WITH SERIALOGRAPHY;  Surgeon: Gary Thomas MD;  Location: [facility] CATH/EP LAB;  Service: Peripheral Vascular;  Laterality: N/A;    CATARACT EXTRACTION Bilateral 2014    CORONARY ANGIOGRAPHY INCLUDING BYPASS GRAFTS WITH CATHETERIZATION OF LEFT HEART Left 01/12/2024    Procedure: ANGIOGRAM, CORONARY, INCLUDING BYPASS GRAFT, WITH LEFT HEART CATHETERIZATION;  Surgeon: Gregg Bush MD;  Location: [facility] CATH/EP LAB;  Service: Cardiology;  Laterality: Left;    CORONARY ARTERY BYPASS GRAFT  2010    ENDOSCOPIC HARVEST OF VEIN Right 2/23/2024    Procedure: HARVEST-VEIN-ENDOVASCULAR;  Surgeon: Gary Thomas MD;  Location: [facility] OR;  Service: Cardiothoracic;  Laterality: Right;    INSERTION OF  IMPLANTABLE LOOP RECORDER      INSERTION OF INTRA-AORTIC BALLOON ASSIST DEVICE Right 2024    Procedure: INSERTION, INTRA-AORTIC BALLOON PUMP;  Surgeon: Gary Thomas MD;  Location: [facility] OR;  Service: Cardiothoracic;  Laterality: Right;    INSERTION OF PACEMAKER      does not have card with him for preadmit    INSTANTANEOUS WAVE-FREE RATIO (IFR) N/A 2024    Procedure: Instantaneous Wave-Free Ratio (IFR);  Surgeon: Gregg Bush MD;  Location: [facility] CATH/EP LAB;  Service: Cardiology;  Laterality: N/A;    PTA, ANTERIOR TIBIAL Left 2023    Procedure: PTA, Anterior Tibial;  Surgeon: Gary Thomas MD;  Location: [facility] CATH/EP LAB;  Service: Peripheral Vascular;  Laterality: Left;    PTA, SUPERFICIAL FEMORAL ARTERY Left 2023    Procedure: PTA, Superficial Femoral Artery;  Surgeon: Gary Thomas MD;  Location: [facility] CATH/EP LAB;  Service: Peripheral Vascular;  Laterality: Left;    REPEAT CORONARY ARTERY BYPASS GRAFTING N/A 2024    Procedure: CABG, REPEAT;  Surgeon: Gary Thomas MD;  Location: [facility];  Service: Cardiothoracic;  Laterality: N/A;    SPLENECTOMY  2016    STENT, ANTERIOR TIBIAL Left 2023    Procedure: Stent, Anterior Tibial;  Surgeon: Gary Thomas MD;  Location: [facility] CATH/EP LAB;  Service: Peripheral Vascular;  Laterality: Left;    SURGICAL PROCUREMENT, ARTERY, RADIAL, FOR CABG  2024    Procedure: SURGICAL PROCUREMENT,ARTERY,RADIAL,FOR CABG;  Surgeon: Gary Thomas MD;  Location: [facility];  Service: Cardiothoracic;;    watchman heart device       Social History     Tobacco Use    Smoking status: Former     Current packs/day: 0.00     Types: Cigarettes     Quit date: 1987     Years since quittin.2    Smokeless tobacco: Never   Substance Use Topics    Alcohol use: Not Currently     Alcohol/week: 21.0 standard drinks of alcohol     Types: 21 Drinks containing 0.5 oz of alcohol per week     Comment: ed  bean mixed drinks    Drug use: No        REVIEW OF SYSTEMS    As mentioned in HPI    OBJECTIVE     VITAL SIGNS (Most Recent)  Temp: 98.8 °F (37.1 °C) (03/01/24 0301)  Pulse: (!) 123 (03/01/24 0922)  Resp: 20 (03/01/24 0830)  BP: 133/62 (03/01/24 0830)  SpO2: (!) 93 % (03/01/24 0922)    VENTILATION STATUS  Resp: 20 (03/01/24 0830)  SpO2: (!) 93 % (03/01/24 0922)  Oxygen Concentration (%):  [40] 40        I & O (Last 24H):  Intake/Output Summary (Last 24 hours) at 3/1/2024 1338  Last data filed at 3/1/2024 0600  Gross per 24 hour   Intake 4849.9 ml   Output 4480 ml   Net 369.9 ml       WEIGHTS  Wt Readings from Last 3 Encounters:   02/23/24 1540 125 kg (275 lb 9.2 oz)   02/21/24 0948 124.5 kg (274 lb 6.4 oz)   01/12/24 0718 120.7 kg (266 lb)       PHYSICAL EXAM    CONSTITUTIONAL: NAD,lethargic not oriented answers yes and no to questions however  HEENT: NGT present  NECK: no JVD  LUNGS: coarse crackles diminished bases  HEART AFIB RVR  ABDOMEN: distended better  EXTREMITIES:  edema  SKIN: No rash      HOME MEDICATIONS:  No current facility-administered medications on file prior to encounter.     Current Outpatient Medications on File Prior to Encounter   Medication Sig Dispense Refill    acetaminophen (TYLENOL) 325 MG tablet Take 650 mg by mouth every 6 (six) hours as needed.      albuterol (PROVENTIL/VENTOLIN HFA) 90 mcg/actuation inhaler Inhale 2 puffs into the lungs every 6 (six) hours as needed.      aspirin 81 MG Chew Take 81 mg by mouth once daily.      betamethasone valerate 0.1% (VALISONE) 0.1 % Lotn Apply to scalp bid prn rash      cyanocobalamin 500 MCG tablet 500 mcg.      cyclobenzaprine (FLEXERIL) 10 MG tablet Take 10 mg by mouth every 8 (eight) hours as needed.      diphenhydrAMINE (BENADRYL) 50 MG capsule 50 mg every 6 (six) hours as needed.      doxepin (SINEQUAN) 50 MG capsule Take 50 mg by mouth every evening.      DULoxetine (CYMBALTA) 60 MG capsule 60 mg nightly.      EPINEPHrine (EPIPEN) 0.3  mg/0.3 mL AtIn INJECT 0.3MG/0.3ML SUBCUTANEOUSLY (UNDER THE SKIN)  AS NEEDED FOR ALLERGIC REACTIONS SELF-INJECTOR PEN      furosemide (LASIX) 20 MG tablet Take 20 mg by mouth once daily.      gabapentin (NEURONTIN) 400 MG capsule 800 mg 2 (two) times daily.      hydrocodone-homatropine 5-1.5 mg/5 ml (HYCODAN) 5-1.5 mg/5 mL Syrp Take by mouth every 6 (six) hours as needed. cough      levothyroxine (SYNTHROID) 50 MCG tablet 50 mcg.      metOLazone (ZAROXOLYN) 5 MG tablet 5 mg daily as needed. For weight gain >2lbs      montelukast (SINGULAIR) 10 mg tablet 10 mg once daily.      nitroGLYCERIN (NITROSTAT) 0.4 MG SL tablet Place 0.4 mg under the tongue every 5 (five) minutes as needed.      omega-3 fatty acids/fish oil (FISH OIL-OMEGA-3 FATTY ACIDS) 300-1,000 mg capsule Take 1 capsule by mouth 2 (two) times daily.      omeprazole (PRILOSEC) 20 MG capsule 40 mg once daily.      potassium chloride SA (K-DUR,KLOR-CON) 20 MEQ tablet 20 mEq nightly.      ranolazine (RANEXA) 1,000 mg Tb12 Take 1 tablet (1,000 mg total) by mouth 2 (two) times daily. 180 tablet 3    senna-docusate 8.6-50 mg (PERICOLACE) 8.6-50 mg per tablet nightly.      spironolactone (ALDACTONE) 50 MG tablet Take 50 mg by mouth once daily.      tamsulosin (FLOMAX) 0.4 mg Cap 0.4 mg.      TESTOSTERONE PROPIONATE, BULK, MISC Inject 1 each as directed every 14 (fourteen) days.      TOPROL XL 25 mg 24 hr tablet Take 25 mg by mouth 2 (two) times daily.      torsemide (DEMADEX) 20 MG Tab Take 1 tablet (20 mg total) by mouth once daily. (Patient not taking: Reported on 12/21/2023) 30 tablet 11       SCHEDULED MEDS:   aspirin  81 mg Oral Daily    DULoxetine  60 mg Oral Nightly    enoxparin  60 mg Subcutaneous Q24H (prophylaxis, 1700)    furosemide (LASIX) injection  40 mg Intravenous Q8H    guaiFENesin  1,200 mg Oral BID    ipratropium  0.5 mg Nebulization Q6H    levalbuterol  1.25 mg Nebulization Q6H    levothyroxine  25 mcg Intravenous Daily    losartan  25 mg Oral  Daily    metoprolol tartrate  50 mg Oral TID    montelukast  10 mg Oral Daily    pantoprazole  40 mg Oral Before breakfast    piperacillin-tazobactam (Zosyn) IV (PEDS and ADULTS) (extended infusion is not appropriate)  3.375 g Intravenous Q8H    sodium chloride 0.9% 1,000 mL with mvi, (ADULT) no.4 with vit K 3,300 unit- 150 mcg/10 mL 10 mL, thiamine 100 mg, folic acid 1 mg infusion   Intravenous Daily    spironolactone  25 mg Oral Daily    tamsulosin  0.4 mg Oral Daily       CONTINUOUS INFUSIONS:   dexmedeTOMIDine (Precedex) infusion (titrating) 0.06 mcg/kg/hr (03/01/24 0932)    dextrose 5 % (D5W) 50 mL/hr at 03/01/24 0833       PRN MEDS:0.9%  NaCl infusion (for blood administration), albuterol sulfate, calcium gluconate IVPB, calcium gluconate IVPB, calcium gluconate IVPB, dextrose 50% in water (D50W), dextrose 50% in water (D50W), hydrALAZINE, HYDROcodone-acetaminophen, HYDROmorphone, lorazepam, magnesium sulfate IVPB, magnesium sulfate IVPB, metoprolol, ondansetron, potassium chloride in water, potassium chloride in water, potassium chloride in water **AND** potassium chloride in water **AND** potassium chloride in water, potassium chloride in water, sodium phosphate 15 mmol in dextrose 5 % (D5W) 250 mL IVPB, sodium phosphate 20.01 mmol in dextrose 5 % (D5W) 250 mL IVPB, sodium phosphate 30 mmol in dextrose 5 % (D5W) 250 mL IVPB    LABS AND DIAGNOSTICS     CBC LAST 3 DAYS  Recent Labs   Lab 02/28/24  0340 02/28/24  1253 02/29/24  0346 03/01/24  0517 03/01/24  0922   WBC 14.48*  --  18.06* 25.23*  --    RBC 3.59*  --  3.81* 4.00*  --    HGB 8.3*  --  8.8* 9.3*  --    HCT 28.0*   < > 30.0* 32.5* 31*   MCV 78*  --  79* 81*  --    MCH 23.1*  --  23.1* 23.3*  --    MCHC 29.6*  --  29.3* 28.6*  --    RDW 22.6*  --  22.8* 22.8*  --      --  342 418  --    MPV 11.1  --  10.8 10.8  --    GRAN 70.5  10.2*  --  83.0*  15.0* 79.3*  20.0*  --    LYMPH 10.7*  1.6  --  5.3*  1.0 6.9*  1.8  --    MONO 14.0  2.0*   "--  10.4  1.9* 10.3  2.6*  --    BASO 0.04  --  0.04 0.14  --    NRBC 2*  --  3* 7*  --     < > = values in this interval not displayed.       COAGULATION LAST 3 DAYS  No results for input(s): "LABPT", "INR", "APTT" in the last 168 hours.    CHEMISTRY LAST 3 DAYS  Recent Labs   Lab 02/24/24  1828 02/24/24  1951 02/25/24 0337 02/26/24  0521 02/27/24  0339 02/28/24  0340 02/28/24  1253 02/28/24  2137 02/29/24  0346 02/29/24  0908 03/01/24  0517 03/01/24 0922     --  137 139   < > 144  --   --  151*  --  151*  --    K 5.0  --  4.7 4.7   < > 3.7  --  3.3* 3.7  --  3.7  --      --  107 109   < > 112*  --   --  109  --  106  --    CO2 24  --  23 25   < > 23  --   --  33*  --  36*  --    ANIONGAP 5*  --  7* 5*   < > 9  --   --  9  --  9  --    BUN 17  --  19 22   < > 17  --   --  22  --  28*  --    CREATININE 0.9  --  0.9 0.8   < > 0.7  --   --  0.9  --  1.1  --    *  --  171* 151*   < > 136*  --   --  164*  --  128*  --    CALCIUM 7.7*  --  7.7* 7.8*   < > 8.0*  --   --  8.9  --  8.8  --    PH  --    < >  --   --   --   --  7.479*  --   --  7.425  --  7.428   MG 2.2  --  2.2 2.1   < > 2.2  --   --  2.4  --  2.2  --    ALBUMIN 3.6  --  3.4* 3.3*  --   --   --   --   --   --   --   --    PROT 5.3*  --  5.3* 5.4*  --   --   --   --   --   --   --   --    ALKPHOS 22*  --  22* 24*  --   --   --   --   --   --   --   --    ALT 18  --  15 12  --   --   --   --   --   --   --   --    AST 39  --  33 32  --   --   --   --   --   --   --   --    BILITOT 0.7  --  0.7 1.1*  --   --   --   --   --   --   --   --     < > = values in this interval not displayed.       CARDIAC PROFILE LAST 3 DAYS  No results for input(s): "BNP", "CPK", "CPKMB", "LDH", "TROPONINI", "TROPONINIHS" in the last 168 hours.    ENDOCRINE LAST 3 DAYS  Recent Labs   Lab 03/01/24  1001   TSH 2.171       LAST ARTERIAL BLOOD GAS  ABG  Recent Labs   Lab 03/01/24  0922   PH 7.428   PO2 54*   PCO2 54.4*   HCO3 36.0*   BE 12*       LAST 7 DAYS " MICROBIOLOGY   Microbiology Results (last 7 days)       Procedure Component Value Units Date/Time    MRSA Screen by PCR [[id number]] Collected: 03/01/24 1112    Order Status: Completed Updated: 03/01/24 1253     MRSA SCREEN BY PCR Negative    Blood culture [[id number]] Collected: 03/01/24 1001    Order Status: Sent Specimen: Blood Updated: 03/01/24 1033    MRSA Screen by PCR [[id number]] Collected: 03/01/24 0940    Order Status: Canceled Specimen: Nasopharyngeal Swab from Nasal             MOST RECENT IMAGING  X-Ray Abdomen Portable  KUB is compared to prior study dated 2/28/2024    Clinical history is abdominal distention    The NG tube has been pulled back with the tip just past the GE junction. There are surgical drains overlying the mid abdomen extending into the chest.  There is improvement of the dilated loops of small bowel. The colon is decompressed.  No organomegaly or abnormal soft-tissue mass. There are degenerative changes of the spine.    IMPRESSION: The NG tube has been pulled back slightly and is just past the GE junction    Improvement of the dilated loops of small bowel.    Electronically signed by:  Ching Adamson MD  03/01/2024 07:51 AM CST Workstation: [id number]  X-Ray Chest AP Portable  Portable chest x-ray at 4:48 AM is compared to prior study dated 2/29/2024    Clinical history is coronary artery disease post-CABG    The NG tube and mediastinal drains and right chest tube are in stable position.  The heart is enlarged. There is a aortic valve endovascular stent. There is a heart loop monitor.  There is improvement of the airspace disease within the lung bases. The upper lobes are clear. There are no pleural effusions or pneumothorax.    IMPRESSION: Status post recent median sternotomy with prosthetic aortic valve    Cardiomegaly with improvement of the airspace disease in the lung bases    Electronically signed by:  Ching Adamson MD  03/01/2024 07:49 AM CST Workstation:  [id number]      ECHOCARDIOGRAM RESULTS (last 5)  Results for orders placed during the hospital encounter of 12/13/23    Echo    Interpretation Summary    Left Ventricle: The left ventricle is normal in size. Mildly increased wall thickness. There is mild concentric hypertrophy. Normal wall motion. There is normal systolic function with a visually estimated ejection fraction of 65 - 70%. There is normal diastolic function.    Right Ventricle: Mild right ventricular enlargement. Wall thickness is normal. Right ventricle wall motion  is normal. Systolic function is normal.    Left Atrium: Left atrium is moderately dilated.    Right Atrium: Right atrium is mildly dilated.    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    Tricuspid Valve: There is mild regurgitation with a centrally directed jet.    Pulmonic Valve: There is mild regurgitation with a centrally directed jet.    Pulmonary Artery: There is mild pulmonary hypertension. The estimated pulmonary artery systolic pressure is 40 mmHg.    IVC/SVC: Intermediate venous pressure at 8 mmHg.      CURRENT/PREVIOUS VISIT EKG  Results for orders placed or performed during the hospital encounter of 02/23/24   EKG 12-LEAD    Collection Time: 02/23/24  1:53 PM   Result Value Ref Range    QRS Duration 232 ms    OHS QTC Calculation 613 ms    Narrative    Test Reason : Z95.1,Z95.1,    Vent. Rate : 080 BPM     Atrial Rate : 087 BPM     P-R Int : 000 ms          QRS Dur : 232 ms      QT Int : 532 ms       P-R-T Axes : 000 168 250 degrees     QTc Int : 613 ms    Ventricular-paced rhythm  occasional atrila pacing  Abnormal ECG  When compared with ECG of 21-FEB-2024 09:33,  Electronic ventricular pacemaker has replaced Atrial fibrillation  Confirmed by Eleazar WOO, Gregg NEWTON ([id number]) on 2/24/2024 7:12:58 AM    Referred By: OLLIE RIBEIRO           Confirmed By:Gregg Bush MD           ASSESSMENT/PLAN:     Active Hospital Problems    Diagnosis     *S/P CABG (coronary artery bypass graft) - x2 09/2010 - LIMA to LAD; SVG to OMB; grafts occluded 08/2012    Hypernatremia     -      Ileus    Abdominal distention     -patient has increasing abdominal distention and discomfort  -check KUB  -NG tube to LIS  -possible postoperative ileus  -increase mobility as tolerated      Acute encephalopathy    Acute blood loss anemia    Acute respiratory failure with hypoxia and hypercapnia    Severe obesity (BMI 35.0-39.9) with comorbidity    Permanent atrial fibrillation    CAD (coronary artery disease), native coronary artery    Dyslipidemia       ASSESSMENT & PLAN:     MVCAD S/P redo CABG yesterday 2/23/24,  left radial artery to LAD, SVG to Dx, SVG to OM  HFpEF  HTN/ dyslipidemia   Statin intolerance  AFIB H/O Watchman with RVR  H/O TAVR  Leadless PPM in situ  Distended Abdomen Post op Ileus now with NGT      RECOMMENDATIONS:      Continue to diurese  Awaiting limited ECHO  Continue Metoprolol 50 mg q 8  Continue ASA  Start Zetia at least  Continue losartan  Continue Aldactone 25 mg daily  Dr. ARIA Miller to follow patient over weekend.    Andria Duffy NP  Department of Cardiology  Date of Service: 03/01/2024      ECHO SHOWS EF 60 65% UNCHANGED FROM PREVIOUS NO SIGNIFICANT PERICARDIAL  EFFUSION.  HIS HEART RATE IS CONTROLLED ADEQUATELY WITH METOPROLOL.  HE HAS A LEADLESS PACEMAKER WHICH IS SET AT 50 PER  HIS LUNGS ARE MOVING AIR BETTER WITH LESS RESPIRATORY DISTRESS  HIS ABDOMEN IS SOFTER GOING DOWN    CONTINUE THE CURRENT REGIMEN

## 2024-03-01 NOTE — PLAN OF CARE
03/01/24 0723   Patient Assessment/Suction   Level of Consciousness (AVPU) alert   Respiratory Effort Normal;Unlabored   Expansion/Accessory Muscles/Retractions no use of accessory muscles;no retractions   All Lung Fields Breath Sounds coarse   Rhythm/Pattern, Respiratory unlabored;pattern regular;depth regular   PRE-TX-O2   Device (Oxygen Therapy) high flow nasal cannula   $ Is the patient on High Flow Oxygen? Yes   Flow (L/min) 7   SpO2 (!) 92 %   Pulse Oximetry Type Continuous   $ Pulse Oximetry - Multiple Charge Pulse Oximetry - Multiple   Pulse (!) 121   Resp (!) 24   Aerosol Therapy   $ Aerosol Therapy Charges Aerosol Treatment   Daily Review of Necessity (SVN) completed   Respiratory Treatment Status (SVN) given   Treatment Route (SVN) in-line   Patient Position (SVN) HOB elevated   Post Treatment Assessment (SVN) breath sounds unchanged   Signs of Intolerance (SVN) none   Breath Sounds Post-Respiratory Treatment   Post-treatment Heart Rate (beats/min) 125   Post-treatment Resp Rate (breaths/min) 24   Intrapulmonary Percussive Ventilation/Metaneb   $ IPV Administration Therapy   Daily Review of Necessity (IPV) completed   Route (IPV) mouthpiece   IPV Operational Pressure (PSI) 30   IPV Duration Total (min) 15   Patient Position (IPV) HOB elevated   Signs of Intolerance (IPV) none   Education   $ Education BiPAP;15 min;Bronchodilator

## 2024-03-01 NOTE — PROGRESS NOTES
Pulmonary/Critical Care Progress Note      PATIENT NAME: Jose F Hanley  MRN: [id number]  TODAY'S DATE: 2024  1:01 PM  ADMIT DATE: 2024  AGE: 78 y.o. : 1945    CONSULT REQUESTED BY: Aravind Albarran MD    REASON FOR CONSULT:   Respiratory failure    HPI:  The patient is a 78-year-old gentleman who underwent coronary artery bypass grafting.  He is recovering from DTs, he is extremely morbidly obese, he is in AFib with RVR, and he is not clearing his secretions.  He is off of Precedex but only by a few hours and is lethargic.     the patient remains quite lethargic.    3/1 the patient is significantly more confused today than yesterday.  He is oriented to self.  He is convinced we are trying to kill him.  He states he is going to die.  This makes me anxious.    REVIEW OF SYSTEMS  Unable to obtain    No change in the patient's Past Medical History, Past Surgical History, Social History or Family History since admission.     VITAL SIGNS (MOST RECENT)  Temp: 98.8 °F (37.1 °C) (24 0301)  Pulse: (!) 129 (24 0830)  Resp: 20 (24 0830)  BP: 133/62 (24 0830)  SpO2: (!) 92 % (24 0723)    INTAKE AND OUTPUT (LAST 24 HOURS):  Intake/Output Summary (Last 24 hours) at 3/1/2024 0841  Last data filed at 3/1/2024 0600  Gross per 24 hour   Intake 4849.9 ml   Output 4480 ml   Net 369.9 ml       WEIGHT  Wt Readings from Last 1 Encounters:   24 125 kg (275 lb 9.2 oz)       PHYSICAL EXAM  GENERAL: Older patient confused.  HEENT: Pupils equal and reactive. Extraocular movements intact. Nose with NG tube in place; also nasal cannula in place.  Pharynx dry  NECK: Supple.   HEART:  Tachycardic rate and rhythm.  AFib with RVR No murmur or gallop auscultated.  LUNGS:  Coarse.  Respiratory reports that he does do well with the IPV.  Lung excursion symmetrical. No change in fremitus. No adventitial noises.  Thoracostomy and mediastinal tubes are still in place  ABDOMEN:  Extremely obese.   Distended.  Bowel sounds present. Non-tender, no masses palpated.  : Normal anatomy.  Canas with some urine.    EXTREMITIES: Normal muscle tone and joint movement, no cyanosis or clubbing.   LYMPHATICS: No adenopathy palpated, no edema.  SKIN: Dry, intact, no lesions.  Incisions dressed and dry  NEURO: Cranial nerves appear intact.  Motor strength is not formally tested.  Patient is very confused today.  PSYCH:  Confused      CBC LAST (LAST 24 HOURS)  Recent Labs   Lab 03/01/24  0517   WBC 25.23*   RBC 4.00*   HGB 9.3*   HCT 32.5*   MCV 81*   MCH 23.3*   MCHC 28.6*   RDW 22.8*      MPV 10.8       CHEMISTRY LAST (LAST 24 HOURS)  Recent Labs   Lab 02/29/24  0908 03/01/24  0517   NA  --  151*   K  --  3.7   CL  --  106   CO2  --  36*   ANIONGAP  --  9   BUN  --  28*   CREATININE  --  1.1   GLU  --  128*   CALCIUM  --  8.8   PH 7.425  --    MG  --  2.2         LAST 7 DAYS MICROBIOLOGY   Microbiology Results (last 7 days)       Procedure Component Value Units Date/Time    MRSA Screen by PCR [[id number]]     Order Status: No result Specimen: Nasopharyngeal Swab from Nasal     Blood culture [[id number]]     Order Status: Sent Specimen: Blood             MOST RECENT IMAGING  X-Ray Abdomen Portable  KUB is compared to prior study dated 2/28/2024    Clinical history is abdominal distention    The NG tube has been pulled back with the tip just past the GE junction. There are surgical drains overlying the mid abdomen extending into the chest.  There is improvement of the dilated loops of small bowel. The colon is decompressed.  No organomegaly or abnormal soft-tissue mass. There are degenerative changes of the spine.    IMPRESSION: The NG tube has been pulled back slightly and is just past the GE junction    Improvement of the dilated loops of small bowel.    Electronically signed by:  Ching Adamson MD  03/01/2024 07:51 AM CST Workstation: [id number]  X-Ray Chest AP Portable  Portable chest x-ray at 4:48 AM is  compared to prior study dated 2/29/2024    Clinical history is coronary artery disease post-CABG    The NG tube and mediastinal drains and right chest tube are in stable position.  The heart is enlarged. There is a aortic valve endovascular stent. There is a heart loop monitor.  There is improvement of the airspace disease within the lung bases. The upper lobes are clear. There are no pleural effusions or pneumothorax.    IMPRESSION: Status post recent median sternotomy with prosthetic aortic valve    Cardiomegaly with improvement of the airspace disease in the lung bases    Electronically signed by:  Ching Adamson MD  03/01/2024 07:49 AM CST Workstation: [id number]      CURRENT VISIT EKG  Results for orders placed or performed during the hospital encounter of 02/23/24   EKG 12-LEAD   Result Value Ref Range    QRS Duration 232 ms    OHS QTC Calculation 613 ms    Narrative    Test Reason : Z95.1,Z95.1,    Vent. Rate : 080 BPM     Atrial Rate : 087 BPM     P-R Int : 000 ms          QRS Dur : 232 ms      QT Int : 532 ms       P-R-T Axes : 000 168 250 degrees     QTc Int : 613 ms    Ventricular-paced rhythm  occasional atrila pacing  Abnormal ECG  When compared with ECG of 21-FEB-2024 09:33,  Electronic ventricular pacemaker has replaced Atrial fibrillation  Confirmed by Eleazar WOO, Gregg NEWTON ([id number]) on 2/24/2024 7:12:58 AM    Referred By: OLLIE RIBEIRO           Confirmed By:Gregg Bush MD       ECHOCARDIOGRAM RESULTS  Results for orders placed during the hospital encounter of 12/13/23    Echo    Interpretation Summary    Left Ventricle: The left ventricle is normal in size. Mildly increased wall thickness. There is mild concentric hypertrophy. Normal wall motion. There is normal systolic function with a visually estimated ejection fraction of 65 - 70%. There is normal diastolic function.    Right Ventricle: Mild right ventricular enlargement. Wall thickness is normal. Right ventricle wall motion  is normal.  Systolic function is normal.    Left Atrium: Left atrium is moderately dilated.    Right Atrium: Right atrium is mildly dilated.    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    Tricuspid Valve: There is mild regurgitation with a centrally directed jet.    Pulmonic Valve: There is mild regurgitation with a centrally directed jet.    Pulmonary Artery: There is mild pulmonary hypertension. The estimated pulmonary artery systolic pressure is 40 mmHg.    IVC/SVC: Intermediate venous pressure at 8 mmHg.        Oxygen INFORMATION  Oxygen Concentration (%):  [40] 40    3 L       LAST ARTERIAL BLOOD GAS  ABG  Recent Labs   Lab 02/29/24  0908   PH 7.425   PO2 65*   PCO2 53.8*   HCO3 35.3*   BE 11*       IMPRESSION AND PLAN  Status post coronary artery bypass grafting  Alcoholic going through DTs  Encephalopathy with significantly increased confusion  - will check ammonia, ABG, TSH  Acute hypercapnic hypoxemic respiratory failure  - 95% saturation on 3 L  Bibasilar atelectasis  - continue IPV  - encourage patient to cough and clear  - Mucinex 1200 mg b.i.d.  Obstructive sleep apnea  - noncompliant with CPAP  - sleeps on 3 L at home  - will sleep on BiPAP whenever he is sleeping.  Nursing reports that the patient continued to pull off his BiPAP last night.  - restrain patient if necessary to keep him on BiPAP tonight  - continue elevation of head of bed  Extreme morbid obesity  - patient does not oxygenate well when flat  Atrial fib with RVR  - CV surgery and Cardiology following  Ileus  - NG tube continues to drain  - KUB suggests and improving ileus  Leukocytosis  - worse with left shift  - Zosyn   Anemia  - postop, expected  Hyperglycemia  - mild  - trend  Hypernatremia  - no improvement with half-normal saline, fluids change to D5W  Hypophosphatemia  - corrected    Discussed with nursing and Respiratory and hospitalist and surgeon and the patient's wife.    Critical care time spent  reviewing the chart, examining the patient, reviewing the labs, reviewing the radiological findings, discussing care with nursing, physicians, and respiratory and creating the note and  has been greater than 35 minutes.    Lesley Ang MD  Date of Service: 03/01/2024  1:01 PM

## 2024-03-01 NOTE — PLAN OF CARE
Problem: Parenteral Nutrition  Goal: Effective Intravenous Nutrition Therapy Delivery  Outcome: Ongoing, Progressing  Intervention: Optimize Intravenous Nutrition Delivery  Flowsheets (Taken 3/1/2024 2186)  Nutrition Support Management: parenteral nutrition continued as ordered

## 2024-03-01 NOTE — PROGRESS NOTES
[facility]  Progress Note    Patient Name: Jose F Hanley  MRN: [id number]  Patient Class: IP- Surgery Admit   Admission Date: 2/23/2024  Length of Stay: 7 days  Attending Physician: Aravind Albarran MD  Primary Care Provider: Sunita Rivera MD        Subjective:     Principal Problem:S/P CABG (coronary artery bypass graft)        HPI:  Patient is 78 year old male with history of Afib, alcohol use, HFpEF, COPD, hypothyroidism, CAD was admitted to ICU after elective CABG. He underwent heart catheterization and coronary angiography showing multivessel coronary artery disease.  He was referred for consideration of redo coronary artery bypass grafting.    He has had a TAVR and Watchman. Recent studies were reviewed.  The patient has significant recurrent coronary artery disease.  Post operatively patient developed alcohol withdrawal and delirium, was started on precedex. Patient was extubated and still on BiPAP.  Hospitalist was consulted for medical management. .    Overview/Hospital Course:  Patient was post op admitted to ICU. Extubated to BiPAP, developed encephalopathy and now on precedex. Post op care per CTS. Cardiology also following. He is post op D4 today, still has chest and mediastinal tubes and pacer wires. Balloon pump was removed 2/25.  Patient complained of increasing abdominal distention and pain.  Discussed checking KUB, and inserting NG tube to LIS, patient may possibly have ileus.  Patient is seen by General surgery, recommend conservative care for now, possible ileus.  Patient with cough and congestion, WBC up to 18.  Patient was started on IV Zosyn    No new subjective & objective note has been filed under this hospital service since the last note was generated.      Assessment/Plan:      * S/P CABG (coronary artery bypass graft) - x2 09/2010 - LIMA to LAD; SVG to OMB; grafts occluded 08/2012  - post op care per surgery   -aspirin mg daily metoprolol 10 mg IV q.12  hours, Lasix 20 mg IV daily, losartan 25 mg daily, metoprolol 25 mg p.o. t.i.d.      Hypernatremia  Patient has hypernatremia which is uncontrolled. The hypernatremia is due to  possible IV fluids . We will aim to correct the sodium by 8-10mEq in 24 hours. We will correct their hypernatremia with Select IV fluids: D5W at a rate of 50 ml/hr. The patient's sodium results have been reviewed and are listed below.  Recent Labs   Lab 03/01/24  0517   *     -discontinue 0.45 NS  -started D5W at 50 mL/ hour  -trend sodium level, sodium 151    Ileus  Patient has Post-operative ileus which is adynamic in etiology which is stable. This is inherent to his procedure. Will treat conservatively with bowel rest, IV fluids, serial abdominal exams and avoidance of GI paralytics such as narcotics or anti-spasmodics. Monitor patient closely.     -trend leukocytosis, WBC 25  -check lactic acid level  -check blood cultures  -chest x-ray and abdominal 1- view improving  -CT abdomen and pelvis      Abdominal distention  -patient has increasing abdominal distention and discomfort  -general surgery consultation, input appreciated.  Suggests ileus, less likely SBO.  -check KUB  -NG tube to LIS  -possible postoperative ileus  -increase mobility as tolerated  -patient was started on IV Zosyn  -check CT abdomen and pelvis  -trend WBC 18<25      Acute respiratory failure with hypoxia and hypercapnia  Secondary to COPD exacerbation   BiPAP q.h.s.  - scheduled breathing Rx   -chest x-ray shows some improvement  -trend leukocytosis, WBC 25  -check lactic acid level  -check blood cultures    Acute blood loss anemia  Expected post o  Hb stable at 8.3<8.8  -transfuse hemoglobin less than 7  - Cont to monitor     Acute encephalopathy  Possibly multifactorial due to recent alcohol withdrawal , possible side effect of morphine/benzodiazepine, ICU psychosis  - weaned off precedex  - CIWA trigged ativan   -decrease morphine 2 mg IV q.6 hours  PRN  -reorient Q shift and PRN  -patient counseled to quit drinking alcohol due to adverse health risk.  -TSH normal at 2.71  -ammonia level normal at 29  -lactic acid level 1.2  -blood cultures pending  -patient on IV Zosyn        Severe obesity (BMI 35.0-39.9) with comorbidity  Body mass index is 37.37 kg/m². Morbid obesity complicates all aspects of disease management from diagnostic modalities to treatment. Weight loss encouraged and health benefits explained to patient.         Permanent atrial fibrillation  S/p watchman device   -S/P TAVR  - Cont metoprolol   -on Lovenox    Dyslipidemia  - cont Statin       CAD (coronary artery disease), native coronary artery  S/p CABG X 3 on 2/23  - post op care per CTS, chest tube and pacer wires to be removed per surgery. Balloon pump was removed 2/25  - Cont aspirin, Lipitor, ,metoprolol       VTE Risk Mitigation (From admission, onward)           Ordered     enoxaparin injection 60 mg  Every 24 hours         02/26/24 1519     Place DUTCH hose  Until discontinued         02/23/24 0545                    Discharge Planning   LEIA: 3/6/2024     Code Status: Full Code   Is the patient medically ready for discharge?:     Reason for patient still in hospital (select all that apply): Patient trending condition, Laboratory test, Treatment, and Consult recommendations  Discharge Plan A: Skilled Nursing Facility   Discharge Delays: None known at this time        Critical care time spent on the evaluation and treatment of severe organ dysfunction, review of pertinent labs and imaging studies, discussions with consulting providers and discussions with patient/family: 25 minutes.      Aravind Albarran MD  Department of Hospital Medicine   [facility]

## 2024-03-01 NOTE — PROGRESS NOTES
[facility]  Adult Nutrition   Progress Note (Nutrition Support Management)    SUMMARY     Recommendations  Recommendation/Intervention:   1. Recommend Clinimix E 4.25/5 @ 100 ml/hr to provide 102 gm protein (84% of EPN), 816 kcal (33% EEN) and GIR: 1.2. 2. Recommend monitoring and correction of BG and electrolytes as needed.   3. RD following.  Goals: Provide ClinimixE to meet > 75% of EPN.  Nutrition Goal Status: progressing towards goal  Communication of RD Recs: reviewed with RN    Nutrition Diagnosis PES Statement: Swallow difficulty  related to dysphagia as evidenced by failed speech eval and need nutrition support.   Altered GI fxn related possible ileus as evidence by abdominal distention, poor bowel fxn, high NG output.   Dietitian Rounds Brief  RD follow up.  He is s/p redo CABG. 2/27/24. Pt has NG placed to suction d/t possible ileus vs obstruction. Pt going for CT. Per SLP: pt continues with s/s possible pharyngeal dysphagia and remains NPO. Consult for TPN- will continue ClinimixE, pt does not have central line or PICC for TPN.   Nutrition Related Social Determinants of Health: SDOH: Adequate food in home environment    Malnutrition Assessment   N/A     Diet order:   Current Diet Order: NPO         Evaluation of Received Nutrient/Fluid Intake  Energy Calories Required: not meeting needs  Protein Required: not meeting needs  Fluid Required: not meeting needs      % Intake of Estimated Energy Needs: 0%  % Meal Intake: NPO      Intake/Output Summary (Last 24 hours) at 3/1/2024 1550  Last data filed at 3/1/2024 0600  Gross per 24 hour   Intake 4849.9 ml   Output 2680 ml   Net 2169.9 ml        Anthropometrics  Temp: 98.8 °F (37.1 °C)  Height Method: Measured  Height: 6' (182.9 cm)  Height (inches): 72 in  Weight Method: Bed Scale  Weight: 125 kg (275 lb 9.2 oz)  Weight (lb): 275.58 lb  Ideal Body Weight (IBW), Male: 178 lb  % Ideal Body Weight, Male (lb): 154.82 %  BMI (Calculated): 37.4  BMI  Grade: 35 - 39.9 - obesity - grade II       Estimated/Assessed Needs  Weight Used For Calorie Calculations: 125 kg (275 lb 9.2 oz)  Energy Calorie Requirements (kcal): 2591-1825 (20-25 kcal/ kg bw)  Energy Need Method: Kcal/kg  Protein Requirements: 121-162 gm (1.5-2.0 gm/ kg IBW)  Weight Used For Protein Calculations: 81 kg (178 lb 9.2 oz)     Estimated Fluid Requirement Method: RDA Method  RDA Method (mL): 2500       Reason for Assessment  Reason For Assessment: RD follow-up  Relevant Medical History: CAD, COPD  Interdisciplinary Rounds: did not attend  General Information Comments: Pt admitted with SOB.  Nutrition Discharge Planning: Cardiac diet    Nutrition/Diet History  Patient Reported Diet/Restrictions/Preferences: general  Spiritual, Cultural Beliefs, Voodoo Practices, Values that Affect Care: no  Food Allergies: NKFA  Factors Affecting Nutritional Intake: NPO    Nutrition Risk Screen  Nutrition Risk Screen: no indicators present       Altered Skin Integrity 02/23/24 0525 Left anterior;lower Arm #1 Skin Tear Partial thickness tissue loss. Shallow open ulcer with a red or pink wound bed, without slough. Intact or Open/Ruptured Serum-filled blister.-Wound Image: Images linked  MST Score: 0  Have you recently lost weight without trying?: No  Weight loss score: 0  Have you been eating poorly because of a decreased appetite?: No  Appetite score: 0       Weight History:  Wt Readings from Last 10 Encounters:   02/23/24 125 kg (275 lb 9.2 oz)   02/21/24 124.5 kg (274 lb 6.4 oz)   01/12/24 120.7 kg (266 lb)   01/10/24 120.7 kg (266 lb)   12/21/23 125.6 kg (277 lb)   12/13/23 126.1 kg (278 lb)   11/28/23 126.1 kg (278 lb)   10/31/23 119.7 kg (264 lb)   10/11/23 124.4 kg (274 lb 4.8 oz)   09/21/23 121.6 kg (268 lb)        Lab/Procedures/Meds: Pertinent Labs/Meds Reviewed    Medications:Pertinent Medications Reviewed  Scheduled Meds:   aspirin  81 mg Oral Daily    DULoxetine  60 mg Oral Nightly    enoxparin  60 mg  Subcutaneous Q24H (prophylaxis, 1700)    ezetimibe  10 mg Oral QHS    furosemide (LASIX) injection  40 mg Intravenous Q8H    guaiFENesin  1,200 mg Oral BID    ipratropium  0.5 mg Nebulization Q6H    levalbuterol  1.25 mg Nebulization Q6H    levothyroxine  25 mcg Intravenous Daily    losartan  25 mg Oral Daily    metoprolol tartrate  50 mg Oral TID    montelukast  10 mg Oral Daily    pantoprazole  40 mg Oral Before breakfast    piperacillin-tazobactam (Zosyn) IV (PEDS and ADULTS) (extended infusion is not appropriate)  3.375 g Intravenous Q8H    sodium chloride 0.9% 1,000 mL with mvi, (ADULT) no.4 with vit K 3,300 unit- 150 mcg/10 mL 10 mL, thiamine 100 mg, folic acid 1 mg infusion   Intravenous Daily    spironolactone  25 mg Oral Daily    tamsulosin  0.4 mg Oral Daily     Continuous Infusions:   amino acid 4.25% - dextrose 5% (CLINIMIX-E) solution      dexmedeTOMIDine (Precedex) infusion (titrating) 0.6 mcg/kg/hr (03/01/24 1130)    dextrose 5 % (D5W) 50 mL/hr at 03/01/24 0833     PRN Meds:.0.9%  NaCl infusion (for blood administration), albuterol sulfate, calcium gluconate IVPB, calcium gluconate IVPB, calcium gluconate IVPB, dextrose 50% in water (D50W), dextrose 50% in water (D50W), hydrALAZINE, HYDROcodone-acetaminophen, HYDROmorphone, lorazepam, magnesium sulfate IVPB, magnesium sulfate IVPB, metoprolol, ondansetron, potassium chloride in water, potassium chloride in water, potassium chloride in water **AND** potassium chloride in water **AND** potassium chloride in water, potassium chloride in water, sodium phosphate 15 mmol in dextrose 5 % (D5W) 250 mL IVPB, sodium phosphate 20.01 mmol in dextrose 5 % (D5W) 250 mL IVPB, sodium phosphate 30 mmol in dextrose 5 % (D5W) 250 mL IVPB    Labs: Pertinent Labs Reviewed  Clinical Chemistry:  Recent Labs   Lab 02/24/24  0726 02/24/24  1828 02/25/24  0337 02/26/24  0521 02/27/24  0339 02/29/24  0346 03/01/24  0517    136 137 139   < > 151* 151*   K 5.0 5.0 4.7 4.7    < > 3.7 3.7    107 107 109   < > 109 106   CO2 27 24 23 25   < > 33* 36*   * 171* 171* 151*   < > 164* 128*   BUN 13 17 19 22   < > 22 28*   CREATININE 1.0 0.9 0.9 0.8   < > 0.9 1.1   CALCIUM 7.8* 7.7* 7.7* 7.8*   < > 8.9 8.8   PROT 5.2* 5.3* 5.3* 5.4*  --   --   --    ALBUMIN 3.7 3.6 3.4* 3.3*  --   --   --    BILITOT 1.0 0.7 0.7 1.1*  --   --   --    ALKPHOS 17* 22* 22* 24*  --   --   --    AST 48* 39 33 32  --   --   --    ALT 22 18 15 12  --   --   --    ANIONGAP 3* 5* 7* 5*   < > 9 9   MG 2.3 2.2 2.2 2.1   < > 2.4 2.2   PHOS 2.9  --   --   --   --  2.4* 3.6    < > = values in this interval not displayed.     CBC:   Recent Labs   Lab 03/01/24  0517 03/01/24  0922   WBC 25.23*  --    RBC 4.00*  --    HGB 9.3*  --    HCT 32.5* 31*     --    MCV 81*  --    MCH 23.3*  --    MCHC 28.6*  --      Lipid Panel:  Recent Labs   Lab 02/29/24  0346   TRIG 94       Thyroid & Parathyroid:  Recent Labs   Lab 03/01/24  1001   TSH 2.171       Monitor and Evaluation  Food and Nutrient Intake: energy intake, food and beverage intake, parenteral nutrition intake  Food and Nutrient Adminstration: enteral and parenteral nutrition administration  Knowledge/Beliefs/Attitudes: food and nutrition knowledge/skill  Physical Activity and Function: nutrition-related ADLs and IADLs  Anthropometric Measurements: weight, weight change  Biochemical Data, Medical Tests and Procedures: electrolyte and renal panel, inflammatory profile, gastrointestinal profile, lipid profile, glucose/endocrine profile  Nutrition-Focused Physical Findings: overall appearance     Nutrition Risk  Level of Risk/Frequency of Follow-up:  (2 xweek)     Nutrition Follow-Up  RD Follow-up?: Yes      Graciela Rhodes, RD 03/01/2024 3:50 PM

## 2024-03-01 NOTE — PT/OT/SLP PROGRESS
Physical Therapy Treatment    Patient Name:  Jose F Hanley   MRN:  [id number]    Recommendations:     Discharge Recommendations: High Intensity Therapy  Discharge Equipment Recommendations: to be determined by next level of care  Barriers to discharge:  weakness, impaired self care skills, impaired functional mobility, impaired balance, decreased coordination, decreased UE/LE function, decreased safety awareness, decreased activity tolerance.     Assessment:     Jose F Hanley is a 78 y.o. male admitted with a medical diagnosis of S/P CABG (coronary artery bypass graft).  He presents with the following impairments/functional limitations: weakness, impaired endurance, impaired self care skills, impaired functional mobility, gait instability, impaired balance, decreased coordination, decreased upper extremity function, decreased lower extremity function, decreased safety awareness, pain, abnormal tone, decreased ROM, impaired skin, impaired cardiopulmonary response to activity.    Pt found resting with HOB elevated, wife at bedside, agreeable to skilled physical therapy session.    Restraints removed and maxA 2-3 required to transfer from supine to sitting EOB.     Once sitting EOB, initially, pt required modA to maintain upright sitting balance as he demonstrated L lateral lean. Verbal cues given for correction, but pt required tactile cues for attention to task. Pt decreased in assistance while EOB needing Robinson after prolonged sitting.     Bed linens noted to be soiled requiring change. Pt stood with total A 1-2 to change linens.     MaxA 2-3 required to transfer pt from sitting EOB to supine. TotalA x 2-3 required to scoot pt toward HOB for comfort and positioning.     Pt left with HOB elevated, call light within reach, all needs met, wife at bedside, and RN present.     Rehab Prognosis: Fair; patient would benefit from acute skilled PT services to address these deficits and reach maximum level of function.    Recent  Surgery: Procedure(s) (LRB):  CABG, REPEAT (N/A)  HARVEST-VEIN-ENDOVASCULAR (Right)  SURGICAL PROCUREMENT,ARTERY,RADIAL,FOR CABG  INSERTION, INTRA-AORTIC BALLOON PUMP (Right) 7 Days Post-Op    Plan:     During this hospitalization, patient to be seen daily to address the identified rehab impairments via gait training, therapeutic activities, therapeutic exercises and progress toward the following goals:    Plan of Care Expires:  03/29/24    Subjective     Chief Complaint: none at this time  Patient/Family Comments/goals: to get better  Pain/Comfort:  Pain Rating 1: 0/10      Objective:     Communicated with RN prior to session.  Patient found HOB elevated with chest tube, NG tube, oxygen, pulse ox (continuous), telemetry, peripheral IV, blood pressure cuff, WISAM drain, fitzgerald catheter upon PT entry to room.     General Precautions: Standard, aspiration, fall, NPO  Orthopedic Precautions:    Braces:    Respiratory Status: Nasal cannula, flow 3 L/min     Functional Mobility:  Bed Mobility:     Scooting: total assistance and of 2-3 persons  Supine to Sit: maximal assistance and of 2-3 persons  Sit to Supine: maximal assistance and of 2-3 persons  Transfers:     Sit to Stand:  total assistance and of 1-2 persons with no AD      AM-PAC 6 CLICK MOBILITY          Treatment & Education:  Pt educated on importance of time OOB, importance of intermittent mobility, safe techniques for transfers/ambulation, discharge recommendations/options, and use of call light for assistance and fall prevention.      Patient left HOB elevated with all lines intact, call button in reach, restraints reapplied at end of session, and RN present..    GOALS:   Multidisciplinary Problems       Physical Therapy Goals          Problem: Physical Therapy    Goal Priority Disciplines Outcome Goal Variances Interventions   Physical Therapy Goal     PT, PT/OT Ongoing, Progressing     Description: 1. Supine to sit with Stand-by Assistance  2. Sit to stand  transfer with Stand-by Assistance  3.. Bed to chair transfer with Supervision using Rolling Walker  4. Gait  x 100 feet with Minimal Assistance using Rolling Walker.                          Time Tracking:     PT Received On: 03/01/24  PT Start Time: 1036     PT Stop Time: 1104  PT Total Time (min): 28 min     Billable Minutes: Therapeutic Activity 28    Treatment Type: Treatment  PT/PTA: PTA     Number of PTA visits since last PT visit: 1 03/01/2024

## 2024-03-01 NOTE — PROGRESS NOTES
CVT SURGERY PROGRESS NOTE  Jose F Hanley  78 y.o.  1945    Patient's Chief Complaint:  S/P CABG (coronary artery bypass graft)    HPI:  This patient has coronary artery disease.  He has had progressive shortness of breath.  He underwent heart catheterization and coronary angiography showing multivessel coronary artery disease.  He was referred for consideration of redo coronary artery bypass grafting.    He has borderline diabetes  and hypertension.  He has a history of remote coronary artery bypass grafting a number of years ago.    He has had a TAVR and Watchman.  He is not a smoker.  He does have COPD.  He quit smoking 35 years ago.    Medicines are part of the epic record.  He does take daily aspirin.    On exam vital signs are stable.  Pupils are equal and round reactive to light.  Neck is supple.  Chest is equal breath sounds.  Heart is in a irregular rate and rhythm.  Abdomen is benign.  Perfusion to the legs and feet seems to be satisfactory.    Recent studies were reviewed.  The patient has significant recurrent coronary artery disease.  Consideration can be made for redo high-risk coronary artery bypass grafting.  The risks and potential complications were discussed.  The patient will take this under advisement.    Last 24 hour interval:  -WBC elevated with left shift. Afebrile. Remains on Zosyn.  -CXR and Abd Xray today without acute process. CT Chest and Abdomen ordered.   -BP well controlled.   -HR improved.   -H/H stable.   -MS tube output last 24 hours - 50 cc, to suction, no air leak  -Bulb drain output last 24 hours - 30cc, to suction  -Good UOP, renal function stable. Canas in place  -Pt seen this AM, lying in bed on NC. Mental status continues to improve. Complains of expected post op pain, controlled with current pain regimen. NGT to suction with bilious output.     Subjective:  Symptoms:  Improved.    Diet:  NPO.  No nausea or vomiting.    Pain:  He complains of pain that is moderate.  Pain  is requiring pain medication.                                                                   Objective:  General Appearance:  In no acute distress.    Vital signs: (most recent): Blood pressure 133/62, pulse 106, temperature 98.8 °F (37.1 °C), temperature source Axillary, resp. rate 18, height 6' (1.829 m), weight 125 kg (275 lb 9.2 oz), SpO2 95 %.    Output: Producing urine.    HEENT: Normal HEENT exam.  (NGT in place, to suction, bilious output. )    Lungs:  There are wheezes (scattered expiratory) and rhonchi (coarse throughout, improved).  No rales.    Heart: Normal rate.  Regular rhythm.  No murmur, gallop or friction rub.   Abdomen: Abdomen is distended.  Hypoactive bowel sounds.   There is no abdominal tenderness.     Extremities: There is local swelling (BLE).    Pulses: Distal pulses are intact.    Neurological: Patient is alert and oriented to person, place and time.    Skin:  Warm and dry.      Recent Vitals:  Vitals:    03/01/24 0830 03/01/24 0922 03/01/24 1342 03/01/24 1343   BP: 133/62      Pulse: (!) 129 (!) 123 104 106   Resp: 20  18 18   Temp:       TempSrc:       SpO2:  (!) 93% 95% 95%   Weight:       Height:           INPATIENT MEDS   dexmedeTOMIDine (Precedex) infusion (titrating) 0.6 mcg/kg/hr (03/01/24 1130)    dextrose 5 % (D5W) 50 mL/hr at 03/01/24 0833      aspirin  81 mg Oral Daily    DULoxetine  60 mg Oral Nightly    enoxparin  60 mg Subcutaneous Q24H (prophylaxis, 1700)    ezetimibe  10 mg Oral QHS    furosemide (LASIX) injection  40 mg Intravenous Q8H    guaiFENesin  1,200 mg Oral BID    ipratropium  0.5 mg Nebulization Q6H    levalbuterol  1.25 mg Nebulization Q6H    levothyroxine  25 mcg Intravenous Daily    losartan  25 mg Oral Daily    metoprolol tartrate  50 mg Oral TID    montelukast  10 mg Oral Daily    pantoprazole  40 mg Oral Before breakfast    piperacillin-tazobactam (Zosyn) IV (PEDS and ADULTS) (extended infusion is not appropriate)  3.375 g Intravenous Q8H    sodium  "chloride 0.9% 1,000 mL with mvi, (ADULT) no.4 with vit K 3,300 unit- 150 mcg/10 mL 10 mL, thiamine 100 mg, folic acid 1 mg infusion   Intravenous Daily    spironolactone  25 mg Oral Daily    tamsulosin  0.4 mg Oral Daily     0.9%  NaCl infusion (for blood administration), albuterol sulfate, calcium gluconate IVPB, calcium gluconate IVPB, calcium gluconate IVPB, dextrose 50% in water (D50W), dextrose 50% in water (D50W), hydrALAZINE, HYDROcodone-acetaminophen, HYDROmorphone, lorazepam, magnesium sulfate IVPB, magnesium sulfate IVPB, metoprolol, ondansetron, potassium chloride in water, potassium chloride in water, potassium chloride in water **AND** potassium chloride in water **AND** potassium chloride in water, potassium chloride in water, sodium phosphate 15 mmol in dextrose 5 % (D5W) 250 mL IVPB, sodium phosphate 20.01 mmol in dextrose 5 % (D5W) 250 mL IVPB, sodium phosphate 30 mmol in dextrose 5 % (D5W) 250 mL IVPB  HEMODYNAMICS      Recent O2 Therapy/Vent Settings: 3L NC    I/O last 24 hrs:  Intake/Output - Last 3 Shifts         02/28 0700  02/29 0659 02/29 0700 03/01 0659 03/01 0700 03/02 0659    I.V. (mL/kg) 1320.8 (10.6) 4200 (33.6)     IV Piggyback 300 649.9     Total Intake(mL/kg) 1620.8 (13) 4849.9 (38.8)     Urine (mL/kg/hr) 3000 (1) 3625 (1.2)     Drains 1845 930     Stool 0      Chest Tube 40 50     Total Output 4885 4605     Net -3264.2 +244.9            Stool Occurrence 1 x            Recent Cardiac Rhythm: a-fib, rates 90s-100s    Recent Pain Assessment: 0    CBC  Recent Labs   Lab 02/28/24  0340 02/29/24  0346 03/01/24  0517   WBC 14.48* 18.06* 25.23*   RBC 3.59* 3.81* 4.00*   HGB 8.3* 8.8* 9.3*    342 418   MCV 78* 79* 81*   MCH 23.1* 23.1* 23.3*   MCHC 29.6* 29.3* 28.6*       BMP  Recent Labs   Lab 02/28/24  0340 02/29/24  0346 03/01/24  0517   CO2 23 33* 36*   BUN 17 22 28*   CREATININE 0.7 0.9 1.1   CALCIUM 8.0* 8.9 8.8       CARDIAC ENZYMES  No results for input(s): "TROPONINI", " ""CPK", "CKMB" in the last 168 hours.  BNP  No results for input(s): "BNP" in the last 168 hours.  PT/INR  No results found for: "PROTIME", "INR"      DIAGNOSTIC RESULTS:  X-Ray Abdomen Portable  KUB is compared to prior study dated 2/28/2024    Clinical history is abdominal distention    The NG tube has been pulled back with the tip just past the GE junction. There are surgical drains overlying the mid abdomen extending into the chest.  There is improvement of the dilated loops of small bowel. The colon is decompressed.  No organomegaly or abnormal soft-tissue mass. There are degenerative changes of the spine.    IMPRESSION: The NG tube has been pulled back slightly and is just past the GE junction    Improvement of the dilated loops of small bowel.    Electronically signed by:  Ching Adamson MD  03/01/2024 07:51 AM CST Workstation: [id number]  X-Ray Chest AP Portable  Portable chest x-ray at 4:48 AM is compared to prior study dated 2/29/2024    Clinical history is coronary artery disease post-CABG    The NG tube and mediastinal drains and right chest tube are in stable position.  The heart is enlarged. There is a aortic valve endovascular stent. There is a heart loop monitor.  There is improvement of the airspace disease within the lung bases. The upper lobes are clear. There are no pleural effusions or pneumothorax.    IMPRESSION: Status post recent median sternotomy with prosthetic aortic valve    Cardiomegaly with improvement of the airspace disease in the lung bases    Electronically signed by:  Ching Adamson MD  03/01/2024 07:49 AM CST Workstation: [id number]        CURRENT CONSULTS:  WOUND CARE CONSULT  IP CONSULT TO CARDIOLOGY  IP CONSULT TO HOSPITALIST  IP CONSULT TO REGISTERED DIETITIAN/NUTRITIONIST  IP CONSULT TO PULMONOLOGY  IP CONSULT TO GENERAL SURGERY  IP CONSULT TO REGISTERED DIETITIAN/NUTRITIONIST    ASSESSMENT/PLAN:    Multivessel CAD   S/p CABG x 2 (LIMA to LAD, SVG to OM) in 2010  S/p Redo " CABG X 3 (L radial to LAD, SVG to Diag, SVG to OM) + placement of IABP by Dr. Thomas on 2/23/24  -IABP removed 2/25  -Hemodynamically stable off pressors  -Precedex weaned off 2/27  -Tolerating NC- wean as able  -Mediastinal tube and WISAM drain removed 3/1/24  -Pacer wires removed 2/28.   -Surgical incisions with surgical dressing  -Continue ASA and BB.   -Documented statin intolerance - consider PCSK9i  -Encourage IS  -Increase activity  -Cruzito hose  -Bowel regimen - NGT in place currently for suspected Ileus.     Leukocytosis  -Uptrending with L shift  -Afebrile  -On Zosyn since 2/29 for possible aspiration  -CT Chest and Abd today    Acute post operative blood loss anemia  Thrombocytopenia  -Expected post op  -Received 1358 cc Cell Saver post post  -1 unit PRBCs on Feb 23  -H/H stable  -Monitor    Post operative ileus  -NGT in place to suction  -Await return of bowel function.       HFpEF  -Strict I/Os and daily weights  -Salt/fluid restrictions  -Diuresis prn    Hypomagnesemia  -Replaced per protocol  -Keep Mag >2 and K >4    Hx of TAVR  -Stable    Permanent AF  S/p Watchman device  -Rates uncontrolled currently  -Increase metoprolol    HTN  -Controlled on current regimen  -Goal < 140/90    HLD  -Continue statin     BPH  -Continue flomax  -Canas in place - remove when patient alert    Leadless PPM in situ  -Stable  -Base rate @ 50    Case and plan of care discussed with MD.      GI Prophylaxis:  PPI:proton pump inhibitor per orders  DVT Prophylaxis:  Anticoagulants   Medication Route Frequency    enoxaparin injection 60 mg Subcutaneous Q24H (prophylaxis, 1700)       Angeli Linda PA-C  Cardiovascular Thoracic Surgery  3/1/2024  9:20 AM

## 2024-03-01 NOTE — PROGRESS NOTES
General Surgery Progress Note    Admit Date: 2/23/2024  S/P: Procedure(s) (LRB):  CABG, REPEAT (N/A)  HARVEST-VEIN-ENDOVASCULAR (Right)  SURGICAL PROCUREMENT,ARTERY,RADIAL,FOR CABG  INSERTION, INTRA-AORTIC BALLOON PUMP (Right)    Post-operative Day: 7 Days Post-Op    Hospital Day: 8    SUBJECTIVE:   Remained confused overnight and somewhat agitated. Discussed with nursing staff and no bowel function.  NG tube output remains high and bilious. Bowel sounds hypoactive.    OBJECTIVE:     Vital Signs (Most Recent)  Temp:  [98.6 °F (37 °C)-99.3 °F (37.4 °C)] 98.8 °F (37.1 °C)  Pulse:  [102-134] 121  Resp:  [16-24] 24  SpO2:  [89 %-100 %] 92 %  BP: (124-173)/(58-79) 140/71    I&Os:  I/O last 3 completed shifts:  In: 6470.7 [I.V.:5520.8; IV Piggyback:949.9]  Out: 9490 [Urine:6625; Drains:2775; Chest Tube:90]    Physical Exam:  Gen: NAD, awake but not oriented  HEENT: Anicteric sclera, NG tube in place with bilious output  Pulm: Somewhat increased work of labor with raspy breath sounds  Abd: Distended but nontender, no rebound, no guarding    Laboratory:  CBC:   Recent Labs   Lab 03/01/24  0517   WBC 25.23*   RBC 4.00*   HGB 9.3*   HCT 32.5*      MCV 81*   MCH 23.3*   MCHC 28.6*     CMP:   Recent Labs   Lab 02/26/24  0521 02/27/24  0339 03/01/24  0517   *   < > 128*   CALCIUM 7.8*   < > 8.8   ALBUMIN 3.3*  --   --    PROT 5.4*  --   --       < > 151*   K 4.7   < > 3.7   CO2 25   < > 36*      < > 106   BUN 22   < > 28*   CREATININE 0.8   < > 1.1   ALKPHOS 24*  --   --    ALT 12  --   --    AST 32  --   --    BILITOT 1.1*  --   --     < > = values in this interval not displayed.     Labs within the past 24 hours have been reviewed.    Abdominal x-ray 03/01/2024  The NG tube has been pulled back with the tip just past the GE junction. There are surgical drains overlying the mid abdomen extending into the chest.  There is improvement of the dilated loops of small bowel. The colon is decompressed.  No  organomegaly or abnormal soft-tissue mass. There are degenerative changes of the spine.     IMPRESSION: The NG tube has been pulled back slightly and is just past the GE junction     Improvement of the dilated loops of small bowel.      ASSESSMENT/PLAN:     Patient Active Problem List    Diagnosis Date Noted    Hypernatremia 03/01/2024    Ileus 02/29/2024    Abdominal distention 02/28/2024    Acute encephalopathy 02/26/2024    Acute blood loss anemia 02/26/2024    Acute respiratory failure with hypoxia and hypercapnia 02/26/2024    Coronary artery disease involving coronary bypass graft of native heart without angina pectoris 01/31/2024    Gangrene 01/11/2024    History of transcatheter aortic valve replacement (TAVR) 12/01/2023    Permanent atrial fibrillation 12/01/2023    Presence of Watchman left atrial appendage closure device 12/01/2023    Severe obesity (BMI 35.0-39.9) with comorbidity 12/01/2023    Chronic diastolic congestive heart failure 12/01/2023    Shortness of breath 11/28/2023    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 male status post repeat CABG with postoperative delirium, leukocytosis, ileus versus obstruction   -patient still without spontaneous bowel function and has high NG tube output that appears bilious   -abdomen remains distended however x-ray this morning shows some improvement in air distention   -recommend continue NG  tube to suctioned until return of bowel function, at that time he may be given clear liquids and the tube may be clamped  -would recommend going slow to avoid potential aspiration which could be contributing to his current leukocytosis   -continue antibiotics and follow-up cultures  -if patient fails to improve would consider CT scan with p.o. contrast but at this time would hold off due to his confusion and agitation

## 2024-03-01 NOTE — ASSESSMENT & PLAN NOTE
Possibly multifactorial due to recent alcohol withdrawal , possible side effect of morphine/benzodiazepine, ICU psychosis  - weaned off precedex  - CIWA trigged ativan   -decrease morphine 2 mg IV q.6 hours PRN  -reorient Q shift and PRN  -patient counseled to quit drinking alcohol due to adverse health risk.

## 2024-03-01 NOTE — PT/OT/SLP PROGRESS
"Speech Language Pathology Treatment    Patient Name:  Jose F Hanley   MRN:  [id number]  Admitting Diagnosis: S/P CABG (coronary artery bypass graft)    Recommendations:                 General Recommendations:   Ongoing swallow evaluation  Diet recommendations:  NPO, Liquid Diet Level: NPO (except ice chips) . If oral meds necessary, crush in applesauce.   Aspiration Precautions:  allow ice chips and Frequent oral care   General Precautions: Standard, aspiration, fall, NPO, sternal  Communication strategies:   frequent reorientation    Assessment:     Jose F Hanley is a 78 y.o. male with an SLP diagnosis of Dysphagia.  He presents with more alert today, however, somewhat confused. Continues with s/s possible pharyngeal dysphagia. Mildly hoarse vocal quality. Aspiration risk 2° respiratory status and lethargy  Will continue to follow for ongoing swallow evaluation with goal for oral diet.    Subjective     "I'm going to try to go to [employer] Sunday."  "You're torturing me." (HOB elevation for PO)    Pain/Comfort:  Pain Rating 1: 0/10    Respiratory Status: Nasal cannula, flow 7 L/min, high flow    Objective:   Pt seen in ICU for ongoing swallow evaluation. Asleep upon entering room. +NGT. Open mouth breather. Opens eyes to verbal/tactile stimulation. Oriented x4, however, evidence of confusion. Mildly hoarse.    HOB elevated prior to PO trials. Pt with fair-poor tolerance of HOB elevation. Easily SOB. Oral cavity dry and pt requesting water. Pt consumed ice chips without difficulty. Pt demonstrated weak cough across 1 of 3 trial of thin via tsp, 1 of 3 trials via cup and 2 of 5 trials via straw. Applesauce was consumed with no overt s/s airway comprise.     Has the patient been evaluated by SLP for swallowing?   Yes  Keep patient NPO? Yes (please allow ice chips)   Current Respiratory Status:          Goals:   Multidisciplinary Problems       SLP Goals          Problem: SLP    Goal Priority Disciplines Outcome   SLP Goal "     SLP Ongoing, Progressing   Description: 1) Pt will consume Regular (IDDSI 7) textures and thin liquids (IDDSI 0) with functional oral phase and no overt s/s penetration/aspiration.                         Plan:     Patient to be seen:  5 x/week   Plan of Care expires:  03/10/24  Plan of Care reviewed with:      SLP Follow-Up:  Yes       Discharge recommendations:  High Intensity Therapy   Barriers to Discharge:  Level of Skilled Assistance Needed , and Safety Awareness .    Time Tracking:     SLP Treatment Date:   03/01/24  Speech Start Time:  1311  Speech Stop Time:  1329     Speech Total Time (min):  18 min    Billable Minutes: Treatment Swallowing Dysfunction 18 and Total Time 18    03/01/2024

## 2024-03-02 LAB
ALLENS TEST: ABNORMAL
ANION GAP SERPL CALC-SCNC: 6 MMOL/L (ref 8–16)
BASOPHILS # BLD AUTO: 0.08 K/UL (ref 0–0.2)
BASOPHILS NFR BLD: 0.5 % (ref 0–1.9)
BUN SERPL-MCNC: 30 MG/DL (ref 8–23)
CALCIUM SERPL-MCNC: 8.1 MG/DL (ref 8.7–10.5)
CHLORIDE SERPL-SCNC: 104 MMOL/L (ref 95–110)
CO2 SERPL-SCNC: 35 MMOL/L (ref 23–29)
CREAT SERPL-MCNC: 1 MG/DL (ref 0.5–1.4)
DELSYS: ABNORMAL
DIFFERENTIAL METHOD BLD: ABNORMAL
EOSINOPHIL # BLD AUTO: 1 K/UL (ref 0–0.5)
EOSINOPHIL NFR BLD: 5.9 % (ref 0–8)
EP: 5
ERYTHROCYTE [DISTWIDTH] IN BLOOD BY AUTOMATED COUNT: 22.3 % (ref 11.5–14.5)
ERYTHROCYTE [SEDIMENTATION RATE] IN BLOOD BY WESTERGREN METHOD: 16 MM/H
EST. GFR  (NO RACE VARIABLE): >60 ML/MIN/1.73 M^2
FIO2: 40
GLUCOSE SERPL-MCNC: 174 MG/DL (ref 70–110)
HCO3 UR-SCNC: 35.7 MMOL/L (ref 24–28)
HCT VFR BLD AUTO: 29.6 % (ref 40–54)
HGB BLD-MCNC: 8.5 G/DL (ref 14–18)
IMM GRANULOCYTES # BLD AUTO: 0.14 K/UL (ref 0–0.04)
IMM GRANULOCYTES NFR BLD AUTO: 0.8 % (ref 0–0.5)
IP: 12
LYMPHOCYTES # BLD AUTO: 0.9 K/UL (ref 1–4.8)
LYMPHOCYTES NFR BLD: 5.5 % (ref 18–48)
MAGNESIUM SERPL-MCNC: 2.1 MG/DL (ref 1.6–2.6)
MCH RBC QN AUTO: 22.9 PG (ref 27–31)
MCHC RBC AUTO-ENTMCNC: 28.7 G/DL (ref 32–36)
MCV RBC AUTO: 80 FL (ref 82–98)
MODE: ABNORMAL
MONOCYTES # BLD AUTO: 1.4 K/UL (ref 0.3–1)
MONOCYTES NFR BLD: 8.3 % (ref 4–15)
NEUTROPHILS # BLD AUTO: 13.5 K/UL (ref 1.8–7.7)
NEUTROPHILS NFR BLD: 79 % (ref 38–73)
NRBC BLD-RTO: 9 /100 WBC
PCO2 BLDA: 56.5 MMHG (ref 35–45)
PH SMN: 7.41 [PH] (ref 7.35–7.45)
PHOSPHATE SERPL-MCNC: 2.9 MG/DL (ref 2.7–4.5)
PLATELET # BLD AUTO: 452 K/UL (ref 150–450)
PMV BLD AUTO: 10.6 FL (ref 9.2–12.9)
PO2 BLDA: 71 MMHG (ref 80–100)
POC BE: 11 MMOL/L
POC SATURATED O2: 94 % (ref 95–100)
POC TCO2: 37 MMOL/L (ref 23–27)
POTASSIUM SERPL-SCNC: 3.5 MMOL/L (ref 3.5–5.1)
POTASSIUM SERPL-SCNC: 3.6 MMOL/L (ref 3.5–5.1)
RBC # BLD AUTO: 3.71 M/UL (ref 4.6–6.2)
SAMPLE: ABNORMAL
SITE: ABNORMAL
SODIUM SERPL-SCNC: 145 MMOL/L (ref 136–145)
SP02: 98
SPONT RATE: 16
WBC # BLD AUTO: 17.03 K/UL (ref 3.9–12.7)

## 2024-03-02 PROCEDURE — 37799 UNLISTED PX VASCULAR SURGERY: CPT

## 2024-03-02 PROCEDURE — 94761 N-INVAS EAR/PLS OXIMETRY MLT: CPT | Mod: XB

## 2024-03-02 PROCEDURE — 85014 HEMATOCRIT: CPT

## 2024-03-02 PROCEDURE — 94660 CPAP INITIATION&MGMT: CPT

## 2024-03-02 PROCEDURE — 84295 ASSAY OF SERUM SODIUM: CPT

## 2024-03-02 PROCEDURE — C1751 CATH, INF, PER/CENT/MIDLINE: HCPCS

## 2024-03-02 PROCEDURE — 36415 COLL VENOUS BLD VENIPUNCTURE: CPT | Performed by: THORACIC SURGERY (CARDIOTHORACIC VASCULAR SURGERY)

## 2024-03-02 PROCEDURE — 80048 BASIC METABOLIC PNL TOTAL CA: CPT | Performed by: THORACIC SURGERY (CARDIOTHORACIC VASCULAR SURGERY)

## 2024-03-02 PROCEDURE — 82330 ASSAY OF CALCIUM: CPT

## 2024-03-02 PROCEDURE — 85025 COMPLETE CBC W/AUTO DIFF WBC: CPT | Performed by: THORACIC SURGERY (CARDIOTHORACIC VASCULAR SURGERY)

## 2024-03-02 PROCEDURE — 84132 ASSAY OF SERUM POTASSIUM: CPT | Performed by: INTERNAL MEDICINE

## 2024-03-02 PROCEDURE — 84100 ASSAY OF PHOSPHORUS: CPT | Performed by: INTERNAL MEDICINE

## 2024-03-02 PROCEDURE — 82803 BLOOD GASES ANY COMBINATION: CPT

## 2024-03-02 PROCEDURE — 99233 SBSQ HOSP IP/OBS HIGH 50: CPT | Mod: ,,, | Performed by: INTERNAL MEDICINE

## 2024-03-02 PROCEDURE — 25000003 PHARM REV CODE 250: Performed by: INTERNAL MEDICINE

## 2024-03-02 PROCEDURE — 99231 SBSQ HOSP IP/OBS SF/LOW 25: CPT | Mod: ,,, | Performed by: SURGERY

## 2024-03-02 PROCEDURE — 25000003 PHARM REV CODE 250: Performed by: NURSE PRACTITIONER

## 2024-03-02 PROCEDURE — 27100171 HC OXYGEN HIGH FLOW UP TO 24 HOURS

## 2024-03-02 PROCEDURE — 25000242 PHARM REV CODE 250 ALT 637 W/ HCPCS: Performed by: INTERNAL MEDICINE

## 2024-03-02 PROCEDURE — 02HV33Z INSERTION OF INFUSION DEVICE INTO SUPERIOR VENA CAVA, PERCUTANEOUS APPROACH: ICD-10-PCS | Performed by: INTERNAL MEDICINE

## 2024-03-02 PROCEDURE — 36569 INSJ PICC 5 YR+ W/O IMAGING: CPT

## 2024-03-02 PROCEDURE — 84132 ASSAY OF SERUM POTASSIUM: CPT

## 2024-03-02 PROCEDURE — 83735 ASSAY OF MAGNESIUM: CPT | Performed by: THORACIC SURGERY (CARDIOTHORACIC VASCULAR SURGERY)

## 2024-03-02 PROCEDURE — 99900031 HC PATIENT EDUCATION (STAT)

## 2024-03-02 PROCEDURE — 25000003 PHARM REV CODE 250: Performed by: THORACIC SURGERY (CARDIOTHORACIC VASCULAR SURGERY)

## 2024-03-02 PROCEDURE — 63600175 PHARM REV CODE 636 W HCPCS: Performed by: INTERNAL MEDICINE

## 2024-03-02 PROCEDURE — 63600175 PHARM REV CODE 636 W HCPCS: Performed by: THORACIC SURGERY (CARDIOTHORACIC VASCULAR SURGERY)

## 2024-03-02 PROCEDURE — B548ZZA ULTRASONOGRAPHY OF SUPERIOR VENA CAVA, GUIDANCE: ICD-10-PCS | Performed by: INTERNAL MEDICINE

## 2024-03-02 PROCEDURE — 63600175 PHARM REV CODE 636 W HCPCS: Performed by: NURSE PRACTITIONER

## 2024-03-02 PROCEDURE — 25000003 PHARM REV CODE 250: Performed by: PHYSICIAN ASSISTANT

## 2024-03-02 PROCEDURE — 97530 THERAPEUTIC ACTIVITIES: CPT | Mod: CQ

## 2024-03-02 PROCEDURE — 99900035 HC TECH TIME PER 15 MIN (STAT)

## 2024-03-02 PROCEDURE — 94640 AIRWAY INHALATION TREATMENT: CPT

## 2024-03-02 PROCEDURE — 99291 CRITICAL CARE FIRST HOUR: CPT | Mod: ,,, | Performed by: INTERNAL MEDICINE

## 2024-03-02 PROCEDURE — 20000000 HC ICU ROOM

## 2024-03-02 RX ORDER — POTASSIUM CHLORIDE 14.9 MG/ML
60 INJECTION INTRAVENOUS
Status: DISCONTINUED | OUTPATIENT
Start: 2024-03-03 | End: 2024-03-05

## 2024-03-02 RX ORDER — MAGNESIUM SULFATE HEPTAHYDRATE 40 MG/ML
4 INJECTION, SOLUTION INTRAVENOUS
Status: DISCONTINUED | OUTPATIENT
Start: 2024-03-03 | End: 2024-03-05

## 2024-03-02 RX ORDER — POTASSIUM CHLORIDE 29.8 MG/ML
40 INJECTION INTRAVENOUS
Status: DISCONTINUED | OUTPATIENT
Start: 2024-03-03 | End: 2024-03-05

## 2024-03-02 RX ORDER — CALCIUM GLUCONATE 20 MG/ML
2 INJECTION, SOLUTION INTRAVENOUS
Status: DISCONTINUED | OUTPATIENT
Start: 2024-03-03 | End: 2024-03-05

## 2024-03-02 RX ORDER — SODIUM CHLORIDE 0.9 % (FLUSH) 0.9 %
10 SYRINGE (ML) INJECTION EVERY 6 HOURS
Status: DISCONTINUED | OUTPATIENT
Start: 2024-03-02 | End: 2024-03-07 | Stop reason: HOSPADM

## 2024-03-02 RX ORDER — CALCIUM GLUCONATE 20 MG/ML
3 INJECTION, SOLUTION INTRAVENOUS
Status: DISCONTINUED | OUTPATIENT
Start: 2024-03-03 | End: 2024-03-05

## 2024-03-02 RX ORDER — CALCIUM GLUCONATE 20 MG/ML
1 INJECTION, SOLUTION INTRAVENOUS
Status: DISCONTINUED | OUTPATIENT
Start: 2024-03-03 | End: 2024-03-05

## 2024-03-02 RX ORDER — MAGNESIUM SULFATE HEPTAHYDRATE 40 MG/ML
2 INJECTION, SOLUTION INTRAVENOUS
Status: DISCONTINUED | OUTPATIENT
Start: 2024-03-03 | End: 2024-03-05

## 2024-03-02 RX ORDER — SODIUM CHLORIDE 0.9 % (FLUSH) 0.9 %
10 SYRINGE (ML) INJECTION
Status: DISCONTINUED | OUTPATIENT
Start: 2024-03-02 | End: 2024-03-07 | Stop reason: HOSPADM

## 2024-03-02 RX ORDER — POTASSIUM CHLORIDE 29.8 MG/ML
80 INJECTION INTRAVENOUS
Status: DISCONTINUED | OUTPATIENT
Start: 2024-03-03 | End: 2024-03-05

## 2024-03-02 RX ADMIN — FUROSEMIDE 40 MG: 10 INJECTION, SOLUTION INTRAVENOUS at 10:03

## 2024-03-02 RX ADMIN — IPRATROPIUM BROMIDE 0.5 MG: 0.5 SOLUTION RESPIRATORY (INHALATION) at 01:03

## 2024-03-02 RX ADMIN — IPRATROPIUM BROMIDE 0.5 MG: 0.5 SOLUTION RESPIRATORY (INHALATION) at 08:03

## 2024-03-02 RX ADMIN — ENOXAPARIN SODIUM 60 MG: 60 INJECTION SUBCUTANEOUS at 05:03

## 2024-03-02 RX ADMIN — LOSARTAN POTASSIUM 25 MG: 25 TABLET, FILM COATED ORAL at 10:03

## 2024-03-02 RX ADMIN — PIPERACILLIN AND TAZOBACTAM 3.38 G: 3; .375 INJECTION, POWDER, FOR SOLUTION INTRAVENOUS; PARENTERAL at 08:03

## 2024-03-02 RX ADMIN — HYDROMORPHONE HYDROCHLORIDE 2 MG: 1 INJECTION, SOLUTION INTRAMUSCULAR; INTRAVENOUS; SUBCUTANEOUS at 08:03

## 2024-03-02 RX ADMIN — LEVALBUTEROL HYDROCHLORIDE 1.25 MG: 1.25 SOLUTION RESPIRATORY (INHALATION) at 01:03

## 2024-03-02 RX ADMIN — FUROSEMIDE 40 MG: 10 INJECTION, SOLUTION INTRAVENOUS at 02:03

## 2024-03-02 RX ADMIN — EZETIMIBE 10 MG: 10 TABLET ORAL at 08:03

## 2024-03-02 RX ADMIN — LEUCINE, PHENYLALANINE, LYSINE, METHIONINE, ISOLEUCINE, VALINE, HISTIDINE, THREONINE, TRYPTOPHAN, ALANINE, GLYCINE, ARGININE, PROLINE, SERINE, TYROSINE, SODIUM ACETATE, DIBASIC POTASSIUM PHOSPHATE, MAGNESIUM CHLORIDE, SODIUM CHLORIDE, CALCIUM CHLORIDE, DEXTROSE
311; 238; 247; 170; 255; 247; 204; 179; 77; 880; 438; 489; 289; 213; 17; 297; 261; 51; 77; 33; 5 INJECTION INTRAVENOUS at 04:03

## 2024-03-02 RX ADMIN — PANTOPRAZOLE SODIUM 40 MG: 40 TABLET, DELAYED RELEASE ORAL at 06:03

## 2024-03-02 RX ADMIN — IPRATROPIUM BROMIDE 0.5 MG: 0.5 SOLUTION RESPIRATORY (INHALATION) at 07:03

## 2024-03-02 RX ADMIN — ASPIRIN 81 MG 81 MG: 81 TABLET ORAL at 10:03

## 2024-03-02 RX ADMIN — GUAIFENESIN 1200 MG: 600 TABLET, EXTENDED RELEASE ORAL at 08:03

## 2024-03-02 RX ADMIN — PIPERACILLIN AND TAZOBACTAM 3.38 G: 3; .375 INJECTION, POWDER, FOR SOLUTION INTRAVENOUS; PARENTERAL at 11:03

## 2024-03-02 RX ADMIN — HYDROMORPHONE HYDROCHLORIDE 2 MG: 1 INJECTION, SOLUTION INTRAMUSCULAR; INTRAVENOUS; SUBCUTANEOUS at 01:03

## 2024-03-02 RX ADMIN — LEVALBUTEROL HYDROCHLORIDE 1.25 MG: 1.25 SOLUTION RESPIRATORY (INHALATION) at 08:03

## 2024-03-02 RX ADMIN — DEXTROSE: 5 SOLUTION INTRAVENOUS at 03:03

## 2024-03-02 RX ADMIN — FUROSEMIDE 40 MG: 10 INJECTION, SOLUTION INTRAVENOUS at 06:03

## 2024-03-02 RX ADMIN — ASCORBIC ACID, VITAMIN A PALMITATE, CHOLECALCIFEROL, THIAMINE HYDROCHLORIDE, RIBOFLAVIN-5 PHOSPHATE SODIUM, PYRIDOXINE HYDROCHLORIDE, NIACINAMIDE, DEXPANTHENOL, ALPHA-TOCOPHEROL ACETATE, VITAMIN K1, FOLIC ACID, BIOTIN, CYANOCOBALAMIN: 200; 3300; 200; 6; 3.6; 6; 40; 15; 10; 150; 600; 60; 5 INJECTION, SOLUTION INTRAVENOUS at 03:03

## 2024-03-02 RX ADMIN — PIPERACILLIN AND TAZOBACTAM 3.38 G: 3; .375 INJECTION, POWDER, FOR SOLUTION INTRAVENOUS; PARENTERAL at 01:03

## 2024-03-02 RX ADMIN — METOPROLOL TARTRATE 50 MG: 50 TABLET, FILM COATED ORAL at 08:03

## 2024-03-02 RX ADMIN — DULOXETINE HYDROCHLORIDE 60 MG: 30 CAPSULE, DELAYED RELEASE ORAL at 08:03

## 2024-03-02 RX ADMIN — METOPROLOL TARTRATE 50 MG: 50 TABLET, FILM COATED ORAL at 04:03

## 2024-03-02 RX ADMIN — DEXMEDETOMIDINE HYDROCHLORIDE 0.6 MCG/KG/HR: 400 INJECTION INTRAVENOUS at 02:03

## 2024-03-02 RX ADMIN — TAMSULOSIN HYDROCHLORIDE 0.4 MG: 0.4 CAPSULE ORAL at 10:03

## 2024-03-02 RX ADMIN — SPIRONOLACTONE 25 MG: 25 TABLET ORAL at 10:03

## 2024-03-02 RX ADMIN — GUAIFENESIN 1200 MG: 600 TABLET, EXTENDED RELEASE ORAL at 10:03

## 2024-03-02 RX ADMIN — MONTELUKAST 10 MG: 10 TABLET, FILM COATED ORAL at 10:03

## 2024-03-02 RX ADMIN — LEVOTHYROXINE SODIUM ANHYDROUS 25 MCG: 100 INJECTION, POWDER, LYOPHILIZED, FOR SOLUTION INTRAVENOUS at 11:03

## 2024-03-02 RX ADMIN — LEUCINE, PHENYLALANINE, LYSINE, METHIONINE, ISOLEUCINE, VALINE, HISTIDINE, THREONINE, TRYPTOPHAN, ALANINE, GLYCINE, ARGININE, PROLINE, SERINE, TYROSINE, SODIUM ACETATE, DIBASIC POTASSIUM PHOSPHATE, MAGNESIUM CHLORIDE, SODIUM CHLORIDE, CALCIUM CHLORIDE, DEXTROSE
311; 238; 247; 170; 255; 247; 204; 179; 77; 880; 438; 489; 289; 213; 17; 297; 261; 51; 77; 33; 5 INJECTION INTRAVENOUS at 06:03

## 2024-03-02 RX ADMIN — METOPROLOL TARTRATE 50 MG: 50 TABLET, FILM COATED ORAL at 10:03

## 2024-03-02 RX ADMIN — DEXMEDETOMIDINE HYDROCHLORIDE 0.6 MCG/KG/HR: 400 INJECTION INTRAVENOUS at 07:03

## 2024-03-02 RX ADMIN — LEVALBUTEROL HYDROCHLORIDE 1.25 MG: 1.25 SOLUTION RESPIRATORY (INHALATION) at 07:03

## 2024-03-02 RX ADMIN — POTASSIUM BICARBONATE 50 MEQ: 977.5 TABLET, EFFERVESCENT ORAL at 10:03

## 2024-03-02 NOTE — PT/OT/SLP PROGRESS
Speech Language Pathology      Jose F Hanley  MRN: [id number]    Patient not seen today secondary to MD hold (Comment) (per RN NPO due to possible ileus). Will follow-up Tomorrow.

## 2024-03-02 NOTE — CONSULTS
[facility]  Adult Nutrition   Progress Note (Nutrition Support Management)    SUMMARY     Recommendations  1.) Will continue Clinimix E @ 100mL/hr x2 days (for weekend coverage) due to GI obstruction versus ileus. TPN to continue providing 816 kcals, 102gm protein; GIR 0.7 meeting 33% EEN and 83% EPN.   2.) If unable to advance PO diet, suggest full TPN. Pharmacy to manage for weekend.    Suggest macronutrient's of AA 120gm; Dextrose 180gm; SMOF lipids 20% to provide 1592 kcals, 120gm protein; GIR 1.0.    Custom TPN to meet 64% EEN and 100% EPN.   Monitor triglycerides while receiving lipids.   3.) When medically approproiate, advance diet to cardiac diet restrictions, texture per SLP.  4.) Continue ETOH prophaylaxis medications: IV thiamine, folic acid and MVI.   Transition to PO medications (thiamine 100mg TID, folic acid 1mg QD, and MVI QD) when cleared for oral intake.    Goals:   1.) Pt to meet/tolerate >75% of EPN via parenteral nutrition.   2.) Pt to have >50% of EEN met within 72hr.   3.) Electrolytes to stabilize and remain in reference range.  Nutrition Goal Status: progressing towards goal  Communication of RD Recs: reviewed with RN    Nutrition Diagnosis PES Statement: Swallow difficulty  related to dysphagia as evidenced by failed speech eval and need nutrition support.   Altered GI fxn related possible ileus as evidence by abdominal distention, poor bowel fxn, high NG output.  (continues)    Dietitian Rounds Brief  RD follow up for TPN management. Pt has been receiving Clinimix E @100mL x4 days.  Pt refusing BiPAP and being treated for ETOH withdraw with thiamine, folic acid and MVI infusion. At this time, pt remains NPO. Will continue Clinimix E @ 100mL/hr over the weekend. LBM 2/29.    Nutrition Related Social Determinants of Health: SDOH: None Identified    Malnutrition Assessment     Skin (Micronutrient): none  Nails (Micronutrient): none       Weight Loss (Malnutrition): other (see  comments) (None)  Energy Intake (Malnutrition): less than 75% for greater than 7 days   Orbital Region (Subcutaneous Fat Loss): well nourished  Upper Arm Region (Subcutaneous Fat Loss): well nourished   Druze Region (Muscle Loss): mild depletion  Clavicle Bone Region (Muscle Loss): well nourished  Clavicle and Acromion Bone Region (Muscle Loss): well nourished  Scapular Bone Region (Muscle Loss): well nourished  Dorsal Hand (Muscle Loss): well nourished  Patellar Region (Muscle Loss): well nourished  Anterior Thigh Region (Muscle Loss): well nourished  Posterior Calf Region (Muscle Loss): well nourished                 Diet order:   Current Diet Order: NPO      Current Nutrition Support Formula Ordered: Clinimix 4.25/5  Current Nutrition Support Rate Ordered: 100 (ml)  Current Nutrition Support Frequency Ordered: mL/hr    Evaluation of Received Nutrient/Fluid Intake  Parenteral Calories (kcal): 816  Parenteral Protein (gm): 102  Parenteral Fluid (mL): 2400  GIR (Glucose Infusion Rate) (mg/kg/min): 0.7 mg/kg/min  % Kcal Needs: 33  % Protein Needs: 83  Energy Calories Required: not meeting needs  Protein Required: meeting needs  Fluid Required: meeting needs  Comments: -     % Intake of Estimated Energy Needs: 0 - 25 %  % Meal Intake: NPO      Intake/Output Summary (Last 24 hours) at 3/2/2024 1134  Last data filed at 3/2/2024 1050  Gross per 24 hour   Intake 3483.07 ml   Output 3875 ml   Net -391.93 ml        Anthropometrics  Temp: 97.6 °F (36.4 °C)  Height Method: Measured  Height: 6' (182.9 cm)  Height (inches): 72 in  Weight Method: Bed Scale  Weight: 125 kg (275 lb 9.2 oz)  Weight (lb): 275.58 lb  Ideal Body Weight (IBW), Male: 178 lb  % Ideal Body Weight, Male (lb): 154.82 %  BMI (Calculated): 37.4  BMI Grade: 35 - 39.9 - obesity - grade II       Estimated/Assessed Needs  Weight Used For Calorie Calculations: 125 kg (275 lb 9.2 oz)  Energy Calorie Requirements (kcal): 6758-3698 (20-25 kcal/ kg bw)  Energy Need  Method: Kcal/kg  Protein Requirements: 121-162 gm (1.5-2.0 gm/ kg IBW)  Weight Used For Protein Calculations: 81 kg (178 lb 9.2 oz)     Estimated Fluid Requirement Method: RDA Method  RDA Method (mL): 2500       Reason for Assessment  Reason For Assessment: RD follow-up  Relevant Medical History: CAD, COPD  Interdisciplinary Rounds: did not attend  Nutrition Discharge Planning: TBD    Nutrition/Diet History  Patient Reported Diet/Restrictions/Preferences: general  Spiritual, Cultural Beliefs, Sabianist Practices, Values that Affect Care: no  Food Allergies: NKFA  Factors Affecting Nutritional Intake: NPO, abdominal distension, abdominal pain, altered gastrointestinal function    Nutrition Risk Screen  Nutrition Risk Screen: no indicators present       Altered Skin Integrity 02/23/24 0525 Left anterior;lower Arm #1 Skin Tear Partial thickness tissue loss. Shallow open ulcer with a red or pink wound bed, without slough. Intact or Open/Ruptured Serum-filled blister.-Wound Image: Images linked  MST Score: 0  Have you recently lost weight without trying?: No  Weight loss score: 0  Have you been eating poorly because of a decreased appetite?: No  Appetite score: 0       Weight History:  Wt Readings from Last 10 Encounters:   02/23/24 125 kg (275 lb 9.2 oz)   02/21/24 124.5 kg (274 lb 6.4 oz)   01/12/24 120.7 kg (266 lb)   01/10/24 120.7 kg (266 lb)   12/21/23 125.6 kg (277 lb)   12/13/23 126.1 kg (278 lb)   11/28/23 126.1 kg (278 lb)   10/31/23 119.7 kg (264 lb)   10/11/23 124.4 kg (274 lb 4.8 oz)   09/21/23 121.6 kg (268 lb)        Lab/Procedures/Meds: Pertinent Labs/Meds Reviewed    Medications:Pertinent Medications Reviewed  Scheduled Meds:   aspirin  81 mg Oral Daily    DULoxetine  60 mg Oral Nightly    enoxparin  60 mg Subcutaneous Q24H (prophylaxis, 1700)    ezetimibe  10 mg Oral QHS    furosemide (LASIX) injection  40 mg Intravenous Q8H    guaiFENesin  1,200 mg Oral BID    ipratropium  0.5 mg Nebulization Q6H     levalbuterol  1.25 mg Nebulization Q6H    levothyroxine  25 mcg Intravenous Daily    losartan  25 mg Oral Daily    metoprolol tartrate  50 mg Oral TID    montelukast  10 mg Oral Daily    pantoprazole  40 mg Oral Before breakfast    piperacillin-tazobactam (Zosyn) IV (PEDS and ADULTS) (extended infusion is not appropriate)  3.375 g Intravenous Q8H    sodium chloride 0.9% 1,000 mL with mvi, (ADULT) no.4 with vit K 3,300 unit- 150 mcg/10 mL 10 mL, thiamine 100 mg, folic acid 1 mg infusion   Intravenous Daily    spironolactone  25 mg Oral Daily    tamsulosin  0.4 mg Oral Daily     Continuous Infusions:   amino acid 4.25% - dextrose 5% (CLINIMIX-E) solution 100 mL/hr at 03/02/24 0637    dexmedeTOMIDine (Precedex) infusion (titrating) 0.4 mcg/kg/hr (03/02/24 1036)    dextrose 5 % (D5W) 50 mL/hr at 03/01/24 0833     PRN Meds:.0.9%  NaCl infusion (for blood administration), albuterol sulfate, dextrose 50% in water (D50W), dextrose 50% in water (D50W), hydrALAZINE, HYDROcodone-acetaminophen, HYDROmorphone, lorazepam, metoprolol, ondansetron, potassium bicarbonate, potassium bicarbonate, potassium bicarbonate    Labs: Pertinent Labs Reviewed  Clinical Chemistry:  Recent Labs   Lab 02/24/24  1828 02/25/24  0337 02/26/24  0521 02/27/24  0339 02/29/24  0346 03/01/24  0517 03/02/24  0454    137 139   < > 151* 151* 145   K 5.0 4.7 4.7   < > 3.7 3.7 3.6    107 109   < > 109 106 104   CO2 24 23 25   < > 33* 36* 35*   * 171* 151*   < > 164* 128* 174*   BUN 17 19 22   < > 22 28* 30*   CREATININE 0.9 0.9 0.8   < > 0.9 1.1 1.0   CALCIUM 7.7* 7.7* 7.8*   < > 8.9 8.8 8.1*   PROT 5.3* 5.3* 5.4*  --   --   --   --    ALBUMIN 3.6 3.4* 3.3*  --   --   --   --    BILITOT 0.7 0.7 1.1*  --   --   --   --    ALKPHOS 22* 22* 24*  --   --   --   --    AST 39 33 32  --   --   --   --    ALT 18 15 12  --   --   --   --    ANIONGAP 5* 7* 5*   < > 9 9 6*   MG 2.2 2.2 2.1   < > 2.4 2.2 2.1   PHOS  --   --   --   --  2.4* 3.6 2.9    <  > = values in this interval not displayed.     CBC:   Recent Labs   Lab 03/02/24  0454   WBC 17.03*   RBC 3.71*   HGB 8.5*   HCT 29.6*   *   MCV 80*   MCH 22.9*   MCHC 28.7*     Lipid Panel:  Recent Labs   Lab 02/29/24  0346   TRIG 94     Thyroid & Parathyroid:  Recent Labs   Lab 03/01/24  1001   TSH 2.171     Monitor and Evaluation  Food and Nutrient Intake: parenteral nutrition intake  Food and Nutrient Adminstration: enteral and parenteral nutrition administration  Knowledge/Beliefs/Attitudes: food and nutrition knowledge/skill  Physical Activity and Function: nutrition-related ADLs and IADLs  Anthropometric Measurements: weight, weight change  Biochemical Data, Medical Tests and Procedures: electrolyte and renal panel, inflammatory profile, gastrointestinal profile, lipid profile, glucose/endocrine profile  Nutrition-Focused Physical Findings: overall appearance     Nutrition Risk  Level of Risk/Frequency of Follow-up:  (2 xweek)     Nutrition Follow-Up  RD Follow-up?: Yes      Andria Salomon RD 03/02/2024 11:34 AM

## 2024-03-02 NOTE — RESPIRATORY THERAPY
PT is alert and oriented and strongly refusing BIPAP and 0100 breathing Tx w/ IPV.  When placed on BiPAP PT lasted only 2 minutes.  I've tried coaching in deep breathing exercises and coughing to clear his airway.  PT was only able to get a small amount of thick tan mucus out.  I've educated the necessity of these exercises but PT is unwilling to participate.  Currently PT is on 3L NC w/ SPO2 95%.  Will continue to monitor.

## 2024-03-02 NOTE — NURSING
Patient has been s incidence over course of night. Patient refused bipap repeatedly p being taught its importance. Patient is oriented to self and place.Patient refusing his bath this am. Patient has remained free of restraints c no pulling of lines.

## 2024-03-02 NOTE — PLAN OF CARE
03/02/24 0832   PRE-TX-O2   Device (Oxygen Therapy) BIPAP   Oxygen Concentration (%) 40   SpO2 97 %   Pulse 79   Resp 18   Preset CPAP/BiPAP Settings   Ipap 12   EPAP (cm H2O) 5   Pressure Support (cm H2O) 7   Set Rate (Breaths/Min) 16   ITime (sec) 1   Rise Time (sec) 3   Labs   $ Was an ABG obtained? Arterial Blood Draw from Existing Line;POCT - Blood gas;POCT - Calcium;POCT - Hematocrit;POCT - Potassium;POCT - Sodium   $ Labs Tech Time 15 min

## 2024-03-02 NOTE — PT/OT/SLP PROGRESS
Speech Language Pathology      Jose F Hanley  MRN: [id number]    Patient not seen today secondary to Testing/imaging (xray/CT/MRI) (having PICC line placed). Will follow-up later today if possible..

## 2024-03-02 NOTE — NURSING
S/c pharmacy, estrellita Perez pending TPN.  Clinimix will have to cont until tomorrow when full TPN avail.  Dynamic infusion at bedside for PICC placement.

## 2024-03-02 NOTE — NURSING
Pt with peripheral only.  Difficult stick, needing multi infusions and awaiting TPN.  PICC order ok'd and order placed.  Attempts at this time to start another peripheral unsuccessful.  Awaiting PICC.  Pt improving.  More awake and alert today.  Calmer.  Placed back on BiPAP this AM per respiratory.  Tolerating.  Able to sit on side of bed with therapy.  Stood x2 and able to side step.  Did not feel comfortable or strong enough to get to chair however.  Weaning precedex.

## 2024-03-02 NOTE — ASSESSMENT & PLAN NOTE
- post op care per surgery   -aspirin mg daily metoprolol 10 mg IV q.12 hours, Lasix 20 mg IV daily, losartan 25 mg daily, metoprolol 25 mg p.o. t.i.d.  -mediastinal drains , pacer wires, and left pleural catheter removed.

## 2024-03-02 NOTE — SUBJECTIVE & OBJECTIVE
Interval History:  Patient seen today for follow-up care.  He seems less confused today.  More talkative.  Patient has CT chest/abdomen/pelvis showed no acute changes, possible ileus versus early SBO.  Patient abdominal pain improving.  Patient on IV Zosyn.  Patient follow up with General surgery, input appreciated.  WBC down to 17, was 25.  Patient on low-dose Precedex.  Patient's hyponatremia improving with sodium down to 145, was 151.  PT and OT to evaluate.  Patient recommended rehab placement.   to assist with discharge planning.  Patient will need PICC line inserted.  Started on TPN until oral intake improves.                Review of Systems   Constitutional: Negative.    HENT:  Positive for congestion.    Eyes: Negative.    Respiratory:  Positive for cough.    Cardiovascular: Negative.    Gastrointestinal:  Positive for abdominal distention.   Endocrine: Negative.    Genitourinary: Negative.    Musculoskeletal: Negative.    Allergic/Immunologic: Negative.    Neurological: Negative.    Hematological: Negative.    Psychiatric/Behavioral: Negative.       Objective:     Vital Signs (Most Recent):  Temp: 97.2 °F (36.2 °C) (03/02/24 1115)  Pulse: 80 (03/02/24 1313)  Resp: (!) 23 (03/02/24 1313)  BP: (!) 96/52 (03/02/24 1100)  SpO2: (!) 93 % (03/02/24 1313) Vital Signs (24h Range):  Temp:  [97.2 °F (36.2 °C)-98.6 °F (37 °C)] 97.2 °F (36.2 °C)  Pulse:  [] 80  Resp:  [13-28] 23  SpO2:  [90 %-100 %] 93 %  BP: ()/(51-72) 96/52     Weight: 125 kg (275 lb 9.2 oz)  Body mass index is 37.37 kg/m².    Intake/Output Summary (Last 24 hours) at 3/2/2024 1322  Last data filed at 3/2/2024 1130  Gross per 24 hour   Intake 3563.07 ml   Output 4025 ml   Net -461.93 ml           Physical Exam  Vitals reviewed.   Constitutional:       Appearance: Normal appearance. He is normal weight.   HENT:      Head: Normocephalic and atraumatic.      Right Ear: External ear normal.      Left Ear: External ear normal.       "Nose: Nose normal.      Mouth/Throat:      Mouth: Mucous membranes are moist.      Pharynx: Oropharynx is clear.   Cardiovascular:      Rate and Rhythm: Tachycardia present. Rhythm irregular.      Pulses: Normal pulses.      Heart sounds: Normal heart sounds.   Pulmonary:      Effort: Pulmonary effort is normal.      Breath sounds: Normal breath sounds.   Abdominal:      General: Abdomen is flat. Bowel sounds are normal. There is distension.      Palpations: Abdomen is soft.      Comments: Abdominal distention gradually improving   Musculoskeletal:         General: Normal range of motion.      Cervical back: Normal range of motion and neck supple.   Skin:     General: Skin is warm.      Capillary Refill: Capillary refill takes less than 2 seconds.   Neurological:      General: No focal deficit present.      Mental Status: He is alert and oriented to person, place, and time. Mental status is at baseline.      Cranial Nerves: No cranial nerve deficit.   Psychiatric:         Mood and Affect: Mood normal.         Thought Content: Thought content normal.         Judgment: Judgment normal.             Significant Labs: All pertinent labs within the past 24 hours have been reviewed.  Blood Culture:   Recent Labs   Lab 03/01/24  1001   LABBLOO No Growth to date  No Growth to date     BMP:   Recent Labs   Lab 03/02/24  0454   *      K 3.6      CO2 35*   BUN 30*   CREATININE 1.0   CALCIUM 8.1*   MG 2.1       CBC:   Recent Labs   Lab 03/01/24  0517 03/01/24  0922 03/02/24  0454   WBC 25.23*  --  17.03*   HGB 9.3*  --  8.5*   HCT 32.5* 31* 29.6*     --  452*       Urine Culture: No results for input(s): "LABURIN" in the last 48 hours.    Significant Imaging: I have reviewed all pertinent imaging results/findings within the past 24 hours.  "

## 2024-03-02 NOTE — ASSESSMENT & PLAN NOTE
Secondary to COPD exacerbation   BiPAP q.h.s.  - scheduled breathing Rx   -chest x-ray shows some improvement  -patient on IV Zosyn.  -trend leukocytosis, WBC 25>17  -check lactic acid level  -check blood cultures, no growth.  -MRSA screen negative.

## 2024-03-02 NOTE — ASSESSMENT & PLAN NOTE
Patient has Post-operative ileus which is adynamic in etiology which is stable. This is inherent to his procedure. Will treat conservatively with bowel rest, IV fluids, serial abdominal exams and avoidance of GI paralytics such as narcotics or anti-spasmodics. Monitor patient closely.       -general surgery following, input appreciated  -trend leukocytosis, WBC 25>17  -check lactic acid level  -check blood cultures, no growth.  -patient on IV Zosyn.  -chest x-ray and abdominal 1- view improving

## 2024-03-02 NOTE — PROCEDURES
Jose F Hanley is a 78 y.o. male patient.    Temp: 97.2 °F (36.2 °C) (03/02/24 1115)  Pulse: 80 (03/02/24 1400)  Resp: (!) 22 (03/02/24 1400)  BP: (!) 110/58 (03/02/24 1400)  SpO2: (!) 94 % (03/02/24 1400)  Weight: 125 kg (275 lb 9.2 oz) (02/23/24 1540)  Height: 6' (182.9 cm) (02/23/24 1540)    PICC  Date/Time: 3/2/2024 2:42 PM  Performed by: Finn Sauer RN  Consent Done: Yes  Time out: Immediately prior to procedure a time out was called to verify the correct patient, procedure, equipment, support staff and site/side marked as required  Indications: vascular access and med administration  Anesthesia: local infiltration  Local anesthetic: lidocaine 1% without epinephrine  Anesthetic Total (mL): 2  Preparation: skin prepped with ChloraPrep  Skin prep agent dried: skin prep agent completely dried prior to procedure  Sterile barriers: all five maximum sterile barriers used - cap, mask, sterile gown, sterile gloves, and large sterile sheet  Hand hygiene: hand hygiene performed prior to central venous catheter insertion  Location details: left basilic  Catheter type: triple lumen  Catheter size: 5 Fr  Catheter Length: 46cm    Ultrasound guidance: yes  Vessel Caliber: medium and patent, compressibility normal  Vascular Doppler: not done  Needle advanced into vessel with real time Ultrasound guidance.  Guidewire confirmed in vessel.  Sterile sheath used.  no esophageal manometryNumber of attempts: 1  Post-procedure: blood return through all ports, chlorhexidine patch and sterile dressing applied  Estimated blood loss (mL): 0            Name Finn Sauer RN  3/2/2024

## 2024-03-02 NOTE — PLAN OF CARE
03/02/24 0809   Patient Assessment/Suction   Level of Consciousness (AVPU) alert   Respiratory Effort Normal;Unlabored   Expansion/Accessory Muscles/Retractions no use of accessory muscles;no retractions   All Lung Fields Breath Sounds coarse   Rhythm/Pattern, Respiratory unlabored;pattern regular;depth regular   PRE-TX-O2   Device (Oxygen Therapy) BIPAP   $ Is the patient on High Flow Oxygen? Yes   Oxygen Concentration (%) 40   SpO2 98 %   Pulse Oximetry Type Continuous   $ Pulse Oximetry - Multiple Charge Pulse Oximetry - Multiple   Pulse 77   Resp 16   Aerosol Therapy   $ Aerosol Therapy Charges Aerosol Treatment   Daily Review of Necessity (SVN) completed   Respiratory Treatment Status (SVN) given   Treatment Route (SVN) in-line   Patient Position (SVN) HOB elevated   Post Treatment Assessment (SVN) breath sounds unchanged   Signs of Intolerance (SVN) none   Breath Sounds Post-Respiratory Treatment   Post-treatment Heart Rate (beats/min) 76   Post-treatment Resp Rate (breaths/min) 15   Intrapulmonary Percussive Ventilation/Metaneb   $ IPV Administration Other (see comments)  (UNABLE TO DOAT THIS TIME PT SATS DROPPED AND PLACED ON BIPAP)   Preset CPAP/BiPAP Settings   Mode Of Delivery BiPAP   $ CPAP/BiPAP Daily Charge BiPAP/CPAP Daily   $ Initial CPAP/BiPAP Setup? No   $ Is patient using? Yes   Size of Mask Extra Large   Ipap 12   EPAP (cm H2O) 5   Pressure Support (cm H2O) 7   Set Rate (Breaths/Min) 16   ITime (sec) 1   Rise Time (sec) 3   Patient CPAP/BiPAP Settings   RR Total (Breaths/Min) 17   Tidal Volume (mL) 309   VE Minute Ventilation (L/min) 4.9 L/min   Peak Inspiratory Pressure (cm H2O) 12   TiTOT (%) 32   Total Leak (L/Min) 25   Patient Trigger - ST Mode Only (%) 50   Education   $ Education BiPAP;Bronchodilator;15 min

## 2024-03-02 NOTE — PLAN OF CARE
Problem: Parenteral Nutrition  Goal: Effective Intravenous Nutrition Therapy Delivery  Outcome: Ongoing, Progressing  Intervention: Optimize Intravenous Nutrition Delivery  Flowsheets (Taken 3/2/2024 1140)  Nutrition Support Management:   weight trending reviewed   other (see comments)   parenteral nutrition continued as ordered     Recommendations  1.) Will continue Clinimix E @ 100mL/hr x2 days (for weekend coverage) due to GI obstruction versus ileus. TPN to continue providing 816 kcals, 102gm protein; GIR 0.7 meeting 33% EEN and 83% EPN.   2.) If unable to advance PO diet, suggest full TPN. Pharmacy to manage for weekend.               Suggest macronutrient's of AA 120gm; Dextrose 180gm; SMOF lipids 20% to provide 1592 kcals, 120gm protein; GIR 1.0.               Custom TPN to meet 64% EEN and 100% EPN.              Monitor triglycerides while receiving lipids.   3.) When medically approproiate, advance diet to cardiac diet restrictions, texture per SLP.  4.) Continue ETOH prophaylaxis medications: IV thiamine, folic acid and MVI.   Transition to PO medications (thiamine 100mg TID, folic acid 1mg QD, and MVI QD) when cleared for oral intake.     Goals:   1.) Pt to meet/tolerate >75% of EPN via parenteral nutrition.   2.) Pt to have >50% of EEN met within 72hr.   3.) Electrolytes to stabilize and remain in reference range.  Nutrition Goal Status: progressing towards goal  Communication of RD Recs: reviewed with RN

## 2024-03-02 NOTE — PT/OT/SLP PROGRESS
Physical Therapy Treatment    Patient Name:  Jose F Hanley   MRN:  [id number]    Recommendations:     Discharge Recommendations: High Intensity Therapy  Discharge Equipment Recommendations: to be determined by next level of care  Barriers to discharge:  weakness, impaired endurance, impaired self care skills, impaired functional mobility, impaired balance, decreased LE function, decreased safety awareness, impaired activity tolerance.     Assessment:     Jose F Hanley is a 78 y.o. male admitted with a medical diagnosis of S/P CABG (coronary artery bypass graft).  He presents with the following impairments/functional limitations: weakness, impaired endurance, impaired self care skills, impaired functional mobility, gait instability, impaired balance, impaired cognition, decreased coordination, decreased upper extremity function, decreased lower extremity function, decreased safety awareness, pain, decreased ROM, edema, impaired skin, impaired cardiopulmonary response to activity.    Pt found resting with HOB elevated, BIPAP donned, wife at bedside, agreeable and excited to participate in skilled physical therapy session today.     Pt transferred from supine to sitting EOB with modA managing LE's and holding heart pillow to maintain sternal precautions. Once EOB, pt was able to sit independently without assistance and no postural deviations noted.    While EOB, pt stated that he wanted to attempt to sit in a bed side chair. Pt educated to attempt standing first. Pt performed sit to stand from EOB with modA and RW. He was able to stand for ~30 sec before reporting fatigue and needing a sitting rest break.     Pt performed another sit to stand from EOB with RW and modA. Once standing, he was able to performed side steps toward HOB for proper positioning once lying.     Pt required modA x2 to transfer from sitting to supine. Once supine he required HOB to be elevated secondary to SOB. HOB put down to scoot pt up in bed.  maxA x2 required to scoot HOB for improved positioning and comfort.     Pt left with HOB elevated, BIPAP donned, call light within reach, wife at bedside, all needs met, and RN notified.     Rehab Prognosis: Fair; patient would benefit from acute skilled PT services to address these deficits and reach maximum level of function.    Recent Surgery: Procedure(s) (LRB):  CABG, REPEAT (N/A)  HARVEST-VEIN-ENDOVASCULAR (Right)  SURGICAL PROCUREMENT,ARTERY,RADIAL,FOR CABG  INSERTION, INTRA-AORTIC BALLOON PUMP (Right) 8 Days Post-Op    Plan:     During this hospitalization, patient to be seen daily to address the identified rehab impairments via gait training, therapeutic activities, therapeutic exercises and progress toward the following goals:    Plan of Care Expires:  03/29/24    Subjective     Chief Complaint: SOB and fatigue  Patient/Family Comments/goals: to get better and get up in chair  Pain/Comfort:  Pain Rating 1: 0/10      Objective:     Communicated with RN prior to session.  Patient found HOB elevated with NG tube, oxygen, pulse ox (continuous), telemetry, peripheral IV, blood pressure cuff, WISAM drain, fitzgerald catheter upon PT entry to room.     General Precautions: Standard, aspiration, fall, NPO  Orthopedic Precautions:    Braces:    Respiratory Status:  BIPAP     Functional Mobility:  Bed Mobility:     Scooting: maximal assistance and of 2 persons  Supine to Sit: moderate assistance  Sit to Supine: moderate assistance and of 2 persons  Transfers:     Sit to Stand:  moderate assistance with rolling walker      AM-PAC 6 CLICK MOBILITY          Treatment & Education:  Pt educated on importance of time OOB, importance of intermittent mobility, safe techniques for transfers/ambulation, discharge recommendations/options, and use of call light for assistance and fall prevention.      Patient left HOB elevated with all lines intact, call button in reach, RN notified, and wife present..    GOALS:   Multidisciplinary  Problems       Physical Therapy Goals          Problem: Physical Therapy    Goal Priority Disciplines Outcome Goal Variances Interventions   Physical Therapy Goal     PT, PT/OT Ongoing, Progressing     Description: 1. Supine to sit with Stand-by Assistance  2. Sit to stand transfer with Stand-by Assistance  3.. Bed to chair transfer with Supervision using Rolling Walker  4. Gait  x 100 feet with Minimal Assistance using Rolling Walker.                          Time Tracking:     PT Received On: 03/02/24  PT Start Time: 0920     PT Stop Time: 1002  PT Total Time (min): 42 min     Billable Minutes: Therapeutic Activity 42    Treatment Type: Treatment  PT/PTA: PTA     Number of PTA visits since last PT visit: 2     03/02/2024

## 2024-03-02 NOTE — ASSESSMENT & PLAN NOTE
-patient is gradually improving.  -CT chest/abdomen/pelvis shows changes of ileus or early SBO.  -general surgery consultation, input appreciated.  Suggests ileus, less likely SBO.  --NG tube discontinued  -possible postoperative ileus  -increase mobility as tolerated  -patient was started on IV Zosyn  -trend WBC 25>17

## 2024-03-02 NOTE — ASSESSMENT & PLAN NOTE
Possibly multifactorial due to recent alcohol withdrawal , possible side effect of morphine/benzodiazepine, ICU psychosis.  Ileus/early SBO changes on CT chest/abdomen/pelvis.  Gradually improving.  - patient on Precedex, wean off as tolerated.  - CIWA trigged ativan   -decrease morphine 2 mg IV q.6 hours PRN  -reorient Q shift and PRN  -patient on IV Zosyn  -patient on BiPAP q.h.s., while sleeping.  -keep oxygen saturation greater than 92%  -patient counseled to quit drinking alcohol due to adverse health risk.

## 2024-03-02 NOTE — PROGRESS NOTES
Pulmonary/Critical Care Progress Note      PATIENT NAME: Jose F Hanley  MRN: [id number]  TODAY'S DATE: 2024  1:01 PM  ADMIT DATE: 2024  AGE: 78 y.o. : 1945    CONSULT REQUESTED BY: Aravind Albarran MD    REASON FOR CONSULT:   Respiratory failure    HPI:  The patient is a 78-year-old gentleman who underwent coronary artery bypass grafting.  He is recovering from DTs, he is extremely morbidly obese, he is in AFib with RVR, and he is not clearing his secretions.  He is off of Precedex but only by a few hours and is lethargic.     the patient remains quite lethargic.    3/1 the patient is significantly more confused today than yesterday.  He is oriented to self.  He is convinced we are trying to kill him.  He states he is going to die.  This makes me anxious.    3/2/24: Afebrile, WBC count trending down on Zosyn. CT CAP identified no particular source of infection, but did suggest ileus or SBO. Encephalopathy is resolved. Mediastinal and pleural drains removed.    REVIEW OF SYSTEMS  Unable to obtain    No change in the patient's Past Medical History, Past Surgical History, Social History or Family History since admission.     VITAL SIGNS (MOST RECENT)  Temp: 97.2 °F (36.2 °C) (24 1115)  Pulse: 76 (24 1100)  Resp: 18 (24 1100)  BP: (!) 96/52 (24 1100)  SpO2: 95 % (24 1100)    INTAKE AND OUTPUT (LAST 24 HOURS):  Intake/Output Summary (Last 24 hours) at 3/2/2024 1239  Last data filed at 3/2/2024 1130  Gross per 24 hour   Intake 3563.07 ml   Output 4025 ml   Net -461.93 ml       WEIGHT  Wt Readings from Last 1 Encounters:   24 125 kg (275 lb 9.2 oz)       PHYSICAL EXAM  GENERAL: Older patient confused.  HEENT: Pupils equal and reactive. Extraocular movements intact. Nose with NG tube in place; also nasal cannula in place.  Pharynx dry  NECK: Supple.   HEART:  Tachycardic rate and rhythm.  AFib with RVR No murmur or gallop auscultated.  LUNGS:  Coarse.  Thoracostomy and  mediastinal tubes are removed.  ABDOMEN:  Extremely obese.  Distended.  Bowel sounds present. Non-tender, no masses palpated.  : Normal anatomy.  Canas with some urine.    EXTREMITIES: Normal muscle tone and joint movement, no cyanosis or clubbing.   LYMPHATICS: No adenopathy palpated, no edema.  SKIN: Dry, intact, no lesions.  Incisions dressed and dry  NEURO: Cranial nerves appear intact.  Motor strength is not formally tested.  Patient is very confused today.  PSYCH:  Confused      CBC LAST (LAST 24 HOURS)  Recent Labs   Lab 03/02/24  0454   WBC 17.03*   RBC 3.71*   HGB 8.5*   HCT 29.6*   MCV 80*   MCH 22.9*   MCHC 28.7*   RDW 22.3*   *   MPV 10.6   GRAN 79.0*  13.5*   LYMPH 5.5*  0.9*   MONO 8.3  1.4*   BASO 0.08   NRBC 9*       CHEMISTRY LAST (LAST 24 HOURS)  Recent Labs   Lab 03/02/24  0454 03/02/24  0832     --    K 3.6  --      --    CO2 35*  --    ANIONGAP 6*  --    BUN 30*  --    CREATININE 1.0  --    *  --    CALCIUM 8.1*  --    PH  --  7.409   MG 2.1  --          LAST 7 DAYS MICROBIOLOGY   Microbiology Results (last 7 days)       Procedure Component Value Units Date/Time    Blood culture [[id number]] Collected: 03/01/24 1001    Order Status: Completed Specimen: Blood Updated: 03/02/24 1232     Blood Culture, Routine No Growth to date      No Growth to date    MRSA Screen by PCR [[id number]] Collected: 03/01/24 1112    Order Status: Completed Updated: 03/01/24 1253     MRSA SCREEN BY PCR Negative    MRSA Screen by PCR [[id number]] Collected: 03/01/24 0940    Order Status: Canceled Specimen: Nasopharyngeal Swab from Nasal             MOST RECENT IMAGING  X-Ray Chest AP Portable  HISTORY: Atelectasis    FINDINGS: Portable chest radiograph at 0615 hours compared to 03/01/2024 shows interval removal of the mediastinal and thoracostomy drains, with nasogastric tube remaining. There are median sternotomy wires and changes of transarterial aortic valvuloplasty, with stable enlarged  cardiac silhouette and normal pulmonary vascularity.    The lungs are symmetrically expanded, with no new pleural or parenchymal abnormality. Bilateral lower lung opacities suggesting subsegmental atelectasis are unchanged.    IMPRESSION: Interval removal of the mediastinal and thoracostomy drains, with no other significant change.    Electronically signed by:  Jeison Lowe MD  03/02/2024 09:35 AM CST Workstation: [id number]      CURRENT VISIT EKG  Results for orders placed or performed during the hospital encounter of 02/23/24   EKG 12-LEAD   Result Value Ref Range    QRS Duration 232 ms    OHS QTC Calculation 613 ms    Narrative    Test Reason : Z95.1,Z95.1,    Vent. Rate : 080 BPM     Atrial Rate : 087 BPM     P-R Int : 000 ms          QRS Dur : 232 ms      QT Int : 532 ms       P-R-T Axes : 000 168 250 degrees     QTc Int : 613 ms    Ventricular-paced rhythm  occasional atrila pacing  Abnormal ECG  When compared with ECG of 21-FEB-2024 09:33,  Electronic ventricular pacemaker has replaced Atrial fibrillation  Confirmed by Eleazar WOO, Gregg NEWTON ([id number]) on 2/24/2024 7:12:58 AM    Referred By: OLLIE RIBEIRO           Confirmed By:Gregg Bush MD       ECHOCARDIOGRAM RESULTS  Results for orders placed during the hospital encounter of 12/13/23    Echo    Interpretation Summary    Left Ventricle: The left ventricle is normal in size. Mildly increased wall thickness. There is mild concentric hypertrophy. Normal wall motion. There is normal systolic function with a visually estimated ejection fraction of 65 - 70%. There is normal diastolic function.    Right Ventricle: Mild right ventricular enlargement. Wall thickness is normal. Right ventricle wall motion  is normal. Systolic function is normal.    Left Atrium: Left atrium is moderately dilated.    Right Atrium: Right atrium is mildly dilated.    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    Tricuspid Valve: There is mild  regurgitation with a centrally directed jet.    Pulmonic Valve: There is mild regurgitation with a centrally directed jet.    Pulmonary Artery: There is mild pulmonary hypertension. The estimated pulmonary artery systolic pressure is 40 mmHg.    IVC/SVC: Intermediate venous pressure at 8 mmHg.        Oxygen INFORMATION  Oxygen Concentration (%):  [40] 40    3 L       LAST ARTERIAL BLOOD GAS  ABG  Recent Labs   Lab 03/02/24  0832   PH 7.409   PO2 71*   PCO2 56.5*   HCO3 35.7*   BE 11*       IMPRESSION AND PLAN  Status post coronary artery bypass grafting  Alcoholic going through DTs  Encephalopathy, resolved  ammonia normal, ABG with compensated mild respiratory acidosis, TSH normal  Acute hypercapnic hypoxemic respiratory failure  - 95% saturation on 3 L  Bibasilar atelectasis  - continue IPV  - encourage patient to cough and clear  - Mucinex 1200 mg b.i.d.  Obstructive sleep apnea  Obesity hypoventilation syndrome  - noncompliant with CPAP  - sleeps on 3 L at home  - BiPAP whenever he is sleeping.   - continue elevation of head of bed  Extreme morbid obesity  - patient does not oxygenate well when flat  Atrial fib with RVR  - CV surgery and Cardiology following  Ileus  - NG tube continues to drain  - KUB suggests and improving ileus  Sepsis with likely intra-abdominal source  - Zosyn   Anemia  - postop, expected  Hyperglycemia  - mild  - trend  Hypernatremia  - D5W    Upon my evaluation, this patient had a high probability of imminent or life-threatening deterioration, which required my direct attention, intervention, and personal management.    I have personally provided at least 35 minutes of critical care time exclusive of time spent on separately billable procedures. Over 50% of the time of this encounter was spent in direct care at the bedside. Time includes review of laboratory data, radiology results, discussion with consultants, and monitoring for potential decompensation. Interventions were performed as  documented above.    Varinder Norris MD  Date of Service: 03/02/2024  1:01 PM

## 2024-03-02 NOTE — PROGRESS NOTES
[facility]  Progress Note    Patient Name: Jose F Hanley  MRN: [id number]  Patient Class: IP- Surgery Admit   Admission Date: 2/23/2024  Length of Stay: 8 days  Attending Physician: Aravind Albarran MD  Primary Care Provider: Sunita Rivera MD        Subjective:     Principal Problem:S/P CABG (coronary artery bypass graft)        HPI:  Patient is 78 year old male with history of Afib, alcohol use, HFpEF, COPD, hypothyroidism, CAD was admitted to ICU after elective CABG. He underwent heart catheterization and coronary angiography showing multivessel coronary artery disease.  He was referred for consideration of redo coronary artery bypass grafting.    He has had a TAVR and Watchman. Recent studies were reviewed.  The patient has significant recurrent coronary artery disease.  Post operatively patient developed alcohol withdrawal and delirium, was started on precedex. Patient was extubated and still on BiPAP.  Hospitalist was consulted for medical management. .    Overview/Hospital Course:  Patient was post op admitted to ICU. Extubated to BiPAP, developed encephalopathy and now on precedex. Post op care per CTS. Cardiology also following. He is post op D4 today, still has chest and mediastinal tubes and pacer wires. Balloon pump was removed 2/25.  Patient complained of increasing abdominal distention and pain.  Discussed checking KUB, and inserting NG tube to LIS, patient may possibly have ileus.  Patient is seen by General surgery, recommend conservative care for now, possible ileus.  Patient with cough and congestion, WBC up to 28.  Patient was started on IV Zosyn with leukocytosis improving.  Patient was mediastinal drains and left pleural drains removed.  Patient was TIANA, on BiPAP q.h.s. and was sleeping.  He is on low-flow nasal cannula.  We will transfer to step-down next few days if patient continues to improve.    Interval History:  Patient seen today for follow-up care.  He  seems less confused today.  More talkative.  Patient has CT chest/abdomen/pelvis showed no acute changes, possible ileus versus early SBO.  Patient abdominal pain improving.  Patient on IV Zosyn.  Patient follow up with General surgery, input appreciated.  WBC down to 17, was 25.  Patient on low-dose Precedex.  Patient's hyponatremia improving with sodium down to 145, was 151.  PT and OT to evaluate.  Patient recommended rehab placement.   to assist with discharge planning.  Patient will need PICC line inserted.  Started on TPN until oral intake improves.                Review of Systems   Constitutional: Negative.    HENT:  Positive for congestion.    Eyes: Negative.    Respiratory:  Positive for cough.    Cardiovascular: Negative.    Gastrointestinal:  Positive for abdominal distention.   Endocrine: Negative.    Genitourinary: Negative.    Musculoskeletal: Negative.    Allergic/Immunologic: Negative.    Neurological: Negative.    Hematological: Negative.    Psychiatric/Behavioral: Negative.       Objective:     Vital Signs (Most Recent):  Temp: 97.2 °F (36.2 °C) (03/02/24 1115)  Pulse: 80 (03/02/24 1313)  Resp: (!) 23 (03/02/24 1313)  BP: (!) 96/52 (03/02/24 1100)  SpO2: (!) 93 % (03/02/24 1313) Vital Signs (24h Range):  Temp:  [97.2 °F (36.2 °C)-98.6 °F (37 °C)] 97.2 °F (36.2 °C)  Pulse:  [] 80  Resp:  [13-28] 23  SpO2:  [90 %-100 %] 93 %  BP: ()/(51-72) 96/52     Weight: 125 kg (275 lb 9.2 oz)  Body mass index is 37.37 kg/m².    Intake/Output Summary (Last 24 hours) at 3/2/2024 1322  Last data filed at 3/2/2024 1130  Gross per 24 hour   Intake 3563.07 ml   Output 4025 ml   Net -461.93 ml           Physical Exam  Vitals reviewed.   Constitutional:       Appearance: Normal appearance. He is normal weight.   HENT:      Head: Normocephalic and atraumatic.      Right Ear: External ear normal.      Left Ear: External ear normal.      Nose: Nose normal.      Mouth/Throat:      Mouth: Mucous  "membranes are moist.      Pharynx: Oropharynx is clear.   Cardiovascular:      Rate and Rhythm: Tachycardia present. Rhythm irregular.      Pulses: Normal pulses.      Heart sounds: Normal heart sounds.   Pulmonary:      Effort: Pulmonary effort is normal.      Breath sounds: Normal breath sounds.   Abdominal:      General: Abdomen is flat. Bowel sounds are normal. There is distension.      Palpations: Abdomen is soft.      Comments: Abdominal distention gradually improving   Musculoskeletal:         General: Normal range of motion.      Cervical back: Normal range of motion and neck supple.   Skin:     General: Skin is warm.      Capillary Refill: Capillary refill takes less than 2 seconds.   Neurological:      General: No focal deficit present.      Mental Status: He is alert and oriented to person, place, and time. Mental status is at baseline.      Cranial Nerves: No cranial nerve deficit.   Psychiatric:         Mood and Affect: Mood normal.         Thought Content: Thought content normal.         Judgment: Judgment normal.             Significant Labs: All pertinent labs within the past 24 hours have been reviewed.  Blood Culture:   Recent Labs   Lab 03/01/24  1001   LABBLOO No Growth to date  No Growth to date     BMP:   Recent Labs   Lab 03/02/24  0454   *      K 3.6      CO2 35*   BUN 30*   CREATININE 1.0   CALCIUM 8.1*   MG 2.1       CBC:   Recent Labs   Lab 03/01/24  0517 03/01/24  0922 03/02/24  0454   WBC 25.23*  --  17.03*   HGB 9.3*  --  8.5*   HCT 32.5* 31* 29.6*     --  452*       Urine Culture: No results for input(s): "LABURIN" in the last 48 hours.    Significant Imaging: I have reviewed all pertinent imaging results/findings within the past 24 hours.    Assessment/Plan:      * S/P CABG (coronary artery bypass graft) - x2 09/2010 - LIMA to LAD; SVG to OMB; grafts occluded 08/2012  - post op care per surgery   -aspirin mg daily metoprolol 10 mg IV q.12 hours, Lasix 20 mg " IV daily, losartan 25 mg daily, metoprolol 25 mg p.o. t.i.d.  -mediastinal drains , pacer wires, and left pleural catheter removed.      Hypernatremia  Patient has hypernatremia which is uncontrolled. The hypernatremia is due to  possible IV fluids . We will aim to correct the sodium by 8-10mEq in 24 hours. We will correct their hypernatremia with Select IV fluids: D5W at a rate of 50 ml/hr. The patient's sodium results have been reviewed and are listed below.  Recent Labs   Lab 03/02/24  0454          -discontinue 0.45 NS  -started D5W at 50 mL/ hour  -trend sodium level, sodium 151>145    Ileus  Patient has Post-operative ileus which is adynamic in etiology which is stable. This is inherent to his procedure. Will treat conservatively with bowel rest, IV fluids, serial abdominal exams and avoidance of GI paralytics such as narcotics or anti-spasmodics. Monitor patient closely.       -general surgery following, input appreciated  -trend leukocytosis, WBC 25>17  -check lactic acid level  -check blood cultures, no growth.  -patient on IV Zosyn.  -chest x-ray and abdominal 1- view improving      Abdominal distention  -patient is gradually improving.  -CT chest/abdomen/pelvis shows changes of ileus or early SBO.  -general surgery consultation, input appreciated.  Suggests ileus, less likely SBO.  --NG tube discontinued  -possible postoperative ileus  -increase mobility as tolerated  -patient was started on IV Zosyn  -trend WBC 25>17      Acute respiratory failure with hypoxia and hypercapnia  Secondary to COPD exacerbation   BiPAP q.h.s.  - scheduled breathing Rx   -chest x-ray shows some improvement  -patient on IV Zosyn.  -trend leukocytosis, WBC 25>17  -check lactic acid level  -check blood cultures, no growth.  -MRSA screen negative.    Acute blood loss anemia  Expected post o  Hb stable at 8.3<8.8>8.5  -transfuse hemoglobin less than 7  - Cont to monitor     Acute encephalopathy  Possibly multifactorial due  to recent alcohol withdrawal , possible side effect of morphine/benzodiazepine, ICU psychosis.  Ileus/early SBO changes on CT chest/abdomen/pelvis.  Gradually improving.  - patient on Precedex, wean off as tolerated.  - CIWA trigged ativan   -decrease morphine 2 mg IV q.6 hours PRN  -reorient Q shift and PRN  -patient on IV Zosyn  -patient on BiPAP q.h.s., while sleeping.  -keep oxygen saturation greater than 92%  -patient counseled to quit drinking alcohol due to adverse health risk.      Severe obesity (BMI 35.0-39.9) with comorbidity  Body mass index is 37.37 kg/m². Morbid obesity complicates all aspects of disease management from diagnostic modalities to treatment. Weight loss encouraged and health benefits explained to patient.         Permanent atrial fibrillation  S/p watchman device   -S/P TAVR  - Cont metoprolol   -on Lovenox    Dyslipidemia  - cont Statin       CAD (coronary artery disease), native coronary artery  S/p CABG X 3 on 2/23  - post op care per CTS, chest tube and pacer wires to be removed per surgery. Balloon pump was removed 2/25  - Cont aspirin, Lipitor, ,metoprolol       VTE Risk Mitigation (From admission, onward)           Ordered     enoxaparin injection 60 mg  Every 24 hours         02/26/24 1519     Place DUTCH hose  Until discontinued         02/23/24 0545                    Discharge Planning   LEIA: 3/6/2024     Code Status: Full Code   Is the patient medically ready for discharge?:     Reason for patient still in hospital (select all that apply): Patient trending condition, Laboratory test, Treatment, Consult recommendations, PT / OT recommendations, and Pending disposition  Discharge Plan A: Skilled Nursing Facility   Discharge Delays: None known at this time        Critical care time spent on the evaluation and treatment of severe organ dysfunction, review of pertinent labs and imaging studies, discussions with consulting providers and discussions with patient/family: 35  minutes.      Aravind Albarran MD  Department of Hospital Medicine   [facility]

## 2024-03-02 NOTE — ASSESSMENT & PLAN NOTE
Patient has hypernatremia which is uncontrolled. The hypernatremia is due to  possible IV fluids . We will aim to correct the sodium by 8-10mEq in 24 hours. We will correct their hypernatremia with Select IV fluids: D5W at a rate of 50 ml/hr. The patient's sodium results have been reviewed and are listed below.  Recent Labs   Lab 03/02/24  0454          -discontinue 0.45 NS  -started D5W at 50 mL/ hour  -trend sodium level, sodium 151>145

## 2024-03-02 NOTE — PROGRESS NOTES
"[facility]  Department of Cardiology  Consult Note      PATIENT NAME: Jose F Hanley  MRN: [id number]  TODAY'S DATE: 03/02/2024  ADMIT DATE: 2/23/2024                          CONSULT REQUESTED BY: Aravind Albarran MD    SUBJECTIVE     PRINCIPAL PROBLEM: S/P CABG (coronary artery bypass graft)      REASON FOR CONSULT:  Post CABG     Interval history:  03/02/2024:  Patient is alert awake and responsive.  Some wheezing is persisting chest wall pain is controlled.  NG tube in place for earliest      3/1/2024      Patient is not completely oriented. He is lethargic.  He states "yes" when asked if he is breathing better.  According to I and O still is positive.  Creatinine stable.  Remains in AFIB rates are low 100s      2/29/24    Post op Ileus with NGT  RVR  Still positive on I and O  CXR Not any better      2/28/2024    Off Bipap  Off Precedex  Pacer wires out  Abdomen is distended    2/27/2024    Resting on Face mask. Still with mild sedation family at bedside. VSS.      2/26/24  Pt asleep in bed, remains on BiPAP. Per RN, he is still confused while awake. Currently under light sedation - precedex gtt. Remains paced with temporary pacer - 80s.     2/25/24  Pt was seen resting in bed with Bipap mask in place. Balloon pump was removed this morning per bedside RN.     2/24/24  Pt was seen extubated on BiPAP this morning. Balloon pump. Pressures soft this morning. Per RN, pt was increasingly agitated this morning.       HPI:  Pt is a 78 year old with history of known CAD with previous bypass surgery back in 08/31/23 and then underwent a repeat CABG today, 2/23/23. He Just recently had a [facility] as well with Dr. Bush back on 2/7/24.       Per surgery team:  Operation: Redo coronary artery bypass grafting x3 using left radial artery to left anterior descending coronary artery and separate segments of saphenous vein from the aorta to the diagonal coronary artery and from the aorta to the obtuse marginal coronary " artery using a combination of antegrade and retrograde cardioplegia, mild systemic hypothermia, endoscopic vein harvest from the right leg, right radial artery harvest, and insertion of right femoral arterial intra-aortic balloon pump with fluoroscopic guidance and complete pericardiectomy due to severe adhesive pericarditis         Review of patient's allergies indicates:   Allergen Reactions    Adhes. band-tape-benzalkonium Rash     Pt stated it caused blisters    Statins-hmg-coa reductase inhibitors Other (See Comments)     Statin Intolerance (joint pain)       Past Medical History:   Diagnosis Date    A-fib     Allergies 2020    gets allergy shots    Arthritis 1973    right knee    Back pain     COPD (chronic obstructive pulmonary disease)     Diastolic heart failure     GERD (gastroesophageal reflux disease) 2015    HLD (hyperlipidemia)     HTN (hypertension)     Hx of LASIK     Hypothyroidism, unspecified 2012    Inflammatory disease of prostate, unspecified     out of medicine    Mild intermittent asthma, uncomplicated     Myocardial infarction     Neuropathy     On home oxygen therapy     3L NC  at night    PAD (peripheral artery disease)     Staph skin infection 2016    right thigh    TIA (transient ischemic attack) 2013     Past Surgical History:   Procedure Laterality Date    AORTOGRAPHY WITH SERIALOGRAPHY N/A 08/31/2023    Procedure: AORTOGRAM, WITH SERIALOGRAPHY;  Surgeon: Gary Thomas MD;  Location: [facility] CATH/EP LAB;  Service: Peripheral Vascular;  Laterality: N/A;    CATARACT EXTRACTION Bilateral 2014    CORONARY ANGIOGRAPHY INCLUDING BYPASS GRAFTS WITH CATHETERIZATION OF LEFT HEART Left 01/12/2024    Procedure: ANGIOGRAM, CORONARY, INCLUDING BYPASS GRAFT, WITH LEFT HEART CATHETERIZATION;  Surgeon: Gregg Bush MD;  Location: [facility] CATH/EP LAB;  Service: Cardiology;  Laterality: Left;    CORONARY ARTERY BYPASS GRAFT  2010    ENDOSCOPIC HARVEST OF VEIN Right 2/23/2024    Procedure:  HARVEST-VEIN-ENDOVASCULAR;  Surgeon: Gary Thomas MD;  Location: [facility];  Service: Cardiothoracic;  Laterality: Right;    INSERTION OF IMPLANTABLE LOOP RECORDER      INSERTION OF INTRA-AORTIC BALLOON ASSIST DEVICE Right 2024    Procedure: INSERTION, INTRA-AORTIC BALLOON PUMP;  Surgeon: Gary Thomas MD;  Location: [facility];  Service: Cardiothoracic;  Laterality: Right;    INSERTION OF PACEMAKER      does not have card with him for preadmit    INSTANTANEOUS WAVE-FREE RATIO (IFR) N/A 2024    Procedure: Instantaneous Wave-Free Ratio (IFR);  Surgeon: Gregg Bush MD;  Location: [facility] CATH/EP LAB;  Service: Cardiology;  Laterality: N/A;    PTA, ANTERIOR TIBIAL Left 2023    Procedure: PTA, Anterior Tibial;  Surgeon: Gary Thomas MD;  Location: [facility] CATH/EP LAB;  Service: Peripheral Vascular;  Laterality: Left;    PTA, SUPERFICIAL FEMORAL ARTERY Left 2023    Procedure: PTA, Superficial Femoral Artery;  Surgeon: Gary Thomas MD;  Location: [facility] CATH/EP LAB;  Service: Peripheral Vascular;  Laterality: Left;    REPEAT CORONARY ARTERY BYPASS GRAFTING N/A 2024    Procedure: CABG, REPEAT;  Surgeon: Gary Thomas MD;  Location: [facility];  Service: Cardiothoracic;  Laterality: N/A;    SPLENECTOMY  2016    STENT, ANTERIOR TIBIAL Left 2023    Procedure: Stent, Anterior Tibial;  Surgeon: Gary Thomas MD;  Location: [facility] CATH/EP LAB;  Service: Peripheral Vascular;  Laterality: Left;    SURGICAL PROCUREMENT, ARTERY, RADIAL, FOR CABG  2024    Procedure: SURGICAL PROCUREMENT,ARTERY,RADIAL,FOR CABG;  Surgeon: Gary Thomas MD;  Location: [facility];  Service: Cardiothoracic;;    watchman heart device       Social History     Tobacco Use    Smoking status: Former     Current packs/day: 0.00     Types: Cigarettes     Quit date: 1987     Years since quittin.2    Smokeless tobacco: Never   Substance Use Topics    Alcohol  use: Not Currently     Alcohol/week: 21.0 standard drinks of alcohol     Types: 21 Drinks containing 0.5 oz of alcohol per week     Comment: ed orberto mixed drinks    Drug use: No        REVIEW OF SYSTEMS    As mentioned in HPI    OBJECTIVE     VITAL SIGNS (Most Recent)  Temp: 97.2 °F (36.2 °C) (03/02/24 1115)  Pulse: 76 (03/02/24 1100)  Resp: 18 (03/02/24 1100)  BP: (!) 96/52 (03/02/24 1100)  SpO2: 95 % (03/02/24 1100)    VENTILATION STATUS  Resp: 18 (03/02/24 1100)  SpO2: 95 % (03/02/24 1100)  Oxygen Concentration (%):  [40] 40        I & O (Last 24H):  Intake/Output Summary (Last 24 hours) at 3/2/2024 1215  Last data filed at 3/2/2024 1130  Gross per 24 hour   Intake 3563.07 ml   Output 4025 ml   Net -461.93 ml       WEIGHTS  Wt Readings from Last 3 Encounters:   02/23/24 1540 125 kg (275 lb 9.2 oz)   02/21/24 0948 124.5 kg (274 lb 6.4 oz)   01/12/24 0718 120.7 kg (266 lb)       PHYSICAL EXAM    CONSTITUTIONAL: NAD,l more alert and responsive answering questions appropriately   HEENT: NGT present  NECK: no JVD  LUNGS: coarse crackles diminished bases with bilateral expiratory wheeze  HEART irregular rhythm no distinct S3   ABDOMEN: distended better no guarding or rebound  EXTREMITIES:  edema  SKIN: No rash surgical dressings clean and dry      HOME MEDICATIONS:  No current facility-administered medications on file prior to encounter.     Current Outpatient Medications on File Prior to Encounter   Medication Sig Dispense Refill    acetaminophen (TYLENOL) 325 MG tablet Take 650 mg by mouth every 6 (six) hours as needed.      albuterol (PROVENTIL/VENTOLIN HFA) 90 mcg/actuation inhaler Inhale 2 puffs into the lungs every 6 (six) hours as needed.      aspirin 81 MG Chew Take 81 mg by mouth once daily.      betamethasone valerate 0.1% (VALISONE) 0.1 % Lotn Apply to scalp bid prn rash      cyanocobalamin 500 MCG tablet 500 mcg.      cyclobenzaprine (FLEXERIL) 10 MG tablet Take 10 mg by mouth every 8 (eight) hours as  needed.      diphenhydrAMINE (BENADRYL) 50 MG capsule 50 mg every 6 (six) hours as needed.      doxepin (SINEQUAN) 50 MG capsule Take 50 mg by mouth every evening.      DULoxetine (CYMBALTA) 60 MG capsule 60 mg nightly.      EPINEPHrine (EPIPEN) 0.3 mg/0.3 mL AtIn INJECT 0.3MG/0.3ML SUBCUTANEOUSLY (UNDER THE SKIN)  AS NEEDED FOR ALLERGIC REACTIONS SELF-INJECTOR PEN      furosemide (LASIX) 20 MG tablet Take 20 mg by mouth once daily.      gabapentin (NEURONTIN) 400 MG capsule 800 mg 2 (two) times daily.      hydrocodone-homatropine 5-1.5 mg/5 ml (HYCODAN) 5-1.5 mg/5 mL Syrp Take by mouth every 6 (six) hours as needed. cough      levothyroxine (SYNTHROID) 50 MCG tablet 50 mcg.      metOLazone (ZAROXOLYN) 5 MG tablet 5 mg daily as needed. For weight gain >2lbs      montelukast (SINGULAIR) 10 mg tablet 10 mg once daily.      nitroGLYCERIN (NITROSTAT) 0.4 MG SL tablet Place 0.4 mg under the tongue every 5 (five) minutes as needed.      omega-3 fatty acids/fish oil (FISH OIL-OMEGA-3 FATTY ACIDS) 300-1,000 mg capsule Take 1 capsule by mouth 2 (two) times daily.      omeprazole (PRILOSEC) 20 MG capsule 40 mg once daily.      potassium chloride SA (K-DUR,KLOR-CON) 20 MEQ tablet 20 mEq nightly.      ranolazine (RANEXA) 1,000 mg Tb12 Take 1 tablet (1,000 mg total) by mouth 2 (two) times daily. 180 tablet 3    senna-docusate 8.6-50 mg (PERICOLACE) 8.6-50 mg per tablet nightly.      spironolactone (ALDACTONE) 50 MG tablet Take 50 mg by mouth once daily.      tamsulosin (FLOMAX) 0.4 mg Cap 0.4 mg.      TESTOSTERONE PROPIONATE, BULK, MISC Inject 1 each as directed every 14 (fourteen) days.      TOPROL XL 25 mg 24 hr tablet Take 25 mg by mouth 2 (two) times daily.      torsemide (DEMADEX) 20 MG Tab Take 1 tablet (20 mg total) by mouth once daily. (Patient not taking: Reported on 12/21/2023) 30 tablet 11       SCHEDULED MEDS:   aspirin  81 mg Oral Daily    DULoxetine  60 mg Oral Nightly    enoxparin  60 mg Subcutaneous Q24H  (prophylaxis, 1700)    ezetimibe  10 mg Oral QHS    furosemide (LASIX) injection  40 mg Intravenous Q8H    guaiFENesin  1,200 mg Oral BID    ipratropium  0.5 mg Nebulization Q6H    levalbuterol  1.25 mg Nebulization Q6H    levothyroxine  25 mcg Intravenous Daily    losartan  25 mg Oral Daily    metoprolol tartrate  50 mg Oral TID    montelukast  10 mg Oral Daily    pantoprazole  40 mg Oral Before breakfast    piperacillin-tazobactam (Zosyn) IV (PEDS and ADULTS) (extended infusion is not appropriate)  3.375 g Intravenous Q8H    sodium chloride 0.9% 1,000 mL with mvi, (ADULT) no.4 with vit K 3,300 unit- 150 mcg/10 mL 10 mL, thiamine 100 mg, folic acid 1 mg infusion   Intravenous Daily    spironolactone  25 mg Oral Daily    tamsulosin  0.4 mg Oral Daily       CONTINUOUS INFUSIONS:   amino acid 4.25% - dextrose 5% (CLINIMIX-E) solution 100 mL/hr at 03/02/24 0637    dexmedeTOMIDine (Precedex) infusion (titrating) 0.4 mcg/kg/hr (03/02/24 1036)    dextrose 5 % (D5W) 50 mL/hr at 03/01/24 0833       PRN MEDS:0.9%  NaCl infusion (for blood administration), albuterol sulfate, dextrose 50% in water (D50W), dextrose 50% in water (D50W), hydrALAZINE, HYDROcodone-acetaminophen, HYDROmorphone, lorazepam, metoprolol, ondansetron, potassium bicarbonate, potassium bicarbonate, potassium bicarbonate    LABS AND DIAGNOSTICS     CBC LAST 3 DAYS  Recent Labs   Lab 02/29/24  0346 03/01/24  0517 03/01/24  0922 03/02/24  0454   WBC 18.06* 25.23*  --  17.03*   RBC 3.81* 4.00*  --  3.71*   HGB 8.8* 9.3*  --  8.5*   HCT 30.0* 32.5* 31* 29.6*   MCV 79* 81*  --  80*   MCH 23.1* 23.3*  --  22.9*   MCHC 29.3* 28.6*  --  28.7*   RDW 22.8* 22.8*  --  22.3*    418  --  452*   MPV 10.8 10.8  --  10.6   GRAN 83.0*  15.0* 79.3*  20.0*  --  79.0*  13.5*   LYMPH 5.3*  1.0 6.9*  1.8  --  5.5*  0.9*   MONO 10.4  1.9* 10.3  2.6*  --  8.3  1.4*   BASO 0.04 0.14  --  0.08   NRBC 3* 7*  --  9*       COAGULATION LAST 3 DAYS  No results for  "input(s): "LABPT", "INR", "APTT" in the last 168 hours.    CHEMISTRY LAST 3 DAYS  Recent Labs   Lab 02/24/24  1828 02/24/24  1951 02/25/24 0337 02/26/24  0521 02/27/24 0339 02/29/24  0346 02/29/24  0908 03/01/24  0517 03/01/24  0922 03/02/24  0454 03/02/24  0832     --  137 139   < > 151*  --  151*  --  145  --    K 5.0  --  4.7 4.7   < > 3.7  --  3.7  --  3.6  --      --  107 109   < > 109  --  106  --  104  --    CO2 24  --  23 25   < > 33*  --  36*  --  35*  --    ANIONGAP 5*  --  7* 5*   < > 9  --  9  --  6*  --    BUN 17  --  19 22   < > 22  --  28*  --  30*  --    CREATININE 0.9  --  0.9 0.8   < > 0.9  --  1.1  --  1.0  --    *  --  171* 151*   < > 164*  --  128*  --  174*  --    CALCIUM 7.7*  --  7.7* 7.8*   < > 8.9  --  8.8  --  8.1*  --    PH  --    < >  --   --    < >  --  7.425  --  7.428  --  7.409   MG 2.2  --  2.2 2.1   < > 2.4  --  2.2  --  2.1  --    ALBUMIN 3.6  --  3.4* 3.3*  --   --   --   --   --   --   --    PROT 5.3*  --  5.3* 5.4*  --   --   --   --   --   --   --    ALKPHOS 22*  --  22* 24*  --   --   --   --   --   --   --    ALT 18  --  15 12  --   --   --   --   --   --   --    AST 39  --  33 32  --   --   --   --   --   --   --    BILITOT 0.7  --  0.7 1.1*  --   --   --   --   --   --   --     < > = values in this interval not displayed.       CARDIAC PROFILE LAST 3 DAYS  No results for input(s): "BNP", "CPK", "CPKMB", "LDH", "TROPONINI", "TROPONINIHS" in the last 168 hours.    ENDOCRINE LAST 3 DAYS  Recent Labs   Lab 03/01/24  1001   TSH 2.171       LAST ARTERIAL BLOOD GAS  ABG  Recent Labs   Lab 03/02/24  0832   PH 7.409   PO2 71*   PCO2 56.5*   HCO3 35.7*   BE 11*       LAST 7 DAYS MICROBIOLOGY   Microbiology Results (last 7 days)       Procedure Component Value Units Date/Time    Blood culture [[id number]] Collected: 03/01/24 1001    Order Status: Completed Specimen: Blood Updated: 03/01/24 1717     Blood Culture, Routine No Growth to date    MRSA Screen by PCR " [[id number]] Collected: 03/01/24 1112    Order Status: Completed Updated: 03/01/24 1253     MRSA SCREEN BY PCR Negative    MRSA Screen by PCR [[id number]] Collected: 03/01/24 0940    Order Status: Canceled Specimen: Nasopharyngeal Swab from Nasal             MOST RECENT IMAGING  X-Ray Chest AP Portable  HISTORY: Atelectasis    FINDINGS: Portable chest radiograph at 0615 hours compared to 03/01/2024 shows interval removal of the mediastinal and thoracostomy drains, with nasogastric tube remaining. There are median sternotomy wires and changes of transarterial aortic valvuloplasty, with stable enlarged cardiac silhouette and normal pulmonary vascularity.    The lungs are symmetrically expanded, with no new pleural or parenchymal abnormality. Bilateral lower lung opacities suggesting subsegmental atelectasis are unchanged.    IMPRESSION: Interval removal of the mediastinal and thoracostomy drains, with no other significant change.    Electronically signed by:  Jeison Lowe MD  03/02/2024 09:35 AM CST Workstation: [id number]      ECHOCARDIOGRAM RESULTS (last 5)  Results for orders placed during the hospital encounter of 12/13/23    Echo    Interpretation Summary    Left Ventricle: The left ventricle is normal in size. Mildly increased wall thickness. There is mild concentric hypertrophy. Normal wall motion. There is normal systolic function with a visually estimated ejection fraction of 65 - 70%. There is normal diastolic function.    Right Ventricle: Mild right ventricular enlargement. Wall thickness is normal. Right ventricle wall motion  is normal. Systolic function is normal.    Left Atrium: Left atrium is moderately dilated.    Right Atrium: Right atrium is mildly dilated.    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    Tricuspid Valve: There is mild regurgitation with a centrally directed jet.    Pulmonic Valve: There is mild regurgitation with a centrally directed jet.     Pulmonary Artery: There is mild pulmonary hypertension. The estimated pulmonary artery systolic pressure is 40 mmHg.    IVC/SVC: Intermediate venous pressure at 8 mmHg.      CURRENT/PREVIOUS VISIT EKG  Results for orders placed or performed during the hospital encounter of 02/23/24   EKG 12-LEAD    Collection Time: 02/23/24  1:53 PM   Result Value Ref Range    QRS Duration 232 ms    OHS QTC Calculation 613 ms    Narrative    Test Reason : Z95.1,Z95.1,    Vent. Rate : 080 BPM     Atrial Rate : 087 BPM     P-R Int : 000 ms          QRS Dur : 232 ms      QT Int : 532 ms       P-R-T Axes : 000 168 250 degrees     QTc Int : 613 ms    Ventricular-paced rhythm  occasional atrila pacing  Abnormal ECG  When compared with ECG of 21-FEB-2024 09:33,  Electronic ventricular pacemaker has replaced Atrial fibrillation  Confirmed by Eleazar WOO, Gregg NEWTON ([id number]) on 2/24/2024 7:12:58 AM    Referred By: OLLIE RIBEIRO           Confirmed By:Gregg Bush MD           ASSESSMENT/PLAN:     Active Hospital Problems    Diagnosis    *S/P CABG (coronary artery bypass graft) - x2 09/2010 - LIMA to LAD; SVG to OMB; grafts occluded 08/2012    Hypernatremia     -      Ileus    Abdominal distention     -patient has increasing abdominal distention and discomfort  -check KUB  -NG tube to LIS  -possible postoperative ileus  -increase mobility as tolerated      Acute encephalopathy    Acute blood loss anemia    Acute respiratory failure with hypoxia and hypercapnia    Severe obesity (BMI 35.0-39.9) with comorbidity    Permanent atrial fibrillation    CAD (coronary artery disease), native coronary artery    Dyslipidemia       ASSESSMENT & PLAN:     MVCAD S/P redo CABG yesterday 2/23/24,  left radial artery to LAD, SVG to Dx, SVG to OM  HFpEF  HTN/ dyslipidemia   Statin intolerance  AFIB H/O Watchman with RVR  H/O TAVR  Leadless PPM in situ  Distended Abdomen Post op Ileus now with NGT  9. Morbid obesity      RECOMMENDATIONS:  03/02/2024:  Echo  she was no significant pericardial effusion.  Recommend to continue on 2 metoprolol tartrate 50 mg 3 times a day, maintain on losartan 25 mg daily  Continue on Aldactone 25 mg a day  Continue nebulizer treatment with Xopenex patient has history of Watchman in place therefore no need for long-term oral anticoagulation therapy as far as AFib is concerned  Continue aggressive pulmonary tolerate,   Out of bed-to-chair        03/01/2024  Continue to diurese  Awaiting limited ECHO  Continue Metoprolol 50 mg q 8  Continue ASA  Start Zetia at least  Continue losartan  Continue Aldactone 25 mg daily  Dr. ARIA Miller to follow patient over weekend.    Jaun Miller MD  Department of Cardiology  Date of Service: 03/02/2024

## 2024-03-02 NOTE — PROGRESS NOTES
Pt seen and examined. NO significant bowel function yet.    NG output appears clearer.  Lessening    Wt Readings from Last 3 Encounters:   02/23/24 125 kg (275 lb 9.2 oz)   02/21/24 124.5 kg (274 lb 6.4 oz)   01/12/24 120.7 kg (266 lb)     Temp Readings from Last 3 Encounters:   03/02/24 97.2 °F (36.2 °C) (Axillary)   02/21/24 97.6 °F (36.4 °C) (Oral)   01/10/24 97.6 °F (36.4 °C) (Oral)     BP Readings from Last 3 Encounters:   03/02/24 (!) 110/58   02/21/24 130/71   01/31/24 113/67     Pulse Readings from Last 3 Encounters:   03/02/24 80   02/21/24 74   01/31/24 91     AAox3  Sinus  Soft/obese/nt.  Few BS noted    Lab Results   Component Value Date    WBC 17.03 (H) 03/02/2024    HGB 8.5 (L) 03/02/2024    HCT 29.6 (L) 03/02/2024    MCV 80 (L) 03/02/2024     (H) 03/02/2024        BMP  Lab Results   Component Value Date     03/02/2024    K 3.6 03/02/2024     03/02/2024    CO2 35 (H) 03/02/2024    BUN 30 (H) 03/02/2024    CREATININE 1.0 03/02/2024    CALCIUM 8.1 (L) 03/02/2024    ANIONGAP 6 (L) 03/02/2024    EGFRNORACEVR >60.0 03/02/2024     A/P: ileus s/p CABG  Cont NG   Await bowel function

## 2024-03-03 LAB
ANION GAP SERPL CALC-SCNC: 4 MMOL/L (ref 8–16)
BASOPHILS # BLD AUTO: 0.09 K/UL (ref 0–0.2)
BASOPHILS NFR BLD: 0.5 % (ref 0–1.9)
BUN SERPL-MCNC: 24 MG/DL (ref 8–23)
CALCIUM SERPL-MCNC: 8.4 MG/DL (ref 8.7–10.5)
CHLORIDE SERPL-SCNC: 101 MMOL/L (ref 95–110)
CO2 SERPL-SCNC: 37 MMOL/L (ref 23–29)
CREAT SERPL-MCNC: 0.9 MG/DL (ref 0.5–1.4)
DIFFERENTIAL METHOD BLD: ABNORMAL
EOSINOPHIL # BLD AUTO: 1 K/UL (ref 0–0.5)
EOSINOPHIL NFR BLD: 5.7 % (ref 0–8)
ERYTHROCYTE [DISTWIDTH] IN BLOOD BY AUTOMATED COUNT: 22.2 % (ref 11.5–14.5)
EST. GFR  (NO RACE VARIABLE): >60 ML/MIN/1.73 M^2
GLUCOSE SERPL-MCNC: 143 MG/DL (ref 70–110)
HCT VFR BLD AUTO: 31 % (ref 40–54)
HGB BLD-MCNC: 8.9 G/DL (ref 14–18)
IMM GRANULOCYTES # BLD AUTO: 0.11 K/UL (ref 0–0.04)
IMM GRANULOCYTES NFR BLD AUTO: 0.6 % (ref 0–0.5)
LYMPHOCYTES # BLD AUTO: 1.5 K/UL (ref 1–4.8)
LYMPHOCYTES NFR BLD: 8.6 % (ref 18–48)
MAGNESIUM SERPL-MCNC: 1.9 MG/DL (ref 1.6–2.6)
MAGNESIUM SERPL-MCNC: 2.2 MG/DL (ref 1.6–2.6)
MCH RBC QN AUTO: 22.9 PG (ref 27–31)
MCHC RBC AUTO-ENTMCNC: 28.7 G/DL (ref 32–36)
MCV RBC AUTO: 80 FL (ref 82–98)
MONOCYTES # BLD AUTO: 1.5 K/UL (ref 0.3–1)
MONOCYTES NFR BLD: 8.9 % (ref 4–15)
NEUTROPHILS # BLD AUTO: 13.1 K/UL (ref 1.8–7.7)
NEUTROPHILS NFR BLD: 75.7 % (ref 38–73)
NRBC BLD-RTO: 7 /100 WBC
PHOSPHATE SERPL-MCNC: 2.7 MG/DL (ref 2.7–4.5)
PLATELET # BLD AUTO: 521 K/UL (ref 150–450)
PMV BLD AUTO: 10.8 FL (ref 9.2–12.9)
POTASSIUM SERPL-SCNC: 3.5 MMOL/L (ref 3.5–5.1)
POTASSIUM SERPL-SCNC: 3.7 MMOL/L (ref 3.5–5.1)
RBC # BLD AUTO: 3.89 M/UL (ref 4.6–6.2)
SODIUM SERPL-SCNC: 142 MMOL/L (ref 136–145)
WBC # BLD AUTO: 17.28 K/UL (ref 3.9–12.7)

## 2024-03-03 PROCEDURE — 80048 BASIC METABOLIC PNL TOTAL CA: CPT | Performed by: THORACIC SURGERY (CARDIOTHORACIC VASCULAR SURGERY)

## 2024-03-03 PROCEDURE — 25000242 PHARM REV CODE 250 ALT 637 W/ HCPCS: Performed by: INTERNAL MEDICINE

## 2024-03-03 PROCEDURE — 94640 AIRWAY INHALATION TREATMENT: CPT

## 2024-03-03 PROCEDURE — 83735 ASSAY OF MAGNESIUM: CPT | Mod: 91 | Performed by: INTERNAL MEDICINE

## 2024-03-03 PROCEDURE — 20000000 HC ICU ROOM

## 2024-03-03 PROCEDURE — 25000003 PHARM REV CODE 250: Performed by: THORACIC SURGERY (CARDIOTHORACIC VASCULAR SURGERY)

## 2024-03-03 PROCEDURE — 92526 ORAL FUNCTION THERAPY: CPT

## 2024-03-03 PROCEDURE — 97530 THERAPEUTIC ACTIVITIES: CPT | Mod: CQ

## 2024-03-03 PROCEDURE — 99291 CRITICAL CARE FIRST HOUR: CPT | Mod: ,,, | Performed by: INTERNAL MEDICINE

## 2024-03-03 PROCEDURE — 84100 ASSAY OF PHOSPHORUS: CPT | Performed by: INTERNAL MEDICINE

## 2024-03-03 PROCEDURE — 25000003 PHARM REV CODE 250: Performed by: INTERNAL MEDICINE

## 2024-03-03 PROCEDURE — 99900031 HC PATIENT EDUCATION (STAT)

## 2024-03-03 PROCEDURE — 63600175 PHARM REV CODE 636 W HCPCS: Performed by: INTERNAL MEDICINE

## 2024-03-03 PROCEDURE — 99900035 HC TECH TIME PER 15 MIN (STAT)

## 2024-03-03 PROCEDURE — 63600175 PHARM REV CODE 636 W HCPCS: Performed by: THORACIC SURGERY (CARDIOTHORACIC VASCULAR SURGERY)

## 2024-03-03 PROCEDURE — 25000003 PHARM REV CODE 250: Performed by: PHYSICIAN ASSISTANT

## 2024-03-03 PROCEDURE — 94660 CPAP INITIATION&MGMT: CPT

## 2024-03-03 PROCEDURE — A4216 STERILE WATER/SALINE, 10 ML: HCPCS | Performed by: INTERNAL MEDICINE

## 2024-03-03 PROCEDURE — 27000221 HC OXYGEN, UP TO 24 HOURS

## 2024-03-03 PROCEDURE — 97110 THERAPEUTIC EXERCISES: CPT | Mod: CQ

## 2024-03-03 PROCEDURE — 99233 SBSQ HOSP IP/OBS HIGH 50: CPT | Mod: ,,, | Performed by: INTERNAL MEDICINE

## 2024-03-03 PROCEDURE — 84132 ASSAY OF SERUM POTASSIUM: CPT | Performed by: INTERNAL MEDICINE

## 2024-03-03 PROCEDURE — 83735 ASSAY OF MAGNESIUM: CPT | Performed by: THORACIC SURGERY (CARDIOTHORACIC VASCULAR SURGERY)

## 2024-03-03 PROCEDURE — 94761 N-INVAS EAR/PLS OXIMETRY MLT: CPT

## 2024-03-03 PROCEDURE — 25000003 PHARM REV CODE 250: Performed by: NURSE PRACTITIONER

## 2024-03-03 PROCEDURE — B4185 PARENTERAL SOL 10 GM LIPIDS: HCPCS | Performed by: INTERNAL MEDICINE

## 2024-03-03 PROCEDURE — A4217 STERILE WATER/SALINE, 500 ML: HCPCS | Performed by: INTERNAL MEDICINE

## 2024-03-03 PROCEDURE — 85025 COMPLETE CBC W/AUTO DIFF WBC: CPT | Performed by: THORACIC SURGERY (CARDIOTHORACIC VASCULAR SURGERY)

## 2024-03-03 PROCEDURE — 63600175 PHARM REV CODE 636 W HCPCS: Performed by: NURSE PRACTITIONER

## 2024-03-03 PROCEDURE — 99232 SBSQ HOSP IP/OBS MODERATE 35: CPT | Mod: ,,, | Performed by: SURGERY

## 2024-03-03 RX ADMIN — MONTELUKAST 10 MG: 10 TABLET, FILM COATED ORAL at 08:03

## 2024-03-03 RX ADMIN — METOPROLOL TARTRATE 50 MG: 50 TABLET, FILM COATED ORAL at 08:03

## 2024-03-03 RX ADMIN — IPRATROPIUM BROMIDE 0.5 MG: 0.5 SOLUTION RESPIRATORY (INHALATION) at 12:03

## 2024-03-03 RX ADMIN — GUAIFENESIN 1200 MG: 600 TABLET, EXTENDED RELEASE ORAL at 08:03

## 2024-03-03 RX ADMIN — LEVALBUTEROL HYDROCHLORIDE 1.25 MG: 1.25 SOLUTION RESPIRATORY (INHALATION) at 12:03

## 2024-03-03 RX ADMIN — PANTOPRAZOLE SODIUM 40 MG: 40 TABLET, DELAYED RELEASE ORAL at 05:03

## 2024-03-03 RX ADMIN — FUROSEMIDE 40 MG: 10 INJECTION, SOLUTION INTRAVENOUS at 09:03

## 2024-03-03 RX ADMIN — LEVOTHYROXINE SODIUM ANHYDROUS 25 MCG: 100 INJECTION, POWDER, LYOPHILIZED, FOR SOLUTION INTRAVENOUS at 08:03

## 2024-03-03 RX ADMIN — POTASSIUM CHLORIDE 60 MEQ: 14.9 INJECTION, SOLUTION INTRAVENOUS at 05:03

## 2024-03-03 RX ADMIN — HYDROMORPHONE HYDROCHLORIDE 2 MG: 1 INJECTION, SOLUTION INTRAMUSCULAR; INTRAVENOUS; SUBCUTANEOUS at 01:03

## 2024-03-03 RX ADMIN — IPRATROPIUM BROMIDE 0.5 MG: 0.5 SOLUTION RESPIRATORY (INHALATION) at 07:03

## 2024-03-03 RX ADMIN — LEVALBUTEROL HYDROCHLORIDE 1.25 MG: 1.25 SOLUTION RESPIRATORY (INHALATION) at 01:03

## 2024-03-03 RX ADMIN — FUROSEMIDE 40 MG: 10 INJECTION, SOLUTION INTRAVENOUS at 05:03

## 2024-03-03 RX ADMIN — LOSARTAN POTASSIUM 25 MG: 25 TABLET, FILM COATED ORAL at 08:03

## 2024-03-03 RX ADMIN — PIPERACILLIN AND TAZOBACTAM 3.38 G: 3; .375 INJECTION, POWDER, FOR SOLUTION INTRAVENOUS; PARENTERAL at 08:03

## 2024-03-03 RX ADMIN — ENOXAPARIN SODIUM 60 MG: 60 INJECTION SUBCUTANEOUS at 04:03

## 2024-03-03 RX ADMIN — SODIUM CHLORIDE, PRESERVATIVE FREE 10 ML: 5 INJECTION INTRAVENOUS at 06:03

## 2024-03-03 RX ADMIN — LEVALBUTEROL HYDROCHLORIDE 1.25 MG: 1.25 SOLUTION RESPIRATORY (INHALATION) at 07:03

## 2024-03-03 RX ADMIN — GUAIFENESIN 1200 MG: 600 TABLET, EXTENDED RELEASE ORAL at 09:03

## 2024-03-03 RX ADMIN — ASPIRIN 81 MG 81 MG: 81 TABLET ORAL at 08:03

## 2024-03-03 RX ADMIN — IPRATROPIUM BROMIDE 0.5 MG: 0.5 SOLUTION RESPIRATORY (INHALATION) at 01:03

## 2024-03-03 RX ADMIN — MAGNESIUM SULFATE HEPTAHYDRATE: 500 INJECTION, SOLUTION INTRAMUSCULAR; INTRAVENOUS at 09:03

## 2024-03-03 RX ADMIN — SODIUM CHLORIDE, PRESERVATIVE FREE 10 ML: 5 INJECTION INTRAVENOUS at 12:03

## 2024-03-03 RX ADMIN — TAMSULOSIN HYDROCHLORIDE 0.4 MG: 0.4 CAPSULE ORAL at 08:03

## 2024-03-03 RX ADMIN — METOPROLOL TARTRATE 50 MG: 50 TABLET, FILM COATED ORAL at 09:03

## 2024-03-03 RX ADMIN — SMOFLIPID 250 ML: 6; 6; 5; 3 INJECTION, EMULSION INTRAVENOUS at 09:03

## 2024-03-03 RX ADMIN — METOPROLOL TARTRATE 50 MG: 50 TABLET, FILM COATED ORAL at 03:03

## 2024-03-03 RX ADMIN — PIPERACILLIN AND TAZOBACTAM 3.38 G: 3; .375 INJECTION, POWDER, FOR SOLUTION INTRAVENOUS; PARENTERAL at 12:03

## 2024-03-03 RX ADMIN — FUROSEMIDE 40 MG: 10 INJECTION, SOLUTION INTRAVENOUS at 01:03

## 2024-03-03 RX ADMIN — POTASSIUM CHLORIDE 40 MEQ: 29.8 INJECTION, SOLUTION INTRAVENOUS at 06:03

## 2024-03-03 RX ADMIN — MAGNESIUM SULFATE HEPTAHYDRATE 2 G: 40 INJECTION, SOLUTION INTRAVENOUS at 05:03

## 2024-03-03 RX ADMIN — SODIUM CHLORIDE, PRESERVATIVE FREE 10 ML: 5 INJECTION INTRAVENOUS at 05:03

## 2024-03-03 RX ADMIN — EZETIMIBE 10 MG: 10 TABLET ORAL at 09:03

## 2024-03-03 RX ADMIN — PIPERACILLIN AND TAZOBACTAM 3.38 G: 3; .375 INJECTION, POWDER, FOR SOLUTION INTRAVENOUS; PARENTERAL at 04:03

## 2024-03-03 RX ADMIN — SPIRONOLACTONE 25 MG: 25 TABLET ORAL at 08:03

## 2024-03-03 RX ADMIN — DULOXETINE HYDROCHLORIDE 60 MG: 30 CAPSULE, DELAYED RELEASE ORAL at 09:03

## 2024-03-03 NOTE — ASSESSMENT & PLAN NOTE
-patient is gradually improving.  -CT chest/abdomen/pelvis shows changes of ileus or early SBO.  -general surgery consultation, input appreciated.  Suggests ileus, less likely SBO.  --NG tube discontinued  -possible postoperative ileus  -increase mobility as tolerated  -blood cultures no growth.  -patient was started on IV Zosyn  -trend WBC 25>17

## 2024-03-03 NOTE — PROGRESS NOTES
[facility] Medicine  Progress Note    Patient Name: Jose F Hanley  MRN: [id number]  Patient Class: IP- Surgery Admit   Admission Date: 2/23/2024  Length of Stay: 9 days  Attending Physician: Aravind Albarran MD  Primary Care Provider: Sunita Rivera MD        Subjective:     Principal Problem:S/P CABG (coronary artery bypass graft)        HPI:  Patient is 78 year old male with history of Afib, alcohol use, HFpEF, COPD, hypothyroidism, CAD was admitted to ICU after elective CABG. He underwent heart catheterization and coronary angiography showing multivessel coronary artery disease.  He was referred for consideration of redo coronary artery bypass grafting.    He has had a TAVR and Watchman. Recent studies were reviewed.  The patient has significant recurrent coronary artery disease.  Post operatively patient developed alcohol withdrawal and delirium, was started on precedex. Patient was extubated and still on BiPAP.  Hospitalist was consulted for medical management. .    Overview/Hospital Course:  Patient was post op admitted to ICU. Extubated to BiPAP, developed encephalopathy and now on precedex. Post op care per CTS. Cardiology also following. He is post op D4 today, still has chest and mediastinal tubes and pacer wires. Balloon pump was removed 2/25.  Patient complained of increasing abdominal distention and pain.  Discussed checking KUB, and inserting NG tube to LIS, patient may possibly have ileus.  Patient is seen by General surgery, recommend conservative care for now, possible ileus.  Patient with cough and congestion, WBC up to 28.  Patient was started on IV Zosyn with leukocytosis improving.  Patient was mediastinal drains and left pleural drains removed.  Patient was TIANA, on BiPAP q.h.s. and was sleeping.  He is on low-flow nasal cannula.  We will transfer to step-down next few days if patient continues to improve.    Interval History:  Patient seen today for follow-up care.   Patient is more alert talkative.  Patient was sitting up in the chair this morning.  Patient was says has urge for bowel movement, hopefully we will have 1 today.  Laboratories showed WBC 17, creatinine 0.9.   Patient recommended rehab placement.   to assist with discharge planning.  Patient will need PICC line inserted.  Started on TPN until oral intake improves.                Review of Systems   Constitutional: Negative.    HENT:  Positive for congestion.    Eyes: Negative.    Respiratory:  Positive for cough.    Cardiovascular: Negative.    Gastrointestinal:  Positive for abdominal distention.   Endocrine: Negative.    Genitourinary: Negative.    Musculoskeletal: Negative.    Allergic/Immunologic: Negative.    Neurological: Negative.    Hematological: Negative.    Psychiatric/Behavioral: Negative.       Objective:     Vital Signs (Most Recent):  Temp: 97.7 °F (36.5 °C) (03/03/24 1130)  Pulse: 96 (03/03/24 1100)  Resp: 20 (03/03/24 1100)  BP: 127/67 (03/03/24 1100)  SpO2: (!) 94 % (03/03/24 1100) Vital Signs (24h Range):  Temp:  [97.2 °F (36.2 °C)-98 °F (36.7 °C)] 97.7 °F (36.5 °C)  Pulse:  [] 96  Resp:  [16-28] 20  SpO2:  [90 %-96 %] 94 %  BP: (110-184)/() 127/67     Weight: 125.1 kg (275 lb 12.7 oz)  Body mass index is 37.4 kg/m².    Intake/Output Summary (Last 24 hours) at 3/3/2024 1315  Last data filed at 3/3/2024 1213  Gross per 24 hour   Intake 3589.71 ml   Output 4845 ml   Net -1255.29 ml           Physical Exam  Vitals reviewed.   Constitutional:       Appearance: Normal appearance. He is normal weight.   HENT:      Head: Normocephalic and atraumatic.      Right Ear: External ear normal.      Left Ear: External ear normal.      Nose: Nose normal.      Mouth/Throat:      Mouth: Mucous membranes are moist.      Pharynx: Oropharynx is clear.   Cardiovascular:      Rate and Rhythm: Tachycardia present. Rhythm irregular.      Pulses: Normal pulses.      Heart sounds: Normal heart  "sounds.   Pulmonary:      Effort: Pulmonary effort is normal.      Breath sounds: Normal breath sounds.   Abdominal:      General: Abdomen is flat. Bowel sounds are normal. There is distension.      Palpations: Abdomen is soft.      Comments: Abdominal distention gradually improving   Musculoskeletal:         General: Normal range of motion.      Cervical back: Normal range of motion and neck supple.   Skin:     General: Skin is warm.      Capillary Refill: Capillary refill takes less than 2 seconds.   Neurological:      General: No focal deficit present.      Mental Status: He is alert and oriented to person, place, and time. Mental status is at baseline.      Cranial Nerves: No cranial nerve deficit.   Psychiatric:         Mood and Affect: Mood normal.         Thought Content: Thought content normal.         Judgment: Judgment normal.             Significant Labs: All pertinent labs within the past 24 hours have been reviewed.  Blood Culture: No results for input(s): "LABBLOO" in the last 48 hours.    BMP:   Recent Labs   Lab 03/03/24  0420   *      K 3.5      CO2 37*   BUN 24*   CREATININE 0.9   CALCIUM 8.4*   MG 1.9       CBC:   Recent Labs   Lab 03/02/24  0454 03/03/24  0420   WBC 17.03* 17.28*   HGB 8.5* 8.9*   HCT 29.6* 31.0*   * 521*       Urine Culture: No results for input(s): "LABURIN" in the last 48 hours.    Significant Imaging: I have reviewed all pertinent imaging results/findings within the past 24 hours.    Assessment/Plan:      * S/P CABG (coronary artery bypass graft) - x2 09/2010 - LIMA to LAD; SVG to OMB; grafts occluded 08/2012  - post op care per surgery   -aspirin mg daily metoprolol 10 mg IV q.12 hours, Lasix 20 mg IV daily, losartan 25 mg daily, metoprolol 25 mg p.o. t.i.d.  -mediastinal drains , pacer wires, and left pleural catheter removed.      Hypernatremia  Patient has hypernatremia which is uncontrolled. The hypernatremia is due to  possible IV fluids . We " will aim to correct the sodium by 8-10mEq in 24 hours. We will correct their hypernatremia with Select IV fluids: D5W at a rate of 50 ml/hr. The patient's sodium results have been reviewed and are listed below.  Recent Labs   Lab 03/03/24  0420          -discontinue  D5W at 50 mL/ hour  -trend sodium level, sodium 151>145>142    Ileus  Patient has Post-operative ileus which is adynamic in etiology which is stable. This is inherent to his procedure. Will treat conservatively with bowel rest, IV fluids, serial abdominal exams and avoidance of GI paralytics such as narcotics or anti-spasmodics. Monitor patient closely.       -general surgery following, input appreciated  -trend leukocytosis, WBC 25>17  -check lactic acid level  -check blood cultures, no growth.  -patient on IV Zosyn.        Abdominal distention  -patient is gradually improving.  -CT chest/abdomen/pelvis shows changes of ileus or early SBO.  -general surgery consultation, input appreciated.  Suggests ileus, less likely SBO.  --NG tube discontinued  -possible postoperative ileus  -increase mobility as tolerated  -blood cultures no growth.  -patient was started on IV Zosyn  -trend WBC 25>17      Acute respiratory failure with hypoxia and hypercapnia  Improving.  Secondary to COPD exacerbation   BiPAP q.h.s.  - scheduled breathing Rx   -chest x-ray shows some improvement  -patient on IV Zosyn.  -trend leukocytosis, WBC 25>17  -check lactic acid level  -check blood cultures, no growth.  -MRSA screen negative.    Acute blood loss anemia  Expected post o  Hb stable at 8.3<8.8>8.5<8.9  -transfuse hemoglobin less than 7  - Cont to monitor     Acute encephalopathy  Improving.  Was likely multifactorial due to recent alcohol withdrawal , possible side effect of morphine/benzodiazepine, ICU psychosis.  Ileus/early SBO changes on CT chest/abdomen/pelvis.    - patient off the Precedex..  - CIWA trigged ativan   -decrease morphine 2 mg IV q.6 hours  PRN  -reorient Q shift and PRN  -patient on IV Zosyn  -blood cultures no growth.  -patient on BiPAP q.h.s., while sleeping.  -keep oxygen saturation greater than 92%  -patient counseled to quit drinking alcohol due to adverse health risk.      Severe obesity (BMI 35.0-39.9) with comorbidity  Body mass index is 37.4 kg/m². Morbid obesity complicates all aspects of disease management from diagnostic modalities to treatment. Weight loss encouraged and health benefits explained to patient.         Permanent atrial fibrillation  S/p watchman device   -S/P TAVR  - Cont metoprolol   -on Lovenox    Dyslipidemia  - cont Statin       CAD (coronary artery disease), native coronary artery  S/p CABG X 3 on 2/23  - post op care per CTS, chest tube and pacer wires to be removed per surgery. Balloon pump was removed 2/25  - Cont aspirin, Lipitor, ,metoprolol       VTE Risk Mitigation (From admission, onward)           Ordered     enoxaparin injection 60 mg  Every 24 hours         02/26/24 1519     Place DUTCH hose  Until discontinued         02/23/24 0536                    Discharge Planning   LEIA: 3/6/2024     Code Status: Full Code   Is the patient medically ready for discharge?:     Reason for patient still in hospital (select all that apply): Patient trending condition, Laboratory test, Treatment, Consult recommendations, and PT / OT recommendations  Discharge Plan A: Skilled Nursing Facility   Discharge Delays: None known at this time        Critical care time spent on the evaluation and treatment of severe organ dysfunction, review of pertinent labs and imaging studies, discussions with consulting providers and discussions with patient/family: 32 minutes.      Aravind Albarran MD  Department of Hospital Medicine   [facility]

## 2024-03-03 NOTE — ASSESSMENT & PLAN NOTE
Body mass index is 37.4 kg/m². Morbid obesity complicates all aspects of disease management from diagnostic modalities to treatment. Weight loss encouraged and health benefits explained to patient.

## 2024-03-03 NOTE — NURSING
Pt tolerate oob to chair appr 1 hour this AM.  Attempted BM with out success then back to bed per request as pt c/o feeling weaker.  2-3 person assist to get out of chair and to bed.  HR increased >100, remained afib. Winded with activity and coughing after.  Dr Santamaria rounding 0800.  Ok with clamping NGT and remain clamped 6-8 hours.  Check residual and if low and denies nausea, may remove NGT and start sips of clears.  Pt and wife instructed on plan and the possibility of replacing if pt does not tolerate not having NGT.  All in agreeance.

## 2024-03-03 NOTE — NURSING
Pt quickly became anxious and requesting bipap to be removed.  HR more frequently 120s still afib, PVC.  Sats upper 80s 40% bipap.  Attempts made to calm patient and remain with bipap unsuccessful.  Switched back to HF but sats not improving as pt still anxious.  Up to 10L. Sats 88%.  Switched to oxymask 10-11L.  Resp at bedside.  Pt explaining unable to tolerate masks and having to breathe against the pressure.    At this time, 1530, pt calming.  Tolerating oxymask and down to 7L sats 96% still with loose cough but not expectorating as was in the AM.

## 2024-03-03 NOTE — PLAN OF CARE
03/02/24 1930   Patient Assessment/Suction   Level of Consciousness (AVPU) alert   Respiratory Effort Unlabored   Expansion/Accessory Muscles/Retractions expansion symmetric   All Lung Fields Breath Sounds wheezes, expiratory   Rhythm/Pattern, Respiratory depth regular;pattern regular   Cough Frequency infrequent   Cough Type fair;loose;nonproductive   PRE-TX-O2   Device (Oxygen Therapy) high flow nasal cannula w/device   $ Is the patient on Low Flow Oxygen? Yes   Flow (L/min) 6   SpO2 96 %   Pulse Oximetry Type Continuous   $ Pulse Oximetry - Multiple Charge Pulse Oximetry - Multiple   Pulse 96   Resp (!) 22   Aerosol Therapy   $ Aerosol Therapy Charges Aerosol Treatment   Daily Review of Necessity (SVN) completed   Respiratory Treatment Status (SVN) given   Treatment Route (SVN) mouthpiece   Patient Position (SVN) HOB elevated   Post Treatment Assessment (SVN) breath sounds unchanged   Signs of Intolerance (SVN) none   Breath Sounds Post-Respiratory Treatment   Throughout All Fields Post-Treatment All Fields   Throughout All Fields Post-Treatment no change   Post-treatment Heart Rate (beats/min) 98   Post-treatment Resp Rate (breaths/min) 20   Intrapulmonary Percussive Ventilation/Metaneb   $ IPV Administration Therapy   Daily Review of Necessity (IPV) completed   Route (IPV) mouthpiece   IPV Operational Pressure (PSI) 25   IPV Duration Total (min) 15   Patient Position (IPV) HOB elevated   Post Treatment Assessment (IPV) breath sounds unchanged   Signs of Intolerance (IPV) none   Education   $ Education Bronchodilator;15 min

## 2024-03-03 NOTE — PLAN OF CARE
Pt remains in ICU.  Mentation improving each day.  See beginning of shift notes.  Strength decreased as shift progressed.  Attempted BiPAP this afternoon as sats dropped.  Pt only tolerated short time then became increasingly anxious and wanting mask removed.  See progress note.  Pt now tolerating oxymask at 7L.  Sats will immediately begin to decrease with removal of mask.  NGT d/c as pt denies abd pain or nausea.  Canas removed as instructed and external male purwick in use.  TPN to start tonight.  SIPS of clear liquid ok'd by gen surg.

## 2024-03-03 NOTE — PROGRESS NOTES
This patient is status post redo coronary artery bypass grafting.  He has had a prolonged somewhat difficult course but today he is up in a chair and appears to be in no distress.  He is conversive and quite lucid.    On exam vital signs are stable.  Surgical wounds are intact.  His chest tubes have been removed.    Laboratory work is noted.    The patient is slowly improving.  I told his nurse to clamp was then G-tube.  If he has no nausea this can be removed later today.  Activity will be increased as tolerated.  Supportive care is ongoing.  Hopefully he can begin a diet today.

## 2024-03-03 NOTE — SUBJECTIVE & OBJECTIVE
Interval History:  Patient seen today for follow-up care.  Patient is more alert talkative.  Patient was sitting up in the chair this morning.  Patient was says has urge for bowel movement, hopefully we will have 1 today.  Laboratories showed WBC 17, creatinine 0.9.   Patient recommended rehab placement.   to assist with discharge planning.  Patient will need PICC line inserted.  Started on TPN until oral intake improves.                Review of Systems   Constitutional: Negative.    HENT:  Positive for congestion.    Eyes: Negative.    Respiratory:  Positive for cough.    Cardiovascular: Negative.    Gastrointestinal:  Positive for abdominal distention.   Endocrine: Negative.    Genitourinary: Negative.    Musculoskeletal: Negative.    Allergic/Immunologic: Negative.    Neurological: Negative.    Hematological: Negative.    Psychiatric/Behavioral: Negative.       Objective:     Vital Signs (Most Recent):  Temp: 97.7 °F (36.5 °C) (03/03/24 1130)  Pulse: 96 (03/03/24 1100)  Resp: 20 (03/03/24 1100)  BP: 127/67 (03/03/24 1100)  SpO2: (!) 94 % (03/03/24 1100) Vital Signs (24h Range):  Temp:  [97.2 °F (36.2 °C)-98 °F (36.7 °C)] 97.7 °F (36.5 °C)  Pulse:  [] 96  Resp:  [16-28] 20  SpO2:  [90 %-96 %] 94 %  BP: (110-184)/() 127/67     Weight: 125.1 kg (275 lb 12.7 oz)  Body mass index is 37.4 kg/m².    Intake/Output Summary (Last 24 hours) at 3/3/2024 1315  Last data filed at 3/3/2024 1213  Gross per 24 hour   Intake 3589.71 ml   Output 4845 ml   Net -1255.29 ml           Physical Exam  Vitals reviewed.   Constitutional:       Appearance: Normal appearance. He is normal weight.   HENT:      Head: Normocephalic and atraumatic.      Right Ear: External ear normal.      Left Ear: External ear normal.      Nose: Nose normal.      Mouth/Throat:      Mouth: Mucous membranes are moist.      Pharynx: Oropharynx is clear.   Cardiovascular:      Rate and Rhythm: Tachycardia present. Rhythm irregular.       "Pulses: Normal pulses.      Heart sounds: Normal heart sounds.   Pulmonary:      Effort: Pulmonary effort is normal.      Breath sounds: Normal breath sounds.   Abdominal:      General: Abdomen is flat. Bowel sounds are normal. There is distension.      Palpations: Abdomen is soft.      Comments: Abdominal distention gradually improving   Musculoskeletal:         General: Normal range of motion.      Cervical back: Normal range of motion and neck supple.   Skin:     General: Skin is warm.      Capillary Refill: Capillary refill takes less than 2 seconds.   Neurological:      General: No focal deficit present.      Mental Status: He is alert and oriented to person, place, and time. Mental status is at baseline.      Cranial Nerves: No cranial nerve deficit.   Psychiatric:         Mood and Affect: Mood normal.         Thought Content: Thought content normal.         Judgment: Judgment normal.             Significant Labs: All pertinent labs within the past 24 hours have been reviewed.  Blood Culture: No results for input(s): "LABBLOO" in the last 48 hours.    BMP:   Recent Labs   Lab 03/03/24  0420   *      K 3.5      CO2 37*   BUN 24*   CREATININE 0.9   CALCIUM 8.4*   MG 1.9       CBC:   Recent Labs   Lab 03/02/24  0454 03/03/24  0420   WBC 17.03* 17.28*   HGB 8.5* 8.9*   HCT 29.6* 31.0*   * 521*       Urine Culture: No results for input(s): "LABURIN" in the last 48 hours.    Significant Imaging: I have reviewed all pertinent imaging results/findings within the past 24 hours.  "

## 2024-03-03 NOTE — ASSESSMENT & PLAN NOTE
Patient has Post-operative ileus which is adynamic in etiology which is stable. This is inherent to his procedure. Will treat conservatively with bowel rest, IV fluids, serial abdominal exams and avoidance of GI paralytics such as narcotics or anti-spasmodics. Monitor patient closely.       -general surgery following, input appreciated  -trend leukocytosis, WBC 25>17  -check lactic acid level  -check blood cultures, no growth.  -patient on IV Zosyn.

## 2024-03-03 NOTE — ASSESSMENT & PLAN NOTE
Improving.  Secondary to COPD exacerbation   BiPAP q.h.s.  - scheduled breathing Rx   -chest x-ray shows some improvement  -patient on IV Zosyn.  -trend leukocytosis, WBC 25>17  -check lactic acid level  -check blood cultures, no growth.  -MRSA screen negative.

## 2024-03-03 NOTE — PLAN OF CARE
03/03/24 0748   Patient Assessment/Suction   Level of Consciousness (AVPU) alert   Respiratory Effort Normal;Unlabored   Expansion/Accessory Muscles/Retractions no retractions;no use of accessory muscles   All Lung Fields Breath Sounds coarse   Rhythm/Pattern, Respiratory unlabored;depth regular;pattern regular   PRE-TX-O2   Device (Oxygen Therapy) high flow nasal cannula   $ Is the patient on Low Flow Oxygen? Yes   Flow (L/min) 6   SpO2 (!) 93 %   Pulse Oximetry Type Continuous   $ Pulse Oximetry - Multiple Charge Pulse Oximetry - Multiple   Pulse 103   Resp (!) 25   Aerosol Therapy   $ Aerosol Therapy Charges Aerosol Treatment   Daily Review of Necessity (SVN) completed   Respiratory Treatment Status (SVN) given   Treatment Route (SVN) mouthpiece   Patient Position (SVN) sitting in chair   Post Treatment Assessment (SVN) breath sounds unchanged   Signs of Intolerance (SVN) none   Breath Sounds Post-Respiratory Treatment   Post-treatment Heart Rate (beats/min) 101   Post-treatment Resp Rate (breaths/min) 17   Intrapulmonary Percussive Ventilation/Metaneb   $ IPV Administration Therapy  (PATIENT IS HAVING A HARD TIME WITH IPV.)   Daily Review of Necessity (IPV) completed   Route (IPV) mouthpiece   IPV Operational Pressure (PSI) 25   IPV Duration Total (min) 10   Patient Position (IPV) sitting in chair   Post Treatment Assessment (IPV) breath sounds unchanged   Signs of Intolerance (IPV) none   Preset CPAP/BiPAP Settings   Mode Of Delivery BiPAP S/T;Standby   $ CPAP/BiPAP Daily Charge BiPAP/CPAP Daily   Education   $ Education BiPAP;Bronchodilator;15 min

## 2024-03-03 NOTE — PROGRESS NOTES
PARENTERAL NUTRITION PROGRESS NOTE:      Parenteral Nutrition Day # 1  Diagnosis/Indication for PN: Ileus  IV Access: PICC  Diet/Tube Feeding: NPO        Admixture Type: 2 in 1   Infusion Rate and Frequency: 100 mL/hr   Patient Acuity:high    PN Composition:   120 grams Amino Acid, 90 grams Dextrose, and 50 grams Lipid.    Today's TPN provides 1286 kcal.  *Dextrose is started at less than full dextrose goal to reduce risk for Refeeding Syndrome. Dextrose content will be advanced as appropriate over the next few days.    Please see order for electrolytes and additives content and adjustments.   *The Nutrition Support Team will continue to monitor electrolytes daily and adjust parenteral nutrition as warranted.

## 2024-03-03 NOTE — ASSESSMENT & PLAN NOTE
Patient has hypernatremia which is uncontrolled. The hypernatremia is due to  possible IV fluids . We will aim to correct the sodium by 8-10mEq in 24 hours. We will correct their hypernatremia with Select IV fluids: D5W at a rate of 50 ml/hr. The patient's sodium results have been reviewed and are listed below.  Recent Labs   Lab 03/03/24  0420          -discontinue  D5W at 50 mL/ hour  -trend sodium level, sodium 151>145>142

## 2024-03-03 NOTE — PLAN OF CARE
Pt improved from this morning.  More awake and alert.  Calmer.  Able to wean then stop precedex per Dr Norris.  PICC line placed.  TPN to start tentatively tomorrow.  More oriented.  Able to participate with therapy this morning.  Sat on side of bed and stood x 2.  On and off BiPAP.  Pt reports passing gas.  Denies nausea.

## 2024-03-03 NOTE — PT/OT/SLP PROGRESS
Physical Therapy Treatment    Patient Name:  Jose F Hanley   MRN:  [id number]    Recommendations:     Discharge Recommendations: High Intensity Therapy  Discharge Equipment Recommendations: to be determined by next level of care  Barriers to discharge:  weakness, impaired self care skills, impaired functional mobility, decreased coordination, decreased LE/UE function, decreased ROM.     Assessment:     Jose F Hanley is a 78 y.o. male admitted with a medical diagnosis of S/P CABG (coronary artery bypass graft).  He presents with the following impairments/functional limitations: weakness, impaired endurance, impaired self care skills, impaired functional mobility, gait instability, impaired balance, impaired cognition, decreased coordination, decreased upper extremity function, decreased lower extremity function, decreased safety awareness, pain, decreased ROM, edema, impaired skin, impaired cardiopulmonary response to activity.    Pt found resting with HOB elevated, agreeable to skilled physical therapy session, wife present at bedside and encouraging.     Pt deferred transfer training today stating he was up in the chair this morning and had difficulty transferring out of chair back to bed. Secondary to difficulty with transfer this am, he requested to defer.     He performed supine therapeutic exercises including ankle pumps, heel slides, hip AB/ADD, glute sets, and SLR's x10 reps each. He required rest breaks between secondary to fatigue.     maxA x2 required to scoot to HOB for improved positioning, comfort, and improved lung expansion. Pt left with HOB elevated, call light within reach, all needs met, wife at bedside, and RN notified.     Rehab Prognosis: Fair; patient would benefit from acute skilled PT services to address these deficits and reach maximum level of function.    Recent Surgery: Procedure(s) (LRB):  CABG, REPEAT (N/A)  HARVEST-VEIN-ENDOVASCULAR (Right)  SURGICAL PROCUREMENT,ARTERY,RADIAL,FOR  CABG  INSERTION, INTRA-AORTIC BALLOON PUMP (Right) 9 Days Post-Op    Plan:     During this hospitalization, patient to be seen daily to address the identified rehab impairments via gait training, therapeutic activities, therapeutic exercises and progress toward the following goals:    Plan of Care Expires:  03/29/24    Subjective     Chief Complaint: fatigued and SOB  Patient/Family Comments/goals: to get better  Pain/Comfort:         Objective:     Communicated with RN prior to session.  Patient found HOB elevated with NG tube, oxygen, pulse ox (continuous), telemetry, peripheral IV, blood pressure cuff, WISAM drain, fitzgerald catheter upon PT entry to room.     General Precautions: Standard, aspiration  Orthopedic Precautions:    Braces:    Respiratory Status: Nasal cannula, flow 9 L/min     Functional Mobility:  Bed Mobility:     Scooting: maximal assistance and of 2 persons      AM-PAC 6 CLICK MOBILITY          Treatment & Education:  Pt educated on importance of time OOB, importance of intermittent mobility, safe techniques for transfers/ambulation, discharge recommendations/options, and use of call light for assistance and fall prevention.      Patient left HOB elevated with all lines intact, call button in reach, RN notified, and wife present..    GOALS:   Multidisciplinary Problems       Physical Therapy Goals          Problem: Physical Therapy    Goal Priority Disciplines Outcome Goal Variances Interventions   Physical Therapy Goal     PT, PT/OT Ongoing, Progressing     Description: 1. Supine to sit with Stand-by Assistance  2. Sit to stand transfer with Stand-by Assistance  3.. Bed to chair transfer with Supervision using Rolling Walker  4. Gait  x 100 feet with Minimal Assistance using Rolling Walker.                          Time Tracking:     PT Received On: 03/03/24  PT Start Time: 1139     PT Stop Time: 1203  PT Total Time (min): 24 min     Billable Minutes: Therapeutic Activity 8 and Therapeutic Exercise  16    Treatment Type: Treatment  PT/PTA: PTA     Number of PTA visits since last PT visit: 3     03/03/2024

## 2024-03-03 NOTE — ASSESSMENT & PLAN NOTE
Improving.  Was likely multifactorial due to recent alcohol withdrawal , possible side effect of morphine/benzodiazepine, ICU psychosis.  Ileus/early SBO changes on CT chest/abdomen/pelvis.    - patient off the Precedex..  - CIWA trigged ativan   -decrease morphine 2 mg IV q.6 hours PRN  -reorient Q shift and PRN  -patient on IV Zosyn  -blood cultures no growth.  -patient on BiPAP q.h.s., while sleeping.  -keep oxygen saturation greater than 92%  -patient counseled to quit drinking alcohol due to adverse health risk.

## 2024-03-03 NOTE — ASSESSMENT & PLAN NOTE
Expected post o  Hb stable at 8.3<8.8>8.5<8.9  -transfuse hemoglobin less than 7  - Cont to monitor

## 2024-03-03 NOTE — NURSING
Pt tolerated chair position in bed appr 2 hours.  C/o pain.  Medicated per MAR and made comfortable.  Sats 88-91 on HF.  Coarse breath sounds and productive cough.  BiPAP placed and scheduled resp treatment admin.  Sats 93% on 40%.  Appears to be resting comfortably at this time.  Wife and friends at bedside.  Appears to be tolerating NGT clamped at this time but has not been taking in much ice/water as previous.

## 2024-03-03 NOTE — PROGRESS NOTES
Pulmonary/Critical Care Progress Note      PATIENT NAME: Jose F Hanley  MRN: [id number]  TODAY'S DATE: 2024  1:01 PM  ADMIT DATE: 2024  AGE: 78 y.o. : 1945    CONSULT REQUESTED BY: Aravind Albarran MD    REASON FOR CONSULT:   Respiratory failure    HPI:  The patient is a 78-year-old gentleman who underwent coronary artery bypass grafting.  He is recovering from DTs, he is extremely morbidly obese, he is in AFib with RVR, and he is not clearing his secretions.  He is off of Precedex but only by a few hours and is lethargic.     the patient remains quite lethargic.    3/1 the patient is significantly more confused today than yesterday.  He is oriented to self.  He is convinced we are trying to kill him.  He states he is going to die.  This makes me anxious.    3/2/24: Afebrile, WBC count trending down on Zosyn. CT CAP identified no particular source of infection, but did suggest ileus or SBO. Encephalopathy is resolved. Mediastinal and pleural drains removed.    3/3/24: NGT clamped and may be removed later today. No BM. Passing flatus.    REVIEW OF SYSTEMS  Endorses SOB and chest pain.    No change in the patient's Past Medical History, Past Surgical History, Social History or Family History since admission.     VITAL SIGNS (MOST RECENT)  Temp: 97.2 °F (36.2 °C) (24 0748)  Pulse: (!) 111 (24 0900)  Resp: (!) 28 (24 09)  BP: 134/78 (24 09)  SpO2: (!) 93 % (24 09)    INTAKE AND OUTPUT (LAST 24 HOURS):  Intake/Output Summary (Last 24 hours) at 3/3/2024 1124  Last data filed at 3/3/2024 0901  Gross per 24 hour   Intake 3549.71 ml   Output 4795 ml   Net -1245.29 ml       WEIGHT  Wt Readings from Last 1 Encounters:   24 125.1 kg (275 lb 12.7 oz)       PHYSICAL EXAM  GENERAL: Older patient; alert and oriented.  HEENT: Pupils equal and reactive. Extraocular movements intact. Nose with NG tube in place; also nasal cannula in place.  Pharynx dry  NECK: Supple.    HEART:  Tachycardic rate and rhythm.  AFib with RVR No murmur or gallop auscultated.  LUNGS:  Coarse.  Thoracostomy and mediastinal tubes are removed.  ABDOMEN:  Extremely obese.  Distended.  Bowel sounds present. Non-tender, no masses palpated.  : Normal anatomy.  Canas with some urine.    EXTREMITIES: Normal muscle tone and joint movement, no cyanosis or clubbing.   LYMPHATICS: No adenopathy palpated, no edema.  SKIN: Dry, intact, no lesions.  Incisions dressed and dry  NEURO: No gross motor or cognitive deficit.      CBC LAST (LAST 24 HOURS)  Recent Labs   Lab 03/03/24  0420   WBC 17.28*   RBC 3.89*   HGB 8.9*   HCT 31.0*   MCV 80*   MCH 22.9*   MCHC 28.7*   RDW 22.2*   *   MPV 10.8   GRAN 75.7*  13.1*   LYMPH 8.6*  1.5   MONO 8.9  1.5*   BASO 0.09   NRBC 7*       CHEMISTRY LAST (LAST 24 HOURS)  Recent Labs   Lab 03/03/24  0420      K 3.5      CO2 37*   ANIONGAP 4*   BUN 24*   CREATININE 0.9   *   CALCIUM 8.4*   MG 1.9         LAST 7 DAYS MICROBIOLOGY   Microbiology Results (last 7 days)       Procedure Component Value Units Date/Time    Blood culture [[id number]] Collected: 03/01/24 1001    Order Status: Completed Specimen: Blood Updated: 03/02/24 1232     Blood Culture, Routine No Growth to date      No Growth to date    MRSA Screen by PCR [[id number]] Collected: 03/01/24 1112    Order Status: Completed Updated: 03/01/24 1253     MRSA SCREEN BY PCR Negative    MRSA Screen by PCR [[id number]] Collected: 03/01/24 0940    Order Status: Canceled Specimen: Nasopharyngeal Swab from Nasal             MOST RECENT IMAGING  X-Ray Chest AP Portable  HISTORY: picc placement    FINDINGS: Portable chest radiograph at 1456 hours and 1458 compared to 0615 hours shows interval insertion of a left PICC. The first image shows the distal tip of the PICC overlying the left subclavian vein, with the second image showing the distal tip of the PICC overlying the SVC, projecting inferolaterally. There  is no other significant interval change.    IMPRESSION: Interval insertion of a left PICC as described.    Electronically signed by:  Jeison Lowe MD  03/02/2024 03:24 PM CST Workstation: [id number]  X-Ray Chest AP Portable  HISTORY: Atelectasis    FINDINGS: Portable chest radiograph at 0615 hours compared to 03/01/2024 shows interval removal of the mediastinal and thoracostomy drains, with nasogastric tube remaining. There are median sternotomy wires and changes of transarterial aortic valvuloplasty, with stable enlarged cardiac silhouette and normal pulmonary vascularity.    The lungs are symmetrically expanded, with no new pleural or parenchymal abnormality. Bilateral lower lung opacities suggesting subsegmental atelectasis are unchanged.    IMPRESSION: Interval removal of the mediastinal and thoracostomy drains, with no other significant change.    Electronically signed by:  Jeison Lowe MD  03/02/2024 09:35 AM CST Workstation: [id number]      CURRENT VISIT EKG  Results for orders placed or performed during the hospital encounter of 02/23/24   EKG 12-LEAD   Result Value Ref Range    QRS Duration 232 ms    [facility] QTC Calculation 613 ms    Narrative    Test Reason : Z95.1,Z95.1,    Vent. Rate : 080 BPM     Atrial Rate : 087 BPM     P-R Int : 000 ms          QRS Dur : 232 ms      QT Int : 532 ms       P-R-T Axes : 000 168 250 degrees     QTc Int : 613 ms    Ventricular-paced rhythm  occasional atrila pacing  Abnormal ECG  When compared with ECG of 21-FEB-2024 09:33,  Electronic ventricular pacemaker has replaced Atrial fibrillation  Confirmed by Eleazar WOO, Gregg NEWTON ([id number]) on 2/24/2024 7:12:58 AM    Referred By: OLLIE RIBEIRO           Confirmed By:Gregg Bush MD       ECHOCARDIOGRAM RESULTS  Results for orders placed during the hospital encounter of 12/13/23    Echo    Interpretation Summary    Left Ventricle: The left ventricle is normal in size. Mildly increased wall thickness. There is mild concentric  hypertrophy. Normal wall motion. There is normal systolic function with a visually estimated ejection fraction of 65 - 70%. There is normal diastolic function.    Right Ventricle: Mild right ventricular enlargement. Wall thickness is normal. Right ventricle wall motion  is normal. Systolic function is normal.    Left Atrium: Left atrium is moderately dilated.    Right Atrium: Right atrium is mildly dilated.    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    Tricuspid Valve: There is mild regurgitation with a centrally directed jet.    Pulmonic Valve: There is mild regurgitation with a centrally directed jet.    Pulmonary Artery: There is mild pulmonary hypertension. The estimated pulmonary artery systolic pressure is 40 mmHg.    IVC/SVC: Intermediate venous pressure at 8 mmHg.        Oxygen INFORMATION  Oxygen Concentration (%):  [40] 40    3 L       LAST ARTERIAL BLOOD GAS  ABG  Recent Labs   Lab 03/02/24  0832   PH 7.409   PO2 71*   PCO2 56.5*   HCO3 35.7*   BE 11*       IMPRESSION AND PLAN  Status post coronary artery bypass grafting  Alcoholic going through DTs, resolved  Encephalopathy, resolved  ammonia normal, ABG with compensated mild respiratory acidosis, TSH normal  - lorazepam d/c'ed  Acute hypercapnic hypoxemic respiratory failure  - 95% saturation on 3 L  Bibasilar atelectasis  - continue IPV  - encourage patient to cough and clear  - Mucinex 1200 mg b.i.d.  Obstructive sleep apnea  Obesity hypoventilation syndrome  - noncompliant with CPAP  - sleeps on 3 L at home  - BiPAP whenever he is sleeping.   - continue elevation of head of bed  Extreme morbid obesity  - patient does not oxygenate well when flat  Atrial fib with RVR  - CV surgery and Cardiology following  Ileus  - NG tube clamped  Sepsis with likely intra-abdominal source  - Zosyn   Anemia  - postop, expected  Hyperglycemia  - mild  - trend  Hypernatremia, resolved  - D5W d/c'ed    Upon my evaluation, this  patient had a high probability of imminent or life-threatening deterioration, which required my direct attention, intervention, and personal management.    I have personally provided at least 35 minutes of critical care time exclusive of time spent on separately billable procedures. Over 50% of the time of this encounter was spent in direct care at the bedside. Time includes review of laboratory data, radiology results, discussion with consultants, and monitoring for potential decompensation. Interventions were performed as documented above.    Varinder Norris MD  Date of Service: 03/03/2024  1:01 PM

## 2024-03-03 NOTE — PROGRESS NOTES
Patient seen and examined.  Resting comfortably in chair at the time of my visit.  Patient has had flatus yesterday and throughout the night.  NG tube has been clamped for an hour to with no nausea or vomiting.    Vitals were reviewed     Abdomen was soft obese no tenderness.  Does have bowel sounds.    Discussed with nursing.  We will keep NG tube clamped for a few more hours.  If no nausea or vomiting in low residual we will remove NG tube today.  If NG tube was removed okay to start sips of clears.  We will continue to monitor.

## 2024-03-03 NOTE — PT/OT/SLP PROGRESS
"Speech Language Pathology Treatment    Patient Name:  Jose F Hanley   MRN:  [id number]  Admitting Diagnosis: S/P CABG (coronary artery bypass graft)    Recommendations:                 General Recommendations:  Dysphagia therapy  Diet recommendations:  NPO, Liquid Diet Level: NPO (except ice chips)   Aspiration Precautions: Assistance with meals, HOB to 90 degrees, Meds whole 1 at a time, and Wear oxygen during intake   General Precautions: Standard, aspiration  Communication strategies:  none    Assessment:     Jose F Hanley is a 78 y.o. male with an admitting diagnosis of  S/P CABG (coronary artery bypass graft) .  He presents with improved level of alertness and participation in ST. He was able to participate in PO trials of liquids, which he tolerated well. Solid PO trials were not trailed 2/2 MD hold for possible ileus. Rec pt initiate liquid diet as soon as he is cleared by MD for a tray. SLP will follow for evaluation of solid boluses when pt is cleared by MD for solid trials.    Subjective     Pt awake laying in bed w/ spouse at bedside. Pt was cleared by RN for PO trials of liquids only prior to entering room.  Patient goals: "I'd love a good cold root beer"     Pain/Comfort:       Respiratory Status: Nasal cannula, flow 9 L/min  NG tube in place    Objective:     Has the patient been evaluated by SLP for swallowing?   Yes  Keep patient NPO? No   Current Respiratory Status:        Pt observed during trails of the following: ice chip, tsp sips water, and straw sips water. 1 occurrence of wet vocal quality across trials, which pt cleared w/ 2nd swallow. No other overt s/s aspiration or pharyngeal dysphagia noted across trials. O2 saturation remained the same across trials. Findings shared w/ pt and nursing.    Goals:   Multidisciplinary Problems       SLP Goals          Problem: SLP    Goal Priority Disciplines Outcome   SLP Goal     SLP Ongoing, Progressing   Description: 1) Pt will consume Regular (IDDSI 7) " textures and thin liquids (IDDSI 0) with functional oral phase and no overt s/s penetration/aspiration.                         Plan:     Patient to be seen:  5 x/week   Plan of Care expires:  03/10/24  Plan of Care reviewed with:  patient, spouse, other (see comments) (nursing)   SLP Follow-Up:  Yes       Discharge recommendations:  High Intensity Therapy   Barriers to Discharge:   nutritional status    Time Tracking:     SLP Treatment Date:   03/03/24  Speech Start Time:  1051  Speech Stop Time:  1100     Speech Total Time (min):  9 min    Billable Minutes: Treatment Swallowing Dysfunction 9 min    03/03/2024

## 2024-03-03 NOTE — PROGRESS NOTES
"[facility]  Department of Cardiology  Consult Note      PATIENT NAME: Jose F Hanley  MRN: [id number]  TODAY'S DATE: 03/03/2024  ADMIT DATE: 2/23/2024                          CONSULT REQUESTED BY: Aravind Albarran MD    SUBJECTIVE     PRINCIPAL PROBLEM: S/P CABG (coronary artery bypass graft)      REASON FOR CONSULT:  Post CABG     Interval history:  03/03/2024:   Patient was up in the chair this morning tolerated fairly well still with fair amount of NG drainage denies having any new pains    03/02/2024:  Patient is alert awake and responsive.  Some wheezing is persisting chest wall pain is controlled.  NG tube in place for earliest      3/1/2024      Patient is not completely oriented. He is lethargic.  He states "yes" when asked if he is breathing better.  According to I and O still is positive.  Creatinine stable.  Remains in AFIB rates are low 100s      2/29/24    Post op Ileus with NGT  RVR  Still positive on I and O  CXR Not any better      2/28/2024    Off Bipap  Off Precedex  Pacer wires out  Abdomen is distended    2/27/2024    Resting on Face mask. Still with mild sedation family at bedside. VSS.      2/26/24  Pt asleep in bed, remains on BiPAP. Per RN, he is still confused while awake. Currently under light sedation - precedex gtt. Remains paced with temporary pacer - 80s.     2/25/24  Pt was seen resting in bed with Bipap mask in place. Balloon pump was removed this morning per bedside RN.     2/24/24  Pt was seen extubated on BiPAP this morning. Balloon pump. Pressures soft this morning. Per RN, pt was increasingly agitated this morning.       HPI:  Pt is a 78 year old with history of known CAD with previous bypass surgery back in 08/31/23 and then underwent a repeat CABG today, 2/23/23. He Just recently had a [facility] as well with Dr. Bush back on 2/7/24.       Per surgery team:  Operation: Redo coronary artery bypass grafting x3 using left radial artery to left anterior descending coronary " artery and separate segments of saphenous vein from the aorta to the diagonal coronary artery and from the aorta to the obtuse marginal coronary artery using a combination of antegrade and retrograde cardioplegia, mild systemic hypothermia, endoscopic vein harvest from the right leg, right radial artery harvest, and insertion of right femoral arterial intra-aortic balloon pump with fluoroscopic guidance and complete pericardiectomy due to severe adhesive pericarditis         Review of patient's allergies indicates:   Allergen Reactions    Adhes. band-tape-benzalkonium Rash     Pt stated it caused blisters    Statins-hmg-coa reductase inhibitors Other (See Comments)     Statin Intolerance (joint pain)       Past Medical History:   Diagnosis Date    A-fib     Allergies 2020    gets allergy shots    Arthritis 1973    right knee    Back pain     COPD (chronic obstructive pulmonary disease)     Diastolic heart failure     GERD (gastroesophageal reflux disease) 2015    HLD (hyperlipidemia)     HTN (hypertension)     Hx of LASIK     Hypothyroidism, unspecified 2012    Inflammatory disease of prostate, unspecified     out of medicine    Mild intermittent asthma, uncomplicated     Myocardial infarction     Neuropathy     On home oxygen therapy     3L NC  at night    PAD (peripheral artery disease)     Staph skin infection 2016    right thigh    TIA (transient ischemic attack) 2013     Past Surgical History:   Procedure Laterality Date    AORTOGRAPHY WITH SERIALOGRAPHY N/A 08/31/2023    Procedure: AORTOGRAM, WITH SERIALOGRAPHY;  Surgeon: Gary Thomas MD;  Location: [facility] CATH/EP LAB;  Service: Peripheral Vascular;  Laterality: N/A;    CATARACT EXTRACTION Bilateral 2014    CORONARY ANGIOGRAPHY INCLUDING BYPASS GRAFTS WITH CATHETERIZATION OF LEFT HEART Left 01/12/2024    Procedure: ANGIOGRAM, CORONARY, INCLUDING BYPASS GRAFT, WITH LEFT HEART CATHETERIZATION;  Surgeon: Gregg Bush MD;  Location: [facility] CATH/EP LAB;   Service: Cardiology;  Laterality: Left;    CORONARY ARTERY BYPASS GRAFT  2010    ENDOSCOPIC HARVEST OF VEIN Right 2/23/2024    Procedure: HARVEST-VEIN-ENDOVASCULAR;  Surgeon: Gary Thomas MD;  Location: [facility];  Service: Cardiothoracic;  Laterality: Right;    INSERTION OF IMPLANTABLE LOOP RECORDER  2011    INSERTION OF INTRA-AORTIC BALLOON ASSIST DEVICE Right 2/23/2024    Procedure: INSERTION, INTRA-AORTIC BALLOON PUMP;  Surgeon: Gary Thomas MD;  Location: [facility];  Service: Cardiothoracic;  Laterality: Right;    INSERTION OF PACEMAKER  2021    does not have card with him for preadmit    INSTANTANEOUS WAVE-FREE RATIO (IFR) N/A 01/12/2024    Procedure: Instantaneous Wave-Free Ratio (IFR);  Surgeon: Gregg Bush MD;  Location: [facility] CATH/EP LAB;  Service: Cardiology;  Laterality: N/A;    PTA, ANTERIOR TIBIAL Left 08/31/2023    Procedure: PTA, Anterior Tibial;  Surgeon: Gary Thomas MD;  Location: [facility] CATH/EP LAB;  Service: Peripheral Vascular;  Laterality: Left;    PTA, SUPERFICIAL FEMORAL ARTERY Left 08/31/2023    Procedure: PTA, Superficial Femoral Artery;  Surgeon: Gary Thomas MD;  Location: [facility] CATH/EP LAB;  Service: Peripheral Vascular;  Laterality: Left;    REPEAT CORONARY ARTERY BYPASS GRAFTING N/A 2/23/2024    Procedure: CABG, REPEAT;  Surgeon: Gary Thomas MD;  Location: [facility];  Service: Cardiothoracic;  Laterality: N/A;    SPLENECTOMY  2016    STENT, ANTERIOR TIBIAL Left 08/31/2023    Procedure: Stent, Anterior Tibial;  Surgeon: Gary Thomas MD;  Location: [facility] CATH/EP LAB;  Service: Peripheral Vascular;  Laterality: Left;    SURGICAL PROCUREMENT, ARTERY, RADIAL, FOR CABG  2/23/2024    Procedure: SURGICAL PROCUREMENT,ARTERY,RADIAL,FOR CABG;  Surgeon: Gary Thomas MD;  Location: [facility];  Service: Cardiothoracic;;    watchman heart device  2019     Social History     Tobacco Use    Smoking status: Former     Current packs/day: 0.00      Types: Cigarettes     Quit date: 1987     Years since quittin.2    Smokeless tobacco: Never   Substance Use Topics    Alcohol use: Not Currently     Alcohol/week: 21.0 standard drinks of alcohol     Types: 21 Drinks containing 0.5 oz of alcohol per week     Comment: ed roberto mixed drinks    Drug use: No        REVIEW OF SYSTEMS    As mentioned in HPI    OBJECTIVE     VITAL SIGNS (Most Recent)  Temp: 97.2 °F (36.2 °C) (24 0748)  Pulse: (!) 111 (24 09)  Resp: (!) 28 (24)  BP: 134/78 (24)  SpO2: (!) 93 % (24)    VENTILATION STATUS  Resp: (!) 28 (24)  SpO2: (!) 93 % (24)  Oxygen Concentration (%):  [40] 40        I & O (Last 24H):  Intake/Output Summary (Last 24 hours) at 3/3/2024 1126  Last data filed at 3/3/2024 0901  Gross per 24 hour   Intake 3549.71 ml   Output 4795 ml   Net -1245.29 ml       WEIGHTS  Wt Readings from Last 3 Encounters:   24 0426 125.1 kg (275 lb 12.7 oz)   24 1540 125 kg (275 lb 9.2 oz)   24 0948 124.5 kg (274 lb 6.4 oz)   24 0718 120.7 kg (266 lb)       PHYSICAL EXAM    CONSTITUTIONAL: NAD,l more alert and responsive answering questions appropriately   HEENT:  Generalized noted   NECK: no JVD  LUNGS: coarse crackles diminished bases with bilateral expiratory wheeze still persisting.  Crackles appear were worse today  HEART irregular rhythm no distinct S3   ABDOMEN: distended better no guarding or rebound, nontender  EXTREMITIES:  edema  SKIN: No rash surgical dressings clean and dry      HOME MEDICATIONS:  No current facility-administered medications on file prior to encounter.     Current Outpatient Medications on File Prior to Encounter   Medication Sig Dispense Refill    acetaminophen (TYLENOL) 325 MG tablet Take 650 mg by mouth every 6 (six) hours as needed.      albuterol (PROVENTIL/VENTOLIN HFA) 90 mcg/actuation inhaler Inhale 2 puffs into the lungs every 6 (six) hours as needed.       aspirin 81 MG Chew Take 81 mg by mouth once daily.      betamethasone valerate 0.1% (VALISONE) 0.1 % Lotn Apply to scalp bid prn rash      cyanocobalamin 500 MCG tablet 500 mcg.      cyclobenzaprine (FLEXERIL) 10 MG tablet Take 10 mg by mouth every 8 (eight) hours as needed.      diphenhydrAMINE (BENADRYL) 50 MG capsule 50 mg every 6 (six) hours as needed.      doxepin (SINEQUAN) 50 MG capsule Take 50 mg by mouth every evening.      DULoxetine (CYMBALTA) 60 MG capsule 60 mg nightly.      EPINEPHrine (EPIPEN) 0.3 mg/0.3 mL AtIn INJECT 0.3MG/0.3ML SUBCUTANEOUSLY (UNDER THE SKIN)  AS NEEDED FOR ALLERGIC REACTIONS SELF-INJECTOR PEN      furosemide (LASIX) 20 MG tablet Take 20 mg by mouth once daily.      gabapentin (NEURONTIN) 400 MG capsule 800 mg 2 (two) times daily.      hydrocodone-homatropine 5-1.5 mg/5 ml (HYCODAN) 5-1.5 mg/5 mL Syrp Take by mouth every 6 (six) hours as needed. cough      levothyroxine (SYNTHROID) 50 MCG tablet 50 mcg.      metOLazone (ZAROXOLYN) 5 MG tablet 5 mg daily as needed. For weight gain >2lbs      montelukast (SINGULAIR) 10 mg tablet 10 mg once daily.      nitroGLYCERIN (NITROSTAT) 0.4 MG SL tablet Place 0.4 mg under the tongue every 5 (five) minutes as needed.      omega-3 fatty acids/fish oil (FISH OIL-OMEGA-3 FATTY ACIDS) 300-1,000 mg capsule Take 1 capsule by mouth 2 (two) times daily.      omeprazole (PRILOSEC) 20 MG capsule 40 mg once daily.      potassium chloride SA (K-DUR,KLOR-CON) 20 MEQ tablet 20 mEq nightly.      ranolazine (RANEXA) 1,000 mg Tb12 Take 1 tablet (1,000 mg total) by mouth 2 (two) times daily. 180 tablet 3    senna-docusate 8.6-50 mg (PERICOLACE) 8.6-50 mg per tablet nightly.      spironolactone (ALDACTONE) 50 MG tablet Take 50 mg by mouth once daily.      tamsulosin (FLOMAX) 0.4 mg Cap 0.4 mg.      TESTOSTERONE PROPIONATE, BULK, MISC Inject 1 each as directed every 14 (fourteen) days.      TOPROL XL 25 mg 24 hr tablet Take 25 mg by mouth 2 (two) times daily.       torsemide (DEMADEX) 20 MG Tab Take 1 tablet (20 mg total) by mouth once daily. (Patient not taking: Reported on 12/21/2023) 30 tablet 11       SCHEDULED MEDS:   aspirin  81 mg Oral Daily    DULoxetine  60 mg Oral Nightly    enoxparin  60 mg Subcutaneous Q24H (prophylaxis, 1700)    ezetimibe  10 mg Oral QHS    furosemide (LASIX) injection  40 mg Intravenous Q8H    guaiFENesin  1,200 mg Oral BID    ipratropium  0.5 mg Nebulization Q6H    levalbuterol  1.25 mg Nebulization Q6H    levothyroxine  25 mcg Intravenous Daily    losartan  25 mg Oral Daily    metoprolol tartrate  50 mg Oral TID    montelukast  10 mg Oral Daily    pantoprazole  40 mg Oral Before breakfast    piperacillin-tazobactam (Zosyn) IV (PEDS and ADULTS) (extended infusion is not appropriate)  3.375 g Intravenous Q8H    sodium chloride 0.9%  10 mL Intravenous Q6H    spironolactone  25 mg Oral Daily    tamsulosin  0.4 mg Oral Daily       CONTINUOUS INFUSIONS:   amino acid 4.25% - dextrose 5% (CLINIMIX-E) solution 100 mL/hr at 03/02/24 1607       PRN MEDS:0.9%  NaCl infusion (for blood administration), albuterol sulfate, calcium gluconate IVPB, calcium gluconate IVPB, calcium gluconate IVPB, dextrose 50% in water (D50W), dextrose 50% in water (D50W), hydrALAZINE, HYDROcodone-acetaminophen, HYDROmorphone, lorazepam, magnesium sulfate IVPB, magnesium sulfate IVPB, metoprolol, ondansetron, potassium bicarbonate, potassium bicarbonate, potassium bicarbonate, potassium chloride in water **AND** potassium chloride in water **AND** potassium chloride in water, Flushing PICC/Midline Protocol **AND** sodium chloride 0.9% **AND** sodium chloride 0.9%, sodium phosphate 15 mmol in dextrose 5 % (D5W) 250 mL IVPB, sodium phosphate 20.01 mmol in dextrose 5 % (D5W) 250 mL IVPB, sodium phosphate 30 mmol in dextrose 5 % (D5W) 250 mL IVPB    LABS AND DIAGNOSTICS     CBC LAST 3 DAYS  Recent Labs   Lab 03/01/24  0517 03/01/24  0922 03/02/24  0454 03/03/24  0420   WBC 25.23*  --  " 17.03* 17.28*   RBC 4.00*  --  3.71* 3.89*   HGB 9.3*  --  8.5* 8.9*   HCT 32.5* 31* 29.6* 31.0*   MCV 81*  --  80* 80*   MCH 23.3*  --  22.9* 22.9*   MCHC 28.6*  --  28.7* 28.7*   RDW 22.8*  --  22.3* 22.2*     --  452* 521*   MPV 10.8  --  10.6 10.8   GRAN 79.3*  20.0*  --  79.0*  13.5* 75.7*  13.1*   LYMPH 6.9*  1.8  --  5.5*  0.9* 8.6*  1.5   MONO 10.3  2.6*  --  8.3  1.4* 8.9  1.5*   BASO 0.14  --  0.08 0.09   NRBC 7*  --  9* 7*       COAGULATION LAST 3 DAYS  No results for input(s): "LABPT", "INR", "APTT" in the last 168 hours.    CHEMISTRY LAST 3 DAYS  Recent Labs   Lab 02/26/24  0521 02/27/24  0339 02/29/24  0908 03/01/24  0517 03/01/24  0922 03/02/24  0454 03/02/24  0832 03/02/24  1658 03/03/24  0420      < >  --  151*  --  145  --   --  142   K 4.7   < >  --  3.7  --  3.6  --  3.5 3.5      < >  --  106  --  104  --   --  101   CO2 25   < >  --  36*  --  35*  --   --  37*   ANIONGAP 5*   < >  --  9  --  6*  --   --  4*   BUN 22   < >  --  28*  --  30*  --   --  24*   CREATININE 0.8   < >  --  1.1  --  1.0  --   --  0.9   *   < >  --  128*  --  174*  --   --  143*   CALCIUM 7.8*   < >  --  8.8  --  8.1*  --   --  8.4*   PH  --    < > 7.425  --  7.428  --  7.409  --   --    MG 2.1   < >  --  2.2  --  2.1  --   --  1.9   ALBUMIN 3.3*  --   --   --   --   --   --   --   --    PROT 5.4*  --   --   --   --   --   --   --   --    ALKPHOS 24*  --   --   --   --   --   --   --   --    ALT 12  --   --   --   --   --   --   --   --    AST 32  --   --   --   --   --   --   --   --    BILITOT 1.1*  --   --   --   --   --   --   --   --     < > = values in this interval not displayed.       CARDIAC PROFILE LAST 3 DAYS  No results for input(s): "BNP", "CPK", "CPKMB", "LDH", "TROPONINI", "TROPONINIHS" in the last 168 hours.    ENDOCRINE LAST 3 DAYS  Recent Labs   Lab 03/01/24  1001   TSH 2.171       LAST ARTERIAL BLOOD GAS  ABG  Recent Labs   Lab 03/02/24  0832   PH 7.409   PO2 71* "   PCO2 56.5*   HCO3 35.7*   BE 11*       LAST 7 DAYS MICROBIOLOGY   Microbiology Results (last 7 days)       Procedure Component Value Units Date/Time    Blood culture [[id number]] Collected: 03/01/24 1001    Order Status: Completed Specimen: Blood Updated: 03/02/24 1232     Blood Culture, Routine No Growth to date      No Growth to date    MRSA Screen by PCR [[id number]] Collected: 03/01/24 1112    Order Status: Completed Updated: 03/01/24 1253     MRSA SCREEN BY PCR Negative    MRSA Screen by PCR [[id number]] Collected: 03/01/24 0940    Order Status: Canceled Specimen: Nasopharyngeal Swab from Nasal             MOST RECENT IMAGING  X-Ray Chest AP Portable  HISTORY: picc placement    FINDINGS: Portable chest radiograph at 1456 hours and 1458 compared to 0615 hours shows interval insertion of a left PICC. The first image shows the distal tip of the PICC overlying the left subclavian vein, with the second image showing the distal tip of the PICC overlying the SVC, projecting inferolaterally. There is no other significant interval change.    IMPRESSION: Interval insertion of a left PICC as described.    Electronically signed by:  Jeison Lowe MD  03/02/2024 03:24 PM CST Workstation: [id number]  X-Ray Chest AP Portable  HISTORY: Atelectasis    FINDINGS: Portable chest radiograph at 0615 hours compared to 03/01/2024 shows interval removal of the mediastinal and thoracostomy drains, with nasogastric tube remaining. There are median sternotomy wires and changes of transarterial aortic valvuloplasty, with stable enlarged cardiac silhouette and normal pulmonary vascularity.    The lungs are symmetrically expanded, with no new pleural or parenchymal abnormality. Bilateral lower lung opacities suggesting subsegmental atelectasis are unchanged.    IMPRESSION: Interval removal of the mediastinal and thoracostomy drains, with no other significant change.    Electronically signed by:  Jeison Lowe MD  03/02/2024 09:35 AM CST  Workstation: [id number]      ECHOCARDIOGRAM RESULTS (last 5)  Results for orders placed during the hospital encounter of 12/13/23    Echo    Interpretation Summary    Left Ventricle: The left ventricle is normal in size. Mildly increased wall thickness. There is mild concentric hypertrophy. Normal wall motion. There is normal systolic function with a visually estimated ejection fraction of 65 - 70%. There is normal diastolic function.    Right Ventricle: Mild right ventricular enlargement. Wall thickness is normal. Right ventricle wall motion  is normal. Systolic function is normal.    Left Atrium: Left atrium is moderately dilated.    Right Atrium: Right atrium is mildly dilated.    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    Tricuspid Valve: There is mild regurgitation with a centrally directed jet.    Pulmonic Valve: There is mild regurgitation with a centrally directed jet.    Pulmonary Artery: There is mild pulmonary hypertension. The estimated pulmonary artery systolic pressure is 40 mmHg.    IVC/SVC: Intermediate venous pressure at 8 mmHg.      CURRENT/PREVIOUS VISIT EKG  Results for orders placed or performed during the hospital encounter of 02/23/24   EKG 12-LEAD    Collection Time: 02/23/24  1:53 PM   Result Value Ref Range    QRS Duration 232 ms    OHS QTC Calculation 613 ms    Narrative    Test Reason : Z95.1,Z95.1,    Vent. Rate : 080 BPM     Atrial Rate : 087 BPM     P-R Int : 000 ms          QRS Dur : 232 ms      QT Int : 532 ms       P-R-T Axes : 000 168 250 degrees     QTc Int : 613 ms    Ventricular-paced rhythm  occasional atrila pacing  Abnormal ECG  When compared with ECG of 21-FEB-2024 09:33,  Electronic ventricular pacemaker has replaced Atrial fibrillation  Confirmed by Eleazar WOO, Gregg NEWTON ([id number]) on 2/24/2024 7:12:58 AM    Referred By: OLLIE RIBEIRO           Confirmed By:Gregg Bush MD           ASSESSMENT/PLAN:     Active Hospital Problems     Diagnosis    *S/P CABG (coronary artery bypass graft) - x2 09/2010 - LIMA to LAD; SVG to OMB; grafts occluded 08/2012    Hypernatremia     -      Ileus    Abdominal distention     -patient has increasing abdominal distention and discomfort  -check KUB  -NG tube to LIS  -possible postoperative ileus  -increase mobility as tolerated      Acute encephalopathy    Acute blood loss anemia    Acute respiratory failure with hypoxia and hypercapnia    Severe obesity (BMI 35.0-39.9) with comorbidity    Permanent atrial fibrillation    CAD (coronary artery disease), native coronary artery    Dyslipidemia       ASSESSMENT & PLAN:     MVCAD S/P redo CABG yesterday 2/23/24,  left radial artery to LAD, SVG to Dx, SVG to OM  HFpEF  HTN/ dyslipidemia   Statin intolerance  AFIB H/O Watchman with RVR  H/O TAVR  Leadless PPM in situ  Distended Abdomen Post op Ileus now with NGT  9. Morbid obesity      RECOMMENDATIONS:  03/03/2024:  Patient appears to have been persistent chronic atrial fibrillation at least since November 23 and before that has lead less placed pacemaker in Situ  Continue on present therapy to include Lasix IV, losartan 25 mg daily, metoprolol tartrate 50 mg 3 times a day and spironolactone 25 mg daily monitor hemoglobin and electrolytes carefully  Continue on aggressive pulmonary treatments  Discussed with the nursing staff patient needs to be in a big boy chair      03/02/2024:  Echo she was no significant pericardial effusion.  Recommend to continue on 2 metoprolol tartrate 50 mg 3 times a day, maintain on losartan 25 mg daily  Continue on Aldactone 25 mg a day  Continue nebulizer treatment with Xopenex patient has history of Watchman in place therefore no need for long-term oral anticoagulation therapy as far as AFib is concerned  Continue aggressive pulmonary tolerate,   Out of bed-to-chair      Jaun Miller MD  Department of Cardiology  Date of Service: 03/03/2024

## 2024-03-04 PROBLEM — J96.01 ACUTE RESPIRATORY FAILURE WITH HYPOXIA AND HYPERCAPNIA: Status: RESOLVED | Noted: 2024-02-26 | Resolved: 2024-03-04

## 2024-03-04 PROBLEM — J96.02 ACUTE RESPIRATORY FAILURE WITH HYPOXIA AND HYPERCAPNIA: Status: RESOLVED | Noted: 2024-02-26 | Resolved: 2024-03-04

## 2024-03-04 PROBLEM — R14.0 ABDOMINAL DISTENTION: Status: RESOLVED | Noted: 2024-02-28 | Resolved: 2024-03-04

## 2024-03-04 PROBLEM — G93.40 ACUTE ENCEPHALOPATHY: Status: RESOLVED | Noted: 2024-02-26 | Resolved: 2024-03-04

## 2024-03-04 LAB
ANION GAP SERPL CALC-SCNC: 8 MMOL/L (ref 8–16)
BASOPHILS # BLD AUTO: 0.11 K/UL (ref 0–0.2)
BASOPHILS NFR BLD: 0.5 % (ref 0–1.9)
BNP SERPL-MCNC: 354 PG/ML (ref 0–99)
BUN SERPL-MCNC: 21 MG/DL (ref 8–23)
CALCIUM SERPL-MCNC: 8.7 MG/DL (ref 8.7–10.5)
CHLORIDE SERPL-SCNC: 99 MMOL/L (ref 95–110)
CO2 SERPL-SCNC: 34 MMOL/L (ref 23–29)
CREAT SERPL-MCNC: 0.8 MG/DL (ref 0.5–1.4)
DIFFERENTIAL METHOD BLD: ABNORMAL
EOSINOPHIL # BLD AUTO: 0.8 K/UL (ref 0–0.5)
EOSINOPHIL NFR BLD: 3.9 % (ref 0–8)
ERYTHROCYTE [DISTWIDTH] IN BLOOD BY AUTOMATED COUNT: 22.3 % (ref 11.5–14.5)
EST. GFR  (NO RACE VARIABLE): >60 ML/MIN/1.73 M^2
GLUCOSE SERPL-MCNC: 165 MG/DL (ref 70–110)
HCT VFR BLD AUTO: 31.9 % (ref 40–54)
HGB BLD-MCNC: 9.3 G/DL (ref 14–18)
IMM GRANULOCYTES # BLD AUTO: 0.24 K/UL (ref 0–0.04)
IMM GRANULOCYTES NFR BLD AUTO: 1.1 % (ref 0–0.5)
LYMPHOCYTES # BLD AUTO: 1.1 K/UL (ref 1–4.8)
LYMPHOCYTES NFR BLD: 5.3 % (ref 18–48)
MAGNESIUM SERPL-MCNC: 2.1 MG/DL (ref 1.6–2.6)
MCH RBC QN AUTO: 23 PG (ref 27–31)
MCHC RBC AUTO-ENTMCNC: 29.2 G/DL (ref 32–36)
MCV RBC AUTO: 79 FL (ref 82–98)
MONOCYTES # BLD AUTO: 1.4 K/UL (ref 0.3–1)
MONOCYTES NFR BLD: 6.6 % (ref 4–15)
NEUTROPHILS # BLD AUTO: 17.6 K/UL (ref 1.8–7.7)
NEUTROPHILS NFR BLD: 82.6 % (ref 38–73)
NRBC BLD-RTO: 5 /100 WBC
PHOSPHATE SERPL-MCNC: 2.3 MG/DL (ref 2.7–4.5)
PLATELET # BLD AUTO: 585 K/UL (ref 150–450)
PMV BLD AUTO: 10.5 FL (ref 9.2–12.9)
POTASSIUM SERPL-SCNC: 3.9 MMOL/L (ref 3.5–5.1)
RBC # BLD AUTO: 4.05 M/UL (ref 4.6–6.2)
SODIUM SERPL-SCNC: 141 MMOL/L (ref 136–145)
WBC # BLD AUTO: 21.31 K/UL (ref 3.9–12.7)

## 2024-03-04 PROCEDURE — 27000221 HC OXYGEN, UP TO 24 HOURS

## 2024-03-04 PROCEDURE — 94640 AIRWAY INHALATION TREATMENT: CPT

## 2024-03-04 PROCEDURE — 25000003 PHARM REV CODE 250: Performed by: INTERNAL MEDICINE

## 2024-03-04 PROCEDURE — 97530 THERAPEUTIC ACTIVITIES: CPT

## 2024-03-04 PROCEDURE — 94761 N-INVAS EAR/PLS OXIMETRY MLT: CPT

## 2024-03-04 PROCEDURE — 85025 COMPLETE CBC W/AUTO DIFF WBC: CPT | Performed by: THORACIC SURGERY (CARDIOTHORACIC VASCULAR SURGERY)

## 2024-03-04 PROCEDURE — 25000003 PHARM REV CODE 250: Performed by: THORACIC SURGERY (CARDIOTHORACIC VASCULAR SURGERY)

## 2024-03-04 PROCEDURE — 99900035 HC TECH TIME PER 15 MIN (STAT)

## 2024-03-04 PROCEDURE — 20000000 HC ICU ROOM

## 2024-03-04 PROCEDURE — 83880 ASSAY OF NATRIURETIC PEPTIDE: CPT | Performed by: INTERNAL MEDICINE

## 2024-03-04 PROCEDURE — 83735 ASSAY OF MAGNESIUM: CPT | Performed by: THORACIC SURGERY (CARDIOTHORACIC VASCULAR SURGERY)

## 2024-03-04 PROCEDURE — 84100 ASSAY OF PHOSPHORUS: CPT | Performed by: INTERNAL MEDICINE

## 2024-03-04 PROCEDURE — 99900031 HC PATIENT EDUCATION (STAT)

## 2024-03-04 PROCEDURE — A4216 STERILE WATER/SALINE, 10 ML: HCPCS | Performed by: INTERNAL MEDICINE

## 2024-03-04 PROCEDURE — 63600175 PHARM REV CODE 636 W HCPCS: Mod: JZ,JG | Performed by: HOSPITALIST

## 2024-03-04 PROCEDURE — 63600175 PHARM REV CODE 636 W HCPCS: Performed by: NURSE PRACTITIONER

## 2024-03-04 PROCEDURE — 97110 THERAPEUTIC EXERCISES: CPT

## 2024-03-04 PROCEDURE — 99231 SBSQ HOSP IP/OBS SF/LOW 25: CPT | Mod: ,,, | Performed by: SURGERY

## 2024-03-04 PROCEDURE — 80048 BASIC METABOLIC PNL TOTAL CA: CPT | Performed by: THORACIC SURGERY (CARDIOTHORACIC VASCULAR SURGERY)

## 2024-03-04 PROCEDURE — 25000003 PHARM REV CODE 250: Performed by: PHYSICIAN ASSISTANT

## 2024-03-04 PROCEDURE — 63600175 PHARM REV CODE 636 W HCPCS: Performed by: INTERNAL MEDICINE

## 2024-03-04 PROCEDURE — 99233 SBSQ HOSP IP/OBS HIGH 50: CPT | Mod: ,,, | Performed by: INTERNAL MEDICINE

## 2024-03-04 PROCEDURE — 25000242 PHARM REV CODE 250 ALT 637 W/ HCPCS: Performed by: INTERNAL MEDICINE

## 2024-03-04 PROCEDURE — 5A0935A ASSISTANCE WITH RESPIRATORY VENTILATION, LESS THAN 24 CONSECUTIVE HOURS, HIGH NASAL FLOW/VELOCITY: ICD-10-PCS | Performed by: HOSPITALIST

## 2024-03-04 PROCEDURE — 25000003 PHARM REV CODE 250: Performed by: NURSE PRACTITIONER

## 2024-03-04 RX ORDER — SPIRONOLACTONE 25 MG/1
25 TABLET ORAL 2 TIMES DAILY
Status: DISCONTINUED | OUTPATIENT
Start: 2024-03-04 | End: 2024-03-07 | Stop reason: HOSPADM

## 2024-03-04 RX ORDER — DILTIAZEM HYDROCHLORIDE 5 MG/ML
10 INJECTION INTRAVENOUS ONCE
Status: COMPLETED | OUTPATIENT
Start: 2024-03-04 | End: 2024-03-04

## 2024-03-04 RX ORDER — DILTIAZEM HYDROCHLORIDE 30 MG/1
30 TABLET, FILM COATED ORAL EVERY 6 HOURS
Status: DISCONTINUED | OUTPATIENT
Start: 2024-03-04 | End: 2024-03-04

## 2024-03-04 RX ORDER — ENOXAPARIN SODIUM 100 MG/ML
40 INJECTION SUBCUTANEOUS EVERY 24 HOURS
Status: DISCONTINUED | OUTPATIENT
Start: 2024-03-04 | End: 2024-03-07 | Stop reason: HOSPADM

## 2024-03-04 RX ORDER — METOPROLOL TARTRATE 25 MG/1
25 TABLET, FILM COATED ORAL 2 TIMES DAILY
Status: DISCONTINUED | OUTPATIENT
Start: 2024-03-04 | End: 2024-03-07 | Stop reason: HOSPADM

## 2024-03-04 RX ADMIN — DILTIAZEM HYDROCHLORIDE 10 MG: 5 INJECTION INTRAVENOUS at 10:03

## 2024-03-04 RX ADMIN — IPRATROPIUM BROMIDE 0.5 MG: 0.5 SOLUTION RESPIRATORY (INHALATION) at 08:03

## 2024-03-04 RX ADMIN — SPIRONOLACTONE 25 MG: 25 TABLET ORAL at 08:03

## 2024-03-04 RX ADMIN — PIPERACILLIN AND TAZOBACTAM 3.38 G: 3; .375 INJECTION, POWDER, FOR SOLUTION INTRAVENOUS; PARENTERAL at 05:03

## 2024-03-04 RX ADMIN — FUROSEMIDE 10 MG/HR: 10 INJECTION, SOLUTION INTRAMUSCULAR; INTRAVENOUS at 12:03

## 2024-03-04 RX ADMIN — IPRATROPIUM BROMIDE 0.5 MG: 0.5 SOLUTION RESPIRATORY (INHALATION) at 12:03

## 2024-03-04 RX ADMIN — IPRATROPIUM BROMIDE 0.5 MG: 0.5 SOLUTION RESPIRATORY (INHALATION) at 07:03

## 2024-03-04 RX ADMIN — PIPERACILLIN AND TAZOBACTAM 3.38 G: 3; .375 INJECTION, POWDER, FOR SOLUTION INTRAVENOUS; PARENTERAL at 11:03

## 2024-03-04 RX ADMIN — LEVALBUTEROL HYDROCHLORIDE 1.25 MG: 1.25 SOLUTION RESPIRATORY (INHALATION) at 12:03

## 2024-03-04 RX ADMIN — ENOXAPARIN SODIUM 40 MG: 100 INJECTION SUBCUTANEOUS at 04:03

## 2024-03-04 RX ADMIN — EZETIMIBE 10 MG: 10 TABLET ORAL at 08:03

## 2024-03-04 RX ADMIN — MONTELUKAST 10 MG: 10 TABLET, FILM COATED ORAL at 08:03

## 2024-03-04 RX ADMIN — LEVALBUTEROL HYDROCHLORIDE 1.25 MG: 1.25 SOLUTION RESPIRATORY (INHALATION) at 08:03

## 2024-03-04 RX ADMIN — METOPROLOL TARTRATE 25 MG: 25 TABLET, FILM COATED ORAL at 08:03

## 2024-03-04 RX ADMIN — SODIUM CHLORIDE, PRESERVATIVE FREE 10 ML: 5 INJECTION INTRAVENOUS at 12:03

## 2024-03-04 RX ADMIN — LEVOTHYROXINE SODIUM ANHYDROUS 25 MCG: 100 INJECTION, POWDER, LYOPHILIZED, FOR SOLUTION INTRAVENOUS at 09:03

## 2024-03-04 RX ADMIN — DULOXETINE HYDROCHLORIDE 60 MG: 30 CAPSULE, DELAYED RELEASE ORAL at 08:03

## 2024-03-04 RX ADMIN — GUAIFENESIN 1200 MG: 600 TABLET, EXTENDED RELEASE ORAL at 08:03

## 2024-03-04 RX ADMIN — IPRATROPIUM BROMIDE 0.5 MG: 0.5 SOLUTION RESPIRATORY (INHALATION) at 01:03

## 2024-03-04 RX ADMIN — DEXTROSE MONOHYDRATE 15 MG/HR: 50 INJECTION, SOLUTION INTRAVENOUS at 04:03

## 2024-03-04 RX ADMIN — PANTOPRAZOLE SODIUM 40 MG: 40 TABLET, DELAYED RELEASE ORAL at 05:03

## 2024-03-04 RX ADMIN — DEXTROSE MONOHYDRATE 5 MG/HR: 50 INJECTION, SOLUTION INTRAVENOUS at 10:03

## 2024-03-04 RX ADMIN — TAMSULOSIN HYDROCHLORIDE 0.4 MG: 0.4 CAPSULE ORAL at 08:03

## 2024-03-04 RX ADMIN — FUROSEMIDE 40 MG: 10 INJECTION, SOLUTION INTRAVENOUS at 05:03

## 2024-03-04 RX ADMIN — METOPROLOL TARTRATE 50 MG: 50 TABLET, FILM COATED ORAL at 08:03

## 2024-03-04 RX ADMIN — ASPIRIN 81 MG 81 MG: 81 TABLET ORAL at 08:03

## 2024-03-04 RX ADMIN — LEVALBUTEROL HYDROCHLORIDE 1.25 MG: 1.25 SOLUTION RESPIRATORY (INHALATION) at 07:03

## 2024-03-04 RX ADMIN — SODIUM CHLORIDE, PRESERVATIVE FREE 10 ML: 5 INJECTION INTRAVENOUS at 05:03

## 2024-03-04 RX ADMIN — LEVALBUTEROL HYDROCHLORIDE 1.25 MG: 1.25 SOLUTION RESPIRATORY (INHALATION) at 01:03

## 2024-03-04 RX ADMIN — LOSARTAN POTASSIUM 25 MG: 25 TABLET, FILM COATED ORAL at 08:03

## 2024-03-04 RX ADMIN — PIPERACILLIN AND TAZOBACTAM 3.38 G: 3; .375 INJECTION, POWDER, FOR SOLUTION INTRAVENOUS; PARENTERAL at 08:03

## 2024-03-04 RX ADMIN — METOPROLOL TARTRATE 10 MG: 1 INJECTION, SOLUTION INTRAVENOUS at 09:03

## 2024-03-04 RX ADMIN — ALTEPLASE 2 MG: 2.2 INJECTION, POWDER, LYOPHILIZED, FOR SOLUTION INTRAVENOUS at 12:03

## 2024-03-04 NOTE — ASSESSMENT & PLAN NOTE
Patient has Post-operative ileus which is adynamic in etiology which is stable. This is inherent to his procedure. Will treat conservatively with bowel rest, IV fluids, serial abdominal exams and avoidance of GI paralytics such as narcotics or anti-spasmodics. Monitor patient closely.     -general surgery following, input appreciated  -trend leukocytosis, improving.  -check blood cultures, no growth.  -patient on IV Zosyn.

## 2024-03-04 NOTE — PROGRESS NOTES
Pulmonary/Critical Care Progress Note      PATIENT NAME: Jose F Hanley  MRN: [id number]  TODAY'S DATE: 2024  1:01 PM  ADMIT DATE: 2024  AGE: 78 y.o. : 1945    CONSULT REQUESTED BY: Corby Lyn MD    REASON FOR CONSULT:   Respiratory failure    HPI:  The patient is a 78-year-old gentleman who underwent coronary artery bypass grafting.  He is recovering from DTs, he is extremely morbidly obese, he is in AFib with RVR, and he is not clearing his secretions.  He is off of Precedex but only by a few hours and is lethargic.     the patient remains quite lethargic.    3/1 the patient is significantly more confused today than yesterday.  He is oriented to self.  He is convinced we are trying to kill him.  He states he is going to die.  This makes me anxious.    3/2/24: Afebrile, WBC count trending down on Zosyn. CT CAP identified no particular source of infection, but did suggest ileus or SBO. Encephalopathy is resolved. Mediastinal and pleural drains removed.    3/3/24: NGT clamped and may be removed later today. No BM. Passing flatus.    3/4 the patient is up in the chair.  He has AFib with RVR.  Discussed with Dr. Miller.  The patient is still rattling mucus around.  He has rhonchi on auscultation.  All of his lines are out.  He is ready for step-down.    REVIEW OF SYSTEMS    General: Feeling weak  Eyes: Vision is good.  ENT:  No sinusitis or pharyngitis.   Heart: No chest pain or palpitations.  Lungs: No cough, sputum, or wheezing.  GI:  Vomited breakfast  : No dysuria, hesitancy, or nocturia.  Skin: No lesions or rashes.  Musculoskeletal:  Very weak.  Neuro: No headaches or neuropathy.  Lymph: No edema or adenopathy.  Psych: No anxiety or depression.  Endo: No weight change.     No change in the patient's Past Medical History, Past Surgical History, Social History or Family History since admission.     VITAL SIGNS (MOST RECENT)  Temp: 97 °F (36.1 °C) (24 0701)  Pulse: (!) 125 (24  0814)  Resp: (!) 28 (03/04/24 0814)  BP: (!) 140/77 (03/04/24 0800)  SpO2: 95 % (03/04/24 0814)    INTAKE AND OUTPUT (LAST 24 HOURS):  Intake/Output Summary (Last 24 hours) at 3/4/2024 0919  Last data filed at 3/4/2024 0545  Gross per 24 hour   Intake 2652.26 ml   Output 3120 ml   Net -467.74 ml       WEIGHT  Wt Readings from Last 1 Encounters:   03/03/24 125.1 kg (275 lb 12.7 oz)       PHYSICAL EXAM  GENERAL: Older patient; alert and oriented.  HEENT: Pupils equal and reactive. Extraocular movements intact. Nose with  nasal cannula in place.  Pharynx moist  NECK: Supple.   HEART:  Irregularly irregular and tachycardic rate and rhythm.  AFib with RVR No murmur or gallop auscultated.  LUNGS:  Diffuse rhonchi with bibasilar crackles  ABDOMEN:  Extremely obese. Bowel sounds present. Non-tender, no masses palpated.  : Normal anatomy.  Male pure wick with some urine.    EXTREMITIES: Normal muscle tone and joint movement, no cyanosis or clubbing.   LYMPHATICS: No adenopathy palpated, no edema.  SKIN: Dry, intact, no lesions.  Incisions dressed and dry  NEURO: No gross motor or cognitive deficit.      CBC LAST (LAST 24 HOURS)  Recent Labs   Lab 03/04/24  0500   WBC 21.31*   RBC 4.05*   HGB 9.3*   HCT 31.9*   MCV 79*   MCH 23.0*   MCHC 29.2*   RDW 22.3*   *   MPV 10.5   GRAN 82.6*  17.6*   LYMPH 5.3*  1.1   MONO 6.6  1.4*   BASO 0.11   NRBC 5*       CHEMISTRY LAST (LAST 24 HOURS)  Recent Labs   Lab 03/04/24  0500      K 3.9   CL 99   CO2 34*   ANIONGAP 8   BUN 21   CREATININE 0.8   *   CALCIUM 8.7   MG 2.1         LAST 7 DAYS MICROBIOLOGY   Microbiology Results (last 7 days)       Procedure Component Value Units Date/Time    Blood culture [[id number]] Collected: 03/01/24 1001    Order Status: Completed Specimen: Blood Updated: 03/03/24 1232     Blood Culture, Routine No Growth to date      No Growth to date      No Growth to date    MRSA Screen by PCR [[id number]] Collected: 03/01/24 1112    Order  Status: Completed Updated: 03/01/24 1253     MRSA SCREEN BY PCR Negative    MRSA Screen by PCR [[id number]] Collected: 03/01/24 0940    Order Status: Canceled Specimen: Nasopharyngeal Swab from Nasal             MOST RECENT IMAGING  X-Ray Chest AP Portable  HISTORY: picc placement    FINDINGS: Portable chest radiograph at 1456 hours and 1458 compared to 0615 hours shows interval insertion of a left PICC. The first image shows the distal tip of the PICC overlying the left subclavian vein, with the second image showing the distal tip of the PICC overlying the SVC, projecting inferolaterally. There is no other significant interval change.    IMPRESSION: Interval insertion of a left PICC as described.    Electronically signed by:  Jeison Lowe MD  03/02/2024 03:24 PM CST Workstation: [id number]  X-Ray Chest AP Portable  HISTORY: Atelectasis    FINDINGS: Portable chest radiograph at 0615 hours compared to 03/01/2024 shows interval removal of the mediastinal and thoracostomy drains, with nasogastric tube remaining. There are median sternotomy wires and changes of transarterial aortic valvuloplasty, with stable enlarged cardiac silhouette and normal pulmonary vascularity.    The lungs are symmetrically expanded, with no new pleural or parenchymal abnormality. Bilateral lower lung opacities suggesting subsegmental atelectasis are unchanged.    IMPRESSION: Interval removal of the mediastinal and thoracostomy drains, with no other significant change.    Electronically signed by:  Jeison Lowe MD  03/02/2024 09:35 AM CST Workstation: [id number]      CURRENT VISIT EKG  Results for orders placed or performed during the hospital encounter of 02/23/24   EKG 12-LEAD   Result Value Ref Range    QRS Duration 232 ms    OHS QTC Calculation 613 ms    Narrative    Test Reason : Z95.1,Z95.1,    Vent. Rate : 080 BPM     Atrial Rate : 087 BPM     P-R Int : 000 ms          QRS Dur : 232 ms      QT Int : 532 ms       P-R-T Axes : 000 168 250  degrees     QTc Int : 613 ms    Ventricular-paced rhythm  occasional atrila pacing  Abnormal ECG  When compared with ECG of 21-FEB-2024 09:33,  Electronic ventricular pacemaker has replaced Atrial fibrillation  Confirmed by Eleazar WOO, Gregg NEWTON ([id number]) on 2/24/2024 7:12:58 AM    Referred By: OLLIE RIBEIRO           Confirmed By:Gregg Bush MD       ECHOCARDIOGRAM RESULTS  Results for orders placed during the hospital encounter of 12/13/23    Echo    Interpretation Summary    Left Ventricle: The left ventricle is normal in size. Mildly increased wall thickness. There is mild concentric hypertrophy. Normal wall motion. There is normal systolic function with a visually estimated ejection fraction of 65 - 70%. There is normal diastolic function.    Right Ventricle: Mild right ventricular enlargement. Wall thickness is normal. Right ventricle wall motion  is normal. Systolic function is normal.    Left Atrium: Left atrium is moderately dilated.    Right Atrium: Right atrium is mildly dilated.    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    Tricuspid Valve: There is mild regurgitation with a centrally directed jet.    Pulmonic Valve: There is mild regurgitation with a centrally directed jet.    Pulmonary Artery: There is mild pulmonary hypertension. The estimated pulmonary artery systolic pressure is 40 mmHg.    IVC/SVC: Intermediate venous pressure at 8 mmHg.        Oxygen INFORMATION  Oxygen Concentration (%):  [40] 40    8 L       LAST ARTERIAL BLOOD GAS  ABG  Recent Labs   Lab 03/02/24  0832   PH 7.409   PO2 71*   PCO2 56.5*   HCO3 35.7*   BE 11*       IMPRESSION AND PLAN  Status post coronary artery bypass grafting  Alcoholic going through DTs  - resolved  Encephalopathy  - resolved  Acute hypercapnic hypoxemic respiratory failure  - 95% saturation on 8 L  Bibasilar atelectasis  - continue IPV  - encourage patient to cough and clear  - Mucinex 1200 mg b.i.d.  Obstructive  sleep apnea  Obesity hypoventilation syndrome  - noncompliant with CPAP  - sleeps on 3 L at home  - BiPAP whenever he is sleeping.   - continue elevation of head of bed  Extreme morbid obesity  - patient does not oxygenate well when flat  Atrial fib with RVR  - CV surgery and Cardiology following  Ileus  - emesis with breakfast  - on TPN  Sepsis with likely intra-abdominal source  - Zosyn, on day 5 of 7  Anemia  - postop, expected  Leukocytosis  - worse today  Hyperglycemia  - mild  - trend  Hypernatremia  - resolved  Hypophosphatemia  - replace and trend    Patient is appropriate to move to step-down  Discussed his AFib RVR with Dr. Miller  Discussed everything with nurse and patient and wife and PT    Lesley Ang MD  Date of Service: 03/04/2024  1:01 PM

## 2024-03-04 NOTE — PROGRESS NOTES
[facility]  Adult Nutrition   Progress Note (Nutrition Support Management)    SUMMARY     Recommendations  Recommendation/Intervention:   1.  TPN bag #2 to be initiated today @ 2000. Run at 100 ml/hr.   2. 250 ml of 20% Smoflipids to be initiated @ 2000, run over 12 hours.   3.  Patient at risk for refeeding. Recommend monitoring and correction of BG and electrolytes as needed.     4. When medically approproiate, advance diet to cardiac diet restrictions, texture per SLP. Continue TPN till diet is advanced and po intake > 50% at meals.   5. Transition to PO medications (thiamine 100mg TID, folic acid 1mg QD, and MVI QD) when cleared for oral intake.   6. RD following.  Goals: TPN to be modified and increased as medically able to meet at least 75% EEN/EPN. Electrolytes to stablize and remain WNL.  Nutrition Goal Status: progressing towards goal  Communication of RD Recs: reviewed with RN    Nutrition Diagnosis PES Statement:  Altered GI fxn related possible ileus as evidence by abdominal distention, poor bowel fxn, TPN for nutrition support.  (continues)    Dietitian Rounds Brief  RD follow up. Pt started on TPN yesterday, will continue today as pt remains on liquid diet and unable to meet nutritional needs. 78 y.o. male status post redo CABG with ileus versus SBO. Pt NG tube previously removed inpatient passing flatus  Pt has tolerated clear liquids and currently on full liquids.    Possible advance in diet if no N/V and 6 hrs, however recommend continue TPN till po intake > 50% of regular diet. Glucose elevated- consider SSI.     Nutrition Related Social Determinants of Health: SDOH: Adequate food in home environment    Malnutrition Assessment   N/a         Diet order:   Current Diet Order: Full Liquid diet        Evaluation of Received Nutrient/Fluid Intake  Energy Calories Required: not meeting needs  Protein Required: not meeting needs  Fluid Required: not meeting needs      % Intake of Estimated  Energy Needs: 0 - 25 %  % Meal Intake: 0 - 25 %      Intake/Output Summary (Last 24 hours) at 3/4/2024 1206  Last data filed at 3/4/2024 1100  Gross per 24 hour   Intake 3699.38 ml   Output 4070 ml   Net -370.62 ml        Anthropometrics  Temp: 97 °F (36.1 °C)  Height Method: Measured  Height: 6' (182.9 cm)  Height (inches): 72 in  Weight Method: Bed Scale  Weight: 125.1 kg (275 lb 12.7 oz)  Weight (lb): 275.8 lb  Ideal Body Weight (IBW), Male: 178 lb  % Ideal Body Weight, Male (lb): 154.82 %  BMI (Calculated): 37.4  BMI Grade: 35 - 39.9 - obesity - grade II       Estimated/Assessed Needs  Weight Used For Calorie Calculations: 125 kg (275 lb 9.2 oz)  Energy Calorie Requirements (kcal): 2485-7601 (20-25 kcal/ kg bw)  Energy Need Method: Kcal/kg  Protein Requirements: 121-162 gm (1.5-2.0 gm/ kg IBW)  Weight Used For Protein Calculations: 81 kg (178 lb 9.2 oz)     Estimated Fluid Requirement Method: RDA Method  RDA Method (mL): 2500       Reason for Assessment  Reason For Assessment: RD follow-up  Relevant Medical History: CAD, COPD  Interdisciplinary Rounds: attended  General Information Comments: -  Nutrition Discharge Planning: TBD    Nutrition/Diet History  Patient Reported Diet/Restrictions/Preferences: general  Spiritual, Cultural Beliefs, Advent Practices, Values that Affect Care: no  Food Allergies: NKFA  Factors Affecting Nutritional Intake: decreased appetite, nausea/vomiting    Nutrition Risk Screen  Nutrition Risk Screen: tube feeding or parenteral nutrition       Altered Skin Integrity 02/23/24 0525 Left anterior;lower Arm #1 Skin Tear Partial thickness tissue loss. Shallow open ulcer with a red or pink wound bed, without slough. Intact or Open/Ruptured Serum-filled blister.-Wound Image: Images linked  MST Score: 0  Have you recently lost weight without trying?: No  Weight loss score: 0  Have you been eating poorly because of a decreased appetite?: No  Appetite score: 0       Weight History:  Wt  Readings from Last 10 Encounters:   03/03/24 125.1 kg (275 lb 12.7 oz)   02/21/24 124.5 kg (274 lb 6.4 oz)   01/12/24 120.7 kg (266 lb)   01/10/24 120.7 kg (266 lb)   12/21/23 125.6 kg (277 lb)   12/13/23 126.1 kg (278 lb)   11/28/23 126.1 kg (278 lb)   10/31/23 119.7 kg (264 lb)   10/11/23 124.4 kg (274 lb 4.8 oz)   09/21/23 121.6 kg (268 lb)        Lab/Procedures/Meds: Pertinent Labs/Meds Reviewed    Medications:Pertinent Medications Reviewed  Scheduled Meds:   aspirin  81 mg Oral Daily    diltiaZEM  30 mg Oral Q6H    DULoxetine  60 mg Oral Nightly    enoxparin  60 mg Subcutaneous Q24H (prophylaxis, 1700)    ezetimibe  10 mg Oral QHS    guaiFENesin  1,200 mg Oral BID    ipratropium  0.5 mg Nebulization Q6H    levalbuterol  1.25 mg Nebulization Q6H    levothyroxine  25 mcg Intravenous Daily    lipid (SMOFLIPID)  250 mL Intravenous Daily    montelukast  10 mg Oral Daily    pantoprazole  40 mg Oral Before breakfast    piperacillin-tazobactam (Zosyn) IV (PEDS and ADULTS) (extended infusion is not appropriate)  3.375 g Intravenous Q8H    sodium chloride 0.9%  10 mL Intravenous Q6H    spironolactone  25 mg Oral BID    tamsulosin  0.4 mg Oral Daily     Continuous Infusions:   dilTIAZem 15 mg/hr (03/04/24 1148)    furosemide (LASIX) 2 mg/mL continuous infusion (non-titrating)      TPN ADULT CENTRAL LINE CUSTOM 100 mL/hr at 03/04/24 1100    TPN ADULT CENTRAL LINE CUSTOM       PRN Meds:.calcium gluconate IVPB, calcium gluconate IVPB, calcium gluconate IVPB, dextrose 50% in water (D50W), dextrose 50% in water (D50W), hydrALAZINE, HYDROcodone-acetaminophen, HYDROmorphone, magnesium sulfate IVPB, magnesium sulfate IVPB, metoprolol, ondansetron, potassium bicarbonate, potassium bicarbonate, potassium bicarbonate, potassium chloride in water **AND** potassium chloride in water **AND** potassium chloride in water, Flushing PICC/Midline Protocol **AND** sodium chloride 0.9% **AND** sodium chloride 0.9%, sodium phosphate 15 mmol in  dextrose 5 % (D5W) 250 mL IVPB, sodium phosphate 20.01 mmol in dextrose 5 % (D5W) 250 mL IVPB, sodium phosphate 30 mmol in dextrose 5 % (D5W) 250 mL IVPB    Labs: Pertinent Labs Reviewed  Clinical Chemistry:  Recent Labs   Lab 03/02/24  0454 03/02/24  1658 03/03/24  0420 03/03/24  1331 03/04/24  0500     --  142  --  141   K 3.6   < > 3.5   < > 3.9     --  101  --  99   CO2 35*  --  37*  --  34*   *  --  143*  --  165*   BUN 30*  --  24*  --  21   CREATININE 1.0  --  0.9  --  0.8   CALCIUM 8.1*  --  8.4*  --  8.7   ANIONGAP 6*  --  4*  --  8   MG 2.1  --  1.9   < > 2.1   PHOS 2.9  --  2.7  --  2.3*    < > = values in this interval not displayed.     CBC:   Recent Labs   Lab 03/04/24  0500   WBC 21.31*   RBC 4.05*   HGB 9.3*   HCT 31.9*   *   MCV 79*   MCH 23.0*   MCHC 29.2*     Lipid Panel:  Recent Labs   Lab 02/29/24  0346   TRIG 94     Cardiac Profile:  Recent Labs   Lab 03/04/24  1002   *       Thyroid & Parathyroid:  Recent Labs   Lab 03/01/24  1001   TSH 2.171       Monitor and Evaluation  Food and Nutrient Intake: parenteral nutrition intake  Food and Nutrient Adminstration: enteral and parenteral nutrition administration  Knowledge/Beliefs/Attitudes: food and nutrition knowledge/skill  Physical Activity and Function: nutrition-related ADLs and IADLs  Anthropometric Measurements: weight, weight change  Biochemical Data, Medical Tests and Procedures: electrolyte and renal panel, inflammatory profile, gastrointestinal profile, lipid profile, glucose/endocrine profile  Nutrition-Focused Physical Findings: overall appearance     Nutrition Risk  Level of Risk/Frequency of Follow-up:  (2 x week.)     Nutrition Follow-Up  RD Follow-up?: Yes      Graciela Rhodes, PANTERA 03/04/2024 12:06 PM

## 2024-03-04 NOTE — ASSESSMENT & PLAN NOTE
Patient has hypernatremia which is uncontrolled. The hypernatremia is due to  possible IV fluids . We will aim to correct the sodium by 8-10mEq in 24 hours. We will correct their hypernatremia with Select IV fluids: D5W at a rate of 50 ml/hr. The patient's sodium results have been reviewed and are listed below.  Recent Labs   Lab 03/04/24  0500          -discontinue  D5W at 50 mL/ hour  -trend sodium level, sodium 151>145>142

## 2024-03-04 NOTE — PROGRESS NOTES
[facility] Medicine  Progress Note    Patient Name: Jose F Hanley  MRN: [id number]  Patient Class: IP- Surgery Admit   Admission Date: 2/23/2024  Length of Stay: 10 days  Attending Physician: Corby Lyn MD  Primary Care Provider: Sunita Rivera MD        Subjective:     Principal Problem:S/P CABG (coronary artery bypass graft)        HPI:  Patient is 78 year old male with history of Afib, alcohol use, HFpEF, COPD, hypothyroidism, CAD was admitted to ICU after elective CABG. He underwent heart catheterization and coronary angiography showing multivessel coronary artery disease.  He was referred for consideration of redo coronary artery bypass grafting.    He has had a TAVR and Watchman. Recent studies were reviewed.  The patient has significant recurrent coronary artery disease.  Post operatively patient developed alcohol withdrawal and delirium, was started on precedex. Patient was extubated and still on BiPAP.  Hospitalist was consulted for medical management. .    Overview/Hospital Course:  Patient was post op admitted to ICU. Extubated to BiPAP, developed encephalopathy and now on precedex. Post op care per CTS. Cardiology also following. He is post op D4 today, still has chest and mediastinal tubes and pacer wires. Balloon pump was removed 2/25.  Patient complained of increasing abdominal distention and pain.  Discussed checking KUB, and inserting NG tube to LIS, patient may possibly have ileus.  Patient is seen by General surgery, recommend conservative care for now, possible ileus.  Patient with cough and congestion, WBC up to 28.  Patient was started on IV Zosyn with leukocytosis improving.  Patient was mediastinal drains and left pleural drains removed.  Patient was TIANA, on BiPAP q.h.s. and was sleeping.  He is on low-flow nasal cannula.  NG tube removed on 03/04.    Interval History:  Patient seen and examined.  Patient went into AFib RVR this morning and was placed on Cardizem  drip.  Currently is rate controlled.  Plan of care discussed with the patient at the bedside in detail along with the bedside nurse.      Objective:     Vital Signs (Most Recent):  Temp: 97 °F (36.1 °C) (03/04/24 1101)  Pulse: 98 (03/04/24 1346)  Resp: (!) 23 (03/04/24 1346)  BP: (!) 142/68 (03/04/24 1301)  SpO2: 96 % (03/04/24 1346) Vital Signs (24h Range):  Temp:  [97 °F (36.1 °C)-97.8 °F (36.6 °C)] 97 °F (36.1 °C)  Pulse:  [] 98  Resp:  [16-30] 23  SpO2:  [92 %-97 %] 96 %  BP: (116-153)/(57-79) 142/68     Weight: 125.1 kg (275 lb 12.7 oz)  Body mass index is 37.4 kg/m².    Intake/Output Summary (Last 24 hours) at 3/4/2024 1424  Last data filed at 3/4/2024 1300  Gross per 24 hour   Intake 3909.38 ml   Output 3910 ml   Net -0.62 ml         Physical Exam  Vitals and nursing note reviewed.   Eyes:      Pupils: Pupils are equal, round, and reactive to light.   Neck:      Vascular: No JVD.   Cardiovascular:      Rate and Rhythm: Tachycardia present. Rhythm irregular.      Heart sounds: Normal heart sounds.   Pulmonary:      Effort: Pulmonary effort is normal.      Breath sounds: Normal breath sounds.   Abdominal:      General: Bowel sounds are normal. There is no distension.      Palpations: Abdomen is soft.      Tenderness: There is no abdominal tenderness.   Musculoskeletal:         General: No deformity.   Lymphadenopathy:      Cervical: No cervical adenopathy.   Skin:     Coloration: Skin is not pale.      Findings: No rash.             Significant Labs: All pertinent labs within the past 24 hours have been reviewed.    Significant Imaging: I have reviewed all pertinent imaging results/findings within the past 24 hours.    Assessment/Plan:      * S/P CABG (coronary artery bypass graft) - x2 09/2010 - LIMA to LAD; SVG to OMB; grafts occluded 08/2012  - post op care per surgery   -aspirin mg daily metoprolol 10 mg IV q.12 hours, Lasix 20 mg IV daily, losartan 25 mg daily, metoprolol 25 mg p.o.  t.i.d.  -mediastinal drains , pacer wires, and left pleural catheter removed.      Hypernatremia  Patient has hypernatremia which is uncontrolled. The hypernatremia is due to  possible IV fluids . We will aim to correct the sodium by 8-10mEq in 24 hours. We will correct their hypernatremia with Select IV fluids: D5W at a rate of 50 ml/hr. The patient's sodium results have been reviewed and are listed below.  Recent Labs   Lab 03/04/24  0500          -discontinue  D5W at 50 mL/ hour  -trend sodium level, sodium 151>145>142    Ileus  Patient has Post-operative ileus which is adynamic in etiology which is stable. This is inherent to his procedure. Will treat conservatively with bowel rest, IV fluids, serial abdominal exams and avoidance of GI paralytics such as narcotics or anti-spasmodics. Monitor patient closely.     -general surgery following, input appreciated  -trend leukocytosis, improving.  -check blood cultures, no growth.  -patient on IV Zosyn.        Acute blood loss anemia  Patient's anemia is currently controlled. Has not received any PRBCs to date.. Etiology likely d/t acute blood loss  Current CBC reviewed-   Lab Results   Component Value Date    HGB 9.3 (L) 03/04/2024    HCT 31.9 (L) 03/04/2024     Monitor serial CBC and transfuse if patient becomes hemodynamically unstable, symptomatic or H/H drops below 7/21.       Severe obesity (BMI 35.0-39.9) with comorbidity  Body mass index is 37.4 kg/m². Morbid obesity complicates all aspects of disease management from diagnostic modalities to treatment. Weight loss encouraged and health benefits explained to patient.         Permanent atrial fibrillation  S/p watchman device and TAVR.Patient with Long standing persistent (>12 months) atrial fibrillation which is uncontrolled currently with Beta Blocker and Calcium Channel Blocker. Patient is currently in atrial fibrillation.[id number] Score: 4. HASBLED score- 3. Anticoagulation indicated. Anticoagulation  done with Lovenox .      Dyslipidemia  - cont Statin       CAD (coronary artery disease), native coronary artery  S/p CABG X 3 on 2/23  - post op care per CTS, chest tube and pacer wires to be removed per surgery. Balloon pump was removed 2/25  - Cont aspirin, Lipitor, ,metoprolol       VTE Risk Mitigation (From admission, onward)           Ordered     enoxaparin injection 40 mg  Every 24 hours         03/04/24 1415     Place DUTCH hose  Until discontinued         02/23/24 0545                    Discharge Planning   LEIA: 3/7/2024     Code Status: Full Code   Is the patient medically ready for discharge?:     Reason for patient still in hospital (select all that apply): Treatment  Discharge Plan A: Skilled Nursing Facility   Discharge Delays: None known at this time        Critical care time spent on the evaluation and treatment of severe organ dysfunction, review of pertinent labs and imaging studies, discussions with consulting providers and discussions with patient/family: 43 minutes.      Corby Lyn MD  Department of Hospital Medicine   [facility]

## 2024-03-04 NOTE — CARE UPDATE
03/04/24 0814   Patient Assessment/Suction   Level of Consciousness (AVPU) alert   Respiratory Effort Normal;Unlabored   Expansion/Accessory Muscles/Retractions expansion symmetric   All Lung Fields Breath Sounds coarse;diminished   Rhythm/Pattern, Respiratory depth regular;pattern regular;unlabored   Cough Frequency frequent   Cough Type congested;loose;productive   Skin Integrity   $ Wound Care Tech Time 15 min   Area Observed Bridge of nose   Skin Appearance without discoloration   Barrier used? Gel Cushion   PRE-TX-O2   Device (Oxygen Therapy) high flow nasal cannula   $ Is the patient on Low Flow Oxygen? Yes   Flow (L/min) 8   SpO2 95 %   Pulse Oximetry Type Continuous   $ Pulse Oximetry - Multiple Charge Pulse Oximetry - Multiple   Pulse (!) 125   Resp (!) 28   Aerosol Therapy   $ Aerosol Therapy Charges Aerosol Treatment   Daily Review of Necessity (SVN) completed   Respiratory Treatment Status (SVN) given   Treatment Route (SVN) mask   Patient Position (SVN) sitting in chair   Post Treatment Assessment (SVN) increased aeration   Signs of Intolerance (SVN) none   Breath Sounds Post-Respiratory Treatment   Throughout All Fields Post-Treatment All Fields   Throughout All Fields Post-Treatment aeration increased   Post-treatment Heart Rate (beats/min) 120   Post-treatment Resp Rate (breaths/min) 30   Intrapulmonary Percussive Ventilation/Metaneb   $ IPV Administration Therapy  (pt with complaints of beeing too weak to take IPV treatments, even while I held mouthpiece. Tx finished via neb treatment)   Daily Review of Necessity (IPV) completed   Route (IPV) mouthpiece   IPV Operational Pressure (PSI) 25   IPV Duration Total (min) 8   Patient Position (IPV) sitting in chair   Post Treatment Assessment (IPV) increased aeration   Signs of Intolerance (IPV) none   Education   $ Education Bronchodilator;DME Oxygen;30 min

## 2024-03-04 NOTE — PLAN OF CARE
03/04/24 1302   Discharge Reassessment   Assessment Type Discharge Planning Brief Assessment   Did the patient's condition or plan change since previous assessment? No   Discharge Plan discussed with: Patient   Communicated LEIA with patient/caregiver Yes   Discharge Plan A Skilled Nursing Facility   Discharge Plan B Skilled Nursing Facility   DME Needed Upon Discharge  none   Transition of Care Barriers None   Why the patient remains in the hospital Requires continued medical care   Post-Acute Status   Post-Acute Authorization Placement   Post-Acute Placement Status Referrals Sent   Discharge Delays None known at this time     Anuradha at [facility] and Gloria at [facility] reviewing pt, updated clinicals sent to [phone]. for Anuradha to review.

## 2024-03-04 NOTE — PT/OT/SLP PROGRESS
"Physical Therapy Treatment    Patient Name:  Jose F Hanley   MRN:  [id number]    Recommendations:     Discharge Recommendations: High Intensity Therapy  Discharge Equipment Recommendations:  (to be determined at next level of care)  Barriers to discharge:  weakness, impaired self care skills, impaired functional mobility, decreased coordination, decreased LE/UE function, decreased ROM.     Assessment:     Jose F Hanley is a 78 y.o. male admitted with a medical diagnosis of S/P CABG (coronary artery bypass graft).  He presents with the following impairments/functional limitations: weakness, impaired endurance, impaired self care skills, impaired functional mobility, gait instability, impaired balance, impaired cognition, decreased coordination, decreased upper extremity function, decreased lower extremity function, decreased safety awareness, pain, decreased ROM, edema, impaired skin, impaired cardiopulmonary response to activity. Pt found up in BS chair.  Required encouragement to participate. "I'm too weak" "How much do you weigh?" /77, ,  HR fluctuating between 130 and 145 Bpm  at rest in chair. On 8 l NC. Pt with shallow, weak cough. Educated in use of heart pillow for deep, supportive coughing. Able to demonstrate same.Pt stood with mod/max A x 2 following sternal precautions after thrid attempt. Once standing, nurse requests patient return to bed. Able to take 8 steps to bed with rollator and min A x 2. Max A x 2 for sit to supine.      Rehab Prognosis: Fair; patient would benefit from acute skilled PT services to address these deficits and reach maximum level of function.    Recent Surgery: Procedure(s) (LRB):  CABG, REPEAT (N/A)  HARVEST-VEIN-ENDOVASCULAR (Right)  SURGICAL PROCUREMENT,ARTERY,RADIAL,FOR CABG  INSERTION, INTRA-AORTIC BALLOON PUMP (Right) 10 Days Post-Op    Plan:     During this hospitalization, patient to be seen daily to address the identified rehab impairments via gait training, " therapeutic activities, therapeutic exercises and progress toward the following goals:    Plan of Care Expires:  03/29/24    Subjective     Chief Complaint: fatigued and SOB  Patient/Family Comments/goals: to get better  Pain/Comfort:  Pain Rating 1: 0/10  Pain Rating Post-Intervention 1: 0/10      Objective:     Communicated with Judy prior to session.  Patient found HOB elevated with   upon PT entry to room.     General Precautions: Standard, fall, sternal  Orthopedic Precautions:    Braces:    Respiratory Status: Nasal cannula, flow 8 L/min     Functional Mobility:  Bed Mobility:     Sit to Supine: maximal assistance and of 2 persons  Transfers:     Sit to Stand:  moderate assistance, maximal assistance, and of 2 persons with no AD  Bed to Chair: minimum assistance, moderate assistance, and of 2 persons with  rolling walker  using  Step Transfer  Gait: 8 steps with RW to bed with min A x 2, small steps      AM-PAC 6 CLICK MOBILITY  Turning over in bed (including adjusting bedclothes, sheets and blankets)?: 2  Sitting down on and standing up from a chair with arms (e.g., wheelchair, bedside commode, etc.): 2  Moving from lying on back to sitting on the side of the bed?: 2  Moving to and from a bed to a chair (including a wheelchair)?: 2  Need to walk in hospital room?: 2  Climbing 3-5 steps with a railing?: 1  Basic Mobility Total Score: 11       Treatment & Education:  Pt educated on sternal precautions, safe techniques for transfers/ambulation, discharge recommendations/options, and use of call light for assistance and fall prevention, supportive cough, benefits of increased mobility and time OOB      Patient left HOB elevated with all lines intact, call button in reach, RN notified, and wife present..    GOALS:   Multidisciplinary Problems       Physical Therapy Goals          Problem: Physical Therapy    Goal Priority Disciplines Outcome Goal Variances Interventions   Physical Therapy Goal     PT, PT/OT  Ongoing, Progressing     Description: 1. Supine to sit with Stand-by Assistance  2. Sit to stand transfer with Stand-by Assistance  3.. Bed to chair transfer with Supervision using Rolling Walker  4. Gait  x 100 feet with Minimal Assistance using Rolling Walker.                          Time Tracking:     PT Received On: 03/04/24  PT Start Time: 0917     PT Stop Time: 0940  PT Total Time (min): 23 min     Billable Minutes: Therapeutic Activity 23    Treatment Type: Treatment  PT/PTA: PT     Number of PTA visits since last PT visit: 3     03/04/2024

## 2024-03-04 NOTE — PT/OT/SLP PROGRESS
Occupational Therapy   Treatment     Name: Jose F Hanley  MRN: [id number]  Admitting Diagnosis:  S/P CABG (coronary artery bypass graft)  10 Days Post-Op    Recommendations:     Discharge Recommendations: High Intensity Therapy  Discharge Equipment Recommendations:  to be determined by next level of care  Barriers to discharge:   (increased assist with ADLs and mobility)    Assessment:     Jose F Hanley is a 78 y.o. male with a medical diagnosis of S/P CABG (coronary artery bypass graft). Pt agreeable to OT therapy session this AM. Performance deficits affecting function are weakness, impaired endurance, impaired self care skills, impaired functional mobility, gait instability, impaired balance, decreased coordination, decreased upper extremity function, decreased lower extremity function, decreased safety awareness, decreased ROM, impaired cardiopulmonary response to activity. Pt with fatigue from sitting up in chair this AM, but agreeable to UE exercises.    Rehab Prognosis:  Fair; patient would benefit from acute skilled OT services to address these deficits and reach maximum level of function.       Plan:     Patient to be seen 5 x/week to address the above listed problems via self-care/home management, therapeutic activities, therapeutic exercises  Plan of Care Expires: 03/29/24  Plan of Care Reviewed with: patient    Subjective     Chief Complaint: none stated  Patient/Family Comments/goals: none stated  Pain/Comfort:  Pain Rating 1: 0/10    Objective:     Communicated with: nursing prior to session.  Patient found HOB elevated with oxygen, pulse ox (continuous), telemetry, blood pressure cuff, peripheral IV, WISAM drain, fitzgerald catheter upon OT entry to room.    General Precautions: Standard, fall, sternal    Orthopedic Precautions:N/A  Braces: N/A  Respiratory Status: High flow, flow 8 L/min  Occupational Performance:     Therapeutic Exercise:  HALEY OVIEDO exercises x 10 reps in all major planes (within sternal  Patient called for refill on:   ARIPiprazole (ABILIFY) 10 MG tablet 30 tablet 2 1/22/2021     Sig - Route: Take 1 tablet by mouth daily. - Oral        ?   Last filled: 1/22/21  Qty: 30  Last seen: 1/22/21  Next visit: 4/27/21    PDMP reviewed; no aberrant behavior identified, prescription authorized.       precautions) bed level to improve pt's strength and endurance needed for ADLs. Pt tolerated well.    Treatment & Education:  Pt educated on role of OT/POC, importance of OOB/EOB activity, use of call bell, and safety during ADLs, transfers, and functional mobility.    Patient left HOB elevated with all lines intact, call button in reach, and bed alarm on    GOALS:   Multidisciplinary Problems       Occupational Therapy Goals          Problem: Occupational Therapy    Goal Priority Disciplines Outcome Interventions   Occupational Therapy Goal     OT, PT/OT     Description: Goals to be met by: 3/29/24     Patient will increase functional independence with ADLs by performing:    UE Dressing with Moderate Assistance.  LE Dressing with Moderate Assistance.  Grooming while seated with Supervision.  Toileting from bedside commode with Moderate Assistance for hygiene and clothing management.   Toilet transfer to bedside commode with Moderate Assistance.    All goals above complete while maintaining sternal precautions.                          Time Tracking:     OT Date of Treatment: 03/04/24  OT Start Time: 1053  OT Stop Time: 1103  OT Total Time (min): 10 min    Billable Minutes:Therapeutic Exercise 10    OT/KIAN: OT          3/4/2024

## 2024-03-04 NOTE — SUBJECTIVE & OBJECTIVE
Interval History:  Patient seen and examined.  Patient went into AFib RVR this morning and was placed on Cardizem drip.  Currently is rate controlled.  Plan of care discussed with the patient at the bedside in detail along with the bedside nurse.      Objective:     Vital Signs (Most Recent):  Temp: 97 °F (36.1 °C) (03/04/24 1101)  Pulse: 98 (03/04/24 1346)  Resp: (!) 23 (03/04/24 1346)  BP: (!) 142/68 (03/04/24 1301)  SpO2: 96 % (03/04/24 1346) Vital Signs (24h Range):  Temp:  [97 °F (36.1 °C)-97.8 °F (36.6 °C)] 97 °F (36.1 °C)  Pulse:  [] 98  Resp:  [16-30] 23  SpO2:  [92 %-97 %] 96 %  BP: (116-153)/(57-79) 142/68     Weight: 125.1 kg (275 lb 12.7 oz)  Body mass index is 37.4 kg/m².    Intake/Output Summary (Last 24 hours) at 3/4/2024 1424  Last data filed at 3/4/2024 1300  Gross per 24 hour   Intake 3909.38 ml   Output 3910 ml   Net -0.62 ml         Physical Exam  Vitals and nursing note reviewed.   Eyes:      Pupils: Pupils are equal, round, and reactive to light.   Neck:      Vascular: No JVD.   Cardiovascular:      Rate and Rhythm: Tachycardia present. Rhythm irregular.      Heart sounds: Normal heart sounds.   Pulmonary:      Effort: Pulmonary effort is normal.      Breath sounds: Normal breath sounds.   Abdominal:      General: Bowel sounds are normal. There is no distension.      Palpations: Abdomen is soft.      Tenderness: There is no abdominal tenderness.   Musculoskeletal:         General: No deformity.   Lymphadenopathy:      Cervical: No cervical adenopathy.   Skin:     Coloration: Skin is not pale.      Findings: No rash.             Significant Labs: All pertinent labs within the past 24 hours have been reviewed.    Significant Imaging: I have reviewed all pertinent imaging results/findings within the past 24 hours.

## 2024-03-04 NOTE — PROGRESS NOTES
General Surgery Progress Note    Admit Date: 2/23/2024  S/P: Procedure(s) (LRB):  CABG, REPEAT (N/A)  HARVEST-VEIN-ENDOVASCULAR (Right)  SURGICAL PROCUREMENT,ARTERY,RADIAL,FOR CABG  INSERTION, INTRA-AORTIC BALLOON PUMP (Right)    Post-operative Day: 10 Days Post-Op    Hospital Day: 11    SUBJECTIVE:   Events over the weekend noted.  Mentation improved. Patient reports he was passing flatus.  Denies nausea or vomiting.  Tolerated some clears.    OBJECTIVE:     Vital Signs (Most Recent)  Temp:  [97 °F (36.1 °C)-97.8 °F (36.6 °C)] 97 °F (36.1 °C)  Pulse:  [] 125  Resp:  [16-30] 28  SpO2:  [90 %-97 %] 95 %  BP: (116-153)/(54-79) 148/65    I&Os:  I/O last 3 completed shifts:  In: 2832.3 [P.O.:360; I.V.:761.7; NG/GT:60; IV Piggyback:600]  Out: 6095 [Urine:5095; Drains:1000]    Physical Exam:  Gen: NAD, AAOx3  HEENT: Anicteric sclera  Pulm: unlabored, symmetrical   Abd: Large and round, uncertain about distention but soft and nontender    Laboratory:  CBC:   Recent Labs   Lab 03/04/24  0500   WBC 21.31*   RBC 4.05*   HGB 9.3*   HCT 31.9*   *   MCV 79*   MCH 23.0*   MCHC 29.2*     BMP:   Recent Labs   Lab 03/04/24  0500   *      K 3.9   CL 99   CO2 34*   BUN 21   CREATININE 0.8   CALCIUM 8.7     Labs within the past 24 hours have been reviewed.    ASSESSMENT/PLAN:     Patient Active Problem List    Diagnosis Date Noted    Hypernatremia 03/01/2024    Ileus 02/29/2024    Abdominal distention 02/28/2024    Acute encephalopathy 02/26/2024    Acute blood loss anemia 02/26/2024    Acute respiratory failure with hypoxia and hypercapnia 02/26/2024    Coronary artery disease involving coronary bypass graft of native heart without angina pectoris 01/31/2024    Gangrene 01/11/2024    History of transcatheter aortic valve replacement (TAVR) 12/01/2023    Permanent atrial fibrillation 12/01/2023    Presence of Watchman left atrial appendage closure device 12/01/2023    Severe obesity (BMI 35.0-39.9) with  comorbidity 12/01/2023    Chronic diastolic congestive heart failure 12/01/2023    Shortness of breath 11/28/2023    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 male status post redo CABG with ileus versus SBO  -NG tube previously removed inpatient passing flatus  -has tolerated clear liquids, will allow full liquids  -if will liquids as tolerated over 6 hours without nausea or vomiting may be advanced to regular diet as tolerated

## 2024-03-04 NOTE — ASSESSMENT & PLAN NOTE
Patient's anemia is currently controlled. Has not received any PRBCs to date.. Etiology likely d/t acute blood loss  Current CBC reviewed-   Lab Results   Component Value Date    HGB 9.3 (L) 03/04/2024    HCT 31.9 (L) 03/04/2024     Monitor serial CBC and transfuse if patient becomes hemodynamically unstable, symptomatic or H/H drops below 7/21.

## 2024-03-04 NOTE — ASSESSMENT & PLAN NOTE
S/p watchman device and TAVR.Patient with Long standing persistent (>12 months) atrial fibrillation which is uncontrolled currently with Beta Blocker and Calcium Channel Blocker. Patient is currently in atrial fibrillation.[id number] Score: 4. HASBLED score- 3. Anticoagulation indicated. Anticoagulation done with Lovenox .

## 2024-03-04 NOTE — PROGRESS NOTES
"[facility]  Department of Cardiology  Consult Note      PATIENT NAME: Jose F Hanley  MRN: [id number]  TODAY'S DATE: 03/04/2024  ADMIT DATE: 2/23/2024                          CONSULT REQUESTED BY: Corby Lyn MD    SUBJECTIVE     PRINCIPAL PROBLEM: S/P CABG (coronary artery bypass graft)      REASON FOR CONSULT:  Post CABG     Interval history:    3/4/2024  Patient is feeling better NG tube is removed congested cough is still persisting  Patient is AFIB with RVR.    WBC 21.31  H/H 9.3/31.9  No more NGT        03/03/2024:   Patient was up in the chair this morning tolerated fairly well still with fair amount of NG drainage denies having any new pains    03/02/2024:  Patient is alert awake and responsive.  Some wheezing is persisting chest wall pain is controlled.  NG tube in place for earliest      3/1/2024      Patient is not completely oriented. He is lethargic.  He states "yes" when asked if he is breathing better.  According to I and O still is positive.  Creatinine stable.  Remains in AFIB rates are low 100s      2/29/24    Post op Ileus with NGT  RVR  Still positive on I and O  CXR Not any better      2/28/2024    Off Bipap  Off Precedex  Pacer wires out  Abdomen is distended    2/27/2024    Resting on Face mask. Still with mild sedation family at bedside. VSS.      2/26/24  Pt asleep in bed, remains on BiPAP. Per RN, he is still confused while awake. Currently under light sedation - precedex gtt. Remains paced with temporary pacer - 80s.     2/25/24  Pt was seen resting in bed with Bipap mask in place. Balloon pump was removed this morning per bedside RN.     2/24/24  Pt was seen extubated on BiPAP this morning. Balloon pump. Pressures soft this morning. Per RN, pt was increasingly agitated this morning.       HPI:  Pt is a 78 year old with history of known CAD with previous bypass surgery back in 08/31/23 and then underwent a repeat CABG today, 2/23/23. He Just recently had a LHC as " well with Dr. Bush back on 2/7/24.       Per surgery team:  Operation: Redo coronary artery bypass grafting x3 using left radial artery to left anterior descending coronary artery and separate segments of saphenous vein from the aorta to the diagonal coronary artery and from the aorta to the obtuse marginal coronary artery using a combination of antegrade and retrograde cardioplegia, mild systemic hypothermia, endoscopic vein harvest from the right leg, right radial artery harvest, and insertion of right femoral arterial intra-aortic balloon pump with fluoroscopic guidance and complete pericardiectomy due to severe adhesive pericarditis         Review of patient's allergies indicates:   Allergen Reactions    Adhes. band-tape-benzalkonium Rash     Pt stated it caused blisters    Statins-hmg-coa reductase inhibitors Other (See Comments)     Statin Intolerance (joint pain)       Past Medical History:   Diagnosis Date    A-fib     Allergies 2020    gets allergy shots    Arthritis 1973    right knee    Back pain     COPD (chronic obstructive pulmonary disease)     Diastolic heart failure     GERD (gastroesophageal reflux disease) 2015    HLD (hyperlipidemia)     HTN (hypertension)     Hx of LASIK     Hypothyroidism, unspecified 2012    Inflammatory disease of prostate, unspecified     out of medicine    Mild intermittent asthma, uncomplicated     Myocardial infarction     Neuropathy     On home oxygen therapy     3L NC  at night    PAD (peripheral artery disease)     Staph skin infection 2016    right thigh    TIA (transient ischemic attack) 2013     Past Surgical History:   Procedure Laterality Date    AORTOGRAPHY WITH SERIALOGRAPHY N/A 08/31/2023    Procedure: AORTOGRAM, WITH SERIALOGRAPHY;  Surgeon: Gary Thomas MD;  Location: [facility] CATH/EP LAB;  Service: Peripheral Vascular;  Laterality: N/A;    CATARACT EXTRACTION Bilateral 2014    CORONARY ANGIOGRAPHY INCLUDING BYPASS GRAFTS WITH CATHETERIZATION OF LEFT  HEART Left 01/12/2024    Procedure: ANGIOGRAM, CORONARY, INCLUDING BYPASS GRAFT, WITH LEFT HEART CATHETERIZATION;  Surgeon: Gregg Bush MD;  Location: [facility] CATH/EP LAB;  Service: Cardiology;  Laterality: Left;    CORONARY ARTERY BYPASS GRAFT  2010    ENDOSCOPIC HARVEST OF VEIN Right 2/23/2024    Procedure: HARVEST-VEIN-ENDOVASCULAR;  Surgeon: Gary Thomas MD;  Location: [facility] OR;  Service: Cardiothoracic;  Laterality: Right;    INSERTION OF IMPLANTABLE LOOP RECORDER  2011    INSERTION OF INTRA-AORTIC BALLOON ASSIST DEVICE Right 2/23/2024    Procedure: INSERTION, INTRA-AORTIC BALLOON PUMP;  Surgeon: Gary Thomas MD;  Location: [facility] OR;  Service: Cardiothoracic;  Laterality: Right;    INSERTION OF PACEMAKER  2021    does not have card with him for preadmit    INSTANTANEOUS WAVE-FREE RATIO (IFR) N/A 01/12/2024    Procedure: Instantaneous Wave-Free Ratio (IFR);  Surgeon: Gregg Bush MD;  Location: [facility] CATH/EP LAB;  Service: Cardiology;  Laterality: N/A;    PTA, ANTERIOR TIBIAL Left 08/31/2023    Procedure: PTA, Anterior Tibial;  Surgeon: Gary Thomas MD;  Location: [facility] CATH/EP LAB;  Service: Peripheral Vascular;  Laterality: Left;    PTA, SUPERFICIAL FEMORAL ARTERY Left 08/31/2023    Procedure: PTA, Superficial Femoral Artery;  Surgeon: Gary Thomas MD;  Location: [facility] CATH/EP LAB;  Service: Peripheral Vascular;  Laterality: Left;    REPEAT CORONARY ARTERY BYPASS GRAFTING N/A 2/23/2024    Procedure: CABG, REPEAT;  Surgeon: Gary Thomas MD;  Location: [facility] OR;  Service: Cardiothoracic;  Laterality: N/A;    SPLENECTOMY  2016    STENT, ANTERIOR TIBIAL Left 08/31/2023    Procedure: Stent, Anterior Tibial;  Surgeon: Gary Thomas MD;  Location: [facility] CATH/EP LAB;  Service: Peripheral Vascular;  Laterality: Left;    SURGICAL PROCUREMENT, ARTERY, RADIAL, FOR CABG  2/23/2024    Procedure: SURGICAL PROCUREMENT,ARTERY,RADIAL,FOR CABG;  Surgeon: Gary Thomas  MD ANA;  Location: [facility] OR;  Service: Cardiothoracic;;    watchman heart device  2019     Social History     Tobacco Use    Smoking status: Former     Current packs/day: 0.00     Types: Cigarettes     Quit date: 1987     Years since quittin.2    Smokeless tobacco: Never   Substance Use Topics    Alcohol use: Not Currently     Alcohol/week: 21.0 standard drinks of alcohol     Types: 21 Drinks containing 0.5 oz of alcohol per week     Comment: ed roberto mixed drinks    Drug use: No        REVIEW OF SYSTEMS    As mentioned in HPI    OBJECTIVE     VITAL SIGNS (Most Recent)  Temp: 97 °F (36.1 °C) (24 0701)  Pulse: (!) 125 (24 0814)  Resp: (!) 28 (24 08)  BP: (!) 140/77 (24 0800)  SpO2: 95 % (24)    VENTILATION STATUS  Resp: (!) 28 (24)  SpO2: 95 % (24)  Oxygen Concentration (%):  [40] 40        I & O (Last 24H):  Intake/Output Summary (Last 24 hours) at 3/4/2024 0917  Last data filed at 3/4/2024 0545  Gross per 24 hour   Intake 2652.26 ml   Output 3120 ml   Net -467.74 ml       WEIGHTS  Wt Readings from Last 3 Encounters:   24 0426 125.1 kg (275 lb 12.7 oz)   24 1540 125 kg (275 lb 9.2 oz)   24 0948 124.5 kg (274 lb 6.4 oz)   24 0718 120.7 kg (266 lb)       PHYSICAL EXAM    CONSTITUTIONAL: NAD,l more alert and responsive answering questions appropriately   HEENT:  Generalized noted   NECK: no JVD  LUNGS: coarse crackles diminished bases with bilateral expiratory wheeze still persisting.    HEART irregular rhythm no distinct S3 tachycardic rhythm   ABDOMEN: distended better no guarding or rebound, nontender  EXTREMITIES:  edema  SKIN: No rash surgical dressings clean and dry      HOME MEDICATIONS:  No current facility-administered medications on file prior to encounter.     Current Outpatient Medications on File Prior to Encounter   Medication Sig Dispense Refill    acetaminophen (TYLENOL) 325 MG tablet Take 650 mg by mouth every  6 (six) hours as needed.      albuterol (PROVENTIL/VENTOLIN HFA) 90 mcg/actuation inhaler Inhale 2 puffs into the lungs every 6 (six) hours as needed.      aspirin 81 MG Chew Take 81 mg by mouth once daily.      betamethasone valerate 0.1% (VALISONE) 0.1 % Lotn Apply to scalp bid prn rash      cyanocobalamin 500 MCG tablet 500 mcg.      cyclobenzaprine (FLEXERIL) 10 MG tablet Take 10 mg by mouth every 8 (eight) hours as needed.      diphenhydrAMINE (BENADRYL) 50 MG capsule 50 mg every 6 (six) hours as needed.      doxepin (SINEQUAN) 50 MG capsule Take 50 mg by mouth every evening.      DULoxetine (CYMBALTA) 60 MG capsule 60 mg nightly.      EPINEPHrine (EPIPEN) 0.3 mg/0.3 mL AtIn INJECT 0.3MG/0.3ML SUBCUTANEOUSLY (UNDER THE SKIN)  AS NEEDED FOR ALLERGIC REACTIONS SELF-INJECTOR PEN      furosemide (LASIX) 20 MG tablet Take 20 mg by mouth once daily.      gabapentin (NEURONTIN) 400 MG capsule 800 mg 2 (two) times daily.      hydrocodone-homatropine 5-1.5 mg/5 ml (HYCODAN) 5-1.5 mg/5 mL Syrp Take by mouth every 6 (six) hours as needed. cough      levothyroxine (SYNTHROID) 50 MCG tablet 50 mcg.      metOLazone (ZAROXOLYN) 5 MG tablet 5 mg daily as needed. For weight gain >2lbs      montelukast (SINGULAIR) 10 mg tablet 10 mg once daily.      nitroGLYCERIN (NITROSTAT) 0.4 MG SL tablet Place 0.4 mg under the tongue every 5 (five) minutes as needed.      omega-3 fatty acids/fish oil (FISH OIL-OMEGA-3 FATTY ACIDS) 300-1,000 mg capsule Take 1 capsule by mouth 2 (two) times daily.      omeprazole (PRILOSEC) 20 MG capsule 40 mg once daily.      potassium chloride SA (K-DUR,KLOR-CON) 20 MEQ tablet 20 mEq nightly.      ranolazine (RANEXA) 1,000 mg Tb12 Take 1 tablet (1,000 mg total) by mouth 2 (two) times daily. 180 tablet 3    senna-docusate 8.6-50 mg (PERICOLACE) 8.6-50 mg per tablet nightly.      spironolactone (ALDACTONE) 50 MG tablet Take 50 mg by mouth once daily.      tamsulosin (FLOMAX) 0.4 mg Cap 0.4 mg.       TESTOSTERONE PROPIONATE, BULK, MISC Inject 1 each as directed every 14 (fourteen) days.      TOPROL XL 25 mg 24 hr tablet Take 25 mg by mouth 2 (two) times daily.      torsemide (DEMADEX) 20 MG Tab Take 1 tablet (20 mg total) by mouth once daily. (Patient not taking: Reported on 12/21/2023) 30 tablet 11       SCHEDULED MEDS:   aspirin  81 mg Oral Daily    DULoxetine  60 mg Oral Nightly    enoxparin  60 mg Subcutaneous Q24H (prophylaxis, 1700)    ezetimibe  10 mg Oral QHS    furosemide (LASIX) injection  40 mg Intravenous Q8H    guaiFENesin  1,200 mg Oral BID    ipratropium  0.5 mg Nebulization Q6H    levalbuterol  1.25 mg Nebulization Q6H    levothyroxine  25 mcg Intravenous Daily    losartan  25 mg Oral Daily    metoprolol tartrate  50 mg Oral TID    montelukast  10 mg Oral Daily    pantoprazole  40 mg Oral Before breakfast    piperacillin-tazobactam (Zosyn) IV (PEDS and ADULTS) (extended infusion is not appropriate)  3.375 g Intravenous Q8H    sodium chloride 0.9%  10 mL Intravenous Q6H    spironolactone  25 mg Oral Daily    tamsulosin  0.4 mg Oral Daily       CONTINUOUS INFUSIONS:   TPN ADULT CENTRAL LINE CUSTOM 100 mL/hr at 03/03/24 2104       PRN MEDS:0.9%  NaCl infusion (for blood administration), albuterol sulfate, calcium gluconate IVPB, calcium gluconate IVPB, calcium gluconate IVPB, dextrose 50% in water (D50W), dextrose 50% in water (D50W), hydrALAZINE, HYDROcodone-acetaminophen, HYDROmorphone, magnesium sulfate IVPB, magnesium sulfate IVPB, metoprolol, ondansetron, potassium bicarbonate, potassium bicarbonate, potassium bicarbonate, potassium chloride in water **AND** potassium chloride in water **AND** potassium chloride in water, Flushing PICC/Midline Protocol **AND** sodium chloride 0.9% **AND** sodium chloride 0.9%, sodium phosphate 15 mmol in dextrose 5 % (D5W) 250 mL IVPB, sodium phosphate 20.01 mmol in dextrose 5 % (D5W) 250 mL IVPB, sodium phosphate 30 mmol in dextrose 5 % (D5W) 250 mL  "IVPB    LABS AND DIAGNOSTICS     CBC LAST 3 DAYS  Recent Labs   Lab 03/02/24  0454 03/03/24  0420 03/04/24  0500   WBC 17.03* 17.28* 21.31*   RBC 3.71* 3.89* 4.05*   HGB 8.5* 8.9* 9.3*   HCT 29.6* 31.0* 31.9*   MCV 80* 80* 79*   MCH 22.9* 22.9* 23.0*   MCHC 28.7* 28.7* 29.2*   RDW 22.3* 22.2* 22.3*   * 521* 585*   MPV 10.6 10.8 10.5   GRAN 79.0*  13.5* 75.7*  13.1* 82.6*  17.6*   LYMPH 5.5*  0.9* 8.6*  1.5 5.3*  1.1   MONO 8.3  1.4* 8.9  1.5* 6.6  1.4*   BASO 0.08 0.09 0.11   NRBC 9* 7* 5*       COAGULATION LAST 3 DAYS  No results for input(s): "LABPT", "INR", "APTT" in the last 168 hours.    CHEMISTRY LAST 3 DAYS  Recent Labs   Lab 02/29/24  0908 03/01/24  0517 03/01/24  0922 03/02/24  0454 03/02/24  0832 03/02/24  1658 03/03/24  0420 03/03/24  1331 03/04/24  0500   NA  --    < >  --  145  --   --  142  --  141   K  --    < >  --  3.6  --    < > 3.5 3.7 3.9   CL  --    < >  --  104  --   --  101  --  99   CO2  --    < >  --  35*  --   --  37*  --  34*   ANIONGAP  --    < >  --  6*  --   --  4*  --  8   BUN  --    < >  --  30*  --   --  24*  --  21   CREATININE  --    < >  --  1.0  --   --  0.9  --  0.8   GLU  --    < >  --  174*  --   --  143*  --  165*   CALCIUM  --    < >  --  8.1*  --   --  8.4*  --  8.7   PH 7.425  --  7.428  --  7.409  --   --   --   --    MG  --    < >  --  2.1  --   --  1.9 2.2 2.1    < > = values in this interval not displayed.       CARDIAC PROFILE LAST 3 DAYS  No results for input(s): "BNP", "CPK", "CPKMB", "LDH", "TROPONINI", "TROPONINIHS" in the last 168 hours.    ENDOCRINE LAST 3 DAYS  Recent Labs   Lab 03/01/24  1001   TSH 2.171       LAST ARTERIAL BLOOD GAS  ABG  Recent Labs   Lab 03/02/24  0832   PH 7.409   PO2 71*   PCO2 56.5*   HCO3 35.7*   BE 11*       LAST 7 DAYS MICROBIOLOGY   Microbiology Results (last 7 days)       Procedure Component Value Units Date/Time    Blood culture [[id number]] Collected: 03/01/24 1001    Order Status: Completed Specimen: Blood " Updated: 03/03/24 1232     Blood Culture, Routine No Growth to date      No Growth to date      No Growth to date    MRSA Screen by PCR [[id number]] Collected: 03/01/24 1112    Order Status: Completed Updated: 03/01/24 1253     MRSA SCREEN BY PCR Negative    MRSA Screen by PCR [[id number]] Collected: 03/01/24 0940    Order Status: Canceled Specimen: Nasopharyngeal Swab from Nasal             MOST RECENT IMAGING  X-Ray Chest AP Portable  HISTORY: picc placement    FINDINGS: Portable chest radiograph at 1456 hours and 1458 compared to 0615 hours shows interval insertion of a left PICC. The first image shows the distal tip of the PICC overlying the left subclavian vein, with the second image showing the distal tip of the PICC overlying the SVC, projecting inferolaterally. There is no other significant interval change.    IMPRESSION: Interval insertion of a left PICC as described.    Electronically signed by:  Jeison Lowe MD  03/02/2024 03:24 PM CST Workstation: [id number]  X-Ray Chest AP Portable  HISTORY: Atelectasis    FINDINGS: Portable chest radiograph at 0615 hours compared to 03/01/2024 shows interval removal of the mediastinal and thoracostomy drains, with nasogastric tube remaining. There are median sternotomy wires and changes of transarterial aortic valvuloplasty, with stable enlarged cardiac silhouette and normal pulmonary vascularity.    The lungs are symmetrically expanded, with no new pleural or parenchymal abnormality. Bilateral lower lung opacities suggesting subsegmental atelectasis are unchanged.    IMPRESSION: Interval removal of the mediastinal and thoracostomy drains, with no other significant change.    Electronically signed by:  Jeison Lowe MD  03/02/2024 09:35 AM CST Workstation: [id number]      ECHOCARDIOGRAM RESULTS (last 5)  Results for orders placed during the hospital encounter of 12/13/23    Echo    Interpretation Summary    Left Ventricle: The left ventricle is normal in size. Mildly  increased wall thickness. There is mild concentric hypertrophy. Normal wall motion. There is normal systolic function with a visually estimated ejection fraction of 65 - 70%. There is normal diastolic function.    Right Ventricle: Mild right ventricular enlargement. Wall thickness is normal. Right ventricle wall motion  is normal. Systolic function is normal.    Left Atrium: Left atrium is moderately dilated.    Right Atrium: Right atrium is mildly dilated.    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    Tricuspid Valve: There is mild regurgitation with a centrally directed jet.    Pulmonic Valve: There is mild regurgitation with a centrally directed jet.    Pulmonary Artery: There is mild pulmonary hypertension. The estimated pulmonary artery systolic pressure is 40 mmHg.    IVC/SVC: Intermediate venous pressure at 8 mmHg.      CURRENT/PREVIOUS VISIT EKG  Results for orders placed or performed during the hospital encounter of 02/23/24   EKG 12-LEAD    Collection Time: 02/23/24  1:53 PM   Result Value Ref Range    QRS Duration 232 ms    OHS QTC Calculation 613 ms    Narrative    Test Reason : Z95.1,Z95.1,    Vent. Rate : 080 BPM     Atrial Rate : 087 BPM     P-R Int : 000 ms          QRS Dur : 232 ms      QT Int : 532 ms       P-R-T Axes : 000 168 250 degrees     QTc Int : 613 ms    Ventricular-paced rhythm  occasional atrila pacing  Abnormal ECG  When compared with ECG of 21-FEB-2024 09:33,  Electronic ventricular pacemaker has replaced Atrial fibrillation  Confirmed by Eleazar WOO, Gregg NEWTON ([id number]) on 2/24/2024 7:12:58 AM    Referred By: OLLIE RIBEIRO           Confirmed By:Gregg Bush MD           ASSESSMENT/PLAN:     Active Hospital Problems    Diagnosis    *S/P CABG (coronary artery bypass graft) - x2 09/2010 - LIMA to LAD; SVG to OMB; grafts occluded 08/2012    Hypernatremia     -      Ileus    Abdominal distention     -patient has increasing abdominal distention  and discomfort  -check KUB  -NG tube to LIS  -possible postoperative ileus  -increase mobility as tolerated      Acute encephalopathy    Acute blood loss anemia    Acute respiratory failure with hypoxia and hypercapnia    Severe obesity (BMI 35.0-39.9) with comorbidity    Permanent atrial fibrillation    CAD (coronary artery disease), native coronary artery    Dyslipidemia       ASSESSMENT & PLAN:     MVCAD S/P redo CABG yesterday 2/23/24,  left radial artery to LAD, SVG to Dx, SVG to OM  HFpEF  HTN/ dyslipidemia   Statin intolerance  AFIB H/O Watchman with RVR  H/O TAVR  Leadless PPM in situ  Distended Abdomen Post op Ileus now with NGT  Morbid obesity      RECOMMENDATIONS:    3/4/2024    Start Cardizem gtt titrating will switch to oral Cardizem and adequate p.o. intake is maintained  Start Lasix drip @ 10 mg/hr  Up in chair as tolerated  Recommend to continue low doses of metoprolol tartrate at least 25 mg PO BID  Continue Aldactone 25 mg twice daily   Continue ASA and Statin      03/03/2024:  Patient appears to have been persistent chronic atrial fibrillation at least since November 23 and before that has lead less placed pacemaker in Situ  Continue on present therapy to include Lasix IV, losartan 25 mg daily, metoprolol tartrate 50 mg 3 times a day and spironolactone 25 mg daily monitor hemoglobin and electrolytes carefully  Continue on aggressive pulmonary treatments  Discussed with the nursing staff patient needs to be in a big boy chair      03/02/2024:  Echo she was no significant pericardial effusion.  Recommend to continue on 2 metoprolol tartrate 50 mg 3 times a day, maintain on losartan 25 mg daily  Continue on Aldactone 25 mg a day  Continue nebulizer treatment with Xopenex patient has history of Watchman in place therefore no need for long-term oral anticoagulation therapy as far as AFib is concerned  Continue aggressive pulmonary tolerate,   Out of bed-to-chair      Andria Duffy NP  Department of  Cardiology  Date of Service: 03/04/2024      Discussed this above care plan with Dr. Ang as well as the nursing staff  When ileus improves will switch to oral medication  I have personally interviewed and examined the patient, I have reviewed the Nurse Practitioner's history and physical, assessment, and plan. I have personally evaluated the patient at bedside and agree with the findings and made appropriate changes as necessary in recommendations.    Jaun Miller MD.    Department of Cardiology  Formerly [facility], [facility]  Date of Service:  03/04/2024

## 2024-03-04 NOTE — PLAN OF CARE
Problem: Parenteral Nutrition  Goal: Effective Intravenous Nutrition Therapy Delivery  Outcome: Ongoing, Progressing  Intervention: Optimize Intravenous Nutrition Delivery  Flowsheets (Taken 3/4/2024 1214)  Nutrition Support Management: parenteral nutrition continued as ordered     PARENTERAL NUTRITION PROGRESS NOTE:      Parenteral Nutrition Day # 2  Diagnosis/Indication for PN: post op ileus  IV Access: PICC  Diet/Tube Feeding: liquid diet         Admixture Type: 2:1  Infusion Rate and Frequency: 100 ml/hr over 24 hrs with 250 ml of 20% lipid to run over 12 hrs.  Patient Acuity: critical    PN Composition:   120 grams Amino Acid, 90 grams Dextrose, and 50 grams Lipid.    Today's TPN provides 1286 kcal.  *Dextrose is started at less than full dextrose goal to reduce risk for Refeeding Syndrome. Dextrose content will be advanced as appropriate over the next few days.    Please see order for electrolytes and additives content and adjustments.   *The Nutrition Support Team will continue to monitor electrolytes daily and adjust parenteral nutrition as warranted.

## 2024-03-04 NOTE — PT/OT/SLP PROGRESS
Speech Language Pathology      Jose F Hanley  MRN: [id number]    Patient not seen today secondary to  (Pt not cleared for solids. Remains on clear liquid diet 2° N/V. Will assess tolerance of upgraded textures once cleared by MD.). Will follow-up 3/5/24.

## 2024-03-04 NOTE — CARE UPDATE
03/03/24 1943   Patient Assessment/Suction   Level of Consciousness (AVPU) alert   Respiratory Effort Unlabored   Expansion/Accessory Muscles/Retractions expansion symmetric   All Lung Fields Breath Sounds crackles, coarse   Rhythm/Pattern, Respiratory depth regular;pattern regular   Cough Frequency infrequent   Cough Type good;nonproductive   PRE-TX-O2   Device (Oxygen Therapy) Oxymask   $ Is the patient on Low Flow Oxygen? Yes   Flow (L/min) 7   SpO2 97 %   Pulse Oximetry Type Continuous   $ Pulse Oximetry - Multiple Charge Pulse Oximetry - Multiple   Pulse 92   Resp 16   Aerosol Therapy   $ Aerosol Therapy Charges Aerosol Treatment   Daily Review of Necessity (SVN) completed   Respiratory Treatment Status (SVN) given   Treatment Route (SVN) in-line   Patient Position (SVN) HOB elevated   Post Treatment Assessment (SVN) breath sounds unchanged   Signs of Intolerance (SVN) none   Breath Sounds Post-Respiratory Treatment   Throughout All Fields Post-Treatment All Fields   Throughout All Fields Post-Treatment no change   Post-treatment Heart Rate (beats/min) 95   Post-treatment Resp Rate (breaths/min) 16   Intrapulmonary Percussive Ventilation/Metaneb   $ IPV Administration Therapy   Daily Review of Necessity (IPV) completed   Route (IPV) mouthpiece   IPV Operational Pressure (PSI) 25   IPV Duration Total (min) 10   Patient Position (IPV) HOB elevated   Post Treatment Assessment (IPV) breath sounds unchanged   Signs of Intolerance (IPV) none   Education   $ Education DME Oxygen;15 min

## 2024-03-05 PROBLEM — J44.1 CHRONIC OBSTRUCTIVE PULMONARY DISEASE WITH ACUTE EXACERBATION: Status: RESOLVED | Noted: 2020-05-07 | Resolved: 2024-03-05

## 2024-03-05 PROBLEM — R06.02 SHORTNESS OF BREATH: Status: RESOLVED | Noted: 2023-11-28 | Resolved: 2024-03-05

## 2024-03-05 PROBLEM — R73.9 HYPERGLYCEMIA: Status: ACTIVE | Noted: 2024-03-05

## 2024-03-05 PROBLEM — E87.1 HYPONATREMIA: Status: ACTIVE | Noted: 2024-03-01

## 2024-03-05 PROBLEM — I25.810 CORONARY ARTERY DISEASE INVOLVING CORONARY BYPASS GRAFT OF NATIVE HEART WITHOUT ANGINA PECTORIS: Status: RESOLVED | Noted: 2024-01-31 | Resolved: 2024-03-05

## 2024-03-05 PROBLEM — I96 GANGRENE: Status: RESOLVED | Noted: 2024-01-11 | Resolved: 2024-03-05

## 2024-03-05 PROBLEM — J18.9 RECURRENT PNEUMONIA: Status: RESOLVED | Noted: 2020-05-07 | Resolved: 2024-03-05

## 2024-03-05 LAB
ANION GAP SERPL CALC-SCNC: 9 MMOL/L (ref 8–16)
BASOPHILS # BLD AUTO: 0.12 K/UL (ref 0–0.2)
BASOPHILS NFR BLD: 0.5 % (ref 0–1.9)
BUN SERPL-MCNC: 22 MG/DL (ref 8–23)
CALCIUM SERPL-MCNC: 8.3 MG/DL (ref 8.7–10.5)
CHLORIDE SERPL-SCNC: 95 MMOL/L (ref 95–110)
CO2 SERPL-SCNC: 30 MMOL/L (ref 23–29)
CREAT SERPL-MCNC: 0.9 MG/DL (ref 0.5–1.4)
DIFFERENTIAL METHOD BLD: ABNORMAL
EOSINOPHIL # BLD AUTO: 0.9 K/UL (ref 0–0.5)
EOSINOPHIL NFR BLD: 4 % (ref 0–8)
ERYTHROCYTE [DISTWIDTH] IN BLOOD BY AUTOMATED COUNT: 22.1 % (ref 11.5–14.5)
EST. GFR  (NO RACE VARIABLE): >60 ML/MIN/1.73 M^2
GLUCOSE SERPL-MCNC: 107 MG/DL (ref 70–110)
GLUCOSE SERPL-MCNC: 109 MG/DL (ref 70–110)
GLUCOSE SERPL-MCNC: 249 MG/DL (ref 70–110)
HCT VFR BLD AUTO: 32.1 % (ref 40–54)
HGB BLD-MCNC: 9.5 G/DL (ref 14–18)
IMM GRANULOCYTES # BLD AUTO: 0.24 K/UL (ref 0–0.04)
IMM GRANULOCYTES NFR BLD AUTO: 1.1 % (ref 0–0.5)
LYMPHOCYTES # BLD AUTO: 1.9 K/UL (ref 1–4.8)
LYMPHOCYTES NFR BLD: 8.1 % (ref 18–48)
MCH RBC QN AUTO: 22.8 PG (ref 27–31)
MCHC RBC AUTO-ENTMCNC: 29.6 G/DL (ref 32–36)
MCV RBC AUTO: 77 FL (ref 82–98)
MONOCYTES # BLD AUTO: 1.6 K/UL (ref 0.3–1)
MONOCYTES NFR BLD: 7 % (ref 4–15)
NEUTROPHILS # BLD AUTO: 18 K/UL (ref 1.8–7.7)
NEUTROPHILS NFR BLD: 79.3 % (ref 38–73)
NRBC BLD-RTO: 6 /100 WBC
OHS QRS DURATION: 184 MS
OHS QTC CALCULATION: 467 MS
PHOSPHATE SERPL-MCNC: 2.4 MG/DL (ref 2.7–4.5)
PLATELET # BLD AUTO: 671 K/UL (ref 150–450)
PMV BLD AUTO: 10.7 FL (ref 9.2–12.9)
POTASSIUM SERPL-SCNC: 3.6 MMOL/L (ref 3.5–5.1)
RBC # BLD AUTO: 4.17 M/UL (ref 4.6–6.2)
SODIUM SERPL-SCNC: 134 MMOL/L (ref 136–145)
WBC # BLD AUTO: 22.71 K/UL (ref 3.9–12.7)

## 2024-03-05 PROCEDURE — 99024 POSTOP FOLLOW-UP VISIT: CPT | Mod: POP,,, | Performed by: PHYSICIAN ASSISTANT

## 2024-03-05 PROCEDURE — 99900035 HC TECH TIME PER 15 MIN (STAT)

## 2024-03-05 PROCEDURE — 85025 COMPLETE CBC W/AUTO DIFF WBC: CPT | Performed by: THORACIC SURGERY (CARDIOTHORACIC VASCULAR SURGERY)

## 2024-03-05 PROCEDURE — 97535 SELF CARE MNGMENT TRAINING: CPT

## 2024-03-05 PROCEDURE — 25000003 PHARM REV CODE 250: Performed by: INTERNAL MEDICINE

## 2024-03-05 PROCEDURE — 25000003 PHARM REV CODE 250: Performed by: HOSPITALIST

## 2024-03-05 PROCEDURE — 80048 BASIC METABOLIC PNL TOTAL CA: CPT | Performed by: THORACIC SURGERY (CARDIOTHORACIC VASCULAR SURGERY)

## 2024-03-05 PROCEDURE — 94640 AIRWAY INHALATION TREATMENT: CPT

## 2024-03-05 PROCEDURE — 63600175 PHARM REV CODE 636 W HCPCS: Performed by: INTERNAL MEDICINE

## 2024-03-05 PROCEDURE — 25000242 PHARM REV CODE 250 ALT 637 W/ HCPCS: Performed by: INTERNAL MEDICINE

## 2024-03-05 PROCEDURE — 63600175 PHARM REV CODE 636 W HCPCS: Performed by: NURSE PRACTITIONER

## 2024-03-05 PROCEDURE — A4216 STERILE WATER/SALINE, 10 ML: HCPCS | Performed by: INTERNAL MEDICINE

## 2024-03-05 PROCEDURE — 63600175 PHARM REV CODE 636 W HCPCS: Performed by: HOSPITALIST

## 2024-03-05 PROCEDURE — 99900031 HC PATIENT EDUCATION (STAT)

## 2024-03-05 PROCEDURE — 27000221 HC OXYGEN, UP TO 24 HOURS

## 2024-03-05 PROCEDURE — 25000003 PHARM REV CODE 250: Performed by: NURSE PRACTITIONER

## 2024-03-05 PROCEDURE — 92526 ORAL FUNCTION THERAPY: CPT

## 2024-03-05 PROCEDURE — 84100 ASSAY OF PHOSPHORUS: CPT | Performed by: INTERNAL MEDICINE

## 2024-03-05 PROCEDURE — 94761 N-INVAS EAR/PLS OXIMETRY MLT: CPT

## 2024-03-05 PROCEDURE — 25000003 PHARM REV CODE 250: Performed by: THORACIC SURGERY (CARDIOTHORACIC VASCULAR SURGERY)

## 2024-03-05 PROCEDURE — 21000000 HC CCU ICU ROOM CHARGE

## 2024-03-05 PROCEDURE — 99233 SBSQ HOSP IP/OBS HIGH 50: CPT | Mod: ,,, | Performed by: INTERNAL MEDICINE

## 2024-03-05 PROCEDURE — 94660 CPAP INITIATION&MGMT: CPT

## 2024-03-05 RX ORDER — IBUPROFEN 200 MG
24 TABLET ORAL
Status: DISCONTINUED | OUTPATIENT
Start: 2024-03-05 | End: 2024-03-07 | Stop reason: HOSPADM

## 2024-03-05 RX ORDER — INSULIN ASPART 100 [IU]/ML
0-10 INJECTION, SOLUTION INTRAVENOUS; SUBCUTANEOUS
Status: DISCONTINUED | OUTPATIENT
Start: 2024-03-05 | End: 2024-03-07 | Stop reason: HOSPADM

## 2024-03-05 RX ORDER — DILTIAZEM HYDROCHLORIDE 180 MG/1
180 CAPSULE, COATED, EXTENDED RELEASE ORAL DAILY
Status: DISCONTINUED | OUTPATIENT
Start: 2024-03-05 | End: 2024-03-07 | Stop reason: HOSPADM

## 2024-03-05 RX ORDER — FUROSEMIDE 10 MG/ML
40 INJECTION INTRAMUSCULAR; INTRAVENOUS
Status: DISCONTINUED | OUTPATIENT
Start: 2024-03-05 | End: 2024-03-07

## 2024-03-05 RX ORDER — GLUCAGON 1 MG
1 KIT INJECTION
Status: DISCONTINUED | OUTPATIENT
Start: 2024-03-05 | End: 2024-03-07 | Stop reason: HOSPADM

## 2024-03-05 RX ORDER — IBUPROFEN 200 MG
16 TABLET ORAL
Status: DISCONTINUED | OUTPATIENT
Start: 2024-03-05 | End: 2024-03-07 | Stop reason: HOSPADM

## 2024-03-05 RX ADMIN — LEVOTHYROXINE SODIUM ANHYDROUS 25 MCG: 100 INJECTION, POWDER, LYOPHILIZED, FOR SOLUTION INTRAVENOUS at 08:03

## 2024-03-05 RX ADMIN — SPIRONOLACTONE 25 MG: 25 TABLET ORAL at 09:03

## 2024-03-05 RX ADMIN — IPRATROPIUM BROMIDE 0.5 MG: 0.5 SOLUTION RESPIRATORY (INHALATION) at 08:03

## 2024-03-05 RX ADMIN — PIPERACILLIN AND TAZOBACTAM 3.38 G: 3; .375 INJECTION, POWDER, FOR SOLUTION INTRAVENOUS; PARENTERAL at 09:03

## 2024-03-05 RX ADMIN — DEXTROSE MONOHYDRATE 15 MG/HR: 50 INJECTION, SOLUTION INTRAVENOUS at 06:03

## 2024-03-05 RX ADMIN — PANTOPRAZOLE SODIUM 40 MG: 40 TABLET, DELAYED RELEASE ORAL at 06:03

## 2024-03-05 RX ADMIN — GUAIFENESIN 1200 MG: 600 TABLET, EXTENDED RELEASE ORAL at 09:03

## 2024-03-05 RX ADMIN — MONTELUKAST 10 MG: 10 TABLET, FILM COATED ORAL at 08:03

## 2024-03-05 RX ADMIN — IPRATROPIUM BROMIDE 0.5 MG: 0.5 SOLUTION RESPIRATORY (INHALATION) at 01:03

## 2024-03-05 RX ADMIN — FUROSEMIDE 40 MG: 10 INJECTION, SOLUTION INTRAVENOUS at 11:03

## 2024-03-05 RX ADMIN — DILTIAZEM HYDROCHLORIDE 180 MG: 180 CAPSULE, COATED, EXTENDED RELEASE ORAL at 01:03

## 2024-03-05 RX ADMIN — EZETIMIBE 10 MG: 10 TABLET ORAL at 09:03

## 2024-03-05 RX ADMIN — PIPERACILLIN AND TAZOBACTAM 3.38 G: 3; .375 INJECTION, POWDER, FOR SOLUTION INTRAVENOUS; PARENTERAL at 03:03

## 2024-03-05 RX ADMIN — ENOXAPARIN SODIUM 40 MG: 100 INJECTION SUBCUTANEOUS at 05:03

## 2024-03-05 RX ADMIN — DEXTROSE MONOHYDRATE 15 MG/HR: 50 INJECTION, SOLUTION INTRAVENOUS at 12:03

## 2024-03-05 RX ADMIN — INSULIN ASPART 4 UNITS: 100 INJECTION, SOLUTION INTRAVENOUS; SUBCUTANEOUS at 11:03

## 2024-03-05 RX ADMIN — METOPROLOL TARTRATE 25 MG: 25 TABLET, FILM COATED ORAL at 08:03

## 2024-03-05 RX ADMIN — METOPROLOL TARTRATE 25 MG: 25 TABLET, FILM COATED ORAL at 09:03

## 2024-03-05 RX ADMIN — SODIUM PHOSPHATE, MONOBASIC, MONOHYDRATE AND SODIUM PHOSPHATE, DIBASIC, ANHYDROUS 15 MMOL: 142; 276 INJECTION, SOLUTION INTRAVENOUS at 07:03

## 2024-03-05 RX ADMIN — LEVALBUTEROL HYDROCHLORIDE 1.25 MG: 1.25 SOLUTION RESPIRATORY (INHALATION) at 08:03

## 2024-03-05 RX ADMIN — FUROSEMIDE 40 MG: 10 INJECTION, SOLUTION INTRAVENOUS at 05:03

## 2024-03-05 RX ADMIN — DULOXETINE HYDROCHLORIDE 60 MG: 30 CAPSULE, DELAYED RELEASE ORAL at 09:03

## 2024-03-05 RX ADMIN — TAMSULOSIN HYDROCHLORIDE 0.4 MG: 0.4 CAPSULE ORAL at 08:03

## 2024-03-05 RX ADMIN — INSULIN DETEMIR 5 UNITS: 100 INJECTION, SOLUTION SUBCUTANEOUS at 11:03

## 2024-03-05 RX ADMIN — IPRATROPIUM BROMIDE 0.5 MG: 0.5 SOLUTION RESPIRATORY (INHALATION) at 12:03

## 2024-03-05 RX ADMIN — FUROSEMIDE 10 MG/HR: 10 INJECTION, SOLUTION INTRAMUSCULAR; INTRAVENOUS at 07:03

## 2024-03-05 RX ADMIN — SODIUM CHLORIDE, PRESERVATIVE FREE 10 ML: 5 INJECTION INTRAVENOUS at 05:03

## 2024-03-05 RX ADMIN — PIPERACILLIN AND TAZOBACTAM 3.38 G: 3; .375 INJECTION, POWDER, FOR SOLUTION INTRAVENOUS; PARENTERAL at 10:03

## 2024-03-05 RX ADMIN — SODIUM CHLORIDE, PRESERVATIVE FREE 10 ML: 5 INJECTION INTRAVENOUS at 12:03

## 2024-03-05 RX ADMIN — LEVALBUTEROL HYDROCHLORIDE 1.25 MG: 1.25 SOLUTION RESPIRATORY (INHALATION) at 07:03

## 2024-03-05 RX ADMIN — LEVALBUTEROL HYDROCHLORIDE 1.25 MG: 1.25 SOLUTION RESPIRATORY (INHALATION) at 01:03

## 2024-03-05 RX ADMIN — GABAPENTIN 800 MG: 300 CAPSULE ORAL at 09:03

## 2024-03-05 RX ADMIN — ASPIRIN 81 MG 81 MG: 81 TABLET ORAL at 08:03

## 2024-03-05 RX ADMIN — GUAIFENESIN 1200 MG: 600 TABLET, EXTENDED RELEASE ORAL at 08:03

## 2024-03-05 RX ADMIN — HYDROCODONE BITARTRATE AND ACETAMINOPHEN 1 TABLET: 5; 325 TABLET ORAL at 09:03

## 2024-03-05 RX ADMIN — SPIRONOLACTONE 25 MG: 25 TABLET ORAL at 08:03

## 2024-03-05 RX ADMIN — IPRATROPIUM BROMIDE 0.5 MG: 0.5 SOLUTION RESPIRATORY (INHALATION) at 07:03

## 2024-03-05 RX ADMIN — LEVALBUTEROL HYDROCHLORIDE 1.25 MG: 1.25 SOLUTION RESPIRATORY (INHALATION) at 12:03

## 2024-03-05 NOTE — PROGRESS NOTES
[facility]  Progress Note    Patient Name: Jose F Hanley  MRN: [id number]  Patient Class: IP- Surgery Admit   Admission Date: 2/23/2024  Length of Stay: 11 days  Attending Physician: Corby Lyn MD  Primary Care Provider: Sunita Rivera MD        Subjective:     Principal Problem:S/P CABG (coronary artery bypass graft)        HPI:  Patient is 78 year old male with history of Afib, alcohol use, HFpEF, COPD, hypothyroidism, CAD was admitted to ICU after elective CABG. He underwent heart catheterization and coronary angiography showing multivessel coronary artery disease.  He was referred for consideration of redo coronary artery bypass grafting.    He has had a TAVR and Watchman. Recent studies were reviewed.  The patient has significant recurrent coronary artery disease.  Post operatively patient developed alcohol withdrawal and delirium, was started on precedex. Patient was extubated and still on BiPAP.  Hospitalist was consulted for medical management. .    Overview/Hospital Course:  Patient was post op admitted to ICU. Extubated to BiPAP, developed encephalopathy and now on precedex. Post op care per CTS. Cardiology also following. He is post op D4 today, still has chest and mediastinal tubes and pacer wires. Balloon pump was removed 2/25.  Patient complained of increasing abdominal distention and pain.  Discussed checking KUB, and inserting NG tube to LIS, patient may possibly have ileus.  Patient is seen by General surgery, recommend conservative care for now, possible ileus.  Patient with cough and congestion, WBC up to 28.  Patient was started on IV Zosyn with leukocytosis improving.  Patient was mediastinal drains and left pleural drains removed.  Patient was TIANA, on BiPAP q.h.s. and was sleeping.  He is on low-flow nasal cannula.  NG tube removed on 03/04.  Patient went into atrial fibrillation with rapid ventricular response on 03/04, and was started on Cardizem drip as  well as Lasix drip.  He diuresed well, and Lasix drip was weaned off on 03/05.    Interval History:  Patient seen and examined.  Patient on Lasix drip overnight as well as Cardizem drip.  Lasix drip was weaned off this morning.  Patient diuresed well.  No acute events overnight.  Heart rate controlled on Cardizem.    Objective:     Vital Signs (Most Recent):  Temp: 98 °F (36.7 °C) (03/05/24 1501)  Pulse: 88 (03/05/24 1501)  Resp: (!) 21 (03/05/24 1501)  BP: (!) 115/58 (03/05/24 1501)  SpO2: (!) 92 % (03/05/24 1501) Vital Signs (24h Range):  Temp:  [97 °F (36.1 °C)-98 °F (36.7 °C)] 98 °F (36.7 °C)  Pulse:  [] 88  Resp:  [20-36] 21  SpO2:  [82 %-97 %] 92 %  BP: (101-156)/(54-85) 115/58     Weight: 125.1 kg (275 lb 12.7 oz)  Body mass index is 37.4 kg/m².    Intake/Output Summary (Last 24 hours) at 3/5/2024 1634  Last data filed at 3/5/2024 1500  Gross per 24 hour   Intake 1507.53 ml   Output 1900 ml   Net -392.47 ml           Physical Exam  Vitals and nursing note reviewed.   Eyes:      Pupils: Pupils are equal, round, and reactive to light.   Neck:      Vascular: No JVD.   Cardiovascular:      Rate and Rhythm: Tachycardia present. Rhythm irregular.      Heart sounds: Normal heart sounds.   Pulmonary:      Effort: Pulmonary effort is normal.      Breath sounds: Normal breath sounds.   Abdominal:      General: Bowel sounds are normal. There is no distension.      Palpations: Abdomen is soft.      Tenderness: There is no abdominal tenderness.   Musculoskeletal:         General: No deformity.   Lymphadenopathy:      Cervical: No cervical adenopathy.   Skin:     Coloration: Skin is not pale.      Findings: No rash.             Significant Labs: All pertinent labs within the past 24 hours have been reviewed.    Significant Imaging: I have reviewed all pertinent imaging results/findings within the past 24 hours.      Assessment/Plan:      * S/P CABG (coronary artery bypass graft) - x2 09/2010 - LIMA to LAD; SVG to  OMB; grafts occluded 08/2012  - post op care per surgery   -aspirin mg daily metoprolol 10 mg IV q.12 hours, Lasix 20 mg IV daily, losartan 25 mg daily, metoprolol 25 mg p.o. t.i.d.  -mediastinal drains , pacer wires, and left pleural catheter removed.      Chronic diastolic congestive heart failure  Patient is identified as having Diastolic (HFpEF) heart failure that is Acute on chronic. CHF is currently uncontrolled due to Continued edema of extremities. Latest ECHO performed and demonstrates- Results for orders placed during the hospital encounter of 02/23/24    Echo    Interpretation Summary    Left Ventricle: Septal motion is consistent with bundle branch block. There is normal systolic function with a visually estimated ejection fraction of 60 - 65%.    Left Atrium: Left atrium is moderately dilated.    Right Atrium: Right atrium is mildly dilated.    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    A limited echo was performed using limited 2D. Overall the study quality was technically difficult. The study was difficult due to patient's clinical status and body habitus.  . Continue Beta Blocker and Furosemide and monitor clinical status closely. Monitor on telemetry. Patient is off CHF pathway.  Monitor strict Is&Os and daily weights. Cardiology has been consulted. Continue to stress to patient importance of self efficacy and  on diet for CHF. Last BNP reviewed- and noted below   Recent Labs   Lab 03/04/24  1002   *       Permanent atrial fibrillation  S/p watchman device and TAVR.Patient with Long standing persistent (>12 months) atrial fibrillation which is uncontrolled currently with Beta Blocker and Calcium Channel Blocker. Patient is currently in atrial fibrillation.[id number] Score: 4. HASBLED score- 3. Anticoagulation indicated. Anticoagulation done with Lovenox .      Ileus  Patient has Post-operative ileus which is adynamic in etiology which is stable.  This is inherent to his procedure. Will treat conservatively with bowel rest, IV fluids, serial abdominal exams and avoidance of GI paralytics such as narcotics or anti-spasmodics. Monitor patient closely.     -general surgery following, input appreciated  -trend leukocytosis, improving.  -check blood cultures, no growth.  -patient on IV Zosyn.        Hyponatremia  Patient has hypornatremia which is uncontrolled. The hypernatremia is due to  volume overload secondary to decompensated heart failure . We will aim to correct the sodium by 8-10mEq in 24 hours. We will correct their hyponatremia with diuresis. The patient's sodium results have been reviewed and are listed below.  Recent Labs   Lab 03/05/24  0340   *         Acute blood loss anemia  Patient's anemia is currently controlled. Has not received any PRBCs to date.. Etiology likely d/t acute blood loss  Current CBC reviewed-   Lab Results   Component Value Date    HGB 9.5 (L) 03/05/2024    HCT 32.1 (L) 03/05/2024     Monitor serial CBC and transfuse if patient becomes hemodynamically unstable, symptomatic or H/H drops below 7/21.       Severe obesity (BMI 35.0-39.9) with comorbidity  Body mass index is 37.4 kg/m². Morbid obesity complicates all aspects of disease management from diagnostic modalities to treatment. Weight loss encouraged and health benefits explained to patient.         Dyslipidemia  - cont Statin       CAD (coronary artery disease), native coronary artery  S/p CABG X 3 on 2/23  - post op care per CTS, chest tube and pacer wires to be removed per surgery. Balloon pump was removed 2/25  - Cont aspirin, Lipitor, ,metoprolol       VTE Risk Mitigation (From admission, onward)           Ordered     enoxaparin injection 40 mg  Every 24 hours         03/04/24 1415     Place DUTCH hose  Until discontinued         02/23/24 1070                    Discharge Planning   LEIA: 3/7/2024     Code Status: Full Code   Is the patient medically ready for  discharge?:     Reason for patient still in hospital (select all that apply): Treatment  Discharge Plan A: Skilled Nursing Facility   Discharge Delays: None known at this time        Critical care time spent on the evaluation and treatment of severe organ dysfunction, review of pertinent labs and imaging studies, discussions with consulting providers and discussions with patient/family: 38 minutes.      Corby Lyn MD  Department of Hospital Medicine   Formerly [facility], later [facility]

## 2024-03-05 NOTE — PLAN OF CARE
Problem: Adult Inpatient Plan of Care  Goal: Plan of Care Review  3/4/2024 1800 by Judy Villegas, RN  Outcome: Ongoing, Progressing  Flowsheets (Taken 3/4/2024 1800)  Plan of Care Reviewed With: patient  3/4/2024 1800 by Judy Villegas, RN  Outcome: Ongoing, Progressing    Pt remains awake and alert. 5L NC. 1 episode of emesis after attempting to take medication PO this morning, diet changed to clears, no BM is passing gas. Pt was in afib hr 120-140 this morning, Cardizem gtt started after bolus given, lasix gtt started. See I&O for urine output. Pt was in chair beginning of shift, Max assist with PT and PT assistant to get back to bed. L PICC still in place. See MAR for infusions.

## 2024-03-05 NOTE — PLAN OF CARE
Problem: Parenteral Nutrition  Goal: Effective Intravenous Nutrition Therapy Delivery  Outcome: Unable to Meet, Plan Revised  Intervention: Optimize Intravenous Nutrition Delivery  Flowsheets (Taken 3/5/2024 7118)  Nutrition Support Management: parenteral nutrition stopped per MD.     Diet advance, po intake < 25%. TPN bag #2 ordered but discontinued. Bag discarded per pharm.

## 2024-03-05 NOTE — SUBJECTIVE & OBJECTIVE
Interval History:  Patient seen and examined.  Patient on Lasix drip overnight as well as Cardizem drip.  Lasix drip was weaned off this morning.  Patient diuresed well.  No acute events overnight.  Heart rate controlled on Cardizem.    Objective:     Vital Signs (Most Recent):  Temp: 98 °F (36.7 °C) (03/05/24 1501)  Pulse: 88 (03/05/24 1501)  Resp: (!) 21 (03/05/24 1501)  BP: (!) 115/58 (03/05/24 1501)  SpO2: (!) 92 % (03/05/24 1501) Vital Signs (24h Range):  Temp:  [97 °F (36.1 °C)-98 °F (36.7 °C)] 98 °F (36.7 °C)  Pulse:  [] 88  Resp:  [20-36] 21  SpO2:  [82 %-97 %] 92 %  BP: (101-156)/(54-85) 115/58     Weight: 125.1 kg (275 lb 12.7 oz)  Body mass index is 37.4 kg/m².    Intake/Output Summary (Last 24 hours) at 3/5/2024 1634  Last data filed at 3/5/2024 1500  Gross per 24 hour   Intake 1507.53 ml   Output 1900 ml   Net -392.47 ml           Physical Exam  Vitals and nursing note reviewed.   Eyes:      Pupils: Pupils are equal, round, and reactive to light.   Neck:      Vascular: No JVD.   Cardiovascular:      Rate and Rhythm: Tachycardia present. Rhythm irregular.      Heart sounds: Normal heart sounds.   Pulmonary:      Effort: Pulmonary effort is normal.      Breath sounds: Normal breath sounds.   Abdominal:      General: Bowel sounds are normal. There is no distension.      Palpations: Abdomen is soft.      Tenderness: There is no abdominal tenderness.   Musculoskeletal:         General: No deformity.   Lymphadenopathy:      Cervical: No cervical adenopathy.   Skin:     Coloration: Skin is not pale.      Findings: No rash.             Significant Labs: All pertinent labs within the past 24 hours have been reviewed.    Significant Imaging: I have reviewed all pertinent imaging results/findings within the past 24 hours.

## 2024-03-05 NOTE — ASSESSMENT & PLAN NOTE
Patient's anemia is currently controlled. Has not received any PRBCs to date.. Etiology likely d/t acute blood loss  Current CBC reviewed-   Lab Results   Component Value Date    HGB 9.5 (L) 03/05/2024    HCT 32.1 (L) 03/05/2024     Monitor serial CBC and transfuse if patient becomes hemodynamically unstable, symptomatic or H/H drops below 7/21.

## 2024-03-05 NOTE — CARE UPDATE
03/05/24 0730   Patient Assessment/Suction   Level of Consciousness (AVPU) alert   Respiratory Effort Normal;Unlabored   Expansion/Accessory Muscles/Retractions no use of accessory muscles;no retractions;expansion symmetric   All Lung Fields Breath Sounds coarse;diminished   Rhythm/Pattern, Respiratory unlabored;pattern regular;depth regular;chest wiggle adequate;no shortness of breath reported   Cough Frequency infrequent   Cough Type good   Skin Integrity   $ Wound Care Tech Time 15 min   Area Observed Bridge of nose   Skin Appearance without discoloration   Barrier used? Gel Cushion   PRE-TX-O2   Device (Oxygen Therapy) high flow nasal cannula   $ Is the patient on Low Flow Oxygen? Yes   Flow (L/min) 5   SpO2 (!) 93 %   Pulse Oximetry Type Continuous   $ Pulse Oximetry - Multiple Charge Pulse Oximetry - Multiple   Pulse 104   Resp (!) 26   Breath Sounds Post-Respiratory Treatment   Throughout All Fields Post-Treatment All Fields   Throughout All Fields Post-Treatment aeration increased   Post-treatment Heart Rate (beats/min) 102   Post-treatment Resp Rate (breaths/min) 24   Intrapulmonary Percussive Ventilation/Metaneb   $ IPV Administration Therapy   Route (IPV) mouthpiece   Preset CPAP/BiPAP Settings   Mode Of Delivery BiPAP   $ CPAP/BiPAP Daily Charge BiPAP/CPAP Daily   Equipment Type V60   Education   $ Education Bronchodilator;15 min

## 2024-03-05 NOTE — PROGRESS NOTES
"Formerly [facility]  Department of Cardiology  Consult Note      PATIENT NAME: Jose F Hanley  MRN: [id number]  TODAY'S DATE: 03/05/2024  ADMIT DATE: 2/23/2024                          CONSULT REQUESTED BY: Corby Lyn MD    SUBJECTIVE     PRINCIPAL PROBLEM: S/P CABG (coronary artery bypass graft)      REASON FOR CONSULT:  Post CABG     Interval history:  3/5/2024  Pt was seen up and out of bed this morning seated in chair with wife at bedside. Cardizem gtt running at 5. Lasix gtt stopped this morning. Congested cough is still present but improving.  No further episodes of nausea noted, reportedly had a bowel movement     3/4/2024  Patient is feeling better NG tube is removed congested cough is still persisting  Patient is AFIB with RVR.    WBC 21.31  H/H 9.3/31.9  No more NGT        03/03/2024:   Patient was up in the chair this morning tolerated fairly well still with fair amount of NG drainage denies having any new pains    03/02/2024:  Patient is alert awake and responsive.  Some wheezing is persisting chest wall pain is controlled.  NG tube in place for earliest      3/1/2024      Patient is not completely oriented. He is lethargic.  He states "yes" when asked if he is breathing better.  According to I and O still is positive.  Creatinine stable.  Remains in AFIB rates are low 100s      2/29/24    Post op Ileus with NGT  RVR  Still positive on I and O  CXR Not any better      2/28/2024    Off Bipap  Off Precedex  Pacer wires out  Abdomen is distended    2/27/2024    Resting on Face mask. Still with mild sedation family at bedside. VSS.      2/26/24  Pt asleep in bed, remains on BiPAP. Per RN, he is still confused while awake. Currently under light sedation - precedex gtt. Remains paced with temporary pacer - 80s.     2/25/24  Pt was seen resting in bed with Bipap mask in place. Balloon pump was removed this morning per bedside RN.     2/24/24  Pt was seen extubated on BiPAP this morning. Balloon " pump. Pressures soft this morning. Per RN, pt was increasingly agitated this morning.       HPI:  Pt is a 78 year old with history of known CAD with previous bypass surgery back in 08/31/23 and then underwent a repeat CABG today, 2/23/23. He Just recently had a [facility] as well with Dr. Bush back on 2/7/24.       Per surgery team:  Operation: Redo coronary artery bypass grafting x3 using left radial artery to left anterior descending coronary artery and separate segments of saphenous vein from the aorta to the diagonal coronary artery and from the aorta to the obtuse marginal coronary artery using a combination of antegrade and retrograde cardioplegia, mild systemic hypothermia, endoscopic vein harvest from the right leg, right radial artery harvest, and insertion of right femoral arterial intra-aortic balloon pump with fluoroscopic guidance and complete pericardiectomy due to severe adhesive pericarditis         Review of patient's allergies indicates:   Allergen Reactions    Adhes. band-tape-benzalkonium Rash     Pt stated it caused blisters    Statins-hmg-coa reductase inhibitors Other (See Comments)     Statin Intolerance (joint pain)       Past Medical History:   Diagnosis Date    A-fib     Allergies 2020    gets allergy shots    Arthritis 1973    right knee    Back pain     COPD (chronic obstructive pulmonary disease)     Diastolic heart failure     GERD (gastroesophageal reflux disease) 2015    HLD (hyperlipidemia)     HTN (hypertension)     Hx of LASIK     Hypothyroidism, unspecified 2012    Inflammatory disease of prostate, unspecified     out of medicine    Mild intermittent asthma, uncomplicated     Myocardial infarction     Neuropathy     On home oxygen therapy     3L NC  at night    PAD (peripheral artery disease)     Staph skin infection 2016    right thigh    TIA (transient ischemic attack) 2013     Past Surgical History:   Procedure Laterality Date    AORTOGRAPHY WITH SERIALOGRAPHY N/A 08/31/2023     Procedure: AORTOGRAM, WITH SERIALOGRAPHY;  Surgeon: Gary Thomas MD;  Location: [facility] CATH/EP LAB;  Service: Peripheral Vascular;  Laterality: N/A;    CATARACT EXTRACTION Bilateral 2014    CORONARY ANGIOGRAPHY INCLUDING BYPASS GRAFTS WITH CATHETERIZATION OF LEFT HEART Left 01/12/2024    Procedure: ANGIOGRAM, CORONARY, INCLUDING BYPASS GRAFT, WITH LEFT HEART CATHETERIZATION;  Surgeon: Gregg Bush MD;  Location: [facility] CATH/EP LAB;  Service: Cardiology;  Laterality: Left;    CORONARY ARTERY BYPASS GRAFT  2010    ENDOSCOPIC HARVEST OF VEIN Right 2/23/2024    Procedure: HARVEST-VEIN-ENDOVASCULAR;  Surgeon: Gary Thomas MD;  Location: [facility];  Service: Cardiothoracic;  Laterality: Right;    INSERTION OF IMPLANTABLE LOOP RECORDER  2011    INSERTION OF INTRA-AORTIC BALLOON ASSIST DEVICE Right 2/23/2024    Procedure: INSERTION, INTRA-AORTIC BALLOON PUMP;  Surgeon: Gary Thomas MD;  Location: [facility];  Service: Cardiothoracic;  Laterality: Right;    INSERTION OF PACEMAKER  2021    does not have card with him for preadmit    INSTANTANEOUS WAVE-FREE RATIO (IFR) N/A 01/12/2024    Procedure: Instantaneous Wave-Free Ratio (IFR);  Surgeon: Gregg Bush MD;  Location: [facility] CATH/EP LAB;  Service: Cardiology;  Laterality: N/A;    PTA, ANTERIOR TIBIAL Left 08/31/2023    Procedure: PTA, Anterior Tibial;  Surgeon: Gary Thomas MD;  Location: [facility] CATH/EP LAB;  Service: Peripheral Vascular;  Laterality: Left;    PTA, SUPERFICIAL FEMORAL ARTERY Left 08/31/2023    Procedure: PTA, Superficial Femoral Artery;  Surgeon: Gary Thomas MD;  Location: [facility] CATH/EP LAB;  Service: Peripheral Vascular;  Laterality: Left;    REPEAT CORONARY ARTERY BYPASS GRAFTING N/A 2/23/2024    Procedure: CABG, REPEAT;  Surgeon: Gary Thomas MD;  Location: [facility] OR;  Service: Cardiothoracic;  Laterality: N/A;    SPLENECTOMY  2016    STENT, ANTERIOR TIBIAL Left 08/31/2023    Procedure: Stent, Anterior  Tibial;  Surgeon: Gary Thomas MD;  Location: [facility] CATH/EP LAB;  Service: Peripheral Vascular;  Laterality: Left;    SURGICAL PROCUREMENT, ARTERY, RADIAL, FOR CABG  2024    Procedure: SURGICAL PROCUREMENT,ARTERY,RADIAL,FOR CABG;  Surgeon: Gary Thomas MD;  Location: [facility] OR;  Service: Cardiothoracic;;    watchman heart device       Social History     Tobacco Use    Smoking status: Former     Current packs/day: 0.00     Types: Cigarettes     Quit date: 1987     Years since quittin.2    Smokeless tobacco: Never   Substance Use Topics    Alcohol use: Not Currently     Alcohol/week: 21.0 standard drinks of alcohol     Types: 21 Drinks containing 0.5 oz of alcohol per week     Comment: ed roberto mixed drinks    Drug use: No        REVIEW OF SYSTEMS    As mentioned in HPI    OBJECTIVE     VITAL SIGNS (Most Recent)  Temp: 97.6 °F (36.4 °C) (24 1101)  Pulse: 91 (24 1313)  Resp: (!) 25 (24 1313)  BP: (!) 117/56 (24 1101)  SpO2: (!) 93 % (24 1313)    VENTILATION STATUS  Resp: (!) 25 (24 1313)  SpO2: (!) 93 % (24 1313)           I & O (Last 24H):  Intake/Output Summary (Last 24 hours) at 3/5/2024 1352  Last data filed at 3/5/2024 1100  Gross per 24 hour   Intake 1896.2 ml   Output 2900 ml   Net -1003.8 ml         WEIGHTS  Wt Readings from Last 3 Encounters:   24 0426 125.1 kg (275 lb 12.7 oz)   24 1540 125 kg (275 lb 9.2 oz)   24 0948 124.5 kg (274 lb 6.4 oz)   24 0718 120.7 kg (266 lb)       PHYSICAL EXAM    CONSTITUTIONAL: NAD, more alert and responsive answering questions appropriately   HEENT:  Generalized noted   NECK: no JVD  LUNGS: coarse crackles diminished bases with bilateral expiratory wheeze still persisting.    HEART irregular rhythm no distinct S3 tachycardic rhythm  ABDOMEN: distended better no guarding or rebound, nontender  EXTREMITIES:  edema +1  SKIN: No rash, surgical dressings clean and dry, mid sternal  dressing coming untaped at top      HOME MEDICATIONS:  No current facility-administered medications on file prior to encounter.     Current Outpatient Medications on File Prior to Encounter   Medication Sig Dispense Refill    acetaminophen (TYLENOL) 325 MG tablet Take 650 mg by mouth every 6 (six) hours as needed.      albuterol (PROVENTIL/VENTOLIN HFA) 90 mcg/actuation inhaler Inhale 2 puffs into the lungs every 6 (six) hours as needed.      aspirin 81 MG Chew Take 81 mg by mouth once daily.      betamethasone valerate 0.1% (VALISONE) 0.1 % Lotn Apply to scalp bid prn rash      cyanocobalamin 500 MCG tablet 500 mcg.      cyclobenzaprine (FLEXERIL) 10 MG tablet Take 10 mg by mouth every 8 (eight) hours as needed.      diphenhydrAMINE (BENADRYL) 50 MG capsule 50 mg every 6 (six) hours as needed.      doxepin (SINEQUAN) 50 MG capsule Take 50 mg by mouth every evening.      DULoxetine (CYMBALTA) 60 MG capsule 60 mg nightly.      EPINEPHrine (EPIPEN) 0.3 mg/0.3 mL AtIn INJECT 0.3MG/0.3ML SUBCUTANEOUSLY (UNDER THE SKIN)  AS NEEDED FOR ALLERGIC REACTIONS SELF-INJECTOR PEN      furosemide (LASIX) 20 MG tablet Take 20 mg by mouth once daily.      gabapentin (NEURONTIN) 400 MG capsule 800 mg 2 (two) times daily.      hydrocodone-homatropine 5-1.5 mg/5 ml (HYCODAN) 5-1.5 mg/5 mL Syrp Take by mouth every 6 (six) hours as needed. cough      levothyroxine (SYNTHROID) 50 MCG tablet 50 mcg.      metOLazone (ZAROXOLYN) 5 MG tablet 5 mg daily as needed. For weight gain >2lbs      montelukast (SINGULAIR) 10 mg tablet 10 mg once daily.      nitroGLYCERIN (NITROSTAT) 0.4 MG SL tablet Place 0.4 mg under the tongue every 5 (five) minutes as needed.      omega-3 fatty acids/fish oil (FISH OIL-OMEGA-3 FATTY ACIDS) 300-1,000 mg capsule Take 1 capsule by mouth 2 (two) times daily.      omeprazole (PRILOSEC) 20 MG capsule 40 mg once daily.      potassium chloride SA (K-DUR,KLOR-CON) 20 MEQ tablet 20 mEq nightly.      ranolazine (RANEXA) 1,000  mg Tb12 Take 1 tablet (1,000 mg total) by mouth 2 (two) times daily. 180 tablet 3    senna-docusate 8.6-50 mg (PERICOLACE) 8.6-50 mg per tablet nightly.      spironolactone (ALDACTONE) 50 MG tablet Take 50 mg by mouth once daily.      tamsulosin (FLOMAX) 0.4 mg Cap 0.4 mg.      TESTOSTERONE PROPIONATE, BULK, MISC Inject 1 each as directed every 14 (fourteen) days.      TOPROL XL 25 mg 24 hr tablet Take 25 mg by mouth 2 (two) times daily.      torsemide (DEMADEX) 20 MG Tab Take 1 tablet (20 mg total) by mouth once daily. (Patient not taking: Reported on 12/21/2023) 30 tablet 11       SCHEDULED MEDS:   aspirin  81 mg Oral Daily    diltiaZEM  180 mg Oral Daily    DULoxetine  60 mg Oral Nightly    enoxparin  40 mg Subcutaneous Q24H (prophylaxis, 1700)    ezetimibe  10 mg Oral QHS    furosemide (LASIX) injection  40 mg Intravenous TID AC    guaiFENesin  1,200 mg Oral BID    insulin detemir U-100  5 Units Subcutaneous Daily    ipratropium  0.5 mg Nebulization Q6H    levalbuterol  1.25 mg Nebulization Q6H    levothyroxine  25 mcg Intravenous Daily    metoprolol tartrate  25 mg Oral BID    montelukast  10 mg Oral Daily    pantoprazole  40 mg Oral Before breakfast    piperacillin-tazobactam (Zosyn) IV (PEDS and ADULTS) (extended infusion is not appropriate)  3.375 g Intravenous Q8H    sodium chloride 0.9%  10 mL Intravenous Q6H    spironolactone  25 mg Oral BID    tamsulosin  0.4 mg Oral Daily       CONTINUOUS INFUSIONS:        PRN MEDS:dextrose 50% in water (D50W), dextrose 50% in water (D50W), dextrose 50% in water (D50W), dextrose 50% in water (D50W), glucagon (human recombinant), glucose, glucose, hydrALAZINE, HYDROcodone-acetaminophen, insulin aspart U-100, metoprolol, ondansetron, potassium bicarbonate, potassium bicarbonate, potassium bicarbonate, Flushing PICC/Midline Protocol **AND** sodium chloride 0.9% **AND** sodium chloride 0.9%    LABS AND DIAGNOSTICS     CBC LAST 3 DAYS  Recent Labs   Lab 03/03/24  1347  "03/04/24  0500 03/05/24  0340   WBC 17.28* 21.31* 22.71*   RBC 3.89* 4.05* 4.17*   HGB 8.9* 9.3* 9.5*   HCT 31.0* 31.9* 32.1*   MCV 80* 79* 77*   MCH 22.9* 23.0* 22.8*   MCHC 28.7* 29.2* 29.6*   RDW 22.2* 22.3* 22.1*   * 585* 671*   MPV 10.8 10.5 10.7   GRAN 75.7*  13.1* 82.6*  17.6* 79.3*  18.0*   LYMPH 8.6*  1.5 5.3*  1.1 8.1*  1.9   MONO 8.9  1.5* 6.6  1.4* 7.0  1.6*   BASO 0.09 0.11 0.12   NRBC 7* 5* 6*         COAGULATION LAST 3 DAYS  No results for input(s): "LABPT", "INR", "APTT" in the last 168 hours.    CHEMISTRY LAST 3 DAYS  Recent Labs   Lab 02/29/24  0908 03/01/24  0517 03/01/24  0922 03/02/24  0454 03/02/24  0832 03/02/24  1658 03/03/24  0420 03/03/24  1331 03/04/24  0500 03/05/24  0340   NA  --    < >  --    < >  --   --  142  --  141 134*   K  --    < >  --    < >  --    < > 3.5 3.7 3.9 3.6   CL  --    < >  --    < >  --   --  101  --  99 95   CO2  --    < >  --    < >  --   --  37*  --  34* 30*   ANIONGAP  --    < >  --    < >  --   --  4*  --  8 9   BUN  --    < >  --    < >  --   --  24*  --  21 22   CREATININE  --    < >  --    < >  --   --  0.9  --  0.8 0.9   GLU  --    < >  --    < >  --   --  143*  --  165* 249*   CALCIUM  --    < >  --    < >  --   --  8.4*  --  8.7 8.3*   PH 7.425  --  7.428  --  7.409  --   --   --   --   --    MG  --    < >  --    < >  --   --  1.9 2.2 2.1  --     < > = values in this interval not displayed.         CARDIAC PROFILE LAST 3 DAYS  Recent Labs   Lab 03/04/24  1002   *       ENDOCRINE LAST 3 DAYS  Recent Labs   Lab 03/01/24  1001   TSH 2.171         LAST ARTERIAL BLOOD GAS  ABG  Recent Labs   Lab 03/02/24  0832   PH 7.409   PO2 71*   PCO2 56.5*   HCO3 35.7*   BE 11*         LAST 7 DAYS MICROBIOLOGY   Microbiology Results (last 7 days)       Procedure Component Value Units Date/Time    Blood culture [[id number]] Collected: 03/01/24 1001    Order Status: Completed Specimen: Blood Updated: 03/05/24 1232     Blood Culture, Routine No Growth to " date      No Growth to date      No Growth to date      No Growth to date      No Growth to date    MRSA Screen by PCR [[id number]] Collected: 03/01/24 1112    Order Status: Completed Updated: 03/01/24 1253     MRSA SCREEN BY PCR Negative    MRSA Screen by PCR [[id number]] Collected: 03/01/24 0940    Order Status: Canceled Specimen: Nasopharyngeal Swab from Nasal             MOST RECENT IMAGING  X-Ray Chest AP Portable  HISTORY: picc placement    FINDINGS: Portable chest radiograph at 1456 hours and 1458 compared to 0615 hours shows interval insertion of a left PICC. The first image shows the distal tip of the PICC overlying the left subclavian vein, with the second image showing the distal tip of the PICC overlying the SVC, projecting inferolaterally. There is no other significant interval change.    IMPRESSION: Interval insertion of a left PICC as described.    Electronically signed by:  Jeison Lowe MD  03/02/2024 03:24 PM CST Workstation: [id number]  X-Ray Chest AP Portable  HISTORY: Atelectasis    FINDINGS: Portable chest radiograph at 0615 hours compared to 03/01/2024 shows interval removal of the mediastinal and thoracostomy drains, with nasogastric tube remaining. There are median sternotomy wires and changes of transarterial aortic valvuloplasty, with stable enlarged cardiac silhouette and normal pulmonary vascularity.    The lungs are symmetrically expanded, with no new pleural or parenchymal abnormality. Bilateral lower lung opacities suggesting subsegmental atelectasis are unchanged.    IMPRESSION: Interval removal of the mediastinal and thoracostomy drains, with no other significant change.    Electronically signed by:  Jeison Lowe MD  03/02/2024 09:35 AM CST Workstation: [id number]      ECHOCARDIOGRAM RESULTS (last 5)  Results for orders placed during the hospital encounter of 12/13/23    Echo    Interpretation Summary    Left Ventricle: The left ventricle is normal in size. Mildly increased wall  thickness. There is mild concentric hypertrophy. Normal wall motion. There is normal systolic function with a visually estimated ejection fraction of 65 - 70%. There is normal diastolic function.    Right Ventricle: Mild right ventricular enlargement. Wall thickness is normal. Right ventricle wall motion  is normal. Systolic function is normal.    Left Atrium: Left atrium is moderately dilated.    Right Atrium: Right atrium is mildly dilated.    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    Tricuspid Valve: There is mild regurgitation with a centrally directed jet.    Pulmonic Valve: There is mild regurgitation with a centrally directed jet.    Pulmonary Artery: There is mild pulmonary hypertension. The estimated pulmonary artery systolic pressure is 40 mmHg.    IVC/SVC: Intermediate venous pressure at 8 mmHg.      CURRENT/PREVIOUS VISIT EKG  Results for orders placed or performed during the hospital encounter of 02/23/24   EKG 12-LEAD    Collection Time: 02/23/24  1:53 PM   Result Value Ref Range    QRS Duration 232 ms    OHS QTC Calculation 613 ms    Narrative    Test Reason : Z95.1,Z95.1,    Vent. Rate : 080 BPM     Atrial Rate : 087 BPM     P-R Int : 000 ms          QRS Dur : 232 ms      QT Int : 532 ms       P-R-T Axes : 000 168 250 degrees     QTc Int : 613 ms    Ventricular-paced rhythm  occasional atrila pacing  Abnormal ECG  When compared with ECG of 21-FEB-2024 09:33,  Electronic ventricular pacemaker has replaced Atrial fibrillation  Confirmed by Eleazar WOO, Gregg NEWTON ([id number]) on 2/24/2024 7:12:58 AM    Referred By: OLLIE RIBEIRO           Confirmed By:Gregg Bush MD           ASSESSMENT/PLAN:     Active Hospital Problems    Diagnosis    *S/P CABG (coronary artery bypass graft) - x2 09/2010 - LIMA to LAD; SVG to OMB; grafts occluded 08/2012    Hypernatremia     -      Ileus    Acute blood loss anemia    Severe obesity (BMI 35.0-39.9) with comorbidity    Permanent  atrial fibrillation    CAD (coronary artery disease), native coronary artery    Dyslipidemia       ASSESSMENT & PLAN:     MVCAD S/P redo CABG yesterday 2/23/24,  left radial artery to LAD, SVG to Dx, SVG to OM  HFpEF  HTN/ dyslipidemia   Statin intolerance  AFIB H/O Watchman with RVR  H/O TAVR  Leadless PPM in situ  Distended Abdomen Post op Ileus now with NGT  Morbid obesity      RECOMMENDATIONS:    3/5/24  Started PO Cardizem 180 mg daily. Stop gtt one hour after giving first oral dose. Monitor rate and rhythm.   Continue metoprolol tartrate 25 mg BID.   Lasix gtt stopped by hospital medicine team and pt remains on IV Lasix 40 mg TID as well as aldactone 25 mg BID.  Asprin and statin therapy.  Will continue to follow.     3/4/2024    Start Cardizem gtt titrating will switch to oral Cardizem and adequate p.o. intake is maintained  Start Lasix drip @ 10 mg/hr  Up in chair as tolerated  Recommend to continue low doses of metoprolol tartrate at least 25 mg PO BID  Continue Aldactone 25 mg twice daily   Continue ASA and Statin      03/03/2024:  Patient appears to have been persistent chronic atrial fibrillation at least since November 23 and before that has lead less placed pacemaker in Situ  Continue on present therapy to include Lasix IV, losartan 25 mg daily, metoprolol tartrate 50 mg 3 times a day and spironolactone 25 mg daily monitor hemoglobin and electrolytes carefully  Continue on aggressive pulmonary treatments  Discussed with the nursing staff patient needs to be in a big boy chair      03/02/2024:  Echo she was no significant pericardial effusion.  Recommend to continue on 2 metoprolol tartrate 50 mg 3 times a day, maintain on losartan 25 mg daily  Continue on Aldactone 25 mg a day  Continue nebulizer treatment with Xopenex patient has history of Watchman in place therefore no need for long-term oral anticoagulation therapy as far as AFib is concerned  Continue aggressive pulmonary tolerate,   Out of  bed-to-chair      Mar Camacho NP  Department of Cardiology  Date of Service: 03/05/2024     Patient has diuresed very well on intravenous Lasix drip.  Recommend to repeat chest x-ray,   May consider changing it to torsemide 40 mg p.o. b.I.d. and continue on spironolactone 25 mg p.o. b.I.d.   As outlined above will transition to oral Cardizem  mg daily he remains stable he can be transferred to next level of care  I have personally interviewed and examined the patient, I have reviewed the Nurse Practitioner's history and physical, assessment, and plan. I have personally evaluated the patient at bedside and agree with the findings and made appropriate changes as necessary in recommendations.    Jaun Miller MD.    Department of Cardiology  [facility]  Date of Service:  03/05/2024

## 2024-03-05 NOTE — PT/OT/SLP PROGRESS
Physical Therapy      Patient Name:  Jose F Hanley   MRN:  [id number]    Patient not seen today d/t pt declining PT, pt just returning to bed from sitting up all morning. Will follow-up 3/6/24.

## 2024-03-05 NOTE — PLAN OF CARE
03/05/24 1325   Discharge Reassessment   Assessment Type Discharge Planning Reassessment   Did the patient's condition or plan change since previous assessment? No   Discharge Plan discussed with: Patient   Communicated LEIA with patient/caregiver Yes   Discharge Plan A Skilled Nursing Facility   Discharge Plan B Skilled Nursing Facility   DME Needed Upon Discharge  none   Transition of Care Barriers None   Why the patient remains in the hospital Requires continued medical care   Post-Acute Status   Post-Acute Authorization Placement   Post-Acute Placement Status Pending post-acute provider review/more information requested   Discharge Delays None known at this time     In clinical review with [facility], pt currently on cardizem gtt and not ready for discharge.

## 2024-03-05 NOTE — PLAN OF CARE
Problem: Occupational Therapy  Goal: Occupational Therapy Goal  Description: Goals to be met by: 3/29/24     Patient will increase functional independence with ADLs by performing:    UE Dressing with Moderate Assistance.  LE Dressing with Moderate Assistance.  Grooming while seated with Supervision.  Toileting from bedside commode with Moderate Assistance for hygiene and clothing management.   Toilet transfer to bedside commode with Moderate Assistance.    All goals above complete while maintaining sternal precautions.     Outcome: Ongoing, Progressing

## 2024-03-05 NOTE — PROGRESS NOTES
Patient having spontaneous bowel function. Suspect ileus/sbo has resolved. Diet as tolerated. OK for stool softeners as needed. Will sign off.

## 2024-03-05 NOTE — ASSESSMENT & PLAN NOTE
Patient is identified as having Diastolic (HFpEF) heart failure that is Acute on chronic. CHF is currently uncontrolled due to Continued edema of extremities. Latest ECHO performed and demonstrates- Results for orders placed during the hospital encounter of 02/23/24    Echo    Interpretation Summary    Left Ventricle: Septal motion is consistent with bundle branch block. There is normal systolic function with a visually estimated ejection fraction of 60 - 65%.    Left Atrium: Left atrium is moderately dilated.    Right Atrium: Right atrium is mildly dilated.    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    A limited echo was performed using limited 2D. Overall the study quality was technically difficult. The study was difficult due to patient's clinical status and body habitus.  . Continue Beta Blocker and Furosemide and monitor clinical status closely. Monitor on telemetry. Patient is off CHF pathway.  Monitor strict Is&Os and daily weights. Cardiology has been consulted. Continue to stress to patient importance of self efficacy and  on diet for CHF. Last BNP reviewed- and noted below   Recent Labs   Lab 03/04/24  1002   *

## 2024-03-05 NOTE — ASSESSMENT & PLAN NOTE
Patient's FSGs are controlled on current medication regimen.  Last A1c reviewed-   Lab Results   Component Value Date    HGBA1C 6.6 (A) 02/05/2024     Most recent fingerstick glucose reviewed-   POC Glucose   Date Value Ref Range Status   03/01/2024 149 (H) 70 - 110 mg/dL Final   02/28/2024 140 (H) 70 - 110 mg/dL Final   02/24/2024 162 (H) 70 - 110 mg/dL Final   02/24/2024 163 (H) 70 - 110 Final       Current correctional scale  Low  Maintain anti-hyperglycemic dose as follows-   Antihyperglycemics (From admission, onward)      Start     Stop Route Frequency Ordered    03/05/24 1015  insulin detemir U-100 (Levemir) pen 5 Units         -- SubQ Daily 03/05/24 0911    03/05/24 1011  insulin aspart U-100 pen 0-10 Units         -- SubQ Before meals & nightly PRN 03/05/24 0911          Hold Oral hypoglycemics while patient is in the hospital.

## 2024-03-05 NOTE — PT/OT/SLP PROGRESS
Occupational Therapy   Treatment    Name: Jose F Hanley  MRN: [id number]  Admitting Diagnosis:  S/P CABG (coronary artery bypass graft)  11 Days Post-Op    Recommendations:     Discharge Recommendations: High Intensity Therapy  Discharge Equipment Recommendations:  to be determined by next level of care  Barriers to discharge:   (increased assist with ADLs and mobility)    Assessment:     Jose F Hanley is a 78 y.o. male with a medical diagnosis of S/P CABG (coronary artery bypass graft).  Pt agreeable to OT therapy session this AM. Performance deficits affecting function are weakness, impaired endurance, impaired self care skills, impaired functional mobility, gait instability, impaired balance, decreased coordination, decreased upper extremity function, decreased lower extremity function, decreased safety awareness, impaired skin, impaired cardiopulmonary response to activity.     Rehab Prognosis:  Good; patient would benefit from acute skilled OT services to address these deficits and reach maximum level of function.       Plan:     Patient to be seen 5 x/week to address the above listed problems via self-care/home management, therapeutic activities, therapeutic exercises  Plan of Care Expires: 03/29/24  Plan of Care Reviewed with: patient    Subjective     Chief Complaint: none stated  Patient/Family Comments/goals: none stated  Pain/Comfort:  Pain Rating 1: 0/10    Objective:     Communicated with: nursing prior to session.  Patient found up in chair with oxygen, pulse ox (continuous), telemetry, blood pressure cuff, peripheral IV, PureWick upon OT entry to room.    General Precautions: Standard, fall, sternal    Orthopedic Precautions:N/A  Braces: N/A  Respiratory Status: High flow, flow 5 L/min    Occupational Performance:     Functional Mobility/Transfers:  Patient completed Sit <> Stand Transfer with maximal assistance and of 2 persons  with  holding red heart pillow   Patient completed Chair <> BSC Transfer  using Step Transfer technique with minimum assistance and of 2 persons with rolling walker    Activities of Daily Living:  Grooming: setup assistance seated in chair to wash hands and brush teeth    Toileting: maximal assistance for hygiene in standing after BM with CGA/Min A for balance    Treatment & Education:  Pt educated on role of OT/POC, importance of OOB/EOB activity, use of call bell, and safety during ADLs, transfers, and functional mobility.    Patient left up in chair with all lines intact, call button in reach, and wife present    GOALS:   Multidisciplinary Problems       Occupational Therapy Goals          Problem: Occupational Therapy    Goal Priority Disciplines Outcome Interventions   Occupational Therapy Goal     OT, PT/OT Ongoing, Progressing    Description: Goals to be met by: 3/29/24     Patient will increase functional independence with ADLs by performing:    UE Dressing with Moderate Assistance.  LE Dressing with Moderate Assistance.  Grooming while seated with Supervision.  Toileting from bedside commode with Moderate Assistance for hygiene and clothing management.   Toilet transfer to bedside commode with Moderate Assistance.    All goals above complete while maintaining sternal precautions.                          Time Tracking:     OT Date of Treatment: 03/05/24  OT Start Time: 1109  OT Stop Time: 1135  OT Total Time (min): 26 min    Billable Minutes:Self Care/Home Management 26    OT/KIAN: OT          3/5/2024

## 2024-03-05 NOTE — ASSESSMENT & PLAN NOTE
Patient has hypornatremia which is uncontrolled. The hypernatremia is due to  volume overload secondary to decompensated heart failure . We will aim to correct the sodium by 8-10mEq in 24 hours. We will correct their hyponatremia with diuresis. The patient's sodium results have been reviewed and are listed below.  Recent Labs   Lab 03/05/24  0340   *

## 2024-03-05 NOTE — PT/OT/SLP PROGRESS
Speech Language Pathology Treatment    Patient Name:  Jose F Hanley   MRN:  [id number]  Admitting Diagnosis: S/P CABG (coronary artery bypass graft)    Recommendations:                 General Recommendations:   Dysphagia management  Diet recommendations:  Soft & Bite Sized Diet - IDDSI Level 6, Liquid Diet Level: Thin liquids - IDDSI Level 0.No dry/crumbly foods. Gravy on all meats  Aspiration Precautions:  OOB to chair for meals, if able. Assistance with meals, slow pace, small bites/sips, O2 with all intake    General Precautions: Standard, aspiration, fall, sternal  Communication strategies:  none    Assessment:     Jose F Hanley is a 78 y.o. male with an SLP diagnosis of Dysphagia.  He presents with improved level of alertness and mental status. Limited acceptance of PO trials, however, all PO trials consumed with no overt s/s airway compromise. Pt required liquid push following isaiah cracker trial. REC Soft & Bite-Sized (IDDSI 6) textures with thin liquids (IDDSI 0) . Will follow 1-2 more visits to monitor tolerance and advance as tolerated.     Subjective     Pt states he is transferring out of ICU.     Pain/Comfort:       Respiratory Status: Nasal cannula, flow 5 L/min    Objective:   Pt alert and cooperative. Sitting up in chair with wife at bedside. Attempted to see pt with lunch tray, however, he sent his lunch tray back. RN cleared by for solid food trials. Pt reports poor appetite. Pt consumed applesauce and diced peach trials with functional oral prep and no overt s/s airway compromise. Pt required liquid push/wash with isaiah cracker trials 2° dry mouth. Water vis straw was consumed without difficulty.     Pt/family educated re: advancing diet, aspiration precautions, SLP role and POC. Receptive to information provided.     Has the patient been evaluated by SLP for swallowing?   Yes  Keep patient NPO? No   Current Respiratory Status:        Goals:   Multidisciplinary Problems       SLP Goals           Problem: SLP    Goal Priority Disciplines Outcome   SLP Goal     SLP Ongoing, Progressing   Description: 1) Pt will consume Regular (IDDSI 7) textures and thin liquids (IDDSI 0) with functional oral phase and no overt s/s penetration/aspiration.                         Plan:     Patient to be seen:  5 x/week   Plan of Care expires:  03/10/24  Plan of Care reviewed with:  patient, spouse   SLP Follow-Up:  Yes       Discharge recommendations:  High Intensity Therapy   Barriers to Discharge:  Level of Skilled Assistance Needed .    Time Tracking:     SLP Treatment Date:   03/05/24  Speech Start Time:  1207  Speech Stop Time:  1216     Speech Total Time (min):  9 min    Billable Minutes: Treatment Swallowing Dysfunction 9 and Total Time 9    03/05/2024

## 2024-03-05 NOTE — ASSESSMENT & PLAN NOTE
S/p watchman device and TAVR.Patient with Long standing persistent (>12 months) atrial fibrillation which is uncontrolled currently with Beta Blocker and Calcium Channel Blocker. Patient is currently in atrial fibrillation.[id number] Score: 4. HASBLED score- 3. Anticoagulation indicated. Anticoagulation done with Lovenox .

## 2024-03-05 NOTE — PLAN OF CARE
03/04/24 1927   Patient Assessment/Suction   Level of Consciousness (AVPU) alert   Respiratory Effort Unlabored   Expansion/Accessory Muscles/Retractions expansion symmetric   All Lung Fields Breath Sounds crackles, coarse   Rhythm/Pattern, Respiratory depth regular;pattern regular   Cough Frequency infrequent   Cough Type good;loose;nonproductive   PRE-TX-O2   Device (Oxygen Therapy) high flow nasal cannula w/device   $ Is the patient on Low Flow Oxygen? Yes   Flow (L/min) 5   SpO2 (!) 90 %   Pulse Oximetry Type Continuous   Pulse (!) 114   Resp (!) 22   Aerosol Therapy   $ Aerosol Therapy Charges Aerosol Treatment   Daily Review of Necessity (SVN) completed   Respiratory Treatment Status (SVN) given   Treatment Route (SVN) mouthpiece   Patient Position (SVN) HOB elevated   Post Treatment Assessment (SVN) breath sounds unchanged   Signs of Intolerance (SVN) none   Breath Sounds Post-Respiratory Treatment   Throughout All Fields Post-Treatment All Fields   Throughout All Fields Post-Treatment no change   Post-treatment Heart Rate (beats/min) 102   Post-treatment Resp Rate (breaths/min) 8   Incentive Spirometer   $ Incentive Spirometer Charges refused   Intrapulmonary Percussive Ventilation/Metaneb   $ IPV Administration Therapy   Daily Review of Necessity (IPV) completed   Route (IPV) mouthpiece   IPV Operational Pressure (PSI) 25   IPV Duration Total (min) 8   Patient Position (IPV) HOB elevated   Post Treatment Assessment (IPV) breath sounds unchanged   Signs of Intolerance (IPV) none   Preset CPAP/BiPAP Settings   Mode Of Delivery BiPAP;Standby  (PT REFUSES BIPAP)   Education   $ Education DME Oxygen;15 min

## 2024-03-05 NOTE — PROGRESS NOTES
Pulmonary/Critical Care Progress Note      PATIENT NAME: Jose F Hanley  MRN: [id number]  TODAY'S DATE: 2024  1:01 PM  ADMIT DATE: 2024  AGE: 78 y.o. : 1945    CONSULT REQUESTED BY: Corby Lyn MD    REASON FOR CONSULT:   Respiratory failure    HPI:  The patient is a 78-year-old gentleman who underwent coronary artery bypass grafting.  He is recovering from DTs, he is extremely morbidly obese, he is in AFib with RVR, and he is not clearing his secretions.  He is off of Precedex but only by a few hours and is lethargic.     the patient remains quite lethargic.    3/1 the patient is significantly more confused today than yesterday.  He is oriented to self.  He is convinced we are trying to kill him.  He states he is going to die.  This makes me anxious.    3/2/24: Afebrile, WBC count trending down on Zosyn. CT CAP identified no particular source of infection, but did suggest ileus or SBO. Encephalopathy is resolved. Mediastinal and pleural drains removed.    3/3/24: NGT clamped and may be removed later today. No BM. Passing flatus.    3/4 the patient is up in the chair.  He has AFib but his rate is controlled..  The patient is still rattling mucus around.  The patient diurese 1200 cc yesterday on 10 milligram/hour Lasix drip.  He is also on a Cardizem drip.  He is ready for step-down.    REVIEW OF SYSTEMS    General: Feeling weak  Eyes: Vision is good.  ENT:  No sinusitis or pharyngitis.   Heart: No chest pain or palpitations.  Lungs:  He states he has clearing a lot of mucus  GI:  No further emesis  : No dysuria, hesitancy, or nocturia.  Skin: No lesions or rashes.  Musculoskeletal:  Very weak.  Neuro: No headaches or neuropathy.  Lymph: No edema or adenopathy.  Psych: No anxiety or depression.  Endo: No weight change.     No change in the patient's Past Medical History, Past Surgical History, Social History or Family History since admission.     VITAL SIGNS (MOST RECENT)  Temp: 97 °F (36.1  °C) (03/05/24 0701)  Pulse: 104 (03/05/24 0730)  Resp: (!) 26 (03/05/24 0730)  BP: 101/60 (03/05/24 0701)  SpO2: (!) 93 % (03/05/24 0730)    INTAKE AND OUTPUT (LAST 24 HOURS):  Intake/Output Summary (Last 24 hours) at 3/5/2024 0812  Last data filed at 3/5/2024 0700  Gross per 24 hour   Intake 2526.09 ml   Output 3950 ml   Net -1423.91 ml       WEIGHT  Wt Readings from Last 1 Encounters:   03/03/24 125.1 kg (275 lb 12.7 oz)       PHYSICAL EXAM  GENERAL: Older patient; alert and oriented.  HEENT: Pupils equal and reactive. Extraocular movements intact. Nose with  nasal cannula in place.  Pharynx moist  NECK: Supple.   HEART:  Irregularly irregular rate and rhythm.  AFib with controlled rate No murmur or gallop auscultated.  LUNGS:  Diffuse rhonchi with bibasilar crackles the crackles are improved, the rhonchi are not, the sputum is weight.  ABDOMEN:  Extremely obese. Bowel sounds present. Non-tender, no masses palpated.  Small smear of feces this morning  : Normal anatomy.  Male pure wick with yellow urine.    EXTREMITIES: Normal muscle tone and joint movement, no cyanosis or clubbing.   LYMPHATICS: No adenopathy palpated, no edema.  SKIN: Dry, intact, no lesions.  Incisions dressed and dry  NEURO: No gross motor or cognitive deficit.      CBC LAST (LAST 24 HOURS)  Recent Labs   Lab 03/05/24  0340   WBC 22.71*   RBC 4.17*   HGB 9.5*   HCT 32.1*   MCV 77*   MCH 22.8*   MCHC 29.6*   RDW 22.1*   *   MPV 10.7   GRAN 79.3*  18.0*   LYMPH 8.1*  1.9   MONO 7.0  1.6*   BASO 0.12   NRBC 6*       CHEMISTRY LAST (LAST 24 HOURS)  Recent Labs   Lab 03/05/24  0340   *   K 3.6   CL 95   CO2 30*   ANIONGAP 9   BUN 22   CREATININE 0.9   *   CALCIUM 8.3*         LAST 7 DAYS MICROBIOLOGY   Microbiology Results (last 7 days)       Procedure Component Value Units Date/Time    Blood culture [[id number]] Collected: 03/01/24 1001    Order Status: Completed Specimen: Blood Updated: 03/04/24 1232     Blood Culture,  Routine No Growth to date      No Growth to date      No Growth to date      No Growth to date    MRSA Screen by PCR [[id number]] Collected: 03/01/24 1112    Order Status: Completed Updated: 03/01/24 1253     MRSA SCREEN BY PCR Negative    MRSA Screen by PCR [[id number]] Collected: 03/01/24 0940    Order Status: Canceled Specimen: Nasopharyngeal Swab from Nasal             MOST RECENT IMAGING  X-Ray Chest AP Portable  HISTORY: picc placement    FINDINGS: Portable chest radiograph at 1456 hours and 1458 compared to 0615 hours shows interval insertion of a left PICC. The first image shows the distal tip of the PICC overlying the left subclavian vein, with the second image showing the distal tip of the PICC overlying the SVC, projecting inferolaterally. There is no other significant interval change.    IMPRESSION: Interval insertion of a left PICC as described.    Electronically signed by:  Jeison Lowe MD  03/02/2024 03:24 PM CST Workstation: [id number]  X-Ray Chest AP Portable  HISTORY: Atelectasis    FINDINGS: Portable chest radiograph at 0615 hours compared to 03/01/2024 shows interval removal of the mediastinal and thoracostomy drains, with nasogastric tube remaining. There are median sternotomy wires and changes of transarterial aortic valvuloplasty, with stable enlarged cardiac silhouette and normal pulmonary vascularity.    The lungs are symmetrically expanded, with no new pleural or parenchymal abnormality. Bilateral lower lung opacities suggesting subsegmental atelectasis are unchanged.    IMPRESSION: Interval removal of the mediastinal and thoracostomy drains, with no other significant change.    Electronically signed by:  Jeison Lowe MD  03/02/2024 09:35 AM CST Workstation: [id number]      CURRENT VISIT EKG  Results for orders placed or performed during the hospital encounter of 02/23/24   EKG 12-LEAD   Result Value Ref Range    QRS Duration 232 ms    [facility] QTC Calculation 613 ms    Narrative    Test Reason  : Z95.1,Z95.1,    Vent. Rate : 080 BPM     Atrial Rate : 087 BPM     P-R Int : 000 ms          QRS Dur : 232 ms      QT Int : 532 ms       P-R-T Axes : 000 168 250 degrees     QTc Int : 613 ms    Ventricular-paced rhythm  occasional atrila pacing  Abnormal ECG  When compared with ECG of 21-FEB-2024 09:33,  Electronic ventricular pacemaker has replaced Atrial fibrillation  Confirmed by Eleazar WOO, Gregg NEWTON ([id number]) on 2/24/2024 7:12:58 AM    Referred By: OLLIE RIBEIRO           Confirmed By:Gregg Bush MD       ECHOCARDIOGRAM RESULTS  Results for orders placed during the hospital encounter of 12/13/23    Echo    Interpretation Summary    Left Ventricle: The left ventricle is normal in size. Mildly increased wall thickness. There is mild concentric hypertrophy. Normal wall motion. There is normal systolic function with a visually estimated ejection fraction of 65 - 70%. There is normal diastolic function.    Right Ventricle: Mild right ventricular enlargement. Wall thickness is normal. Right ventricle wall motion  is normal. Systolic function is normal.    Left Atrium: Left atrium is moderately dilated.    Right Atrium: Right atrium is mildly dilated.    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    Tricuspid Valve: There is mild regurgitation with a centrally directed jet.    Pulmonic Valve: There is mild regurgitation with a centrally directed jet.    Pulmonary Artery: There is mild pulmonary hypertension. The estimated pulmonary artery systolic pressure is 40 mmHg.    IVC/SVC: Intermediate venous pressure at 8 mmHg.        Oxygen INFORMATION       5 L       LAST ARTERIAL BLOOD GAS  ABG  Recent Labs   Lab 03/02/24  0832   PH 7.409   PO2 71*   PCO2 56.5*   HCO3 35.7*   BE 11*       IMPRESSION AND PLAN  Status post coronary artery bypass grafting  Alcoholic going through DTs  - resolved  Encephalopathy  - resolved  Acute hypercapnic hypoxemic respiratory failure  - 95%  saturation on 5 L  Bibasilar atelectasis  - continue IPV  - encourage patient to cough and clear  - Mucinex 1200 mg b.i.d.  Obstructive sleep apnea  Obesity hypoventilation syndrome  - noncompliant with CPAP  - sleeps on 3 L at home  - BiPAP whenever he is sleeping.   - continue elevation of head of bed  Extreme morbid obesity  - patient does not oxygenate well when flat  Atrial fib with RVR  - rate controlled on Cardizem  - CV surgery and Cardiology following  Ileus  - no further emesis  - tolerated a little clear liquids this morning  - on TPN  Sepsis with likely intra-abdominal source  - Zosyn, on day 6 of 7  Anemia  - postop, expected  Leukocytosis  - worse today again  - continue trending  - pan culture for temp spike  Hyperglycemia  - mild  - trend  Hyponatremia  - on 2 drips in D5W  - will try to change to NS  Hypophosphatemia  - replace and trend    Patient is appropriate to move to step-down  Discussed everything with nurse and patient and wife and respiratory    Lesley Ang MD  Date of Service: 03/05/2024  1:01 PM    none

## 2024-03-05 NOTE — PROGRESS NOTES
CVT SURGERY PROGRESS NOTE  Jose F Hanley  78 y.o.  1945    Patient's Chief Complaint:  S/P CABG (coronary artery bypass graft)    HPI:  This patient has coronary artery disease.  He has had progressive shortness of breath.  He underwent heart catheterization and coronary angiography showing multivessel coronary artery disease.  He was referred for consideration of redo coronary artery bypass grafting.    He has borderline diabetes  and hypertension.  He has a history of remote coronary artery bypass grafting a number of years ago.    He has had a TAVR and Watchman.  He is not a smoker.  He does have COPD.  He quit smoking 35 years ago.    Medicines are part of the epic record.  He does take daily aspirin.    On exam vital signs are stable.  Pupils are equal and round reactive to light.  Neck is supple.  Chest is equal breath sounds.  Heart is in a irregular rate and rhythm.  Abdomen is benign.  Perfusion to the legs and feet seems to be satisfactory.    Recent studies were reviewed.  The patient has significant recurrent coronary artery disease.  Consideration can be made for redo high-risk coronary artery bypass grafting.  The risks and potential complications were discussed.  The patient will take this under advisement.    Last 24 hour interval:  -No events overnight.   -WBC remains elevated with left shift. Afebrile. Remains on Zosyn.  -BP well controlled.   -Remains in a-fib, rate controlled on diltiazem and lopressor  -H/H stable.   -Good UOP, renal function stable.  -Pt seen this afternoon, lying in bed on NC. Drowsy, but awakens to voice.     Subjective:  Symptoms:  Improved.    Diet:  NPO.  No nausea or vomiting.    Pain:  He complains of pain that is moderate.  Pain is requiring pain medication.                                                                   Objective:  General Appearance:  In no acute distress.    Vital signs: (most recent): Blood pressure (!) 117/56, pulse 91, temperature 97.6 °F  (36.4 °C), temperature source Axillary, resp. rate (!) 25, height 6' (1.829 m), weight 125.1 kg (275 lb 12.7 oz), SpO2 (!) 93 %.    Output: Producing urine.    HEENT: Normal HEENT exam.  (NGT in place, to suction, bilious output. )    Lungs:  There are wheezes (scattered expiratory) and rhonchi (coarse throughout, improved).  No rales.    Heart: Normal rate.  Regular rhythm.  No murmur, gallop or friction rub.   Abdomen: Abdomen is distended.  Hypoactive bowel sounds.   There is no abdominal tenderness.     Extremities: There is local swelling (BLE).    Pulses: Distal pulses are intact.    Neurological: Patient is alert and oriented to person, place and time.    Skin:  Warm and dry.      Recent Vitals:  Vitals:    03/05/24 0943 03/05/24 1001 03/05/24 1101 03/05/24 1313   BP:  (!) 118/54 (!) 117/56    BP Location:   Right arm    Patient Position:   Lying    Pulse:  89 89 91   Resp: (!) 22 (!) 27 (!) 28 (!) 25   Temp:   97.6 °F (36.4 °C)    TempSrc:   Axillary    SpO2:  (!) 90% (!) 94% (!) 93%   Weight:       Height:           INPATIENT MEDS       aspirin  81 mg Oral Daily    diltiaZEM  180 mg Oral Daily    DULoxetine  60 mg Oral Nightly    enoxparin  40 mg Subcutaneous Q24H (prophylaxis, 1700)    ezetimibe  10 mg Oral QHS    furosemide (LASIX) injection  40 mg Intravenous TID AC    gabapentin  800 mg Oral BID    guaiFENesin  1,200 mg Oral BID    insulin detemir U-100  5 Units Subcutaneous Daily    ipratropium  0.5 mg Nebulization Q6H    levalbuterol  1.25 mg Nebulization Q6H    levothyroxine  25 mcg Intravenous Daily    metoprolol tartrate  25 mg Oral BID    montelukast  10 mg Oral Daily    pantoprazole  40 mg Oral Before breakfast    piperacillin-tazobactam (Zosyn) IV (PEDS and ADULTS) (extended infusion is not appropriate)  3.375 g Intravenous Q8H    sodium chloride 0.9%  10 mL Intravenous Q6H    spironolactone  25 mg Oral BID    tamsulosin  0.4 mg Oral Daily     dextrose 50% in water (D50W), dextrose 50% in water  "(D50W), dextrose 50% in water (D50W), dextrose 50% in water (D50W), glucagon (human recombinant), glucose, glucose, hydrALAZINE, HYDROcodone-acetaminophen, insulin aspart U-100, metoprolol, ondansetron, potassium bicarbonate, potassium bicarbonate, potassium bicarbonate, Flushing PICC/Midline Protocol **AND** sodium chloride 0.9% **AND** sodium chloride 0.9%  HEMODYNAMICS      Recent O2 Therapy/Vent Settings: 4L NC    I/O last 24 hrs:  Intake/Output - Last 3 Shifts         03/03 0700 03/04 0659 03/04 0700 03/05 0659 03/05 0700 03/06 0659    P.O. 240 495 75    I.V. (mL/kg) 761.7 (6.1) 180.7 (1.4) 321.7 (2.6)    NG/GT 60      IV Piggyback 600 300 361.6    TPN 1050.6 1292.7 306.7    Total Intake(mL/kg) 2712.3 (21.7) 2268.4 (18.1) 1065 (8.5)    Urine (mL/kg/hr) 3795 (1.3) 3950 (1.3)     Emesis/NG output  0     Drains 200      Total Output 3995 3950     Net -1282.7 -1681.6 +1065           Stool Occurrence   2 x    Emesis Occurrence  1 x           Recent Cardiac Rhythm: a-fib, rates 90s-100s    Recent Pain Assessment: 0    CBC  Recent Labs   Lab 03/03/24  0420 03/04/24  0500 03/05/24  0340   WBC 17.28* 21.31* 22.71*   RBC 3.89* 4.05* 4.17*   HGB 8.9* 9.3* 9.5*   * 585* 671*   MCV 80* 79* 77*   MCH 22.9* 23.0* 22.8*   MCHC 28.7* 29.2* 29.6*       BMP  Recent Labs   Lab 03/03/24  0420 03/04/24  0500 03/05/24  0340   CO2 37* 34* 30*   BUN 24* 21 22   CREATININE 0.9 0.8 0.9   CALCIUM 8.4* 8.7 8.3*       CARDIAC ENZYMES  No results for input(s): "TROPONINI", "CPK", "CKMB" in the last 168 hours.  BNP  Recent Labs   Lab 03/04/24  1002   *     PT/INR  No results found for: "PROTIME", "INR"      DIAGNOSTIC RESULTS:  X-Ray Chest AP Portable  HISTORY: picc placement    FINDINGS: Portable chest radiograph at 1456 hours and 1458 compared to 0615 hours shows interval insertion of a left PICC. The first image shows the distal tip of the PICC overlying the left subclavian vein, with the second image showing the distal " tip of the PICC overlying the SVC, projecting inferolaterally. There is no other significant interval change.    IMPRESSION: Interval insertion of a left PICC as described.    Electronically signed by:  Jeison Lowe MD  03/02/2024 03:24 PM CST Workstation: [id number]  X-Ray Chest AP Portable  HISTORY: Atelectasis    FINDINGS: Portable chest radiograph at 0615 hours compared to 03/01/2024 shows interval removal of the mediastinal and thoracostomy drains, with nasogastric tube remaining. There are median sternotomy wires and changes of transarterial aortic valvuloplasty, with stable enlarged cardiac silhouette and normal pulmonary vascularity.    The lungs are symmetrically expanded, with no new pleural or parenchymal abnormality. Bilateral lower lung opacities suggesting subsegmental atelectasis are unchanged.    IMPRESSION: Interval removal of the mediastinal and thoracostomy drains, with no other significant change.    Electronically signed by:  Jeison Lowe MD  03/02/2024 09:35 AM CST Workstation: [id number]        CURRENT CONSULTS:  WOUND CARE CONSULT  IP CONSULT TO CARDIOLOGY  IP CONSULT TO HOSPITALIST  IP CONSULT TO REGISTERED DIETITIAN/NUTRITIONIST  IP CONSULT TO PULMONOLOGY  IP CONSULT TO GENERAL SURGERY  IP CONSULT TO REGISTERED DIETITIAN/NUTRITIONIST  IP CONSULT TO PICC TEAM (NIAS)  IP CONSULT TO REGISTERED DIETITIAN/NUTRITIONIST    ASSESSMENT/PLAN:    Multivessel CAD   S/p CABG x 2 (LIMA to LAD, SVG to OM) in 2010  S/p Redo CABG X 3 (L radial to LAD, SVG to Diag, SVG to OM) + placement of IABP by Dr. Thomas on 2/23/24  -IABP removed 2/25  -Hemodynamically stable off pressors  -Precedex weaned off 2/27  -Tolerating NC- wean as able  -Mediastinal tube and WISAM drain removed 3/1/24  -Pacer wires removed 2/28.   -Surgical incisions with surgical dressing  -Continue ASA and BB.   -Documented statin intolerance - consider PCSK9i  -Encourage IS  -Increase activity. PT/OT following.  -Cruzito hose  -Bowel regimen -  start docusate. NGT removed 3/3. Passing flatus and tolerating regular diet.    Leukocytosis  -Uptrending with L shift  -Afebrile  -On Zosyn since 2/29 for possible aspiration    Acute post operative blood loss anemia  -Expected post op  -Received 1358 cc Cell Saver post post  -1 unit PRBCs on Feb 23  -H/H stable  -Monitor    Thrombocytosis  -Uptrending  -Likely secondary to infection  -Monitor    Post operative ileus  -Resolved    HFpEF  -Strict I/Os and daily weights  -Salt/fluid restrictions  -Diuresis with Lasix IV TID + aldactone    Hypomagnesemia  -Replaced per protocol  -Keep Mag >2 and K >4    Hx of TAVR  -Stable    Permanent AF  S/p Watchman device  -Rates uncontrolled currently  -Increase metoprolol    HTN  -Controlled on current regimen  -Goal < 140/90    HLD  -Continue statin     BPH  -Continue flomax  -Canas in place - remove when patient alert    Leadless PPM in situ  -Stable  -Base rate @ 50    Case and plan of care discussed with MD. OK for transfer to [facility]. Will need placement at discharge.       GI Prophylaxis:  PPI:proton pump inhibitor per orders  DVT Prophylaxis:  Anticoagulants   Medication Route Frequency    enoxaparin injection 40 mg Subcutaneous Q24H (prophylaxis, 1700)       Angeli Linda PA-C  Cardiovascular Thoracic Surgery  3/5/2024  9:20 AM

## 2024-03-06 LAB
ANION GAP SERPL CALC-SCNC: 8 MMOL/L (ref 8–16)
BACTERIA BLD CULT: NORMAL
BASOPHILS # BLD AUTO: 0.11 K/UL (ref 0–0.2)
BASOPHILS NFR BLD: 0.5 % (ref 0–1.9)
BUN SERPL-MCNC: 27 MG/DL (ref 8–23)
CALCIUM SERPL-MCNC: 8.6 MG/DL (ref 8.7–10.5)
CHLORIDE SERPL-SCNC: 97 MMOL/L (ref 95–110)
CO2 SERPL-SCNC: 31 MMOL/L (ref 23–29)
CREAT SERPL-MCNC: 1 MG/DL (ref 0.5–1.4)
DIFFERENTIAL METHOD BLD: ABNORMAL
EOSINOPHIL # BLD AUTO: 0.8 K/UL (ref 0–0.5)
EOSINOPHIL NFR BLD: 3.7 % (ref 0–8)
ERYTHROCYTE [DISTWIDTH] IN BLOOD BY AUTOMATED COUNT: 21.9 % (ref 11.5–14.5)
EST. GFR  (NO RACE VARIABLE): >60 ML/MIN/1.73 M^2
GLUCOSE SERPL-MCNC: 125 MG/DL (ref 70–110)
GLUCOSE SERPL-MCNC: 143 MG/DL (ref 70–110)
GLUCOSE SERPL-MCNC: 145 MG/DL (ref 70–110)
GLUCOSE SERPL-MCNC: 153 MG/DL (ref 70–110)
GLUCOSE SERPL-MCNC: 168 MG/DL (ref 70–110)
HCT VFR BLD AUTO: 31.9 % (ref 40–54)
HGB BLD-MCNC: 9.3 G/DL (ref 14–18)
IMM GRANULOCYTES # BLD AUTO: 0.26 K/UL (ref 0–0.04)
IMM GRANULOCYTES NFR BLD AUTO: 1.1 % (ref 0–0.5)
LYMPHOCYTES # BLD AUTO: 1.5 K/UL (ref 1–4.8)
LYMPHOCYTES NFR BLD: 6.7 % (ref 18–48)
MCH RBC QN AUTO: 22.3 PG (ref 27–31)
MCHC RBC AUTO-ENTMCNC: 29.2 G/DL (ref 32–36)
MCV RBC AUTO: 77 FL (ref 82–98)
MONOCYTES # BLD AUTO: 1.6 K/UL (ref 0.3–1)
MONOCYTES NFR BLD: 7.2 % (ref 4–15)
NEUTROPHILS # BLD AUTO: 18.5 K/UL (ref 1.8–7.7)
NEUTROPHILS NFR BLD: 80.8 % (ref 38–73)
NRBC BLD-RTO: 3 /100 WBC
PHOSPHATE SERPL-MCNC: 3.7 MG/DL (ref 2.7–4.5)
PLATELET # BLD AUTO: 747 K/UL (ref 150–450)
PMV BLD AUTO: 10.6 FL (ref 9.2–12.9)
POTASSIUM SERPL-SCNC: 3.5 MMOL/L (ref 3.5–5.1)
RBC # BLD AUTO: 4.17 M/UL (ref 4.6–6.2)
SODIUM SERPL-SCNC: 136 MMOL/L (ref 136–145)
WBC # BLD AUTO: 22.85 K/UL (ref 3.9–12.7)

## 2024-03-06 PROCEDURE — 63600175 PHARM REV CODE 636 W HCPCS: Performed by: NURSE PRACTITIONER

## 2024-03-06 PROCEDURE — 25000003 PHARM REV CODE 250: Performed by: INTERNAL MEDICINE

## 2024-03-06 PROCEDURE — 97110 THERAPEUTIC EXERCISES: CPT

## 2024-03-06 PROCEDURE — 25000242 PHARM REV CODE 250 ALT 637 W/ HCPCS: Performed by: INTERNAL MEDICINE

## 2024-03-06 PROCEDURE — 63600175 PHARM REV CODE 636 W HCPCS: Performed by: HOSPITALIST

## 2024-03-06 PROCEDURE — 99232 SBSQ HOSP IP/OBS MODERATE 35: CPT | Mod: ,,, | Performed by: INTERNAL MEDICINE

## 2024-03-06 PROCEDURE — 25000003 PHARM REV CODE 250: Performed by: HOSPITALIST

## 2024-03-06 PROCEDURE — 99900031 HC PATIENT EDUCATION (STAT)

## 2024-03-06 PROCEDURE — 63600175 PHARM REV CODE 636 W HCPCS: Performed by: INTERNAL MEDICINE

## 2024-03-06 PROCEDURE — 27000221 HC OXYGEN, UP TO 24 HOURS

## 2024-03-06 PROCEDURE — 25000003 PHARM REV CODE 250: Performed by: PHYSICIAN ASSISTANT

## 2024-03-06 PROCEDURE — 25000003 PHARM REV CODE 250: Performed by: NURSE PRACTITIONER

## 2024-03-06 PROCEDURE — 85025 COMPLETE CBC W/AUTO DIFF WBC: CPT | Performed by: THORACIC SURGERY (CARDIOTHORACIC VASCULAR SURGERY)

## 2024-03-06 PROCEDURE — 97535 SELF CARE MNGMENT TRAINING: CPT

## 2024-03-06 PROCEDURE — 94640 AIRWAY INHALATION TREATMENT: CPT

## 2024-03-06 PROCEDURE — 94761 N-INVAS EAR/PLS OXIMETRY MLT: CPT

## 2024-03-06 PROCEDURE — 80048 BASIC METABOLIC PNL TOTAL CA: CPT | Performed by: THORACIC SURGERY (CARDIOTHORACIC VASCULAR SURGERY)

## 2024-03-06 PROCEDURE — 25000003 PHARM REV CODE 250: Performed by: THORACIC SURGERY (CARDIOTHORACIC VASCULAR SURGERY)

## 2024-03-06 PROCEDURE — 99900035 HC TECH TIME PER 15 MIN (STAT)

## 2024-03-06 PROCEDURE — 84100 ASSAY OF PHOSPHORUS: CPT | Performed by: INTERNAL MEDICINE

## 2024-03-06 PROCEDURE — 21000000 HC CCU ICU ROOM CHARGE

## 2024-03-06 PROCEDURE — 97530 THERAPEUTIC ACTIVITIES: CPT | Mod: CQ

## 2024-03-06 PROCEDURE — 99024 POSTOP FOLLOW-UP VISIT: CPT | Mod: POP,,, | Performed by: PHYSICIAN ASSISTANT

## 2024-03-06 RX ORDER — DOCUSATE SODIUM 100 MG/1
100 CAPSULE, LIQUID FILLED ORAL 2 TIMES DAILY
Status: DISCONTINUED | OUTPATIENT
Start: 2024-03-06 | End: 2024-03-07 | Stop reason: HOSPADM

## 2024-03-06 RX ORDER — LEVOTHYROXINE SODIUM 25 UG/1
50 TABLET ORAL
Status: DISCONTINUED | OUTPATIENT
Start: 2024-03-07 | End: 2024-03-07 | Stop reason: HOSPADM

## 2024-03-06 RX ADMIN — SPIRONOLACTONE 25 MG: 25 TABLET ORAL at 08:03

## 2024-03-06 RX ADMIN — METOPROLOL TARTRATE 25 MG: 25 TABLET, FILM COATED ORAL at 09:03

## 2024-03-06 RX ADMIN — IPRATROPIUM BROMIDE 0.5 MG: 0.5 SOLUTION RESPIRATORY (INHALATION) at 07:03

## 2024-03-06 RX ADMIN — PIPERACILLIN AND TAZOBACTAM 3.38 G: 3; .375 INJECTION, POWDER, FOR SOLUTION INTRAVENOUS; PARENTERAL at 05:03

## 2024-03-06 RX ADMIN — PANTOPRAZOLE SODIUM 40 MG: 40 TABLET, DELAYED RELEASE ORAL at 05:03

## 2024-03-06 RX ADMIN — MONTELUKAST 10 MG: 10 TABLET, FILM COATED ORAL at 08:03

## 2024-03-06 RX ADMIN — TAMSULOSIN HYDROCHLORIDE 0.4 MG: 0.4 CAPSULE ORAL at 08:03

## 2024-03-06 RX ADMIN — GUAIFENESIN 1200 MG: 600 TABLET, EXTENDED RELEASE ORAL at 08:03

## 2024-03-06 RX ADMIN — ENOXAPARIN SODIUM 40 MG: 100 INJECTION SUBCUTANEOUS at 05:03

## 2024-03-06 RX ADMIN — SPIRONOLACTONE 25 MG: 25 TABLET ORAL at 09:03

## 2024-03-06 RX ADMIN — LEVALBUTEROL HYDROCHLORIDE 1.25 MG: 1.25 SOLUTION RESPIRATORY (INHALATION) at 07:03

## 2024-03-06 RX ADMIN — LEVALBUTEROL HYDROCHLORIDE 1.25 MG: 1.25 SOLUTION RESPIRATORY (INHALATION) at 01:03

## 2024-03-06 RX ADMIN — GUAIFENESIN 1200 MG: 600 TABLET, EXTENDED RELEASE ORAL at 09:03

## 2024-03-06 RX ADMIN — DOCUSATE SODIUM 100 MG: 100 CAPSULE, LIQUID FILLED ORAL at 11:03

## 2024-03-06 RX ADMIN — PIPERACILLIN AND TAZOBACTAM 3.38 G: 3; .375 INJECTION, POWDER, FOR SOLUTION INTRAVENOUS; PARENTERAL at 01:03

## 2024-03-06 RX ADMIN — DOCUSATE SODIUM 100 MG: 100 CAPSULE, LIQUID FILLED ORAL at 09:03

## 2024-03-06 RX ADMIN — POTASSIUM BICARBONATE 50 MEQ: 977.5 TABLET, EFFERVESCENT ORAL at 10:03

## 2024-03-06 RX ADMIN — HYDROCODONE BITARTRATE AND ACETAMINOPHEN 1 TABLET: 5; 325 TABLET ORAL at 07:03

## 2024-03-06 RX ADMIN — IPRATROPIUM BROMIDE 0.5 MG: 0.5 SOLUTION RESPIRATORY (INHALATION) at 01:03

## 2024-03-06 RX ADMIN — METOPROLOL TARTRATE 25 MG: 25 TABLET, FILM COATED ORAL at 08:03

## 2024-03-06 RX ADMIN — DILTIAZEM HYDROCHLORIDE 180 MG: 180 CAPSULE, COATED, EXTENDED RELEASE ORAL at 08:03

## 2024-03-06 RX ADMIN — EZETIMIBE 10 MG: 10 TABLET ORAL at 09:03

## 2024-03-06 RX ADMIN — FUROSEMIDE 40 MG: 10 INJECTION, SOLUTION INTRAVENOUS at 06:03

## 2024-03-06 RX ADMIN — DULOXETINE HYDROCHLORIDE 60 MG: 30 CAPSULE, DELAYED RELEASE ORAL at 09:03

## 2024-03-06 RX ADMIN — FUROSEMIDE 40 MG: 10 INJECTION, SOLUTION INTRAVENOUS at 05:03

## 2024-03-06 RX ADMIN — GABAPENTIN 800 MG: 300 CAPSULE ORAL at 09:03

## 2024-03-06 RX ADMIN — GABAPENTIN 800 MG: 300 CAPSULE ORAL at 08:03

## 2024-03-06 RX ADMIN — LEVOTHYROXINE SODIUM ANHYDROUS 25 MCG: 100 INJECTION, POWDER, LYOPHILIZED, FOR SOLUTION INTRAVENOUS at 08:03

## 2024-03-06 RX ADMIN — INSULIN ASPART 1 UNITS: 100 INJECTION, SOLUTION INTRAVENOUS; SUBCUTANEOUS at 09:03

## 2024-03-06 RX ADMIN — INSULIN DETEMIR 5 UNITS: 100 INJECTION, SOLUTION SUBCUTANEOUS at 08:03

## 2024-03-06 RX ADMIN — FUROSEMIDE 40 MG: 10 INJECTION, SOLUTION INTRAVENOUS at 11:03

## 2024-03-06 RX ADMIN — ASPIRIN 81 MG 81 MG: 81 TABLET ORAL at 08:03

## 2024-03-06 NOTE — ASSESSMENT & PLAN NOTE
Patient's anemia is currently controlled. Has not received any PRBCs to date.. Etiology likely d/t acute blood loss  Current CBC reviewed-   Lab Results   Component Value Date    HGB 9.3 (L) 03/06/2024    HCT 31.9 (L) 03/06/2024     Monitor serial CBC and transfuse if patient becomes hemodynamically unstable, symptomatic or H/H drops below 7/21.

## 2024-03-06 NOTE — ASSESSMENT & PLAN NOTE
Patient has Post-operative ileus which is adynamic in etiology which is stable. This is inherent to his procedure. Will treat conservatively with bowel rest, IV fluids, serial abdominal exams and avoidance of GI paralytics such as narcotics or anti-spasmodics. Monitor patient closely.     Patient had bowel movement and ileus resolve  Last day of zosyn

## 2024-03-06 NOTE — PROGRESS NOTES
Pending sale to [facility]  Progress Note    Patient Name: Jose F Hanley  MRN: [id number]  Patient Class: IP- Inpatient   Admission Date: 2/23/2024  Length of Stay: 12 days  Attending Physician: Corby Lyn MD  Primary Care Provider: Sunita Rivera MD        Subjective:     Principal Problem:S/P CABG (coronary artery bypass graft)        HPI:  Patient is 78 year old male with history of Afib, alcohol use, HFpEF, COPD, hypothyroidism, CAD was admitted to ICU after elective CABG. He underwent heart catheterization and coronary angiography showing multivessel coronary artery disease.  He was referred for consideration of redo coronary artery bypass grafting.    He has had a TAVR and Watchman. Recent studies were reviewed.  The patient has significant recurrent coronary artery disease.  Post operatively patient developed alcohol withdrawal and delirium, was started on precedex. Patient was extubated and still on BiPAP.  Hospitalist was consulted for medical management. .    Overview/Hospital Course:  Patient was post op CABG and  admitted to ICU. Extubated to BiPAP, developed encephalopathy secondary to DT and needed precedex.   Post op care per CTS. Cardiology also following.  Postop course was complicated with DT and also atrial fibrillation and heart failure and possible abdominal source of infection and completed Zosyn.   chest and mediastinal tubes and pacer wires and Balloon pump was removed 2/25.  Patient had brief period of possible paralytic ileus with bowel obstruction but patient had bowel movement and which has resolved.   Patient has history of  TIANA, on BiPAP q.h.s.while  sleeping and continue currently he is on high-flow nasal cannula 6 L and saturating 96%.  He needed Cardizem drip for AFib with RVR but currently getting controlled with p.o. Cardizem.  Patient is still having significant dyspnea even when talking and a lot of secretions and try to do suctions .    Interval History:      Patient was seen along with the team, he is on 6 L and still having conversational dyspnea  Lung sounds very noisy with rales and congestion.  Bilateral legs are still  edema    Review of Systems  Objective:     Vital Signs (Most Recent):  Temp: 97.9 °F (36.6 °C) (03/06/24 1100)  Pulse: 85 (03/06/24 1200)  Resp: (!) 24 (03/06/24 1200)  BP: (!) 104/51 (03/06/24 1200)  SpO2: 95 % (03/06/24 1200) Vital Signs (24h Range):  Temp:  [97.9 °F (36.6 °C)-99.1 °F (37.3 °C)] 97.9 °F (36.6 °C)  Pulse:  [] 85  Resp:  [17-28] 24  SpO2:  [78 %-97 %] 95 %  BP: ()/(51-64) 104/51     Weight: 125.1 kg (275 lb 12.7 oz)  Body mass index is 37.4 kg/m².    Intake/Output Summary (Last 24 hours) at 3/6/2024 1320  Last data filed at 3/6/2024 1018  Gross per 24 hour   Intake 1202.58 ml   Output 1750 ml   Net -547.42 ml         Physical Exam  Constitutional:       General: He is not in acute distress.     Appearance: He is well-developed.   HENT:      Head: Normocephalic.      Nose: Nose normal.   Eyes:      Pupils: Pupils are equal, round, and reactive to light.   Cardiovascular:      Rate and Rhythm: Normal rate and regular rhythm.   Pulmonary:      Effort: No respiratory distress.      Breath sounds: Rhonchi and rales present.   Abdominal:      General: Abdomen is flat. There is no distension.      Tenderness: There is no abdominal tenderness.   Musculoskeletal:         General: Normal range of motion.      Cervical back: Neck supple.   Skin:     General: Skin is warm.      Findings: No rash.   Neurological:      Mental Status: He is alert and oriented to person, place, and time.   Psychiatric:         Mood and Affect: Mood normal.             Significant Labs: All pertinent labs within the past 24 hours have been reviewed.  CBC:   Recent Labs   Lab 03/05/24  0340 03/06/24  0450   WBC 22.71* 22.85*   HGB 9.5* 9.3*   HCT 32.1* 31.9*   * 747*     CMP:   Recent Labs   Lab 03/05/24  0340 03/06/24  0450   * 136   K  "3.6 3.5   CL 95 97   CO2 30* 31*   * 143*   BUN 22 27*   CREATININE 0.9 1.0   CALCIUM 8.3* 8.6*   ANIONGAP 9 8     Cardiac Markers: No results for input(s): "CKMB", "MYOGLOBIN", "BNP", "TROPISTAT" in the last 48 hours.    Significant Imaging: I have reviewed all pertinent imaging results/findings within the past 24 hours.    Assessment/Plan:      * S/P CABG (coronary artery bypass graft) - x2 09/2010 - LIMA to LAD; SVG to OMB; grafts occluded 08/2012  - post op care per surgery   -aspirin mg daily metoprolol 10 mg IV q.12 hourslosartan 25 mg daily, metoprolol 25 mg p.o. t.i.d.  High-dose Lasix for now and monitor renal function        Hyperglycemia  Patient's FSGs are controlled on current medication regimen.  Last A1c reviewed-   Lab Results   Component Value Date    HGBA1C 6.6 (A) 02/05/2024     Most recent fingerstick glucose reviewed-   POC Glucose   Date Value Ref Range Status   03/01/2024 149 (H) 70 - 110 mg/dL Final   02/28/2024 140 (H) 70 - 110 mg/dL Final   02/24/2024 162 (H) 70 - 110 mg/dL Final   02/24/2024 163 (H) 70 - 110 Final       Current correctional scale  Low  Maintain anti-hyperglycemic dose as follows-   Antihyperglycemics (From admission, onward)      Start     Stop Route Frequency Ordered    03/05/24 1015  insulin detemir U-100 (Levemir) pen 5 Units         -- SubQ Daily 03/05/24 0911    03/05/24 1011  insulin aspart U-100 pen 0-10 Units         -- SubQ Before meals & nightly PRN 03/05/24 0911          Hold Oral hypoglycemics while patient is in the hospital.        Hyponatremia  Patient has hypornatremia which is uncontrolled. The hypernatremia is due to  volume overload secondary to decompensated heart failure . We will aim to correct the sodium by 8-10mEq in 24 hours. We will correct their hyponatremia with diuresis. The patient's sodium results have been reviewed and are listed below.  Recent Labs   Lab 03/06/24  0450        Resolved    Ileus  Patient has Post-operative ileus " which is adynamic in etiology which is stable. This is inherent to his procedure. Will treat conservatively with bowel rest, IV fluids, serial abdominal exams and avoidance of GI paralytics such as narcotics or anti-spasmodics. Monitor patient closely.     Patient had bowel movement and ileus resolve  Last day of zosyn         Acute blood loss anemia  Patient's anemia is currently controlled. Has not received any PRBCs to date.. Etiology likely d/t acute blood loss  Current CBC reviewed-   Lab Results   Component Value Date    HGB 9.3 (L) 03/06/2024    HCT 31.9 (L) 03/06/2024     Monitor serial CBC and transfuse if patient becomes hemodynamically unstable, symptomatic or H/H drops below 7/21.       Chronic diastolic congestive heart failure  Patient is identified as having Diastolic (HFpEF) heart failure that is Acute on chronic. CHF is currently uncontrolled due to Continued edema of extremities. Latest ECHO performed and demonstrates- Results for orders placed during the hospital encounter of 02/23/24    Echo    Interpretation Summary    Left Ventricle: Septal motion is consistent with bundle branch block. There is normal systolic function with a visually estimated ejection fraction of 60 - 65%.    Left Atrium: Left atrium is moderately dilated.    Right Atrium: Right atrium is mildly dilated.    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    A limited echo was performed using limited 2D. Overall the study quality was technically difficult. The study was difficult due to patient's clinical status and body habitus.  . Continue Beta Blocker and Furosemide and monitor clinical status closely. Monitor on telemetry. Patient is off CHF pathway.  Monitor strict Is&Os and daily weights. Cardiology has been consulted. Continue to stress to patient importance of self efficacy and  on diet for CHF. Last BNP reviewed- and noted below   Recent Labs   Lab 03/04/24  1002   *      Continue diuresis and strict intake output and patient needed get out of the bed    Severe obesity (BMI 35.0-39.9) with comorbidity and TIANA  Body mass index is 37.4 kg/m². Morbid obesity complicates all aspects of disease management from diagnostic modalities to treatment. Weight loss encouraged and health benefits explained to patient.         Presence of Watchman left atrial appendage closure device  Aware  Patient may not need any further anticoagulation because of Watchman device      Permanent atrial fibrillation  S/p watchman device and TAVR.Patient with Long standing persistent (>12 months) atrial fibrillation which is uncontrolled currently with Beta Blocker and Calcium Channel Blocker. Patient is currently in atrial fibrillation.[id number] Score: 4. HASBLED score- 3. Anticoagulation indicated. Anticoagulation done with Lovenox .      History of transcatheter aortic valve replacement (TAVR)  Noted      Dyslipidemia  - cont Statin       CAD (coronary artery disease), native coronary artery  S/p CABG X 3 on 2/23  - post op care per CTS, chest tube and pacer wires removed . Balloon pump was removed 2/25  - Cont aspirin, Lipitor, ,metoprolol       VTE Risk Mitigation (From admission, onward)           Ordered     enoxaparin injection 40 mg  Every 24 hours         03/04/24 1415     Place DUTCH hose  Until discontinued         02/23/24 0545                    Discharge Planning   LEIA: 3/7/2024     Code Status: Full Code   Is the patient medically ready for discharge?:     Reason for patient still in hospital (select all that apply): Treatment  Discharge Plan A: Skilled Nursing Facility   Discharge Delays: None known at this time              Oscar Palma MD  Department of Hospital Medicine   [facility]

## 2024-03-06 NOTE — ASSESSMENT & PLAN NOTE
S/p CABG X 3 on 2/23  - post op care per CTS, chest tube and pacer wires removed . Balloon pump was removed 2/25  - Cont aspirin, Lipitor, ,metoprolol

## 2024-03-06 NOTE — NURSING
Follow up  78 yr old male  in bed    fold red blanching     Left arm  skin tears     Right arm vein harvest    Nose healing

## 2024-03-06 NOTE — NURSING
Nurses Note -- 4 Eyes      3/6/2024   2:10 AM      Skin assessed during: Transfer      [] No Altered Skin Integrity Present    []Prevention Measures Documented      [x] Yes- Altered Skin Integrity Present or Discovered   [x] LDA Added if Not in Epic (Describe Wound)   [] New Altered Skin Integrity was Present on Admit and Documented in LDA   [] Wound Image Taken    Wound Care Consulted? Yes    Attending Nurse:  BILLY PATEL RN    Second RN/Staff Member:  SPENCER BABB RN

## 2024-03-06 NOTE — PT/OT/SLP PROGRESS
Physical Therapy Treatment    Patient Name:  Jose F Hanley   MRN:  [id number]    Recommendations:     Discharge Recommendations: High Intensity Therapy  Discharge Equipment Recommendations:  (to be determined at next level of care)  Barriers to discharge:  increased assist with mobility, decreased activity tolerance, sternal precautions    Assessment:     Jose F Hanley is a 78 y.o. male admitted with a medical diagnosis of S/P CABG (coronary artery bypass graft).  He presents with the following impairments/functional limitations: weakness, impaired endurance, impaired self care skills, impaired functional mobility, gait instability, impaired balance, decreased coordination, decreased upper extremity function, decreased lower extremity function, decreased safety awareness, impaired skin, impaired cardiopulmonary response to activity.    Pt agreeable to visit. Pt required mod to max assist x 2 persons for supine to sit transfer with pt instructed to hold heart pillow to maintain sternal precautions.    Pt required mod assist to scoot hip towards EOB.    Pt required mod assist x 2 for sit to stand transfer with verbal cuing for sternal precautions and to rock back and forth for momentum.    Pt performed step transfer from bed to chair with personal rollator and mod assist.    Pt left up in chair and spouse present.    Rehab Prognosis: Fair; patient would benefit from acute skilled PT services to address these deficits and reach maximum level of function.    Recent Surgery: Procedure(s) (LRB):  CABG, REPEAT (N/A)  HARVEST-VEIN-ENDOVASCULAR (Right)  SURGICAL PROCUREMENT,ARTERY,RADIAL,FOR CABG  INSERTION, INTRA-AORTIC BALLOON PUMP (Right) 12 Days Post-Op    Plan:     During this hospitalization, patient to be seen daily to address the identified rehab impairments via gait training, therapeutic activities, therapeutic exercises and progress toward the following goals:    Plan of Care Expires:  03/29/24    Subjective     Chief  Complaint: fatigue  Patient/Family Comments/goals: to get better  Pain/Comfort:  Pain Rating 1: 0/10      Objective:     Communicated with RN prior to session.  Patient found HOB elevated with oxygen, pulse ox (continuous), telemetry, blood pressure cuff, peripheral IV, PureWick upon PT entry to room.     General Precautions: Standard, fall, sternal  Orthopedic Precautions: N/A  Braces: N/A  Respiratory Status: Nasal cannula, flow 4 L/min     Functional Mobility:  Bed Mobility:     Supine to Sit: moderate assistance, maximal assistance, and of 2 persons  Transfers:     Sit to Stand:  moderate assistance and of 2 persons with heart pillow  Bed to Chair: moderate assistance with  rollator  using  Step Transfer      AM-PAC 6 CLICK MOBILITY          Treatment & Education:  Pt educated on importance of time OOB, importance of intermittent mobility, safe techniques for transfers/ambulation, discharge recommendations/options, and use of call light for assistance and fall prevention.      Patient left up in chair with all lines intact, call button in reach, chair alarm on, and spouse present..    GOALS:   Multidisciplinary Problems       Physical Therapy Goals          Problem: Physical Therapy    Goal Priority Disciplines Outcome Goal Variances Interventions   Physical Therapy Goal     PT, PT/OT Ongoing, Progressing     Description: 1. Supine to sit with Stand-by Assistance  2. Sit to stand transfer with Stand-by Assistance  3.. Bed to chair transfer with Supervision using Rolling Walker  4. Gait  x 100 feet with Minimal Assistance using Rolling Walker.                          Time Tracking:     PT Received On: 03/06/24  PT Start Time: 1022     PT Stop Time: 1052  PT Total Time (min): 30 min     Billable Minutes: Therapeutic Activity 30    Treatment Type: Treatment  PT/PTA: PTA     Number of PTA visits since last PT visit: 1 03/06/2024

## 2024-03-06 NOTE — PLAN OF CARE
Problem: Adult Inpatient Plan of Care  Goal: Plan of Care Review  Outcome: Ongoing, Progressing  Goal: Patient-Specific Goal (Individualized)  Outcome: Ongoing, Progressing  Goal: Absence of Hospital-Acquired Illness or Injury  Outcome: Ongoing, Progressing  Goal: Optimal Comfort and Wellbeing  Outcome: Ongoing, Progressing  Goal: Readiness for Transition of Care  Outcome: Ongoing, Progressing     Problem: Fluid Imbalance (Pneumonia)  Goal: Fluid Balance  Outcome: Ongoing, Progressing     Problem: Infection (Pneumonia)  Goal: Resolution of Infection Signs and Symptoms  Outcome: Ongoing, Progressing     Problem: Respiratory Compromise (Pneumonia)  Goal: Effective Oxygenation and Ventilation  Outcome: Ongoing, Progressing     Problem: Impaired Wound Healing  Goal: Optimal Wound Healing  Outcome: Ongoing, Progressing     Problem: Fall Injury Risk  Goal: Absence of Fall and Fall-Related Injury  Outcome: Ongoing, Progressing

## 2024-03-06 NOTE — PROGRESS NOTES
CVT SURGERY PROGRESS NOTE  Jose F Hanley  78 y.o.  1945    Patient's Chief Complaint:  S/P CABG (coronary artery bypass graft)    HPI:  This patient has coronary artery disease.  He has had progressive shortness of breath.  He underwent heart catheterization and coronary angiography showing multivessel coronary artery disease.  He was referred for consideration of redo coronary artery bypass grafting.    He has borderline diabetes  and hypertension.  He has a history of remote coronary artery bypass grafting a number of years ago.    He has had a TAVR and Watchman.  He is not a smoker.  He does have COPD.  He quit smoking 35 years ago.    Medicines are part of the epic record.  He does take daily aspirin.    On exam vital signs are stable.  Pupils are equal and round reactive to light.  Neck is supple.  Chest is equal breath sounds.  Heart is in a irregular rate and rhythm.  Abdomen is benign.  Perfusion to the legs and feet seems to be satisfactory.    Recent studies were reviewed.  The patient has significant recurrent coronary artery disease.  Consideration can be made for redo high-risk coronary artery bypass grafting.  The risks and potential complications were discussed.  The patient will take this under advisement.    Last 24 hour interval:  -No events overnight.   -WBC remains elevated with left shift. Afebrile. Remains on Zosyn.  -BP well controlled.   -Remains in a-fib, rate controlled on diltiazem and lopressor  -H/H stable.   -Good UOP, renal function stable.  -Pt seen this afternoon, lying in bed on NC. Much more alert today. Reports large BM yesterday. Still coughing up thick sputum. Eager to go to swing bed soon.     Subjective:  Symptoms:  Improved.    Diet:  NPO.  No nausea or vomiting.    Pain:  He complains of pain that is moderate.  Pain is requiring pain medication.                                                                   Objective:  General Appearance:  In no acute distress.     Vital signs: (most recent): Blood pressure (!) 123/58, pulse 83, temperature 97.9 °F (36.6 °C), resp. rate (!) 23, height 6' (1.829 m), weight 125.1 kg (275 lb 12.7 oz), SpO2 97 %.    Output: Producing urine.    HEENT: Normal HEENT exam.  (NGT in place, to suction, bilious output. )    Lungs:  There are wheezes (scattered expiratory) and rhonchi (coarse throughout, improved).  No rales.    Heart: Normal rate.  Regular rhythm.  No murmur, gallop or friction rub.   Abdomen: Abdomen is distended.  Hypoactive bowel sounds.   There is no abdominal tenderness.     Extremities: There is local swelling (BLE).    Pulses: Distal pulses are intact.    Neurological: Patient is alert and oriented to person, place and time.    Skin:  Warm and dry.      Recent Vitals:  Vitals:    03/06/24 1100 03/06/24 1200 03/06/24 1330 03/06/24 1400   BP: (!) 99/55 (!) 104/51  (!) 111/53   BP Location:       Patient Position:       Pulse: 86 85 85 72   Resp: (!) 21 (!) 24 19 19   Temp: 97.9 °F (36.6 °C)      TempSrc:       SpO2: 96% 95% 95% (!) 93%   Weight:       Height:           INPATIENT MEDS       aspirin  81 mg Oral Daily    diltiaZEM  180 mg Oral Daily    docusate sodium  100 mg Oral BID    DULoxetine  60 mg Oral Nightly    enoxparin  40 mg Subcutaneous Q24H (prophylaxis, 1700)    ezetimibe  10 mg Oral QHS    furosemide (LASIX) injection  40 mg Intravenous TID AC    gabapentin  800 mg Oral BID    guaiFENesin  1,200 mg Oral BID    insulin detemir U-100  5 Units Subcutaneous Daily    ipratropium  0.5 mg Nebulization Q6H    levalbuterol  1.25 mg Nebulization Q6H    levothyroxine  25 mcg Intravenous Daily    metoprolol tartrate  25 mg Oral BID    montelukast  10 mg Oral Daily    pantoprazole  40 mg Oral Before breakfast    piperacillin-tazobactam (Zosyn) IV (PEDS and ADULTS) (extended infusion is not appropriate)  3.375 g Intravenous Q8H    sodium chloride 0.9%  10 mL Intravenous Q6H    spironolactone  25 mg Oral BID    tamsulosin  0.4 mg  "Oral Daily     dextrose 50% in water (D50W), dextrose 50% in water (D50W), dextrose 50% in water (D50W), dextrose 50% in water (D50W), glucagon (human recombinant), glucose, glucose, hydrALAZINE, HYDROcodone-acetaminophen, insulin aspart U-100, metoprolol, ondansetron, potassium bicarbonate, potassium bicarbonate, potassium bicarbonate, Flushing PICC/Midline Protocol **AND** sodium chloride 0.9% **AND** sodium chloride 0.9%  HEMODYNAMICS      Recent O2 Therapy/Vent Settings: 4L NC    I/O last 24 hrs:  Intake/Output - Last 3 Shifts         03/04 0700 03/05 0659 03/05 0700 03/06 0659 03/06 0700 03/07 0659    P.O. 495 435 600    I.V. (mL/kg) 180.7 (1.4) 336 (2.7)     NG/GT       IV Piggyback 300 449.9 200    TPN 1292.7 306.7     Total Intake(mL/kg) 2268.4 (18.1) 1527.5 (12.2) 800 (6.4)    Urine (mL/kg/hr) 3950 (1.3) 1250 (0.4) 500 (0.5)    Emesis/NG output 0      Drains       Total Output 3950 1250 500    Net -1681.6 +277.5 +300           Stool Occurrence  2 x     Emesis Occurrence 1 x            Recent Cardiac Rhythm: a-fib, rates 90s-100s    Recent Pain Assessment: 0    CBC  Recent Labs   Lab 03/04/24  0500 03/05/24  0340 03/06/24  0450   WBC 21.31* 22.71* 22.85*   RBC 4.05* 4.17* 4.17*   HGB 9.3* 9.5* 9.3*   * 671* 747*   MCV 79* 77* 77*   MCH 23.0* 22.8* 22.3*   MCHC 29.2* 29.6* 29.2*       BMP  Recent Labs   Lab 03/04/24  0500 03/05/24  0340 03/06/24  0450   CO2 34* 30* 31*   BUN 21 22 27*   CREATININE 0.8 0.9 1.0   CALCIUM 8.7 8.3* 8.6*       CARDIAC ENZYMES  No results for input(s): "TROPONINI", "CPK", "CKMB" in the last 168 hours.  BNP  Recent Labs   Lab 03/04/24  1002   *       PT/INR  No results found for: "PROTIME", "INR"      DIAGNOSTIC RESULTS:  X-Ray Chest 1 View  Chest single view    CLINICAL DATA: Atelectasis    FINDINGS: 2 AP views are compared to March 2.    The heart and mediastinum are unchanged. Left-sided PICC line remains in place with tip at superior vena cava. Nasogastric tube " has been removed.    Previously noted bibasilar atelectasis appears improved. There is no pneumothorax or significant pleural effusion.    IMPRESSION:  1. Improving bilateral atelectasis.  2. Interval removal of nasogastric tube.    Electronically signed by:  Jan Leyva MD  03/06/2024 11:04 AM CST Workstation: [id number]        CURRENT CONSULTS:  WOUND CARE CONSULT  IP CONSULT TO CARDIOLOGY  IP CONSULT TO HOSPITALIST  IP CONSULT TO REGISTERED DIETITIAN/NUTRITIONIST  IP CONSULT TO PULMONOLOGY  IP CONSULT TO GENERAL SURGERY  IP CONSULT TO REGISTERED DIETITIAN/NUTRITIONIST  IP CONSULT TO PICC TEAM (NIAS)  IP CONSULT TO REGISTERED DIETITIAN/NUTRITIONIST    ASSESSMENT/PLAN:    Multivessel CAD   S/p CABG x 2 (LIMA to LAD, SVG to OM) in 2010  S/p Redo CABG X 3 (L radial to LAD, SVG to Diag, SVG to OM) + placement of IABP by Dr. Thomas on 2/23/24  -IABP removed 2/25  -Hemodynamically stable off pressors  -Precedex weaned off 2/27  -Tolerating NC- wean as able  -Mediastinal tube and WISAM drain removed 3/1/24  -Pacer wires removed 2/28.   -Surgical incisions with surgical dressing  -Continue ASA and BB.   -Documented statin intolerance - consider PCSK9i  -Encourage IS  -Increase activity. PT/OT following.  -Cruzito hose  -Bowel regimen - continue docusate. NGT removed 3/3. + BM on 3/5/24    Leukocytosis  -Uptrending with L shift  -Afebrile  -On Zosyn since 2/29 for possible aspiration    Acute post operative blood loss anemia  -Expected post op  -Received 1358 cc Cell Saver post post  -1 unit PRBCs on Feb 23  -H/H stable  -Monitor    Thrombocytosis  -Uptrending  -Likely secondary to infection  -Monitor    Post operative ileus  -Resolved    HFpEF  -Strict I/Os and daily weights  -Salt/fluid restrictions  -Diuresis with Lasix IV TID + aldactone    Hypomagnesemia  -Replaced per protocol  -Keep Mag >2 and K >4    Hx of TAVR  -Stable    Permanent AF  S/p Watchman device  -Rates controlled  -Continue  metoprolol    HTN  -Controlled on current regimen  -Goal < 140/90    HLD  -Continue statin     BPH  -Continue flomax    Leadless PPM in situ  -Stable  -Base rate @ 50    Case and plan of care discussed with MD. Anticipate d/c to IPR/SNF in next 24-48 hours if stable.       GI Prophylaxis:  PPI:proton pump inhibitor per orders  DVT Prophylaxis:  Anticoagulants   Medication Route Frequency    enoxaparin injection 40 mg Subcutaneous Q24H (prophylaxis, 1700)       Angeli Linda PA-C  Cardiovascular Thoracic Surgery  3/6/2024  9:20 AM

## 2024-03-06 NOTE — PROGRESS NOTES
"[facility]  Department of Cardiology  Consult Note      PATIENT NAME: Jose F Hanley  MRN: [id number]  TODAY'S DATE: 03/06/2024  ADMIT DATE: 2/23/2024                          CONSULT REQUESTED BY: Corby Lyn MD    SUBJECTIVE     PRINCIPAL PROBLEM: S/P CABG (coronary artery bypass graft)      REASON FOR CONSULT:  Post CABG     Interval history:  3/6/2024  Pt up in chair this morning, appears to be doing well. Still congested with dry cough.     3/5/2024  Pt was seen up and out of bed this morning seated in chair with wife at bedside. Cardizem gtt running at 5. Lasix gtt stopped this morning. Congested cough is still present but improving.  No further episodes of nausea noted, reportedly had a bowel movement     3/4/2024  Patient is feeling better NG tube is removed congested cough is still persisting  Patient is AFIB with RVR.    WBC 21.31  H/H 9.3/31.9  No more NGT        03/03/2024:   Patient was up in the chair this morning tolerated fairly well still with fair amount of NG drainage denies having any new pains    03/02/2024:  Patient is alert awake and responsive.  Some wheezing is persisting chest wall pain is controlled.  NG tube in place for earliest      3/1/2024      Patient is not completely oriented. He is lethargic.  He states "yes" when asked if he is breathing better.  According to I and O still is positive.  Creatinine stable.  Remains in AFIB rates are low 100s      2/29/24    Post op Ileus with NGT  RVR  Still positive on I and O  CXR Not any better      2/28/2024    Off Bipap  Off Precedex  Pacer wires out  Abdomen is distended    2/27/2024    Resting on Face mask. Still with mild sedation family at bedside. VSS.      2/26/24  Pt asleep in bed, remains on BiPAP. Per RN, he is still confused while awake. Currently under light sedation - precedex gtt. Remains paced with temporary pacer - 80s.     2/25/24  Pt was seen resting in bed with Bipap mask in place. Balloon pump was " removed this morning per bedside RN.     2/24/24  Pt was seen extubated on BiPAP this morning. Balloon pump. Pressures soft this morning. Per RN, pt was increasingly agitated this morning.       HPI:  Pt is a 78 year old with history of known CAD with previous bypass surgery back in 08/31/23 and then underwent a repeat CABG today, 2/23/23. He Just recently had a C as well with Dr. Bush back on 2/7/24.       Per surgery team:  Operation: Redo coronary artery bypass grafting x3 using left radial artery to left anterior descending coronary artery and separate segments of saphenous vein from the aorta to the diagonal coronary artery and from the aorta to the obtuse marginal coronary artery using a combination of antegrade and retrograde cardioplegia, mild systemic hypothermia, endoscopic vein harvest from the right leg, right radial artery harvest, and insertion of right femoral arterial intra-aortic balloon pump with fluoroscopic guidance and complete pericardiectomy due to severe adhesive pericarditis         Review of patient's allergies indicates:   Allergen Reactions    Adhes. band-tape-benzalkonium Rash     Pt stated it caused blisters    Statins-hmg-coa reductase inhibitors Other (See Comments)     Statin Intolerance (joint pain)       Past Medical History:   Diagnosis Date    A-fib     Allergies 2020    gets allergy shots    Arthritis 1973    right knee    Back pain     COPD (chronic obstructive pulmonary disease)     Diastolic heart failure     GERD (gastroesophageal reflux disease) 2015    HLD (hyperlipidemia)     HTN (hypertension)     Hx of LASIK     Hypothyroidism, unspecified 2012    Inflammatory disease of prostate, unspecified     out of medicine    Mild intermittent asthma, uncomplicated     Myocardial infarction     Neuropathy     On home oxygen therapy     3L NC  at night    PAD (peripheral artery disease)     Staph skin infection 2016    right thigh    TIA (transient ischemic attack) 2013     Past  Surgical History:   Procedure Laterality Date    AORTOGRAPHY WITH SERIALOGRAPHY N/A 08/31/2023    Procedure: AORTOGRAM, WITH SERIALOGRAPHY;  Surgeon: Gary Thomas MD;  Location: [facility] CATH/EP LAB;  Service: Peripheral Vascular;  Laterality: N/A;    CATARACT EXTRACTION Bilateral 2014    CORONARY ANGIOGRAPHY INCLUDING BYPASS GRAFTS WITH CATHETERIZATION OF LEFT HEART Left 01/12/2024    Procedure: ANGIOGRAM, CORONARY, INCLUDING BYPASS GRAFT, WITH LEFT HEART CATHETERIZATION;  Surgeon: Gregg Bush MD;  Location: [facility] CATH/EP LAB;  Service: Cardiology;  Laterality: Left;    CORONARY ARTERY BYPASS GRAFT  2010    ENDOSCOPIC HARVEST OF VEIN Right 2/23/2024    Procedure: HARVEST-VEIN-ENDOVASCULAR;  Surgeon: Gary Thomas MD;  Location: [facility];  Service: Cardiothoracic;  Laterality: Right;    INSERTION OF IMPLANTABLE LOOP RECORDER  2011    INSERTION OF INTRA-AORTIC BALLOON ASSIST DEVICE Right 2/23/2024    Procedure: INSERTION, INTRA-AORTIC BALLOON PUMP;  Surgeon: Gary Thomas MD;  Location: [facility];  Service: Cardiothoracic;  Laterality: Right;    INSERTION OF PACEMAKER  2021    does not have card with him for preadmit    INSTANTANEOUS WAVE-FREE RATIO (IFR) N/A 01/12/2024    Procedure: Instantaneous Wave-Free Ratio (IFR);  Surgeon: Gregg Bush MD;  Location: [facility] CATH/EP LAB;  Service: Cardiology;  Laterality: N/A;    PTA, ANTERIOR TIBIAL Left 08/31/2023    Procedure: PTA, Anterior Tibial;  Surgeon: Gary Thomas MD;  Location: [facility] CATH/EP LAB;  Service: Peripheral Vascular;  Laterality: Left;    PTA, SUPERFICIAL FEMORAL ARTERY Left 08/31/2023    Procedure: PTA, Superficial Femoral Artery;  Surgeon: Gary Thomas MD;  Location: [facility] CATH/EP LAB;  Service: Peripheral Vascular;  Laterality: Left;    REPEAT CORONARY ARTERY BYPASS GRAFTING N/A 2/23/2024    Procedure: CABG, REPEAT;  Surgeon: Gary Thomas MD;  Location: [facility] OR;  Service: Cardiothoracic;  Laterality:  N/A;    SPLENECTOMY  2016    STENT, ANTERIOR TIBIAL Left 2023    Procedure: Stent, Anterior Tibial;  Surgeon: Gary Thomas MD;  Location: [facility] CATH/EP LAB;  Service: Peripheral Vascular;  Laterality: Left;    SURGICAL PROCUREMENT, ARTERY, RADIAL, FOR CABG  2024    Procedure: SURGICAL PROCUREMENT,ARTERY,RADIAL,FOR CABG;  Surgeon: Gary Thomas MD;  Location: [facility] OR;  Service: Cardiothoracic;;    watchman heart device  2019     Social History     Tobacco Use    Smoking status: Former     Current packs/day: 0.00     Types: Cigarettes     Quit date: 1987     Years since quittin.2    Smokeless tobacco: Never   Substance Use Topics    Alcohol use: Not Currently     Alcohol/week: 21.0 standard drinks of alcohol     Types: 21 Drinks containing 0.5 oz of alcohol per week     Comment: ed roberto mixed drinks    Drug use: No        REVIEW OF SYSTEMS    As mentioned in HPI    OBJECTIVE     VITAL SIGNS (Most Recent)  Temp: 97.9 °F (36.6 °C) (24 1100)  Pulse: 85 (24 1330)  Resp: 19 (24 1330)  BP: (!) 104/51 (24 1200)  SpO2: 95 % (24 1330)    VENTILATION STATUS  Resp: 19 (24 1330)  SpO2: 95 % (24 1330)           I & O (Last 24H):  Intake/Output Summary (Last 24 hours) at 3/6/2024 1434  Last data filed at 3/6/2024 1018  Gross per 24 hour   Intake 1173.33 ml   Output 1750 ml   Net -576.67 ml         WEIGHTS  Wt Readings from Last 3 Encounters:   24 0426 125.1 kg (275 lb 12.7 oz)   24 1540 125 kg (275 lb 9.2 oz)   24 0948 124.5 kg (274 lb 6.4 oz)   24 0718 120.7 kg (266 lb)       PHYSICAL EXAM    CONSTITUTIONAL: NAD, more alert and responsive answering questions appropriately   HEENT:  Generalized noted   NECK: no JVD  LUNGS: coarse crackles diminished bases with bilateral expiratory wheeze still persisting.    HEART irregular rhythm no distinct S3 tachycardic rhythm  ABDOMEN: distended better no guarding or rebound,  nontender  EXTREMITIES:  edema +1  SKIN: No rash, surgical dressings clean and dry, mid sternal dressing coming untaped at top      HOME MEDICATIONS:  No current facility-administered medications on file prior to encounter.     Current Outpatient Medications on File Prior to Encounter   Medication Sig Dispense Refill    acetaminophen (TYLENOL) 325 MG tablet Take 650 mg by mouth every 6 (six) hours as needed.      albuterol (PROVENTIL/VENTOLIN HFA) 90 mcg/actuation inhaler Inhale 2 puffs into the lungs every 6 (six) hours as needed.      aspirin 81 MG Chew Take 81 mg by mouth once daily.      betamethasone valerate 0.1% (VALISONE) 0.1 % Lotn Apply to scalp bid prn rash      cyanocobalamin 500 MCG tablet 500 mcg.      cyclobenzaprine (FLEXERIL) 10 MG tablet Take 10 mg by mouth every 8 (eight) hours as needed.      diphenhydrAMINE (BENADRYL) 50 MG capsule 50 mg every 6 (six) hours as needed.      doxepin (SINEQUAN) 50 MG capsule Take 50 mg by mouth every evening.      DULoxetine (CYMBALTA) 60 MG capsule 60 mg nightly.      EPINEPHrine (EPIPEN) 0.3 mg/0.3 mL AtIn INJECT 0.3MG/0.3ML SUBCUTANEOUSLY (UNDER THE SKIN)  AS NEEDED FOR ALLERGIC REACTIONS SELF-INJECTOR PEN      furosemide (LASIX) 20 MG tablet Take 20 mg by mouth once daily.      gabapentin (NEURONTIN) 400 MG capsule 800 mg 2 (two) times daily.      hydrocodone-homatropine 5-1.5 mg/5 ml (HYCODAN) 5-1.5 mg/5 mL Syrp Take by mouth every 6 (six) hours as needed. cough      levothyroxine (SYNTHROID) 50 MCG tablet 50 mcg.      metOLazone (ZAROXOLYN) 5 MG tablet 5 mg daily as needed. For weight gain >2lbs      montelukast (SINGULAIR) 10 mg tablet 10 mg once daily.      nitroGLYCERIN (NITROSTAT) 0.4 MG SL tablet Place 0.4 mg under the tongue every 5 (five) minutes as needed.      omega-3 fatty acids/fish oil (FISH OIL-OMEGA-3 FATTY ACIDS) 300-1,000 mg capsule Take 1 capsule by mouth 2 (two) times daily.      omeprazole (PRILOSEC) 20 MG capsule 40 mg once daily.       potassium chloride SA (K-DUR,KLOR-CON) 20 MEQ tablet 20 mEq nightly.      ranolazine (RANEXA) 1,000 mg Tb12 Take 1 tablet (1,000 mg total) by mouth 2 (two) times daily. 180 tablet 3    senna-docusate 8.6-50 mg (PERICOLACE) 8.6-50 mg per tablet nightly.      spironolactone (ALDACTONE) 50 MG tablet Take 50 mg by mouth once daily.      tamsulosin (FLOMAX) 0.4 mg Cap 0.4 mg.      TESTOSTERONE PROPIONATE, BULK, MISC Inject 1 each as directed every 14 (fourteen) days.      TOPROL XL 25 mg 24 hr tablet Take 25 mg by mouth 2 (two) times daily.      torsemide (DEMADEX) 20 MG Tab Take 1 tablet (20 mg total) by mouth once daily. (Patient not taking: Reported on 12/21/2023) 30 tablet 11       SCHEDULED MEDS:   aspirin  81 mg Oral Daily    diltiaZEM  180 mg Oral Daily    docusate sodium  100 mg Oral BID    DULoxetine  60 mg Oral Nightly    enoxparin  40 mg Subcutaneous Q24H (prophylaxis, 1700)    ezetimibe  10 mg Oral QHS    furosemide (LASIX) injection  40 mg Intravenous TID AC    gabapentin  800 mg Oral BID    guaiFENesin  1,200 mg Oral BID    insulin detemir U-100  5 Units Subcutaneous Daily    ipratropium  0.5 mg Nebulization Q6H    levalbuterol  1.25 mg Nebulization Q6H    levothyroxine  25 mcg Intravenous Daily    metoprolol tartrate  25 mg Oral BID    montelukast  10 mg Oral Daily    pantoprazole  40 mg Oral Before breakfast    piperacillin-tazobactam (Zosyn) IV (PEDS and ADULTS) (extended infusion is not appropriate)  3.375 g Intravenous Q8H    sodium chloride 0.9%  10 mL Intravenous Q6H    spironolactone  25 mg Oral BID    tamsulosin  0.4 mg Oral Daily       CONTINUOUS INFUSIONS:        PRN MEDS:dextrose 50% in water (D50W), dextrose 50% in water (D50W), dextrose 50% in water (D50W), dextrose 50% in water (D50W), glucagon (human recombinant), glucose, glucose, hydrALAZINE, HYDROcodone-acetaminophen, insulin aspart U-100, metoprolol, ondansetron, potassium bicarbonate, potassium bicarbonate, potassium bicarbonate,  "Flushing PICC/Midline Protocol **AND** sodium chloride 0.9% **AND** sodium chloride 0.9%    LABS AND DIAGNOSTICS     CBC LAST 3 DAYS  Recent Labs   Lab 03/04/24  0500 03/05/24  0340 03/06/24  0450   WBC 21.31* 22.71* 22.85*   RBC 4.05* 4.17* 4.17*   HGB 9.3* 9.5* 9.3*   HCT 31.9* 32.1* 31.9*   MCV 79* 77* 77*   MCH 23.0* 22.8* 22.3*   MCHC 29.2* 29.6* 29.2*   RDW 22.3* 22.1* 21.9*   * 671* 747*   MPV 10.5 10.7 10.6   GRAN 82.6*  17.6* 79.3*  18.0* 80.8*  18.5*   LYMPH 5.3*  1.1 8.1*  1.9 6.7*  1.5   MONO 6.6  1.4* 7.0  1.6* 7.2  1.6*   BASO 0.11 0.12 0.11   NRBC 5* 6* 3*         COAGULATION LAST 3 DAYS  No results for input(s): "LABPT", "INR", "APTT" in the last 168 hours.    CHEMISTRY LAST 3 DAYS  Recent Labs   Lab 02/29/24  0908 03/01/24  0517 03/01/24  0922 03/02/24  0454 03/02/24  0832 03/02/24  1658 03/03/24  0420 03/03/24  1331 03/04/24  0500 03/05/24  0340 03/06/24  0450   NA  --    < >  --    < >  --   --  142  --  141 134* 136   K  --    < >  --    < >  --    < > 3.5 3.7 3.9 3.6 3.5   CL  --    < >  --    < >  --   --  101  --  99 95 97   CO2  --    < >  --    < >  --   --  37*  --  34* 30* 31*   ANIONGAP  --    < >  --    < >  --   --  4*  --  8 9 8   BUN  --    < >  --    < >  --   --  24*  --  21 22 27*   CREATININE  --    < >  --    < >  --   --  0.9  --  0.8 0.9 1.0   GLU  --    < >  --    < >  --   --  143*  --  165* 249* 143*   CALCIUM  --    < >  --    < >  --   --  8.4*  --  8.7 8.3* 8.6*   PH 7.425  --  7.428  --  7.409  --   --   --   --   --   --    MG  --    < >  --    < >  --   --  1.9 2.2 2.1  --   --     < > = values in this interval not displayed.         CARDIAC PROFILE LAST 3 DAYS  Recent Labs   Lab 03/04/24  1002   *         ENDOCRINE LAST 3 DAYS  Recent Labs   Lab 03/01/24  1001   TSH 2.171         LAST ARTERIAL BLOOD GAS  ABG  Recent Labs   Lab 03/02/24  0832   PH 7.409   PO2 71*   PCO2 56.5*   HCO3 35.7*   BE 11*         LAST 7 DAYS MICROBIOLOGY   Microbiology " Results (last 7 days)       Procedure Component Value Units Date/Time    Blood culture [[id number]] Collected: 03/01/24 1001    Order Status: Completed Specimen: Blood Updated: 03/06/24 1232     Blood Culture, Routine No growth after 5 days.    MRSA Screen by PCR [[id number]] Collected: 03/01/24 1112    Order Status: Completed Updated: 03/01/24 1253     MRSA SCREEN BY PCR Negative    MRSA Screen by PCR [[id number]] Collected: 03/01/24 0940    Order Status: Canceled Specimen: Nasopharyngeal Swab from Nasal             MOST RECENT IMAGING  X-Ray Chest 1 View  Chest single view    CLINICAL DATA: Atelectasis    FINDINGS: 2 AP views are compared to March 2.    The heart and mediastinum are unchanged. Left-sided PICC line remains in place with tip at superior vena cava. Nasogastric tube has been removed.    Previously noted bibasilar atelectasis appears improved. There is no pneumothorax or significant pleural effusion.    IMPRESSION:  1. Improving bilateral atelectasis.  2. Interval removal of nasogastric tube.    Electronically signed by:  Jan Leyva MD  03/06/2024 11:04 AM [facility] Workstation: [id number]      ECHOCARDIOGRAM RESULTS (last 5)  Results for orders placed during the hospital encounter of 12/13/23    Echo    Interpretation Summary    Left Ventricle: The left ventricle is normal in size. Mildly increased wall thickness. There is mild concentric hypertrophy. Normal wall motion. There is normal systolic function with a visually estimated ejection fraction of 65 - 70%. There is normal diastolic function.    Right Ventricle: Mild right ventricular enlargement. Wall thickness is normal. Right ventricle wall motion  is normal. Systolic function is normal.    Left Atrium: Left atrium is moderately dilated.    Right Atrium: Right atrium is mildly dilated.    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    Tricuspid Valve: There is mild regurgitation with a centrally directed  jet.    Pulmonic Valve: There is mild regurgitation with a centrally directed jet.    Pulmonary Artery: There is mild pulmonary hypertension. The estimated pulmonary artery systolic pressure is 40 mmHg.    IVC/SVC: Intermediate venous pressure at 8 mmHg.      CURRENT/PREVIOUS VISIT EKG  Results for orders placed or performed during the hospital encounter of 02/23/24   EKG 12-LEAD    Collection Time: 02/23/24  1:53 PM   Result Value Ref Range    QRS Duration 232 ms    OHS QTC Calculation 613 ms    Narrative    Test Reason : Z95.1,Z95.1,    Vent. Rate : 080 BPM     Atrial Rate : 087 BPM     P-R Int : 000 ms          QRS Dur : 232 ms      QT Int : 532 ms       P-R-T Axes : 000 168 250 degrees     QTc Int : 613 ms    Ventricular-paced rhythm  occasional atrila pacing  Abnormal ECG  When compared with ECG of 21-FEB-2024 09:33,  Electronic ventricular pacemaker has replaced Atrial fibrillation  Confirmed by Eleazar WOO, Gregg NEWTON ([id number]) on 2/24/2024 7:12:58 AM    Referred By: OLLIE RIBEIRO           Confirmed By:Gregg Bush MD           ASSESSMENT/PLAN:     Active Hospital Problems    Diagnosis    *S/P CABG (coronary artery bypass graft) - x2 09/2010 - LIMA to LAD; SVG to OMB; grafts occluded 08/2012    Hyperglycemia    Hyponatremia     -      Ileus    Acute blood loss anemia    Severe obesity (BMI 35.0-39.9) with comorbidity and TIANA    Permanent atrial fibrillation    History of transcatheter aortic valve replacement (TAVR)    Presence of Watchman left atrial appendage closure device    Chronic diastolic congestive heart failure    CAD (coronary artery disease), native coronary artery    Dyslipidemia       ASSESSMENT & PLAN:     MVCAD S/P redo CABG yesterday 2/23/24,  left radial artery to LAD, SVG to Dx, SVG to OM  HFpEF  HTN/ dyslipidemia   Statin intolerance  AFIB H/O Watchman with RVR  H/O TAVR  Leadless PPM in situ  Distended Abdomen Post op Ileus now with NGT  Morbid  obesity      RECOMMENDATIONS:  3/6/2024  Rate appears well controlled on Cardizem 180 daily - HR in 80s. Continue metoprolol tartrate 25 mg BID.   Aspirin and statin therapy.  Could consider transitioning IV lasix to PO torsemide 40 mg BID.   Up and out of bed as tolerated, walking unit, in chair for all meals.     3/5/24  Started PO Cardizem 180 mg daily. Stop gtt one hour after giving first oral dose. Monitor rate and rhythm.   Continue metoprolol tartrate 25 mg BID.   Lasix gtt stopped by hospital medicine team and pt remains on IV Lasix 40 mg TID as well as aldactone 25 mg BID.  Asprin and statin therapy.  Will continue to follow.         Mar Camacho NP  Department of Cardiology  Date of Service: 03/06/2024    I had a long discussion with the patient and his wife at bedside.  Is willing to modify his lifestyle and make changes for better living.  He is tolerating Cardizem  mg fairly well continue on aspirin therapy  .  Change IV Lasix to torsemide 40 mg p.o. b.i.d. and maintain on Aldactone 25 mg b.i.d. as well monitor chemistry.  He understands importance of compliance and certain restrictions at this time including salt and fluid.  He has preserved LV function.  Continue on aggressive pulmonary toilet   His renal function remains stable.  I have personally interviewed and examined the patient, I have reviewed the Nurse Practitioner's history and physical, assessment, and plan. I have personally evaluated the patient at bedside and agree with the findings and made appropriate changes as necessary in recommendations.    Jaun Miller MD.    Department of Cardiology  [facility]  Date of Service:  03/06/2024

## 2024-03-06 NOTE — ASSESSMENT & PLAN NOTE
Patient has hypornatremia which is uncontrolled. The hypernatremia is due to  volume overload secondary to decompensated heart failure . We will aim to correct the sodium by 8-10mEq in 24 hours. We will correct their hyponatremia with diuresis. The patient's sodium results have been reviewed and are listed below.  Recent Labs   Lab 03/06/24  0450        Resolved

## 2024-03-06 NOTE — PROGRESS NOTES
Pulmonary/Critical Care Progress Note      PATIENT NAME: Jose F Hanley  MRN: [id number]  TODAY'S DATE: 2024  1:01 PM  ADMIT DATE: 2024  AGE: 78 y.o. : 1945    CONSULT REQUESTED BY: Corby Lyn MD    REASON FOR CONSULT:   Respiratory failure    HPI:  The patient is a 78-year-old gentleman who underwent coronary artery bypass grafting.  He is recovering from DTs, he is extremely morbidly obese, he is in AFib with RVR, and he is not clearing his secretions.  He is off of Precedex but only by a few hours and is lethargic.     the patient remains quite lethargic.    3/1 the patient is significantly more confused today than yesterday.  He is oriented to self.  He is convinced we are trying to kill him.  He states he is going to die.  This makes me anxious.    3/2/24: Afebrile, WBC count trending down on Zosyn. CT CAP identified no particular source of infection, but did suggest ileus or SBO. Encephalopathy is resolved. Mediastinal and pleural drains removed.    3/3/24: NGT clamped and may be removed later today. No BM. Passing flatus.    3/4 the patient is up in the chair.  He has AFib but his rate is controlled..  The patient is still rattling mucus around.  The patient diurese 1200 cc yesterday on 10 milligram/hour Lasix drip.  He is also on a Cardizem drip.  He is ready for step-down.    3/5 patient is continuing to improve.  He should move out of ICU today.    3/6 the patient is still in bed at 10:00 a.m..  I have discussed this with his nurse.  He is also asking for a suctioned so that he can retrieve his secretions.    REVIEW OF SYSTEMS    General: Feeling tired  Eyes: Vision is good.  ENT:  No sinusitis or pharyngitis.   Heart: No chest pain or palpitations.  Lungs:  He states he is swallowing his secretions  GI:  No further emesis  : No dysuria, hesitancy, or nocturia.  Skin: No lesions or rashes.  Musculoskeletal:  Very weak.  Neuro: No headaches or neuropathy.  Lymph: No edema or  adenopathy.  Psych: No anxiety or depression.  Endo: No weight change.     No change in the patient's Past Medical History, Past Surgical History, Social History or Family History since admission.     VITAL SIGNS (MOST RECENT)  Temp: 98 °F (36.7 °C) (03/06/24 0701)  Pulse: 91 (03/06/24 0900)  Resp: 17 (03/06/24 0900)  BP: (!) 127/57 (03/06/24 0900)  SpO2: (!) 92 % (03/06/24 0900)    INTAKE AND OUTPUT (LAST 24 HOURS):  Intake/Output Summary (Last 24 hours) at 3/6/2024 0929  Last data filed at 3/6/2024 0605  Gross per 24 hour   Intake 611.19 ml   Output 1250 ml   Net -638.81 ml       WEIGHT  Wt Readings from Last 1 Encounters:   03/03/24 125.1 kg (275 lb 12.7 oz)       PHYSICAL EXAM  GENERAL: Older patient; alert and oriented.  HEENT: Pupils equal and reactive. Extraocular movements intact. Nose with  nasal cannula in place.  Pharynx moist  NECK: Supple.   HEART:  Irregularly irregular rate and rhythm.  AFib with controlled rate No murmur or gallop auscultated.  LUNGS:  Diffuse rhonchi with bibasilar crackles the crackles are improved, the rhonchi are not, the sputum is white  ABDOMEN:  Extremely obese. Bowel sounds present. Non-tender, no masses palpated.  Small smear of feces this morning  : Normal anatomy.  Male pure wick with yellow urine.    EXTREMITIES: Normal muscle tone and joint movement, no cyanosis or clubbing.   LYMPHATICS: No adenopathy palpated, no edema.  SKIN: Dry, intact, no lesions.  Incisions dressed and dry  NEURO: No gross motor or cognitive deficit.      CBC LAST (LAST 24 HOURS)  Recent Labs   Lab 03/06/24  0450   WBC 22.85*   RBC 4.17*   HGB 9.3*   HCT 31.9*   MCV 77*   MCH 22.3*   MCHC 29.2*   RDW 21.9*   *   MPV 10.6   GRAN 80.8*  18.5*   LYMPH 6.7*  1.5   MONO 7.2  1.6*   BASO 0.11   NRBC 3*       CHEMISTRY LAST (LAST 24 HOURS)  Recent Labs   Lab 03/06/24  0450      K 3.5   CL 97   CO2 31*   ANIONGAP 8   BUN 27*   CREATININE 1.0   *   CALCIUM 8.6*         LAST 7 DAYS  MICROBIOLOGY   Microbiology Results (last 7 days)       Procedure Component Value Units Date/Time    Blood culture [[id number]] Collected: 03/01/24 1001    Order Status: Completed Specimen: Blood Updated: 03/05/24 1232     Blood Culture, Routine No Growth to date      No Growth to date      No Growth to date      No Growth to date      No Growth to date    MRSA Screen by PCR [[id number]] Collected: 03/01/24 1112    Order Status: Completed Updated: 03/01/24 1253     MRSA SCREEN BY PCR Negative    MRSA Screen by PCR [[id number]] Collected: 03/01/24 0940    Order Status: Canceled Specimen: Nasopharyngeal Swab from Nasal             MOST RECENT IMAGING  X-Ray Chest AP Portable  HISTORY: picc placement    FINDINGS: Portable chest radiograph at 1456 hours and 1458 compared to 0615 hours shows interval insertion of a left PICC. The first image shows the distal tip of the PICC overlying the left subclavian vein, with the second image showing the distal tip of the PICC overlying the SVC, projecting inferolaterally. There is no other significant interval change.    IMPRESSION: Interval insertion of a left PICC as described.    Electronically signed by:  Jeison Lowe MD  03/02/2024 03:24 PM CST Workstation: [id number]  X-Ray Chest AP Portable  HISTORY: Atelectasis    FINDINGS: Portable chest radiograph at 0615 hours compared to 03/01/2024 shows interval removal of the mediastinal and thoracostomy drains, with nasogastric tube remaining. There are median sternotomy wires and changes of transarterial aortic valvuloplasty, with stable enlarged cardiac silhouette and normal pulmonary vascularity.    The lungs are symmetrically expanded, with no new pleural or parenchymal abnormality. Bilateral lower lung opacities suggesting subsegmental atelectasis are unchanged.    IMPRESSION: Interval removal of the mediastinal and thoracostomy drains, with no other significant change.    Electronically signed by:  Jeison Lowe MD  03/02/2024  09:35 AM [facility] Workstation: [id number]      CURRENT VISIT EKG  Results for orders placed or performed during the hospital encounter of 02/23/24   EKG 12-LEAD   Result Value Ref Range    QRS Duration 232 ms    OHS QTC Calculation 613 ms    Narrative    Test Reason : Z95.1,Z95.1,    Vent. Rate : 080 BPM     Atrial Rate : 087 BPM     P-R Int : 000 ms          QRS Dur : 232 ms      QT Int : 532 ms       P-R-T Axes : 000 168 250 degrees     QTc Int : 613 ms    Ventricular-paced rhythm  occasional atrila pacing  Abnormal ECG  When compared with ECG of 21-FEB-2024 09:33,  Electronic ventricular pacemaker has replaced Atrial fibrillation  Confirmed by Gregg Bush MD ([id number]) on 2/24/2024 7:12:58 AM    Referred By: OLLIE RIBEIRO           Confirmed By:Gregg Bush MD       ECHOCARDIOGRAM RESULTS  Results for orders placed during the hospital encounter of 12/13/23    Echo    Interpretation Summary    Left Ventricle: The left ventricle is normal in size. Mildly increased wall thickness. There is mild concentric hypertrophy. Normal wall motion. There is normal systolic function with a visually estimated ejection fraction of 65 - 70%. There is normal diastolic function.    Right Ventricle: Mild right ventricular enlargement. Wall thickness is normal. Right ventricle wall motion  is normal. Systolic function is normal.    Left Atrium: Left atrium is moderately dilated.    Right Atrium: Right atrium is mildly dilated.    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    Tricuspid Valve: There is mild regurgitation with a centrally directed jet.    Pulmonic Valve: There is mild regurgitation with a centrally directed jet.    Pulmonary Artery: There is mild pulmonary hypertension. The estimated pulmonary artery systolic pressure is 40 mmHg.    IVC/SVC: Intermediate venous pressure at 8 mmHg.        Oxygen INFORMATION       5 L       LAST ARTERIAL BLOOD GAS  ABG  Recent Labs   Lab  03/02/24  0832   PH 7.409   PO2 71*   PCO2 56.5*   HCO3 35.7*   BE 11*       IMPRESSION AND PLAN  Status post coronary artery bypass grafting  Alcoholic going through DTs  - resolved  Encephalopathy  - resolved  Acute hypercapnic hypoxemic respiratory failure  - 95% saturation on 5 L  Bibasilar atelectasis  - continue IPV  - encourage patient to cough and clear  - Mucinex 1200 mg b.i.d.  - will get a suction set up in his room  - check chest x-ray  Obstructive sleep apnea  Obesity hypoventilation syndrome  - noncompliant with CPAP  - sleeps on 3 L at home  - BiPAP whenever he is sleeping.   - continue elevation of head of bed  Extreme morbid obesity  - patient does not oxygenate well when flat  Atrial fib with RVR  - rate controlled on Cardizem  - CV surgery and Cardiology following  - patient is in permanent atrial fibrillation, he does not need anticoagulation  Ileus  - no further emesis  - tolerating diet  Sepsis with likely intra-abdominal source  - Zosyn, on day 7 of 7  Anemia  - postop, expected  Leukocytosis  - continuing  - continue trending  - pan culture for temp spike  Hyperglycemia  - mild  - trend  Hyponatremia  - resolved  Hypophosphatemia  - replace and trend    Discussed with nurse and cardiologist and patient and wife.    Lesley Ang MD  Date of Service: 03/06/2024  1:01 PM

## 2024-03-06 NOTE — PLAN OF CARE
Left voicemail message for Anuradha at [facility] [phone] requesting call back with update on acceptance of patient to transfer to them for swing bed admission. Awaiting response and will update when appropriate.

## 2024-03-06 NOTE — ASSESSMENT & PLAN NOTE
- post op care per surgery   -aspirin mg daily metoprolol 10 mg IV q.12 hourslosartan 25 mg daily, metoprolol 25 mg p.o. t.i.d.  High-dose Lasix for now and monitor renal function

## 2024-03-06 NOTE — ASSESSMENT & PLAN NOTE
S/p watchman device and TAVR.Patient with Long standing persistent (>12 months) atrial fibrillation which is uncontrolled currently with Beta Blocker and Calcium Channel Blocker. Patient is currently in atrial fibrillation.[id number] Score: 4. HASBLED score- 3. Anticoagulation indicated. Anticoagulation done with Lovenox .

## 2024-03-06 NOTE — PROGRESS NOTES
[facility]  Adult Nutrition   Progress Note (Follow-Up)    SUMMARY     Recommendations  Continue current cardiac/diabetic diet as tolerated.    Goals:   Pt to continue to meet 75% or more of his EEN and 100% of his protein needs.    Nutrition Goal Status: goal not met    Communication of RD Recs: other (comment)    Dietitian Rounds Brief  F/U Nutrition Note: Pt has a diet now after being on nutrition support and at 75% of his meal today and he was encouraged to continue. His LBM was yesterday, his skin is intact and labs reviewed.Will continue to follow biweekly and prn.    Altered nutrition-related labs R/T diabetes AEB a blood glucose level of 165.    Nutrition Related Social Determinants of Health: SDOH: None Identified    Diet order:   Current Diet Order: Cardiac/Diabetic (2000 kcal)      Current Nutrition Support Formula Ordered: Clinimix 4.25/5  Current Nutrition Support Rate Ordered: 100 (ml)  Current Nutrition Support Frequency Ordered: mL/hr    Evaluation of Received Nutrient/Fluid Intake  Energy Calories Required: meeting needs  Protein Required: meeting needs  Fluid Required: meeting needs     % Intake of Estimated Energy Needs: 75 - 100 %  % Meal Intake: 75 - 100 %      Intake/Output Summary (Last 24 hours) at 3/6/2024 1510  Last data filed at 3/6/2024 1018  Gross per 24 hour   Intake 1160 ml   Output 1750 ml   Net -590 ml        Anthropometrics  Temp: 97.9 °F (36.6 °C)  Height Method: Measured  Height: 6' (182.9 cm)  Height (inches): 72 in  Weight Method: Bed Scale  Weight: 125.1 kg (275 lb 12.7 oz)  Weight (lb): 275.8 lb  Ideal Body Weight (IBW), Male: 178 lb  % Ideal Body Weight, Male (lb): 154.82 %  BMI (Calculated): 37.4  BMI Grade: 35 - 39.9 - obesity - grade II       Estimated/Assessed Needs  Weight Used For Calorie Calculations: 125 kg (275 lb 9.2 oz)  Energy Calorie Requirements (kcal): 9185-9341 (20-25 kcal/ kg bw)  Energy Need Method: Kcal/kg  Protein Requirements: 121-162 gm  (1.5-2.0 gm/ kg IBW)  Weight Used For Protein Calculations: 81 kg (178 lb 9.2 oz)     Estimated Fluid Requirement Method: RDA Method  RDA Method (mL): 2500  CHO Requirement: 313-391 g CHO/day    Reason for Assessment  Reason For Assessment: RD follow-up  Diagnosis: cardiac disease  Relevant Medical History: DM2, CAD, COPD, ileus and hypothyroidism  Interdisciplinary Rounds: did not attend  General Information Comments: -  Nutrition Discharge Planning: Pt should be discharged on a cardiac/diabetic diet.    Nutrition/Diet History  Patient Reported Diet/Restrictions/Preferences: general  Spiritual, Cultural Beliefs, Zoroastrianism Practices, Values that Affect Care: no  Food Allergies: NKFA  Factors Affecting Nutritional Intake: decreased appetite, nausea/vomiting    Nutrition Risk Screen  Nutrition Risk Screen: tube feeding or parenteral nutrition       Altered Skin Integrity 02/23/24 0525 Left anterior;lower Arm #1 Skin Tear Partial thickness tissue loss. Shallow open ulcer with a red or pink wound bed, without slough. Intact or Open/Ruptured Serum-filled blister.-Wound Image: Images linked  MST Score: 0  Have you recently lost weight without trying?: No  Weight loss score: 0  Have you been eating poorly because of a decreased appetite?: No  Appetite score: 0       Weight History:  Wt Readings from Last 5 Encounters:   03/03/24 125.1 kg (275 lb 12.7 oz)   02/21/24 124.5 kg (274 lb 6.4 oz)   01/12/24 120.7 kg (266 lb)   01/10/24 120.7 kg (266 lb)   12/21/23 125.6 kg (277 lb)        Lab/Procedures/Meds: Pertinent Labs/Meds Reviewed    Medications:Pertinent Medications Reviewed  Scheduled Meds:   aspirin  81 mg Oral Daily    diltiaZEM  180 mg Oral Daily    docusate sodium  100 mg Oral BID    DULoxetine  60 mg Oral Nightly    enoxparin  40 mg Subcutaneous Q24H (prophylaxis, 1700)    ezetimibe  10 mg Oral QHS    furosemide (LASIX) injection  40 mg Intravenous TID AC    gabapentin  800 mg Oral BID    guaiFENesin  1,200 mg Oral  BID    insulin detemir U-100  5 Units Subcutaneous Daily    ipratropium  0.5 mg Nebulization Q6H    levalbuterol  1.25 mg Nebulization Q6H    [START ON 3/7/2024] levothyroxine  50 mcg Oral Before breakfast    metoprolol tartrate  25 mg Oral BID    montelukast  10 mg Oral Daily    pantoprazole  40 mg Oral Before breakfast    piperacillin-tazobactam (Zosyn) IV (PEDS and ADULTS) (extended infusion is not appropriate)  3.375 g Intravenous Q8H    sodium chloride 0.9%  10 mL Intravenous Q6H    spironolactone  25 mg Oral BID    tamsulosin  0.4 mg Oral Daily     Continuous Infusions:  PRN Meds:.dextrose 50% in water (D50W), dextrose 50% in water (D50W), dextrose 50% in water (D50W), dextrose 50% in water (D50W), glucagon (human recombinant), glucose, glucose, hydrALAZINE, HYDROcodone-acetaminophen, insulin aspart U-100, metoprolol, ondansetron, potassium bicarbonate, potassium bicarbonate, potassium bicarbonate, Flushing PICC/Midline Protocol **AND** sodium chloride 0.9% **AND** sodium chloride 0.9%    Labs: Pertinent Labs Reviewed  Clinical Chemistry:  Recent Labs   Lab 03/04/24  0500 03/05/24  0340 03/06/24  0450    134* 136   K 3.9 3.6 3.5   CL 99 95 97   CO2 34* 30* 31*   * 249* 143*   BUN 21 22 27*   CREATININE 0.8 0.9 1.0   CALCIUM 8.7 8.3* 8.6*   ANIONGAP 8 9 8   MG 2.1  --   --    PHOS 2.3* 2.4* 3.7     CBC:   Recent Labs   Lab 03/06/24  0450   WBC 22.85*   RBC 4.17*   HGB 9.3*   HCT 31.9*   *   MCV 77*   MCH 22.3*   MCHC 29.2*     Lipid Panel:  Recent Labs   Lab 02/29/24  0346   TRIG 94     Cardiac Profile:  Recent Labs   Lab 03/04/24  1002   *     Thyroid & Parathyroid:  Recent Labs   Lab 03/01/24  1001   TSH 2.171       Monitor and Evaluation  Food and Nutrient Intake: food and beverage intake, energy intake  Food and Nutrient Adminstration: diet order  Knowledge/Beliefs/Attitudes: food and nutrition knowledge/skill, beliefs and attitudes  Physical Activity and Function:  nutrition-related ADLs and IADLs, factors affecting access to physical activity  Anthropometric Measurements: weight, weight change, body mass index  Biochemical Data, Medical Tests and Procedures: lipid profile, inflammatory profile, glucose/endocrine profile, gastrointestinal profile, electrolyte and renal panel  Nutrition-Focused Physical Findings: overall appearance     Nutrition Risk  Level of Risk/Frequency of Follow-up: moderate     Nutrition Follow-Up  RD Follow-up?: Yes      Ladi Gaytan RD 03/06/2024 3:10 PM

## 2024-03-06 NOTE — SUBJECTIVE & OBJECTIVE
Interval History:     Patient was seen along with the team, he is on 6 L and still having conversational dyspnea  Lung sounds very noisy with rales and congestion.  Bilateral legs are still  edema    Review of Systems  Objective:     Vital Signs (Most Recent):  Temp: 97.9 °F (36.6 °C) (03/06/24 1100)  Pulse: 85 (03/06/24 1200)  Resp: (!) 24 (03/06/24 1200)  BP: (!) 104/51 (03/06/24 1200)  SpO2: 95 % (03/06/24 1200) Vital Signs (24h Range):  Temp:  [97.9 °F (36.6 °C)-99.1 °F (37.3 °C)] 97.9 °F (36.6 °C)  Pulse:  [] 85  Resp:  [17-28] 24  SpO2:  [78 %-97 %] 95 %  BP: ()/(51-64) 104/51     Weight: 125.1 kg (275 lb 12.7 oz)  Body mass index is 37.4 kg/m².    Intake/Output Summary (Last 24 hours) at 3/6/2024 1320  Last data filed at 3/6/2024 1018  Gross per 24 hour   Intake 1202.58 ml   Output 1750 ml   Net -547.42 ml         Physical Exam  Constitutional:       General: He is not in acute distress.     Appearance: He is well-developed.   HENT:      Head: Normocephalic.      Nose: Nose normal.   Eyes:      Pupils: Pupils are equal, round, and reactive to light.   Cardiovascular:      Rate and Rhythm: Normal rate and regular rhythm.   Pulmonary:      Effort: No respiratory distress.      Breath sounds: Rhonchi and rales present.   Abdominal:      General: Abdomen is flat. There is no distension.      Tenderness: There is no abdominal tenderness.   Musculoskeletal:         General: Normal range of motion.      Cervical back: Neck supple.   Skin:     General: Skin is warm.      Findings: No rash.   Neurological:      Mental Status: He is alert and oriented to person, place, and time.   Psychiatric:         Mood and Affect: Mood normal.             Significant Labs: All pertinent labs within the past 24 hours have been reviewed.  CBC:   Recent Labs   Lab 03/05/24  0340 03/06/24  0450   WBC 22.71* 22.85*   HGB 9.5* 9.3*   HCT 32.1* 31.9*   * 747*     CMP:   Recent Labs   Lab 03/05/24  0340 03/06/24  9989  "  * 136   K 3.6 3.5   CL 95 97   CO2 30* 31*   * 143*   BUN 22 27*   CREATININE 0.9 1.0   CALCIUM 8.3* 8.6*   ANIONGAP 9 8     Cardiac Markers: No results for input(s): "CKMB", "MYOGLOBIN", "BNP", "TROPISTAT" in the last 48 hours.    Significant Imaging: I have reviewed all pertinent imaging results/findings within the past 24 hours.  "

## 2024-03-06 NOTE — CARE UPDATE
03/06/24 0756   Patient Assessment/Suction   Level of Consciousness (AVPU) alert   Respiratory Effort Normal;Unlabored   Expansion/Accessory Muscles/Retractions no use of accessory muscles;no retractions   All Lung Fields Breath Sounds Anterior:;Lateral:;crackles, coarse   Rhythm/Pattern, Respiratory unlabored;pattern regular;depth regular   Cough Type no productive sputum   Skin Integrity   $ Wound Care Tech Time 15 min   Area Observed Left;Right;Behind ear;Nares   Skin Appearance without discoloration   PRE-TX-O2   Device (Oxygen Therapy) nasal cannula   $ Is the patient on Low Flow Oxygen? Yes   Flow (L/min) 5   SpO2 95 %   Pulse Oximetry Type Continuous   $ Pulse Oximetry - Multiple Charge Pulse Oximetry - Multiple   Pulse 100   Resp 18   Aerosol Therapy   $ Aerosol Therapy Charges Aerosol Treatment   Daily Review of Necessity (SVN) completed   Respiratory Treatment Status (SVN) given   Treatment Route (SVN) oxygen;mouthpiece   Patient Position (SVN) HOB elevated   Post Treatment Assessment (SVN) breath sounds improved   Signs of Intolerance (SVN) none   Breath Sounds Post-Respiratory Treatment   Throughout All Fields Post-Treatment All Fields   Throughout All Fields Post-Treatment aeration increased   Post-treatment Heart Rate (beats/min) 104   Post-treatment Resp Rate (breaths/min) 20   Intrapulmonary Percussive Ventilation/Metaneb   $ IPV Administration Therapy   Daily Review of Necessity (IPV) completed   Route (IPV) mouthpiece   Patient Position (IPV) HOB elevated   Post Treatment Assessment (IPV) breath sounds improved   Signs of Intolerance (IPV) none   Preset CPAP/BiPAP Settings   Mode Of Delivery BiPAP;Standby   Education   $ Education Bronchodilator;15 min

## 2024-03-06 NOTE — PT/OT/SLP PROGRESS
Occupational Therapy   Treatment    Name: Jose F Hanley  MRN: [id number]  Admitting Diagnosis:  S/P CABG (coronary artery bypass graft)  12 Days Post-Op    Recommendations:     Discharge Recommendations: High Intensity Therapy  Discharge Equipment Recommendations:  to be determined by next level of care  Barriers to discharge:   (increased assist with ADLs and mobility)    Assessment:     Jose F Hanley is a 78 y.o. male with a medical diagnosis of S/P CABG (coronary artery bypass graft).  Pt agreeable to OT therapy session this AM. Performance deficits affecting function are weakness, impaired endurance, impaired self care skills, impaired functional mobility, gait instability, impaired balance, decreased coordination, decreased upper extremity function, decreased lower extremity function, decreased safety awareness, impaired skin, impaired cardiopulmonary response to activity.   Pt required increased time for self expression during session.    Rehab Prognosis:  Good; patient would benefit from acute skilled OT services to address these deficits and reach maximum level of function.       Plan:     Patient to be seen 5 x/week to address the above listed problems via self-care/home management, therapeutic activities, therapeutic exercises  Plan of Care Expires: 03/29/24  Plan of Care Reviewed with: patient, spouse    Subjective     Chief Complaint: fatigue  Patient/Family Comments/goals: none stated  Pain/Comfort:  Pain Rating 1: 0/10    Objective:     Communicated with: nursing prior to session.  Patient found up in chair with pulse ox (continuous), oxygen, telemetry, blood pressure cuff, peripheral IV, PureWick upon OT entry to room.    General Precautions: Standard, fall, sternal    Orthopedic Precautions:N/A  Braces: N/A  Respiratory Status: Nasal cannula, flow 5 L/min     Occupational Performance:     Activities of Daily Living:  Grooming: setup assistance seated in chair to brush teeth and to wash face     Toileting: purewick      Therapeutic Exercise:  BUE ROM exercises x 10 reps each in all major planes of motion (within sternal precautions) to improved pt's strength and endurance needed for ADLs; pt tolerated well    Treatment & Education:  Pt educated on role of OT/POC, importance of OOB/EOB activity, use of call bell, and safety during ADLs, transfers, and functional mobility.  OT reviewed sternal precautions with pt and wife.     Patient left up in chair with all lines intact, call button in reach, and wife present    GOALS:   Multidisciplinary Problems       Occupational Therapy Goals          Problem: Occupational Therapy    Goal Priority Disciplines Outcome Interventions   Occupational Therapy Goal     OT, PT/OT Ongoing, Progressing    Description: Goals to be met by: 3/29/24     Patient will increase functional independence with ADLs by performing:    UE Dressing with Moderate Assistance.  LE Dressing with Moderate Assistance.  Grooming while seated with Supervision.  Toileting from bedside commode with Moderate Assistance for hygiene and clothing management.   Toilet transfer to bedside commode with Moderate Assistance.    All goals above complete while maintaining sternal precautions.                          Time Tracking:     OT Date of Treatment: 03/06/24  OT Start Time: 1110  OT Stop Time: 1133  OT Total Time (min): 23 min    Billable Minutes:Self Care/Home Management 10  Therapeutic Exercise 13    OT/KIAN: OT          3/6/2024   abnormal

## 2024-03-06 NOTE — ASSESSMENT & PLAN NOTE
Patient is identified as having Diastolic (HFpEF) heart failure that is Acute on chronic. CHF is currently uncontrolled due to Continued edema of extremities. Latest ECHO performed and demonstrates- Results for orders placed during the hospital encounter of 02/23/24    Echo    Interpretation Summary    Left Ventricle: Septal motion is consistent with bundle branch block. There is normal systolic function with a visually estimated ejection fraction of 60 - 65%.    Left Atrium: Left atrium is moderately dilated.    Right Atrium: Right atrium is mildly dilated.    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    A limited echo was performed using limited 2D. Overall the study quality was technically difficult. The study was difficult due to patient's clinical status and body habitus.  . Continue Beta Blocker and Furosemide and monitor clinical status closely. Monitor on telemetry. Patient is off CHF pathway.  Monitor strict Is&Os and daily weights. Cardiology has been consulted. Continue to stress to patient importance of self efficacy and  on diet for CHF. Last BNP reviewed- and noted below   Recent Labs   Lab 03/04/24  1002   *     Continue diuresis and strict intake output and patient needed get out of the bed

## 2024-03-06 NOTE — PLAN OF CARE
Received voicemail message from Anuradha at [facility] [phone] stating they have not officially accepted patient yet and requests updated clinical information to be faxed to them at fax# [phone] for continued review before they can make a final decision.

## 2024-03-07 VITALS
RESPIRATION RATE: 17 BRPM | BODY MASS INDEX: 37.36 KG/M2 | WEIGHT: 275.81 LBS | DIASTOLIC BLOOD PRESSURE: 55 MMHG | HEIGHT: 72 IN | SYSTOLIC BLOOD PRESSURE: 99 MMHG | HEART RATE: 71 BPM | TEMPERATURE: 98 F | OXYGEN SATURATION: 98 %

## 2024-03-07 LAB
ANION GAP SERPL CALC-SCNC: 5 MMOL/L (ref 8–16)
BASOPHILS # BLD AUTO: 0.13 K/UL (ref 0–0.2)
BASOPHILS NFR BLD: 0.8 % (ref 0–1.9)
BUN SERPL-MCNC: 28 MG/DL (ref 8–23)
CALCIUM SERPL-MCNC: 8.8 MG/DL (ref 8.7–10.5)
CHLORIDE SERPL-SCNC: 97 MMOL/L (ref 95–110)
CO2 SERPL-SCNC: 34 MMOL/L (ref 23–29)
CREAT SERPL-MCNC: 1.1 MG/DL (ref 0.5–1.4)
DIFFERENTIAL METHOD BLD: ABNORMAL
EOSINOPHIL # BLD AUTO: 0.9 K/UL (ref 0–0.5)
EOSINOPHIL NFR BLD: 5.8 % (ref 0–8)
ERYTHROCYTE [DISTWIDTH] IN BLOOD BY AUTOMATED COUNT: 21.4 % (ref 11.5–14.5)
EST. GFR  (NO RACE VARIABLE): >60 ML/MIN/1.73 M^2
GLUCOSE SERPL-MCNC: 138 MG/DL (ref 70–110)
GLUCOSE SERPL-MCNC: 151 MG/DL (ref 70–110)
GLUCOSE SERPL-MCNC: 155 MG/DL (ref 70–110)
HCT VFR BLD AUTO: 29.4 % (ref 40–54)
HGB BLD-MCNC: 8.5 G/DL (ref 14–18)
IMM GRANULOCYTES # BLD AUTO: 0.14 K/UL (ref 0–0.04)
IMM GRANULOCYTES NFR BLD AUTO: 0.9 % (ref 0–0.5)
LYMPHOCYTES # BLD AUTO: 1.2 K/UL (ref 1–4.8)
LYMPHOCYTES NFR BLD: 7.7 % (ref 18–48)
MCH RBC QN AUTO: 22.7 PG (ref 27–31)
MCHC RBC AUTO-ENTMCNC: 28.9 G/DL (ref 32–36)
MCV RBC AUTO: 79 FL (ref 82–98)
MONOCYTES # BLD AUTO: 1.4 K/UL (ref 0.3–1)
MONOCYTES NFR BLD: 8.9 % (ref 4–15)
NEUTROPHILS # BLD AUTO: 12.1 K/UL (ref 1.8–7.7)
NEUTROPHILS NFR BLD: 75.9 % (ref 38–73)
NRBC BLD-RTO: 2 /100 WBC
PHOSPHATE SERPL-MCNC: 3.5 MG/DL (ref 2.7–4.5)
PLATELET # BLD AUTO: 733 K/UL (ref 150–450)
PMV BLD AUTO: 10.6 FL (ref 9.2–12.9)
POTASSIUM SERPL-SCNC: 3.9 MMOL/L (ref 3.5–5.1)
RBC # BLD AUTO: 3.74 M/UL (ref 4.6–6.2)
SODIUM SERPL-SCNC: 136 MMOL/L (ref 136–145)
WBC # BLD AUTO: 15.93 K/UL (ref 3.9–12.7)

## 2024-03-07 PROCEDURE — 99232 SBSQ HOSP IP/OBS MODERATE 35: CPT | Mod: ,,, | Performed by: INTERNAL MEDICINE

## 2024-03-07 PROCEDURE — 27000221 HC OXYGEN, UP TO 24 HOURS

## 2024-03-07 PROCEDURE — 99900035 HC TECH TIME PER 15 MIN (STAT)

## 2024-03-07 PROCEDURE — 97116 GAIT TRAINING THERAPY: CPT | Mod: CQ

## 2024-03-07 PROCEDURE — 85025 COMPLETE CBC W/AUTO DIFF WBC: CPT | Performed by: THORACIC SURGERY (CARDIOTHORACIC VASCULAR SURGERY)

## 2024-03-07 PROCEDURE — 25000003 PHARM REV CODE 250: Performed by: NURSE PRACTITIONER

## 2024-03-07 PROCEDURE — 25000003 PHARM REV CODE 250: Performed by: THORACIC SURGERY (CARDIOTHORACIC VASCULAR SURGERY)

## 2024-03-07 PROCEDURE — 97535 SELF CARE MNGMENT TRAINING: CPT

## 2024-03-07 PROCEDURE — 99233 SBSQ HOSP IP/OBS HIGH 50: CPT | Mod: ,,, | Performed by: INTERNAL MEDICINE

## 2024-03-07 PROCEDURE — 94761 N-INVAS EAR/PLS OXIMETRY MLT: CPT

## 2024-03-07 PROCEDURE — 97530 THERAPEUTIC ACTIVITIES: CPT | Mod: CQ

## 2024-03-07 PROCEDURE — 25000003 PHARM REV CODE 250: Performed by: INTERNAL MEDICINE

## 2024-03-07 PROCEDURE — 25000003 PHARM REV CODE 250: Performed by: PHYSICIAN ASSISTANT

## 2024-03-07 PROCEDURE — 80048 BASIC METABOLIC PNL TOTAL CA: CPT | Performed by: THORACIC SURGERY (CARDIOTHORACIC VASCULAR SURGERY)

## 2024-03-07 PROCEDURE — 25000003 PHARM REV CODE 250: Performed by: HOSPITALIST

## 2024-03-07 PROCEDURE — 63600175 PHARM REV CODE 636 W HCPCS: Performed by: HOSPITALIST

## 2024-03-07 PROCEDURE — 97530 THERAPEUTIC ACTIVITIES: CPT

## 2024-03-07 PROCEDURE — 84100 ASSAY OF PHOSPHORUS: CPT | Performed by: INTERNAL MEDICINE

## 2024-03-07 PROCEDURE — 92526 ORAL FUNCTION THERAPY: CPT

## 2024-03-07 PROCEDURE — 99024 POSTOP FOLLOW-UP VISIT: CPT | Mod: POP,,, | Performed by: PHYSICIAN ASSISTANT

## 2024-03-07 RX ORDER — GUAIFENESIN 600 MG/1
1200 TABLET, EXTENDED RELEASE ORAL 2 TIMES DAILY
Qty: 30 TABLET | Refills: 0 | Status: SHIPPED | OUTPATIENT
Start: 2024-03-07

## 2024-03-07 RX ORDER — EZETIMIBE 10 MG/1
10 TABLET ORAL NIGHTLY
Qty: 90 TABLET | Refills: 0 | Status: SHIPPED | OUTPATIENT
Start: 2024-03-07 | End: 2024-06-05

## 2024-03-07 RX ORDER — METOPROLOL TARTRATE 25 MG/1
25 TABLET, FILM COATED ORAL 2 TIMES DAILY
Qty: 120 TABLET | Refills: 0 | Status: SHIPPED | OUTPATIENT
Start: 2024-03-07 | End: 2024-05-29

## 2024-03-07 RX ORDER — TORSEMIDE 20 MG/1
40 TABLET ORAL DAILY
Qty: 120 TABLET | Refills: 0 | Status: ON HOLD | OUTPATIENT
Start: 2024-03-07 | End: 2024-03-24

## 2024-03-07 RX ORDER — SPIRONOLACTONE 25 MG/1
25 TABLET ORAL 2 TIMES DAILY
Qty: 120 TABLET | Refills: 0 | Status: ON HOLD | OUTPATIENT
Start: 2024-03-07 | End: 2024-03-24

## 2024-03-07 RX ORDER — DILTIAZEM HYDROCHLORIDE 180 MG/1
180 CAPSULE, COATED, EXTENDED RELEASE ORAL DAILY
Qty: 90 CAPSULE | Refills: 0 | Status: SHIPPED | OUTPATIENT
Start: 2024-03-08 | End: 2024-06-06

## 2024-03-07 RX ORDER — TORSEMIDE 20 MG/1
40 TABLET ORAL 2 TIMES DAILY
Status: DISCONTINUED | OUTPATIENT
Start: 2024-03-07 | End: 2024-03-07 | Stop reason: HOSPADM

## 2024-03-07 RX ADMIN — SPIRONOLACTONE 25 MG: 25 TABLET ORAL at 09:03

## 2024-03-07 RX ADMIN — MONTELUKAST 10 MG: 10 TABLET, FILM COATED ORAL at 09:03

## 2024-03-07 RX ADMIN — TAMSULOSIN HYDROCHLORIDE 0.4 MG: 0.4 CAPSULE ORAL at 09:03

## 2024-03-07 RX ADMIN — FUROSEMIDE 40 MG: 10 INJECTION, SOLUTION INTRAVENOUS at 12:03

## 2024-03-07 RX ADMIN — GUAIFENESIN 1200 MG: 600 TABLET, EXTENDED RELEASE ORAL at 09:03

## 2024-03-07 RX ADMIN — GABAPENTIN 800 MG: 300 CAPSULE ORAL at 09:03

## 2024-03-07 RX ADMIN — ASPIRIN 81 MG 81 MG: 81 TABLET ORAL at 09:03

## 2024-03-07 RX ADMIN — FUROSEMIDE 40 MG: 10 INJECTION, SOLUTION INTRAVENOUS at 06:03

## 2024-03-07 RX ADMIN — LEVOTHYROXINE SODIUM 50 MCG: 0.03 TABLET ORAL at 05:03

## 2024-03-07 RX ADMIN — DILTIAZEM HYDROCHLORIDE 180 MG: 180 CAPSULE, COATED, EXTENDED RELEASE ORAL at 09:03

## 2024-03-07 RX ADMIN — PANTOPRAZOLE SODIUM 40 MG: 40 TABLET, DELAYED RELEASE ORAL at 05:03

## 2024-03-07 RX ADMIN — INSULIN DETEMIR 5 UNITS: 100 INJECTION, SOLUTION SUBCUTANEOUS at 09:03

## 2024-03-07 RX ADMIN — METOPROLOL TARTRATE 25 MG: 25 TABLET, FILM COATED ORAL at 09:03

## 2024-03-07 RX ADMIN — DOCUSATE SODIUM 100 MG: 100 CAPSULE, LIQUID FILLED ORAL at 09:03

## 2024-03-07 NOTE — CARE UPDATE
"   03/06/24 1926   Patient Assessment/Suction   Level of Consciousness (AVPU) alert   Respiratory Effort Normal;Unlabored   Expansion/Accessory Muscles/Retractions no use of accessory muscles;no retractions;expansion symmetric   All Lung Fields Breath Sounds crackles, coarse   Rhythm/Pattern, Respiratory unlabored;pattern regular   Cough Frequency frequent   Skin Integrity   $ Wound Care Tech Time 15 min   Area Observed Right;Cheek;Bridge of nose   Skin Appearance without discoloration   PRE-TX-O2   Device (Oxygen Therapy) nasal cannula   Flow (L/min) 4   Pulse Oximetry Type Continuous   $ Pulse Oximetry - Multiple Charge Pulse Oximetry - Multiple   Pulse 87   Resp (!) 22   Aerosol Therapy   $ Aerosol Therapy Charges Refused   Incentive Spirometer   $ Incentive Spirometer Charges refused   Intrapulmonary Percussive Ventilation/Metaneb   $ IPV Administration (!) Refused   Respiratory Interventions   Cough And Deep Breathing refused   Preset CPAP/BiPAP Settings   Mode Of Delivery Standby   $ Is patient using? No/refused   Reason patient is not wearing? Patient refused   Education   $ Education Bronchodilator;BiPAP;15 min     Pt. Is refusing all respiratory treatments at this time.  Pt. Stated " I'm leaving tomorrow and would like to sleep".  I offered treatments multiple times.  Care team updated  "

## 2024-03-07 NOTE — DISCHARGE SUMMARY
[facility] Medicine  Discharge Summary      Patient Name: Jose F Hanley  MRN: [id number]  [facility]: [id number]  Patient Class: IP- Inpatient  Admission Date: 2/23/2024  Hospital Length of Stay: 13 days  Discharge Date and Time:  03/07/2024 4:04 PM  Attending Physician: Oscar Palma MD   Discharging Provider: Oscar Palma MD  Primary Care Provider: Sunita Rivera MD    Primary Care Team: Networked reference to record PCT     HPI:   Patient is 78 year old male with history of Afib, alcohol use, HFpEF, COPD, hypothyroidism, CAD was admitted to ICU after elective CABG. He underwent heart catheterization and coronary angiography showing multivessel coronary artery disease.  He was referred for consideration of redo coronary artery bypass grafting.    He has had a TAVR and Watchman. Recent studies were reviewed.  The patient has significant recurrent coronary artery disease.  Post operatively patient developed alcohol withdrawal and delirium, was started on precedex. Patient was extubated and still on BiPAP.  Hospitalist was consulted for medical management. .    Procedure(s) (LRB):  CABG, REPEAT (N/A)  HARVEST-VEIN-ENDOVASCULAR (Right)  SURGICAL PROCUREMENT,ARTERY,RADIAL,FOR CABG  INSERTION, INTRA-AORTIC BALLOON PUMP (Right)      Hospital Course:   78 M with  coronary artery disease, HTN , DM and non smomker , COPD  with progressive shortness of breath.  He underwent heart catheterization and coronary angiography showing multivessel coronary artery disease.  He was referred for consideration of redo coronary artery bypass grafting.  He has had a TAVR and Watchman.in the past . He .had elective post op CABG was done and  admitted to ICU.   Extubated to BiPAP, developed encephalopathy secondary to DT and needed precedex.    Postop course was complicated with DT and also atrial fibrillation and heart failure and possible abdominal source of infection and treated and completed Zosyn.   Chest and  mediastinal tubes and pacer wires and Balloon pump was removed 2/25.  Patient had brief period of possible paralytic ileus with bowel obstruction but patient had bowel movement and which has resolved.   Patient has history of  TIANA, on BiPAP q.h.s.while  sleeping and continue on high-flow nasal cannula 6 L and saturating 96% and later able to wean down to 3 L which is  his baseline   He needed Cardizem drip for AFib with RVR but currently getting controlled with p.o. Cardizem.  Later patient dyspnea improved and cardiology changed Lasix to torsemide and continue Aldactone and patient was placed the nursing home and follow up with Cardiology and Cardiothoracic surgery in 1 week.  Cardiology and Pulmonary did not recommend anticoagulation at this time     Goals of Care Treatment Preferences:  Code Status: Full Code      Consults:   Consults (From admission, onward)          Status Ordering Provider     Inpatient consult to Registered Dietitian/Nutritionist  Once        Provider:  (Not yet assigned)    Completed SOCRATES FRANCIS     Inpatient consult to PICC Line Nurse  Once        Provider:  (Not yet assigned)    Acknowledged SOCRATES FRANCIS     Inpatient consult to Registered Dietitian/Nutritionist  Once        Provider:  (Not yet assigned)    Completed STEPHANIE ANG     Inpatient consult to General Surgery  Once        Provider:  Gian Parker Jr., MD    Completed SOCRATES FRANCIS     Inpatient consult to Pulmonology  Once        Provider:  Stephanie Ang MD    Completed SOCRATES FRANCIS     Inpatient consult to Registered Dietitian/Nutritionist  Once        Provider:  (Not yet assigned)    Completed SOCRATES FRANCIS     Inpatient consult to Hospitalist  Once        Provider:  Mehdi Zhong MD    Acknowledged JAXON LEACH     Consult to Cardiology  Once        Provider:  Ronny Farrar MD    Completed OLLIE RIBEIRO            No new Assessment & Plan notes have been filed under  this hospital service since the last note was generated.  Service: Hospital Medicine    Final Active Diagnoses:    Diagnosis Date Noted POA    PRINCIPAL PROBLEM:  S/P CABG (coronary artery bypass graft) - x2 09/2010 - LIMA to LAD; SVG to OMB; grafts occluded 08/2012 [Z95.1] 11/28/2012 Not Applicable    Hyperglycemia [R73.9] 03/05/2024 Yes    Hyponatremia [E87.1] 03/01/2024 No    Ileus [K56.7] 02/29/2024 No    Acute blood loss anemia [D62] 02/26/2024 Yes    Severe obesity (BMI 35.0-39.9) with comorbidity and TIANA [E66.01] 12/01/2023 Yes    Permanent atrial fibrillation [I48.21] 12/01/2023 Yes    History of transcatheter aortic valve replacement (TAVR) [Z95.2] 12/01/2023 Not Applicable    Presence of Watchman left atrial appendage closure device [Z95.818] 12/01/2023 Yes    Chronic diastolic congestive heart failure [I50.32] 12/01/2023 Yes    CAD (coronary artery disease), native coronary artery [I25.10] 11/28/2012 Yes    Dyslipidemia [E78.5] 11/28/2012 Yes      Problems Resolved During this Admission:    Diagnosis Date Noted Date Resolved POA    Abdominal distention [R14.0] 02/28/2024 03/04/2024 No    Acute encephalopathy [G93.40] 02/26/2024 03/04/2024 Yes    Acute respiratory failure with hypoxia and hypercapnia [J96.01, J96.02] 02/26/2024 03/04/2024 Yes       Discharged Condition: good    Disposition: Another Health Care Inst*    Follow Up:   Follow-up Information       Sunita Rivera MD Follow up in 1 month(s).    Specialty: Family Medicine  Contact information:  [address] [facility] [id number] [phone]               Gary Thomas MD Follow up in 1 week(s).    Specialties: Cardiothoracic Surgery, Vascular Surgery, Cardiovascular Disease, Cardiology  Contact information:  [address] [facility]  [address]  [place] [address]               Lesley Ang MD Follow up in 1 month(s).    Specialties: Pulmonary Disease, Sleep Medicine  Contact information:  [address]  [facility] [address]  [phone]                           Patient Instructions:      Diet Cardiac     Activity as tolerated       Significant Diagnostic Studies: Labs: CMP   Recent Labs   Lab 03/06/24  0450 03/07/24  0435    136   K 3.5 3.9   CL 97 97   CO2 31* 34*   * 138*   BUN 27* 28*   CREATININE 1.0 1.1   CALCIUM 8.6* 8.8   ANIONGAP 8 5*    and CBC   Recent Labs   Lab 03/06/24  0450 03/07/24  0435   WBC 22.85* 15.93*   HGB 9.3* 8.5*   HCT 31.9* 29.4*   * 733*       Pending Diagnostic Studies:       Procedure Component Value Units Date/Time    Magnesium [[id number]] Collected: 02/23/24 1808    Order Status: Sent Lab Status: In process Updated: 02/23/24 1809    Specimen: Blood     Phosphorus [[id number]] Collected: 02/23/24 1808    Order Status: Sent Lab Status: In process Updated: 02/23/24 1809    Specimen: Blood     Potassium [[id number]] Collected: 02/23/24 1808    Order Status: Sent Lab Status: In process Updated: 02/23/24 1809    Specimen: Blood            Medications:  Reconciled Home Medications:      Medication List        START taking these medications      diltiaZEM 180 MG 24 hr capsule  Commonly known as: CARDIZEM CD  Take 1 capsule (180 mg total) by mouth once daily.  Start taking on: March 8, 2024     ezetimibe 10 mg tablet  Commonly known as: ZETIA  Take 1 tablet (10 mg total) by mouth every evening.     guaiFENesin 600 mg 12 hr tablet  Commonly known as: MUCINEX  Take 2 tablets (1,200 mg total) by mouth 2 (two) times daily.     metoprolol tartrate 25 MG tablet  Commonly known as: LOPRESSOR  Take 1 tablet (25 mg total) by mouth 2 (two) times daily.            CHANGE how you take these medications      spironolactone 25 MG tablet  Commonly known as: ALDACTONE  Take 1 tablet (25 mg total) by mouth 2 (two) times daily.  What changed:   medication strength  how much to take  when to take this     torsemide 20 MG Tab  Commonly known as: DEMADEX  Take 2 tablets (40 mg total)  by mouth once daily.  What changed: how much to take            CONTINUE taking these medications      acetaminophen 325 MG tablet  Commonly known as: TYLENOL  Take 650 mg by mouth every 6 (six) hours as needed.     albuterol 90 mcg/actuation inhaler  Commonly known as: PROVENTIL/VENTOLIN HFA  Inhale 2 puffs into the lungs every 6 (six) hours as needed.     aspirin 81 MG Chew  Take 81 mg by mouth once daily.     betamethasone valerate 0.1% 0.1 % Lotn  Commonly known as: VALISONE  Apply to scalp bid prn rash     cyanocobalamin 500 MCG tablet  500 mcg.     cyclobenzaprine 10 MG tablet  Commonly known as: FLEXERIL  Take 10 mg by mouth every 8 (eight) hours as needed.     doxepin 50 MG capsule  Commonly known as: SINEQUAN  Take 50 mg by mouth every evening.     DULoxetine 60 MG capsule  Commonly known as: CYMBALTA  60 mg nightly.     EPINEPHrine 0.3 mg/0.3 mL Atin  Commonly known as: EPIPEN  INJECT 0.3MG/0.3ML SUBCUTANEOUSLY (UNDER THE SKIN)  AS NEEDED FOR ALLERGIC REACTIONS SELF-INJECTOR PEN     fish oil-omega-3 fatty acids 300-1,000 mg capsule  Take 1 capsule by mouth 2 (two) times daily.     gabapentin 400 MG capsule  Commonly known as: NEURONTIN  800 mg 2 (two) times daily.     montelukast 10 mg tablet  Commonly known as: SINGULAIR  10 mg once daily.     nitroGLYCERIN 0.4 MG SL tablet  Commonly known as: NITROSTAT  Place 0.4 mg under the tongue every 5 (five) minutes as needed.     omeprazole 20 MG capsule  Commonly known as: PRILOSEC  40 mg once daily.     potassium chloride SA 20 MEQ tablet  Commonly known as: K-DUR,KLOR-CON  20 mEq nightly.     senna-docusate 8.6-50 mg 8.6-50 mg per tablet  Commonly known as: PERICOLACE  nightly.     SYNTHROID 50 MCG tablet  Generic drug: levothyroxine  50 mcg.     tamsulosin 0.4 mg Cap  Commonly known as: FLOMAX  0.4 mg.            STOP taking these medications      diphenhydrAMINE 50 MG capsule  Commonly known as: BENADRYL     furosemide 20 MG tablet  Commonly known as: LASIX      hydrocodone-homatropine 5-1.5 mg/5 ml 5-1.5 mg/5 mL Syrp  Commonly known as: HYCODAN     metOLazone 5 MG tablet  Commonly known as: ZAROXOLYN     ranolazine 1,000 mg Tb12  Commonly known as: RANEXA     TESTOSTERONE PROPIONATE (BULK) MISC     TOPROL XL 25 MG 24 hr tablet  Generic drug: metoprolol succinate              Indwelling Lines/Drains at time of discharge:   Lines/Drains/Airways       Peripherally Inserted Central Catheter Line  Duration             PICC Triple Lumen 03/02/24 1442 left basilic 5 days              Drain  Duration             Male External Urine Management Device w/ Suction 03/03/24 1730 3 days                  General: Patient resting comfortably in no acute distress. Appears as stated age. Calm  Eyes: EOM intact. No conjunctivae injection. No scleral icterus.  ENT: Hearing grossly intact. No discharge from ears. No nasal discharge.   CVS: RRR. No LE edema BL.  Lungs: CTA BL, no wheezing or crackles. Good breath sounds. No accessory muscle use. No acute respiratory distress  Neuro: non focal , Follows commands. Responds appropriately     Time spent on the discharge of patient: 34 minutes         Oscar Palma MD  Department of Hospital Medicine  [facility]

## 2024-03-07 NOTE — PT/OT/SLP PROGRESS
Occupational Therapy   Treatment    Name: Jose F Hanley  MRN: [id number]  Admitting Diagnosis:  S/P CABG (coronary artery bypass graft)  13 Days Post-Op    Recommendations:     Discharge Recommendations: High Intensity Therapy  Discharge Equipment Recommendations:  to be determined by next level of care  Barriers to discharge:   (increased physical assistance with functional mobilty and ADLs.)    Assessment:     Jose F Hanley is a 78 y.o. male with a medical diagnosis of S/P CABG (coronary artery bypass graft).  He presents with general weakness. Performance deficits affecting function are weakness, impaired endurance, impaired self care skills, impaired functional mobility, gait instability, impaired balance, decreased upper extremity function, decreased lower extremity function, decreased safety awareness, impaired cardiopulmonary response to activity.     Rehab Prognosis:  Good; patient would benefit from acute skilled OT services to address these deficits and reach maximum level of function.       Plan:     Patient to be seen 5 x/week to address the above listed problems via self-care/home management, therapeutic activities, therapeutic exercises  Plan of Care Expires: 03/29/24  Plan of Care Reviewed with: patient, spouse    Subjective     Chief Complaint: General weakness  Patient/Family Comments/goals: Improved functional mobility and ADL independence  Pain/Comfort:  Pain Rating 1: 0/10  Pain Rating Post-Intervention 1: 0/10    Objective:     Communicated with: nurse prior to session.  Patient found HOB elevated with telemetry, PICC line, PureWick, oxygen, pulse ox (continuous) upon OT entry to room.    General Precautions: Standard, fall, sternal    Orthopedic Precautions:N/A  Braces: N/A  Respiratory Status: Nasal cannula, flow 6 L/min     Occupational Performance:     Bed Mobility:    Patient completed Scooting/Bridging with moderate assistance  Patient completed Supine to Sit with moderate assistance  Patient  completed Sit to Supine with moderate assistance   Performed unsupported sitting EOB with contact guard assistance.    Activities of Daily Living:  Lower Body Dressing: minimum assistance to don/doff socks sitting EOB.      [facility] 6 Click ADL: 15    Treatment & Education:  Patient educated on sternal precautions and issued a handout to read with his spouse.     Patient left HOB elevated with all lines intact, call button in reach, and bed alarm on    GOALS:   Multidisciplinary Problems       Occupational Therapy Goals          Problem: Occupational Therapy    Goal Priority Disciplines Outcome Interventions   Occupational Therapy Goal     OT, PT/OT Ongoing, Progressing    Description: Goals to be met by: 3/29/24     Patient will increase functional independence with ADLs by performing:    UE Dressing with Moderate Assistance.  LE Dressing with Moderate Assistance.  Grooming while seated with Supervision.  Toileting from bedside commode with Moderate Assistance for hygiene and clothing management.   Toilet transfer to bedside commode with Moderate Assistance.    All goals above complete while maintaining sternal precautions.                          Time Tracking:     OT Date of Treatment: 03/07/24  OT Start Time: 1024  OT Stop Time: 1049  OT Total Time (min): 25 min    Billable Minutes:Self Care/Home Management 12  Therapeutic Activity 13    OT/KIAN: OT          3/7/2024

## 2024-03-07 NOTE — CARE UPDATE
03/07/24 0751   Patient Assessment/Suction   Level of Consciousness (AVPU) alert   Respiratory Effort Normal;Unlabored   PRE-TX-O2   Device (Oxygen Therapy) nasal cannula   $ Is the patient on Low Flow Oxygen? Yes   Flow (L/min) 6   SpO2 (!) 94 %   Pulse Oximetry Type Continuous   $ Pulse Oximetry - Multiple Charge Pulse Oximetry - Multiple   Pulse 81   Resp 18   Aerosol Therapy   $ Aerosol Therapy Charges Refused  (pt refused tx at this time. pt states he hates them and they put a bad taste in his mouth. tried to reason with pt on why he needed them and pt still refused. pt wife at bedside. rn notified on pt's refusal.)   Intrapulmonary Percussive Ventilation/Metaneb   $ IPV Administration (!) Refused   Preset CPAP/BiPAP Settings   Mode Of Delivery BiPAP S/T;Standby

## 2024-03-07 NOTE — PHYSICIAN QUERY
PT Name: Jose F Hanley  MR #: [id number]     DOCUMENTATION CLARIFICATION     CDS/: Ernestine Thomas Pe               Contact information: [phone]   This form is a permanent document in the medical record.    Query Date: March 7, 2024    By submitting this query, we are merely seeking further clarification of documentation.  Please utilize your independent clinical judgment when addressing the question(s) below.  The Medical Record contains the following:   Indicators   Supporting Clinical Findings Location in Medical Record    Pneumonia documented Cardiovascular Surgery On Zosyn since 2/29 for possible aspiration     2/29 Hospital medicine  Patient was started on IV Zosyn due to concerns of leukocytosis, possible risk of aspiration, and ileus. (Ileus has since resolved)     Post operatively patient developed alcohol withdrawal and delirium, was started on precedex.    3/6 CV Surgery note      2/29 Hospital medicine        3/6 Progress note    Chest X-Ray/CT Scan Chest/Abd CT 3/1 . Small bilateral pleural effusions.  5. Dependent lower lobe consolidative opacities more likely to reflect atelectasis rather than consolidation such as pneumonia. In the appropriate clinical setting, aspiration can be considered. 3/1 Chest/Abd CT     WBC 2/21 Preop 11.28 > 2/23 Post Op 23.59>13.63>20.34>14.48>25.23>22.85    3/1 LA 1.2 Per Lab report     Vital Signs Max temp 100.2 Per Nursing flow sheets     Cultures  Blood Cx x 1 Negative  Per Lab report     Treatment  IV Zosyn  Per MAR     Supplemental O2 3/3 spO2 upper 80s requiring 40% bipap     Dysphagia/Swallow study SLP diagnosis of Dysphagia   Speech therapy     Other Patient admitted for CABG 2/23 - his hospital course was complicated by encephalopathy due to alcohol withdrawal and Acute on chronic diastolic CHF being treated with Lasix gtt      3/3 RN  Coarse breath sounds and productive cough     3/6 CTS Still coughing up thick sputum. Per Chart review           3/3 Nursing  note    3/6 CTSx note     Provider, please provide the diagnosis related to the above clinical indicators:    [ x  ] Aspiration Pneumonia/Pneumonitis   [   ] Other diagnosis (please specify): _________     Please document in your progress notes daily for the duration of treatment, until resolved, and include in your discharge summary.     Form No. [id number]

## 2024-03-07 NOTE — PROGRESS NOTES
CVT SURGERY PROGRESS NOTE  Jose F Hanley  78 y.o.  1945    Patient's Chief Complaint:  S/P CABG (coronary artery bypass graft)    HPI:  This patient has coronary artery disease.  He has had progressive shortness of breath.  He underwent heart catheterization and coronary angiography showing multivessel coronary artery disease.  He was referred for consideration of redo coronary artery bypass grafting.    He has borderline diabetes  and hypertension.  He has a history of remote coronary artery bypass grafting a number of years ago.    He has had a TAVR and Watchman.  He is not a smoker.  He does have COPD.  He quit smoking 35 years ago.    Medicines are part of the epic record.  He does take daily aspirin.    On exam vital signs are stable.  Pupils are equal and round reactive to light.  Neck is supple.  Chest is equal breath sounds.  Heart is in a irregular rate and rhythm.  Abdomen is benign.  Perfusion to the legs and feet seems to be satisfactory.    Recent studies were reviewed.  The patient has significant recurrent coronary artery disease.  Consideration can be made for redo high-risk coronary artery bypass grafting.  The risks and potential complications were discussed.  The patient will take this under advisement.    Last 24 hour interval:  -No events overnight.   -WBC trending down. Afebrile. Completed 7 day course of Zosyn.   -BP soft  -Remains in a-fib, rate controlled on diltiazem and lopressor  -H/H down a little today. Platelets remain elevated.  -Good UOP, renal function stable.  -Pt seen this afternoon, lying in bed on NC. Still coughing up thick sputum. More lethargic today. Worked with PT/OT today. Eager to go to swing bed soon.     Subjective:  Symptoms:  Improved.    Diet:  NPO.  No nausea or vomiting.    Pain:  He complains of pain that is moderate.  Pain is requiring pain medication.                                                                   Objective:  General Appearance:  In no  acute distress.    Vital signs: (most recent): Blood pressure (!) 99/55, pulse 71, temperature 97.8 °F (36.6 °C), temperature source Oral, resp. rate 17, height 6' (1.829 m), weight 125.1 kg (275 lb 12.7 oz), SpO2 98 %.    Output: Producing urine.    HEENT: Normal HEENT exam.    Lungs:  There are wheezes (scattered expiratory) and rhonchi (coarse throughout, improved).  No rales.    Heart: Normal rate.  Regular rhythm.  No murmur, gallop or friction rub.   Abdomen: Abdomen is distended.  Hypoactive bowel sounds.   There is no abdominal tenderness.     Extremities: There is local swelling (BLE, R > L).    Pulses: Distal pulses are intact.    Neurological: Patient is alert and oriented to person, place and time.    Skin:  Warm and dry.      Recent Vitals:  Vitals:    03/07/24 1200 03/07/24 1300 03/07/24 1304 03/07/24 1400   BP: (!) 118/58 (!) 95/53 (!) 95/53 (!) 101/54   BP Location:       Patient Position:       Pulse: 72 75 72 74   Resp: 18 20 20 17   Temp:       TempSrc:       SpO2: 100% 97% 98% 97%   Weight:       Height:           INPATIENT MEDS       aspirin  81 mg Oral Daily    diltiaZEM  180 mg Oral Daily    docusate sodium  100 mg Oral BID    DULoxetine  60 mg Oral Nightly    enoxparin  40 mg Subcutaneous Q24H (prophylaxis, 1700)    ezetimibe  10 mg Oral QHS    furosemide (LASIX) injection  40 mg Intravenous TID AC    gabapentin  800 mg Oral BID    guaiFENesin  1,200 mg Oral BID    insulin detemir U-100  5 Units Subcutaneous Daily    ipratropium  0.5 mg Nebulization Q6H    levalbuterol  1.25 mg Nebulization Q6H    levothyroxine  50 mcg Oral Before breakfast    metoprolol tartrate  25 mg Oral BID    montelukast  10 mg Oral Daily    pantoprazole  40 mg Oral Before breakfast    sodium chloride 0.9%  10 mL Intravenous Q6H    spironolactone  25 mg Oral BID    tamsulosin  0.4 mg Oral Daily     dextrose 50% in water (D50W), dextrose 50% in water (D50W), dextrose 50% in water (D50W), dextrose 50% in water (D50W),  "glucagon (human recombinant), glucose, glucose, hydrALAZINE, HYDROcodone-acetaminophen, insulin aspart U-100, metoprolol, ondansetron, potassium bicarbonate, potassium bicarbonate, potassium bicarbonate, Flushing PICC/Midline Protocol **AND** sodium chloride 0.9% **AND** sodium chloride 0.9%  HEMODYNAMICS      Recent O2 Therapy/Vent Settings: 4L NC    I/O last 24 hrs:  Intake/Output - Last 3 Shifts         03/05 0700 03/06 0659 03/06 0700 03/07 0659 03/07 0700 03/08 0659    P.O. 435 1080 237    I.V. (mL/kg) 336 (2.7)      IV Piggyback 449.9 300     .7      Total Intake(mL/kg) 1527.5 (12.2) 1380 (11) 237 (1.9)    Urine (mL/kg/hr) 1250 (0.4) 1300 (0.4) 300 (0.3)    Emesis/NG output       Total Output 1250 1300 300    Net +277.5 +80 -63           Urine Occurrence   2 x    Stool Occurrence 2 x            Recent Cardiac Rhythm: a-fib, rates 90s-100s    Recent Pain Assessment: 0    CBC  Recent Labs   Lab 03/05/24  0340 03/06/24  0450 03/07/24  0435   WBC 22.71* 22.85* 15.93*   RBC 4.17* 4.17* 3.74*   HGB 9.5* 9.3* 8.5*   * 747* 733*   MCV 77* 77* 79*   MCH 22.8* 22.3* 22.7*   MCHC 29.6* 29.2* 28.9*       BMP  Recent Labs   Lab 03/05/24  0340 03/06/24  0450 03/07/24  0435   CO2 30* 31* 34*   BUN 22 27* 28*   CREATININE 0.9 1.0 1.1   CALCIUM 8.3* 8.6* 8.8       CARDIAC ENZYMES  No results for input(s): "TROPONINI", "CPK", "CKMB" in the last 168 hours.  BNP  Recent Labs   Lab 03/04/24  1002   *       PT/INR  No results found for: "PROTIME", "INR"      DIAGNOSTIC RESULTS:  X-Ray Chest 1 View  Chest single view    CLINICAL DATA: Atelectasis    FINDINGS: 2 AP views are compared to March 2.    The heart and mediastinum are unchanged. Left-sided PICC line remains in place with tip at superior vena cava. Nasogastric tube has been removed.    Previously noted bibasilar atelectasis appears improved. There is no pneumothorax or significant pleural effusion.    IMPRESSION:  1. Improving bilateral " atelectasis.  2. Interval removal of nasogastric tube.    Electronically signed by:  Jan Leyva MD  03/06/2024 11:04 AM CST Workstation: [id number]        CURRENT CONSULTS:  WOUND CARE CONSULT  IP CONSULT TO CARDIOLOGY  IP CONSULT TO HOSPITALIST  IP CONSULT TO REGISTERED DIETITIAN/NUTRITIONIST  IP CONSULT TO PULMONOLOGY  IP CONSULT TO GENERAL SURGERY  IP CONSULT TO REGISTERED DIETITIAN/NUTRITIONIST  IP CONSULT TO PICC TEAM (NIAS)  IP CONSULT TO REGISTERED DIETITIAN/NUTRITIONIST    ASSESSMENT/PLAN:    Multivessel CAD   S/p CABG x 2 (LIMA to LAD, SVG to OM) in 2010  S/p Redo CABG X 3 (L radial to LAD, SVG to Diag, SVG to OM) + placement of IABP by Dr. Thomas on 2/23/24  -IABP removed 2/25  -Hemodynamically stable off pressors  -Precedex weaned off 2/27  -Tolerating NC- wean as able  -Mediastinal tube and WISAM drain removed 3/1/24  -Pacer wires removed 2/28.   -Surgical incisions with surgical dressing  -Continue ASA and BB.   -Documented statin intolerance - consider PCSK9i  -Encourage IS  -Increase activity. PT/OT following.  -Cruzito hose  -Bowel regimen - continue docusate. NGT removed 3/3. + BM on 3/5/24    Leukocytosis  -Downtrending  -Afebrile  -Completed 7 day course of Zosyn 2/29-3/6 for possible aspiration    Acute post operative blood loss anemia  -Expected post op  -Received 1358 cc Cell Saver post post  -1 unit PRBCs on Feb 23  -H/H down slightly, but no indication for transfusion at this time.   -Monitor    Thrombocytosis  -Likely secondary to infection  -Monitor    Post operative ileus  -Resolved    HFpEF  -Strict I/Os and daily weights  -Salt/fluid restrictions  -Diuresis with Lasix IV TID + aldactone    Hypomagnesemia  -Replaced per protocol  -Keep Mag >2 and K >4    Hx of TAVR  -Stable    Permanent AF  S/p Watchman device  -Rates controlled  -Continue metoprolol    HTN  -Controlled on current regimen  -Goal < 140/90    HLD  -Continue statin     BPH  -Continue flomax    Leadless PPM in  situ  -Stable  -Base rate @ 50    Case and plan of care discussed with MD. [facility] for d/c to swing bed when able. Activity restrictions and incision care reviewed. Follow up in clinic in 2 weeks.       GI Prophylaxis:  PPI:proton pump inhibitor per orders  DVT Prophylaxis:  Anticoagulants   Medication Route Frequency    enoxaparin injection 40 mg Subcutaneous Q24H (prophylaxis, 1700)       Angeli Linda PA-C  Cardiovascular Thoracic Surgery  3/7/2024  9:20 AM

## 2024-03-07 NOTE — PLAN OF CARE
Met with patient and spouse at bedside, informed of facility acceptance today and both are in agreement with transferring for SNF services.  Patient transferring to [facility] swing bed (SNF). Patient will transport in private vehicle with spouse. Facility requests report to be called to [phone] to the nurse taking this patient. Above info relayed to patient's primary nurse SINCERE Jasso.    Discharge orders and chart reviewed with no further post-acute discharge needs identified at this time.  At this time, patient is cleared for discharge from Case Management standpoint.        03/07/24 1540   Final Note   Assessment Type Final Discharge Note   Anticipated Discharge Disposition SNF   Post-Acute Status   Post-Acute Authorization Placement   Post-Acute Placement Status Set-up Complete/Auth obtained   Coverage [facility]   Discharge Delays None known at this time

## 2024-03-07 NOTE — PROGRESS NOTES
Pulmonary/Critical Care Progress Note      PATIENT NAME: Jose F Hanley  MRN: [id number]  TODAY'S DATE: 2024  1:01 PM  ADMIT DATE: 2024  AGE: 78 y.o. : 1945    CONSULT REQUESTED BY: Oscar Palma MD    REASON FOR CONSULT:   Respiratory failure    HPI:  The patient is a 78-year-old gentleman who underwent coronary artery bypass grafting.  He is recovering from DTs, he is extremely morbidly obese, he is in AFib with RVR, and he is not clearing his secretions.  He is off of Precedex but only by a few hours and is lethargic.     the patient remains quite lethargic.    3/1 the patient is significantly more confused today than yesterday.  He is oriented to self.  He is convinced we are trying to kill him.  He states he is going to die.  This makes me anxious.    3/2/24: Afebrile, WBC count trending down on Zosyn. CT CAP identified no particular source of infection, but did suggest ileus or SBO. Encephalopathy is resolved. Mediastinal and pleural drains removed.    3/3/24: NGT clamped and may be removed later today. No BM. Passing flatus.    3/4 the patient is up in the chair.  He has AFib but his rate is controlled..  The patient is still rattling mucus around.  The patient diurese 1200 cc yesterday on 10 milligram/hour Lasix drip.  He is also on a Cardizem drip.  He is ready for step-down.    3/5 patient is continuing to improve.  He should move out of ICU today.    3/6 the patient is still in bed at 10:00 a.m..  I have discussed this with his nurse.  He is also asking for a suctioned so that he can retrieve his secretions.    REVIEW OF SYSTEMS    General: Feeling tired  Eyes: Vision is good.  ENT:  No sinusitis or pharyngitis.   Heart: No chest pain or palpitations.  Lungs:  He states he is swallowing his secretions  GI:  No further emesis  : No dysuria, hesitancy, or nocturia.  Skin: No lesions or rashes.  Musculoskeletal:  Very weak.  Neuro: No headaches or neuropathy.  Lymph: No edema or  adenopathy.  Psych: No anxiety or depression.  Endo: No weight change.     No change in the patient's Past Medical History, Past Surgical History, Social History or Family History since admission.     VITAL SIGNS (MOST RECENT)  Temp: 97.8 °F (36.6 °C) (03/07/24 1115)  Pulse: 72 (03/07/24 1304)  Resp: 20 (03/07/24 1304)  BP: (!) 95/53 (03/07/24 1304)  SpO2: 98 % (03/07/24 1304)    INTAKE AND OUTPUT (LAST 24 HOURS):  Intake/Output Summary (Last 24 hours) at 3/7/2024 1308  Last data filed at 3/7/2024 0947  Gross per 24 hour   Intake 817 ml   Output 1100 ml   Net -283 ml       WEIGHT  Wt Readings from Last 1 Encounters:   03/03/24 125.1 kg (275 lb 12.7 oz)       PHYSICAL EXAM  GENERAL: Older patient; alert and oriented.  HEENT: Pupils equal and reactive. Extraocular movements intact. Nose with  nasal cannula in place.  Pharynx moist  NECK: Supple.   HEART:  Irregularly irregular rate and rhythm.  AFib with controlled rate No murmur or gallop auscultated.  LUNGS:  Diffuse rhonchi with bibasilar crackles the crackles are improved, the rhonchi are not, the sputum is white  ABDOMEN:  Extremely obese. Bowel sounds present. Non-tender, no masses palpated.  Small smear of feces this morning  : Normal anatomy.  Male pure wick with yellow urine.    EXTREMITIES: Normal muscle tone and joint movement, no cyanosis or clubbing.   LYMPHATICS: No adenopathy palpated, no edema.  SKIN: Dry, intact, no lesions.  Incisions dressed and dry  NEURO: No gross motor or cognitive deficit.      CBC LAST (LAST 24 HOURS)  Recent Labs   Lab 03/07/24  0435   WBC 15.93*   RBC 3.74*   HGB 8.5*   HCT 29.4*   MCV 79*   MCH 22.7*   MCHC 28.9*   RDW 21.4*   *   MPV 10.6   GRAN 75.9*  12.1*   LYMPH 7.7*  1.2   MONO 8.9  1.4*   BASO 0.13   NRBC 2*       CHEMISTRY LAST (LAST 24 HOURS)  Recent Labs   Lab 03/07/24  0435      K 3.9   CL 97   CO2 34*   ANIONGAP 5*   BUN 28*   CREATININE 1.1   *   CALCIUM 8.8         LAST 7 DAYS  MICROBIOLOGY   Microbiology Results (last 7 days)       Procedure Component Value Units Date/Time    Blood culture [[id number]] Collected: 03/01/24 1001    Order Status: Completed Specimen: Blood Updated: 03/06/24 1232     Blood Culture, Routine No growth after 5 days.    MRSA Screen by PCR [[id number]] Collected: 03/01/24 1112    Order Status: Completed Updated: 03/01/24 1253     MRSA SCREEN BY PCR Negative    MRSA Screen by PCR [[id number]] Collected: 03/01/24 0940    Order Status: Canceled Specimen: Nasopharyngeal Swab from Nasal             MOST RECENT IMAGING  X-Ray Chest 1 View  Chest single view    CLINICAL DATA: Atelectasis    FINDINGS: 2 AP views are compared to March 2.    The heart and mediastinum are unchanged. Left-sided PICC line remains in place with tip at superior vena cava. Nasogastric tube has been removed.    Previously noted bibasilar atelectasis appears improved. There is no pneumothorax or significant pleural effusion.    IMPRESSION:  1. Improving bilateral atelectasis.  2. Interval removal of nasogastric tube.    Electronically signed by:  Jan Leyva MD  03/06/2024 11:04 AM CST Workstation: [id number]      CURRENT VISIT EKG  Results for orders placed or performed during the hospital encounter of 02/23/24   EKG 12-LEAD   Result Value Ref Range    QRS Duration 232 ms    OHS QTC Calculation 613 ms    Narrative    Test Reason : Z95.1,Z95.1,    Vent. Rate : 080 BPM     Atrial Rate : 087 BPM     P-R Int : 000 ms          QRS Dur : 232 ms      QT Int : 532 ms       P-R-T Axes : 000 168 250 degrees     QTc Int : 613 ms    Ventricular-paced rhythm  occasional atrila pacing  Abnormal ECG  When compared with ECG of 21-FEB-2024 09:33,  Electronic ventricular pacemaker has replaced Atrial fibrillation  Confirmed by Eleazar WOO, Gregg NEWTON ([id number]) on 2/24/2024 7:12:58 AM    Referred By: OLLIE RIBEIRO           Confirmed By:Gregg Bush MD       ECHOCARDIOGRAM RESULTS  Results for orders placed  during the hospital encounter of 12/13/23    Echo    Interpretation Summary    Left Ventricle: The left ventricle is normal in size. Mildly increased wall thickness. There is mild concentric hypertrophy. Normal wall motion. There is normal systolic function with a visually estimated ejection fraction of 65 - 70%. There is normal diastolic function.    Right Ventricle: Mild right ventricular enlargement. Wall thickness is normal. Right ventricle wall motion  is normal. Systolic function is normal.    Left Atrium: Left atrium is moderately dilated.    Right Atrium: Right atrium is mildly dilated.    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    Tricuspid Valve: There is mild regurgitation with a centrally directed jet.    Pulmonic Valve: There is mild regurgitation with a centrally directed jet.    Pulmonary Artery: There is mild pulmonary hypertension. The estimated pulmonary artery systolic pressure is 40 mmHg.    IVC/SVC: Intermediate venous pressure at 8 mmHg.        Oxygen INFORMATION       5 L       LAST ARTERIAL BLOOD GAS  ABG  Recent Labs   Lab 03/02/24  0832   PH 7.409   PO2 71*   PCO2 56.5*   HCO3 35.7*   BE 11*       IMPRESSION AND PLAN  Chronic bronchitis  Status post coronary artery bypass grafting  Alcoholic going through DTs  - resolved  Encephalopathy  - resolved  Acute hypercapnic hypoxemic respiratory failure  - 92% saturation on 3 L  - patient states his sats are usually in the mid 90s at home on room air  - patient has oxygen at home which he uses in place of CPAP  Bibasilar atelectasis  - improving on chest x-ray  - continue IPV  - encourage patient to cough and clear  - Mucinex 1200 mg b.i.d.  - patient needs to stay out of bed and ambulate with PT  Obstructive sleep apnea  Obesity hypoventilation syndrome  - noncompliant with CPAP  - sleeps on 3 L at home  - BiPAP whenever he is sleeping.   - continue elevation of head of bed  Extreme morbid obesity  -  patient does not oxygenate well when flat  Atrial fib with RVR  - rate controlled on Cardizem  - CV surgery and Cardiology following  - patient is in permanent atrial fibrillation, he does not need anticoagulation  Ileus  - no further emesis  - tolerating diet  Sepsis with likely intra-abdominal source  - appears resolved  Anemia  - postop, expected  Leukocytosis  - improving  - continue trending  - pan culture for temp spike  Hyperglycemia  - mild  - trend  Hyponatremia  - resolved  Hypophosphatemia  - resolved    Discussed with nurse and cardiologist and patient and wife.    Lesley Ang MD  Date of Service: 03/07/2024  1:01 PM

## 2024-03-07 NOTE — PLAN OF CARE
Received call from Anuradha at [facility] swing bed [phone] requesting currently d/c summary and d/c order if patient is medically ready to transfer to them today. Received d/c order and updated AVS d/c summary and all above information rightfaxed to Anuradha at fax# [phone]. Awaiting response from Anuradha as to whether they can accept patient this afternoon. Will continue to follow and update as appropriate.

## 2024-03-07 NOTE — PT/OT/SLP PROGRESS
Speech Language Pathology Treatment    Patient Name:  Jose F Hanley   MRN:  [id number]  Admitting Diagnosis: S/P CABG (coronary artery bypass graft)    Recommendations:                 General Recommendations:   Dysphagia management  Diet recommendations:  Soft & Bite Sized Diet - IDDSI Level 6, Liquid Diet Level: Thin liquids - IDDSI Level 0.No dry/crumbly foods. Gravy on all meats  Aspiration Precautions:  OOB to chair for meals, if able. Assistance with meals, slow pace, small bites/sips, O2 with all intake    General Precautions: Standard, aspiration, fall, sternal  Communication strategies:  none    Assessment:     Jose F Hanley is a 78 y.o. male with an SLP diagnosis of improving Dysphagia; nearly resolved.  He presents with mild difficulty masticating solid textures and prefers softer. Tolerating PO with no overt s/s airway compromise. Current diet remains appropriate.    Subjective     No c/o  In good spirits       Pain/Comfort:  Pain Rating 1: 0/10    Respiratory Status: Nasal cannula, flow 4 L/min    Objective:   Asleep upon entering room. Opens eyes to voice and gentle touch. Very pleasant and cooperative. Agreed to lunch. Appetite remains poor. Pt consumed soft turkey/gravy, mashed potatoes and pudding with functional oral prep an no overt s/s airway compromise. Pt consumed steamed green beans with functional oral prep, however, he reported it was difficult to chew. Current diet remains appropriate at this time. Will plan to see with regular tray (trial) for lunch tomorrow.     Has the patient been evaluated by SLP for swallowing?   Yes  Keep patient NPO? No   Current Respiratory Status:        Goals:   Multidisciplinary Problems       SLP Goals          Problem: SLP    Goal Priority Disciplines Outcome   SLP Goal     SLP Ongoing, Progressing   Description: 1) Pt will consume Regular (IDDSI 7) textures and thin liquids (IDDSI 0) with functional oral phase and no overt s/s penetration/aspiration.                          Plan:     Patient to be seen:  5 x/week   Plan of Care expires:  03/10/24  Plan of Care reviewed with:  patient, spouse   SLP Follow-Up:  Yes       Discharge recommendations:  High Intensity Therapy   Barriers to Discharge:  Level of Skilled Assistance Needed ,    Time Tracking:     SLP Treatment Date:   03/07/24  Speech Start Time:  1230  Speech Stop Time:  1243     Speech Total Time (min):  13 min    Billable Minutes: Treatment Swallowing Dysfunction 13 and Total Time 13    03/07/2024

## 2024-03-07 NOTE — PT/OT/SLP PROGRESS
Physical Therapy Treatment    Patient Name:  Jose F Hanley   MRN:  [id number]    Recommendations:     Discharge Recommendations: High Intensity Therapy  Discharge Equipment Recommendations:  (to be determined at next level of care)  Barriers to discharge:  increased assist with mobility, decreased activity tolerance, sternal precautions, fall risk    Assessment:     Jose F Hanley is a 78 y.o. male admitted with a medical diagnosis of S/P CABG (coronary artery bypass graft).  He presents with the following impairments/functional limitations: weakness, impaired endurance, impaired self care skills, impaired functional mobility, gait instability, impaired balance, decreased coordination, decreased upper extremity function, decreased lower extremity function, decreased safety awareness, impaired skin, impaired cardiopulmonary response to activity.    Pt up in chair with spouse present. Pt reports he has been up for about an hour and would like to return to bed after therapy session. Pt's blood pressure was taken and noted to be 246/208 with pt conversing normally with no complaints or distress. Attempted to take BP again but it would not read. RN notified and to check it after session.    Pt required mod assist x 2 persons for sit to stand transfer holding onto heart pillow to maintain sternal precautions.    Pt ambulated 15' with rollator and min assist for safety. Pt with decreased step length and step clearance.    Pt assisted to supine with mod assist x 2.    RN headed in with new BP cuff upon therapist exiting room.    Rehab Prognosis: Fair; patient would benefit from acute skilled PT services to address these deficits and reach maximum level of function.    Recent Surgery: Procedure(s) (LRB):  CABG, REPEAT (N/A)  HARVEST-VEIN-ENDOVASCULAR (Right)  SURGICAL PROCUREMENT,ARTERY,RADIAL,FOR CABG  INSERTION, INTRA-AORTIC BALLOON PUMP (Right) 13 Days Post-Op    Plan:     During this hospitalization, patient to be seen daily  to address the identified rehab impairments via gait training, therapeutic activities, therapeutic exercises and progress toward the following goals:    Plan of Care Expires:  03/29/24    Subjective     Chief Complaint: fatigue and wanting to return to bed  Patient/Family Comments/goals: to get better  Pain/Comfort:  Pain Rating 1: 0/10      Objective:     Communicated with RN prior to session.  Patient found up in chair with oxygen, pulse ox (continuous), telemetry, blood pressure cuff, peripheral IV, PureWick upon PT entry to room.     General Precautions: Standard, fall, sternal  Orthopedic Precautions: N/A  Braces: N/A  Respiratory Status: Nasal cannula, flow 4 L/min     Functional Mobility:  Bed Mobility:     Sit to Supine: moderate assistance and of 2 persons  Transfers:     Sit to Stand:  moderate assistance and of 2 persons with heart pillow  Gait: x 15' with RW and Robinson      AM-PAC 6 CLICK MOBILITY          Treatment & Education:  Pt educated on importance of time OOB, importance of intermittent mobility, safe techniques for transfers/ambulation, discharge recommendations/options, and use of call light for assistance and fall prevention.      Patient left HOB elevated with all lines intact, call button in reach, bed alarm on, and nurse notified..    GOALS:   Multidisciplinary Problems       Physical Therapy Goals          Problem: Physical Therapy    Goal Priority Disciplines Outcome Goal Variances Interventions   Physical Therapy Goal     PT, PT/OT Ongoing, Progressing     Description: 1. Supine to sit with Stand-by Assistance  2. Sit to stand transfer with Stand-by Assistance  3.. Bed to chair transfer with Supervision using Rolling Walker  4. Gait  x 100 feet with Minimal Assistance using Rolling Walker.                          Time Tracking:     PT Received On: 03/07/24  PT Start Time: 0914     PT Stop Time: 0937  PT Total Time (min): 23 min     Billable Minutes: Gait Training 10 and Therapeutic  Activity 13    Treatment Type: Treatment  PT/PTA: PTA     Number of PTA visits since last PT visit: 2     03/07/2024

## 2024-03-07 NOTE — PROGRESS NOTES
"[facility]  Department of Cardiology  Consult Note      PATIENT NAME: Jose F Hanley  MRN: [id number]  TODAY'S DATE: 03/07/2024  ADMIT DATE: 2/23/2024                          CONSULT REQUESTED BY: Oscar Palma MD    SUBJECTIVE     PRINCIPAL PROBLEM: S/P CABG (coronary artery bypass graft)      REASON FOR CONSULT:  Post CABG     Interval history:  Pt seen resting in bed this morning after PT,. He remains in 3.5L NC. Still sounds coarse but no longer wheezing. Wife at bedside. He is anxious to be transferred to swing bed.     3/6/2024  Pt up in chair this morning, appears to be doing well. Still congested with dry cough.     3/5/2024  Pt was seen up and out of bed this morning seated in chair with wife at bedside. Cardizem gtt running at 5. Lasix gtt stopped this morning. Congested cough is still present but improving.  No further episodes of nausea noted, reportedly had a bowel movement     3/4/2024  Patient is feeling better NG tube is removed congested cough is still persisting  Patient is AFIB with RVR.    WBC 21.31  H/H 9.3/31.9  No more NGT        03/03/2024:   Patient was up in the chair this morning tolerated fairly well still with fair amount of NG drainage denies having any new pains    03/02/2024:  Patient is alert awake and responsive.  Some wheezing is persisting chest wall pain is controlled.  NG tube in place for earliest      3/1/2024      Patient is not completely oriented. He is lethargic.  He states "yes" when asked if he is breathing better.  According to I and O still is positive.  Creatinine stable.  Remains in AFIB rates are low 100s      2/29/24    Post op Ileus with NGT  RVR  Still positive on I and O  CXR Not any better      2/28/2024    Off Bipap  Off Precedex  Pacer wires out  Abdomen is distended    2/27/2024    Resting on Face mask. Still with mild sedation family at bedside. VSS.      2/26/24  Pt asleep in bed, remains on BiPAP. Per RN, he is still confused while " awake. Currently under light sedation - precedex gtt. Remains paced with temporary pacer - 80s.     2/25/24  Pt was seen resting in bed with Bipap mask in place. Balloon pump was removed this morning per bedside RN.     2/24/24  Pt was seen extubated on BiPAP this morning. Balloon pump. Pressures soft this morning. Per RN, pt was increasingly agitated this morning.       HPI:  Pt is a 78 year old with history of known CAD with previous bypass surgery back in 08/31/23 and then underwent a repeat CABG today, 2/23/23. He Just recently had a [facility] as well with Dr. Bush back on 2/7/24.       Per surgery team:  Operation: Redo coronary artery bypass grafting x3 using left radial artery to left anterior descending coronary artery and separate segments of saphenous vein from the aorta to the diagonal coronary artery and from the aorta to the obtuse marginal coronary artery using a combination of antegrade and retrograde cardioplegia, mild systemic hypothermia, endoscopic vein harvest from the right leg, right radial artery harvest, and insertion of right femoral arterial intra-aortic balloon pump with fluoroscopic guidance and complete pericardiectomy due to severe adhesive pericarditis         Review of patient's allergies indicates:   Allergen Reactions    Adhes. band-tape-benzalkonium Rash     Pt stated it caused blisters    Statins-hmg-coa reductase inhibitors Other (See Comments)     Statin Intolerance (joint pain)       Past Medical History:   Diagnosis Date    A-fib     Allergies 2020    gets allergy shots    Arthritis 1973    right knee    Back pain     COPD (chronic obstructive pulmonary disease)     Diastolic heart failure     GERD (gastroesophageal reflux disease) 2015    HLD (hyperlipidemia)     HTN (hypertension)     Hx of LASIK     Hypothyroidism, unspecified 2012    Inflammatory disease of prostate, unspecified     out of medicine    Mild intermittent asthma, uncomplicated     Myocardial infarction      Neuropathy     On home oxygen therapy     3L NC  at night    PAD (peripheral artery disease)     Staph skin infection 2016    right thigh    TIA (transient ischemic attack) 2013     Past Surgical History:   Procedure Laterality Date    AORTOGRAPHY WITH SERIALOGRAPHY N/A 08/31/2023    Procedure: AORTOGRAM, WITH SERIALOGRAPHY;  Surgeon: Gary Thomas MD;  Location: [facility] CATH/EP LAB;  Service: Peripheral Vascular;  Laterality: N/A;    CATARACT EXTRACTION Bilateral 2014    CORONARY ANGIOGRAPHY INCLUDING BYPASS GRAFTS WITH CATHETERIZATION OF LEFT HEART Left 01/12/2024    Procedure: ANGIOGRAM, CORONARY, INCLUDING BYPASS GRAFT, WITH LEFT HEART CATHETERIZATION;  Surgeon: Gregg Bush MD;  Location: [facility] CATH/EP LAB;  Service: Cardiology;  Laterality: Left;    CORONARY ARTERY BYPASS GRAFT  2010    ENDOSCOPIC HARVEST OF VEIN Right 2/23/2024    Procedure: HARVEST-VEIN-ENDOVASCULAR;  Surgeon: Gary Thomas MD;  Location: [facility] OR;  Service: Cardiothoracic;  Laterality: Right;    INSERTION OF IMPLANTABLE LOOP RECORDER  2011    INSERTION OF INTRA-AORTIC BALLOON ASSIST DEVICE Right 2/23/2024    Procedure: INSERTION, INTRA-AORTIC BALLOON PUMP;  Surgeon: Gary Thomas MD;  Location: [facility] OR;  Service: Cardiothoracic;  Laterality: Right;    INSERTION OF PACEMAKER  2021    does not have card with him for preadmit    INSTANTANEOUS WAVE-FREE RATIO (IFR) N/A 01/12/2024    Procedure: Instantaneous Wave-Free Ratio (IFR);  Surgeon: Gregg Bush MD;  Location: [facility] CATH/EP LAB;  Service: Cardiology;  Laterality: N/A;    PTA, ANTERIOR TIBIAL Left 08/31/2023    Procedure: PTA, Anterior Tibial;  Surgeon: Gary Thomas MD;  Location: [facility] CATH/EP LAB;  Service: Peripheral Vascular;  Laterality: Left;    PTA, SUPERFICIAL FEMORAL ARTERY Left 08/31/2023    Procedure: PTA, Superficial Femoral Artery;  Surgeon: Gary Thomas MD;  Location: [facility] CATH/EP LAB;  Service: Peripheral Vascular;  Laterality:  Left;    REPEAT CORONARY ARTERY BYPASS GRAFTING N/A 2024    Procedure: CABG, REPEAT;  Surgeon: Gary Thomas MD;  Location: [facility] OR;  Service: Cardiothoracic;  Laterality: N/A;    SPLENECTOMY  2016    STENT, ANTERIOR TIBIAL Left 2023    Procedure: Stent, Anterior Tibial;  Surgeon: Gary Thomas MD;  Location: [facility] CATH/EP LAB;  Service: Peripheral Vascular;  Laterality: Left;    SURGICAL PROCUREMENT, ARTERY, RADIAL, FOR CABG  2024    Procedure: SURGICAL PROCUREMENT,ARTERY,RADIAL,FOR CABG;  Surgeon: Gary Thomas MD;  Location: [facility] OR;  Service: Cardiothoracic;;    watchman heart device       Social History     Tobacco Use    Smoking status: Former     Current packs/day: 0.00     Types: Cigarettes     Quit date: 1987     Years since quittin.2    Smokeless tobacco: Never   Substance Use Topics    Alcohol use: Not Currently     Alcohol/week: 21.0 standard drinks of alcohol     Types: 21 Drinks containing 0.5 oz of alcohol per week     Comment: ed roberto mixed drinks    Drug use: No        REVIEW OF SYSTEMS    As mentioned in HPI    OBJECTIVE     VITAL SIGNS (Most Recent)  Temp: 97.8 °F (36.6 °C) (24 1115)  Pulse: 74 (24 1400)  Resp: 17 (24 1400)  BP: (!) 101/54 (24 1400)  SpO2: 97 % (24 1400)    VENTILATION STATUS  Resp: 17 (24 1400)  SpO2: 97 % (24 1400)           I & O (Last 24H):  Intake/Output Summary (Last 24 hours) at 3/7/2024 1449  Last data filed at 3/7/2024 0947  Gross per 24 hour   Intake 817 ml   Output 1100 ml   Net -283 ml         WEIGHTS  Wt Readings from Last 3 Encounters:   24 0426 125.1 kg (275 lb 12.7 oz)   24 1540 125 kg (275 lb 9.2 oz)   24 0948 124.5 kg (274 lb 6.4 oz)   24 0718 120.7 kg (266 lb)       PHYSICAL EXAM    CONSTITUTIONAL: NAD, more alert and responsive answering questions appropriately   HEENT:  Generalized noted   NECK: no JVD  LUNGS: coarse crackles diminished  bases    HEART irregular rhythm no distinct S3 tachycardic rhythm  ABDOMEN: distended better no guarding or rebound, nontender  EXTREMITIES:  edema +1  SKIN: No rash, surgical dressings clean and dry, mid sternal dressing coming untaped at top      HOME MEDICATIONS:  No current facility-administered medications on file prior to encounter.     Current Outpatient Medications on File Prior to Encounter   Medication Sig Dispense Refill    acetaminophen (TYLENOL) 325 MG tablet Take 650 mg by mouth every 6 (six) hours as needed.      albuterol (PROVENTIL/VENTOLIN HFA) 90 mcg/actuation inhaler Inhale 2 puffs into the lungs every 6 (six) hours as needed.      aspirin 81 MG Chew Take 81 mg by mouth once daily.      betamethasone valerate 0.1% (VALISONE) 0.1 % Lotn Apply to scalp bid prn rash      cyanocobalamin 500 MCG tablet 500 mcg.      cyclobenzaprine (FLEXERIL) 10 MG tablet Take 10 mg by mouth every 8 (eight) hours as needed.      doxepin (SINEQUAN) 50 MG capsule Take 50 mg by mouth every evening.      DULoxetine (CYMBALTA) 60 MG capsule 60 mg nightly.      EPINEPHrine (EPIPEN) 0.3 mg/0.3 mL AtIn INJECT 0.3MG/0.3ML SUBCUTANEOUSLY (UNDER THE SKIN)  AS NEEDED FOR ALLERGIC REACTIONS SELF-INJECTOR PEN      gabapentin (NEURONTIN) 400 MG capsule 800 mg 2 (two) times daily.      hydrocodone-homatropine 5-1.5 mg/5 ml (HYCODAN) 5-1.5 mg/5 mL Syrp Take by mouth every 6 (six) hours as needed. cough      levothyroxine (SYNTHROID) 50 MCG tablet 50 mcg.      montelukast (SINGULAIR) 10 mg tablet 10 mg once daily.      nitroGLYCERIN (NITROSTAT) 0.4 MG SL tablet Place 0.4 mg under the tongue every 5 (five) minutes as needed.      omega-3 fatty acids/fish oil (FISH OIL-OMEGA-3 FATTY ACIDS) 300-1,000 mg capsule Take 1 capsule by mouth 2 (two) times daily.      omeprazole (PRILOSEC) 20 MG capsule 40 mg once daily.      potassium chloride SA (K-DUR,KLOR-CON) 20 MEQ tablet 20 mEq nightly.      senna-docusate 8.6-50 mg (PERICOLACE) 8.6-50 mg  per tablet nightly.      tamsulosin (FLOMAX) 0.4 mg Cap 0.4 mg.      [DISCONTINUED] diphenhydrAMINE (BENADRYL) 50 MG capsule 50 mg every 6 (six) hours as needed.      [DISCONTINUED] furosemide (LASIX) 20 MG tablet Take 20 mg by mouth once daily.      [DISCONTINUED] metOLazone (ZAROXOLYN) 5 MG tablet 5 mg daily as needed. For weight gain >2lbs      [DISCONTINUED] ranolazine (RANEXA) 1,000 mg Tb12 Take 1 tablet (1,000 mg total) by mouth 2 (two) times daily. 180 tablet 3    [DISCONTINUED] spironolactone (ALDACTONE) 50 MG tablet Take 50 mg by mouth once daily.      [DISCONTINUED] TESTOSTERONE PROPIONATE, BULK, MISC Inject 1 each as directed every 14 (fourteen) days.      [DISCONTINUED] TOPROL XL 25 mg 24 hr tablet Take 25 mg by mouth 2 (two) times daily.      [DISCONTINUED] torsemide (DEMADEX) 20 MG Tab Take 1 tablet (20 mg total) by mouth once daily. (Patient not taking: Reported on 12/21/2023) 30 tablet 11       SCHEDULED MEDS:   aspirin  81 mg Oral Daily    diltiaZEM  180 mg Oral Daily    docusate sodium  100 mg Oral BID    DULoxetine  60 mg Oral Nightly    enoxparin  40 mg Subcutaneous Q24H (prophylaxis, 1700)    ezetimibe  10 mg Oral QHS    furosemide (LASIX) injection  40 mg Intravenous TID AC    gabapentin  800 mg Oral BID    guaiFENesin  1,200 mg Oral BID    insulin detemir U-100  5 Units Subcutaneous Daily    ipratropium  0.5 mg Nebulization Q6H    levalbuterol  1.25 mg Nebulization Q6H    levothyroxine  50 mcg Oral Before breakfast    metoprolol tartrate  25 mg Oral BID    montelukast  10 mg Oral Daily    pantoprazole  40 mg Oral Before breakfast    sodium chloride 0.9%  10 mL Intravenous Q6H    spironolactone  25 mg Oral BID    tamsulosin  0.4 mg Oral Daily       CONTINUOUS INFUSIONS:        PRN MEDS:dextrose 50% in water (D50W), dextrose 50% in water (D50W), dextrose 50% in water (D50W), dextrose 50% in water (D50W), glucagon (human recombinant), glucose, glucose, hydrALAZINE, HYDROcodone-acetaminophen,  "insulin aspart U-100, metoprolol, ondansetron, potassium bicarbonate, potassium bicarbonate, potassium bicarbonate, Flushing PICC/Midline Protocol **AND** sodium chloride 0.9% **AND** sodium chloride 0.9%    LABS AND DIAGNOSTICS     CBC LAST 3 DAYS  Recent Labs   Lab 03/05/24  0340 03/06/24  0450 03/07/24  0435   WBC 22.71* 22.85* 15.93*   RBC 4.17* 4.17* 3.74*   HGB 9.5* 9.3* 8.5*   HCT 32.1* 31.9* 29.4*   MCV 77* 77* 79*   MCH 22.8* 22.3* 22.7*   MCHC 29.6* 29.2* 28.9*   RDW 22.1* 21.9* 21.4*   * 747* 733*   MPV 10.7 10.6 10.6   GRAN 79.3*  18.0* 80.8*  18.5* 75.9*  12.1*   LYMPH 8.1*  1.9 6.7*  1.5 7.7*  1.2   MONO 7.0  1.6* 7.2  1.6* 8.9  1.4*   BASO 0.12 0.11 0.13   NRBC 6* 3* 2*         COAGULATION LAST 3 DAYS  No results for input(s): "LABPT", "INR", "APTT" in the last 168 hours.    CHEMISTRY LAST 3 DAYS  Recent Labs   Lab 03/01/24  0922 03/02/24  0454 03/02/24  0832 03/02/24  1658 03/03/24  0420 03/03/24  1331 03/04/24  0500 03/05/24  0340 03/06/24  0450 03/07/24  0435   NA  --    < >  --   --  142  --  141 134* 136 136   K  --    < >  --    < > 3.5 3.7 3.9 3.6 3.5 3.9   CL  --    < >  --   --  101  --  99 95 97 97   CO2  --    < >  --   --  37*  --  34* 30* 31* 34*   ANIONGAP  --    < >  --   --  4*  --  8 9 8 5*   BUN  --    < >  --   --  24*  --  21 22 27* 28*   CREATININE  --    < >  --   --  0.9  --  0.8 0.9 1.0 1.1   GLU  --    < >  --   --  143*  --  165* 249* 143* 138*   CALCIUM  --    < >  --   --  8.4*  --  8.7 8.3* 8.6* 8.8   PH 7.428  --  7.409  --   --   --   --   --   --   --    MG  --    < >  --   --  1.9 2.2 2.1  --   --   --     < > = values in this interval not displayed.         CARDIAC PROFILE LAST 3 DAYS  Recent Labs   Lab 03/04/24  1002   *         ENDOCRINE LAST 3 DAYS  Recent Labs   Lab 03/01/24  1001   TSH 2.171         LAST ARTERIAL BLOOD GAS  ABG  Recent Labs   Lab 03/02/24  0832   PH 7.409   PO2 71*   PCO2 56.5*   HCO3 35.7*   BE 11*         LAST 7 DAYS " MICROBIOLOGY   Microbiology Results (last 7 days)       Procedure Component Value Units Date/Time    Blood culture [[id number]] Collected: 03/01/24 1001    Order Status: Completed Specimen: Blood Updated: 03/06/24 1232     Blood Culture, Routine No growth after 5 days.    MRSA Screen by PCR [[id number]] Collected: 03/01/24 1112    Order Status: Completed Updated: 03/01/24 1253     MRSA SCREEN BY PCR Negative    MRSA Screen by PCR [[id number]] Collected: 03/01/24 0940    Order Status: Canceled Specimen: Nasopharyngeal Swab from Nasal             MOST RECENT IMAGING  X-Ray Chest 1 View  Chest single view    CLINICAL DATA: Atelectasis    FINDINGS: 2 AP views are compared to March 2.    The heart and mediastinum are unchanged. Left-sided PICC line remains in place with tip at superior vena cava. Nasogastric tube has been removed.    Previously noted bibasilar atelectasis appears improved. There is no pneumothorax or significant pleural effusion.    IMPRESSION:  1. Improving bilateral atelectasis.  2. Interval removal of nasogastric tube.    Electronically signed by:  Jan Leyva MD  03/06/2024 11:04 AM [facility] Workstation: [id number]      ECHOCARDIOGRAM RESULTS (last 5)  Results for orders placed during the hospital encounter of 12/13/23    Echo    Interpretation Summary    Left Ventricle: The left ventricle is normal in size. Mildly increased wall thickness. There is mild concentric hypertrophy. Normal wall motion. There is normal systolic function with a visually estimated ejection fraction of 65 - 70%. There is normal diastolic function.    Right Ventricle: Mild right ventricular enlargement. Wall thickness is normal. Right ventricle wall motion  is normal. Systolic function is normal.    Left Atrium: Left atrium is moderately dilated.    Right Atrium: Right atrium is mildly dilated.    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    Tricuspid Valve: There is mild regurgitation  with a centrally directed jet.    Pulmonic Valve: There is mild regurgitation with a centrally directed jet.    Pulmonary Artery: There is mild pulmonary hypertension. The estimated pulmonary artery systolic pressure is 40 mmHg.    IVC/SVC: Intermediate venous pressure at 8 mmHg.      CURRENT/PREVIOUS VISIT EKG  Results for orders placed or performed during the hospital encounter of 02/23/24   EKG 12-LEAD    Collection Time: 02/23/24  1:53 PM   Result Value Ref Range    QRS Duration 232 ms    OHS QTC Calculation 613 ms    Narrative    Test Reason : Z95.1,Z95.1,    Vent. Rate : 080 BPM     Atrial Rate : 087 BPM     P-R Int : 000 ms          QRS Dur : 232 ms      QT Int : 532 ms       P-R-T Axes : 000 168 250 degrees     QTc Int : 613 ms    Ventricular-paced rhythm  occasional atrila pacing  Abnormal ECG  When compared with ECG of 21-FEB-2024 09:33,  Electronic ventricular pacemaker has replaced Atrial fibrillation  Confirmed by Eleazar WOO, Gregg NEWTON ([id number]) on 2/24/2024 7:12:58 AM    Referred By: OLLIE RIBEIRO           Confirmed By:Gregg Bush MD           ASSESSMENT/PLAN:     Active Hospital Problems    Diagnosis    *S/P CABG (coronary artery bypass graft) - x2 09/2010 - LIMA to LAD; SVG to OMB; grafts occluded 08/2012    Hyperglycemia    Hyponatremia     -      Ileus    Acute blood loss anemia    Severe obesity (BMI 35.0-39.9) with comorbidity and TIANA    Permanent atrial fibrillation    History of transcatheter aortic valve replacement (TAVR)    Presence of Watchman left atrial appendage closure device    Chronic diastolic congestive heart failure    CAD (coronary artery disease), native coronary artery    Dyslipidemia       ASSESSMENT & PLAN:     MVCAD S/P redo CABG yesterday 2/23/24,  left radial artery to LAD, SVG to Dx, SVG to OM  HFpEF  HTN/ dyslipidemia   Statin intolerance  AFIB H/O Watchman with RVR  H/O TAVR  Leadless PPM in situ  Abdominal distention has improved  Morbid  obesity      RECOMMENDATIONS:    Rate appears well controlled on Cardizem 180 daily - HR in 70s. Continue metoprolol tartrate 25 mg BID.   Aspirin and statin therapy.  Will transition IV lasix to PO torsemide 40 mg BID as well as aldactone 25 mg BID.   Up and out of bed as tolerated, walking unit, in chair for all meals.   Pt can be cleared to discharge to swing bed/skilled facility for rehab from a cardaic standpoint. Needs to follow up in clinic in 1-2 weeks.      Mar Camacho NP  Department of Cardiology  Date of Service: 03/07/2024      I have personally interviewed and examined the patient, I have reviewed the Nurse Practitioner's history and physical, assessment, and plan. I have personally evaluated the patient at bedside and agree with the findings and made appropriate changes as necessary in recommendations.    Jaun Miller MD.    Department of Cardiology  [facility]  3/7/2024

## 2024-03-08 ENCOUNTER — TELEPHONE (OUTPATIENT)
Dept: VASCULAR SURGERY | Facility: CLINIC | Age: 79
End: 2024-03-08
Payer: MEDICARE

## 2024-03-08 NOTE — TELEPHONE ENCOUNTER
----- Message from Angeli Linda PA-C sent at 3/7/2024  2:49 PM CST -----  Mr. Hanley is s/p CABG. He will need follow up in [place] in 2 weeks. Thanks!

## 2024-03-11 ENCOUNTER — TELEPHONE (OUTPATIENT)
Dept: VASCULAR SURGERY | Facility: CLINIC | Age: 79
End: 2024-03-11
Payer: MEDICARE

## 2024-03-11 NOTE — TELEPHONE ENCOUNTER
Spoke with Sarah at  [facility] for p/o incisional care. Keep all incisions clean dry and KIAN, denies any issues with incision sites at this time. No questions or concerns at this time. P/O appt confirmed for 3/20/24 - [place].

## 2024-03-11 NOTE — TELEPHONE ENCOUNTER
----- Message from Anita Pedro sent at 3/11/2024  3:30 PM CDT -----  Regarding: advice for dressing cabg  Contact: [phone]  Type: Needs Medical Advice    Who Called:  Sarah // [facility]    Best Call Back Number: [phone]    Additional Information: needs instructions on how to dress cabg and dressing advice // please call to advise

## 2024-03-20 ENCOUNTER — OFFICE VISIT (OUTPATIENT)
Dept: VASCULAR SURGERY | Facility: CLINIC | Age: 79
End: 2024-03-20
Payer: MEDICARE

## 2024-03-20 VITALS — SYSTOLIC BLOOD PRESSURE: 112 MMHG | DIASTOLIC BLOOD PRESSURE: 66 MMHG | HEART RATE: 84 BPM

## 2024-03-20 DIAGNOSIS — Z95.1 S/P CABG X 3: Primary | ICD-10-CM

## 2024-03-20 PROCEDURE — 99024 POSTOP FOLLOW-UP VISIT: CPT | Mod: POP,,, | Performed by: THORACIC SURGERY (CARDIOTHORACIC VASCULAR SURGERY)

## 2024-03-20 PROCEDURE — 99999 PR PBB SHADOW E&M-EST. PATIENT-LVL III: CPT | Mod: PBBFAC,,, | Performed by: THORACIC SURGERY (CARDIOTHORACIC VASCULAR SURGERY)

## 2024-03-20 PROCEDURE — 99213 OFFICE O/P EST LOW 20 MIN: CPT | Mod: PBBFAC,PN | Performed by: THORACIC SURGERY (CARDIOTHORACIC VASCULAR SURGERY)

## 2024-03-20 RX ORDER — HYDROCODONE BITARTRATE AND ACETAMINOPHEN 10; 325 MG/1; MG/1
1 TABLET ORAL EVERY 12 HOURS PRN
COMMUNITY
Start: 2024-04-07 | End: 2024-05-07

## 2024-03-20 NOTE — PROGRESS NOTES
This patient is status post coronary artery bypass grafting.  He comes back to the office today in follow-up.  He has been at a rehab facility for the last couple of weeks.  He has slowly improved.  He is able to walk short distances without too much assistance.    His respiratory status has obviously improved since being discharged from the hospital.  The wife reports that he is breathing much easier than before.  Medicines are noted and are part of the epic record.  His problem list was reviewed.    On exam vital signs are stable.  Surgical wounds are intact.  His chest tube stitches were removed.    Recommendation is for routine medical follow-up with the Cardiology service and his primary care physician.    He can see me on an as-needed basis.  His prognosis should be fairly decent.

## 2024-03-22 ENCOUNTER — HOSPITAL ENCOUNTER (INPATIENT)
Facility: HOSPITAL | Age: 79
LOS: 1 days | Discharge: HOME HEALTH | DRG: 189 | End: 2024-03-24
Attending: STUDENT IN AN ORGANIZED HEALTH CARE EDUCATION/TRAINING PROGRAM | Admitting: INTERNAL MEDICINE
Payer: MEDICARE

## 2024-03-22 DIAGNOSIS — J44.9 COPD (CHRONIC OBSTRUCTIVE PULMONARY DISEASE): ICD-10-CM

## 2024-03-22 DIAGNOSIS — I50.9 HEART FAILURE: ICD-10-CM

## 2024-03-22 DIAGNOSIS — R07.9 CHEST PAIN: ICD-10-CM

## 2024-03-22 DIAGNOSIS — I50.9 CHF (CONGESTIVE HEART FAILURE): ICD-10-CM

## 2024-03-22 PROCEDURE — 96375 TX/PRO/DX INJ NEW DRUG ADDON: CPT | Mod: 59

## 2024-03-22 PROCEDURE — 96365 THER/PROPH/DIAG IV INF INIT: CPT

## 2024-03-22 PROCEDURE — 93005 ELECTROCARDIOGRAM TRACING: CPT | Performed by: INTERNAL MEDICINE

## 2024-03-22 PROCEDURE — 93010 ELECTROCARDIOGRAM REPORT: CPT | Mod: ,,, | Performed by: INTERNAL MEDICINE

## 2024-03-22 PROCEDURE — 99285 EMERGENCY DEPT VISIT HI MDM: CPT

## 2024-03-23 ENCOUNTER — CLINICAL SUPPORT (OUTPATIENT)
Dept: CARDIOLOGY | Facility: HOSPITAL | Age: 79
DRG: 189 | End: 2024-03-23
Attending: INTERNAL MEDICINE
Payer: MEDICARE

## 2024-03-23 PROBLEM — J96.01 ACUTE HYPOXEMIC RESPIRATORY FAILURE: Status: ACTIVE | Noted: 2024-03-23

## 2024-03-23 PROBLEM — D64.9 ANEMIA: Status: ACTIVE | Noted: 2024-03-23

## 2024-03-23 PROBLEM — I50.9 CHF (CONGESTIVE HEART FAILURE): Status: ACTIVE | Noted: 2024-03-23

## 2024-03-23 LAB
ALBUMIN SERPL BCP-MCNC: 3.6 G/DL (ref 3.5–5.2)
ALBUMIN SERPL BCP-MCNC: 3.6 G/DL (ref 3.5–5.2)
ALP SERPL-CCNC: 82 U/L (ref 55–135)
ALP SERPL-CCNC: 83 U/L (ref 55–135)
ALT SERPL W/O P-5'-P-CCNC: 10 U/L (ref 10–44)
ALT SERPL W/O P-5'-P-CCNC: 9 U/L (ref 10–44)
ANION GAP SERPL CALC-SCNC: 6 MMOL/L (ref 8–16)
ANION GAP SERPL CALC-SCNC: 8 MMOL/L (ref 8–16)
AST SERPL-CCNC: 15 U/L (ref 10–40)
AST SERPL-CCNC: 22 U/L (ref 10–40)
AV INDEX (PROSTH): 0.44
AV MEAN GRADIENT: 13 MMHG
AV PEAK GRADIENT: 25 MMHG
AV VALVE AREA BY VELOCITY RATIO: 1.79 CM²
AV VALVE AREA: 1.83 CM²
AV VELOCITY RATIO: 0.43
BASOPHILS # BLD AUTO: 0.12 K/UL (ref 0–0.2)
BASOPHILS NFR BLD: 1.2 % (ref 0–1.9)
BILIRUB SERPL-MCNC: 0.5 MG/DL (ref 0.1–1)
BILIRUB SERPL-MCNC: 0.6 MG/DL (ref 0.1–1)
BNP SERPL-MCNC: 386 PG/ML (ref 0–99)
BSA FOR ECHO PROCEDURE: 2.41 M2
BUN SERPL-MCNC: 18 MG/DL (ref 8–23)
BUN SERPL-MCNC: 23 MG/DL (ref 8–23)
CALCIUM SERPL-MCNC: 8.8 MG/DL (ref 8.7–10.5)
CALCIUM SERPL-MCNC: 9 MG/DL (ref 8.7–10.5)
CHLORIDE SERPL-SCNC: 93 MMOL/L (ref 95–110)
CHLORIDE SERPL-SCNC: 94 MMOL/L (ref 95–110)
CO2 SERPL-SCNC: 30 MMOL/L (ref 23–29)
CO2 SERPL-SCNC: 33 MMOL/L (ref 23–29)
CREAT SERPL-MCNC: 1 MG/DL (ref 0.5–1.4)
CREAT SERPL-MCNC: 1.2 MG/DL (ref 0.5–1.4)
CV ECHO LV RWT: 0.5 CM
DIFFERENTIAL METHOD BLD: ABNORMAL
DOP CALC AO PEAK VEL: 2.51 M/S
DOP CALC AO VTI: 48.7 CM
DOP CALC LVOT AREA: 4.2 CM2
DOP CALC LVOT DIAMETER: 2.3 CM
DOP CALC LVOT PEAK VEL: 1.08 M/S
DOP CALC LVOT STROKE VOLUME: 89.28 CM3
DOP CALCLVOT PEAK VEL VTI: 21.5 CM
ECHO LV POSTERIOR WALL: 1.15 CM (ref 0.6–1.1)
EOSINOPHIL # BLD AUTO: 0.5 K/UL (ref 0–0.5)
EOSINOPHIL NFR BLD: 4.8 % (ref 0–8)
ERYTHROCYTE [DISTWIDTH] IN BLOOD BY AUTOMATED COUNT: 19.7 % (ref 11.5–14.5)
EST. GFR  (NO RACE VARIABLE): >60 ML/MIN/1.73 M^2
EST. GFR  (NO RACE VARIABLE): >60 ML/MIN/1.73 M^2
FERRITIN SERPL-MCNC: 47 NG/ML (ref 20–300)
FOLATE SERPL-MCNC: 10.4 NG/ML (ref 4–24)
FRACTIONAL SHORTENING: 28 % (ref 28–44)
GLUCOSE SERPL-MCNC: 101 MG/DL (ref 70–110)
GLUCOSE SERPL-MCNC: 153 MG/DL (ref 70–110)
HCT VFR BLD AUTO: 31.1 % (ref 40–54)
HGB BLD-MCNC: 9.1 G/DL (ref 14–18)
IMM GRANULOCYTES # BLD AUTO: 0.04 K/UL (ref 0–0.04)
IMM GRANULOCYTES NFR BLD AUTO: 0.4 % (ref 0–0.5)
INTERVENTRICULAR SEPTUM: 1.13 CM (ref 0.6–1.1)
IRON SERPL-MCNC: <10 UG/DL (ref 45–160)
LEFT INTERNAL DIMENSION IN SYSTOLE: 3.33 CM (ref 2.1–4)
LEFT VENTRICLE DIASTOLIC VOLUME INDEX: 41.83 ML/M2
LEFT VENTRICLE DIASTOLIC VOLUME: 98.3 ML
LEFT VENTRICLE MASS INDEX: 82 G/M2
LEFT VENTRICLE SYSTOLIC VOLUME INDEX: 19.2 ML/M2
LEFT VENTRICLE SYSTOLIC VOLUME: 45.1 ML
LEFT VENTRICULAR INTERNAL DIMENSION IN DIASTOLE: 4.62 CM (ref 3.5–6)
LEFT VENTRICULAR MASS: 191.88 G
LVOT MG: 3 MMHG
LVOT MV: 0.77 CM/S
LYMPHOCYTES # BLD AUTO: 2.5 K/UL (ref 1–4.8)
LYMPHOCYTES NFR BLD: 25.5 % (ref 18–48)
MAGNESIUM SERPL-MCNC: 1.7 MG/DL (ref 1.6–2.6)
MCH RBC QN AUTO: 21.8 PG (ref 27–31)
MCHC RBC AUTO-ENTMCNC: 29.3 G/DL (ref 32–36)
MCV RBC AUTO: 74 FL (ref 82–98)
MONOCYTES # BLD AUTO: 1.3 K/UL (ref 0.3–1)
MONOCYTES NFR BLD: 13.3 % (ref 4–15)
NEUTROPHILS # BLD AUTO: 5.3 K/UL (ref 1.8–7.7)
NEUTROPHILS NFR BLD: 54.8 % (ref 38–73)
NRBC BLD-RTO: 0 /100 WBC
OHS LV EJECTION FRACTION SIMPSONS BIPLANE MOD: 53 %
PISA TR MAX VEL: 3.06 M/S
PLATELET # BLD AUTO: 389 K/UL (ref 150–450)
PMV BLD AUTO: 10 FL (ref 9.2–12.9)
POTASSIUM SERPL-SCNC: 4 MMOL/L (ref 3.5–5.1)
POTASSIUM SERPL-SCNC: 4.2 MMOL/L (ref 3.5–5.1)
PROT SERPL-MCNC: 6.8 G/DL (ref 6–8.4)
PROT SERPL-MCNC: 6.9 G/DL (ref 6–8.4)
RA PRESSURE ESTIMATED: 8 MMHG
RBC # BLD AUTO: 4.18 M/UL (ref 4.6–6.2)
RV TB RVSP: 11 MMHG
SATURATED IRON: ABNORMAL % (ref 20–50)
SODIUM SERPL-SCNC: 131 MMOL/L (ref 136–145)
SODIUM SERPL-SCNC: 133 MMOL/L (ref 136–145)
TOTAL IRON BINDING CAPACITY: 386 UG/DL (ref 250–450)
TR MAX PG: 37 MMHG
TRANSFERRIN SERPL-MCNC: 276 MG/DL (ref 200–375)
TROPONIN I SERPL HS-MCNC: 10.8 PG/ML (ref 0–14.9)
TROPONIN I SERPL HS-MCNC: 18.3 PG/ML (ref 0–14.9)
TV REST PULMONARY ARTERY PRESSURE: 45 MMHG
VIT B12 SERPL-MCNC: 728 PG/ML (ref 210–950)
WBC # BLD AUTO: 9.68 K/UL (ref 3.9–12.7)
Z-SCORE OF LEFT VENTRICULAR DIMENSION IN END DIASTOLE: -7.55
Z-SCORE OF LEFT VENTRICULAR DIMENSION IN END SYSTOLE: -4.53

## 2024-03-23 PROCEDURE — 99900031 HC PATIENT EDUCATION (STAT)

## 2024-03-23 PROCEDURE — 83735 ASSAY OF MAGNESIUM: CPT | Performed by: INTERNAL MEDICINE

## 2024-03-23 PROCEDURE — 63600175 PHARM REV CODE 636 W HCPCS: Performed by: STUDENT IN AN ORGANIZED HEALTH CARE EDUCATION/TRAINING PROGRAM

## 2024-03-23 PROCEDURE — 99900035 HC TECH TIME PER 15 MIN (STAT)

## 2024-03-23 PROCEDURE — 94640 AIRWAY INHALATION TREATMENT: CPT | Mod: XB

## 2024-03-23 PROCEDURE — 93005 ELECTROCARDIOGRAM TRACING: CPT | Performed by: INTERNAL MEDICINE

## 2024-03-23 PROCEDURE — 97530 THERAPEUTIC ACTIVITIES: CPT

## 2024-03-23 PROCEDURE — 93010 ELECTROCARDIOGRAM REPORT: CPT | Mod: ,,, | Performed by: INTERNAL MEDICINE

## 2024-03-23 PROCEDURE — 93308 TTE F-UP OR LMTD: CPT | Mod: 26,,, | Performed by: INTERNAL MEDICINE

## 2024-03-23 PROCEDURE — 99406 BEHAV CHNG SMOKING 3-10 MIN: CPT

## 2024-03-23 PROCEDURE — 83540 ASSAY OF IRON: CPT | Performed by: INTERNAL MEDICINE

## 2024-03-23 PROCEDURE — 25000242 PHARM REV CODE 250 ALT 637 W/ HCPCS: Performed by: INTERNAL MEDICINE

## 2024-03-23 PROCEDURE — 85025 COMPLETE CBC W/AUTO DIFF WBC: CPT | Performed by: STUDENT IN AN ORGANIZED HEALTH CARE EDUCATION/TRAINING PROGRAM

## 2024-03-23 PROCEDURE — 25000003 PHARM REV CODE 250: Performed by: INTERNAL MEDICINE

## 2024-03-23 PROCEDURE — 82607 VITAMIN B-12: CPT | Performed by: INTERNAL MEDICINE

## 2024-03-23 PROCEDURE — 25000242 PHARM REV CODE 250 ALT 637 W/ HCPCS: Performed by: STUDENT IN AN ORGANIZED HEALTH CARE EDUCATION/TRAINING PROGRAM

## 2024-03-23 PROCEDURE — 80053 COMPREHEN METABOLIC PANEL: CPT | Performed by: STUDENT IN AN ORGANIZED HEALTH CARE EDUCATION/TRAINING PROGRAM

## 2024-03-23 PROCEDURE — 63600175 PHARM REV CODE 636 W HCPCS: Performed by: INTERNAL MEDICINE

## 2024-03-23 PROCEDURE — 96375 TX/PRO/DX INJ NEW DRUG ADDON: CPT

## 2024-03-23 PROCEDURE — 80053 COMPREHEN METABOLIC PANEL: CPT | Mod: 91 | Performed by: INTERNAL MEDICINE

## 2024-03-23 PROCEDURE — 36415 COLL VENOUS BLD VENIPUNCTURE: CPT | Performed by: INTERNAL MEDICINE

## 2024-03-23 PROCEDURE — 84484 ASSAY OF TROPONIN QUANT: CPT | Mod: 91 | Performed by: INTERNAL MEDICINE

## 2024-03-23 PROCEDURE — 83880 ASSAY OF NATRIURETIC PEPTIDE: CPT | Performed by: STUDENT IN AN ORGANIZED HEALTH CARE EDUCATION/TRAINING PROGRAM

## 2024-03-23 PROCEDURE — 97161 PT EVAL LOW COMPLEX 20 MIN: CPT

## 2024-03-23 PROCEDURE — 94640 AIRWAY INHALATION TREATMENT: CPT

## 2024-03-23 PROCEDURE — 27000221 HC OXYGEN, UP TO 24 HOURS

## 2024-03-23 PROCEDURE — 21400001 HC TELEMETRY ROOM

## 2024-03-23 PROCEDURE — 94761 N-INVAS EAR/PLS OXIMETRY MLT: CPT

## 2024-03-23 PROCEDURE — 96376 TX/PRO/DX INJ SAME DRUG ADON: CPT

## 2024-03-23 PROCEDURE — 82746 ASSAY OF FOLIC ACID SERUM: CPT | Performed by: INTERNAL MEDICINE

## 2024-03-23 PROCEDURE — 25000003 PHARM REV CODE 250: Performed by: STUDENT IN AN ORGANIZED HEALTH CARE EDUCATION/TRAINING PROGRAM

## 2024-03-23 PROCEDURE — 93308 TTE F-UP OR LMTD: CPT

## 2024-03-23 PROCEDURE — 36415 COLL VENOUS BLD VENIPUNCTURE: CPT | Performed by: STUDENT IN AN ORGANIZED HEALTH CARE EDUCATION/TRAINING PROGRAM

## 2024-03-23 PROCEDURE — 94799 UNLISTED PULMONARY SVC/PX: CPT

## 2024-03-23 PROCEDURE — 82728 ASSAY OF FERRITIN: CPT | Performed by: INTERNAL MEDICINE

## 2024-03-23 PROCEDURE — 84484 ASSAY OF TROPONIN QUANT: CPT | Performed by: STUDENT IN AN ORGANIZED HEALTH CARE EDUCATION/TRAINING PROGRAM

## 2024-03-23 RX ORDER — POTASSIUM CHLORIDE 20 MEQ/1
20 TABLET, EXTENDED RELEASE ORAL NIGHTLY
Status: DISCONTINUED | OUTPATIENT
Start: 2024-03-23 | End: 2024-03-24 | Stop reason: HOSPADM

## 2024-03-23 RX ORDER — SODIUM CHLORIDE 0.9 % (FLUSH) 0.9 %
10 SYRINGE (ML) INJECTION
Status: DISCONTINUED | OUTPATIENT
Start: 2024-03-23 | End: 2024-03-24 | Stop reason: HOSPADM

## 2024-03-23 RX ORDER — ALUMINUM HYDROXIDE, MAGNESIUM HYDROXIDE, AND SIMETHICONE 1200; 120; 1200 MG/30ML; MG/30ML; MG/30ML
30 SUSPENSION ORAL 4 TIMES DAILY PRN
Status: DISCONTINUED | OUTPATIENT
Start: 2024-03-23 | End: 2024-03-24 | Stop reason: HOSPADM

## 2024-03-23 RX ORDER — DILTIAZEM HYDROCHLORIDE 180 MG/1
180 CAPSULE, COATED, EXTENDED RELEASE ORAL DAILY
Status: DISCONTINUED | OUTPATIENT
Start: 2024-03-23 | End: 2024-03-24 | Stop reason: HOSPADM

## 2024-03-23 RX ORDER — SPIRONOLACTONE 25 MG/1
25 TABLET ORAL 2 TIMES DAILY
Status: DISCONTINUED | OUTPATIENT
Start: 2024-03-23 | End: 2024-03-24 | Stop reason: HOSPADM

## 2024-03-23 RX ORDER — FUROSEMIDE 10 MG/ML
80 INJECTION INTRAMUSCULAR; INTRAVENOUS 2 TIMES DAILY
Status: DISCONTINUED | OUTPATIENT
Start: 2024-03-23 | End: 2024-03-24 | Stop reason: HOSPADM

## 2024-03-23 RX ORDER — LANOLIN ALCOHOL/MO/W.PET/CERES
400 CREAM (GRAM) TOPICAL 2 TIMES DAILY
Status: DISCONTINUED | OUTPATIENT
Start: 2024-03-23 | End: 2024-03-24 | Stop reason: HOSPADM

## 2024-03-23 RX ORDER — TALC
6 POWDER (GRAM) TOPICAL NIGHTLY PRN
Status: DISCONTINUED | OUTPATIENT
Start: 2024-03-23 | End: 2024-03-24 | Stop reason: HOSPADM

## 2024-03-23 RX ORDER — GLUCAGON 1 MG
1 KIT INJECTION
Status: DISCONTINUED | OUTPATIENT
Start: 2024-03-23 | End: 2024-03-24 | Stop reason: HOSPADM

## 2024-03-23 RX ORDER — AMOXICILLIN 250 MG
1 CAPSULE ORAL 2 TIMES DAILY
Status: DISCONTINUED | OUTPATIENT
Start: 2024-03-23 | End: 2024-03-24 | Stop reason: HOSPADM

## 2024-03-23 RX ORDER — SODIUM,POTASSIUM PHOSPHATES 280-250MG
2 POWDER IN PACKET (EA) ORAL
Status: DISCONTINUED | OUTPATIENT
Start: 2024-03-23 | End: 2024-03-24 | Stop reason: HOSPADM

## 2024-03-23 RX ORDER — IPRATROPIUM BROMIDE AND ALBUTEROL SULFATE 2.5; .5 MG/3ML; MG/3ML
3 SOLUTION RESPIRATORY (INHALATION)
Status: COMPLETED | OUTPATIENT
Start: 2024-03-23 | End: 2024-03-23

## 2024-03-23 RX ORDER — NALOXONE HCL 0.4 MG/ML
0.02 VIAL (ML) INJECTION
Status: DISCONTINUED | OUTPATIENT
Start: 2024-03-23 | End: 2024-03-24 | Stop reason: HOSPADM

## 2024-03-23 RX ORDER — LANOLIN ALCOHOL/MO/W.PET/CERES
800 CREAM (GRAM) TOPICAL
Status: DISCONTINUED | OUTPATIENT
Start: 2024-03-23 | End: 2024-03-24 | Stop reason: HOSPADM

## 2024-03-23 RX ORDER — HYDROCODONE BITARTRATE AND ACETAMINOPHEN 5; 325 MG/1; MG/1
1 TABLET ORAL EVERY 6 HOURS PRN
Status: DISCONTINUED | OUTPATIENT
Start: 2024-03-23 | End: 2024-03-24 | Stop reason: HOSPADM

## 2024-03-23 RX ORDER — FAMOTIDINE 10 MG/ML
20 INJECTION INTRAVENOUS 2 TIMES DAILY
Status: DISCONTINUED | OUTPATIENT
Start: 2024-03-23 | End: 2024-03-24 | Stop reason: HOSPADM

## 2024-03-23 RX ORDER — NAPROXEN SODIUM 220 MG/1
81 TABLET, FILM COATED ORAL DAILY
Status: DISCONTINUED | OUTPATIENT
Start: 2024-03-23 | End: 2024-03-24 | Stop reason: HOSPADM

## 2024-03-23 RX ORDER — ACETAMINOPHEN 325 MG/1
650 TABLET ORAL EVERY 8 HOURS PRN
Status: DISCONTINUED | OUTPATIENT
Start: 2024-03-23 | End: 2024-03-24 | Stop reason: HOSPADM

## 2024-03-23 RX ORDER — ACETAMINOPHEN 325 MG/1
650 TABLET ORAL EVERY 4 HOURS PRN
Status: DISCONTINUED | OUTPATIENT
Start: 2024-03-23 | End: 2024-03-24 | Stop reason: HOSPADM

## 2024-03-23 RX ORDER — SODIUM CHLORIDE 0.9 % (FLUSH) 0.9 %
3 SYRINGE (ML) INJECTION EVERY 12 HOURS PRN
Status: DISCONTINUED | OUTPATIENT
Start: 2024-03-23 | End: 2024-03-24 | Stop reason: HOSPADM

## 2024-03-23 RX ORDER — IBUPROFEN 200 MG
24 TABLET ORAL
Status: DISCONTINUED | OUTPATIENT
Start: 2024-03-23 | End: 2024-03-24 | Stop reason: HOSPADM

## 2024-03-23 RX ORDER — IPRATROPIUM BROMIDE AND ALBUTEROL SULFATE 2.5; .5 MG/3ML; MG/3ML
3 SOLUTION RESPIRATORY (INHALATION) EVERY 6 HOURS
Status: DISCONTINUED | OUTPATIENT
Start: 2024-03-23 | End: 2024-03-24 | Stop reason: HOSPADM

## 2024-03-23 RX ORDER — ONDANSETRON HYDROCHLORIDE 2 MG/ML
4 INJECTION, SOLUTION INTRAVENOUS EVERY 8 HOURS PRN
Status: DISCONTINUED | OUTPATIENT
Start: 2024-03-23 | End: 2024-03-24 | Stop reason: HOSPADM

## 2024-03-23 RX ORDER — DULOXETIN HYDROCHLORIDE 30 MG/1
60 CAPSULE, DELAYED RELEASE ORAL NIGHTLY
Status: DISCONTINUED | OUTPATIENT
Start: 2024-03-23 | End: 2024-03-24 | Stop reason: HOSPADM

## 2024-03-23 RX ORDER — TAMSULOSIN HYDROCHLORIDE 0.4 MG/1
0.4 CAPSULE ORAL DAILY
Status: DISCONTINUED | OUTPATIENT
Start: 2024-03-23 | End: 2024-03-24 | Stop reason: HOSPADM

## 2024-03-23 RX ORDER — GUAIFENESIN 600 MG/1
1200 TABLET, EXTENDED RELEASE ORAL 2 TIMES DAILY
Status: DISCONTINUED | OUTPATIENT
Start: 2024-03-23 | End: 2024-03-24 | Stop reason: HOSPADM

## 2024-03-23 RX ORDER — IBUPROFEN 200 MG
16 TABLET ORAL
Status: DISCONTINUED | OUTPATIENT
Start: 2024-03-23 | End: 2024-03-24 | Stop reason: HOSPADM

## 2024-03-23 RX ORDER — LEVOTHYROXINE SODIUM 25 UG/1
50 TABLET ORAL
Status: DISCONTINUED | OUTPATIENT
Start: 2024-03-24 | End: 2024-03-24 | Stop reason: HOSPADM

## 2024-03-23 RX ORDER — PREDNISONE 20 MG/1
40 TABLET ORAL
Status: COMPLETED | OUTPATIENT
Start: 2024-03-23 | End: 2024-03-23

## 2024-03-23 RX ORDER — METOPROLOL TARTRATE 25 MG/1
25 TABLET, FILM COATED ORAL 2 TIMES DAILY
Status: DISCONTINUED | OUTPATIENT
Start: 2024-03-23 | End: 2024-03-24 | Stop reason: HOSPADM

## 2024-03-23 RX ORDER — IPRATROPIUM BROMIDE AND ALBUTEROL SULFATE 2.5; .5 MG/3ML; MG/3ML
3 SOLUTION RESPIRATORY (INHALATION) 4 TIMES DAILY
Status: DISCONTINUED | OUTPATIENT
Start: 2024-03-23 | End: 2024-03-23

## 2024-03-23 RX ORDER — FUROSEMIDE 10 MG/ML
40 INJECTION INTRAMUSCULAR; INTRAVENOUS
Status: COMPLETED | OUTPATIENT
Start: 2024-03-23 | End: 2024-03-23

## 2024-03-23 RX ADMIN — FUROSEMIDE 80 MG: 10 INJECTION, SOLUTION INTRAVENOUS at 04:03

## 2024-03-23 RX ADMIN — TAMSULOSIN HYDROCHLORIDE 0.4 MG: 0.4 CAPSULE ORAL at 04:03

## 2024-03-23 RX ADMIN — SPIRONOLACTONE 25 MG: 25 TABLET ORAL at 08:03

## 2024-03-23 RX ADMIN — METOPROLOL TARTRATE 25 MG: 25 TABLET, FILM COATED ORAL at 08:03

## 2024-03-23 RX ADMIN — IPRATROPIUM BROMIDE AND ALBUTEROL SULFATE 3 ML: 2.5; .5 SOLUTION RESPIRATORY (INHALATION) at 01:03

## 2024-03-23 RX ADMIN — GUAIFENESIN 1200 MG: 600 TABLET, EXTENDED RELEASE ORAL at 08:03

## 2024-03-23 RX ADMIN — IPRATROPIUM BROMIDE AND ALBUTEROL SULFATE 3 ML: 2.5; .5 SOLUTION RESPIRATORY (INHALATION) at 09:03

## 2024-03-23 RX ADMIN — PREDNISONE 40 MG: 20 TABLET ORAL at 03:03

## 2024-03-23 RX ADMIN — FAMOTIDINE 20 MG: 10 INJECTION, SOLUTION INTRAVENOUS at 10:03

## 2024-03-23 RX ADMIN — FUROSEMIDE 80 MG: 10 INJECTION, SOLUTION INTRAVENOUS at 10:03

## 2024-03-23 RX ADMIN — IPRATROPIUM BROMIDE AND ALBUTEROL SULFATE 3 ML: .5; 3 SOLUTION RESPIRATORY (INHALATION) at 12:03

## 2024-03-23 RX ADMIN — Medication 400 MG: at 08:03

## 2024-03-23 RX ADMIN — IPRATROPIUM BROMIDE AND ALBUTEROL SULFATE 3 ML: 2.5; .5 SOLUTION RESPIRATORY (INHALATION) at 07:03

## 2024-03-23 RX ADMIN — Medication 400 MG: at 10:03

## 2024-03-23 RX ADMIN — AZITHROMYCIN MONOHYDRATE 500 MG: 500 INJECTION, POWDER, LYOPHILIZED, FOR SOLUTION INTRAVENOUS at 03:03

## 2024-03-23 RX ADMIN — FAMOTIDINE 20 MG: 10 INJECTION, SOLUTION INTRAVENOUS at 08:03

## 2024-03-23 RX ADMIN — FUROSEMIDE 40 MG: 10 INJECTION, SOLUTION INTRAMUSCULAR; INTRAVENOUS at 02:03

## 2024-03-23 RX ADMIN — SENNOSIDES AND DOCUSATE SODIUM 1 TABLET: 8.6; 5 TABLET ORAL at 08:03

## 2024-03-23 RX ADMIN — DILTIAZEM HYDROCHLORIDE 180 MG: 180 CAPSULE, COATED, EXTENDED RELEASE ORAL at 04:03

## 2024-03-23 RX ADMIN — POTASSIUM CHLORIDE 20 MEQ: 1500 TABLET, EXTENDED RELEASE ORAL at 08:03

## 2024-03-23 RX ADMIN — SENNOSIDES AND DOCUSATE SODIUM 1 TABLET: 8.6; 5 TABLET ORAL at 10:03

## 2024-03-23 RX ADMIN — ASPIRIN 81 MG 81 MG: 81 TABLET ORAL at 04:03

## 2024-03-23 RX ADMIN — DULOXETINE HYDROCHLORIDE 60 MG: 30 CAPSULE, DELAYED RELEASE ORAL at 08:03

## 2024-03-23 RX ADMIN — GABAPENTIN 800 MG: 300 CAPSULE ORAL at 08:03

## 2024-03-23 NOTE — NURSING
Nurses Note -- 4 Eyes      3/23/2024   12:47 PM      Skin assessed during: Admit      [x] No Altered Skin Integrity Present    []Prevention Measures Documented      [] Yes- Altered Skin Integrity Present or Discovered   [] LDA Added if Not in Epic (Describe Wound)   [] New Altered Skin Integrity was Present on Admit and Documented in LDA   [] Wound Image Taken    Wound Care Consulted? No    Attending Nurse:  SINCERE Vizcarra    Second RN/Staff Member:   ELAYNE Arredondo

## 2024-03-23 NOTE — PROGRESS NOTES
No acute events overnight.  Patient is on 4 L nasal cannula.  States he only uses oxygen at night typically.  We will continue diuresis.  We will order limited echo and reassess.    Virgilio Crockett MD

## 2024-03-23 NOTE — CARE UPDATE
03/23/24 1336   Patient Assessment/Suction   Level of Consciousness (AVPU) alert   Respiratory Effort Unlabored   Expansion/Accessory Muscles/Retractions no use of accessory muscles   All Lung Fields Breath Sounds coarse   Rhythm/Pattern, Respiratory pattern regular   Cough Frequency infrequent   Cough Type congested;nonproductive   PRE-TX-O2   Device (Oxygen Therapy) nasal cannula   $ Is the patient on Low Flow Oxygen? Yes   Flow (L/min) 3   SpO2 (!) 93 %   Pulse Oximetry Type Intermittent   $ Pulse Oximetry - Multiple Charge Pulse Oximetry - Multiple   Pulse 90   Resp 16   Aerosol Therapy   $ Aerosol Therapy Charges Aerosol Treatment   Daily Review of Necessity (SVN) completed   Respiratory Treatment Status (SVN) given   Treatment Route (SVN) mask   Patient Position (SVN) semi-Eaton's   Post Treatment Assessment (SVN) increased aeration   Signs of Intolerance (SVN) none   Breath Sounds Post-Respiratory Treatment   Throughout All Fields Post-Treatment aeration increased   Post-treatment Heart Rate (beats/min) 91   Post-treatment Resp Rate (breaths/min) 15   Education   $ Education Bronchodilator;15 min

## 2024-03-23 NOTE — HPI
78 WM with pmh of CAD and CABG x2  repeat in 2024 ,  HTN,   COPD on oxygen at night,  ex smoker .and recently ex drinker.   Afib not on blood thinners now.   He has hx of TAVR and Watchman procedure.         He lives at home with wife     He quit drinking when he had his second cabg few weeks ago .   He went into withdrawal during that hospital stay .     He started drinking in  when his wife  of cancer.         After his Recent CABG 23 he went to rehab facility which is where he still is.    They noticed that his Room air oxygen sats have been low and they were unable to get them up so they sent him to the hospital and he requested to go back to [facility] .      I pasted the Hospital course summary below since so much had happened recently .      He .had elective post op CABG was done and  admitted to ICU.   Extubated to BiPAP, developed encephalopathy secondary to DT and needed precedex.    Postop course was complicated with DT and also atrial fibrillation and heart failure and possible abdominal source of infection and treated and completed Zosyn.   Chest and mediastinal tubes and pacer wires and Balloon pump was removed .  Patient had brief period of possible paralytic ileus with bowel obstruction but patient had bowel movement and which has resolved.   Patient has history of  TIANA, on BiPAP q.h.s.while  sleeping and continue on high-flow nasal cannula 6 L and saturating 96% and later able to wean down to 3 L which is  his baseline   He needed Cardizem drip for AFib with RVR but currently getting controlled with p.o. Cardizem.  Later patient dyspnea improved and cardiology changed Lasix to torsemide and continue Aldactone and patient was placed the nursing home and follow up with Cardiology and Cardiothoracic surgery in 1 week.  Cardiology and Pulmonary did not recommend anticoagulation at this time       So in our ER, he was needing several liters of oxygen to keep his sats above  90%    But he was in no distress  When I saw him he was sleeping and was not tachypnic    sats in low 90s    He was confused at first when I woke him up . But then he was able to answer my questions .     He denies weight gain.  Denies swelling in legs or belly.       Denies trouble laying flat.  I even laid him flat in the bed and he was fine.       He has wheezing and cough but that's chronic      He said he noticed nothing wrong with his breathing       The CXR in ER does look like CHF   His sodium was low   Wbc normal   Anemia with low MCV  Troponin 18  And BNP was up some also       He was given 40 mg IV lasix     And we are admitting him for Hypoxemic respiratory failure

## 2024-03-23 NOTE — ASSESSMENT & PLAN NOTE
MCV Low  I no work up in labs history here       Will check Iron , TIBC,  ferritin , b12, folate , retic

## 2024-03-23 NOTE — PLAN OF CARE
03/23/24 1400   DAI Message   Medicare Outpatient and Observation Notification regarding financial responsibility Given to patient/caregiver;Explained to patient/caregiver;Signed/date by patient/caregiver   Date DAI was signed 03/23/24   Time DAI was signed 1400

## 2024-03-23 NOTE — PLAN OF CARE
[facility]  Initial Discharge Assessment       Primary Care Provider: Sunita Rivera MD    Admission Diagnosis: COPD (chronic obstructive pulmonary disease) [J44.9]    Admission Date: 3/22/2024  Expected Discharge Date:     Presented at pt's bedside to complete discharge assessment. Pt AAOx4s. Demographics, PCP, Pharmacy and insurance verified. No HH. No dialysis, No Coumadin therapy. Pt reports ability to complete ADLs with the assistance of a rolling walker, oxygen and nebulizer. Pt verbalized plan to discharge home via family transport. Pt has no other needs to be addressed at this time.        Transition of Care Barriers: None    Payor: MEDICARE / Plan: MEDICARE PART A & B / Product Type: Government /     Extended Emergency Contact Information  Primary Emergency Contact: Berenice Hanley  Mobile Phone: [phone]  Relation: Spouse  Secondary Emergency Contact: Lisy Garcia  Address: [address]  Home Phone: [phone]  Work Phone: [phone]  Mobile Phone: [phone]  Relation: Relative    Discharge Plan A: Home with family  Discharge Plan B: Home with family      [facility] [address]  [address] [facility] [address]  Phone: [phone] Fax: [phone]    [place] [id number]  Phone: [phone] Fax: [phone]      Initial Assessment (most recent)       Adult Discharge Assessment - 03/23/24 1417          Discharge Assessment    Assessment Type Discharge Planning Assessment     Confirmed/corrected address, phone number and insurance Yes     Confirmed Demographics Correct on Facesheet     Source of Information patient;health record     Reason For Admission Acute hypoxemic respiratory failure     People in Home spouse     Facility Arrived From: Home     Do you expect to return to your  current living situation? Yes     Do you have help at home or someone to help you manage your care at home? Yes     Who are your caregiver(s) and their phone number(s)? Berenice Hanley Spouse   [phone]           2     Lisy Garcia Relative         [phone]     Prior to hospitilization cognitive status: Alert/Oriented     Current cognitive status: Alert/Oriented     Walking or Climbing Stairs Difficulty yes     Walking or Climbing Stairs ambulation difficulty, requires equipment     Mobility Management rolling walker     Dressing/Bathing Difficulty no     Home Accessibility wheelchair accessible     Home Layout Able to live on 1st floor     Equipment Currently Used at Home walker, rolling;oxygen;nebulizer     Readmission within 30 days? No     Patient currently being followed by outpatient case management? No     Do you currently have service(s) that help you manage your care at home? No     Do you take prescription medications? Yes     Do you have prescription coverage? Yes     Coverage Medicare and [employer]     Do you have any problems affording any of your prescribed medications? No     Is the patient taking medications as prescribed? yes     Who is going to help you get home at discharge? Berenice Hanley Spouse [phone] 2 Lisy Garcia Relative [phone]     How do you get to doctors appointments? car, drives self     Are you on dialysis? No     Do you take coumadin? No     Discharge Plan A Home with family     Discharge Plan B Home with family     DME Needed Upon Discharge  none     Discharge Plan discussed with: Patient;Spouse/sig other     Name(s) and Number(s) Berenice Hanley Spouse [phone]     Transition of Care Barriers None

## 2024-03-23 NOTE — ASSESSMENT & PLAN NOTE
Likely a combination of his baseline COPD and new CHF     However he really didn't know he was hypoxemic he says   he has no typical symptoms of CHF or COPD exacerbation.       His CXR does fit   Low sodium fits  He  admits to urinating less lately     I looked at him with bedside ultra sound and his IJ vessels and IVC are all large with no respiratory variation   We checked his weight and he says his usual weight  actually less.         So I think its worth trying some more diuresis and see what happens.         PLAN     IV lasix 80 mg BID   morning and afternoon   Continue aldactone  Give mag ox po BID  Continuous pulse ox   Telemetry bed  Daily weights and strict I's and O's   Control BP / afterload     Try some Nebs for his COPD component also.   No steroids at this time   Mucinex 1200 mg po BID

## 2024-03-23 NOTE — RESPIRATORY THERAPY
03/23/24 0018   Patient Assessment/Suction   Level of Consciousness (AVPU) alert   Respiratory Effort Short of breath   All Lung Fields Breath Sounds Anterior:;coarse;diminished   Rhythm/Pattern, Respiratory tachypneic;shortness of breath   Cough Frequency infrequent   PRE-TX-O2   Device (Oxygen Therapy) nasal cannula   Flow (L/min) 4   SpO2 (!) 92 %   Pulse Oximetry Type Continuous   $ Pulse Oximetry - Multiple Charge Pulse Oximetry - Multiple   Pulse 77   Resp (!) 23   Aerosol Therapy   $ Aerosol Therapy Charges Aerosol Treatment   Daily Review of Necessity (SVN) completed   Respiratory Treatment Status (SVN) given   Treatment Route (SVN) mask;oxygen   Patient Position (SVN) HOB elevated   Post Treatment Assessment (SVN) increased aeration   Signs of Intolerance (SVN) none   Breath Sounds Post-Respiratory Treatment   Post-treatment Heart Rate (beats/min) 79   Post-treatment Resp Rate (breaths/min) 23   Education   $ Education Bronchodilator;DME Nebulizer;DME Oxygen;15 min   Tobacco Cessation Intervention   Do you use any type of tobacco product? No   Are you interested in quitting use of tobacco products?   (former smoker)   $ Smoking Cessation Charges Smoking Cessation - Intermediate (Non-CTTS)   Respiratory Evaluation   $ Care Plan Tech Time 15 min   $ Eval/Re-eval Charges Evaluation   Evaluation For New Orders   Cardiac Diagnosis CAD   Pulmonary Diagnosis copd   Current Surgeries cabg   Home Oxygen   Has Home Oxygen? Yes   Liter Flow 3   Duration with sleep   Route nasal cannula   Mode continuous   Device home concentrator   Home Aerosol, MDI, DPI, and Other Treatments/Therapies   Home Respiratory Therapy Per Patient/Review of Chart Yes   Aerosol Home Meds/Freq abluterol prn   MDI Home Meds/Freq albuterol prn   Oxygen Care Plan   Oxygen Care Plan Per Protocol   SPO2 Goal (%) 92% non-cardiac   Rationale Maintain Home oxygen   Bronchodilator Care Plan   Bronchodilator Care Plan Aerosol   Rationale Maintain  home respiratory medicine;Shortness of breath (increased WOB)   Atelectasis Care Plan   Rationale No Rational Found   Airway Clearance Care Plan   Rationale No rationale found

## 2024-03-23 NOTE — PHARMACY MED REC
"              .        Admission Medication History     The home medication history was taken by Lisy Dykes.    You may go to "Admission" then "Reconcile Home Medications" tabs to review and/or act upon these items.     The home medication list has been updated by the Pharmacy department.   Please read ALL comments highlighted in yellow.   Please address this information as you see fit.    Feel free to contact us if you have any questions or require assistance.      Medications listed below were obtained from: Patient/family and Analytic software- [employer]  Current Facility-Administered Medications on File Prior to Encounter   Medication Dose Route Frequency Provider Last Rate Last Admin    [DISCONTINUED] GENERIC EXTERNAL MEDICATION     Provider, Generic External Data        [DISCONTINUED] GENERIC EXTERNAL MEDICATION     Provider, Generic External Data        [DISCONTINUED] levoFLOXacin tablet  500 mg Oral  Provider, Generic External Data         Current Outpatient Medications on File Prior to Encounter   Medication Sig Dispense Refill    acetaminophen (TYLENOL) 325 MG tablet Take 650 mg by mouth every 6 (six) hours as needed for Pain or Temperature greater than.      albuterol (PROVENTIL/VENTOLIN HFA) 90 mcg/actuation inhaler Inhale 2 puffs into the lungs every 6 (six) hours as needed for Wheezing or Shortness of Breath.      betamethasone valerate 0.1% (VALISONE) 0.1 % Lotn Apply 1 g topically 2 (two) times daily as needed.      cyclobenzaprine (FLEXERIL) 10 MG tablet Take 10 mg by mouth every 8 (eight) hours as needed for Muscle spasms.      diltiaZEM (CARDIZEM CD) 180 MG 24 hr capsule Take 1 capsule (180 mg total) by mouth once daily. 90 capsule 0    doxepin (SINEQUAN) 50 MG capsule Take 50 mg by mouth every evening.      DULoxetine (CYMBALTA) 60 MG capsule Take 60 mg by mouth nightly.      EPINEPHrine (EPIPEN) 0.3 mg/0.3 mL AtIn Inject 0.3 mg into the muscle once.      gabapentin (NEURONTIN) 300 MG capsule " Take 300 mg by mouth 2 (two) times daily.      guaiFENesin (MUCINEX) 600 mg 12 hr tablet Take 2 tablets (1,200 mg total) by mouth 2 (two) times daily. 30 tablet 0    [START ON 4/7/2024] HYDROcodone-acetaminophen (NORCO)  mg per tablet Take 1 tablet by mouth every 12 (twelve) hours as needed for Pain.      levothyroxine (SYNTHROID) 50 MCG tablet Take 50 mcg by mouth before breakfast.      metoprolol tartrate (LOPRESSOR) 25 MG tablet Take 1 tablet (25 mg total) by mouth 2 (two) times daily. 120 tablet 0    montelukast (SINGULAIR) 10 mg tablet Take 10 mg by mouth once daily.      nitroGLYCERIN (NITROSTAT) 0.4 MG SL tablet Place 0.4 mg under the tongue every 5 (five) minutes as needed for Chest pain.      omega-3 fatty acids/fish oil (FISH OIL-OMEGA-3 FATTY ACIDS) 300-1,000 mg capsule Take 1 capsule by mouth 2 (two) times daily.      omeprazole (PRILOSEC) 20 MG capsule Take 40 mg by mouth once daily.      potassium chloride SA (K-DUR,KLOR-CON) 20 MEQ tablet Take 20 mEq by mouth nightly.      senna-docusate 8.6-50 mg (PERICOLACE) 8.6-50 mg per tablet Take 1 tablet by mouth nightly.      spironolactone (ALDACTONE) 25 MG tablet Take 1 tablet (25 mg total) by mouth 2 (two) times daily. (Patient taking differently: Take 25 mg by mouth once daily.) 120 tablet 0    tamsulosin (FLOMAX) 0.4 mg Cap Take 0.4 mg by mouth once daily.      torsemide (DEMADEX) 20 MG Tab Take 2 tablets (40 mg total) by mouth once daily. 120 tablet 0    aspirin 81 MG Chew Take 81 mg by mouth once daily.      ezetimibe (ZETIA) 10 mg tablet Take 1 tablet (10 mg total) by mouth every evening. (Patient not taking: Reported on 3/23/2024) 90 tablet 0    [DISCONTINUED] cyanocobalamin 500 MCG tablet 500 mcg.         Potential issues to be addressed PRIOR TO DISCHARGE  Patient reported not taking the following medications: (Aspirin & Zetia). These medications remain on the home medication list. Please address accordingly.     Lisy Dykes  EXT  [phone]

## 2024-03-23 NOTE — ED PROVIDER NOTES
Encounter Date: 3/22/2024       History     Chief Complaint   Patient presents with    Shortness of Breath     From rehab center in [facility].  Had recent bipass surgery with Dr. Thomas in feb 23. Wears 3 liters o2 at home for nighttime     HPI      Patient is a 78-year-old male with history of COPD on home O2, CAD status post CABG in February presenting from rehab facility with concerns for hypoxia.  Patient has been at this rehab facility since March 4th and states he has been doing well.  Denies any new onset cough, shortness of breath, chest pain, or any other concerns.  He states the nurse there was having trouble getting a good pulse ox reading and became concerned prompting them to call an ambulance and sent him here for further evaluation.  Patient reports adherence to his home medications.  Denies any increasing oxygen requirement or increased work of breathing.  States he currently feels at his baseline other than the lack of mobility he has had since the CABG.    Review of patient's allergies indicates:   Allergen Reactions    Adhes. band-tape-benzalkonium Rash     Pt stated it caused blisters    Statins-hmg-coa reductase inhibitors Other (See Comments)     Statin Intolerance (joint pain)     Past Medical History:   Diagnosis Date    A-fib     Allergies 2020    gets allergy shots    Arthritis 1973    right knee    Back pain     COPD (chronic obstructive pulmonary disease)     Diastolic heart failure     GERD (gastroesophageal reflux disease) 2015    HLD (hyperlipidemia)     HTN (hypertension)     Hx of LASIK     Hypothyroidism, unspecified 2012    Inflammatory disease of prostate, unspecified     out of medicine    Mild intermittent asthma, uncomplicated     Myocardial infarction     Neuropathy     On home oxygen therapy     3L NC  at night    PAD (peripheral artery disease)     Staph skin infection 2016    right thigh    TIA (transient ischemic attack) 2013     Past Surgical History:   Procedure  Laterality Date    AORTOGRAPHY WITH SERIALOGRAPHY N/A 08/31/2023    Procedure: AORTOGRAM, WITH SERIALOGRAPHY;  Surgeon: Gary Thoams MD;  Location: [facility] CATH/EP LAB;  Service: Peripheral Vascular;  Laterality: N/A;    CATARACT EXTRACTION Bilateral 2014    CORONARY ANGIOGRAPHY INCLUDING BYPASS GRAFTS WITH CATHETERIZATION OF LEFT HEART Left 01/12/2024    Procedure: ANGIOGRAM, CORONARY, INCLUDING BYPASS GRAFT, WITH LEFT HEART CATHETERIZATION;  Surgeon: Gregg Bush MD;  Location: [facility] CATH/EP LAB;  Service: Cardiology;  Laterality: Left;    CORONARY ARTERY BYPASS GRAFT  2010    ENDOSCOPIC HARVEST OF VEIN Right 2/23/2024    Procedure: HARVEST-VEIN-ENDOVASCULAR;  Surgeon: Gary Thomas MD;  Location: [facility];  Service: Cardiothoracic;  Laterality: Right;    INSERTION OF IMPLANTABLE LOOP RECORDER  2011    INSERTION OF INTRA-AORTIC BALLOON ASSIST DEVICE Right 2/23/2024    Procedure: INSERTION, INTRA-AORTIC BALLOON PUMP;  Surgeon: Gary Thomas MD;  Location: [facility];  Service: Cardiothoracic;  Laterality: Right;    INSERTION OF PACEMAKER  2021    does not have card with him for preadmit    INSTANTANEOUS WAVE-FREE RATIO (IFR) N/A 01/12/2024    Procedure: Instantaneous Wave-Free Ratio (IFR);  Surgeon: Gregg Bush MD;  Location: [facility] CATH/EP LAB;  Service: Cardiology;  Laterality: N/A;    PTA, ANTERIOR TIBIAL Left 08/31/2023    Procedure: PTA, Anterior Tibial;  Surgeon: Gary Thomas MD;  Location: [facility] CATH/EP LAB;  Service: Peripheral Vascular;  Laterality: Left;    PTA, SUPERFICIAL FEMORAL ARTERY Left 08/31/2023    Procedure: PTA, Superficial Femoral Artery;  Surgeon: Gary Thomas MD;  Location: [facility] CATH/EP LAB;  Service: Peripheral Vascular;  Laterality: Left;    REPEAT CORONARY ARTERY BYPASS GRAFTING N/A 2/23/2024    Procedure: CABG, REPEAT;  Surgeon: Gary Thomas MD;  Location: [facility] OR;  Service: Cardiothoracic;  Laterality: N/A;    SPLENECTOMY  2016     STENT, ANTERIOR TIBIAL Left 2023    Procedure: Stent, Anterior Tibial;  Surgeon: Gary Thomas MD;  Location: [facility] CATH/EP LAB;  Service: Peripheral Vascular;  Laterality: Left;    SURGICAL PROCUREMENT, ARTERY, RADIAL, FOR CABG  2024    Procedure: SURGICAL PROCUREMENT,ARTERY,RADIAL,FOR CABG;  Surgeon: Gary Thomas MD;  Location: [facility] OR;  Service: Cardiothoracic;;    watchman heart device  2019     History reviewed. No pertinent family history.  Social History     Tobacco Use    Smoking status: Former     Current packs/day: 0.00     Types: Cigarettes     Quit date: 1987     Years since quittin.3    Smokeless tobacco: Never   Substance Use Topics    Alcohol use: Not Currently     Alcohol/week: 21.0 standard drinks of alcohol     Types: 21 Drinks containing 0.5 oz of alcohol per week     Comment: ed roberto mixed drinks    Drug use: No     Review of Systems   Constitutional:  Negative for activity change, appetite change, chills, fatigue and fever.   HENT:  Negative for congestion and rhinorrhea.    Respiratory:  Cough: Chronic cough. Shortness of breath: baseline chronic shortness of breath.    Cardiovascular:  Negative for chest pain and palpitations.   Gastrointestinal:  Negative for abdominal distention, abdominal pain, nausea and vomiting.   Skin:  Negative for rash and wound.   Psychiatric/Behavioral:  Negative for confusion.        Physical Exam     Initial Vitals [24 2333]   BP Pulse Resp Temp SpO2   (!) 106/52 86 (!) 24 98.9 °F (37.2 °C) 95 %      MAP       --         Physical Exam    Constitutional: He appears well-developed and well-nourished.   HENT:   Head: Normocephalic and atraumatic.   Cardiovascular:  Normal rate.            Surgical scars from CABG are clean, dry, no drainage   Pulmonary/Chest:    Bilateral rhonchi are present.  Bilateral expiratory wheezing are noted.  No increased work of breathing.  Currently on home 3 L nasal cannula with O2 sat of 93%    Abdominal: Abdomen is soft.   Musculoskeletal:         General: No tenderness or edema. Normal range of motion.     Neurological: He is alert.   Skin: Skin is warm and dry. Capillary refill takes less than 2 seconds. No rash noted.         ED Course   Procedures  Labs Reviewed   CBC W/ AUTO DIFFERENTIAL - Abnormal; Notable for the following components:       Result Value    RBC 4.18 (*)     Hemoglobin 9.1 (*)     Hematocrit 31.1 (*)     MCV 74 (*)     MCH 21.8 (*)     MCHC 29.3 (*)     RDW 19.7 (*)     Mono # 1.3 (*)     All other components within normal limits   COMPREHENSIVE METABOLIC PANEL   B-TYPE NATRIURETIC PEPTIDE   TROPONIN I HIGH SENSITIVITY     EKG Readings: (Independently Interpreted)    EKG interpreted by me with Afib, rate 83, right bundle-branch block, no significant ST elevation       Imaging Results              X-Ray Chest AP Portable (In process)                      Medications   albuterol-ipratropium 2.5 mg-0.5 mg/3 mL nebulizer solution 3 mL (3 mLs Nebulization Given 3/23/24 0022)     Medical Decision Making   78-year-old male with history of COPD on home O2, CAD status post CABG in February, diastolic heart failure presenting with concern for hypoxia at rehab facility. Vital signs are stable.  Patient O2 sat 93% on 3.5 L.  On exam patient has bilateral rhonchi and expiratory wheezing which he states is chronic.  No unilateral leg swelling.   We will check basic labs, BNP, and trop.EKG is similar to prior.  Chest x-ray  with evidence of suspected bilateral pleural effusions with airspace disease in lower lung fields. Patient given duo-neb, prednisone, Azithro,  and 40 IV lasix. Will admit for further management including repeat PT/OT assessment and monitoring of O2 status.     Bubba Price MD  Emergency Medicine      Amount and/or Complexity of Data Reviewed  Labs: ordered. Decision-making details documented in ED Course.  Radiology: ordered.    Risk  Prescription drug management.    .            ED Course as of 03/23/24 0300   Sat Mar 23, 2024   0154 Troponin I High Sensitivity [KH]   0229 BNP(!): 386 [KH]   0231 Troponin I High Sensitivity(!): 18.3 [KH]      ED Course User Index  [KH] Bubba Price MD                           Clinical Impression:  Final diagnoses:  [J44.9] COPD (chronic obstructive pulmonary disease)                 Bubba Price MD  03/23/24 0304

## 2024-03-23 NOTE — CARE UPDATE
03/23/24 0756   Patient Assessment/Suction   Level of Consciousness (AVPU) alert   Respiratory Effort Short of breath   Expansion/Accessory Muscles/Retractions expansion asymmetric   All Lung Fields Breath Sounds coarse;diminished   Rhythm/Pattern, Respiratory unlabored;shortness of breath   Cough Frequency infrequent   Cough Type congested;nonproductive   Skin Integrity   $ Wound Care Tech Time 15 min   Area Observed Left;Right;Behind ear;Cheek;Upper lip;Nares   Skin Appearance without discoloration   PRE-TX-O2   Device (Oxygen Therapy) nasal cannula   $ Is the patient on Low Flow Oxygen? Yes   Flow (L/min) 4   SpO2 (!) 93 %   Pulse Oximetry Type Continuous   $ Pulse Oximetry - Multiple Charge Pulse Oximetry - Multiple   Pulse 88   Resp 18   Aerosol Therapy   $ Aerosol Therapy Charges Aerosol Treatment   Daily Review of Necessity (SVN) completed   Respiratory Treatment Status (SVN) given   Treatment Route (SVN) air;mask   Patient Position (SVN) semi-Eaton's   Post Treatment Assessment (SVN) breath sounds unchanged   Signs of Intolerance (SVN) none   Breath Sounds Post-Respiratory Treatment   Throughout All Fields Post-Treatment All Fields   Throughout All Fields Post-Treatment no change   Post-treatment Heart Rate (beats/min) 88   Post-treatment Resp Rate (breaths/min) 20

## 2024-03-23 NOTE — PT/OT/SLP EVAL
Physical Therapy Evaluation    Patient Name:  Jose F Hanley   MRN:  [id number]    Recommendations:     Discharge Recommendations: Moderate Intensity Therapy   Discharge Equipment Recommendations: raised toilet   Barriers to discharge: Inaccessible home and Decreased caregiver support    Assessment:     Jose F Hanley is a 78 y.o. male admitted with a medical diagnosis of Acute hypoxemic respiratory failure.  He presents with the following impairments/functional limitations: weakness, impaired endurance, impaired self care skills, impaired functional mobility, gait instability, impaired cardiopulmonary response to activity, edema Pt found resting in bed. Spouse present. Just admitted from rehab through ED. Recent CABG sx with sternal precautions.Pt wants to discharge home. Educated on benefits of post acute rehab prior to home. He required assistance with bed mobility, sit to stand and gait. SPO2 87% on 3 l NC with activity.He would benefit from rehab prior to DC home as he appears to be recovering slowly from recent surgery.    Rehab Prognosis: Good; patient would benefit from acute skilled PT services to address these deficits and reach maximum level of function.    Recent Surgery: * No surgery found *      Plan:     During this hospitalization, patient to be seen daily to address the identified rehab impairments via gait training, therapeutic activities, therapeutic exercises and progress toward the following goals:    Plan of Care Expires:  04/23/24    Subjective     Chief Complaint: weakness/SOB  Patient/Family Comments/goals: dc home  Pain/Comfort:  Pain Rating 1: 0/10  Pain Rating Post-Intervention 1: 0/10    Patients cultural, spiritual, Buddhist conflicts given the current situation: no    Living Environment:  Prior to admission, patients level of function at rehab center was min to mod A.  Equipment used at home: shower chair, oxygen, rollator, grab bar.  DME owned (not currently used): none.  Upon  discharge, patient will have assistance from spouse and grand son.    Objective:     Communicated with nurse prior to session.  Patient found HOB elevated with telemetry, peripheral IV, oxygen  upon PT entry to room.    General Precautions: Standard, sternal, fall  Orthopedic Precautions:N/A   Braces: N/A  Respiratory Status: Nasal cannula, flow 3 L/min    Exams:  Cognitive Exam:  Patient is oriented to Person, Place, Time, and Situation  Gross Motor Coordination:  WFL  RLE Strength: WFL  LLE Strength: WFL    Functional Mobility:  Bed Mobility:     Rolling Left:  minimum assistance  Supine to Sit: minimum assistance, moderate assistance, and without use of UE's  Transfers:     Sit to Stand:  minimum assistance with no AD and no use of UE's from elevated bed  Gait: amb 20 ft with RW CGA, slow tarik, on 3 l NC, desats to 87%      AM-PAC 6 CLICK MOBILITY  Total Score:17       Treatment & Education:  Pt and spouse educated in PT roles and goals, DC recommendations, fall prevention, pacing, sternal precautions, use of call light    Patient left up in chair with all lines intact, call button in reach, nurse La notified, and spouse present.    GOALS:   Multidisciplinary Problems       Physical Therapy Goals          Problem: Physical Therapy    Goal Priority Disciplines Outcome Goal Variances Interventions   Physical Therapy Goal     PT, PT/OT Ongoing, Progressing     Description: Goals to be met by: 24     Patient will increase functional independence with mobility by performin. Supine to sit with Stand-by Assistance  2. Sit to supine with Stand-by Assistance  3. Sit to stand transfer with Stand-by Assistance  4. Bed to chair transfer with Stand-by Assistance using Rolling Walker  5. Gait  x 100 feet with Stand-by Assistance using Rolling Walker.   6. Ascend/descend 6 stair with bilateral Handrails Contact Guard Assistance using No Assistive Device.                          History:     Past Medical  History:   Diagnosis Date    A-fib     Allergies 2020    gets allergy shots    Arthritis 1973    right knee    Back pain     COPD (chronic obstructive pulmonary disease)     Diastolic heart failure     GERD (gastroesophageal reflux disease) 2015    HLD (hyperlipidemia)     HTN (hypertension)     Hx of LASIK     Hypothyroidism, unspecified 2012    Inflammatory disease of prostate, unspecified     out of medicine    Mild intermittent asthma, uncomplicated     Myocardial infarction     Neuropathy     On home oxygen therapy     3L NC  at night    PAD (peripheral artery disease)     Staph skin infection 2016    right thigh    TIA (transient ischemic attack) 2013       Past Surgical History:   Procedure Laterality Date    AORTOGRAPHY WITH SERIALOGRAPHY N/A 08/31/2023    Procedure: AORTOGRAM, WITH SERIALOGRAPHY;  Surgeon: Gary Thomas MD;  Location: [facility] CATH/EP LAB;  Service: Peripheral Vascular;  Laterality: N/A;    CATARACT EXTRACTION Bilateral 2014    CORONARY ANGIOGRAPHY INCLUDING BYPASS GRAFTS WITH CATHETERIZATION OF LEFT HEART Left 01/12/2024    Procedure: ANGIOGRAM, CORONARY, INCLUDING BYPASS GRAFT, WITH LEFT HEART CATHETERIZATION;  Surgeon: Gregg uBsh MD;  Location: [facility] CATH/EP LAB;  Service: Cardiology;  Laterality: Left;    CORONARY ARTERY BYPASS GRAFT  2010    ENDOSCOPIC HARVEST OF VEIN Right 2/23/2024    Procedure: HARVEST-VEIN-ENDOVASCULAR;  Surgeon: Gary Thomas MD;  Location: [facility] OR;  Service: Cardiothoracic;  Laterality: Right;    INSERTION OF IMPLANTABLE LOOP RECORDER  2011    INSERTION OF INTRA-AORTIC BALLOON ASSIST DEVICE Right 2/23/2024    Procedure: INSERTION, INTRA-AORTIC BALLOON PUMP;  Surgeon: Gary Thomas MD;  Location: [facility] OR;  Service: Cardiothoracic;  Laterality: Right;    INSERTION OF PACEMAKER  2021    does not have card with him for preadmit    INSTANTANEOUS WAVE-FREE RATIO (IFR) N/A 01/12/2024    Procedure: Instantaneous Wave-Free Ratio (IFR);  Surgeon:  Gregg Bush MD;  Location: [facility] CATH/EP LAB;  Service: Cardiology;  Laterality: N/A;    PTA, ANTERIOR TIBIAL Left 08/31/2023    Procedure: PTA, Anterior Tibial;  Surgeon: Gary Thomas MD;  Location: [facility] CATH/EP LAB;  Service: Peripheral Vascular;  Laterality: Left;    PTA, SUPERFICIAL FEMORAL ARTERY Left 08/31/2023    Procedure: PTA, Superficial Femoral Artery;  Surgeon: Gary Thomas MD;  Location: [facility] CATH/EP LAB;  Service: Peripheral Vascular;  Laterality: Left;    REPEAT CORONARY ARTERY BYPASS GRAFTING N/A 2/23/2024    Procedure: CABG, REPEAT;  Surgeon: Gary Thomas MD;  Location: [facility] OR;  Service: Cardiothoracic;  Laterality: N/A;    SPLENECTOMY  2016    STENT, ANTERIOR TIBIAL Left 08/31/2023    Procedure: Stent, Anterior Tibial;  Surgeon: Gary Thomas MD;  Location: [facility] CATH/EP LAB;  Service: Peripheral Vascular;  Laterality: Left;    SURGICAL PROCUREMENT, ARTERY, RADIAL, FOR CABG  2/23/2024    Procedure: SURGICAL PROCUREMENT,ARTERY,RADIAL,FOR CABG;  Surgeon: Gary Thomas MD;  Location: [facility];  Service: Cardiothoracic;;    watchman heart device  2019       Time Tracking:     PT Received On: 03/23/24  PT Start Time: 1131     PT Stop Time: 1155  PT Total Time (min): 24 min     Billable Minutes: Evaluation 10 and Therapeutic Activity 14      03/23/2024

## 2024-03-23 NOTE — SUBJECTIVE & OBJECTIVE
Past Medical History:   Diagnosis Date    A-fib     Allergies 2020    gets allergy shots    Arthritis 1973    right knee    Back pain     COPD (chronic obstructive pulmonary disease)     Diastolic heart failure     GERD (gastroesophageal reflux disease) 2015    HLD (hyperlipidemia)     HTN (hypertension)     Hx of LASIK     Hypothyroidism, unspecified 2012    Inflammatory disease of prostate, unspecified     out of medicine    Mild intermittent asthma, uncomplicated     Myocardial infarction     Neuropathy     On home oxygen therapy     3L NC  at night    PAD (peripheral artery disease)     Staph skin infection 2016    right thigh    TIA (transient ischemic attack) 2013       Past Surgical History:   Procedure Laterality Date    AORTOGRAPHY WITH SERIALOGRAPHY N/A 08/31/2023    Procedure: AORTOGRAM, WITH SERIALOGRAPHY;  Surgeon: Gary Thomas MD;  Location: [facility] CATH/EP LAB;  Service: Peripheral Vascular;  Laterality: N/A;    CATARACT EXTRACTION Bilateral 2014    CORONARY ANGIOGRAPHY INCLUDING BYPASS GRAFTS WITH CATHETERIZATION OF LEFT HEART Left 01/12/2024    Procedure: ANGIOGRAM, CORONARY, INCLUDING BYPASS GRAFT, WITH LEFT HEART CATHETERIZATION;  Surgeon: Gregg Bush MD;  Location: [facility] CATH/EP LAB;  Service: Cardiology;  Laterality: Left;    CORONARY ARTERY BYPASS GRAFT  2010    ENDOSCOPIC HARVEST OF VEIN Right 2/23/2024    Procedure: HARVEST-VEIN-ENDOVASCULAR;  Surgeon: Gary Thomas MD;  Location: [facility] OR;  Service: Cardiothoracic;  Laterality: Right;    INSERTION OF IMPLANTABLE LOOP RECORDER  2011    INSERTION OF INTRA-AORTIC BALLOON ASSIST DEVICE Right 2/23/2024    Procedure: INSERTION, INTRA-AORTIC BALLOON PUMP;  Surgeon: Gary Thomas MD;  Location: [facility] OR;  Service: Cardiothoracic;  Laterality: Right;    INSERTION OF PACEMAKER  2021    does not have card with him for preadmit    INSTANTANEOUS WAVE-FREE RATIO (IFR) N/A 01/12/2024    Procedure: Instantaneous Wave-Free Ratio  (IFR);  Surgeon: Gregg Bush MD;  Location: [facility] CATH/EP LAB;  Service: Cardiology;  Laterality: N/A;    PTA, ANTERIOR TIBIAL Left 08/31/2023    Procedure: PTA, Anterior Tibial;  Surgeon: Gary Thomas MD;  Location: [facility] CATH/EP LAB;  Service: Peripheral Vascular;  Laterality: Left;    PTA, SUPERFICIAL FEMORAL ARTERY Left 08/31/2023    Procedure: PTA, Superficial Femoral Artery;  Surgeon: Gary Thomas MD;  Location: [facility] CATH/EP LAB;  Service: Peripheral Vascular;  Laterality: Left;    REPEAT CORONARY ARTERY BYPASS GRAFTING N/A 2/23/2024    Procedure: CABG, REPEAT;  Surgeon: Gary Thomas MD;  Location: [facility] OR;  Service: Cardiothoracic;  Laterality: N/A;    SPLENECTOMY  2016    STENT, ANTERIOR TIBIAL Left 08/31/2023    Procedure: Stent, Anterior Tibial;  Surgeon: Gary Thomas MD;  Location: [facility] CATH/EP LAB;  Service: Peripheral Vascular;  Laterality: Left;    SURGICAL PROCUREMENT, ARTERY, RADIAL, FOR CABG  2/23/2024    Procedure: SURGICAL PROCUREMENT,ARTERY,RADIAL,FOR CABG;  Surgeon: Gary Thomas MD;  Location: [facility] OR;  Service: Cardiothoracic;;    watchman heart device  2019       Review of patient's allergies indicates:   Allergen Reactions    Adhes. band-tape-benzalkonium Rash     Pt stated it caused blisters    Statins-hmg-coa reductase inhibitors Other (See Comments)     Statin Intolerance (joint pain)       Current Facility-Administered Medications on File Prior to Encounter   Medication    [DISCONTINUED] GENERIC EXTERNAL MEDICATION    [DISCONTINUED] GENERIC EXTERNAL MEDICATION    [DISCONTINUED] levoFLOXacin tablet     Current Outpatient Medications on File Prior to Encounter   Medication Sig    acetaminophen (TYLENOL) 325 MG tablet Take 650 mg by mouth every 6 (six) hours as needed.    albuterol (PROVENTIL/VENTOLIN HFA) 90 mcg/actuation inhaler Inhale 2 puffs into the lungs every 6 (six) hours as needed.    aspirin 81 MG Chew Take 81 mg by mouth once  daily.    betamethasone valerate 0.1% (VALISONE) 0.1 % Lotn Apply to scalp bid prn rash    cyanocobalamin 500 MCG tablet 500 mcg.    cyclobenzaprine (FLEXERIL) 10 MG tablet Take 10 mg by mouth every 8 (eight) hours as needed.    diltiaZEM (CARDIZEM CD) 180 MG 24 hr capsule Take 1 capsule (180 mg total) by mouth once daily.    doxepin (SINEQUAN) 50 MG capsule Take 50 mg by mouth every evening.    DULoxetine (CYMBALTA) 60 MG capsule 60 mg nightly.    EPINEPHrine (EPIPEN) 0.3 mg/0.3 mL AtIn INJECT 0.3MG/0.3ML SUBCUTANEOUSLY (UNDER THE SKIN)  AS NEEDED FOR ALLERGIC REACTIONS SELF-INJECTOR PEN    ezetimibe (ZETIA) 10 mg tablet Take 1 tablet (10 mg total) by mouth every evening.    gabapentin (NEURONTIN) 400 MG capsule 800 mg 2 (two) times daily.    guaiFENesin (MUCINEX) 600 mg 12 hr tablet Take 2 tablets (1,200 mg total) by mouth 2 (two) times daily.    [START ON 4/7/2024] HYDROcodone-acetaminophen (NORCO)  mg per tablet Take 1 tablet by mouth every 12 (twelve) hours as needed.    levothyroxine (SYNTHROID) 50 MCG tablet 50 mcg.    metoprolol tartrate (LOPRESSOR) 25 MG tablet Take 1 tablet (25 mg total) by mouth 2 (two) times daily.    montelukast (SINGULAIR) 10 mg tablet 10 mg once daily.    nitroGLYCERIN (NITROSTAT) 0.4 MG SL tablet Place 0.4 mg under the tongue every 5 (five) minutes as needed.    omega-3 fatty acids/fish oil (FISH OIL-OMEGA-3 FATTY ACIDS) 300-1,000 mg capsule Take 1 capsule by mouth 2 (two) times daily.    omeprazole (PRILOSEC) 20 MG capsule 40 mg once daily.    potassium chloride SA (K-DUR,KLOR-CON) 20 MEQ tablet 20 mEq nightly.    senna-docusate 8.6-50 mg (PERICOLACE) 8.6-50 mg per tablet nightly.    spironolactone (ALDACTONE) 25 MG tablet Take 1 tablet (25 mg total) by mouth 2 (two) times daily.    tamsulosin (FLOMAX) 0.4 mg Cap 0.4 mg.    torsemide (DEMADEX) 20 MG Tab Take 2 tablets (40 mg total) by mouth once daily.     Family History    None       Tobacco Use    Smoking status: Former      Current packs/day: 0.00     Types: Cigarettes     Quit date: 1987     Years since quittin.3    Smokeless tobacco: Never   Substance and Sexual Activity    Alcohol use: Not Currently     Alcohol/week: 21.0 standard drinks of alcohol     Types: 21 Drinks containing 0.5 oz of alcohol per week     Comment: ed roberto mixed drinks    Drug use: No    Sexual activity: Not on file     Review of Systems   Respiratory:  Positive for cough and wheezing.    All other systems reviewed and are negative.    Objective:     Vital Signs (Most Recent):  Temp: 98.6 °F (37 °C) (24 013)  Pulse: 84 (24)  Resp: 18 (24)  BP: (!) 119/56 (24)  SpO2: (!) 93 % (24) Vital Signs (24h Range):  Temp:  [98.6 °F (37 °C)-98.9 °F (37.2 °C)] 98.6 °F (37 °C)  Pulse:  [77-87] 84  Resp:  [18-30] 18  SpO2:  [90 %-99 %] 93 %  BP: (106-121)/(52-69) 119/56     Weight: 116.3 kg (256 lb 6.3 oz)  Body mass index is 34.77 kg/m².     Physical Exam  Vitals and nursing note reviewed. Exam conducted with a chaperone present.   Constitutional:       Appearance: Normal appearance.   HENT:      Head: Normocephalic and atraumatic.      Mouth/Throat:      Mouth: Mucous membranes are moist.   Eyes:      Extraocular Movements: Extraocular movements intact.      Pupils: Pupils are equal, round, and reactive to light.   Cardiovascular:      Rate and Rhythm: Normal rate and regular rhythm.      Pulses: Normal pulses.      Heart sounds: Normal heart sounds.   Pulmonary:      Breath sounds: Rhonchi present.   Abdominal:      General: Abdomen is flat. Bowel sounds are normal.      Palpations: Abdomen is soft.   Musculoskeletal:         General: Normal range of motion.      Cervical back: Normal range of motion and neck supple.   Skin:     General: Skin is warm.      Capillary Refill: Capillary refill takes less than 2 seconds.   Neurological:      General: No focal deficit present.      Mental Status: He is alert and  "oriented to person, place, and time.   Psychiatric:         Mood and Affect: Mood normal.         Behavior: Behavior normal.              CRANIAL NERVES     CN III, IV, VI   Pupils are equal, round, and reactive to light.       Significant Labs: All pertinent labs within the past 24 hours have been reviewed.  CBC:   Recent Labs   Lab 03/23/24  0019   WBC 9.68   HGB 9.1*   HCT 31.1*        CMP:   Recent Labs   Lab 03/23/24 0019   *   K 4.2   CL 93*   CO2 30*      BUN 23   CREATININE 1.2   CALCIUM 9.0   PROT 6.8   ALBUMIN 3.6   BILITOT 0.5   ALKPHOS 82   AST 22   ALT 9*   ANIONGAP 8     Lactic Acid: No results for input(s): "LACTATE" in the last 48 hours.  Troponin:   Recent Labs   Lab 03/23/24 0019   TROPONINIHS 18.3*       Significant Imaging: I have reviewed all pertinent imaging results/findings within the past 24 hours.  I have reviewed and interpreted all pertinent imaging results/findings within the past 24 hours.  "

## 2024-03-23 NOTE — ASSESSMENT & PLAN NOTE
He had an echo not long ago that showed a good LVEF.    March 1st    But based on his Ultrasound I did and his CXR I looked at  he does appear overloaded  But he doesn't seem to be affected by it       Will try some more aggressive diuresis as I said above with Lasix IV 80 mg BID and see how he responds

## 2024-03-23 NOTE — PLAN OF CARE
Problem: Physical Therapy  Goal: Physical Therapy Goal  Description: Goals to be met by: 24     Patient will increase functional independence with mobility by performin. Supine to sit with Stand-by Assistance  2. Sit to supine with Stand-by Assistance  3. Sit to stand transfer with Stand-by Assistance  4. Bed to chair transfer with Stand-by Assistance using Rolling Walker  5. Gait  x 100 feet with Stand-by Assistance using Rolling Walker.   6. Ascend/descend 6 stair with bilateral Handrails Contact Guard Assistance using No Assistive Device.     Outcome: Ongoing, Progressing

## 2024-03-23 NOTE — CONSULTS
[facility]  Adult Nutrition   Consult Note (Nutrition Education)     SUMMARY     Recommendations  Recommendation/Intervention: Will adjust diet to cardiac, diabetic diet to aid in BP and BG levels.    Dietitian Rounds Brief  Consult received for diet education. Pt currently on regular diet which will be adjusted. Pt with recent CABG 2/23/24, then d/c to rehab facility when they noticed low oxygen sats and sent to [facility]. Pt reports following a cardiac, diabetic diet at the rehab facility and has no control over meals at this time. Pt denied any verbal education or handouts at this time.     Diet order:   Current Diet Order: regular     Anthropometrics  Temp: 98.4 °F (36.9 °C)  Height Method: Stated  Height: 6' (182.9 cm)  Height (inches): 72 in  Weight Method: Bed Scale  Weight: 114.8 kg (253 lb 1.4 oz)  Weight (lb): 253.09 lb  Ideal Body Weight (IBW), Male: 178 lb  % Ideal Body Weight, Male (lb): 142.19 %  BMI (Calculated): 34.3  BMI Grade: 30 - 34.9- obesity - grade I       Reason for Assessment  Reason For Assessment: consult (education)  Diagnosis: cardiac disease  Relevant Medical History: DM2, CAD, COPD, ileus and hypothyroidism  Nutrition Discharge Planning: Diabetic, cardiac diet.    Nutrition/Diet History  Spiritual, Cultural Beliefs, Synagogue Practices, Values that Affect Care: no  Food Allergies: NKFA    Weight History:  Wt Readings from Last 10 Encounters:   03/23/24 114.8 kg (253 lb 1.4 oz)   03/03/24 125.1 kg (275 lb 12.7 oz)   02/21/24 124.5 kg (274 lb 6.4 oz)   01/12/24 120.7 kg (266 lb)   01/10/24 120.7 kg (266 lb)   12/21/23 125.6 kg (277 lb)   12/13/23 126.1 kg (278 lb)   11/28/23 126.1 kg (278 lb)   10/31/23 119.7 kg (264 lb)   10/11/23 124.4 kg (274 lb 4.8 oz)      Lab/Procedures/Meds: Pertinent Labs/Meds Reviewed    Nutrition Risk  Level of Risk/Frequency of Follow-up:  (1x/week)     Nutrition Follow-Up  RD Follow-up?: Yes      Andria Salomon RD 03/23/2024 11:17 AM

## 2024-03-23 NOTE — H&P
[facility] - Emergency Dept  Hospital Medicine  History & Physical    Patient Name: Jose F Hanley  MRN: [id number]  Patient Class: Emergency  Admission Date: 3/22/2024  Attending Physician: Gibson Peters MD  Primary Care Provider: Sunita Rivera MD         Patient information was obtained from patient and ER records.     Subjective:     Principal Problem:Acute hypoxemic respiratory failure    Chief Complaint:   Chief Complaint   Patient presents with    Shortness of Breath     From rehab center in [facility] [place].  Had recent bipass surgery with Dr. Thomas in . Wears 3 liters o2 at home for nighttime        HPI: 78 WM with pmh of CAD and CABG x2  repeat in 2024 ,  HTN,   COPD on oxygen at night,  ex smoker .and recently ex drinker.   Afib not on blood thinners now.   He has hx of TAVR and Watchman procedure.         He lives at home with wife     He quit drinking when he had his second cabg few weeks ago .   He went into withdrawal during that hospital stay .     He started drinking in  when his wife  of cancer.         After his Recent CABG 24 he went to rehab facility which is where he still is.    They noticed that his Room air oxygen sats have been low and they were unable to get them up so they sent him to the hospital and he requested to go back to [facility] .      I pasted the Hospital course summary below since so much had happened recently .      He .had elective post op CABG was done and  admitted to ICU.   Extubated to BiPAP, developed encephalopathy secondary to DT and needed precedex.    Postop course was complicated with DT and also atrial fibrillation and heart failure and possible abdominal source of infection and treated and completed Zosyn.   Chest and mediastinal tubes and pacer wires and Balloon pump was removed .  Patient had brief period of possible paralytic ileus with bowel obstruction but patient had bowel movement and which has resolved.    Patient has history of  TIANA, on BiPAP q.h.s.while  sleeping and continue on high-flow nasal cannula 6 L and saturating 96% and later able to wean down to 3 L which is  his baseline   He needed Cardizem drip for AFib with RVR but currently getting controlled with p.o. Cardizem.  Later patient dyspnea improved and cardiology changed Lasix to torsemide and continue Aldactone and patient was placed the nursing home and follow up with Cardiology and Cardiothoracic surgery in 1 week.  Cardiology and Pulmonary did not recommend anticoagulation at this time       So in our ER, he was needing several liters of oxygen to keep his sats above 90%    But he was in no distress  When I saw him he was sleeping and was not tachypnic    sats in low 90s    He was confused at first when I woke him up . But then he was able to answer my questions .     He denies weight gain.  Denies swelling in legs or belly.       Denies trouble laying flat.  I even laid him flat in the bed and he was fine.       He has wheezing and cough but that's chronic      He said he noticed nothing wrong with his breathing       The CXR in ER does look like CHF   His sodium was low   Wbc normal   Anemia with low MCV  Troponin 18  And BNP was up some also       He was given 40 mg IV lasix     And we are admitting him for Hypoxemic respiratory failure     Past Medical History:   Diagnosis Date    A-fib     Allergies 2020    gets allergy shots    Arthritis 1973    right knee    Back pain     COPD (chronic obstructive pulmonary disease)     Diastolic heart failure     GERD (gastroesophageal reflux disease) 2015    HLD (hyperlipidemia)     HTN (hypertension)     Hx of LASIK     Hypothyroidism, unspecified 2012    Inflammatory disease of prostate, unspecified     out of medicine    Mild intermittent asthma, uncomplicated     Myocardial infarction     Neuropathy     On home oxygen therapy     3L NC  at night    PAD (peripheral artery disease)     Staph skin infection  2016    right thigh    TIA (transient ischemic attack) 2013       Past Surgical History:   Procedure Laterality Date    AORTOGRAPHY WITH SERIALOGRAPHY N/A 08/31/2023    Procedure: AORTOGRAM, WITH SERIALOGRAPHY;  Surgeon: Gary Thomas MD;  Location: [facility] CATH/EP LAB;  Service: Peripheral Vascular;  Laterality: N/A;    CATARACT EXTRACTION Bilateral 2014    CORONARY ANGIOGRAPHY INCLUDING BYPASS GRAFTS WITH CATHETERIZATION OF LEFT HEART Left 01/12/2024    Procedure: ANGIOGRAM, CORONARY, INCLUDING BYPASS GRAFT, WITH LEFT HEART CATHETERIZATION;  Surgeon: Gregg Bush MD;  Location: [facility] CATH/EP LAB;  Service: Cardiology;  Laterality: Left;    CORONARY ARTERY BYPASS GRAFT  2010    ENDOSCOPIC HARVEST OF VEIN Right 2/23/2024    Procedure: HARVEST-VEIN-ENDOVASCULAR;  Surgeon: Gary Thomas MD;  Location: [facility];  Service: Cardiothoracic;  Laterality: Right;    INSERTION OF IMPLANTABLE LOOP RECORDER  2011    INSERTION OF INTRA-AORTIC BALLOON ASSIST DEVICE Right 2/23/2024    Procedure: INSERTION, INTRA-AORTIC BALLOON PUMP;  Surgeon: Gary Thomas MD;  Location: [facility] OR;  Service: Cardiothoracic;  Laterality: Right;    INSERTION OF PACEMAKER  2021    does not have card with him for preadmit    INSTANTANEOUS WAVE-FREE RATIO (IFR) N/A 01/12/2024    Procedure: Instantaneous Wave-Free Ratio (IFR);  Surgeon: Gregg Bush MD;  Location: [facility] CATH/EP LAB;  Service: Cardiology;  Laterality: N/A;    PTA, ANTERIOR TIBIAL Left 08/31/2023    Procedure: PTA, Anterior Tibial;  Surgeon: Gary Thomas MD;  Location: [facility] CATH/EP LAB;  Service: Peripheral Vascular;  Laterality: Left;    PTA, SUPERFICIAL FEMORAL ARTERY Left 08/31/2023    Procedure: PTA, Superficial Femoral Artery;  Surgeon: Gary Thomas MD;  Location: [facility] CATH/EP LAB;  Service: Peripheral Vascular;  Laterality: Left;    REPEAT CORONARY ARTERY BYPASS GRAFTING N/A 2/23/2024    Procedure: CABG, REPEAT;  Surgeon: William  Gary PEREZ MD;  Location: [facility] OR;  Service: Cardiothoracic;  Laterality: N/A;    SPLENECTOMY  2016    STENT, ANTERIOR TIBIAL Left 08/31/2023    Procedure: Stent, Anterior Tibial;  Surgeon: Gary Thomas MD;  Location: [facility] CATH/EP LAB;  Service: Peripheral Vascular;  Laterality: Left;    SURGICAL PROCUREMENT, ARTERY, RADIAL, FOR CABG  2/23/2024    Procedure: SURGICAL PROCUREMENT,ARTERY,RADIAL,FOR CABG;  Surgeon: Gary Thomas MD;  Location: [facility] OR;  Service: Cardiothoracic;;    watchman heart device  2019       Review of patient's allergies indicates:   Allergen Reactions    Adhes. band-tape-benzalkonium Rash     Pt stated it caused blisters    Statins-hmg-coa reductase inhibitors Other (See Comments)     Statin Intolerance (joint pain)       Current Facility-Administered Medications on File Prior to Encounter   Medication    [DISCONTINUED] GENERIC EXTERNAL MEDICATION    [DISCONTINUED] GENERIC EXTERNAL MEDICATION    [DISCONTINUED] levoFLOXacin tablet     Current Outpatient Medications on File Prior to Encounter   Medication Sig    acetaminophen (TYLENOL) 325 MG tablet Take 650 mg by mouth every 6 (six) hours as needed.    albuterol (PROVENTIL/VENTOLIN HFA) 90 mcg/actuation inhaler Inhale 2 puffs into the lungs every 6 (six) hours as needed.    aspirin 81 MG Chew Take 81 mg by mouth once daily.    betamethasone valerate 0.1% (VALISONE) 0.1 % Lotn Apply to scalp bid prn rash    cyanocobalamin 500 MCG tablet 500 mcg.    cyclobenzaprine (FLEXERIL) 10 MG tablet Take 10 mg by mouth every 8 (eight) hours as needed.    diltiaZEM (CARDIZEM CD) 180 MG 24 hr capsule Take 1 capsule (180 mg total) by mouth once daily.    doxepin (SINEQUAN) 50 MG capsule Take 50 mg by mouth every evening.    DULoxetine (CYMBALTA) 60 MG capsule 60 mg nightly.    EPINEPHrine (EPIPEN) 0.3 mg/0.3 mL AtIn INJECT 0.3MG/0.3ML SUBCUTANEOUSLY (UNDER THE SKIN)  AS NEEDED FOR ALLERGIC REACTIONS SELF-INJECTOR PEN    ezetimibe (ZETIA) 10 mg  tablet Take 1 tablet (10 mg total) by mouth every evening.    gabapentin (NEURONTIN) 400 MG capsule 800 mg 2 (two) times daily.    guaiFENesin (MUCINEX) 600 mg 12 hr tablet Take 2 tablets (1,200 mg total) by mouth 2 (two) times daily.    [START ON 2024] HYDROcodone-acetaminophen (NORCO)  mg per tablet Take 1 tablet by mouth every 12 (twelve) hours as needed.    levothyroxine (SYNTHROID) 50 MCG tablet 50 mcg.    metoprolol tartrate (LOPRESSOR) 25 MG tablet Take 1 tablet (25 mg total) by mouth 2 (two) times daily.    montelukast (SINGULAIR) 10 mg tablet 10 mg once daily.    nitroGLYCERIN (NITROSTAT) 0.4 MG SL tablet Place 0.4 mg under the tongue every 5 (five) minutes as needed.    omega-3 fatty acids/fish oil (FISH OIL-OMEGA-3 FATTY ACIDS) 300-1,000 mg capsule Take 1 capsule by mouth 2 (two) times daily.    omeprazole (PRILOSEC) 20 MG capsule 40 mg once daily.    potassium chloride SA (K-DUR,KLOR-CON) 20 MEQ tablet 20 mEq nightly.    senna-docusate 8.6-50 mg (PERICOLACE) 8.6-50 mg per tablet nightly.    spironolactone (ALDACTONE) 25 MG tablet Take 1 tablet (25 mg total) by mouth 2 (two) times daily.    tamsulosin (FLOMAX) 0.4 mg Cap 0.4 mg.    torsemide (DEMADEX) 20 MG Tab Take 2 tablets (40 mg total) by mouth once daily.     Family History    None       Tobacco Use    Smoking status: Former     Current packs/day: 0.00     Types: Cigarettes     Quit date: 1987     Years since quittin.3    Smokeless tobacco: Never   Substance and Sexual Activity    Alcohol use: Not Currently     Alcohol/week: 21.0 standard drinks of alcohol     Types: 21 Drinks containing 0.5 oz of alcohol per week     Comment: ed roberto mixed drinks    Drug use: No    Sexual activity: Not on file     Review of Systems   Respiratory:  Positive for cough and wheezing.    All other systems reviewed and are negative.    Objective:     Vital Signs (Most Recent):  Temp: 98.6 °F (37 °C) (24 0130)  Pulse: 84 (24 0130)  Resp:  18 (03/23/24 0130)  BP: (!) 119/56 (03/23/24 0130)  SpO2: (!) 93 % (03/23/24 0130) Vital Signs (24h Range):  Temp:  [98.6 °F (37 °C)-98.9 °F (37.2 °C)] 98.6 °F (37 °C)  Pulse:  [77-87] 84  Resp:  [18-30] 18  SpO2:  [90 %-99 %] 93 %  BP: (106-121)/(52-69) 119/56     Weight: 116.3 kg (256 lb 6.3 oz)  Body mass index is 34.77 kg/m².     Physical Exam  Vitals and nursing note reviewed. Exam conducted with a chaperone present.   Constitutional:       Appearance: Normal appearance.   HENT:      Head: Normocephalic and atraumatic.      Mouth/Throat:      Mouth: Mucous membranes are moist.   Eyes:      Extraocular Movements: Extraocular movements intact.      Pupils: Pupils are equal, round, and reactive to light.   Cardiovascular:      Rate and Rhythm: Normal rate and regular rhythm.      Pulses: Normal pulses.      Heart sounds: Normal heart sounds.   Pulmonary:      Breath sounds: Rhonchi present.   Abdominal:      General: Abdomen is flat. Bowel sounds are normal.      Palpations: Abdomen is soft.   Musculoskeletal:         General: Normal range of motion.      Cervical back: Normal range of motion and neck supple.   Skin:     General: Skin is warm.      Capillary Refill: Capillary refill takes less than 2 seconds.   Neurological:      General: No focal deficit present.      Mental Status: He is alert and oriented to person, place, and time.   Psychiatric:         Mood and Affect: Mood normal.         Behavior: Behavior normal.              CRANIAL NERVES     CN III, IV, VI   Pupils are equal, round, and reactive to light.       Significant Labs: All pertinent labs within the past 24 hours have been reviewed.  CBC:   Recent Labs   Lab 03/23/24  0019   WBC 9.68   HGB 9.1*   HCT 31.1*        CMP:   Recent Labs   Lab 03/23/24  0019   *   K 4.2   CL 93*   CO2 30*      BUN 23   CREATININE 1.2   CALCIUM 9.0   PROT 6.8   ALBUMIN 3.6   BILITOT 0.5   ALKPHOS 82   AST 22   ALT 9*   ANIONGAP 8     Lactic Acid:  "No results for input(s): "LACTATE" in the last 48 hours.  Troponin:   Recent Labs   Lab 03/23/24  0019   TROPONINIHS 18.3*       Significant Imaging: I have reviewed all pertinent imaging results/findings within the past 24 hours.  I have reviewed and interpreted all pertinent imaging results/findings within the past 24 hours.  Assessment/Plan:     * Acute hypoxemic respiratory failure  Likely a combination of his baseline COPD and new CHF     However he really didn't know he was hypoxemic he says   he has no typical symptoms of CHF or COPD exacerbation.       His CXR does fit   Low sodium fits  He  admits to urinating less lately     I looked at him with bedside ultra sound and his IJ vessels and IVC are all large with no respiratory variation   We checked his weight and he says his usual weight  actually less.         So I think its worth trying some more diuresis and see what happens.         PLAN     IV lasix 80 mg BID   morning and afternoon   Continue aldactone  Give mag ox po BID  Continuous pulse ox   Telemetry bed  Daily weights and strict I's and O's   Control BP / afterload     Try some Nebs for his COPD component also.   No steroids at this time   Mucinex 1200 mg po BID       Anemia  MCV Low  I no work up in labs history here       Will check Iron , TIBC,  ferritin , b12, folate , retic     CHF (congestive heart failure)  He had an echo not long ago that showed a good LVEF.    March 1st    But based on his Ultrasound I did and his CXR I looked at  he does appear overloaded  But he doesn't seem to be affected by it       Will try some more aggressive diuresis as I said above with Lasix IV 80 mg BID and see how he responds             VTE Risk Mitigation (From admission, onward)      None               REHAB     He was in rehab and now he wants to go home and do PT at home or out patient.       Consult PT OT eval  And consult social work              Gibson Peters MD  Department of Hospital " Medicine  [facility] - Emergency Dept

## 2024-03-24 VITALS
WEIGHT: 253.06 LBS | BODY MASS INDEX: 34.28 KG/M2 | DIASTOLIC BLOOD PRESSURE: 57 MMHG | RESPIRATION RATE: 18 BRPM | SYSTOLIC BLOOD PRESSURE: 112 MMHG | TEMPERATURE: 98 F | HEART RATE: 60 BPM | HEIGHT: 72 IN | OXYGEN SATURATION: 93 %

## 2024-03-24 LAB
ALBUMIN SERPL BCP-MCNC: 3.6 G/DL (ref 3.5–5.2)
ALP SERPL-CCNC: 77 U/L (ref 55–135)
ALT SERPL W/O P-5'-P-CCNC: 10 U/L (ref 10–44)
ANION GAP SERPL CALC-SCNC: 8 MMOL/L (ref 8–16)
AST SERPL-CCNC: 15 U/L (ref 10–40)
BILIRUB SERPL-MCNC: 0.6 MG/DL (ref 0.1–1)
BUN SERPL-MCNC: 16 MG/DL (ref 8–23)
CALCIUM SERPL-MCNC: 9.3 MG/DL (ref 8.7–10.5)
CHLORIDE SERPL-SCNC: 95 MMOL/L (ref 95–110)
CO2 SERPL-SCNC: 33 MMOL/L (ref 23–29)
CREAT SERPL-MCNC: 0.9 MG/DL (ref 0.5–1.4)
EST. GFR  (NO RACE VARIABLE): >60 ML/MIN/1.73 M^2
GLUCOSE SERPL-MCNC: 114 MG/DL (ref 70–110)
POTASSIUM SERPL-SCNC: 3.6 MMOL/L (ref 3.5–5.1)
PROCALCITONIN SERPL IA-MCNC: 0.15 NG/ML (ref 0–0.5)
PROT SERPL-MCNC: 6.7 G/DL (ref 6–8.4)
RETICS/RBC NFR AUTO: 1.5 % (ref 0.4–2)
SODIUM SERPL-SCNC: 136 MMOL/L (ref 136–145)

## 2024-03-24 PROCEDURE — 99223 1ST HOSP IP/OBS HIGH 75: CPT | Mod: ,,, | Performed by: INTERNAL MEDICINE

## 2024-03-24 PROCEDURE — 97535 SELF CARE MNGMENT TRAINING: CPT

## 2024-03-24 PROCEDURE — 63600175 PHARM REV CODE 636 W HCPCS: Performed by: INTERNAL MEDICINE

## 2024-03-24 PROCEDURE — 97166 OT EVAL MOD COMPLEX 45 MIN: CPT

## 2024-03-24 PROCEDURE — 25000242 PHARM REV CODE 250 ALT 637 W/ HCPCS: Performed by: INTERNAL MEDICINE

## 2024-03-24 PROCEDURE — 85045 AUTOMATED RETICULOCYTE COUNT: CPT | Performed by: INTERNAL MEDICINE

## 2024-03-24 PROCEDURE — 84145 PROCALCITONIN (PCT): CPT | Performed by: INTERNAL MEDICINE

## 2024-03-24 PROCEDURE — 94761 N-INVAS EAR/PLS OXIMETRY MLT: CPT

## 2024-03-24 PROCEDURE — 36415 COLL VENOUS BLD VENIPUNCTURE: CPT | Performed by: INTERNAL MEDICINE

## 2024-03-24 PROCEDURE — 94640 AIRWAY INHALATION TREATMENT: CPT | Mod: XB

## 2024-03-24 PROCEDURE — 80053 COMPREHEN METABOLIC PANEL: CPT | Performed by: INTERNAL MEDICINE

## 2024-03-24 PROCEDURE — 94640 AIRWAY INHALATION TREATMENT: CPT

## 2024-03-24 PROCEDURE — 25000003 PHARM REV CODE 250: Performed by: INTERNAL MEDICINE

## 2024-03-24 PROCEDURE — 27000221 HC OXYGEN, UP TO 24 HOURS

## 2024-03-24 PROCEDURE — 97116 GAIT TRAINING THERAPY: CPT | Mod: CQ

## 2024-03-24 RX ORDER — SPIRONOLACTONE 25 MG/1
TABLET ORAL
Qty: 44 TABLET | Refills: 0 | Status: SHIPPED | OUTPATIENT
Start: 2024-03-24 | End: 2024-05-29

## 2024-03-24 RX ORDER — TORSEMIDE 20 MG/1
TABLET ORAL
Qty: 88 TABLET | Refills: 0 | Status: SHIPPED | OUTPATIENT
Start: 2024-03-24 | End: 2024-04-30

## 2024-03-24 RX ADMIN — GABAPENTIN 800 MG: 300 CAPSULE ORAL at 08:03

## 2024-03-24 RX ADMIN — FAMOTIDINE 20 MG: 10 INJECTION, SOLUTION INTRAVENOUS at 08:03

## 2024-03-24 RX ADMIN — DILTIAZEM HYDROCHLORIDE 180 MG: 180 CAPSULE, COATED, EXTENDED RELEASE ORAL at 08:03

## 2024-03-24 RX ADMIN — Medication 400 MG: at 08:03

## 2024-03-24 RX ADMIN — TAMSULOSIN HYDROCHLORIDE 0.4 MG: 0.4 CAPSULE ORAL at 08:03

## 2024-03-24 RX ADMIN — SENNOSIDES AND DOCUSATE SODIUM 1 TABLET: 8.6; 5 TABLET ORAL at 08:03

## 2024-03-24 RX ADMIN — Medication 6 MG: at 01:03

## 2024-03-24 RX ADMIN — IPRATROPIUM BROMIDE AND ALBUTEROL SULFATE 3 ML: 2.5; .5 SOLUTION RESPIRATORY (INHALATION) at 01:03

## 2024-03-24 RX ADMIN — FUROSEMIDE 80 MG: 10 INJECTION, SOLUTION INTRAVENOUS at 08:03

## 2024-03-24 RX ADMIN — LEVOTHYROXINE SODIUM 50 MCG: 0.03 TABLET ORAL at 05:03

## 2024-03-24 RX ADMIN — ASPIRIN 81 MG 81 MG: 81 TABLET ORAL at 08:03

## 2024-03-24 RX ADMIN — GUAIFENESIN 1200 MG: 600 TABLET, EXTENDED RELEASE ORAL at 08:03

## 2024-03-24 RX ADMIN — IPRATROPIUM BROMIDE AND ALBUTEROL SULFATE 3 ML: 2.5; .5 SOLUTION RESPIRATORY (INHALATION) at 07:03

## 2024-03-24 RX ADMIN — METOPROLOL TARTRATE 25 MG: 25 TABLET, FILM COATED ORAL at 08:03

## 2024-03-24 RX ADMIN — SPIRONOLACTONE 25 MG: 25 TABLET ORAL at 08:03

## 2024-03-24 NOTE — CARE UPDATE
03/23/24 2105   Patient Assessment/Suction   Level of Consciousness (AVPU) alert   Respiratory Effort Normal;Unlabored   Expansion/Accessory Muscles/Retractions expansion symmetric;no retractions;no use of accessory muscles   All Lung Fields Breath Sounds coarse;diminished   Rhythm/Pattern, Respiratory unlabored;pattern regular;no shortness of breath reported;shallow   Cough Frequency infrequent   Cough Type congested;productive   PRE-TX-O2   Device (Oxygen Therapy) nasal cannula with humidification   $ Is the patient on Low Flow Oxygen? Yes   Flow (L/min) 3   SpO2 97 %   Pulse Oximetry Type Intermittent   $ Pulse Oximetry - Multiple Charge Pulse Oximetry - Multiple   Pulse 97   Resp 19   Aerosol Therapy   $ Aerosol Therapy Charges Aerosol Treatment  (DUO)   Daily Review of Necessity (SVN) completed   Respiratory Treatment Status (SVN) given   Treatment Route (SVN) mask;oxygen   Patient Position (SVN) HOB elevated   Post Treatment Assessment (SVN) increased aeration   Signs of Intolerance (SVN) none   Breath Sounds Post-Respiratory Treatment   Throughout All Fields Post-Treatment All Fields   Throughout All Fields Post-Treatment aeration increased   Post-treatment Heart Rate (beats/min) 98   Post-treatment Resp Rate (breaths/min) 19

## 2024-03-24 NOTE — PT/OT/SLP PROGRESS
Physical Therapy Treatment    Patient Name:  Jose F Hanley   MRN:  [id number]    Recommendations:     Discharge Recommendations: Moderate Intensity Therapy  Discharge Equipment Recommendations: raised toilet  Barriers to discharge:  sternal precautions, decreased activity tolerance    Assessment:     Jose F Hanley is a 78 y.o. male admitted with a medical diagnosis of Acute hypoxemic respiratory failure.  He presents with the following impairments/functional limitations: weakness, impaired endurance, impaired self care skills, impaired functional mobility, gait instability, impaired cardiopulmonary response to activity.    Pt with HOB elevated and spouse present. Pt reports that he is going home today. Pt agreeable to visit.    Pt performed supine to sit transfer with stand by assist and verbal cuing for sternal precautions. Pt performed sit to stand transfer with close stand by assist and verbal cuing to rock for momentum and to maintain sternal precautions. Pt with posterior LOB with first attempt unable to fully stand, but able to stand on second attempt. Pt with slight LOB in standing but able to correct.    Pt ambulated 80' x 2 with RW and contact guard assist on 3 L O2, SPO2 93%.    Pt left sitting EOB with spouse present setting up lunch tray.    Rehab Prognosis: Fair; patient would benefit from acute skilled PT services to address these deficits and reach maximum level of function.    Recent Surgery: * No surgery found *      Plan:     During this hospitalization, patient to be seen daily to address the identified rehab impairments via gait training, therapeutic activities, therapeutic exercises and progress toward the following goals:    Plan of Care Expires:  04/23/24    Subjective     Chief Complaint: ready to go home  Patient/Family Comments/goals: to go home  Pain/Comfort:  Pain Rating 1: 0/10      Objective:     Communicated with RN prior to session.  Patient found HOB elevated with telemetry, peripheral  IV, oxygen upon PT entry to room.     General Precautions: Standard, sternal, fall  Orthopedic Precautions: N/A  Braces: N/A  Respiratory Status: Nasal cannula, flow 3 L/min     Functional Mobility:  Bed Mobility:     Supine to Sit: stand by assistance  Transfers:     Sit to Stand:  stand by assistance with no AD  Gait: 2 x 80' with RW and CGA      AM-PAC 6 CLICK MOBILITY          Treatment & Education:  Pt educated on importance of time OOB, importance of intermittent mobility, safe techniques for transfers/ambulation, discharge recommendations/options, and use of call light for assistance and fall prevention.      Patient left sitting edge of bed with all lines intact, call button in reach, and spouse present..    GOALS:   Multidisciplinary Problems       Physical Therapy Goals          Problem: Physical Therapy    Goal Priority Disciplines Outcome Goal Variances Interventions   Physical Therapy Goal     PT, PT/OT Ongoing, Progressing     Description: Goals to be met by: 24     Patient will increase functional independence with mobility by performin. Supine to sit with Stand-by Assistance  2. Sit to supine with Stand-by Assistance  3. Sit to stand transfer with Stand-by Assistance  4. Bed to chair transfer with Stand-by Assistance using Rolling Walker  5. Gait  x 100 feet with Stand-by Assistance using Rolling Walker.   6. Ascend/descend 6 stair with bilateral Handrails Contact Guard Assistance using No Assistive Device.                          Time Tracking:     PT Received On: 24  PT Start Time:      PT Stop Time: 1136  PT Total Time (min): 9 min     Billable Minutes: Gait Training 9    Treatment Type: Treatment  PT/PTA: PTA     Number of PTA visits since last PT visit: 2024

## 2024-03-24 NOTE — DISCHARGE INSTRUCTIONS
Takes spironolactone and torsemide b.i.d. for 7 days and once daily thereafter.  Follow up with Cardiology and PCP.    Discharge Instructions, [facility]    Thank you for choosing [facility] for your medical care. The primary doctor who is taking care of you at the time of your discharge is Dana Sun MD.     You were admitted to the hospital with Acute hypoxemic respiratory failure.     Please note your discharge instructions, including diet/activity restrictions, follow-up appointments, and medication changes.  If you have any questions about your medical issues, prescriptions, or any other questions, please feel free to contact the [facility] Medicine Dept at [phone] and we will help.    If you are previously with Home health, outpatient PT/OT or under a therapy program, you are cleared to return to those programs.    Please direct all long term medication refills and follow up to your primary care provider, Sunita Rivera MD. Thank you again for letting us take care of your health care needs.    Please note the following discharge instructions per your discharging physician-  Padmaja Lovelace PA-C

## 2024-03-24 NOTE — PLAN OF CARE
Problem: Adult Inpatient Plan of Care  Goal: Plan of Care Review  Outcome: Met  Goal: Patient-Specific Goal (Individualized)  Outcome: Met  Goal: Absence of Hospital-Acquired Illness or Injury  Outcome: Met  Goal: Optimal Comfort and Wellbeing  Outcome: Met  Goal: Readiness for Transition of Care  Outcome: Met     Problem: Impaired Wound Healing  Goal: Optimal Wound Healing  Outcome: Met     Problem: Fall Injury Risk  Goal: Absence of Fall and Fall-Related Injury  Outcome: Met     Problem: Adjustment to Illness (Heart Failure)  Goal: Optimal Coping  Outcome: Met     Problem: Cardiac Output Decreased (Heart Failure)  Goal: Optimal Cardiac Output  Outcome: Met     Problem: Dysrhythmia (Heart Failure)  Goal: Stable Heart Rate and Rhythm  Outcome: Met     Problem: Fluid Imbalance (Heart Failure)  Goal: Fluid Balance  Outcome: Met     Problem: Functional Ability Impaired (Heart Failure)  Goal: Optimal Functional Ability  Outcome: Met     Problem: Oral Intake Inadequate (Heart Failure)  Goal: Optimal Nutrition Intake  Outcome: Met     Problem: Respiratory Compromise (Heart Failure)  Goal: Effective Oxygenation and Ventilation  Outcome: Met     Problem: Sleep Disordered Breathing (Heart Failure)  Goal: Effective Breathing Pattern During Sleep  Outcome: Met

## 2024-03-24 NOTE — RESPIRATORY THERAPY
03/24/24 1310   Patient Assessment/Suction   Level of Consciousness (AVPU) alert   Respiratory Effort Normal;Unlabored   Expansion/Accessory Muscles/Retractions no retractions;expansion symmetric   All Lung Fields Breath Sounds Posterior:   GUERLINE Breath Sounds coarse;wheezes, expiratory;wheezes, inspiratory   LLL Breath Sounds coarse;wheezes, expiratory;wheezes, inspiratory   RUL Breath Sounds coarse;wheezes, expiratory;wheezes, inspiratory   RML Breath Sounds coarse;wheezes, expiratory;wheezes, inspiratory   RLL Breath Sounds wheezes, expiratory;wheezes, inspiratory;coarse   Rhythm/Pattern, Respiratory no shortness of breath reported   Cough Frequency infrequent   PRE-TX-O2   Device (Oxygen Therapy) nasal cannula   Flow (L/min) 3   SpO2 96 %   Pulse Oximetry Type Intermittent   $ Pulse Oximetry - Multiple Charge Pulse Oximetry - Multiple   Pulse 67   Resp 20   Aerosol Therapy   $ Aerosol Therapy Charges Aerosol Treatment   Daily Review of Necessity (SVN) completed   Respiratory Treatment Status (SVN) given   Treatment Route (SVN) mask;oxygen   Patient Position (SVN) HOB elevated   Post Treatment Assessment (SVN) increased aeration   Signs of Intolerance (SVN) none   Breath Sounds Post-Respiratory Treatment   Throughout All Fields Post-Treatment All Fields   Throughout All Fields Post-Treatment aeration increased   Post-treatment Heart Rate (beats/min) 68   Post-treatment Resp Rate (breaths/min) 97

## 2024-03-24 NOTE — DISCHARGE SUMMARY
[facility] Medicine  Discharge Summary      Patient Name: Jose F Hanley  MRN: [id number]  XIOMARA: [id number]  Patient Class: IP- Inpatient  Admission Date: 3/22/2024  Hospital Length of Stay: 1 days  Discharge Date and Time:  2024 4:31 PM  Attending Physician: Dana Sun MD   Discharging Provider: Padmaja Lovelace PA-C  Primary Care Provider: Sunita Rivera MD    Primary Care Team: Networked reference to record PCT     HPI:   78 WM with pmh of CAD and CABG x2  repeat in 2024 ,  HTN,   COPD on oxygen at night,  ex smoker .and recently ex drinker.   Afib not on blood thinners now.   He has hx of TAVR and Watchman procedure.         He lives at home with wife     He quit drinking when he had his second cabg few weeks ago .   He went into withdrawal during that hospital stay .     He started drinking in  when his wife  of cancer.         After his Recent CABG 24 he went to rehab facility which is where he still is.    They noticed that his Room air oxygen sats have been low and they were unable to get them up so they sent him to the hospital and he requested to go back to [facility] .      I pasted the Hospital course summary below since so much had happened recently .      He .had elective post op CABG was done and  admitted to ICU.   Extubated to BiPAP, developed encephalopathy secondary to DT and needed precedex.    Postop course was complicated with DT and also atrial fibrillation and heart failure and possible abdominal source of infection and treated and completed Zosyn.   Chest and mediastinal tubes and pacer wires and Balloon pump was removed .  Patient had brief period of possible paralytic ileus with bowel obstruction but patient had bowel movement and which has resolved.   Patient has history of  TIANA, on BiPAP q.h.s.while  sleeping and continue on high-flow nasal cannula 6 L and saturating 96% and later able to wean down to 3 L which is  his  baseline   He needed Cardizem drip for AFib with RVR but currently getting controlled with p.o. Cardizem.  Later patient dyspnea improved and cardiology changed Lasix to torsemide and continue Aldactone and patient was placed the nursing home and follow up with Cardiology and Cardiothoracic surgery in 1 week.  Cardiology and Pulmonary did not recommend anticoagulation at this time       So in our ER, he was needing several liters of oxygen to keep his sats above 90%    But he was in no distress  When I saw him he was sleeping and was not tachypnic    sats in low 90s    He was confused at first when I woke him up . But then he was able to answer my questions .     He denies weight gain.  Denies swelling in legs or belly.       Denies trouble laying flat.  I even laid him flat in the bed and he was fine.       He has wheezing and cough but that's chronic      He said he noticed nothing wrong with his breathing       The CXR in ER does look like CHF   His sodium was low   Wbc normal   Anemia with low MCV  Troponin 18  And BNP was up some also       He was given 40 mg IV lasix     And we are admitting him for Hypoxemic respiratory failure     * No surgery found *      Hospital Course:   Patient was admitted to the hospital with CHF exacerbation and acute on chronic respiratory failure.  The patient was monitored closely throughout his hospital stay.  Patient was started on IV diuresis.  Cardiology was consulted.  Patient was diuresed 3.5L throughout his hospitalization.  Patient's oxygen was weaned down to 3 L which patient wears p.r.n. at home.  PT/OT were consulted and recommended moderate intensive therapy which the patient and patient's wife both declined.  Cardiology recommended torsemide 40 mg b.i.d. and spironolactone 25 b.i.d. for 7 days then decrease both medications to daily.  Patient was seen on day of discharge.  Patient was very eager for discharge.  Patient again declined SNF stated he wanted to go home.   Home health was ordered at discharge.  Patient was cleared for discharge by Cardiology.  Patient to follow up with PCP, Cardiology, and pulmonology on discharge.  Patient already had home oxygen set up.  Patient instructed to wear home oxygen.  Return precautions discussed and patient voiced understanding.  All questions answered.     Goals of Care Treatment Preferences:  Code Status: Full Code      Consults:   Consults (From admission, onward)          Status Ordering Provider     Inpatient consult to Cardiology  Once        Provider:  (Not yet assigned)    Completed JENNI OLIVER     Inpatient consult to Social Work/Case Management  Once        Provider:  (Not yet assigned)    Acknowledged ONEYDA MEEKS     Inpatient consult to Registered Dietitian/Nutritionist  Once        Provider:  (Not yet assigned)    Completed ONEYDA MEEKS            No new Assessment & Plan notes have been filed under this hospital service since the last note was generated.  Service: Hospital Medicine    Final Active Diagnoses:    Diagnosis Date Noted POA    PRINCIPAL PROBLEM:  Acute hypoxemic respiratory failure [J96.01] 03/23/2024 Yes    CHF (congestive heart failure) [I50.9] 03/23/2024 Yes    Anemia [D64.9] 03/23/2024 Yes    Hyponatremia [E87.1] 03/01/2024 Yes      Problems Resolved During this Admission:       Discharged Condition: good    Disposition: Home-Health Care [facility]    Follow Up:   Follow-up Information       Sunita Rivera MD Follow up.    Specialty: Family Medicine  Contact information:  [address] [facility] [address]  [phone]               Jaun Miller MD. Schedule an appointment as soon as possible for a visit.    Specialties: Interventional Cardiology, Cardiology  Why: follow up in clinic in 7 to 10 days  Contact information:  [address] [address]  CARDIOLOGY [facility] [address]  [phone]               Varinder Norris MD. Call.    Specialty: Pulmonary  Disease  Contact information:  [place] [address]  [phone]                           Patient Instructions:      Ambulatory referral/consult to Home Health   Standing Status: Future   Referral Priority: Routine Referral Type: Home Health   Referral Reason: Specialty Services Required   Requested Specialty: Home Health Services   Number of Visits Requested: 1     Diet Cardiac     Notify your health care provider if you experience any of the following:  temperature >100.4     Notify your health care provider if you experience any of the following:  persistent nausea and vomiting or diarrhea     Notify your health care provider if you experience any of the following:  severe uncontrolled pain     Notify your health care provider if you experience any of the following:  redness, tenderness, or signs of infection (pain, swelling, redness, odor or green/yellow discharge around incision site)     Notify your health care provider if you experience any of the following:  difficulty breathing or increased cough     Notify your health care provider if you experience any of the following:  increased confusion or weakness     Activity as tolerated       Significant Diagnostic Studies: Labs: CMP   Recent Labs   Lab 03/23/24  0019 03/23/24  0815 03/24/24  0501   * 133* 136   K 4.2 4.0 3.6   CL 93* 94* 95   CO2 30* 33* 33*    153* 114*   BUN 23 18 16   CREATININE 1.2 1.0 0.9   CALCIUM 9.0 8.8 9.3   PROT 6.8 6.9 6.7   ALBUMIN 3.6 3.6 3.6   BILITOT 0.5 0.6 0.6   ALKPHOS 82 83 77   AST 22 15 15   ALT 9* 10 10   ANIONGAP 8 6* 8    and CBC   Recent Labs   Lab 03/23/24  0019   WBC 9.68   HGB 9.1*   HCT 31.1*          Pending Diagnostic Studies:       None           Medications:  Reconciled Home Medications:      Medication List        CHANGE how you take these medications      spironolactone 25 MG tablet  Commonly known as: ALDACTONE  Take 1 tablet (25 mg total) by mouth 2 (two) times daily for 7  days, THEN 1 tablet (25 mg total) once daily.  Start taking on: March 24, 2024  What changed: See the new instructions.     torsemide 20 MG Tab  Commonly known as: DEMADEX  Take 2 tablets (40 mg total) by mouth 2 (two) times a day for 7 days, THEN 2 tablets (40 mg total) once daily.  Start taking on: March 24, 2024  What changed: See the new instructions.            CONTINUE taking these medications      acetaminophen 325 MG tablet  Commonly known as: TYLENOL  Take 650 mg by mouth every 6 (six) hours as needed for Pain or Temperature greater than.     albuterol 90 mcg/actuation inhaler  Commonly known as: PROVENTIL/VENTOLIN HFA  Inhale 2 puffs into the lungs every 6 (six) hours as needed for Wheezing or Shortness of Breath.     aspirin 81 MG Chew  Take 81 mg by mouth once daily.     betamethasone valerate 0.1% 0.1 % Lotn  Commonly known as: VALISONE  Apply 1 g topically 2 (two) times daily as needed.     cyclobenzaprine 10 MG tablet  Commonly known as: FLEXERIL  Take 10 mg by mouth every 8 (eight) hours as needed for Muscle spasms.     diltiaZEM 180 MG 24 hr capsule  Commonly known as: CARDIZEM CD  Take 1 capsule (180 mg total) by mouth once daily.     doxepin 50 MG capsule  Commonly known as: SINEQUAN  Take 50 mg by mouth every evening.     DULoxetine 60 MG capsule  Commonly known as: CYMBALTA  Take 60 mg by mouth nightly.     EPINEPHrine 0.3 mg/0.3 mL Atin  Commonly known as: EPIPEN  Inject 0.3 mg into the muscle once.     fish oil-omega-3 fatty acids 300-1,000 mg capsule  Take 1 capsule by mouth 2 (two) times daily.     gabapentin 300 MG capsule  Commonly known as: NEURONTIN  Take 300 mg by mouth 2 (two) times daily.     guaiFENesin 600 mg 12 hr tablet  Commonly known as: MUCINEX  Take 2 tablets (1,200 mg total) by mouth 2 (two) times daily.     HYDROcodone-acetaminophen  mg per tablet  Commonly known as: NORCO  Take 1 tablet by mouth every 12 (twelve) hours as needed for Pain.  Start taking on: April 7,  2024     metoprolol tartrate 25 MG tablet  Commonly known as: LOPRESSOR  Take 1 tablet (25 mg total) by mouth 2 (two) times daily.     montelukast 10 mg tablet  Commonly known as: SINGULAIR  Take 10 mg by mouth once daily.     nitroGLYCERIN 0.4 MG SL tablet  Commonly known as: NITROSTAT  Place 0.4 mg under the tongue every 5 (five) minutes as needed for Chest pain.     omeprazole 20 MG capsule  Commonly known as: PRILOSEC  Take 40 mg by mouth once daily.     potassium chloride SA 20 MEQ tablet  Commonly known as: K-DUR,KLOR-CON  Take 20 mEq by mouth nightly.     senna-docusate 8.6-50 mg 8.6-50 mg per tablet  Commonly known as: PERICOLACE  Take 1 tablet by mouth nightly.     SYNTHROID 50 MCG tablet  Generic drug: levothyroxine  Take 50 mcg by mouth before breakfast.     tamsulosin 0.4 mg Cap  Commonly known as: FLOMAX  Take 0.4 mg by mouth once daily.            ASK your doctor about these medications      ezetimibe 10 mg tablet  Commonly known as: ZETIA  Take 1 tablet (10 mg total) by mouth every evening.              Indwelling Lines/Drains at time of discharge:   Lines/Drains/Airways       None                   Time spent on the discharge of patient: 35 minutes         Padmaja Lovelace PA-C  Department of Hospital Medicine  [facility]

## 2024-03-24 NOTE — NURSING
1653 discharge instructions given and explained to the patient and spouse. They deny questions or concerns at this time.

## 2024-03-24 NOTE — PLAN OF CARE
03/24/24 1629   Final Note   Assessment Type Final Discharge Note   Anticipated Discharge Disposition Home-Health   Post-Acute Status   Post-Acute Authorization Home Health   Home Health Status Referrals Sent   Discharge Delays None known at this time     Patient cleared for discharge from case management standpoint.    Waiting to be accepted by a home health provider. Referral sent via [facility]   Chart and discharge orders reviewed.  Patient discharged home with no further case management needs.

## 2024-03-24 NOTE — PLAN OF CARE
Problem: Occupational Therapy  Goal: Occupational Therapy Goal  Description: Goals to be met by: 4/7/2024     Patient will increase functional independence with ADLs by performing:    LE Dressing with Supervision and Assistive Devices as needed.  Grooming while standing at sink with Supervision.  Toileting from toilet with Supervision for hygiene and clothing management.   Bathing from  shower chair/bench with Supervision and Assistive Devices as needed.  Toilet transfer to toilet with Supervision.  100% adherence of sternal precautions with ADLS, mobility without verbal cues    Outcome: Ongoing, Progressing

## 2024-03-24 NOTE — CONSULTS
[facility]  Department of Cardiology  Consult Note      PATIENT NAME: Jose F Hanley    MRN: [id number]  TODAY'S DATE: 2024  ADMIT DATE: 3/22/2024                          CONSULT REQUESTED BY: Dana Sun MD    SUBJECTIVE     PRINCIPAL PROBLEM: Acute hypoxemic respiratory failure      REASON FOR CONSULT:   Shortness of breath      HPI:   78-year-old gentleman with history of known coronary artery disease and post bypass surgery 2024 and postoperative congestive heart failure had history of TAVR and Watchman procedure and persisted to have chronic atrial fibrillation was readmitted with increasing shortness of breath.  Patient was receiving torsemide only once a day.  He is increased weight gain shortness of breath for about a week duration prompted readmission he is responded very well to initial therapy intravenous diuretic  Denies having chest discomfort and no significant palpitations noted.         HPI: 78 WM with pmh of CAD and CABG x2  repeat in 2024 ,  HTN,   COPD on oxygen at night,  ex smoker .and recently ex drinker.   Afib not on blood thinners now.   He has hx of TAVR and Watchman procedure.          He lives at home with wife      He quit drinking when he had his second cabg few weeks ago .   He went into withdrawal during that hospital stay .     He started drinking in  when his wife  of cancer.           After his Recent CABG 24 he went to rehab facility which is where he still is.    They noticed that his Room air oxygen sats have been low and they were unable to get them up so they sent him to the hospital and he requested to go back to [facility] .      Review of patient's allergies indicates:   Allergen Reactions    Adhes. band-tape-benzalkonium Rash     Pt stated it caused blisters    Statins-hmg-coa reductase inhibitors Other (See Comments)     Statin Intolerance (joint pain)       Past Medical History:   Diagnosis Date    A-fib      Allergies 2020    gets allergy shots    Arthritis 1973    right knee    Back pain     COPD (chronic obstructive pulmonary disease)     Diastolic heart failure     GERD (gastroesophageal reflux disease) 2015    HLD (hyperlipidemia)     HTN (hypertension)     Hx of LASIK     Hypothyroidism, unspecified 2012    Inflammatory disease of prostate, unspecified     out of medicine    Mild intermittent asthma, uncomplicated     Myocardial infarction     Neuropathy     On home oxygen therapy     3L NC  at night    PAD (peripheral artery disease)     Staph skin infection 2016    right thigh    TIA (transient ischemic attack) 2013     Past Surgical History:   Procedure Laterality Date    AORTOGRAPHY WITH SERIALOGRAPHY N/A 08/31/2023    Procedure: AORTOGRAM, WITH SERIALOGRAPHY;  Surgeon: Gary Thomas MD;  Location: [facility] CATH/EP LAB;  Service: Peripheral Vascular;  Laterality: N/A;    CATARACT EXTRACTION Bilateral 2014    CORONARY ANGIOGRAPHY INCLUDING BYPASS GRAFTS WITH CATHETERIZATION OF LEFT HEART Left 01/12/2024    Procedure: ANGIOGRAM, CORONARY, INCLUDING BYPASS GRAFT, WITH LEFT HEART CATHETERIZATION;  Surgeon: Gregg Bush MD;  Location: [facility] CATH/EP LAB;  Service: Cardiology;  Laterality: Left;    CORONARY ARTERY BYPASS GRAFT  2010    ENDOSCOPIC HARVEST OF VEIN Right 2/23/2024    Procedure: HARVEST-VEIN-ENDOVASCULAR;  Surgeon: Gary Thomas MD;  Location: [facility] OR;  Service: Cardiothoracic;  Laterality: Right;    INSERTION OF IMPLANTABLE LOOP RECORDER  2011    INSERTION OF INTRA-AORTIC BALLOON ASSIST DEVICE Right 2/23/2024    Procedure: INSERTION, INTRA-AORTIC BALLOON PUMP;  Surgeon: Gary Thomas MD;  Location: [facility] OR;  Service: Cardiothoracic;  Laterality: Right;    INSERTION OF PACEMAKER  2021    does not have card with him for preadmit    INSTANTANEOUS WAVE-FREE RATIO (IFR) N/A 01/12/2024    Procedure: Instantaneous Wave-Free Ratio (IFR);  Surgeon: Gregg Bush MD;  Location: [facility]  CATH/EP LAB;  Service: Cardiology;  Laterality: N/A;    PTA, ANTERIOR TIBIAL Left 2023    Procedure: PTA, Anterior Tibial;  Surgeon: Gary Thomas MD;  Location: [facility] CATH/EP LAB;  Service: Peripheral Vascular;  Laterality: Left;    PTA, SUPERFICIAL FEMORAL ARTERY Left 2023    Procedure: PTA, Superficial Femoral Artery;  Surgeon: Gary Thomas MD;  Location: [facility] CATH/EP LAB;  Service: Peripheral Vascular;  Laterality: Left;    REPEAT CORONARY ARTERY BYPASS GRAFTING N/A 2024    Procedure: CABG, REPEAT;  Surgeon: Gary Thomas MD;  Location: [facility] OR;  Service: Cardiothoracic;  Laterality: N/A;    SPLENECTOMY  2016    STENT, ANTERIOR TIBIAL Left 2023    Procedure: Stent, Anterior Tibial;  Surgeon: Gary Thomas MD;  Location: [facility] CATH/EP LAB;  Service: Peripheral Vascular;  Laterality: Left;    SURGICAL PROCUREMENT, ARTERY, RADIAL, FOR CABG  2024    Procedure: SURGICAL PROCUREMENT,ARTERY,RADIAL,FOR CABG;  Surgeon: Gary Thomas MD;  Location: [facility] OR;  Service: Cardiothoracic;;    watchman heart device       Social History     Tobacco Use    Smoking status: Former     Current packs/day: 0.00     Types: Cigarettes     Quit date: 1987     Years since quittin.3    Smokeless tobacco: Never   Substance Use Topics    Alcohol use: Not Currently     Alcohol/week: 21.0 standard drinks of alcohol     Types: 21 Drinks containing 0.5 oz of alcohol per week     Comment: ed roberto mixed drinks    Drug use: No        REVIEW OF SYSTEMS  CONSTITUTIONAL: Negative for chills, fatigue and fever.   EYES: No double vision, No blurred vision  NEURO: No headaches, No dizziness  RESPIRATORY:   Progressive shortness of breath with some cough and congestion noted.  CARDIOVASCULAR: Negative for chest pain. Negative for palpitations and leg swelling.   GI: Negative for abdominal pain, No melena, diarrhea, nausea and vomiting.   : Negative for dysuria and  frequency, Negative for hematuria  SKIN: Negative for bruising, Negative for edema or discoloration noted.   ENDOCRINE: Negative for polyphagia, Negative for heat intolerance, Negative for cold intolerance  PSYCHIATRIC: Negative for depression, Negative for anxiety, Negative for memory loss  MUSCULOSKELETAL: Negative for neck pain, Negative for muscle weakness, Negative for back pain     OBJECTIVE     VITAL SIGNS (Most Recent)  Temp: 97.6 °F (36.4 °C) (03/24/24 1116)  Pulse: 67 (03/24/24 1310)  Resp: 20 (03/24/24 1310)  BP: (!) 124/59 (03/24/24 1116)  SpO2: 96 % (03/24/24 1310)    VENTILATION STATUS  Resp: 20 (03/24/24 1310)  SpO2: 96 % (03/24/24 1310)           I & O (Last 24H):  Intake/Output Summary (Last 24 hours) at 3/24/2024 1331  Last data filed at 3/24/2024 1113  Gross per 24 hour   Intake 840 ml   Output 3200 ml   Net -2360 ml       WEIGHTS  Wt Readings from Last 1 Encounters:   03/23/24 1058 114.8 kg (253 lb 1.4 oz)   03/23/24 0306 116.3 kg (256 lb 6.3 oz)   03/22/24 2341 124.7 kg (275 lb)       PHYSICAL EXAM  GENERAL: well built, well nourished, well-developed in no apparent distress alert and oriented.   HEENT: Normocephalic. Pupils normal and conjunctivae normal.  Mucous membranes normal, no cyanosis or icterus, trachea central,no pallor or icterus is noted..   NECK: No JVD. No bruit..   THYROID: Thyroid not enlarged. No nodules present..   CARDIAC:  irregular rhythm systolic murmur  CHEST ANATOMY: normal.   Surgical wounds are healing well  LUNGS: coarse rhonchi bilaterally diminished breath sounds at basis  ABDOMEN: Soft no masses or organomegaly.  No abdomen pulsations or bruits.  Normal bowel sounds. No pulsations and no masses felt, No guarding or rebound.   URINARY: No fitzgerald catheter   EXTREMITIES: No cyanosis, clubbing  mild edema noted at this time., no calf tenderness bilaterally.   PERIPHERAL VASCULAR SYSTEM: Good palpable distal pulses.   CENTRAL NERVOUS SYSTEM: No focal motor or sensory  deficits noted.   SKIN: Skin without lesions, moist, well perfused.   MUSCLE STRENGTH & TONE: No noteable weakness, atrophy or abnormal movement.     HOME MEDICATIONS:  No current facility-administered medications on file prior to encounter.     Current Outpatient Medications on File Prior to Encounter   Medication Sig Dispense Refill    acetaminophen (TYLENOL) 325 MG tablet Take 650 mg by mouth every 6 (six) hours as needed for Pain or Temperature greater than.      albuterol (PROVENTIL/VENTOLIN HFA) 90 mcg/actuation inhaler Inhale 2 puffs into the lungs every 6 (six) hours as needed for Wheezing or Shortness of Breath.      betamethasone valerate 0.1% (VALISONE) 0.1 % Lotn Apply 1 g topically 2 (two) times daily as needed.      cyclobenzaprine (FLEXERIL) 10 MG tablet Take 10 mg by mouth every 8 (eight) hours as needed for Muscle spasms.      diltiaZEM (CARDIZEM CD) 180 MG 24 hr capsule Take 1 capsule (180 mg total) by mouth once daily. 90 capsule 0    doxepin (SINEQUAN) 50 MG capsule Take 50 mg by mouth every evening.      DULoxetine (CYMBALTA) 60 MG capsule Take 60 mg by mouth nightly.      EPINEPHrine (EPIPEN) 0.3 mg/0.3 mL AtIn Inject 0.3 mg into the muscle once.      gabapentin (NEURONTIN) 300 MG capsule Take 300 mg by mouth 2 (two) times daily.      guaiFENesin (MUCINEX) 600 mg 12 hr tablet Take 2 tablets (1,200 mg total) by mouth 2 (two) times daily. 30 tablet 0    [START ON 4/7/2024] HYDROcodone-acetaminophen (NORCO)  mg per tablet Take 1 tablet by mouth every 12 (twelve) hours as needed for Pain.      levothyroxine (SYNTHROID) 50 MCG tablet Take 50 mcg by mouth before breakfast.      metoprolol tartrate (LOPRESSOR) 25 MG tablet Take 1 tablet (25 mg total) by mouth 2 (two) times daily. 120 tablet 0    montelukast (SINGULAIR) 10 mg tablet Take 10 mg by mouth once daily.      nitroGLYCERIN (NITROSTAT) 0.4 MG SL tablet Place 0.4 mg under the tongue every 5 (five) minutes as needed for Chest pain.       omega-3 fatty acids/fish oil (FISH OIL-OMEGA-3 FATTY ACIDS) 300-1,000 mg capsule Take 1 capsule by mouth 2 (two) times daily.      omeprazole (PRILOSEC) 20 MG capsule Take 40 mg by mouth once daily.      potassium chloride SA (K-DUR,KLOR-CON) 20 MEQ tablet Take 20 mEq by mouth nightly.      senna-docusate 8.6-50 mg (PERICOLACE) 8.6-50 mg per tablet Take 1 tablet by mouth nightly.      spironolactone (ALDACTONE) 25 MG tablet Take 1 tablet (25 mg total) by mouth 2 (two) times daily. (Patient taking differently: Take 25 mg by mouth once daily.) 120 tablet 0    tamsulosin (FLOMAX) 0.4 mg Cap Take 0.4 mg by mouth once daily.      torsemide (DEMADEX) 20 MG Tab Take 2 tablets (40 mg total) by mouth once daily. 120 tablet 0    aspirin 81 MG Chew Take 81 mg by mouth once daily.      ezetimibe (ZETIA) 10 mg tablet Take 1 tablet (10 mg total) by mouth every evening. (Patient not taking: Reported on 3/23/2024) 90 tablet 0       SCHEDULED MEDS:   albuterol-ipratropium  3 mL Nebulization Q6H    aspirin  81 mg Oral Daily    diltiaZEM  180 mg Oral Daily    DULoxetine  60 mg Oral Nightly    famotidine (PF)  20 mg Intravenous BID    furosemide (LASIX) injection  80 mg Intravenous BID    gabapentin  800 mg Oral BID    guaiFENesin  1,200 mg Oral BID    levothyroxine  50 mcg Oral Before breakfast    magnesium oxide  400 mg Oral BID    metoprolol tartrate  25 mg Oral BID    potassium chloride SA  20 mEq Oral Nightly    senna-docusate 8.6-50 mg  1 tablet Oral BID    spironolactone  25 mg Oral BID    tamsulosin  0.4 mg Oral Daily       CONTINUOUS INFUSIONS:    PRN MEDS:acetaminophen, acetaminophen, aluminum-magnesium hydroxide-simethicone, dextrose 50%, dextrose 50%, glucagon (human recombinant), glucose, glucose, HYDROcodone-acetaminophen, magnesium oxide, magnesium oxide, melatonin, naloxone, ondansetron, potassium bicarbonate, potassium bicarbonate, potassium bicarbonate, potassium, sodium phosphates, potassium, sodium phosphates,  "potassium, sodium phosphates, sodium chloride 0.9%, sodium chloride 0.9%    LABS AND DIAGNOSTICS     CBC LAST 3 DAYS  Recent Labs   Lab 03/23/24  0019   WBC 9.68   RBC 4.18*   HGB 9.1*   HCT 31.1*   MCV 74*   MCH 21.8*   MCHC 29.3*   RDW 19.7*      MPV 10.0   GRAN 54.8  5.3   LYMPH 25.5  2.5   MONO 13.3  1.3*   BASO 0.12   NRBC 0       COAGULATION LAST 3 DAYS  No results for input(s): "LABPT", "INR", "APTT" in the last 168 hours.    CHEMISTRY LAST 3 DAYS  Recent Labs   Lab 03/23/24  0019 03/23/24  0815 03/24/24  0501   * 133* 136   K 4.2 4.0 3.6   CL 93* 94* 95   CO2 30* 33* 33*   ANIONGAP 8 6* 8   BUN 23 18 16   CREATININE 1.2 1.0 0.9    153* 114*   CALCIUM 9.0 8.8 9.3   MG  --  1.7  --    ALBUMIN 3.6 3.6 3.6   PROT 6.8 6.9 6.7   ALKPHOS 82 83 77   ALT 9* 10 10   AST 22 15 15   BILITOT 0.5 0.6 0.6       CARDIAC PROFILE LAST 3 DAYS  Recent Labs   Lab 03/23/24  0019 03/23/24  0815   *  --    TROPONINIHS 18.3* 10.8       ENDOCRINE LAST 3 DAYS  Recent Labs   Lab 03/24/24  0501   PROCAL 0.152       LAST ARTERIAL BLOOD GAS  ABG  No results for input(s): "PH", "PO2", "PCO2", "HCO3", "BE" in the last 168 hours.    LAST 7 DAYS MICROBIOLOGY   Microbiology Results (last 7 days)       ** No results found for the last 168 hours. **            MOST RECENT IMAGING  Echo    Left Ventricle: The left ventricle is normal in size. Mildly increased   wall thickness. There is mild concentric hypertrophy. Normal wall motion.   Septal motion is consistent with bundle branch block. There is normal   systolic function with a visually estimated ejection fraction of 55 - 60%.   There is normal diastolic function.    Right Ventricle: Normal right ventricular cavity size. Wall thickness   is normal. Right ventricle wall motion  is normal. Systolic function is   normal.    Left Atrium: Left atrium is mildly dilated.    Aortic Valve: There is a transcatheter valve replacement in the aortic   position. There is severe " aortic valve sclerosis. Mildly restricted   motion. Aortic valve area by VTI is 1.83 cm². Aortic valve peak velocity   is 2.51 m/s. Mean gradient is 13 mmHg. The dimensionless index is 0.44.    Tricuspid Valve: There is moderate regurgitation with an eccentrically   directed jet.    Pulmonary Artery: There is mild to moderate pulmonary hypertension. The   estimated pulmonary artery systolic pressure is 45 mmHg.    IVC/SVC: Intermediate venous pressure at 8 mmHg.  X-Ray Chest AP Portable  Exam: Single view of the chest    Indication: Short of breath    Comparison: March 6, 2024    Findings:    Patient's had prior CABG and TAVR. Heart is enlarged and stable from prior. Pulmonary vascularity is congested with some interstitial edema and small pleural effusions.    Impression:    CHF.    Electronically signed by:  Gregg Hui MD  03/23/2024 06:25 AM CDT Workstation: [id number]   I have reviewed the chest x-ray my  self, its consistent with bilateral interstitial changes consistent with congestive heart failure and left pleural effusion    ECHOCARDIOGRAM RESULTS (last 5)  Results for orders placed during the hospital encounter of 03/22/24    Echo    Interpretation Summary    Left Ventricle: The left ventricle is normal in size. Mildly increased wall thickness. There is mild concentric hypertrophy. Normal wall motion. Septal motion is consistent with bundle branch block. There is normal systolic function with a visually estimated ejection fraction of 55 - 60%. There is normal diastolic function.    Right Ventricle: Normal right ventricular cavity size. Wall thickness is normal. Right ventricle wall motion  is normal. Systolic function is normal.    Left Atrium: Left atrium is mildly dilated.    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 There is severe aortic valve sclerosis. Mildly restricted motion. Aortic valve area by VTI is 1.83 cm². Aortic valve peak velocity is 2.51 m/s. Mean  gradient is 13 mmHg. The dimensionless index is 0.44.    Tricuspid Valve: There is moderate regurgitation with an eccentrically directed jet.    Pulmonary Artery: There is mild to moderate pulmonary hypertension. The estimated pulmonary artery systolic pressure is 45 mmHg.    IVC/SVC: Intermediate venous pressure at 8 mmHg.      Results for orders placed during the hospital encounter of 02/23/24    Echo    Interpretation Summary    Left Ventricle: Septal motion is consistent with bundle branch block. There is normal systolic function with a visually estimated ejection fraction of 60 - 65%.    Left Atrium: Left atrium is moderately dilated.    Right Atrium: Right atrium is mildly dilated.    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    A limited echo was performed using limited 2D. Overall the study quality was technically difficult. The study was difficult due to patient's clinical status and body habitus.      Results for orders placed during the hospital encounter of 12/13/23    Echo    Interpretation Summary    Left Ventricle: The left ventricle is normal in size. Mildly increased wall thickness. There is mild concentric hypertrophy. Normal wall motion. There is normal systolic function with a visually estimated ejection fraction of 65 - 70%. There is normal diastolic function.    Right Ventricle: Mild right ventricular enlargement. Wall thickness is normal. Right ventricle wall motion  is normal. Systolic function is normal.    Left Atrium: Left atrium is moderately dilated.    Right Atrium: Right atrium is mildly dilated.    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    Tricuspid Valve: There is mild regurgitation with a centrally directed jet.    Pulmonic Valve: There is mild regurgitation with a centrally directed jet.    Pulmonary Artery: There is mild pulmonary hypertension. The estimated pulmonary artery systolic pressure is 40  mmHg.    IVC/SVC: Intermediate venous pressure at 8 mmHg.      CURRENT/PREVIOUS VISIT EKG  Results for orders placed or performed during the hospital encounter of 03/22/24   EKG 12-lead    Collection Time: 03/23/24  6:31 AM   Result Value Ref Range    QRS Duration 184 ms    [facility] QTC Calculation 548 ms    Narrative    Test Reason : R07.9,    Vent. Rate : 090 BPM     Atrial Rate : 119 BPM     P-R Int : 000 ms          QRS Dur : 184 ms      QT Int : 448 ms       P-R-T Axes : 000 241 035 degrees     QTc Int : 548 ms    Atrial fibrillation with premature ventricular or aberrantly conducted  complexes  Right bundle branch block  Cannot rule out Inferior infarct (cited on or before 22-MAR-2024)  Abnormal ECG  When compared with ECG of 22-MAR-2024 23:36,  No significant change was found    Referred By: [email or web address]   SELF           Confirmed By:            ASSESSMENT/PLAN:     Active Hospital Problems    Diagnosis    *Acute hypoxemic respiratory failure    CHF (congestive heart failure)    Anemia    Hyponatremia     -         ASSESSMENT & PLAN:    Acute decompensation of chronic congestive heart failure with preserved ejection fraction   Chronic persistent atrial fibrillation   Watchman device in Situ   Status post TAVR   Component of COPD   hypoxemia      RECOMMENDATIONS:   1. Discontinue intravenous Lasix   2.  Start him on torsemide 40 mg p.o. b.I.d. for 1 week and then reduce it to daily thereafter   3.  Spironolactone 25 mg p.o. b.I.d. for 1 week and then reduce it to daily thereafter   4.  Continue on diltiazem 180 mg daily for rate control   5. Patient has Watchman device in Situ he does not need oral anticoagulation therapy   6.  Maintain on aspirin therapy in view of recent bypass surgery   7.  Continue metoprolol tartrate 25 mg p.o. b.I.d. hold for heart rate less than 60   8. Daily weight management   9. Encouraged patient to maintain on 1200 cc fluid restriction along with 2 g sodium diet   10. Patient can maintain  on Pepcid 40 mg p.o. daily     I have discussed these changes with the patient, hospitalist team as well   And if it remains stable he can be discharged with outpatient follow-up in 7-10 days' time    Thank you for the consult.     Jaun Miller MD  Date of Service: 03/24/2024  1:31 PM

## 2024-03-24 NOTE — PT/OT/SLP EVAL
Occupational Therapy   Evaluation, tx    Name: Jose F Hanley  MRN: [id number]  Admitting Diagnosis: Acute hypoxemic respiratory failure  Recent Surgery: * No surgery found *    Diagnoses of COPD (chronic obstructive pulmonary disease), Heart failure, Chest pain, and CHF (congestive heart failure) were pertinent to this visit.    Recommendations:     Discharge Recommendations: Moderate Intensity Therapy  Discharge Equipment Recommendations:  shower chair  Barriers to discharge:  None    Assessment:     Jose F Hanley is a 78 y.o. male with a medical diagnosis of Acute hypoxemic respiratory failure.  He presents with Performance deficits affecting function: weakness, impaired endurance, impaired self care skills, impaired functional mobility, gait instability, impaired balance, decreased safety awareness, impaired cardiopulmonary response to activity.  Pt had recent CABG and was admitted from rehab. Pt expressed his eagerness to come home and not return to rehab. He was educated in benefits for post acute therapies to maximize recovery outcome. Pt performed self care activities with Indep-Min A, ambulated CGA with RW. Spouse training initiated in above. Gait belt issued. Continue with OT POC.     Rehab Prognosis: Good; patient would benefit from acute skilled OT services to address these deficits and reach maximum level of function.       Plan:     Patient to be seen 5 x/week to address the above listed problems via self-care/home management, therapeutic activities, therapeutic exercises  Plan of Care Expires: 04/24/24  Plan of Care Reviewed with: patient, spouse    Subjective     Chief Complaint: none  Patient/Family Comments/goals: I have been in the hospital for 30 days and I want to go home. My wife can't take this either anymore, its hard on her and she can't sleep on that sofa anymore of she will be in the hospital. Goal: I want to get back to working in my garden.    Occupational Profile:  Living Environment: pt  lives with spouse in [facility] with ~ 6 PRECIOUS, grandson staying with them. Pt has a walk-in shower with a narrow built n shower seat   Previous level of function: Indep-Mod I ADLS with rollator, drives  Roles and Routines: gardens.   Equipment Used at Home: oxygen, raised toilet, rollator (an old wc he does not use)  Assistance upon Discharge: spouse, 19 y/o grandson    Pain/Comfort:  Pain Rating 1: 0/10  Pain Rating Post-Intervention 1: 0/10    Patients cultural, spiritual, Restorationist conflicts given the current situation: no    Objective:     Communicated with: nurse prior to session.  Patient found sitting edge of bed with telemetry, oxygen upon OT entry to room.    General Precautions: Standard, fall, respiratory, sternal  Orthopedic Precautions: N/A  Braces: N/A  Respiratory Status: Nasal cannula, flow 3 L/min    Occupational Performance:      Functional Mobility/Transfers:  Patient completed Sit <> Stand Transfer with contact guard assistance and 2 trials -bed height raised to simulate height of his bed at home; minor posterior LOB,  forward/backward momentum rocking used and sternal precautions adhered to  with  rolling walker   Functional Mobility: ambulated CGA with RW short distance in room to sink and back, no LOB, extended o2 tubing added for reach.    Activities of Daily Living:  Feeding:  independence .  Grooming: contact guard assistance washed hands, face, brushed teeth and combed hair standing inside RW at sink for balance safety;pt needed vc to stand inside RW as he attempted to place walker off to side of sink reducing his safety  Upper Body Dressing: modified independence donned /doffed pullover tshirt seated EOB; adhered to sternal precautions  Lower Body Dressing: minimum assistance: spouse assisted him earlier threading legs in pants;modified independence donned/doffed slip on slippers seated EOB;  Toileting: declined use; simulated SBA-vc to avoid reaching behind back to pull up  pants    Cognitive/Visual Perceptual:  Cognitive/Psychosocial Skills:     -       Oriented to: Person, Place, Time, and Situation   -       Follows Commands/attention:Follows two-step commands  -       Communication: clear/fluent  -       Memory: Poor immediate recall  -       Safety awareness/insight to disability: impaired   -       Mood/Affect/Coping skills/emotional control: Cooperative  Visual/Perceptual:      -Intact .    Physical Exam:  Balance:    -       sitting: good  dynamic: good-  standing; fair  dynamic: poor plus  Postural examination/scapula alignment:    -       No postural abnormalities identified  Skin integrity: incisional scar no bandage looks healed  Sensation: chronic neuropathy in B feet  Dominant hand:    -       right  Upper Extremity Range of Motion:     -       Right Upper Extremity: WFL  -       Left Upper Extremity: WFL  Upper Extremity Strength:    -       Right Upper Extremity: WFL  -       Left Upper Extremity: WFL   Strength:    -       Right Upper Extremity: WFL  -       Left Upper Extremity: WFL    AMPAC 6 Click ADL:  AMPAC Total Score: 21    Treatment & Education:  Purpose of OT and POC  Pt. seen for self care retraining and functional mobility retraining with adapted techniques and modifications as stated above.  Pt re-educated in sternal precautions: minimal cues needed to remind pt   Family training and education initiated in above areas. Gait belt issued and pt/spouse trained in use.  Call staff for back to bed and OOB assist for fall prevention. Spouse and pt verbalized understanding.  Dc planning: educated in differences between HH vs SNF. DME rec; shower chair with back and height necessary for chair to be for safe sitting and sit<>stand. They verbalized understanding.    Patient left sitting edge of bed with all lines intact, call button in reach,  notified, and spouse present    GOALS:   Multidisciplinary Problems       Occupational Therapy Goals           Problem: Occupational Therapy    Goal Priority Disciplines Outcome Interventions   Occupational Therapy Goal     OT, PT/OT Ongoing, Progressing    Description: Goals to be met by: 4/7/2024     Patient will increase functional independence with ADLs by performing:    LE Dressing with Supervision and Assistive Devices as needed.  Grooming while standing at sink with Supervision.  Toileting from toilet with Supervision for hygiene and clothing management.   Bathing from  shower chair/bench with Supervision and Assistive Devices as needed.  Toilet transfer to toilet with Supervision.  100% adherence of sternal precautions with ADLS, mobility without verbal cues                         History:     Past Medical History:   Diagnosis Date    A-fib     Allergies 2020    gets allergy shots    Arthritis 1973    right knee    Back pain     COPD (chronic obstructive pulmonary disease)     Diastolic heart failure     GERD (gastroesophageal reflux disease) 2015    HLD (hyperlipidemia)     HTN (hypertension)     Hx of LASIK     Hypothyroidism, unspecified 2012    Inflammatory disease of prostate, unspecified     out of medicine    Mild intermittent asthma, uncomplicated     Myocardial infarction     Neuropathy     On home oxygen therapy     3L NC  at night    PAD (peripheral artery disease)     Staph skin infection 2016    right thigh    TIA (transient ischemic attack) 2013         Past Surgical History:   Procedure Laterality Date    AORTOGRAPHY WITH SERIALOGRAPHY N/A 08/31/2023    Procedure: AORTOGRAM, WITH SERIALOGRAPHY;  Surgeon: Gary Thomas MD;  Location: [facility] CATH/EP LAB;  Service: Peripheral Vascular;  Laterality: N/A;    CATARACT EXTRACTION Bilateral 2014    CORONARY ANGIOGRAPHY INCLUDING BYPASS GRAFTS WITH CATHETERIZATION OF LEFT HEART Left 01/12/2024    Procedure: ANGIOGRAM, CORONARY, INCLUDING BYPASS GRAFT, WITH LEFT HEART CATHETERIZATION;  Surgeon: Gregg Bush MD;  Location: [facility] CATH/EP LAB;  Service:  Cardiology;  Laterality: Left;    CORONARY ARTERY BYPASS GRAFT  2010    ENDOSCOPIC HARVEST OF VEIN Right 2/23/2024    Procedure: HARVEST-VEIN-ENDOVASCULAR;  Surgeon: Gary Thomas MD;  Location: [facility];  Service: Cardiothoracic;  Laterality: Right;    INSERTION OF IMPLANTABLE LOOP RECORDER  2011    INSERTION OF INTRA-AORTIC BALLOON ASSIST DEVICE Right 2/23/2024    Procedure: INSERTION, INTRA-AORTIC BALLOON PUMP;  Surgeon: Gary Thomas MD;  Location: [facility];  Service: Cardiothoracic;  Laterality: Right;    INSERTION OF PACEMAKER  2021    does not have card with him for preadmit    INSTANTANEOUS WAVE-FREE RATIO (IFR) N/A 01/12/2024    Procedure: Instantaneous Wave-Free Ratio (IFR);  Surgeon: Gregg Bush MD;  Location: [facility] CATH/EP LAB;  Service: Cardiology;  Laterality: N/A;    PTA, ANTERIOR TIBIAL Left 08/31/2023    Procedure: PTA, Anterior Tibial;  Surgeon: Gary Thomas MD;  Location: [facility] CATH/EP LAB;  Service: Peripheral Vascular;  Laterality: Left;    PTA, SUPERFICIAL FEMORAL ARTERY Left 08/31/2023    Procedure: PTA, Superficial Femoral Artery;  Surgeon: Gary Thomas MD;  Location: [facility] CATH/EP LAB;  Service: Peripheral Vascular;  Laterality: Left;    REPEAT CORONARY ARTERY BYPASS GRAFTING N/A 2/23/2024    Procedure: CABG, REPEAT;  Surgeon: Gary Thomas MD;  Location: [facility];  Service: Cardiothoracic;  Laterality: N/A;    SPLENECTOMY  2016    STENT, ANTERIOR TIBIAL Left 08/31/2023    Procedure: Stent, Anterior Tibial;  Surgeon: Gary Thomas MD;  Location: [facility] CATH/EP LAB;  Service: Peripheral Vascular;  Laterality: Left;    SURGICAL PROCUREMENT, ARTERY, RADIAL, FOR CABG  2/23/2024    Procedure: SURGICAL PROCUREMENT,ARTERY,RADIAL,FOR CABG;  Surgeon: Gary Thomas MD;  Location: [facility];  Service: Cardiothoracic;;    watchman heart device  2019       Time Tracking:     OT Date of Treatment: 03/24/24  OT Start Time: 1337  OT Stop Time: 1401  OT  Total Time (min): 24 min    Billable Minutes:Evaluation 10  Self Care/Home Management 14  Total Time 24    3/24/2024

## 2024-03-24 NOTE — HOSPITAL COURSE
Patient was admitted to the hospital with CHF exacerbation and acute on chronic respiratory failure.  The patient was monitored closely throughout his hospital stay.  Patient was started on IV diuresis.  Cardiology was consulted.  Patient was diuresed 3.5L throughout his hospitalization.  Patient's oxygen was weaned down to 3 L which patient wears p.r.n. at home.  PT/OT were consulted and recommended moderate intensive therapy which the patient and patient's wife both declined.  Cardiology recommended torsemide 40 mg b.i.d. and spironolactone 25 b.i.d. for 7 days then decrease both medications to daily.  Patient was seen on day of discharge.  Patient was very eager for discharge.  Patient again declined SNF stated he wanted to go home.  Home health was ordered at discharge.  Patient was cleared for discharge by Cardiology.  Patient to follow up with PCP, Cardiology, and pulmonology on discharge.  Patient already had home oxygen set up.  Patient instructed to wear home oxygen.  Return precautions discussed and patient voiced understanding.  All questions answered.

## 2024-03-25 NOTE — PLAN OF CARE
03/25/24 1439   Discharge Reassessment   Assessment Type Discharge Planning Reassessment   Did the patient's condition or plan change since previous assessment? Yes     Pt accept by [facility], SOC 3/26/2024

## 2024-04-01 ENCOUNTER — PATIENT OUTREACH (OUTPATIENT)
Dept: ADMINISTRATIVE | Facility: CLINIC | Age: 79
End: 2024-04-01
Payer: MEDICARE

## 2024-04-01 NOTE — PROGRESS NOTES
C3 nurse spoke with Jose F Hanley  for a TCC post hospital discharge follow up call. The patient has a scheduled HOSFU appointment with Sunita Rivera MD  on 4/8/24 @ 2947.

## 2024-04-04 ENCOUNTER — OFFICE VISIT (OUTPATIENT)
Dept: CARDIOLOGY | Facility: CLINIC | Age: 79
End: 2024-04-04
Payer: MEDICARE

## 2024-04-04 VITALS
WEIGHT: 251 LBS | HEART RATE: 84 BPM | SYSTOLIC BLOOD PRESSURE: 120 MMHG | RESPIRATION RATE: 16 BRPM | HEIGHT: 72 IN | OXYGEN SATURATION: 84 % | DIASTOLIC BLOOD PRESSURE: 64 MMHG | BODY MASS INDEX: 34 KG/M2

## 2024-04-04 DIAGNOSIS — Z95.1 S/P CABG (CORONARY ARTERY BYPASS GRAFT): Primary | ICD-10-CM

## 2024-04-04 DIAGNOSIS — E07.9 THYROID DYSFUNCTION: ICD-10-CM

## 2024-04-04 DIAGNOSIS — I48.21 PERMANENT ATRIAL FIBRILLATION: ICD-10-CM

## 2024-04-04 DIAGNOSIS — J42 CHRONIC BRONCHITIS, UNSPECIFIED CHRONIC BRONCHITIS TYPE: ICD-10-CM

## 2024-04-04 DIAGNOSIS — Z95.2 HISTORY OF TRANSCATHETER AORTIC VALVE REPLACEMENT (TAVR): ICD-10-CM

## 2024-04-04 DIAGNOSIS — E66.01 SEVERE OBESITY (BMI 35.0-39.9) WITH COMORBIDITY: ICD-10-CM

## 2024-04-04 DIAGNOSIS — Z95.818 PRESENCE OF WATCHMAN LEFT ATRIAL APPENDAGE CLOSURE DEVICE: ICD-10-CM

## 2024-04-04 DIAGNOSIS — D62 ACUTE BLOOD LOSS ANEMIA: ICD-10-CM

## 2024-04-04 DIAGNOSIS — I50.32 CHRONIC DIASTOLIC CONGESTIVE HEART FAILURE: ICD-10-CM

## 2024-04-04 PROCEDURE — 99999 PR PBB SHADOW E&M-EST. PATIENT-LVL III: CPT | Mod: PBBFAC,,, | Performed by: INTERNAL MEDICINE

## 2024-04-04 PROCEDURE — 99215 OFFICE O/P EST HI 40 MIN: CPT | Mod: S$PBB,,, | Performed by: INTERNAL MEDICINE

## 2024-04-04 PROCEDURE — 99213 OFFICE O/P EST LOW 20 MIN: CPT | Mod: PBBFAC,PN,25 | Performed by: INTERNAL MEDICINE

## 2024-04-04 PROCEDURE — 93010 ELECTROCARDIOGRAM REPORT: CPT | Mod: S$PBB,,, | Performed by: INTERNAL MEDICINE

## 2024-04-04 PROCEDURE — 93005 ELECTROCARDIOGRAM TRACING: CPT | Mod: PBBFAC,PN | Performed by: INTERNAL MEDICINE

## 2024-04-04 RX ORDER — METHYLPREDNISOLONE 4 MG/1
TABLET ORAL
Qty: 21 EACH | Refills: 0 | Status: SHIPPED | OUTPATIENT
Start: 2024-04-04 | End: 2024-04-25

## 2024-04-04 NOTE — ASSESSMENT & PLAN NOTE
Patient is identified as having Diastolic (HFpEF) heart failure that is Chronic. CHF is currently controlled. Latest ECHO performed and demonstrates- Results for orders placed during the hospital encounter of 03/22/24    Echo    Interpretation Summary    Left Ventricle: The left ventricle is normal in size. Mildly increased wall thickness. There is mild concentric hypertrophy. Normal wall motion. Septal motion is consistent with bundle branch block. There is normal systolic function with a visually estimated ejection fraction of 55 - 60%. There is normal diastolic function.    Right Ventricle: Normal right ventricular cavity size. Wall thickness is normal. Right ventricle wall motion  is normal. Systolic function is normal.    Left Atrium: Left atrium is mildly dilated.    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 There is severe aortic valve sclerosis. Mildly restricted motion. Aortic valve area by VTI is 1.83 cm². Aortic valve peak velocity is 2.51 m/s. Mean gradient is 13 mmHg. The dimensionless index is 0.44.    Tricuspid Valve: There is moderate regurgitation with an eccentrically directed jet.    Pulmonary Artery: There is mild to moderate pulmonary hypertension. The estimated pulmonary artery systolic pressure is 45 mmHg.    IVC/SVC: Intermediate venous pressure at 8 mmHg.  . Continue Beta Blocker, Furosemide, and Aldactone and monitor clinical status closely. Monitor on telemetry. Patient is on CHF pathway.  Monitor strict Is&Os and daily weights.  Place on fluid restriction of 1.5 L. Cardiology has been consulted. Continue to stress to patient importance of self efficacy and  on diet for CHF. Last BNP reviewed- and noted below 386

## 2024-04-04 NOTE — ASSESSMENT & PLAN NOTE
Patient has permanent atrial fibrillation although rates are reasonably controlled 76 beats per minute.  Continue on present therapy including low-dose of metoprolol

## 2024-04-04 NOTE — PROGRESS NOTES
Subjective:    Patient ID:  Jose F Hanley is a 78 y.o. male patient here for evaluation Hospital Follow Up      History of Present Illness:   Patient is a 78-year-old gentleman who had history of known coronary artery disease had coronary artery bypass surgery repeat in 02/23/2024 has history of arterial hypertension severe COPD on oxygen supplements at night since his discharge from the hospital 03/24/2024 he has been using oxygen round-the-clock.  Has history of severe COPD and also history of paroxysmal atrial fibrillation.  Prior history of TAVR and Watchman procedure.  Since discharge he has not progressing as rapidly apparently.  And he has some cough congestion still persisting mostly mucoid sputum production.  No fevers or chills noted.  He is using torsemide 20 mg daily and also spironolactone 25 mg a day.  He quit alcohol since his bypass surgery February 22, 2024.    Mild swelling in the lower extremities noted left is worse than the right.  Today apparently he ran out of his oxygen and he was hypoxemic with an O2 resection 76% in the office.  He was placed on oxygen supplements at 3 L with improvement of O2 saturation    Details of his last admission is as follows        Discharge Summary        Patient Name: Jose F Hanley  MRN: [id number]  XIOMARA: [id number]  Patient Class: IP- Inpatient  Admission Date: 3/22/2024  Hospital Length of Stay: 1 days  Discharge Date and Time:  03/24/2024 4:31 PM  Attending Physician: Dana Sun MD   Discharging Provider: Padmaja Lovelace PA-C  Primary Care Provider: Sunita Rivera MD     Primary Care Team: Networked reference to record PCT      HPI:   78 WM with pmh of CAD and CABG x2  repeat in 2/23/2024 ,  HTN,   COPD on oxygen at night,  ex smoker .and recently ex drinker.   Afib not on blood thinners now.   He has hx of TAVR and Watchman procedure.          He lives at home with wife      He quit drinking when he had his second cabg few weeks ago .   He went  into withdrawal during that hospital stay .     He started drinking in  when his wife  of cancer.           After his Recent CABG 24 he went to rehab facility which is where he still is.    They noticed that his Room air oxygen sats have been low and they were unable to get them up so they sent him to the hospital and he requested to go back to [facility] .       I pasted the Hospital course summary below since so much had happened recently .       He .had elective post op CABG was done and  admitted to ICU.   Extubated to BiPAP, developed encephalopathy secondary to DT and needed precedex.    Postop course was complicated with DT and also atrial fibrillation and heart failure and possible abdominal source of infection and treated and completed Zosyn.   Chest and mediastinal tubes and pacer wires and Balloon pump was removed .  Patient had brief period of possible paralytic ileus with bowel obstruction but patient had bowel movement and which has resolved.   Patient has history of  TIANA, on BiPAP q.h.s.while  sleeping and continue on high-flow nasal cannula 6 L and saturating 96% and later able to wean down to 3 L which is  his baseline   He needed Cardizem drip for AFib with RVR but currently getting controlled with p.o. Cardizem.  Later patient dyspnea improved and cardiology changed Lasix to torsemide and continue Aldactone and patient was placed the nursing home and follow up with Cardiology and Cardiothoracic surgery in 1 week.  Cardiology and Pulmonary did not recommend anticoagulation at this time         So in our ER, he was needing several liters of oxygen to keep his sats above 90%     But he was in no distress  When I saw him he was sleeping and was not tachypnic    sats in low 90s     He was confused at first when I woke him up . But then he was able to answer my questions .      He denies weight gain.  Denies swelling in legs or belly.        Denies trouble laying flat.  I even  laid him flat in the bed and he was fine.        He has wheezing and cough but that's chronic       He said he noticed nothing wrong with his breathing         The CXR in ER does look like CHF   His sodium was low   Wbc normal   Anemia with low MCV  Troponin 18  And BNP was up some also         He was given 40 mg IV lasix      And we are admitting him for Hypoxemic respiratory failure      * No surgery found *       Hospital Course:   Patient was admitted to the hospital with CHF exacerbation and acute on chronic respiratory failure.  The patient was monitored closely throughout his hospital stay.  Patient was started on IV diuresis.  Cardiology was consulted.  Patient was diuresed 3.5L throughout his hospitalization.  Patient's oxygen was weaned down to 3 L which patient wears p.r.n. at home.  PT/OT were consulted and recommended moderate intensive therapy which the patient and patient's wife both declined.  Cardiology recommended torsemide 40 mg b.i.d. and spironolactone 25 b.i.d. for 7 days then decrease both medications to daily.  Patient was seen on day of discharge.  Patient was very eager for discharge.  Patient again declined SNF stated he wanted to go home.  Home health was ordered at discharge.  Patient was cleared for discharge by Cardiology.  Patient to follow up with PCP, Cardiology, and pulmonology on discharge.  Patient already had home oxygen set up.  Patient instructed to wear home oxygen.  Return precautions discussed and patient voiced understanding.  All questions answered.        Review of patient's allergies indicates:   Allergen Reactions    Adhes. band-tape-benzalkonium Rash     Pt stated it caused blisters    Statins-hmg-coa reductase inhibitors Other (See Comments)     Statin Intolerance (joint pain)       Past Medical History:   Diagnosis Date    A-fib     Allergies 2020    gets allergy shots    Arthritis 1973    right knee    Back pain     COPD (chronic obstructive pulmonary disease)      Diastolic heart failure     GERD (gastroesophageal reflux disease) 2015    HLD (hyperlipidemia)     HTN (hypertension)     Hx of LASIK     Hypothyroidism, unspecified 2012    Inflammatory disease of prostate, unspecified     out of medicine    Mild intermittent asthma, uncomplicated     Myocardial infarction     Neuropathy     On home oxygen therapy     3L NC  at night    PAD (peripheral artery disease)     Staph skin infection 2016    right thigh    TIA (transient ischemic attack) 2013     Past Surgical History:   Procedure Laterality Date    AORTOGRAPHY WITH SERIALOGRAPHY N/A 08/31/2023    Procedure: AORTOGRAM, WITH SERIALOGRAPHY;  Surgeon: Gary Thomas MD;  Location: [facility] CATH/EP LAB;  Service: Peripheral Vascular;  Laterality: N/A;    CATARACT EXTRACTION Bilateral 2014    CORONARY ANGIOGRAPHY INCLUDING BYPASS GRAFTS WITH CATHETERIZATION OF LEFT HEART Left 01/12/2024    Procedure: ANGIOGRAM, CORONARY, INCLUDING BYPASS GRAFT, WITH LEFT HEART CATHETERIZATION;  Surgeon: Gregg Bush MD;  Location: [facility] CATH/EP LAB;  Service: Cardiology;  Laterality: Left;    CORONARY ARTERY BYPASS GRAFT  2010    ENDOSCOPIC HARVEST OF VEIN Right 2/23/2024    Procedure: HARVEST-VEIN-ENDOVASCULAR;  Surgeon: Gary Thomas MD;  Location: [facility] OR;  Service: Cardiothoracic;  Laterality: Right;    INSERTION OF IMPLANTABLE LOOP RECORDER  2011    INSERTION OF INTRA-AORTIC BALLOON ASSIST DEVICE Right 2/23/2024    Procedure: INSERTION, INTRA-AORTIC BALLOON PUMP;  Surgeon: Gayr Thomas MD;  Location: [facility] OR;  Service: Cardiothoracic;  Laterality: Right;    INSERTION OF PACEMAKER  2021    does not have card with him for preadmit    INSTANTANEOUS WAVE-FREE RATIO (IFR) N/A 01/12/2024    Procedure: Instantaneous Wave-Free Ratio (IFR);  Surgeon: Gregg Bush MD;  Location: [facility] CATH/EP LAB;  Service: Cardiology;  Laterality: N/A;    PTA, ANTERIOR TIBIAL Left 08/31/2023    Procedure: PTA, Anterior Tibial;   Surgeon: Gary Thomas MD;  Location: [facility] CATH/EP LAB;  Service: Peripheral Vascular;  Laterality: Left;    PTA, SUPERFICIAL FEMORAL ARTERY Left 2023    Procedure: PTA, Superficial Femoral Artery;  Surgeon: Gary Thomas MD;  Location: [facility] CATH/EP LAB;  Service: Peripheral Vascular;  Laterality: Left;    REPEAT CORONARY ARTERY BYPASS GRAFTING N/A 2024    Procedure: CABG, REPEAT;  Surgeon: Gary Thomas MD;  Location: [facility] OR;  Service: Cardiothoracic;  Laterality: N/A;    SPLENECTOMY  2016    STENT, ANTERIOR TIBIAL Left 2023    Procedure: Stent, Anterior Tibial;  Surgeon: Gary Thomas MD;  Location: [facility] CATH/EP LAB;  Service: Peripheral Vascular;  Laterality: Left;    SURGICAL PROCUREMENT, ARTERY, RADIAL, FOR CABG  2024    Procedure: SURGICAL PROCUREMENT,ARTERY,RADIAL,FOR CABG;  Surgeon: Gary Thomas MD;  Location: [facility] OR;  Service: Cardiothoracic;;    watchman heart device  2019     Social History     Tobacco Use    Smoking status: Former     Current packs/day: 0.00     Types: Cigarettes     Quit date: 1987     Years since quittin.3    Smokeless tobacco: Never   Substance Use Topics    Alcohol use: Not Currently     Alcohol/week: 21.0 standard drinks of alcohol     Types: 21 Drinks containing 0.5 oz of alcohol per week     Comment: ed roberto mixed drinks    Drug use: No        Review of Systems:    As noted in HPI in addition      REVIEW OF SYSTEMS  CARDIOVASCULAR: No recent chest pain, palpitations, arm, neck, or jaw pain  RESPIRATORY: No recent fever,  Persistent cough and congestion noted.  And mucoid sputum production without purulence    : No blood in the urine  GI: No Nausea, vomiting, constipation, diarrhea, blood, or reflux.  MUSCULOSKELETAL: No myalgias  NEURO: No lightheadedness or dizziness  EYES: No Double vision, blurry, vision or headache   Edema in both lower extremities left is worse than the right.           Objective         Vitals:    04/04/24 1309   BP: 120/64   Pulse: 84   Resp: 16       LIPIDS - LAST 2   Lab Results   Component Value Date    TRIG 94 02/29/2024       CBC - LAST 2  Lab Results   Component Value Date    WBC 9.7 04/02/2024    WBC 9.68 03/23/2024    RBC 4.18 (L) 03/23/2024    RBC 3.74 (L) 03/07/2024    HGB 10.7 (L) 04/02/2024    HGB 9.1 (L) 03/23/2024    HCT 35.4 (L) 04/02/2024    HCT 31.1 (L) 03/23/2024    MCV 70 (L) 04/02/2024    MCV 74 (L) 03/23/2024    MCH 21 (L) 04/02/2024    MCH 21.8 (L) 03/23/2024    MCHC 30 (L) 04/02/2024    MCHC 29.3 (L) 03/23/2024    RDW 19.4 (H) 04/02/2024    RDW 19.7 (H) 03/23/2024     (H) 04/02/2024     03/23/2024    MPV 8.4 04/02/2024    MPV 10.0 03/23/2024    GRAN 5.3 03/23/2024    GRAN 54.8 03/23/2024    LYMPH 2.5 03/23/2024    LYMPH 25.5 03/23/2024    MONO 1.3 (H) 03/23/2024    MONO 13.3 03/23/2024    BASO 0.12 03/23/2024    BASO 0.13 03/07/2024    NRBC 0 03/23/2024    NRBC 2 (A) 03/07/2024       CHEMISTRY & LIVER FUNCTION - LAST 2  Lab Results   Component Value Date     03/24/2024     (L) 03/23/2024    K 3.6 03/24/2024    K 4.0 03/23/2024    CL 95 03/24/2024    CL 94 (L) 03/23/2024    CO2 33 (H) 03/24/2024    CO2 33 (H) 03/23/2024    ANIONGAP 8 03/24/2024    ANIONGAP 6 (L) 03/23/2024    BUN 16 03/24/2024    BUN 18 03/23/2024    CREATININE 0.9 03/24/2024    CREATININE 1.0 03/23/2024     (H) 03/24/2024     (H) 03/23/2024    CALCIUM 9.3 03/24/2024    CALCIUM 8.8 03/23/2024    PH 7.409 03/02/2024    PH 7.428 03/01/2024    MG 1.7 03/23/2024    MG 2.1 03/04/2024    ALBUMIN 3.6 03/24/2024    ALBUMIN 3.6 03/23/2024    PROT 6.7 03/24/2024    PROT 6.9 03/23/2024    ALKPHOS 77 03/24/2024    ALKPHOS 83 03/23/2024    ALT 10 03/24/2024    ALT 10 03/23/2024    AST 15 03/24/2024    AST 15 03/23/2024    BILITOT 0.6 03/24/2024    BILITOT 0.6 03/23/2024        CARDIAC PROFILE - LAST 2  Lab Results   Component Value Date     (H) 03/23/2024      (H) 03/04/2024    TROPONINIHS 10.8 03/23/2024    TROPONINIHS 18.3 (H) 03/23/2024        COAGULATION - LAST 2  Lab Results   Component Value Date    APTT 29.6 01/10/2024       ENDOCRINE & PSA - LAST 2  Lab Results   Component Value Date    HGBA1C 6.6 (A) 02/05/2024    HGBA1C 6.5 11/03/2023    TSH 2.171 03/01/2024    PROCAL 0.152 03/24/2024        ECHOCARDIOGRAM RESULTS  Results for orders placed during the hospital encounter of 03/22/24    Echo    Interpretation Summary    Left Ventricle: The left ventricle is normal in size. Mildly increased wall thickness. There is mild concentric hypertrophy. Normal wall motion. Septal motion is consistent with bundle branch block. There is normal systolic function with a visually estimated ejection fraction of 55 - 60%. There is normal diastolic function.    Right Ventricle: Normal right ventricular cavity size. Wall thickness is normal. Right ventricle wall motion  is normal. Systolic function is normal.    Left Atrium: Left atrium is mildly dilated.    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 There is severe aortic valve sclerosis. Mildly restricted motion. Aortic valve area by VTI is 1.83 cm². Aortic valve peak velocity is 2.51 m/s. Mean gradient is 13 mmHg. The dimensionless index is 0.44.    Tricuspid Valve: There is moderate regurgitation with an eccentrically directed jet.    Pulmonary Artery: There is mild to moderate pulmonary hypertension. The estimated pulmonary artery systolic pressure is 45 mmHg.    IVC/SVC: Intermediate venous pressure at 8 mmHg.      CURRENT/PREVIOUS VISIT EKG  Results for orders placed or performed during the hospital encounter of 03/22/24   EKG 12-lead    Collection Time: 03/23/24  6:31 AM   Result Value Ref Range    QRS Duration 184 ms    [facility] QTC Calculation 548 ms    Narrative    Test Reason : R07.9,    Vent. Rate : 090 BPM     Atrial Rate : 119 BPM     P-R Int : 000 ms          QRS Dur : 184 ms      QT  Int : 448 ms       P-R-T Axes : 000 241 035 degrees     QTc Int : 548 ms    Atrial fibrillation with premature ventricular or aberrantly conducted  complexes  Right bundle branch block  Cannot rule out Inferior infarct (cited on or before 22-MAR-2024)  Abnormal ECG  When compared with ECG of 22-MAR-2024 23:36,  No significant change was found    Referred By: [email or web address]   SELF           Confirmed By:      No valid procedures specified.   Results for orders placed during the hospital encounter of 12/13/23    Nuclear Stress - Cardiology Interpreted    Interpretation Summary    Abnormal myocardial perfusion scan.    There are two significant perfusion abnormalities.    Perfusion Abnormality #1 - There is a moderate to severe intensity, moderate sized, reversible perfusion abnormality that is consistent with ischemia in the basal to apical lateral wall(s).    Perfusion Abnormality #2 - There is a moderate to large sized, moderate to severe intensity, equivocal perfusion abnormality that is fixed in the basal to apical inferior wall(s). This finding is equivocal due to diaphragm shadow.    There are no other significant perfusion abnormalities.    The gated perfusion images showed an ejection fraction of 63% at rest. The gated perfusion images showed an ejection fraction of 60% post stress. Normal ejection fraction is greater than 53%.    There is normal wall motion at rest and post stress.    LV cavity size is normal at rest and normal at stress.    The ECG portion of the study is negative for ischemia.    The patient reported no chest pain during the stress test.    There were no arrhythmias during stress.    TID : 1.3    RECOMMEND ANGIOGRAPHIC EVALUATION FOR MORE DEFINITIVE DIAGNOSIS AND INTERVENTION    No valid procedures specified.    PHYSICAL EXAM  CONSTITUTIONAL: Well built, well nourished in no apparent distress  NECK: no carotid bruit, no JVD  LUNGS:  Diminished breath sounds coarse rhonchi bilaterally with some  expiratory wheezes   CHEST WALL: no tenderness  HEART:  Irregular rhythm systolic murmur no gallop   ABDOMEN: soft, non-tender; bowel sounds normal; no masses,  no organomegaly  EXTREMITIES: Extremities normal, at least 1+ edema in the left trace on the to 1+ in the right   NEURO: AAO X 3    I HAVE REVIEWED :    The vital signs, nurses notes, and all the pertinent radiology and labs.        Current Outpatient Medications   Medication Instructions    acetaminophen (TYLENOL) 650 mg, Oral, Every 6 hours PRN    albuterol (PROVENTIL/VENTOLIN HFA) 90 mcg/actuation inhaler 2 puffs, Inhalation, Every 6 hours PRN    aspirin 81 mg, Oral, Daily    betamethasone valerate 0.1% (VALISONE) 1 g, Topical (Top), 2 times daily PRN    cyclobenzaprine (FLEXERIL) 10 mg, Oral, Every 8 hours PRN    diltiaZEM (CARDIZEM CD) 180 mg, Oral, Daily    doxepin (SINEQUAN) 50 mg, Oral, Nightly    DULoxetine (CYMBALTA) 60 mg, Oral, Nightly    EPINEPHrine (EPIPEN) 0.3 mg, Intramuscular, Once    ezetimibe (ZETIA) 10 mg, Oral, Nightly    gabapentin (NEURONTIN) 300 mg, Oral, 2 times daily    guaiFENesin (MUCINEX) 1,200 mg, Oral, 2 times daily    [START ON 4/7/2024] HYDROcodone-acetaminophen (NORCO)  mg per tablet 1 tablet, Oral, Every 12 hours PRN    levothyroxine (SYNTHROID) 50 mcg, Oral, Before breakfast    metoprolol tartrate (LOPRESSOR) 25 mg, Oral, 2 times daily    montelukast (SINGULAIR) 10 mg, Oral, Daily    nitroGLYCERIN (NITROSTAT) 0.4 mg, Sublingual, Every 5 min PRN    omega-3 fatty acids/fish oil (FISH OIL-OMEGA-3 FATTY ACIDS) 300-1,000 mg capsule 1 capsule, Oral, 2 times daily    omeprazole (PRILOSEC) 40 mg, Oral, Daily    potassium chloride SA (K-DUR,KLOR-CON) 20 MEQ tablet 20 mEq, Oral, Nightly    senna-docusate 8.6-50 mg (PERICOLACE) 8.6-50 mg per tablet 1 tablet, Oral, Nightly    spironolactone (ALDACTONE) 25 MG tablet Take 1 tablet (25 mg total) by mouth 2 (two) times daily for 7 days, THEN 1 tablet (25 mg total) once daily.     tamsulosin (FLOMAX) 0.4 mg, Oral, Daily    torsemide (DEMADEX) 20 MG Tab Take 2 tablets (40 mg total) by mouth 2 (two) times a day for 7 days, THEN 2 tablets (40 mg total) once daily.      04/04/2024 EKG shows atrial fibrillation right bundle branch block morphology inferior infarct pattern.  I have personally reviewed his last chest x-ray recommend to repeat this this shows pulmonary congestion will obtain a two-view in the department  And also repeat the laboratory profile include CBC and CMP.  As well as BNP    Assessment & Plan     S/P CABG (coronary artery bypass graft) - x2 09/2010 - LIMA to LAD; SVG to OMB; grafts occluded 08/2012  Status post redo bypass surgery he is doing remarkably well from surgical standpoint and cardiac perspective.  I have encouraged him to continue on current therapy to include dual antiplatelet therapy, he is on metoprolol tartrate 25 mg p.o. b.i.d., to continue on torsemide 20 mg daily as well as spironolactone 25 mg daily.  Appears to be relatively stable on this    Presence of Watchman left atrial appendage closure device  No need for oral anticoagulation therapy as patient has a functioning Watchman device in Situ    Permanent atrial fibrillation  Patient has permanent atrial fibrillation although rates are reasonably controlled 76 beats per minute.  Continue on present therapy including low-dose of metoprolol    History of transcatheter aortic valve replacement (TAVR)  History of TAVR clinically stable    Chronic diastolic congestive heart failure  Patient is identified as having Diastolic (HFpEF) heart failure that is Chronic. CHF is currently controlled. Latest ECHO performed and demonstrates- Results for orders placed during the hospital encounter of 03/22/24    Echo    Interpretation Summary    Left Ventricle: The left ventricle is normal in size. Mildly increased wall thickness. There is mild concentric hypertrophy. Normal wall motion. Septal motion is consistent with  bundle branch block. There is normal systolic function with a visually estimated ejection fraction of 55 - 60%. There is normal diastolic function.    Right Ventricle: Normal right ventricular cavity size. Wall thickness is normal. Right ventricle wall motion  is normal. Systolic function is normal.    Left Atrium: Left atrium is mildly dilated.    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 There is severe aortic valve sclerosis. Mildly restricted motion. Aortic valve area by VTI is 1.83 cm². Aortic valve peak velocity is 2.51 m/s. Mean gradient is 13 mmHg. The dimensionless index is 0.44.    Tricuspid Valve: There is moderate regurgitation with an eccentrically directed jet.    Pulmonary Artery: There is mild to moderate pulmonary hypertension. The estimated pulmonary artery systolic pressure is 45 mmHg.    IVC/SVC: Intermediate venous pressure at 8 mmHg.  . Continue Beta Blocker, Furosemide, and Aldactone and monitor clinical status closely. Monitor on telemetry. Patient is on CHF pathway.  Monitor strict Is&Os and daily weights.  Place on fluid restriction of 1.5 L. Cardiology has been consulted. Continue to stress to patient importance of self efficacy and  on diet for CHF. Last BNP reviewed- and noted below 386    Acute blood loss anemia  This has continuing to improve.    Severe obesity (BMI 35.0-39.9) with comorbidity and TIANA  Encouraged to participate in phase 2 of cardiac rehab    Thyroid dysfunction  Continue on levothyroxine at 50 mcg before breakfast    Chronic bronchitis  Out encourage him to seek evaluation with a pulmonologist in the meanwhile continue on Singulair, Proventil rescue inhaler I will add a Medrol Dosepak for his symptom relief at this time.          No follow-ups on file.

## 2024-04-04 NOTE — ASSESSMENT & PLAN NOTE
Status post redo bypass surgery he is doing remarkably well from surgical standpoint and cardiac perspective.  I have encouraged him to continue on current therapy to include dual antiplatelet therapy, he is on metoprolol tartrate 25 mg p.o. b.i.d., to continue on torsemide 20 mg daily as well as spironolactone 25 mg daily.  Appears to be relatively stable on this

## 2024-04-04 NOTE — ASSESSMENT & PLAN NOTE
Out encourage him to seek evaluation with a pulmonologist in the meanwhile continue on Singulair, Proventil rescue inhaler I will add a Medrol Dosepak for his symptom relief at this time.

## 2024-04-08 NOTE — PLAN OF CARE
Pt's HH arranged with [facility] services and start of care 3/26/2024.     04/08/24 1545   Final Note   Assessment Type Final Discharge Note   Anticipated Discharge Disposition Home-Health   Post-Acute Status   Post-Acute Authorization Home Health   Home Health Status Set-up Complete/Auth obtained

## 2024-04-11 LAB
OHS QRS DURATION: 182 MS
OHS QRS DURATION: 184 MS
OHS QTC CALCULATION: 509 MS
OHS QTC CALCULATION: 548 MS

## 2024-05-21 LAB
OHS QRS DURATION: 174 MS
OHS QTC CALCULATION: 499 MS

## 2024-05-29 ENCOUNTER — OFFICE VISIT (OUTPATIENT)
Dept: PULMONOLOGY | Facility: CLINIC | Age: 79
End: 2024-05-29
Payer: MEDICARE

## 2024-05-29 VITALS
HEART RATE: 72 BPM | DIASTOLIC BLOOD PRESSURE: 60 MMHG | WEIGHT: 247 LBS | BODY MASS INDEX: 33.5 KG/M2 | OXYGEN SATURATION: 92 % | SYSTOLIC BLOOD PRESSURE: 121 MMHG

## 2024-05-29 DIAGNOSIS — J42 CHRONIC BRONCHITIS, UNSPECIFIED CHRONIC BRONCHITIS TYPE: ICD-10-CM

## 2024-05-29 PROCEDURE — 99214 OFFICE O/P EST MOD 30 MIN: CPT | Mod: S$PBB,,, | Performed by: INTERNAL MEDICINE

## 2024-05-29 PROCEDURE — 99214 OFFICE O/P EST MOD 30 MIN: CPT | Mod: PBBFAC,PO | Performed by: INTERNAL MEDICINE

## 2024-05-29 PROCEDURE — 99999 PR PBB SHADOW E&M-EST. PATIENT-LVL IV: CPT | Mod: PBBFAC,,, | Performed by: INTERNAL MEDICINE

## 2024-05-29 RX ORDER — FUROSEMIDE 40 MG/1
40 TABLET ORAL
COMMUNITY
Start: 2024-04-11

## 2024-05-29 RX ORDER — METOLAZONE 5 MG/1
5 TABLET ORAL
COMMUNITY
Start: 2024-04-11

## 2024-05-29 RX ORDER — IPRATROPIUM BROMIDE AND ALBUTEROL SULFATE 2.5; .5 MG/3ML; MG/3ML
3 SOLUTION RESPIRATORY (INHALATION)
COMMUNITY
Start: 2024-03-28

## 2024-05-29 NOTE — PROGRESS NOTES
Pulmonary Clinic New Patient    HISTORY OF PRESENT ILLNESS   Jose F Hanley is a 79 y.o. male with a PMH of recent repeat CABG (2/23/24), HFpEF, TIANA/OHS noncompliant w/ CPAP, TAVR, hypothyroidism, alcohol abuse, and Afib s/p Watchman on whom we have been consulted due to concern for COPD.    The patient has TIANA.OHS, but he chooses to use 3 L O2 overnight instead of CPAP. He states he is using a long-acting discuss inhaler once daily, but he does not know which one, and it is not on his med list. [facility] When asked, he states he would rather continue to [facility] than switch to here.      SMOKING HISTORY  Quit 1989.  Smoked 2-4 PPD x 30 years.    EXPOSURE HISTORY  Worked with asbestos as a  in the [employer].     REVIEW OF SYSTEMS  Feeling well. Chronic cough.    PFSH:  Past Medical History:   Diagnosis Date    A-fib     Allergies 2020    gets allergy shots    Arthritis 1973    right knee    Back pain     COPD (chronic obstructive pulmonary disease)     Diastolic heart failure     GERD (gastroesophageal reflux disease) 2015    HLD (hyperlipidemia)     HTN (hypertension)     Hx of LASIK     Hypothyroidism, unspecified 2012    Inflammatory disease of prostate, unspecified     out of medicine    Mild intermittent asthma, uncomplicated     Myocardial infarction     Neuropathy     On home oxygen therapy     3L NC  at night    PAD (peripheral artery disease)     Staph skin infection 2016    right thigh    TIA (transient ischemic attack) 2013         Past Surgical History:   Procedure Laterality Date    AORTOGRAPHY WITH SERIALOGRAPHY N/A 08/31/2023    Procedure: AORTOGRAM, WITH SERIALOGRAPHY;  Surgeon: Gary Thomas MD;  Location: [facility] CATH/EP LAB;  Service: Peripheral Vascular;  Laterality: N/A;    CATARACT EXTRACTION Bilateral 2014    CORONARY ANGIOGRAPHY INCLUDING BYPASS GRAFTS WITH CATHETERIZATION OF LEFT HEART Left 01/12/2024     Procedure: ANGIOGRAM, CORONARY, INCLUDING BYPASS GRAFT, WITH LEFT HEART CATHETERIZATION;  Surgeon: Gregg Bush MD;  Location: [facility] CATH/EP LAB;  Service: Cardiology;  Laterality: Left;    CORONARY ARTERY BYPASS GRAFT  2010    ENDOSCOPIC HARVEST OF VEIN Right 2/23/2024    Procedure: HARVEST-VEIN-ENDOVASCULAR;  Surgeon: Gary Thomas MD;  Location: [facility];  Service: Cardiothoracic;  Laterality: Right;    INSERTION OF IMPLANTABLE LOOP RECORDER  2011    INSERTION OF INTRA-AORTIC BALLOON ASSIST DEVICE Right 2/23/2024    Procedure: INSERTION, INTRA-AORTIC BALLOON PUMP;  Surgeon: Gary Thomas MD;  Location: [facility];  Service: Cardiothoracic;  Laterality: Right;    INSERTION OF PACEMAKER  2021    does not have card with him for preadmit    INSTANTANEOUS WAVE-FREE RATIO (IFR) N/A 01/12/2024    Procedure: Instantaneous Wave-Free Ratio (IFR);  Surgeon: Gregg Bush MD;  Location: [facility] CATH/EP LAB;  Service: Cardiology;  Laterality: N/A;    PTA, ANTERIOR TIBIAL Left 08/31/2023    Procedure: PTA, Anterior Tibial;  Surgeon: Gary Thomas MD;  Location: [facility] CATH/EP LAB;  Service: Peripheral Vascular;  Laterality: Left;    PTA, SUPERFICIAL FEMORAL ARTERY Left 08/31/2023    Procedure: PTA, Superficial Femoral Artery;  Surgeon: Gary Thomas MD;  Location: [facility] CATH/EP LAB;  Service: Peripheral Vascular;  Laterality: Left;    REPEAT CORONARY ARTERY BYPASS GRAFTING N/A 2/23/2024    Procedure: CABG, REPEAT;  Surgeon: Gary Thomas MD;  Location: [facility] OR;  Service: Cardiothoracic;  Laterality: N/A;    SPLENECTOMY  2016    STENT, ANTERIOR TIBIAL Left 08/31/2023    Procedure: Stent, Anterior Tibial;  Surgeon: Gary Thomas MD;  Location: [facility] CATH/EP LAB;  Service: Peripheral Vascular;  Laterality: Left;    SURGICAL PROCUREMENT, ARTERY, RADIAL, FOR CABG  2/23/2024    Procedure: SURGICAL PROCUREMENT,ARTERY,RADIAL,FOR CABG;  Surgeon: Gary Thomas MD;  Location: [facility]  OR;  Service: Cardiothoracic;;    watchman heart device  2019     Social History     Tobacco Use    Smoking status: Former     Current packs/day: 0.00     Types: Cigarettes     Quit date: 1987     Years since quittin.5    Smokeless tobacco: Never   Substance Use Topics    Alcohol use: Not Currently     Alcohol/week: 21.0 standard drinks of alcohol     Types: 21 Drinks containing 0.5 oz of alcohol per week     Comment: ed roberto mixed drinks    Drug use: No     No family history on file.  Review of patient's allergies indicates:   Allergen Reactions    Adhes. band-tape-benzalkonium Rash     Pt stated it caused blisters    Statins-hmg-coa reductase inhibitors Other (See Comments)     Statin Intolerance (joint pain)         VITAL SIGNS (MOST RECENT)  Pulse: 72 (24 1432)  BP: 121/60 (24 1432)  SpO2: (!) 92 % (room air at rest) (24 143)    PHYSICAL EXAM  GENERAL: NAD.  HEENT: Pupils equal and reactive. Extraocular movements intact.   NECK: Supple.   HEART: Regular rate and rhythm. No murmur or gallop auscultated.  LUNGS: Clear to auscultation and percussion. Lung excursion symmetrical.  ABDOMEN: Bowel sounds present. Non-tender, no masses palpated.  EXTREMITIES: Normal muscle tone and joint movement, no cyanosis or clubbing.   LYMPHATICS: No adenopathy palpated, no edema.  SKIN: Dry, intact, no lesions.   NEURO: No gross cognitive or motor deficits.  PSYCH: Appropriate affect.    Lab Results   Component Value Date    WBC 9.7 2024    HGB 10.7 (L) 2024    HCT 35.4 (L) 2024     (H) 2024    TRIG 94 2024    ALT 10 2024    AST 15 2024     2024    K 3.6 2024    CL 95 2024    CREATININE 0.9 2024    BUN 16 2024    CO2 33 (H) 2024    TSH 2.171 2024    HGBA1C 6.2 2024       LAST ARTERIAL BLOOD GAS  @LABRCNTIP[PH,PO2,PCO2,HCO3,BE@      LAST 7 DAYS MICROBIOLOGY   Microbiology Results (last 7 days)        ** No results found for the last 168 hours. **            MOST RECENT IMAGING  Echo    Left Ventricle: The left ventricle is normal in size. Mildly increased   wall thickness. There is mild concentric hypertrophy. Normal wall motion.   Septal motion is consistent with bundle branch block. There is normal   systolic function with a visually estimated ejection fraction of 55 - 60%.   There is normal diastolic function.    Right Ventricle: Normal right ventricular cavity size. Wall thickness   is normal. Right ventricle wall motion  is normal. Systolic function is   normal.    Left Atrium: Left atrium is mildly dilated.    Aortic Valve: There is a transcatheter valve replacement in the aortic   position. There is severe aortic valve sclerosis. Mildly restricted   motion. Aortic valve area by VTI is 1.83 cm². Aortic valve peak velocity   is 2.51 m/s. Mean gradient is 13 mmHg. The dimensionless index is 0.44.    Tricuspid Valve: There is moderate regurgitation with an eccentrically   directed jet.    Pulmonary Artery: There is mild to moderate pulmonary hypertension. The   estimated pulmonary artery systolic pressure is 45 mmHg.    IVC/SVC: Intermediate venous pressure at 8 mmHg.  X-Ray Chest AP Portable  Exam: Single view of the chest    Indication: Short of breath    Comparison: March 6, 2024    Findings:    Patient's had prior CABG and TAVR. Heart is enlarged and stable from prior. Pulmonary vascularity is congested with some interstitial edema and small pleural effusions.    Impression:    CHF.    Electronically signed by:  Gregg Hui MD  03/23/2024 06:25 AM CDT Workstation: [id number]      CURRENT VISIT EKG  No results found for this visit on 05/29/24.    ECHOCARDIOGRAM RESULTS  Results for orders placed during the hospital encounter of 03/22/24    Echo    Interpretation Summary    Left Ventricle: The left ventricle is normal in size. Mildly increased wall thickness. There is mild concentric hypertrophy. Normal  "wall motion. Septal motion is consistent with bundle branch block. There is normal systolic function with a visually estimated ejection fraction of 55 - 60%. There is normal diastolic function.    Right Ventricle: Normal right ventricular cavity size. Wall thickness is normal. Right ventricle wall motion  is normal. Systolic function is normal.    Left Atrium: Left atrium is mildly dilated.    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 There is severe aortic valve sclerosis. Mildly restricted motion. Aortic valve area by VTI is 1.83 cm². Aortic valve peak velocity is 2.51 m/s. Mean gradient is 13 mmHg. The dimensionless index is 0.44.    Tricuspid Valve: There is moderate regurgitation with an eccentrically directed jet.    Pulmonary Artery: There is mild to moderate pulmonary hypertension. The estimated pulmonary artery systolic pressure is 45 mmHg.    IVC/SVC: Intermediate venous pressure at 8 mmHg.      Pulmonary Function Tests  No results found for: "FEV1", "FVC", "TKA5ZIF", "TLC", "DLCO"      ASSESSMENT & PLAN   oJse F Hanley is a 79 y.o. male with a PMH of recent repeat CABG (2/23/24), HFpEF, TIANA/OHS noncompliant w/ CPAP, TAVR, hypothyroidism, alcohol abuse, and Afib s/p Watchman on whom we have been consulted due to concern for COPD.    #Chronic hypoxic respiratory failure  Possibly due to his CHF and myriad other cardiovascular pathologies, but COPD is certainly a possibility as well.  - patient should not be following with two different pulmonary clinics  - [facility]  - continue bronchodilators    #TIANA/OHS, non-complaint with CPAP  - patient chooses to sleep on supplemental oxygen instead      No need for further follow up with our clinic.  [facility].    Discussed with wife. I have personally reviewed recent labs and ABGs, and I have viewed and interpreted the images of recent chest imaging, as above.     Varinder Norris, " MD  Pulmonary and Critical Care Medicine  [facility]  Date of Service: 05/29/2024  2:50 PM

## 2024-06-24 PROBLEM — J96.01 ACUTE HYPOXEMIC RESPIRATORY FAILURE: Status: RESOLVED | Noted: 2024-03-23 | Resolved: 2024-06-24

## 2024-08-19 ENCOUNTER — OFFICE VISIT (OUTPATIENT)
Dept: CARDIOLOGY | Facility: CLINIC | Age: 79
End: 2024-08-19
Payer: MEDICARE

## 2024-08-19 VITALS
HEIGHT: 72 IN | WEIGHT: 252 LBS | OXYGEN SATURATION: 95 % | RESPIRATION RATE: 16 BRPM | BODY MASS INDEX: 34.13 KG/M2 | SYSTOLIC BLOOD PRESSURE: 124 MMHG | DIASTOLIC BLOOD PRESSURE: 70 MMHG | HEART RATE: 70 BPM

## 2024-08-19 DIAGNOSIS — Z95.2 HISTORY OF TRANSCATHETER AORTIC VALVE REPLACEMENT (TAVR): ICD-10-CM

## 2024-08-19 DIAGNOSIS — I48.21 PERMANENT ATRIAL FIBRILLATION: ICD-10-CM

## 2024-08-19 DIAGNOSIS — Z95.818 PRESENCE OF WATCHMAN LEFT ATRIAL APPENDAGE CLOSURE DEVICE: ICD-10-CM

## 2024-08-19 DIAGNOSIS — I50.32 CHRONIC DIASTOLIC CONGESTIVE HEART FAILURE: ICD-10-CM

## 2024-08-19 DIAGNOSIS — Z95.1 S/P CABG (CORONARY ARTERY BYPASS GRAFT): Primary | ICD-10-CM

## 2024-08-19 DIAGNOSIS — Z53.20 STATIN DECLINED: ICD-10-CM

## 2024-08-19 PROCEDURE — 99213 OFFICE O/P EST LOW 20 MIN: CPT | Mod: PBBFAC,PN | Performed by: INTERNAL MEDICINE

## 2024-08-19 PROCEDURE — 99214 OFFICE O/P EST MOD 30 MIN: CPT | Mod: S$PBB,,, | Performed by: INTERNAL MEDICINE

## 2024-08-19 PROCEDURE — 99999 PR PBB SHADOW E&M-EST. PATIENT-LVL III: CPT | Mod: PBBFAC,,, | Performed by: INTERNAL MEDICINE

## 2024-08-19 NOTE — PROGRESS NOTES
Subjective:    Patient ID:  Jose F Hanley is a 79 y.o. male patient here for evaluation Follow-up      History of Present Illness:     Patient was 79-year-old gentleman with history of COPD chronic diastolic heart failure, GERD, history of TAVR, coronary artery bypass surgery x2 1st 1 in 2010 and 2nd surgery in 2012 is seeking follow-up evaluation.  He was doing remarkably well from a cardiac standpoint denies having episodes of angina no arm neck or jaw pain.  His main complaint has been arthritic symptoms in his knees and some feet and this has been his limitations doing any physical activity longstanding.  Some mild swelling noted in the left lower extremity very had vein harvest for CABG.  Otherwise no exertional angina no arm neck or jaw pain noted and no PND orthopnea        Review of patient's allergies indicates:   Allergen Reactions    Adhes. band-tape-benzalkonium Rash     Pt stated it caused blisters    Statins-hmg-coa reductase inhibitors Other (See Comments)     Statin Intolerance (joint pain)       Past Medical History:   Diagnosis Date    A-fib     Allergies 2020    gets allergy shots    Arthritis 1973    right knee    Back pain     COPD (chronic obstructive pulmonary disease)     Diastolic heart failure     GERD (gastroesophageal reflux disease) 2015    HLD (hyperlipidemia)     HTN (hypertension)     Hx of LASIK     Hypothyroidism, unspecified 2012    Inflammatory disease of prostate, unspecified     out of medicine    Mild intermittent asthma, uncomplicated     Myocardial infarction     Neuropathy     On home oxygen therapy     3L NC  at night    PAD (peripheral artery disease)     Staph skin infection 2016    right thigh    TIA (transient ischemic attack) 2013     Past Surgical History:   Procedure Laterality Date    AORTOGRAPHY WITH SERIALOGRAPHY N/A 08/31/2023    Procedure: AORTOGRAM, WITH SERIALOGRAPHY;  Surgeon: Gary Thomas MD;  Location: [facility] CATH/EP LAB;  Service: Peripheral  Vascular;  Laterality: N/A;    CATARACT EXTRACTION Bilateral 2014    CORONARY ANGIOGRAPHY INCLUDING BYPASS GRAFTS WITH CATHETERIZATION OF LEFT HEART Left 01/12/2024    Procedure: ANGIOGRAM, CORONARY, INCLUDING BYPASS GRAFT, WITH LEFT HEART CATHETERIZATION;  Surgeon: Gregg Bush MD;  Location: [facility] CATH/EP LAB;  Service: Cardiology;  Laterality: Left;    CORONARY ARTERY BYPASS GRAFT  2010    ENDOSCOPIC HARVEST OF VEIN Right 2/23/2024    Procedure: HARVEST-VEIN-ENDOVASCULAR;  Surgeon: Gary Thomas MD;  Location: [facility];  Service: Cardiothoracic;  Laterality: Right;    INSERTION OF IMPLANTABLE LOOP RECORDER  2011    INSERTION OF INTRA-AORTIC BALLOON ASSIST DEVICE Right 2/23/2024    Procedure: INSERTION, INTRA-AORTIC BALLOON PUMP;  Surgeon: Gary Thomas MD;  Location: [facility];  Service: Cardiothoracic;  Laterality: Right;    INSERTION OF PACEMAKER  2021    does not have card with him for preadmit    INSTANTANEOUS WAVE-FREE RATIO (IFR) N/A 01/12/2024    Procedure: Instantaneous Wave-Free Ratio (IFR);  Surgeon: Gregg Bush MD;  Location: [facility] CATH/EP LAB;  Service: Cardiology;  Laterality: N/A;    PTA, ANTERIOR TIBIAL Left 08/31/2023    Procedure: PTA, Anterior Tibial;  Surgeon: Gary Thomas MD;  Location: [facility] CATH/EP LAB;  Service: Peripheral Vascular;  Laterality: Left;    PTA, SUPERFICIAL FEMORAL ARTERY Left 08/31/2023    Procedure: PTA, Superficial Femoral Artery;  Surgeon: Gary Thomas MD;  Location: [facility] CATH/EP LAB;  Service: Peripheral Vascular;  Laterality: Left;    REPEAT CORONARY ARTERY BYPASS GRAFTING N/A 2/23/2024    Procedure: CABG, REPEAT;  Surgeon: Gary Thomas MD;  Location: [facility] OR;  Service: Cardiothoracic;  Laterality: N/A;    SPLENECTOMY  2016    STENT, ANTERIOR TIBIAL Left 08/31/2023    Procedure: Stent, Anterior Tibial;  Surgeon: Gary Thomas MD;  Location: [facility] CATH/EP LAB;  Service: Peripheral Vascular;  Laterality: Left;     SURGICAL PROCUREMENT, ARTERY, RADIAL, FOR CABG  2024    Procedure: SURGICAL PROCUREMENT,ARTERY,RADIAL,FOR CABG;  Surgeon: Gary Thomas MD;  Location: [facility];  Service: Cardiothoracic;;    watchman heart device  2019     Social History     Tobacco Use    Smoking status: Former     Current packs/day: 0.00     Types: Cigarettes     Quit date: 1987     Years since quittin.7    Smokeless tobacco: Never   Substance Use Topics    Alcohol use: Not Currently     Alcohol/week: 21.0 standard drinks of alcohol     Types: 21 Drinks containing 0.5 oz of alcohol per week     Comment: ed roberto mixed drinks    Drug use: No        Review of Systems:    As noted in HPI in addition      REVIEW OF SYSTEMS  CARDIOVASCULAR: No recent chest pain, palpitations, arm, neck, or jaw pain  RESPIRATORY: No recent fever, cough chills, some shortness of breath and congestion noted with moderate effort   : No blood in the urine  GI: No Nausea, vomiting, constipation, diarrhea, blood, or reflux.  MUSCULOSKELETAL:  Musculoskeletal pains in both knees and legs noted.  Especially the feet with longstanding    NEURO: No lightheadedness ,occasional transient dizziness is noted   neuropathic symptoms are also noted in his feet bilaterally  EYES: No Double vision, blurry, vision or headache              Objective        Vitals:    24 1054   BP: 124/70   Pulse: 70   Resp: 16       LIPIDS - LAST 2   Lab Results   Component Value Date    TRIG 94 2024       CBC - LAST 2  Lab Results   Component Value Date    WBC 9.7 2024    WBC 9.68 2024    RBC 4.18 (L) 2024    RBC 3.74 (L) 2024    HGB 10.7 (L) 2024    HGB 9.1 (L) 2024    HCT 35.4 (L) 2024    HCT 31.1 (L) 2024    MCV 70 (L) 2024    MCV 74 (L) 2024    MCH 21 (L) 2024    MCH 21.8 (L) 2024    MCHC 30 (L) 2024    MCHC 29.3 (L) 2024    RDW 19.4 (H) 2024    RDW 19.7 (H) 2024    PLT  708 (H) 04/02/2024     03/23/2024    MPV 8.4 04/02/2024    MPV 10.0 03/23/2024    GRAN 5.3 03/23/2024    GRAN 54.8 03/23/2024    LYMPH 2.5 03/23/2024    LYMPH 25.5 03/23/2024    MONO 1.3 (H) 03/23/2024    MONO 13.3 03/23/2024    BASO 0.12 03/23/2024    BASO 0.13 03/07/2024    NRBC 0 03/23/2024    NRBC 2 (A) 03/07/2024       CHEMISTRY & LIVER FUNCTION - LAST 2  Lab Results   Component Value Date     03/24/2024     (L) 03/23/2024    K 3.6 03/24/2024    K 4.0 03/23/2024    CL 95 03/24/2024    CL 94 (L) 03/23/2024    CO2 33 (H) 03/24/2024    CO2 33 (H) 03/23/2024    ANIONGAP 8 03/24/2024    ANIONGAP 6 (L) 03/23/2024    BUN 16 03/24/2024    BUN 18 03/23/2024    CREATININE 0.9 03/24/2024    CREATININE 1.0 03/23/2024     (H) 03/24/2024     (H) 03/23/2024    CALCIUM 9.3 03/24/2024    CALCIUM 8.8 03/23/2024    PH 7.409 03/02/2024    PH 7.428 03/01/2024    MG 1.7 03/23/2024    MG 2.1 03/04/2024    ALBUMIN 3.6 03/24/2024    ALBUMIN 3.6 03/23/2024    PROT 6.7 03/24/2024    PROT 6.9 03/23/2024    ALKPHOS 77 03/24/2024    ALKPHOS 83 03/23/2024    ALT 10 03/24/2024    ALT 10 03/23/2024    AST 15 03/24/2024    AST 15 03/23/2024    BILITOT 0.6 03/24/2024    BILITOT 0.6 03/23/2024        CARDIAC PROFILE - LAST 2  Lab Results   Component Value Date     (H) 03/23/2024     (H) 03/04/2024    TROPONINIHS 10.8 03/23/2024    TROPONINIHS 18.3 (H) 03/23/2024        COAGULATION - LAST 2  Lab Results   Component Value Date    APTT 29.6 01/10/2024       ENDOCRINE & PSA - LAST 2  Lab Results   Component Value Date    HGBA1C 6.2 05/07/2024    HGBA1C 6.6 (A) 02/05/2024    TSH 2.171 03/01/2024    PROCAL 0.152 03/24/2024        ECHOCARDIOGRAM RESULTS  Results for orders placed during the hospital encounter of 03/22/24    Echo    Interpretation Summary    Left Ventricle: The left ventricle is normal in size. Mildly increased wall thickness. There is mild concentric hypertrophy. Normal wall motion. Septal  motion is consistent with bundle branch block. There is normal systolic function with a visually estimated ejection fraction of 55 - 60%. There is normal diastolic function.    Right Ventricle: Normal right ventricular cavity size. Wall thickness is normal. Right ventricle wall motion  is normal. Systolic function is normal.    Left Atrium: Left atrium is mildly dilated.    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 There is severe aortic valve sclerosis. Mildly restricted motion. Aortic valve area by VTI is 1.83 cm². Aortic valve peak velocity is 2.51 m/s. Mean gradient is 13 mmHg. The dimensionless index is 0.44.    Tricuspid Valve: There is moderate regurgitation with an eccentrically directed jet.    Pulmonary Artery: There is mild to moderate pulmonary hypertension. The estimated pulmonary artery systolic pressure is 45 mmHg.    IVC/SVC: Intermediate venous pressure at 8 mmHg.      CURRENT/PREVIOUS VISIT EKG  Results for orders placed or performed in visit on 04/04/24   IN OFFICE EKG 12-LEAD (to [place])    Collection Time: 04/04/24  1:23 PM   Result Value Ref Range    QRS Duration 174 ms    [facility] QTC Calculation 499 ms    Narrative    Test Reason : Z95.1,    Vent. Rate : 076 BPM     Atrial Rate : 000 BPM     P-R Int : 000 ms          QRS Dur : 174 ms      QT Int : 444 ms       P-R-T Axes : 000 265 010 degrees     QTc Int : 499 ms    Atrial fibrillation  Right bundle branch block  Inferior infarct (cited on or before 22-MAR-2024)  Abnormal ECG  When compared with ECG of 23-MAR-2024 06:31,  No significant change was found  Confirmed by Jaun Miller MD ([id number]) on 5/21/2024 8:54:35 PM    Referred By:             Confirmed By:Jaun Miller MD     No valid procedures specified.   Results for orders placed during the hospital encounter of 12/13/23    Nuclear Stress - Cardiology Interpreted    Interpretation Summary    Abnormal myocardial perfusion scan.    There are two  significant perfusion abnormalities.    Perfusion Abnormality #1 - There is a moderate to severe intensity, moderate sized, reversible perfusion abnormality that is consistent with ischemia in the basal to apical lateral wall(s).    Perfusion Abnormality #2 - There is a moderate to large sized, moderate to severe intensity, equivocal perfusion abnormality that is fixed in the basal to apical inferior wall(s). This finding is equivocal due to diaphragm shadow.    There are no other significant perfusion abnormalities.    The gated perfusion images showed an ejection fraction of 63% at rest. The gated perfusion images showed an ejection fraction of 60% post stress. Normal ejection fraction is greater than 53%.    There is normal wall motion at rest and post stress.    LV cavity size is normal at rest and normal at stress.    The ECG portion of the study is negative for ischemia.    The patient reported no chest pain during the stress test.    There were no arrhythmias during stress.    TID : 1.3    RECOMMEND ANGIOGRAPHIC EVALUATION FOR MORE DEFINITIVE DIAGNOSIS AND INTERVENTION    No valid procedures specified.    PHYSICAL EXAM  CONSTITUTIONAL: Well built, well nourished in no apparent distress  NECK: no carotid bruit, no JVD  LUNGS: CTA  CHEST WALL: no tenderness  HEART: regular rate and rhythm, S1, S2 normal, no murmur, click, rub or gallop   ABDOMEN: soft, non-tender; bowel sounds normal; no masses,  no organomegaly  EXTREMITIES: Extremities normal, no edema, no calf tenderness noted  NEURO: AAO X 3    I HAVE REVIEWED :    The vital signs, nurses notes, and all the pertinent radiology and labs.        Current Outpatient Medications   Medication Instructions    acetaminophen (TYLENOL) 650 mg, Every 6 hours PRN    albuterol (PROVENTIL/VENTOLIN HFA) 90 mcg/actuation inhaler 2 puffs, Inhalation, Every 6 hours PRN    albuterol-ipratropium (DUO-NEB) 2.5 mg-0.5 mg/3 mL nebulizer solution 3 mLs, Inhalation    aspirin 81  mg, Oral, Daily    betamethasone valerate 0.1% (VALISONE) 1 g, Topical (Top), 2 times daily PRN    diltiaZEM (CARDIZEM CD) 180 mg, Oral, Daily    doxepin (SINEQUAN) 50 mg, Oral, Nightly    DULoxetine (CYMBALTA) 60 mg, Oral, Nightly    EPINEPHrine (EPIPEN) 0.3 mg, Intramuscular, Once    ezetimibe (ZETIA) 10 mg, Oral, Nightly    furosemide (LASIX) 40 mg    gabapentin (NEURONTIN) 300 mg, Oral, 2 times daily    guaiFENesin (MUCINEX) 1,200 mg, Oral, 2 times daily    levothyroxine (SYNTHROID) 50 mcg, Oral, Before breakfast    metOLazone (ZAROXOLYN) 5 mg    metoprolol tartrate (LOPRESSOR) 25 mg, Oral, 2 times daily    montelukast (SINGULAIR) 10 mg, Oral, Daily    nitroGLYCERIN (NITROSTAT) 0.4 mg, Every 5 min PRN    omega-3 fatty acids/fish oil (FISH OIL-OMEGA-3 FATTY ACIDS) 300-1,000 mg capsule 1 capsule, Oral, 2 times daily    omeprazole (PRILOSEC) 40 mg, Oral, Daily    potassium chloride SA (K-DUR,KLOR-CON) 20 MEQ tablet 20 mEq, Oral, Nightly    senna-docusate 8.6-50 mg (PERICOLACE) 8.6-50 mg per tablet 1 tablet, Oral, Nightly    spironolactone (ALDACTONE) 25 MG tablet Take 1 tablet (25 mg total) by mouth 2 (two) times daily for 7 days, THEN 1 tablet (25 mg total) once daily.    tamsulosin (FLOMAX) 0.4 mg, Oral, Daily    torsemide (DEMADEX) 20 MG Tab Take 2 tablets (40 mg total) by mouth 2 (two) times a day for 7 days, THEN 2 tablets (40 mg total) once daily.          Assessment & Plan     1. S/P CABG (coronary artery bypass graft) - x2 09/2010 - [facility] to LAD; SVG to OMB; grafts occluded 08/2012  Assessment & Plan:  Status post redo bypass surgery he is doing remarkably well continue on aggressive risk factor modification daily physical activity as tolerated and blood pressure control as listed here.      2. History of transcatheter aortic valve replacement (TAVR)  Assessment & Plan:  History of dyslipidemia continue on fish oil capsules, Successful TAVR and he was doing well postprocedurally.  No new symptoms history of  severe statin intolerance      3. Presence of Watchman left atrial appendage closure device  Assessment & Plan:  Patient has a Watchman device in the left atrial appendage presently only on aspirin therapy.      4. Permanent atrial fibrillation  Assessment & Plan:  History of permanent atrial fibrillation rates are controlled doing well continue on aspirin therapy      5. Chronic diastolic congestive heart failure  Assessment & Plan:  Patient is identified as having Diastolic (HFpEF) heart failure that is Chronic. CHF is currently controlled. Latest ECHO performed and demonstrates- Results for orders placed during the hospital encounter of 03/22/24    Echo    Interpretation Summary    Left Ventricle: The left ventricle is normal in size. Mildly increased wall thickness. There is mild concentric hypertrophy. Normal wall motion. Septal motion is consistent with bundle branch block. There is normal systolic function with a visually estimated ejection fraction of 55 - 60%. There is normal diastolic function.    Right Ventricle: Normal right ventricular cavity size. Wall thickness is normal. Right ventricle wall motion  is normal. Systolic function is normal.    Left Atrium: Left atrium is mildly dilated.    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 There is severe aortic valve sclerosis. Mildly restricted motion. Aortic valve area by VTI is 1.83 cm². Aortic valve peak velocity is 2.51 m/s. Mean gradient is 13 mmHg. The dimensionless index is 0.44.    Tricuspid Valve: There is moderate regurgitation with an eccentrically directed jet.    Pulmonary Artery: There is mild to moderate pulmonary hypertension. The estimated pulmonary artery systolic pressure is 45 mmHg.    IVC/SVC: Intermediate venous pressure at 8 mmHg.  . Continue Beta Blocker, Furosemide, and Aldactone and monitor clinical status closely. Monitor on telemetry. Patient is on CHF pathway.  Monitor strict Is&Os and daily  weights.  Place on fluid restriction of 2 L. Cardiology has been consulted. Continue to stress to patient importance of self efficacy and  on diet for CHF. Last BNP reviewed-386        6. Statin declined  Assessment & Plan:  At this point he is reluctant to have any lipid lowering agents            Follow up in about 6 months (around 2/19/2025).

## 2024-08-19 NOTE — ASSESSMENT & PLAN NOTE
History of dyslipidemia continue on fish oil capsules, Successful TAVR and he was doing well postprocedurally.  No new symptoms history of severe statin intolerance

## 2024-08-19 NOTE — ASSESSMENT & PLAN NOTE
Status post redo bypass surgery he is doing remarkably well continue on aggressive risk factor modification daily physical activity as tolerated and blood pressure control as listed here.

## 2024-08-19 NOTE — ASSESSMENT & PLAN NOTE
History of permanent atrial fibrillation rates are controlled doing well continue on aspirin therapy

## 2024-08-19 NOTE — ASSESSMENT & PLAN NOTE
Patient is identified as having Diastolic (HFpEF) heart failure that is Chronic. CHF is currently controlled. Latest ECHO performed and demonstrates- Results for orders placed during the hospital encounter of 03/22/24    Echo    Interpretation Summary    Left Ventricle: The left ventricle is normal in size. Mildly increased wall thickness. There is mild concentric hypertrophy. Normal wall motion. Septal motion is consistent with bundle branch block. There is normal systolic function with a visually estimated ejection fraction of 55 - 60%. There is normal diastolic function.    Right Ventricle: Normal right ventricular cavity size. Wall thickness is normal. Right ventricle wall motion  is normal. Systolic function is normal.    Left Atrium: Left atrium is mildly dilated.    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 There is severe aortic valve sclerosis. Mildly restricted motion. Aortic valve area by VTI is 1.83 cm². Aortic valve peak velocity is 2.51 m/s. Mean gradient is 13 mmHg. The dimensionless index is 0.44.    Tricuspid Valve: There is moderate regurgitation with an eccentrically directed jet.    Pulmonary Artery: There is mild to moderate pulmonary hypertension. The estimated pulmonary artery systolic pressure is 45 mmHg.    IVC/SVC: Intermediate venous pressure at 8 mmHg.  . Continue Beta Blocker, Furosemide, and Aldactone and monitor clinical status closely. Monitor on telemetry. Patient is on CHF pathway.  Monitor strict Is&Os and daily weights.  Place on fluid restriction of 2 L. Cardiology has been consulted. Continue to stress to patient importance of self efficacy and  on diet for CHF. Last BNP reviewed-386

## 2024-08-23 ENCOUNTER — OFFICE VISIT (OUTPATIENT)
Dept: WOUND CARE | Facility: HOSPITAL | Age: 79
End: 2024-08-23
Attending: PODIATRIST
Payer: MEDICARE

## 2024-08-23 VITALS
HEART RATE: 85 BPM | SYSTOLIC BLOOD PRESSURE: 132 MMHG | DIASTOLIC BLOOD PRESSURE: 65 MMHG | TEMPERATURE: 98 F | RESPIRATION RATE: 16 BRPM | WEIGHT: 250 LBS | HEIGHT: 72 IN | BODY MASS INDEX: 33.86 KG/M2

## 2024-08-23 DIAGNOSIS — I73.9 PAD (PERIPHERAL ARTERY DISEASE): ICD-10-CM

## 2024-08-23 DIAGNOSIS — L02.619 CELLULITIS AND ABSCESS OF FOOT: ICD-10-CM

## 2024-08-23 DIAGNOSIS — L97.522 ULCER OF LEFT FOOT WITH FAT LAYER EXPOSED: ICD-10-CM

## 2024-08-23 DIAGNOSIS — I73.9 PVD (PERIPHERAL VASCULAR DISEASE): ICD-10-CM

## 2024-08-23 DIAGNOSIS — E11.628 DIABETIC FOOT INFECTION: ICD-10-CM

## 2024-08-23 DIAGNOSIS — Z91.89 AT HIGH RISK FOR INADEQUATE NUTRITIONAL INTAKE: ICD-10-CM

## 2024-08-23 DIAGNOSIS — L03.119 CELLULITIS AND ABSCESS OF FOOT: ICD-10-CM

## 2024-08-23 DIAGNOSIS — T14.8XXA WOUND INFECTION: ICD-10-CM

## 2024-08-23 DIAGNOSIS — L08.9 DIABETIC FOOT INFECTION: ICD-10-CM

## 2024-08-23 DIAGNOSIS — R26.2 DIFFICULTY IN WALKING, NOT ELSEWHERE CLASSIFIED: ICD-10-CM

## 2024-08-23 DIAGNOSIS — Z87.2 HISTORY OF ULCER OF LOWER EXTREMITY: ICD-10-CM

## 2024-08-23 DIAGNOSIS — L89.893 PRESSURE INJURY OF TOE OF LEFT FOOT, STAGE 3: Primary | ICD-10-CM

## 2024-08-23 DIAGNOSIS — L08.9 WOUND INFECTION: ICD-10-CM

## 2024-08-23 PROCEDURE — 87070 CULTURE OTHR SPECIMN AEROBIC: CPT | Performed by: PODIATRIST

## 2024-08-23 PROCEDURE — 11042 DBRDMT SUBQ TIS 1ST 20SQCM/<: CPT | Mod: ,,, | Performed by: PODIATRIST

## 2024-08-23 PROCEDURE — 11042 DBRDMT SUBQ TIS 1ST 20SQCM/<: CPT | Mod: PN | Performed by: PODIATRIST

## 2024-08-23 PROCEDURE — 87075 CULTR BACTERIA EXCEPT BLOOD: CPT | Performed by: PODIATRIST

## 2024-08-23 PROCEDURE — 99214 OFFICE O/P EST MOD 30 MIN: CPT | Mod: 25,PN | Performed by: PODIATRIST

## 2024-08-23 PROCEDURE — 99214 OFFICE O/P EST MOD 30 MIN: CPT | Mod: 25,,, | Performed by: PODIATRIST

## 2024-08-23 RX ORDER — DOXYCYCLINE 100 MG/1
100 CAPSULE ORAL EVERY 12 HOURS
Qty: 28 CAPSULE | Refills: 0 | Status: SHIPPED | OUTPATIENT
Start: 2024-08-23 | End: 2024-09-06

## 2024-08-24 LAB — BACTERIA SPEC AEROBE CULT: NORMAL

## 2024-08-24 NOTE — PROGRESS NOTES
"  [address] [facility] [address]  [place] [address]  Phone: [phone]   [phone]    Patient's PCP:Sunita Rivera MD  Referring Provider: No ref. provider found    Subjective:      Chief Complaint:: Wound Care, Wound Check, Wound Consult, Foot Ulcer, Wound Infection, Pressure Ulcer, Peripheral Neuropathy, Poor Circulation, Non-healing Wound, and Blister    HPI  Jose F Hanley is a 79 y.o. male who presents today with a complaint of left great toe wound.  See wound docs documentation for full assessment and evaluation of all wounds.       First visit with patient in over 6 months:  Reviewed all notes from patients medical chart, including imaging, procedures, studies, progress notes,  cultures, antibiotic regimens, photos,  etc.       Vascular Status/JENNIFER: patient under the care of vascular surgery previously. Will place additional referral to vascular surgery for patient to continue to follow with vascular surgery, due to new wound present- patient does have diminished pedal pulses.   Nutrition Risk/Labs: "Tips and Tricks for wound healing" handout given with detailed guide to optimize nutritional status for wound healing.   Counseled patient on increasing protein intake, following a healthy diet, vitamins  Recent Cultures & Dates:  See micro tab in chart  Antibiotics: doxycycline prescribed, culture taken pending  Radiology/Xray: See imaging tab in chart  Diabetes Status/HbA1c: See lab tab in chart  Offloading/Immobilization: Offloading felt to foot, placed in order to remove pressure from wound, accommodating for patient's gait and foot deformity. Recommend crutches or knee scooter.  Football wrap with Darco shoe.  Edema/Compression: none  Tobacco Use: none      Culture taken of wound.  I will adjust antibiotics accordingly once the results of the culture return      EVALUATION, ASSESSMENT, & PLAN:     1. Debridement.See Wound Docs for assessment of wounds and procedure notes  2. Continue taking all " medications as prescribed  3. Dressing changes, see Wound docs for dressing change orders  4. RTC one week   5. Counseled patient on increasing protein intake, not getting wound wet, keeping dressing clean dry and intact, following a healthy diet, elevating legs when able, removing pressure from wound    Total time spent for E&M 40  Total time for debridement 20 minutes     Proper ulcer care and the possible need for serial debridements, topical medications, specific dressings and biological engineered skin substitutes if indicated  Discussed general issues surrounding recurrent/ nonhealing ulcers, along with the advantages & disadvantages of various treatment strategies.    Patient should call the office immediately if any signs of infection, such as fever, chills, sweats, increased redness or pain.    Patient was instructed to call the clinic or go to the emergency department if their symptoms do not improve, worsens, or if new symptoms develop.  Patient was advised that if any increased swelling, pain, or numbness arise to go immediately to the ED. Patient knows to call any time if an emergency arises. Shared decision making occurred and patient verbalized understanding in agreement with this plan.       >50% of this > 60 minute visit was spent face to face educating/counseling the patient    I spent a total of 60 minutes on the day of the visit.This includes face to face time and non-face to face time preparing to see the patient (eg, review of tests), obtaining and/or reviewing separately obtained history, documenting clinical information in the electronic or other health record, independently interpreting results and communicating results to the patient/family/caregiver, or care coordinator.       Much of the documentation for this visit was completed in the Wound Docs system.  Please see the attached documentation for further details about the patient's care. Scanned under the Media tab.        Radha Costello,  DPM     Vitals:    08/23/24 1042   BP: 132/65   Pulse: 85   Resp: 16   Temp: 98.3 °F (36.8 °C)   Weight: 113.4 kg (250 lb)   Height: 6' (1.829 m)   PainSc: 0-No pain      Shoe Size:     Past Surgical History:   Procedure Laterality Date    AORTOGRAPHY WITH SERIALOGRAPHY N/A 08/31/2023    Procedure: AORTOGRAM, WITH SERIALOGRAPHY;  Surgeon: Gary Thomas MD;  Location: [facility] CATH/EP LAB;  Service: Peripheral Vascular;  Laterality: N/A;    CATARACT EXTRACTION Bilateral 2014    CORONARY ANGIOGRAPHY INCLUDING BYPASS GRAFTS WITH CATHETERIZATION OF LEFT HEART Left 01/12/2024    Procedure: ANGIOGRAM, CORONARY, INCLUDING BYPASS GRAFT, WITH LEFT HEART CATHETERIZATION;  Surgeon: Gregg Bush MD;  Location: [facility] CATH/EP LAB;  Service: Cardiology;  Laterality: Left;    CORONARY ARTERY BYPASS GRAFT  2010    ENDOSCOPIC HARVEST OF VEIN Right 2/23/2024    Procedure: HARVEST-VEIN-ENDOVASCULAR;  Surgeon: Gary Thomas MD;  Location: [facility];  Service: Cardiothoracic;  Laterality: Right;    INSERTION OF IMPLANTABLE LOOP RECORDER  2011    INSERTION OF INTRA-AORTIC BALLOON ASSIST DEVICE Right 2/23/2024    Procedure: INSERTION, INTRA-AORTIC BALLOON PUMP;  Surgeon: Gary Thomas MD;  Location: [facility];  Service: Cardiothoracic;  Laterality: Right;    INSERTION OF PACEMAKER  2021    does not have card with him for preadmit    INSTANTANEOUS WAVE-FREE RATIO (IFR) N/A 01/12/2024    Procedure: Instantaneous Wave-Free Ratio (IFR);  Surgeon: Gregg Bush MD;  Location: [facility] CATH/EP LAB;  Service: Cardiology;  Laterality: N/A;    PTA, ANTERIOR TIBIAL Left 08/31/2023    Procedure: PTA, Anterior Tibial;  Surgeon: Gary Thomas MD;  Location: [facility] CATH/EP LAB;  Service: Peripheral Vascular;  Laterality: Left;    PTA, SUPERFICIAL FEMORAL ARTERY Left 08/31/2023    Procedure: PTA, Superficial Femoral Artery;  Surgeon: Gary Thomas MD;  Location: [facility] CATH/EP LAB;  Service: Peripheral Vascular;   Laterality: Left;    REPEAT CORONARY ARTERY BYPASS GRAFTING N/A 2/23/2024    Procedure: CABG, REPEAT;  Surgeon: Gary Thomas MD;  Location: [facility] OR;  Service: Cardiothoracic;  Laterality: N/A;    SPLENECTOMY  2016    STENT, ANTERIOR TIBIAL Left 08/31/2023    Procedure: Stent, Anterior Tibial;  Surgeon: Gary Thomas MD;  Location: [facility] CATH/EP LAB;  Service: Peripheral Vascular;  Laterality: Left;    SURGICAL PROCUREMENT, ARTERY, RADIAL, FOR CABG  2/23/2024    Procedure: SURGICAL PROCUREMENT,ARTERY,RADIAL,FOR CABG;  Surgeon: Gary Thomas MD;  Location: [facility] OR;  Service: Cardiothoracic;;    watchman heart device  2019     Past Medical History:   Diagnosis Date    A-fib     Allergies 2020    gets allergy shots    Arthritis 1973    right knee    Back pain     COPD (chronic obstructive pulmonary disease)     Diastolic heart failure     GERD (gastroesophageal reflux disease) 2015    HLD (hyperlipidemia)     HTN (hypertension)     Hx of LASIK     Hypothyroidism, unspecified 2012    Inflammatory disease of prostate, unspecified     out of medicine    Mild intermittent asthma, uncomplicated     Myocardial infarction     Neuropathy     On home oxygen therapy     3L [place]  at night    PAD (peripheral artery disease)     Staph skin infection 2016    right thigh    TIA (transient ischemic attack) 2013     No family history on file.     Social History:   Marital Status:   Alcohol History:  reports that he does not currently use alcohol after a past usage of about 21.0 standard drinks of alcohol per week.  Tobacco History:  reports that he quit smoking about 36 years ago. His smoking use included cigarettes. He has never used smokeless tobacco.  Drug History:  reports no history of drug use.    Review of patient's allergies indicates:   Allergen Reactions    Adhes. band-tape-benzalkonium Rash     Pt stated it caused blisters    Statins-hmg-coa reductase inhibitors Other (See Comments)     Statin  Intolerance (joint pain)       Current Outpatient Medications   Medication Sig Dispense Refill    acetaminophen (TYLENOL) 325 MG tablet Take 650 mg by mouth every 6 (six) hours as needed for Pain or Temperature greater than. (Patient not taking: Reported on 5/29/2024)      albuterol (PROVENTIL/VENTOLIN HFA) 90 mcg/actuation inhaler Inhale 2 puffs into the lungs every 6 (six) hours as needed for Wheezing or Shortness of Breath.      albuterol-ipratropium (DUO-NEB) 2.5 mg-0.5 mg/3 mL nebulizer solution Inhale 3 mLs into the lungs.      aspirin 81 MG Chew Take 81 mg by mouth once daily.      betamethasone valerate 0.1% (VALISONE) 0.1 % Lotn Apply 1 g topically 2 (two) times daily as needed.      diltiaZEM (CARDIZEM CD) 180 MG 24 hr capsule Take 1 capsule (180 mg total) by mouth once daily. (Patient not taking: Reported on 4/1/2024) 90 capsule 0    doxepin (SINEQUAN) 50 MG capsule Take 50 mg by mouth every evening.      doxycycline (VIBRAMYCIN) 100 MG Cap Take 1 capsule (100 mg total) by mouth every 12 (twelve) hours. for 14 days 28 capsule 0    DULoxetine (CYMBALTA) 60 MG capsule Take 60 mg by mouth nightly.      EPINEPHrine (EPIPEN) 0.3 mg/0.3 mL AtIn Inject 0.3 mg into the muscle once.      ezetimibe (ZETIA) 10 mg tablet Take 1 tablet (10 mg total) by mouth every evening. (Patient not taking: Reported on 8/19/2024) 90 tablet 0    furosemide (LASIX) 40 MG tablet 40 mg.      gabapentin (NEURONTIN) 300 MG capsule Take 300 mg by mouth 2 (two) times daily.      guaiFENesin (MUCINEX) 600 mg 12 hr tablet Take 2 tablets (1,200 mg total) by mouth 2 (two) times daily. 30 tablet 0    levothyroxine (SYNTHROID) 50 MCG tablet Take 50 mcg by mouth before breakfast.      metOLazone (ZAROXOLYN) 5 MG tablet 5 mg.      metoprolol tartrate (LOPRESSOR) 25 MG tablet Take 1 tablet (25 mg total) by mouth 2 (two) times daily. (Patient taking differently: Take 12.5 mg by mouth 2 (two) times daily. He will hold if his bp is low) 120 tablet 0     montelukast (SINGULAIR) 10 mg tablet Take 10 mg by mouth once daily.      nitroGLYCERIN (NITROSTAT) 0.4 MG SL tablet Place 0.4 mg under the tongue every 5 (five) minutes as needed for Chest pain. (Patient not taking: Reported on 5/29/2024)      omega-3 fatty acids/fish oil (FISH OIL-OMEGA-3 FATTY ACIDS) 300-1,000 mg capsule Take 1 capsule by mouth 2 (two) times daily.      omeprazole (PRILOSEC) 20 MG capsule Take 40 mg by mouth once daily.      potassium chloride SA (K-DUR,KLOR-CON) 20 MEQ tablet Take 20 mEq by mouth nightly.      senna-docusate 8.6-50 mg (PERICOLACE) 8.6-50 mg per tablet Take 1 tablet by mouth nightly.      spironolactone (ALDACTONE) 25 MG tablet Take 1 tablet (25 mg total) by mouth 2 (two) times daily for 7 days, THEN 1 tablet (25 mg total) once daily. (Patient taking differently: daily) 44 tablet 0    tamsulosin (FLOMAX) 0.4 mg Cap Take 0.4 mg by mouth once daily.      torsemide (DEMADEX) 20 MG Tab Take 2 tablets (40 mg total) by mouth 2 (two) times a day for 7 days, THEN 2 tablets (40 mg total) once daily. (Patient not taking: Reported on 5/29/2024) 88 tablet 0     No current facility-administered medications for this visit.       Review of Systems   Constitutional:  Negative for chills, fatigue, fever and unexpected weight change.   HENT:  Negative for hearing loss and trouble swallowing.    Eyes:  Negative for photophobia and visual disturbance.   Respiratory:  Negative for cough, shortness of breath and wheezing.    Cardiovascular:  Negative for chest pain, palpitations and leg swelling.   Gastrointestinal:  Negative for abdominal pain and nausea.   Genitourinary:  Negative for dysuria and frequency.   Musculoskeletal:  Negative for arthralgias, back pain, gait problem, joint swelling and myalgias.   Skin:  Positive for wound. Negative for rash.   Neurological:  Positive for numbness. Negative for tremors, seizures, weakness and headaches.   Hematological:  Does not bruise/bleed easily.          Objective:        Physical Exam:   Foot Exam  Physical Exam  Ortho/SPM Exam     Imaging:            Assessment:       1. Pressure injury of toe of left foot, stage 3    2. Difficulty in walking, not elsewhere classified    3. At high risk for inadequate nutritional intake    4. PVD (peripheral vascular disease)    5. PAD (peripheral artery disease)    6. Ulcer of left foot with fat layer exposed    7. Wound infection    8. Cellulitis and abscess of foot    9. History of ulcer of lower extremity    10. Diabetic foot infection      Plan:   Pressure injury of toe of left foot, stage 3  -     Ambulatory referral/consult to Cardiovascular Surgery; Future; Expected date: 08/30/2024    Difficulty in walking, not elsewhere classified    At high risk for inadequate nutritional intake    PVD (peripheral vascular disease)    PAD (peripheral artery disease)  -     Ambulatory referral/consult to Cardiovascular Surgery; Future; Expected date: 08/30/2024    Ulcer of left foot with fat layer exposed    Wound infection    Cellulitis and abscess of foot  -     Culture, Anaerobic  -     CULTURE, AEROBIC  (SPECIFY SOURCE)  -     doxycycline (VIBRAMYCIN) 100 MG Cap; Take 1 capsule (100 mg total) by mouth every 12 (twelve) hours. for 14 days  Dispense: 28 capsule; Refill: 0    History of ulcer of lower extremity    Diabetic foot infection      Follow up in about 1 week (around 8/30/2024).    Procedures          Counseling:     I provided patient education verbally regarding:   Patient diagnosis, treatment options, as well as alternatives, risks, and benefits.     This note was created using Dragon voice recognition software that occasionally misinterpreted phrases or words.

## 2024-08-25 LAB — BACTERIA SPEC AEROBE CULT: NORMAL

## 2024-08-27 PROCEDURE — G0180 MD CERTIFICATION HHA PATIENT: HCPCS | Mod: ,,, | Performed by: PODIATRIST

## 2024-08-28 LAB — BACTERIA SPEC ANAEROBE CULT: NORMAL

## 2024-08-29 NOTE — PROGRESS NOTES
[address] [facility] [address]  [place] [address]  Phone: [phone]   [phone]    Patient's PCP:Sunita Rivera MD  Referring Provider: No ref. provider found    Subjective:      Chief Complaint:: Non-healing Wound (Wound to my foot), Foot Ulcer, Wound Check, Wound Care, Results (Culture results review), Peripheral Neuropathy, and Tinea Pedis    HPI  Jose F Hanley is a 79 y.o. male who presents today for follow-up of left great toe wound.  See wound docs documentation for full assessment and evaluation of all wounds.      Culture taken of wound at last visit reviewed.       Additionally, patient presents with dry skin bilateral feet.  Fungal infection of skin explained. Treatment options including no treatment, topical medications, oral medications were discussed, as well as success rates and risks of recurrence. We agreed on topical medication       Athlete's foot counseling: Counseled patient that it is important to keep the feet dry. Use absorbent cotton socks and change them if they become sweaty. Or wear an open-toe shoe or sandal. Wash the feet at least once a day with soap and water. Apply the antifungal cream as prescribed. Recommend spray antiperspirant to the feet. Some antifungal creams are available without a prescription. It may take a week before the rash starts to improve. It can take about 3 to 4 weeks to completely clear. Continue the medicine until the rash is all gone. Use over-the-counter antifungal powders or sprays on your feet after exposure to high-risk environments, such as public showers, gyms, and locker rooms. This can help prevent future infections. Wearing appropriate shoes in these situations can help.           Vascular Status/JENNIFER: patient under the care of vascular surgery previously. Placed additional referral to vascular surgery for patient to continue to follow with vascular surgery, due to new wound present- patient does have diminished pedal pulses.   Nutrition  "Risk/Labs: "Tips and Tricks for wound healing" handout given with detailed guide to optimize nutritional status for wound healing.   Counseled patient on increasing protein intake, following a healthy diet, vitamins  Recent Cultures & Dates:  See micro tab in chart  Antibiotics: doxycycline prescribed  Radiology/Xray: See imaging tab in chart  Diabetes Status/HbA1c: See lab tab in chart  Offloading/Immobilization: Offloading felt to foot, placed in order to remove pressure from wound, accommodating for patient's gait and foot deformity. Recommend crutches or knee scooter.  Football wrap with Darco shoe.  Edema/Compression: none  Tobacco Use: none        EVALUATION, ASSESSMENT, & PLAN:      1. Debridement.See Wound Docs for assessment of wounds and procedure notes  2. Continue taking all medications as prescribed  3. Dressing changes, see Wound docs for dressing change orders  4. RTC one week   5. Counseled patient on increasing protein intake, not getting wound wet, keeping dressing clean dry and intact, following a healthy diet, elevating legs when able, removing pressure from wound     Total time spent for E&M 40  Total time for debridement 20 minutes      Proper ulcer care and the possible need for serial debridements, topical medications, specific dressings and biological engineered skin substitutes if indicated  Discussed general issues surrounding recurrent/ nonhealing ulcers, along with the advantages & disadvantages of various treatment strategies.     Patient should call the office immediately if any signs of infection, such as fever, chills, sweats, increased redness or pain.     Patient was instructed to call the clinic or go to the emergency department if their symptoms do not improve, worsens, or if new symptoms develop.  Patient was advised that if any increased swelling, pain, or numbness arise to go immediately to the ED. Patient knows to call any time if an emergency arises. Shared decision making " occurred and patient verbalized understanding in agreement with this plan.         >50% of this > 60 minute visit was spent face to face educating/counseling the patient     I spent a total of 60 minutes on the day of the visit.This includes face to face time and non-face to face time preparing to see the patient (eg, review of tests), obtaining and/or reviewing separately obtained history, documenting clinical information in the electronic or other health record, independently interpreting results and communicating results to the patient/family/caregiver, or care coordinator.        Much of the documentation for this visit was completed in the Wound Docs system.  Please see the attached documentation for further details about the patient's care. Scanned under the Media tab.         Radha Costello, DPMARLEN     Vitals:    08/30/24 0915   BP: 135/60   Pulse: 83   Resp: 18   Temp: 98.2 °F (36.8 °C)      Shoe Size:     Past Surgical History:   Procedure Laterality Date    AORTOGRAPHY WITH SERIALOGRAPHY N/A 08/31/2023    Procedure: AORTOGRAM, WITH SERIALOGRAPHY;  Surgeon: Gary Thomas MD;  Location: [facility] CATH/EP LAB;  Service: Peripheral Vascular;  Laterality: N/A;    CATARACT EXTRACTION Bilateral 2014    CORONARY ANGIOGRAPHY INCLUDING BYPASS GRAFTS WITH CATHETERIZATION OF LEFT HEART Left 01/12/2024    Procedure: ANGIOGRAM, CORONARY, INCLUDING BYPASS GRAFT, WITH LEFT HEART CATHETERIZATION;  Surgeon: Gregg Bush MD;  Location: [facility] CATH/EP LAB;  Service: Cardiology;  Laterality: Left;    CORONARY ARTERY BYPASS GRAFT  2010    ENDOSCOPIC HARVEST OF VEIN Right 2/23/2024    Procedure: HARVEST-VEIN-ENDOVASCULAR;  Surgeon: Gary Thomas MD;  Location: [facility] OR;  Service: Cardiothoracic;  Laterality: Right;    INSERTION OF IMPLANTABLE LOOP RECORDER  2011    INSERTION OF INTRA-AORTIC BALLOON ASSIST DEVICE Right 2/23/2024    Procedure: INSERTION, INTRA-AORTIC BALLOON PUMP;  Surgeon: Gary Thomas MD;   Location: [facility] OR;  Service: Cardiothoracic;  Laterality: Right;    INSERTION OF PACEMAKER  2021    does not have card with him for preadmit    INSTANTANEOUS WAVE-FREE RATIO (IFR) N/A 01/12/2024    Procedure: Instantaneous Wave-Free Ratio (IFR);  Surgeon: Gregg Bush MD;  Location: [facility] CATH/EP LAB;  Service: Cardiology;  Laterality: N/A;    PTA, ANTERIOR TIBIAL Left 08/31/2023    Procedure: PTA, Anterior Tibial;  Surgeon: Gary Thomas MD;  Location: [facility] CATH/EP LAB;  Service: Peripheral Vascular;  Laterality: Left;    PTA, SUPERFICIAL FEMORAL ARTERY Left 08/31/2023    Procedure: PTA, Superficial Femoral Artery;  Surgeon: Gary Thomas MD;  Location: [facility] CATH/EP LAB;  Service: Peripheral Vascular;  Laterality: Left;    REPEAT CORONARY ARTERY BYPASS GRAFTING N/A 2/23/2024    Procedure: CABG, REPEAT;  Surgeon: Gary Thomas MD;  Location: [facility];  Service: Cardiothoracic;  Laterality: N/A;    SPLENECTOMY  2016    STENT, ANTERIOR TIBIAL Left 08/31/2023    Procedure: Stent, Anterior Tibial;  Surgeon: Gary Thomas MD;  Location: [facility] CATH/EP LAB;  Service: Peripheral Vascular;  Laterality: Left;    SURGICAL PROCUREMENT, ARTERY, RADIAL, FOR CABG  2/23/2024    Procedure: SURGICAL PROCUREMENT,ARTERY,RADIAL,FOR CABG;  Surgeon: Gary Thomas MD;  Location: [facility];  Service: Cardiothoracic;;    watchman heart device  2019     Past Medical History:   Diagnosis Date    A-fib     Allergies 2020    gets allergy shots    Arthritis 1973    right knee    Back pain     COPD (chronic obstructive pulmonary disease)     Diastolic heart failure     GERD (gastroesophageal reflux disease) 2015    HLD (hyperlipidemia)     HTN (hypertension)     Hx of LASIK     Hypothyroidism, unspecified 2012    Inflammatory disease of prostate, unspecified     out of medicine    Mild intermittent asthma, uncomplicated     Myocardial infarction     Neuropathy     On home oxygen therapy     3L [place]  at night     PAD (peripheral artery disease)     [facility] skin infection 2016    right thigh    TIA (transient ischemic attack) 2013     No family history on file.     Social History:   Marital Status:   Alcohol History:  reports that he does not currently use alcohol after a past usage of about 21.0 standard drinks of alcohol per week.  Tobacco History:  reports that he quit smoking about 36 years ago. His smoking use included cigarettes. He has never used smokeless tobacco.  Drug History:  reports no history of drug use.    Review of patient's allergies indicates:   Allergen Reactions    Adhes. band-tape-benzalkonium Rash     Pt stated it caused blisters    Statins-hmg-coa reductase inhibitors Other (See Comments)     Statin Intolerance (joint pain)       Current Outpatient Medications   Medication Sig Dispense Refill    acetaminophen (TYLENOL) 325 MG tablet Take 650 mg by mouth every 6 (six) hours as needed for Pain or Temperature greater than. (Patient not taking: Reported on 5/29/2024)      albuterol (PROVENTIL/VENTOLIN HFA) 90 mcg/actuation inhaler Inhale 2 puffs into the lungs every 6 (six) hours as needed for Wheezing or Shortness of Breath.      albuterol-ipratropium (DUO-NEB) 2.5 mg-0.5 mg/3 mL nebulizer solution Inhale 3 mLs into the lungs.      aspirin 81 MG Chew Take 81 mg by mouth once daily.      betamethasone valerate 0.1% (VALISONE) 0.1 % Lotn Apply 1 g topically 2 (two) times daily as needed.      diltiaZEM (CARDIZEM CD) 180 MG 24 hr capsule Take 1 capsule (180 mg total) by mouth once daily. (Patient not taking: Reported on 4/1/2024) 90 capsule 0    doxepin (SINEQUAN) 50 MG capsule Take 50 mg by mouth every evening.      doxycycline (VIBRAMYCIN) 100 MG Cap Take 1 capsule (100 mg total) by mouth every 12 (twelve) hours. for 14 days 28 capsule 0    DULoxetine (CYMBALTA) 60 MG capsule Take 60 mg by mouth nightly.      EPINEPHrine (EPIPEN) 0.3 mg/0.3 mL AtIn Inject 0.3 mg into the muscle once.      ezetimibe  (ZETIA) 10 mg tablet Take 1 tablet (10 mg total) by mouth every evening. (Patient not taking: Reported on 8/19/2024) 90 tablet 0    furosemide (LASIX) 40 MG tablet 40 mg.      gabapentin (NEURONTIN) 300 MG capsule Take 300 mg by mouth 2 (two) times daily.      guaiFENesin (MUCINEX) 600 mg 12 hr tablet Take 2 tablets (1,200 mg total) by mouth 2 (two) times daily. 30 tablet 0    levothyroxine (SYNTHROID) 50 MCG tablet Take 50 mcg by mouth before breakfast.      metOLazone (ZAROXOLYN) 5 MG tablet 5 mg.      metoprolol tartrate (LOPRESSOR) 25 MG tablet Take 1 tablet (25 mg total) by mouth 2 (two) times daily. (Patient taking differently: Take 12.5 mg by mouth 2 (two) times daily. He will hold if his bp is low) 120 tablet 0    montelukast (SINGULAIR) 10 mg tablet Take 10 mg by mouth once daily.      nitroGLYCERIN (NITROSTAT) 0.4 MG SL tablet Place 0.4 mg under the tongue every 5 (five) minutes as needed for Chest pain. (Patient not taking: Reported on 5/29/2024)      omega-3 fatty acids/fish oil (FISH OIL-OMEGA-3 FATTY ACIDS) 300-1,000 mg capsule Take 1 capsule by mouth 2 (two) times daily.      omeprazole (PRILOSEC) 20 MG capsule Take 40 mg by mouth once daily.      potassium chloride SA (K-DUR,KLOR-CON) 20 MEQ tablet Take 20 mEq by mouth nightly.      senna-docusate 8.6-50 mg (PERICOLACE) 8.6-50 mg per tablet Take 1 tablet by mouth nightly.      spironolactone (ALDACTONE) 25 MG tablet Take 1 tablet (25 mg total) by mouth 2 (two) times daily for 7 days, THEN 1 tablet (25 mg total) once daily. (Patient taking differently: daily) 44 tablet 0    tamsulosin (FLOMAX) 0.4 mg Cap Take 0.4 mg by mouth once daily.      torsemide (DEMADEX) 20 MG Tab Take 2 tablets (40 mg total) by mouth 2 (two) times a day for 7 days, THEN 2 tablets (40 mg total) once daily. (Patient not taking: Reported on 5/29/2024) 88 tablet 0     No current facility-administered medications for this visit.       Review of Systems   Constitutional:  Negative  for chills, fatigue, fever and unexpected weight change.   HENT:  Negative for hearing loss and trouble swallowing.    Eyes:  Negative for photophobia and visual disturbance.   Respiratory:  Negative for cough, shortness of breath and wheezing.    Cardiovascular:  Negative for chest pain, palpitations and leg swelling.   Gastrointestinal:  Negative for abdominal pain and nausea.   Genitourinary:  Negative for dysuria and frequency.   Musculoskeletal:  Negative for arthralgias, back pain, gait problem, joint swelling and myalgias.   Skin:  Positive for wound. Negative for rash.   Neurological:  Positive for numbness. Negative for tremors, seizures, weakness and headaches.   Hematological:  Does not bruise/bleed easily.         Objective:        Physical Exam:   Foot Exam  Physical Exam  Ortho/SPM Exam     Imaging:            Assessment:       1. Ulcer of left foot with fat layer exposed    2. Difficulty in walking, not elsewhere classified    3. At high risk for inadequate nutritional intake    4. PVD (peripheral vascular disease)    5. PAD (peripheral artery disease)    6. Pressure injury of toe of left foot, stage 3    7. History of ulcer of lower extremity    8. Wound infection    9. Tinea pedis of both feet      Plan:   Ulcer of left foot with fat layer exposed    Difficulty in walking, not elsewhere classified    At high risk for inadequate nutritional intake    PVD (peripheral vascular disease)    PAD (peripheral artery disease)    Pressure injury of toe of left foot, stage 3    History of ulcer of lower extremity    Wound infection    Tinea pedis of both feet      Follow up in about 1 week (around 9/6/2024).    Procedures          Counseling:     I provided patient education verbally regarding:   Patient diagnosis, treatment options, as well as alternatives, risks, and benefits.     This note was created using Dragon voice recognition software that occasionally misinterpreted phrases or words.

## 2024-08-30 ENCOUNTER — OFFICE VISIT (OUTPATIENT)
Dept: WOUND CARE | Facility: HOSPITAL | Age: 79
End: 2024-08-30
Attending: PODIATRIST
Payer: MEDICARE

## 2024-08-30 VITALS
DIASTOLIC BLOOD PRESSURE: 60 MMHG | TEMPERATURE: 98 F | SYSTOLIC BLOOD PRESSURE: 135 MMHG | RESPIRATION RATE: 18 BRPM | HEART RATE: 83 BPM

## 2024-08-30 DIAGNOSIS — L89.893 PRESSURE INJURY OF TOE OF LEFT FOOT, STAGE 3: ICD-10-CM

## 2024-08-30 DIAGNOSIS — B35.3 TINEA PEDIS OF BOTH FEET: ICD-10-CM

## 2024-08-30 DIAGNOSIS — L97.522 ULCER OF LEFT FOOT WITH FAT LAYER EXPOSED: Primary | ICD-10-CM

## 2024-08-30 DIAGNOSIS — R26.2 DIFFICULTY IN WALKING, NOT ELSEWHERE CLASSIFIED: ICD-10-CM

## 2024-08-30 DIAGNOSIS — L08.9 WOUND INFECTION: ICD-10-CM

## 2024-08-30 DIAGNOSIS — T14.8XXA WOUND INFECTION: ICD-10-CM

## 2024-08-30 DIAGNOSIS — I73.9 PAD (PERIPHERAL ARTERY DISEASE): ICD-10-CM

## 2024-08-30 DIAGNOSIS — Z87.2 HISTORY OF ULCER OF LOWER EXTREMITY: ICD-10-CM

## 2024-08-30 DIAGNOSIS — Z91.89 AT HIGH RISK FOR INADEQUATE NUTRITIONAL INTAKE: ICD-10-CM

## 2024-08-30 DIAGNOSIS — I73.9 PVD (PERIPHERAL VASCULAR DISEASE): ICD-10-CM

## 2024-08-30 PROCEDURE — 11042 DBRDMT SUBQ TIS 1ST 20SQCM/<: CPT | Mod: PN | Performed by: PODIATRIST

## 2024-08-30 PROCEDURE — 11042 DBRDMT SUBQ TIS 1ST 20SQCM/<: CPT | Mod: ,,, | Performed by: PODIATRIST

## 2024-08-30 PROCEDURE — 99213 OFFICE O/P EST LOW 20 MIN: CPT | Mod: 25,,, | Performed by: PODIATRIST

## 2024-09-06 ENCOUNTER — OFFICE VISIT (OUTPATIENT)
Dept: WOUND CARE | Facility: HOSPITAL | Age: 79
End: 2024-09-06
Attending: PODIATRIST
Payer: MEDICARE

## 2024-09-06 VITALS
TEMPERATURE: 98 F | HEART RATE: 83 BPM | DIASTOLIC BLOOD PRESSURE: 63 MMHG | SYSTOLIC BLOOD PRESSURE: 122 MMHG | RESPIRATION RATE: 16 BRPM

## 2024-09-06 DIAGNOSIS — I73.9 PAD (PERIPHERAL ARTERY DISEASE): ICD-10-CM

## 2024-09-06 DIAGNOSIS — I73.9 PVD (PERIPHERAL VASCULAR DISEASE): ICD-10-CM

## 2024-09-06 DIAGNOSIS — Z87.2 HISTORY OF ULCER OF LOWER EXTREMITY: ICD-10-CM

## 2024-09-06 DIAGNOSIS — B35.3 TINEA PEDIS OF BOTH FEET: ICD-10-CM

## 2024-09-06 DIAGNOSIS — Z91.89 AT HIGH RISK FOR INADEQUATE NUTRITIONAL INTAKE: ICD-10-CM

## 2024-09-06 DIAGNOSIS — L89.893 PRESSURE INJURY OF TOE OF LEFT FOOT, STAGE 3: Primary | ICD-10-CM

## 2024-09-06 DIAGNOSIS — L97.522 ULCER OF LEFT FOOT WITH FAT LAYER EXPOSED: ICD-10-CM

## 2024-09-06 DIAGNOSIS — R26.2 DIFFICULTY IN WALKING, NOT ELSEWHERE CLASSIFIED: ICD-10-CM

## 2024-09-06 PROCEDURE — 99213 OFFICE O/P EST LOW 20 MIN: CPT | Mod: 25,,, | Performed by: PODIATRIST

## 2024-09-06 PROCEDURE — 11042 DBRDMT SUBQ TIS 1ST 20SQCM/<: CPT | Mod: ,,, | Performed by: PODIATRIST

## 2024-09-06 PROCEDURE — 11042 DBRDMT SUBQ TIS 1ST 20SQCM/<: CPT | Mod: PN | Performed by: PODIATRIST

## 2024-09-07 NOTE — PROGRESS NOTES
"  [address] [facility] [address]  [place] [address]  Phone: [phone]   [phone]    Patient's PCP:Sunita Rivera MD  Referring Provider: No ref. provider found    Subjective:      Chief Complaint:: Wound Care, Wound Check, Foot Ulcer, Pressure Ulcer, Peripheral Neuropathy, and Tinea Pedis    HPI  Jose F Hanley is a 79 y.o. male who presents today for follow-up of left great toe wound.  See wound docs documentation for full assessment and evaluation of all wounds.         Additionally, patient presents with dry skin bilateral feet.  Fungal infection of skin explained. Treatment options including no treatment, topical medications, oral medications were discussed, as well as success rates and risks of recurrence. We agreed on topical medication         Athlete's foot counseling: Counseled patient that it is important to keep the feet dry. Use absorbent cotton socks and change them if they become sweaty. Or wear an open-toe shoe or sandal. Wash the feet at least once a day with soap and water. Apply the antifungal cream as prescribed. Recommend spray antiperspirant to the feet. Some antifungal creams are available without a prescription. It may take a week before the rash starts to improve. It can take about 3 to 4 weeks to completely clear. Continue the medicine until the rash is all gone. Use over-the-counter antifungal powders or sprays on your feet after exposure to high-risk environments, such as public showers, gyms, and locker rooms. This can help prevent future infections. Wearing appropriate shoes in these situations can help.            Vascular Status/JENNIFER: patient under the care of vascular surgery previously. Placed additional referral to vascular surgery for patient to continue to follow with vascular surgery, due to new wound present- patient does have diminished pedal pulses.   Nutrition Risk/Labs: "Tips and Tricks for wound healing" handout given with detailed guide to optimize nutritional " status for wound healing.   Counseled patient on increasing protein intake, following a healthy diet, vitamins  Recent Cultures & Dates:  See micro tab in chart  Antibiotics: doxycycline prescribed  Radiology/Xray: See imaging tab in chart  Diabetes Status/HbA1c: See lab tab in chart  Offloading/Immobilization: Offloading felt to foot, placed in order to remove pressure from wound, accommodating for patient's gait and foot deformity. Recommend crutches or knee scooter.  Football wrap with Darco shoe.  Edema/Compression: none  Tobacco Use: none        EVALUATION, ASSESSMENT, & PLAN:      1. Debridement.See Wound Docs for assessment of wounds and procedure notes  2. Continue taking all medications as prescribed  3. Dressing changes, see Wound docs for dressing change orders  4. RTC one week   5. Counseled patient on increasing protein intake, not getting wound wet, keeping dressing clean dry and intact, following a healthy diet, elevating legs when able, removing pressure from wound     Total time spent for E&M 40  Total time for debridement 20 minutes      Proper ulcer care and the possible need for serial debridements, topical medications, specific dressings and biological engineered skin substitutes if indicated  Discussed general issues surrounding recurrent/ nonhealing ulcers, along with the advantages & disadvantages of various treatment strategies.     Patient should call the office immediately if any signs of infection, such as fever, chills, sweats, increased redness or pain.     Patient was instructed to call the clinic or go to the emergency department if their symptoms do not improve, worsens, or if new symptoms develop.  Patient was advised that if any increased swelling, pain, or numbness arise to go immediately to the ED. Patient knows to call any time if an emergency arises. Shared decision making occurred and patient verbalized understanding in agreement with this plan.         >50% of this > 60  minute visit was spent face to face educating/counseling the patient     I spent a total of 60 minutes on the day of the visit.This includes face to face time and non-face to face time preparing to see the patient (eg, review of tests), obtaining and/or reviewing separately obtained history, documenting clinical information in the electronic or other health record, independently interpreting results and communicating results to the patient/family/caregiver, or care coordinator.        Much of the documentation for this visit was completed in the Wound Docs system.  Please see the attached documentation for further details about the patient's care. Scanned under the Media tab.         Radha GodoyARIANNE bolden   Vitals:    09/06/24 1037   BP: 122/63   Pulse: 83   Resp: 16   Temp: 98.2 °F (36.8 °C)   PainSc: 0-No pain      Shoe Size:     Past Surgical History:   Procedure Laterality Date    AORTOGRAPHY WITH SERIALOGRAPHY N/A 08/31/2023    Procedure: AORTOGRAM, WITH SERIALOGRAPHY;  Surgeon: Gary Thomas MD;  Location: [facility] CATH/EP LAB;  Service: Peripheral Vascular;  Laterality: N/A;    CATARACT EXTRACTION Bilateral 2014    CORONARY ANGIOGRAPHY INCLUDING BYPASS GRAFTS WITH CATHETERIZATION OF LEFT HEART Left 01/12/2024    Procedure: ANGIOGRAM, CORONARY, INCLUDING BYPASS GRAFT, WITH LEFT HEART CATHETERIZATION;  Surgeon: Gregg Bush MD;  Location: [facility] CATH/EP LAB;  Service: Cardiology;  Laterality: Left;    CORONARY ARTERY BYPASS GRAFT  2010    ENDOSCOPIC HARVEST OF VEIN Right 2/23/2024    Procedure: HARVEST-VEIN-ENDOVASCULAR;  Surgeon: Gary Thomas MD;  Location: [facility] OR;  Service: Cardiothoracic;  Laterality: Right;    INSERTION OF IMPLANTABLE LOOP RECORDER  2011    INSERTION OF INTRA-AORTIC BALLOON ASSIST DEVICE Right 2/23/2024    Procedure: INSERTION, INTRA-AORTIC BALLOON PUMP;  Surgeon: Gary Thomas MD;  Location: [facility] OR;  Service: Cardiothoracic;  Laterality: Right;    INSERTION OF  PACEMAKER  2021    does not have card with him for preadmit    INSTANTANEOUS WAVE-FREE RATIO (IFR) N/A 01/12/2024    Procedure: Instantaneous Wave-Free Ratio (IFR);  Surgeon: Gregg Bush MD;  Location: [facility] CATH/EP LAB;  Service: Cardiology;  Laterality: N/A;    PTA, ANTERIOR TIBIAL Left 08/31/2023    Procedure: PTA, Anterior Tibial;  Surgeon: Gary Thomas MD;  Location: [facility] CATH/EP LAB;  Service: Peripheral Vascular;  Laterality: Left;    PTA, SUPERFICIAL FEMORAL ARTERY Left 08/31/2023    Procedure: PTA, Superficial Femoral Artery;  Surgeon: Gary Thomas MD;  Location: [facility] CATH/EP LAB;  Service: Peripheral Vascular;  Laterality: Left;    REPEAT CORONARY ARTERY BYPASS GRAFTING N/A 2/23/2024    Procedure: CABG, REPEAT;  Surgeon: Gary Thomas MD;  Location: [facility] OR;  Service: Cardiothoracic;  Laterality: N/A;    SPLENECTOMY  2016    STENT, ANTERIOR TIBIAL Left 08/31/2023    Procedure: Stent, Anterior Tibial;  Surgeon: Gary Thomas MD;  Location: [facility] CATH/EP LAB;  Service: Peripheral Vascular;  Laterality: Left;    SURGICAL PROCUREMENT, ARTERY, RADIAL, FOR CABG  2/23/2024    Procedure: SURGICAL PROCUREMENT,ARTERY,RADIAL,FOR CABG;  Surgeon: Gary Thomas MD;  Location: [facility] OR;  Service: Cardiothoracic;;    watchman heart device  2019     Past Medical History:   Diagnosis Date    A-fib     Allergies 2020    gets allergy shots    Arthritis 1973    right knee    Back pain     COPD (chronic obstructive pulmonary disease)     Diastolic heart failure     GERD (gastroesophageal reflux disease) 2015    HLD (hyperlipidemia)     HTN (hypertension)     Hx of LASIK     Hypothyroidism, unspecified 2012    Inflammatory disease of prostate, unspecified     out of medicine    Mild intermittent asthma, uncomplicated     Myocardial infarction     Neuropathy     On home oxygen therapy     3L [place]  at night    PAD (peripheral artery disease)     Staph skin infection 2016    right thigh     TIA (transient ischemic attack) 2013     No family history on file.     Social History:   Marital Status:   Alcohol History:  reports that he does not currently use alcohol after a past usage of about 21.0 standard drinks of alcohol per week.  Tobacco History:  reports that he quit smoking about 36 years ago. His smoking use included cigarettes. He has never used smokeless tobacco.  Drug History:  reports no history of drug use.    Review of patient's allergies indicates:   Allergen Reactions    Adhes. band-tape-benzalkonium Rash     Pt stated it caused blisters    Statins-hmg-coa reductase inhibitors Other (See Comments)     Statin Intolerance (joint pain)       Current Outpatient Medications   Medication Sig Dispense Refill    acetaminophen (TYLENOL) 325 MG tablet Take 650 mg by mouth every 6 (six) hours as needed for Pain or Temperature greater than. (Patient not taking: Reported on 5/29/2024)      albuterol (PROVENTIL/VENTOLIN HFA) 90 mcg/actuation inhaler Inhale 2 puffs into the lungs every 6 (six) hours as needed for Wheezing or Shortness of Breath.      albuterol-ipratropium (DUO-NEB) 2.5 mg-0.5 mg/3 mL nebulizer solution Inhale 3 mLs into the lungs.      aspirin 81 MG Chew Take 81 mg by mouth once daily.      betamethasone valerate 0.1% (VALISONE) 0.1 % Lotn Apply 1 g topically 2 (two) times daily as needed.      diltiaZEM (CARDIZEM CD) 180 MG 24 hr capsule Take 1 capsule (180 mg total) by mouth once daily. (Patient not taking: Reported on 4/1/2024) 90 capsule 0    doxepin (SINEQUAN) 50 MG capsule Take 50 mg by mouth every evening.      DULoxetine (CYMBALTA) 60 MG capsule Take 60 mg by mouth nightly.      EPINEPHrine (EPIPEN) 0.3 mg/0.3 mL AtIn Inject 0.3 mg into the muscle once.      ezetimibe (ZETIA) 10 mg tablet Take 1 tablet (10 mg total) by mouth every evening. (Patient not taking: Reported on 8/19/2024) 90 tablet 0    furosemide (LASIX) 40 MG tablet 40 mg.      gabapentin (NEURONTIN) 300 MG  capsule Take 300 mg by mouth 2 (two) times daily.      guaiFENesin (MUCINEX) 600 mg 12 hr tablet Take 2 tablets (1,200 mg total) by mouth 2 (two) times daily. 30 tablet 0    levothyroxine (SYNTHROID) 50 MCG tablet Take 50 mcg by mouth before breakfast.      metOLazone (ZAROXOLYN) 5 MG tablet 5 mg.      metoprolol tartrate (LOPRESSOR) 25 MG tablet Take 1 tablet (25 mg total) by mouth 2 (two) times daily. (Patient taking differently: Take 12.5 mg by mouth 2 (two) times daily. He will hold if his bp is low) 120 tablet 0    montelukast (SINGULAIR) 10 mg tablet Take 10 mg by mouth once daily.      nitroGLYCERIN (NITROSTAT) 0.4 MG SL tablet Place 0.4 mg under the tongue every 5 (five) minutes as needed for Chest pain. (Patient not taking: Reported on 5/29/2024)      omega-3 fatty acids/fish oil (FISH OIL-OMEGA-3 FATTY ACIDS) 300-1,000 mg capsule Take 1 capsule by mouth 2 (two) times daily.      omeprazole (PRILOSEC) 20 MG capsule Take 40 mg by mouth once daily.      potassium chloride SA (K-DUR,KLOR-CON) 20 MEQ tablet Take 20 mEq by mouth nightly.      senna-docusate 8.6-50 mg (PERICOLACE) 8.6-50 mg per tablet Take 1 tablet by mouth nightly.      spironolactone (ALDACTONE) 25 MG tablet Take 1 tablet (25 mg total) by mouth 2 (two) times daily for 7 days, THEN 1 tablet (25 mg total) once daily. (Patient taking differently: daily) 44 tablet 0    tamsulosin (FLOMAX) 0.4 mg Cap Take 0.4 mg by mouth once daily.      torsemide (DEMADEX) 20 MG Tab Take 2 tablets (40 mg total) by mouth 2 (two) times a day for 7 days, THEN 2 tablets (40 mg total) once daily. (Patient not taking: Reported on 5/29/2024) 88 tablet 0     No current facility-administered medications for this visit.       Review of Systems   Constitutional:  Negative for chills, fatigue, fever and unexpected weight change.   HENT:  Negative for hearing loss and trouble swallowing.    Eyes:  Negative for photophobia and visual disturbance.   Respiratory:  Negative for  cough, shortness of breath and wheezing.    Cardiovascular:  Negative for chest pain, palpitations and leg swelling.   Gastrointestinal:  Negative for abdominal pain and nausea.   Genitourinary:  Negative for dysuria and frequency.   Musculoskeletal:  Negative for arthralgias, back pain, gait problem, joint swelling and myalgias.   Skin:  Positive for wound. Negative for rash.   Neurological:  Positive for numbness. Negative for tremors, seizures, weakness and headaches.   Hematological:  Does not bruise/bleed easily.         Objective:        Physical Exam:   Foot Exam  Physical Exam  Ortho/SPM Exam     Imaging:            Assessment:       1. Pressure injury of toe of left foot, stage 3    2. History of ulcer of lower extremity    3. Difficulty in walking, not elsewhere classified    4. At high risk for inadequate nutritional intake    5. PVD (peripheral vascular disease)    6. PAD (peripheral artery disease)    7. Ulcer of left foot with fat layer exposed    8. Tinea pedis of both feet      Plan:   Pressure injury of toe of left foot, stage 3    History of ulcer of lower extremity    Difficulty in walking, not elsewhere classified    At high risk for inadequate nutritional intake    PVD (peripheral vascular disease)    PAD (peripheral artery disease)    Ulcer of left foot with fat layer exposed    Tinea pedis of both feet      Follow up in about 10 days (around 9/16/2024).    Procedures          Counseling:     I provided patient education verbally regarding:   Patient diagnosis, treatment options, as well as alternatives, risks, and benefits.     This note was created using Dragon voice recognition software that occasionally misinterpreted phrases or words.

## 2024-09-09 ENCOUNTER — DOCUMENT SCAN (OUTPATIENT)
Dept: HOME HEALTH SERVICES | Facility: HOSPITAL | Age: 79
End: 2024-09-09
Payer: MEDICARE

## 2024-09-09 DIAGNOSIS — M25.561 RIGHT KNEE PAIN, UNSPECIFIED CHRONICITY: Primary | ICD-10-CM

## 2024-09-16 ENCOUNTER — TELEPHONE (OUTPATIENT)
Dept: RADIOLOGY | Facility: HOSPITAL | Age: 79
End: 2024-09-16

## 2024-09-17 DIAGNOSIS — M25.551 RIGHT HIP PAIN: Primary | ICD-10-CM

## 2024-09-17 NOTE — NURSING
Right hip inj scheduled @ [facility] on 9/25 @ 1pm with arrival @ 1230. Instructed to hold aspirin after 9/21 evening dose. Verbalized understanding

## 2024-09-19 ENCOUNTER — DOCUMENT SCAN (OUTPATIENT)
Dept: HOME HEALTH SERVICES | Facility: HOSPITAL | Age: 79
End: 2024-09-19
Payer: MEDICARE

## 2024-09-20 ENCOUNTER — DOCUMENT SCAN (OUTPATIENT)
Dept: HOME HEALTH SERVICES | Facility: HOSPITAL | Age: 79
End: 2024-09-20
Payer: MEDICARE

## 2024-09-23 ENCOUNTER — OFFICE VISIT (OUTPATIENT)
Dept: WOUND CARE | Facility: HOSPITAL | Age: 79
End: 2024-09-23
Attending: PODIATRIST
Payer: MEDICARE

## 2024-09-23 VITALS
DIASTOLIC BLOOD PRESSURE: 69 MMHG | TEMPERATURE: 98 F | RESPIRATION RATE: 20 BRPM | HEART RATE: 81 BPM | SYSTOLIC BLOOD PRESSURE: 135 MMHG

## 2024-09-23 DIAGNOSIS — Z91.89 AT HIGH RISK FOR INADEQUATE NUTRITIONAL INTAKE: ICD-10-CM

## 2024-09-23 DIAGNOSIS — I73.9 PAD (PERIPHERAL ARTERY DISEASE): ICD-10-CM

## 2024-09-23 DIAGNOSIS — R26.2 DIFFICULTY IN WALKING, NOT ELSEWHERE CLASSIFIED: ICD-10-CM

## 2024-09-23 DIAGNOSIS — I73.9 PVD (PERIPHERAL VASCULAR DISEASE): ICD-10-CM

## 2024-09-23 DIAGNOSIS — L97.522 ULCER OF LEFT FOOT WITH FAT LAYER EXPOSED: ICD-10-CM

## 2024-09-23 DIAGNOSIS — B35.3 TINEA PEDIS OF BOTH FEET: ICD-10-CM

## 2024-09-23 DIAGNOSIS — Z87.2 HISTORY OF ULCER OF LOWER EXTREMITY: ICD-10-CM

## 2024-09-23 DIAGNOSIS — L89.893 PRESSURE INJURY OF TOE OF LEFT FOOT, STAGE 3: Primary | ICD-10-CM

## 2024-09-23 PROCEDURE — 99213 OFFICE O/P EST LOW 20 MIN: CPT | Mod: 25,,, | Performed by: PODIATRIST

## 2024-09-23 PROCEDURE — 11042 DBRDMT SUBQ TIS 1ST 20SQCM/<: CPT | Mod: ,,, | Performed by: PODIATRIST

## 2024-09-23 PROCEDURE — 11042 DBRDMT SUBQ TIS 1ST 20SQCM/<: CPT | Mod: PN | Performed by: PODIATRIST

## 2024-09-23 NOTE — PROGRESS NOTES
"  [address] [facility] [address] [place] [address]  Phone: [phone]   [phone]    Patient's PCP:Sunita Rivera MD  Referring Provider: No ref. provider found    Subjective:      Chief Complaint:: Wound Care, Foot Ulcer, Non-healing Wound, Pressure Ulcer, Wound Check, Peripheral Neuropathy, and Tinea Pedis    HPI  Jose F Hanley is a 79 y.o. male who presents today for follow-up of left great toe wound.  See wound docs documentation for full assessment and evaluation of all wounds.         Additionally, patient presents for follow up of dry skin bilateral feet.  Fungal infection of skin explained. Treatment options including no treatment, topical medications, oral medications were discussed, as well as success rates and risks of recurrence. We agreed on topical medication         Athlete's foot counseling: Counseled patient that it is important to keep the feet dry. Use absorbent cotton socks and change them if they become sweaty. Or wear an open-toe shoe or sandal. Wash the feet at least once a day with soap and water. Apply the antifungal cream as prescribed. Recommend spray antiperspirant to the feet. Some antifungal creams are available without a prescription. It may take a week before the rash starts to improve. It can take about 3 to 4 weeks to completely clear. Continue the medicine until the rash is all gone. Use over-the-counter antifungal powders or sprays on your feet after exposure to high-risk environments, such as public showers, gyms, and locker rooms. This can help prevent future infections. Wearing appropriate shoes in these situations can help.            Vascular Status/JENNIFER: patient under the care of vascular surgery previously. Placed additional referral to vascular surgery for patient to continue to follow with vascular surgery, due to new wound present- patient does have diminished pedal pulses.   Nutrition Risk/Labs: "Tips and Tricks for wound healing" handout given with detailed " guide to optimize nutritional status for wound healing.   Counseled patient on increasing protein intake, following a healthy diet, vitamins  Recent Cultures & Dates:  See micro tab in chart  Antibiotics: doxycycline prescribed  Radiology/Xray: See imaging tab in chart  Diabetes Status/HbA1c: See lab tab in chart  Offloading/Immobilization: Offloading felt to foot, placed in order to remove pressure from wound, accommodating for patient's gait and foot deformity. Recommend crutches or knee scooter.  Football wrap with Darco shoe.  Edema/Compression: none  Tobacco Use: none        EVALUATION, ASSESSMENT, & PLAN:      1. Debridement.See Wound Docs for assessment of wounds and procedure notes  2. Continue taking all medications as prescribed  3. Dressing changes, see Wound docs for dressing change orders  4. RTC one week   5. Counseled patient on increasing protein intake, not getting wound wet, keeping dressing clean dry and intact, following a healthy diet, elevating legs when able, removing pressure from wound     Total time spent for E&M 40  Total time for debridement 20 minutes      Proper ulcer care and the possible need for serial debridements, topical medications, specific dressings and biological engineered skin substitutes if indicated  Discussed general issues surrounding recurrent/ nonhealing ulcers, along with the advantages & disadvantages of various treatment strategies.     Patient should call the office immediately if any signs of infection, such as fever, chills, sweats, increased redness or pain.     Patient was instructed to call the clinic or go to the emergency department if their symptoms do not improve, worsens, or if new symptoms develop.  Patient was advised that if any increased swelling, pain, or numbness arise to go immediately to the ED. Patient knows to call any time if an emergency arises. Shared decision making occurred and patient verbalized understanding in agreement with this plan.          >50% of this > 60 minute visit was spent face to face educating/counseling the patient     I spent a total of 60 minutes on the day of the visit.This includes face to face time and non-face to face time preparing to see the patient (eg, review of tests), obtaining and/or reviewing separately obtained history, documenting clinical information in the electronic or other health record, independently interpreting results and communicating results to the patient/family/caregiver, or care coordinator.        Much of the documentation for this visit was completed in the Wound Docs system.  Please see the attached documentation for further details about the patient's care. Scanned under the Media tab.         Radha Costello, DPMARLEN     Vitals:    09/23/24 0944   BP: 135/69   Pulse: 81   Resp: 20   Temp: 98.1 °F (36.7 °C)   PainSc: 0-No pain      Shoe Size:     Past Surgical History:   Procedure Laterality Date    AORTOGRAPHY WITH SERIALOGRAPHY N/A 08/31/2023    Procedure: AORTOGRAM, WITH SERIALOGRAPHY;  Surgeon: Gary Thomas MD;  Location: [facility] CATH/EP LAB;  Service: Peripheral Vascular;  Laterality: N/A;    CATARACT EXTRACTION Bilateral 2014    CORONARY ANGIOGRAPHY INCLUDING BYPASS GRAFTS WITH CATHETERIZATION OF LEFT HEART Left 01/12/2024    Procedure: ANGIOGRAM, CORONARY, INCLUDING BYPASS GRAFT, WITH LEFT HEART CATHETERIZATION;  Surgeon: Gregg Bush MD;  Location: [facility] CATH/EP LAB;  Service: Cardiology;  Laterality: Left;    CORONARY ARTERY BYPASS GRAFT  2010    ENDOSCOPIC HARVEST OF VEIN Right 2/23/2024    Procedure: HARVEST-VEIN-ENDOVASCULAR;  Surgeon: Gary Thomas MD;  Location: [facility] OR;  Service: Cardiothoracic;  Laterality: Right;    INSERTION OF IMPLANTABLE LOOP RECORDER  2011    INSERTION OF INTRA-AORTIC BALLOON ASSIST DEVICE Right 2/23/2024    Procedure: INSERTION, INTRA-AORTIC BALLOON PUMP;  Surgeon: Gary Thomas MD;  Location: [facility] OR;  Service: Cardiothoracic;  Laterality:  Right;    INSERTION OF PACEMAKER  2021    does not have card with him for preadmit    INSTANTANEOUS WAVE-FREE RATIO (IFR) N/A 01/12/2024    Procedure: Instantaneous Wave-Free Ratio (IFR);  Surgeon: Gregg Bush MD;  Location: [facility] CATH/EP LAB;  Service: Cardiology;  Laterality: N/A;    PTA, ANTERIOR TIBIAL Left 08/31/2023    Procedure: PTA, Anterior Tibial;  Surgeon: Gary Thomas MD;  Location: [facility] CATH/EP LAB;  Service: Peripheral Vascular;  Laterality: Left;    PTA, SUPERFICIAL FEMORAL ARTERY Left 08/31/2023    Procedure: PTA, Superficial Femoral Artery;  Surgeon: Gary Thomas MD;  Location: [facility] CATH/EP LAB;  Service: Peripheral Vascular;  Laterality: Left;    REPEAT CORONARY ARTERY BYPASS GRAFTING N/A 2/23/2024    Procedure: CABG, REPEAT;  Surgeon: Gary Thomas MD;  Location: [facility] OR;  Service: Cardiothoracic;  Laterality: N/A;    SPLENECTOMY  2016    STENT, ANTERIOR TIBIAL Left 08/31/2023    Procedure: Stent, Anterior Tibial;  Surgeon: Gary Thomas MD;  Location: [facility] CATH/EP LAB;  Service: Peripheral Vascular;  Laterality: Left;    SURGICAL PROCUREMENT, ARTERY, RADIAL, FOR CABG  2/23/2024    Procedure: SURGICAL PROCUREMENT,ARTERY,RADIAL,FOR CABG;  Surgeon: Gary Thomas MD;  Location: [facility] OR;  Service: Cardiothoracic;;    watchman heart device  2019     Past Medical History:   Diagnosis Date    A-fib     Allergies 2020    gets allergy shots    Arthritis 1973    right knee    Back pain     COPD (chronic obstructive pulmonary disease)     Diastolic heart failure     GERD (gastroesophageal reflux disease) 2015    HLD (hyperlipidemia)     HTN (hypertension)     Hx of LASIK     Hypothyroidism, unspecified 2012    Inflammatory disease of prostate, unspecified     out of medicine    Mild intermittent asthma, uncomplicated     Myocardial infarction     Neuropathy     On home oxygen therapy     3L [place]  at night    PAD (peripheral artery disease)     Staph skin  infection 2016    right thigh    TIA (transient ischemic attack) 2013     No family history on file.     Social History:   Marital Status:   Alcohol History:  reports that he does not currently use alcohol after a past usage of about 21.0 standard drinks of alcohol per week.  Tobacco History:  reports that he quit smoking about 36 years ago. His smoking use included cigarettes. He has never used smokeless tobacco.  Drug History:  reports no history of drug use.    Review of patient's allergies indicates:   Allergen Reactions    Adhes. band-tape-benzalkonium Rash     Pt stated it caused blisters    Statins-hmg-coa reductase inhibitors Other (See Comments)     Statin Intolerance (joint pain)       Current Outpatient Medications   Medication Sig Dispense Refill    acetaminophen (TYLENOL) 325 MG tablet Take 650 mg by mouth every 6 (six) hours as needed for Pain or Temperature greater than. (Patient not taking: Reported on 5/29/2024)      albuterol (PROVENTIL/VENTOLIN HFA) 90 mcg/actuation inhaler Inhale 2 puffs into the lungs every 6 (six) hours as needed for Wheezing or Shortness of Breath.      albuterol-ipratropium (DUO-NEB) 2.5 mg-0.5 mg/3 mL nebulizer solution Inhale 3 mLs into the lungs.      aspirin 81 MG Chew Take 81 mg by mouth once daily.      betamethasone valerate 0.1% (VALISONE) 0.1 % Lotn Apply 1 g topically 2 (two) times daily as needed.      diltiaZEM (CARDIZEM CD) 180 MG 24 hr capsule Take 1 capsule (180 mg total) by mouth once daily. (Patient not taking: Reported on 4/1/2024) 90 capsule 0    doxepin (SINEQUAN) 50 MG capsule Take 50 mg by mouth every evening.      DULoxetine (CYMBALTA) 60 MG capsule Take 60 mg by mouth nightly.      EPINEPHrine (EPIPEN) 0.3 mg/0.3 mL AtIn Inject 0.3 mg into the muscle once.      ezetimibe (ZETIA) 10 mg tablet Take 1 tablet (10 mg total) by mouth every evening. (Patient not taking: Reported on 8/19/2024) 90 tablet 0    furosemide (LASIX) 40 MG tablet 40 mg.       gabapentin (NEURONTIN) 300 MG capsule Take 300 mg by mouth 2 (two) times daily.      guaiFENesin (MUCINEX) 600 mg 12 hr tablet Take 2 tablets (1,200 mg total) by mouth 2 (two) times daily. 30 tablet 0    levothyroxine (SYNTHROID) 50 MCG tablet Take 50 mcg by mouth before breakfast.      metOLazone (ZAROXOLYN) 5 MG tablet 5 mg.      metoprolol tartrate (LOPRESSOR) 25 MG tablet Take 1 tablet (25 mg total) by mouth 2 (two) times daily. (Patient taking differently: Take 12.5 mg by mouth 2 (two) times daily. He will hold if his bp is low) 120 tablet 0    montelukast (SINGULAIR) 10 mg tablet Take 10 mg by mouth once daily.      nitroGLYCERIN (NITROSTAT) 0.4 MG SL tablet Place 0.4 mg under the tongue every 5 (five) minutes as needed for Chest pain. (Patient not taking: Reported on 5/29/2024)      omega-3 fatty acids/fish oil (FISH OIL-OMEGA-3 FATTY ACIDS) 300-1,000 mg capsule Take 1 capsule by mouth 2 (two) times daily.      omeprazole (PRILOSEC) 20 MG capsule Take 40 mg by mouth once daily.      potassium chloride SA (K-DUR,KLOR-CON) 20 MEQ tablet Take 20 mEq by mouth nightly.      senna-docusate 8.6-50 mg (PERICOLACE) 8.6-50 mg per tablet Take 1 tablet by mouth nightly.      spironolactone (ALDACTONE) 25 MG tablet Take 1 tablet (25 mg total) by mouth 2 (two) times daily for 7 days, THEN 1 tablet (25 mg total) once daily. (Patient taking differently: daily) 44 tablet 0    tamsulosin (FLOMAX) 0.4 mg Cap Take 0.4 mg by mouth once daily.      torsemide (DEMADEX) 20 MG Tab Take 2 tablets (40 mg total) by mouth 2 (two) times a day for 7 days, THEN 2 tablets (40 mg total) once daily. (Patient not taking: Reported on 5/29/2024) 88 tablet 0     No current facility-administered medications for this visit.       Review of Systems   Constitutional:  Negative for chills, fatigue, fever and unexpected weight change.   HENT:  Negative for hearing loss and trouble swallowing.    Eyes:  Negative for photophobia and visual disturbance.    Respiratory:  Negative for cough, shortness of breath and wheezing.    Cardiovascular:  Negative for chest pain, palpitations and leg swelling.   Gastrointestinal:  Negative for abdominal pain and nausea.   Genitourinary:  Negative for dysuria and frequency.   Musculoskeletal:  Negative for arthralgias, back pain, gait problem, joint swelling and myalgias.   Skin:  Positive for wound. Negative for rash.   Neurological:  Positive for numbness. Negative for tremors, seizures, weakness and headaches.   Hematological:  Does not bruise/bleed easily.         Objective:        Physical Exam:   Foot Exam  Physical Exam  Ortho/SPM Exam     Imaging:            Assessment:       1. Pressure injury of toe of left foot, stage 3    2. PVD (peripheral vascular disease)    3. PAD (peripheral artery disease)    4. Ulcer of left foot with fat layer exposed    5. History of ulcer of lower extremity    6. At high risk for inadequate nutritional intake    7. Tinea pedis of both feet    8. Difficulty in walking, not elsewhere classified      Plan:   Pressure injury of toe of left foot, stage 3    PVD (peripheral vascular disease)    PAD (peripheral artery disease)    Ulcer of left foot with fat layer exposed    History of ulcer of lower extremity    At high risk for inadequate nutritional intake    Tinea pedis of both feet    Difficulty in walking, not elsewhere classified      Follow up in about 1 week (around 9/30/2024).    Procedures          Counseling:     I provided patient education verbally regarding:   Patient diagnosis, treatment options, as well as alternatives, risks, and benefits.     This note was created using Dragon voice recognition software that occasionally misinterpreted phrases or words.

## 2024-09-25 ENCOUNTER — HOSPITAL ENCOUNTER (OUTPATIENT)
Dept: RADIOLOGY | Facility: HOSPITAL | Age: 79
Discharge: HOME | End: 2024-09-25
Attending: NURSE PRACTITIONER
Payer: MEDICARE

## 2024-09-25 DIAGNOSIS — M25.551 RIGHT HIP PAIN: ICD-10-CM

## 2024-09-25 PROCEDURE — A4215 STERILE NEEDLE: HCPCS

## 2024-09-25 PROCEDURE — 25500020 PHARM REV CODE 255: Performed by: NURSE PRACTITIONER

## 2024-09-25 PROCEDURE — 77002 NEEDLE LOCALIZATION BY XRAY: CPT | Mod: 26,,, | Performed by: RADIOLOGY

## 2024-09-25 PROCEDURE — 20610 DRAIN/INJ JOINT/BURSA W/O US: CPT | Mod: RT

## 2024-09-25 PROCEDURE — 63600175 PHARM REV CODE 636 W HCPCS: Mod: JZ,JG | Performed by: NURSE PRACTITIONER

## 2024-09-25 PROCEDURE — 20610 DRAIN/INJ JOINT/BURSA W/O US: CPT | Mod: RT,,, | Performed by: RADIOLOGY

## 2024-09-25 PROCEDURE — 25000003 PHARM REV CODE 250: Performed by: NURSE PRACTITIONER

## 2024-09-25 RX ORDER — BUPIVACAINE HYDROCHLORIDE 2.5 MG/ML
30 INJECTION, SOLUTION EPIDURAL; INFILTRATION; INTRACAUDAL ONCE
Status: COMPLETED | OUTPATIENT
Start: 2024-09-25 | End: 2024-09-25

## 2024-09-25 RX ORDER — LIDOCAINE HYDROCHLORIDE 10 MG/ML
10 INJECTION, SOLUTION INFILTRATION; PERINEURAL ONCE
Status: COMPLETED | OUTPATIENT
Start: 2024-09-25 | End: 2024-09-25

## 2024-09-25 RX ORDER — METHYLPREDNISOLONE ACETATE 40 MG/ML
40 INJECTION, SUSPENSION INTRA-ARTICULAR; INTRALESIONAL; INTRAMUSCULAR; SOFT TISSUE ONCE
Status: COMPLETED | OUTPATIENT
Start: 2024-09-25 | End: 2024-09-25

## 2024-09-25 RX ADMIN — METHYLPREDNISOLONE ACETATE 40 MG: 40 INJECTION, SUSPENSION INTRA-ARTICULAR; INTRALESIONAL; INTRAMUSCULAR; SOFT TISSUE at 01:09

## 2024-09-25 RX ADMIN — LIDOCAINE HYDROCHLORIDE 7 ML: 10 SOLUTION INTRAVENOUS at 01:09

## 2024-09-25 RX ADMIN — BUPIVACAINE HYDROCHLORIDE 8 ML: 2.5 INJECTION, SOLUTION EPIDURAL; INFILTRATION; INTRACAUDAL at 01:09

## 2024-09-25 RX ADMIN — IOHEXOL 3 ML: 300 INJECTION, SOLUTION INTRAVENOUS at 01:09

## 2024-09-27 ENCOUNTER — EXTERNAL HOME HEALTH (OUTPATIENT)
Dept: HOME HEALTH SERVICES | Facility: HOSPITAL | Age: 79
End: 2024-09-27
Payer: MEDICARE

## 2024-09-29 NOTE — PROGRESS NOTES
"  [address] [facility] [address]  [place] [address]  Phone: [phone]   [phone]    Patient's PCP:Sunita Rivera MD  Referring Provider: No ref. provider found    Subjective:      Chief Complaint:: Wound Care, Foot Ulcer, Wound Check, Pressure Ulcer, Non-healing Wound, and Poor Circulation    HPI  Jose F Hanley is a 79 y.o. male who presents today for follow-up of left great toe wound.  See wound docs documentation for full assessment and evaluation of all wounds.         Additionally, patient presents for follow up of dry skin bilateral feet.  Fungal infection of skin explained. Treatment options including no treatment, topical medications, oral medications were discussed, as well as success rates and risks of recurrence. We agreed on topical medication         Athlete's foot counseling: Counseled patient that it is important to keep the feet dry. Use absorbent cotton socks and change them if they become sweaty. Or wear an open-toe shoe or sandal. Wash the feet at least once a day with soap and water. Apply the antifungal cream as prescribed. Recommend spray antiperspirant to the feet. Some antifungal creams are available without a prescription. It may take a week before the rash starts to improve. It can take about 3 to 4 weeks to completely clear. Continue the medicine until the rash is all gone. Use over-the-counter antifungal powders or sprays on your feet after exposure to high-risk environments, such as public showers, gyms, and locker rooms. This can help prevent future infections. Wearing appropriate shoes in these situations can help.            Vascular Status/JENNIFER: patient under the care of vascular surgery previously. Placed additional referral to vascular surgery for patient to continue to follow with vascular surgery, due to new wound present- patient does have diminished pedal pulses.   Nutrition Risk/Labs: "Tips and Tricks for wound healing" handout given with detailed guide to optimize " nutritional status for wound healing.   Counseled patient on increasing protein intake, following a healthy diet, vitamins  Recent Cultures & Dates:  See micro tab in chart  Antibiotics: doxycycline prescribed  Radiology/Xray: See imaging tab in chart  Diabetes Status/HbA1c: See lab tab in chart  Offloading/Immobilization: Offloading felt to foot, placed in order to remove pressure from wound, accommodating for patient's gait and foot deformity. Recommend crutches or knee scooter.  Football wrap with Darco shoe.  Edema/Compression: none  Tobacco Use: none        EVALUATION, ASSESSMENT, & PLAN:      1. Debridement.See Wound Docs for assessment of wounds and procedure notes  2. Continue taking all medications as prescribed  3. Dressing changes, see Wound docs for dressing change orders  4. RTC one week   5. Counseled patient on increasing protein intake, not getting wound wet, keeping dressing clean dry and intact, following a healthy diet, elevating legs when able, removing pressure from wound     Total time spent for E&M 40  Total time for debridement 20 minutes      Proper ulcer care and the possible need for serial debridements, topical medications, specific dressings and biological engineered skin substitutes if indicated  Discussed general issues surrounding recurrent/ nonhealing ulcers, along with the advantages & disadvantages of various treatment strategies.     Patient should call the office immediately if any signs of infection, such as fever, chills, sweats, increased redness or pain.     Patient was instructed to call the clinic or go to the emergency department if their symptoms do not improve, worsens, or if new symptoms develop.  Patient was advised that if any increased swelling, pain, or numbness arise to go immediately to the ED. Patient knows to call any time if an emergency arises. Shared decision making occurred and patient verbalized understanding in agreement with this plan.         >50% of  this > 60 minute visit was spent face to face educating/counseling the patient     I spent a total of 60 minutes on the day of the visit.This includes face to face time and non-face to face time preparing to see the patient (eg, review of tests), obtaining and/or reviewing separately obtained history, documenting clinical information in the electronic or other health record, independently interpreting results and communicating results to the patient/family/caregiver, or care coordinator.        Much of the documentation for this visit was completed in the Wound Docs system.  Please see the attached documentation for further details about the patient's care. Scanned under the Media tab.         Radha Costello DPM        Vitals:    09/30/24 0928   BP: 139/69   Pulse: 68   Resp: 18   Temp: 98.2 °F (36.8 °C)      Shoe Size:     Past Surgical History:   Procedure Laterality Date    AORTOGRAPHY WITH SERIALOGRAPHY N/A 08/31/2023    Procedure: AORTOGRAM, WITH SERIALOGRAPHY;  Surgeon: Gary Thomas MD;  Location: [facility] CATH/EP LAB;  Service: Peripheral Vascular;  Laterality: N/A;    CATARACT EXTRACTION Bilateral 2014    CORONARY ANGIOGRAPHY INCLUDING BYPASS GRAFTS WITH CATHETERIZATION OF LEFT HEART Left 01/12/2024    Procedure: ANGIOGRAM, CORONARY, INCLUDING BYPASS GRAFT, WITH LEFT HEART CATHETERIZATION;  Surgeon: Gregg Bush MD;  Location: [facility] CATH/EP LAB;  Service: Cardiology;  Laterality: Left;    CORONARY ARTERY BYPASS GRAFT  2010    ENDOSCOPIC HARVEST OF VEIN Right 2/23/2024    Procedure: HARVEST-VEIN-ENDOVASCULAR;  Surgeon: Gary Thomas MD;  Location: [facility] OR;  Service: Cardiothoracic;  Laterality: Right;    INSERTION OF IMPLANTABLE LOOP RECORDER  2011    INSERTION OF INTRA-AORTIC BALLOON ASSIST DEVICE Right 2/23/2024    Procedure: INSERTION, INTRA-AORTIC BALLOON PUMP;  Surgeon: Gary Thomas MD;  Location: [facility] OR;  Service: Cardiothoracic;  Laterality: Right;    INSERTION OF PACEMAKER   2021    does not have card with him for preadmit    INSTANTANEOUS WAVE-FREE RATIO (IFR) N/A 01/12/2024    Procedure: Instantaneous Wave-Free Ratio (IFR);  Surgeon: Gregg Bush MD;  Location: [facility] CATH/EP LAB;  Service: Cardiology;  Laterality: N/A;    PTA, ANTERIOR TIBIAL Left 08/31/2023    Procedure: PTA, Anterior Tibial;  Surgeon: Gary Thomas MD;  Location: [facility] CATH/EP LAB;  Service: Peripheral Vascular;  Laterality: Left;    PTA, SUPERFICIAL FEMORAL ARTERY Left 08/31/2023    Procedure: PTA, Superficial Femoral Artery;  Surgeon: Gary Thomas MD;  Location: [facility] CATH/EP LAB;  Service: Peripheral Vascular;  Laterality: Left;    REPEAT CORONARY ARTERY BYPASS GRAFTING N/A 2/23/2024    Procedure: CABG, REPEAT;  Surgeon: Gary Thomas MD;  Location: [facility] OR;  Service: Cardiothoracic;  Laterality: N/A;    SPLENECTOMY  2016    STENT, ANTERIOR TIBIAL Left 08/31/2023    Procedure: Stent, Anterior Tibial;  Surgeon: Gary Thomas MD;  Location: [facility] CATH/EP LAB;  Service: Peripheral Vascular;  Laterality: Left;    SURGICAL PROCUREMENT, ARTERY, RADIAL, FOR CABG  2/23/2024    Procedure: SURGICAL PROCUREMENT,ARTERY,RADIAL,FOR CABG;  Surgeon: Gary Thomas MD;  Location: [facility] OR;  Service: Cardiothoracic;;    watchman heart device  2019     Past Medical History:   Diagnosis Date    A-fib     Allergies 2020    gets allergy shots    Arthritis 1973    right knee    Back pain     COPD (chronic obstructive pulmonary disease)     Diastolic heart failure     GERD (gastroesophageal reflux disease) 2015    HLD (hyperlipidemia)     HTN (hypertension)     Hx of LASIK     Hypothyroidism, unspecified 2012    Inflammatory disease of prostate, unspecified     out of medicine    Mild intermittent asthma, uncomplicated     Myocardial infarction     Neuropathy     On home oxygen therapy     3L [place]  at night    PAD (peripheral artery disease)     Staph skin infection 2016    right thigh    TIA  (transient ischemic attack) 2013     No family history on file.     Social History:   Marital Status:   Alcohol History:  reports that he does not currently use alcohol after a past usage of about 21.0 standard drinks of alcohol per week.  Tobacco History:  reports that he quit smoking about 36 years ago. His smoking use included cigarettes. He has never used smokeless tobacco.  Drug History:  reports no history of drug use.    Review of patient's allergies indicates:   Allergen Reactions    Adhes. band-tape-benzalkonium Rash     Pt stated it caused blisters    Statins-hmg-coa reductase inhibitors Other (See Comments)     Statin Intolerance (joint pain)       Current Outpatient Medications   Medication Sig Dispense Refill    acetaminophen (TYLENOL) 325 MG tablet Take 650 mg by mouth every 6 (six) hours as needed for Pain or Temperature greater than. (Patient not taking: Reported on 5/29/2024)      albuterol (PROVENTIL/VENTOLIN HFA) 90 mcg/actuation inhaler Inhale 2 puffs into the lungs every 6 (six) hours as needed for Wheezing or Shortness of Breath.      albuterol-ipratropium (DUO-NEB) 2.5 mg-0.5 mg/3 mL nebulizer solution Inhale 3 mLs into the lungs.      aspirin 81 MG Chew Take 81 mg by mouth once daily.      betamethasone valerate 0.1% (VALISONE) 0.1 % Lotn Apply 1 g topically 2 (two) times daily as needed.      diltiaZEM (CARDIZEM CD) 180 MG 24 hr capsule Take 1 capsule (180 mg total) by mouth once daily. (Patient not taking: Reported on 4/1/2024) 90 capsule 0    doxepin (SINEQUAN) 50 MG capsule Take 50 mg by mouth every evening.      DULoxetine (CYMBALTA) 60 MG capsule Take 60 mg by mouth nightly.      EPINEPHrine (EPIPEN) 0.3 mg/0.3 mL AtIn Inject 0.3 mg into the muscle once.      ezetimibe (ZETIA) 10 mg tablet Take 1 tablet (10 mg total) by mouth every evening. (Patient not taking: Reported on 8/19/2024) 90 tablet 0    furosemide (LASIX) 40 MG tablet 40 mg.      gabapentin (NEURONTIN) 300 MG capsule  Take 300 mg by mouth 2 (two) times daily.      guaiFENesin (MUCINEX) 600 mg 12 hr tablet Take 2 tablets (1,200 mg total) by mouth 2 (two) times daily. 30 tablet 0    levothyroxine (SYNTHROID) 50 MCG tablet Take 50 mcg by mouth before breakfast.      metOLazone (ZAROXOLYN) 5 MG tablet 5 mg.      metoprolol tartrate (LOPRESSOR) 25 MG tablet Take 1 tablet (25 mg total) by mouth 2 (two) times daily. (Patient taking differently: Take 12.5 mg by mouth 2 (two) times daily. He will hold if his bp is low) 120 tablet 0    montelukast (SINGULAIR) 10 mg tablet Take 10 mg by mouth once daily.      nitroGLYCERIN (NITROSTAT) 0.4 MG SL tablet Place 0.4 mg under the tongue every 5 (five) minutes as needed for Chest pain. (Patient not taking: Reported on 5/29/2024)      omega-3 fatty acids/fish oil (FISH OIL-OMEGA-3 FATTY ACIDS) 300-1,000 mg capsule Take 1 capsule by mouth 2 (two) times daily.      omeprazole (PRILOSEC) 20 MG capsule Take 40 mg by mouth once daily.      potassium chloride SA (K-DUR,KLOR-CON) 20 MEQ tablet Take 20 mEq by mouth nightly.      senna-docusate 8.6-50 mg (PERICOLACE) 8.6-50 mg per tablet Take 1 tablet by mouth nightly.      spironolactone (ALDACTONE) 25 MG tablet Take 1 tablet (25 mg total) by mouth 2 (two) times daily for 7 days, THEN 1 tablet (25 mg total) once daily. (Patient taking differently: daily) 44 tablet 0    tamsulosin (FLOMAX) 0.4 mg Cap Take 0.4 mg by mouth once daily.      torsemide (DEMADEX) 20 MG Tab Take 2 tablets (40 mg total) by mouth 2 (two) times a day for 7 days, THEN 2 tablets (40 mg total) once daily. (Patient not taking: Reported on 5/29/2024) 88 tablet 0     No current facility-administered medications for this visit.       Review of Systems   Constitutional:  Negative for chills, fatigue, fever and unexpected weight change.   HENT:  Negative for hearing loss and trouble swallowing.    Eyes:  Negative for photophobia and visual disturbance.   Respiratory:  Negative for cough,  shortness of breath and wheezing.    Cardiovascular:  Negative for chest pain, palpitations and leg swelling.   Gastrointestinal:  Negative for abdominal pain and nausea.   Genitourinary:  Negative for dysuria and frequency.   Musculoskeletal:  Negative for arthralgias, back pain, gait problem, joint swelling and myalgias.   Skin:  Positive for wound. Negative for rash.   Neurological:  Positive for numbness. Negative for tremors, seizures, weakness and headaches.   Hematological:  Does not bruise/bleed easily.         Objective:        Physical Exam:   Foot Exam  Physical Exam  Ortho/SPM Exam     Imaging:            Assessment:       1. Pressure injury of toe of left foot, stage 3    2. PVD (peripheral vascular disease)    3. PAD (peripheral artery disease)    4. Ulcer of left foot with fat layer exposed    5. Tinea pedis of both feet    6. Difficulty in walking, not elsewhere classified    7. At high risk for inadequate nutritional intake    8. Wound infection    9. History of ulcer of lower extremity    10. Cellulitis and abscess of foot    11. 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    Procedures          Counseling:     I provided patient education verbally regarding:   Patient diagnosis, treatment options, as well as alternatives, risks, and benefits.     This note was created using Dragon voice recognition software that occasionally  misinterpreted phrases or words.

## 2024-09-30 ENCOUNTER — OFFICE VISIT (OUTPATIENT)
Dept: WOUND CARE | Facility: HOSPITAL | Age: 79
End: 2024-09-30
Attending: PODIATRIST
Payer: MEDICARE

## 2024-09-30 VITALS
SYSTOLIC BLOOD PRESSURE: 139 MMHG | RESPIRATION RATE: 18 BRPM | TEMPERATURE: 98 F | HEART RATE: 68 BPM | DIASTOLIC BLOOD PRESSURE: 69 MMHG

## 2024-09-30 DIAGNOSIS — L08.9 WOUND INFECTION: ICD-10-CM

## 2024-09-30 DIAGNOSIS — L02.619 CELLULITIS AND ABSCESS OF FOOT: ICD-10-CM

## 2024-09-30 DIAGNOSIS — Z91.89 AT HIGH RISK FOR INADEQUATE NUTRITIONAL INTAKE: ICD-10-CM

## 2024-09-30 DIAGNOSIS — I73.9 PAD (PERIPHERAL ARTERY DISEASE): ICD-10-CM

## 2024-09-30 DIAGNOSIS — L97.522 ULCER OF LEFT FOOT WITH FAT LAYER EXPOSED: ICD-10-CM

## 2024-09-30 DIAGNOSIS — L03.119 CELLULITIS AND ABSCESS OF FOOT: ICD-10-CM

## 2024-09-30 DIAGNOSIS — L97.524 ULCER OF LEFT FOOT WITH NECROSIS OF BONE: ICD-10-CM

## 2024-09-30 DIAGNOSIS — R26.2 DIFFICULTY IN WALKING, NOT ELSEWHERE CLASSIFIED: ICD-10-CM

## 2024-09-30 DIAGNOSIS — Z87.2 HISTORY OF ULCER OF LOWER EXTREMITY: ICD-10-CM

## 2024-09-30 DIAGNOSIS — B35.3 TINEA PEDIS OF BOTH FEET: ICD-10-CM

## 2024-09-30 DIAGNOSIS — I73.9 PVD (PERIPHERAL VASCULAR DISEASE): ICD-10-CM

## 2024-09-30 DIAGNOSIS — E11.628 DIABETIC FOOT INFECTION: ICD-10-CM

## 2024-09-30 DIAGNOSIS — L89.893 PRESSURE INJURY OF TOE OF LEFT FOOT, STAGE 3: Primary | ICD-10-CM

## 2024-09-30 DIAGNOSIS — T14.8XXA WOUND INFECTION: ICD-10-CM

## 2024-09-30 DIAGNOSIS — L08.9 DIABETIC FOOT INFECTION: ICD-10-CM

## 2024-09-30 PROCEDURE — 99213 OFFICE O/P EST LOW 20 MIN: CPT | Mod: PN | Performed by: PODIATRIST

## 2024-09-30 PROCEDURE — 99214 OFFICE O/P EST MOD 30 MIN: CPT | Mod: ,,, | Performed by: PODIATRIST

## 2024-10-03 ENCOUNTER — DOCUMENT SCAN (OUTPATIENT)
Dept: HOME HEALTH SERVICES | Facility: HOSPITAL | Age: 79
End: 2024-10-03
Payer: MEDICARE

## 2024-10-04 DIAGNOSIS — M47.896 OTHER OSTEOARTHRITIS OF SPINE, LUMBAR REGION: Primary | ICD-10-CM

## 2024-10-15 ENCOUNTER — TELEPHONE (OUTPATIENT)
Dept: CARDIOLOGY | Facility: CLINIC | Age: 79
End: 2024-10-15
Payer: MEDICARE

## 2024-10-15 NOTE — TELEPHONE ENCOUNTER
----- Message from Elisabet sent at 10/15/2024  8:12 AM CDT -----  Regarding: Needs Medical Clearance  Contact: patient at [phone]  Type: Needs Medical Clearance    Who Called:  patient at [phone]    Additional Information: fax is being sent by MRI department for Doctor Paul to sign for patient to have MRI since he has a pacemaker. Please call and advise. Thank you

## 2024-10-19 ENCOUNTER — DOCUMENT SCAN (OUTPATIENT)
Dept: HOME HEALTH SERVICES | Facility: HOSPITAL | Age: 79
End: 2024-10-19
Payer: MEDICARE

## 2024-10-24 ENCOUNTER — DOCUMENT SCAN (OUTPATIENT)
Dept: HOME HEALTH SERVICES | Facility: HOSPITAL | Age: 79
End: 2024-10-24
Payer: MEDICARE

## 2025-01-08 DIAGNOSIS — M25.551 PAIN IN RIGHT HIP: Primary | ICD-10-CM

## 2025-01-08 DIAGNOSIS — M25.561 PAIN IN RIGHT KNEE: ICD-10-CM

## (undated) DEVICE — COVER OVERHEAD SURG LT BLUE

## (undated) DEVICE — BLANKET HYPER ADULT 24X60IN

## (undated) DEVICE — SET CARDIOPLEGIA 4:1 RATIO

## (undated) DEVICE — SUT SILK 2-0 SH 18IN BLACK

## (undated) DEVICE — DECANTER FLUID TRNSF WHITE 9IN

## (undated) DEVICE — SUTURE PROLENE BLU MONO 4-30 SZ 7.0 DBLE ARMED BV175-6

## (undated) DEVICE — CATH DXTERITY IMA 100CM 6FR

## (undated) DEVICE — TRAY CATH 1-LYR URIMTR 16FR

## (undated) DEVICE — SOL POVIDONE PREP IODINE 4 OZ

## (undated) DEVICE — RESERVOIR (FOR C A T S)

## (undated) DEVICE — YANKAUER OPEN TIP W/O VENT

## (undated) DEVICE — SHEATH INTRODUCER DESTINATION 6FX45

## (undated) DEVICE — CANNULA IMA 1MM

## (undated) DEVICE — DRESSING TEGADERM CHG 3.5X4.5

## (undated) DEVICE — Device

## (undated) DEVICE — ELECTRODE REM PLYHSV RETURN 9

## (undated) DEVICE — BLADE SAW SAG DP 4.72X25.40X89

## (undated) DEVICE — KIT CUSTOM BLD CARDIOPLEGIA ST

## (undated) DEVICE — MARKER DUAL TIP SKIN W/ RULER

## (undated) DEVICE — SUTURE STEELPACING 2-0 SH 24 TPW32

## (undated) DEVICE — DRAPE EXTREMITY ORTHOMAX

## (undated) DEVICE — SUT SILK 0 PSL 30 INCH

## (undated) DEVICE — TRAY CV ACCESS W/ AUX B SMH

## (undated) DEVICE — GAUZE SPONGE 8X4 12 PLY

## (undated) DEVICE — CATH VEN RET THINWALL 36/46F

## (undated) DEVICE — GAUZE X RAY STRL 16PLY 4X4IN

## (undated) DEVICE — TRAY SKIN SCRUB DRY PREMIUM

## (undated) DEVICE — SOL NACL IRR 3000ML

## (undated) DEVICE — CATH DXTERITY JR40 100CM 6FR

## (undated) DEVICE — APPLICATOR CHLORAPREP ORN 26ML

## (undated) DEVICE — SOL POVIDONE SCRUB IODINE 4 OZ

## (undated) DEVICE — SET SPRAY FOR TISSEAL

## (undated) DEVICE — TOWEL COTTON SURG WHT 27X17IN

## (undated) DEVICE — CATH THOR STND RGHT ANG 28F

## (undated) DEVICE — ELECTRODE BLD EXT 6.50 ST DISP

## (undated) DEVICE — CABLE PACING FAS-LOC SECURITY

## (undated) DEVICE — SEE MEDLINE ITEM 146308

## (undated) DEVICE — EVACUATOR WOUND BULB 100CC

## (undated) DEVICE — SYS VIRTUOSAPH PLUS EVM

## (undated) DEVICE — ELECTRODE BLD 1 INCH TEFLON

## (undated) DEVICE — SHEATH PINNACLE 6FRX10CM W/GUIDEWIRE

## (undated) DEVICE — APPLIER LIGACLIP SM 9.38IN

## (undated) DEVICE — MARKER CORONARY BYPASS GRAFT

## (undated) DEVICE — SUT 2/0 36IN ETHIBOND EXCE

## (undated) DEVICE — SUT BONE WAX SYNTHETIC

## (undated) DEVICE — SET AT1 AUTOTRANSFUSION

## (undated) DEVICE — CATH THORACIC 28FR ST

## (undated) DEVICE — SUT PROLENE 6-0 BL C-1 C-1

## (undated) DEVICE — CLIP LIGACLIP XTRA TITANIUM

## (undated) DEVICE — SUT MONOCRYL 0 CT-1 UND MON

## (undated) DEVICE — SUT PROLENE 4-0 SH BLU 36IN

## (undated) DEVICE — SUT 2-0 12-18IN SILK

## (undated) DEVICE — DRESSING 5X5 4PLY QUIKCLOT

## (undated) DEVICE — DRAPE CVMAX SPLIT ANES SCRN

## (undated) DEVICE — GUIDEWIRE J 3MM .035IN 150

## (undated) DEVICE — CATHETER ANGIO OMNI FLUSH 10732201

## (undated) DEVICE — CANNULA RETROGRADE CARDIOPLEG

## (undated) DEVICE — SUT PROLENE 6-0 C-1 30IN BL

## (undated) DEVICE — SUT ETHIBOND XTRA 4-0 RB-1

## (undated) DEVICE — SUT 6 18IN STEEL MONO CCS

## (undated) DEVICE — SYR SLIP TIP 20CC

## (undated) DEVICE — SUT SILK BLK BR. 2 2-60

## (undated) DEVICE — CANNULA SOFT FLOW ANG 14IN 18F

## (undated) DEVICE — SPONGE LAP 18X18 PREWASHED

## (undated) DEVICE — PLEDGET TFLN FELT 9.5X4.8 ST

## (undated) DEVICE — SHEATH INTRODUCER 6FR 11CM

## (undated) DEVICE — GAUZE VISTEC XR DTECT 16 4X4IN

## (undated) DEVICE — GLOVE SURGICAL LATEX SZ 6.5

## (undated) DEVICE — PAD DEFIB RADIOLUCENT ADULT

## (undated) DEVICE — TUBING ANTICOAGULANT

## (undated) DEVICE — DRAPE INCISE IOBAN 2 23X17IN

## (undated) DEVICE — DRESSING MEPILEX 4X10IN

## (undated) DEVICE — BLADE SURG CARBON STEEL SZ11

## (undated) DEVICE — GUIDEWIRE ASTATO 20 300CM

## (undated) DEVICE — SUT 4-0 12-18IN SILK BLACK

## (undated) DEVICE — TUBING SUC UNIV W/CONN 12FT

## (undated) DEVICE — TUBING PRSS MON M/M LL 72IN

## (undated) DEVICE — CATH DXTERITY JL40 100CM 6FR

## (undated) DEVICE — SUT MONO 2-0 CT-1 UNDYED

## (undated) DEVICE — DRAPE INCISE IOBAN 2 23X33IN

## (undated) DEVICE — GUIDEWIRE STIFF ANGLED 035X260 LAUREATE

## (undated) DEVICE — TUBING INSUFFLATION W/LUER LOK

## (undated) DEVICE — SUT ETHBND-EXCEL 2/0 SH-1 36IN

## (undated) DEVICE — NDL ECLIPSE SAF REG 25GX5/8IN

## (undated) DEVICE — SUT MONOCRYL PLUS UD 3-0 27

## (undated) DEVICE — GUIDEWIRE BMW 0.014/300

## (undated) DEVICE — GLOVE SURG BIOGEL LATEX SZ 7.5

## (undated) DEVICE — STAPLER SKIN PROXIMATE WIDE

## (undated) DEVICE — GUIDEWIRE DOUBLE ENDED .035 DIA. 150CML

## (undated) DEVICE — DRAPE STERI INSTRUMENT 1018

## (undated) DEVICE — CONTAINER SPECIMEN OR STER 4OZ

## (undated) DEVICE — DRAIN CHEST DRY SUCTION